# Patient Record
Sex: MALE | Race: BLACK OR AFRICAN AMERICAN | NOT HISPANIC OR LATINO | Employment: UNEMPLOYED | URBAN - METROPOLITAN AREA
[De-identification: names, ages, dates, MRNs, and addresses within clinical notes are randomized per-mention and may not be internally consistent; named-entity substitution may affect disease eponyms.]

---

## 2017-01-07 ENCOUNTER — HOSPITAL ENCOUNTER (EMERGENCY)
Facility: CLINIC | Age: 32
Discharge: HOME | End: 2017-01-07
Attending: EMERGENCY MEDICINE | Admitting: EMERGENCY MEDICINE
Payer: COMMERCIAL

## 2017-01-07 VITALS
RESPIRATION RATE: 16 BRPM | SYSTOLIC BLOOD PRESSURE: 119 MMHG | HEART RATE: 89 BPM | DIASTOLIC BLOOD PRESSURE: 108 MMHG | OXYGEN SATURATION: 100 % | TEMPERATURE: 98.6 F

## 2017-01-07 DIAGNOSIS — L03.317 CELLULITIS OF BUTTOCK, RIGHT: ICD-10-CM

## 2017-01-07 DIAGNOSIS — K61.0 ABSCESS, PERIANAL: ICD-10-CM

## 2017-01-07 PROCEDURE — 10060 I&D ABSCESS SIMPLE/SINGLE: CPT

## 2017-01-07 PROCEDURE — 99285 EMERGENCY DEPT VISIT HI MDM: CPT | Mod: 25

## 2017-01-07 PROCEDURE — 40000275 ZZH STATISTIC RCP TIME EA 10 MIN

## 2017-01-07 PROCEDURE — 99152 MOD SED SAME PHYS/QHP 5/>YRS: CPT

## 2017-01-07 PROCEDURE — 96374 THER/PROPH/DIAG INJ IV PUSH: CPT

## 2017-01-07 PROCEDURE — 25000125 ZZHC RX 250: Performed by: EMERGENCY MEDICINE

## 2017-01-07 PROCEDURE — 40000965 ZZH STATISTIC END TITIAL CO2 MONITORING

## 2017-01-07 PROCEDURE — 76882 US LMTD JT/FCL EVL NVASC XTR: CPT

## 2017-01-07 PROCEDURE — 96372 THER/PROPH/DIAG INJ SC/IM: CPT

## 2017-01-07 RX ORDER — OXYCODONE HYDROCHLORIDE 5 MG/1
5-10 TABLET ORAL EVERY 6 HOURS PRN
Qty: 12 TABLET | Refills: 0 | Status: SHIPPED | OUTPATIENT
Start: 2017-01-07 | End: 2017-06-16

## 2017-01-07 RX ORDER — SULFAMETHOXAZOLE/TRIMETHOPRIM 800-160 MG
2 TABLET ORAL 2 TIMES DAILY
Qty: 28 TABLET | Refills: 0 | Status: SHIPPED | OUTPATIENT
Start: 2017-01-07 | End: 2017-01-14

## 2017-01-07 RX ORDER — CEPHALEXIN 500 MG/1
500 CAPSULE ORAL 4 TIMES DAILY
Qty: 28 CAPSULE | Refills: 0 | Status: SHIPPED | OUTPATIENT
Start: 2017-01-07 | End: 2017-01-14

## 2017-01-07 RX ORDER — PROPOFOL 10 MG/ML
200 INJECTION, EMULSION INTRAVENOUS ONCE
Status: DISCONTINUED | OUTPATIENT
Start: 2017-01-07 | End: 2017-01-07 | Stop reason: HOSPADM

## 2017-01-07 RX ORDER — LIDOCAINE HYDROCHLORIDE AND EPINEPHRINE 10; 10 MG/ML; UG/ML
INJECTION, SOLUTION INFILTRATION; PERINEURAL
Status: DISCONTINUED
Start: 2017-01-07 | End: 2017-01-07 | Stop reason: HOSPADM

## 2017-01-07 RX ORDER — PROPOFOL 10 MG/ML
INJECTION, EMULSION INTRAVENOUS
Status: DISCONTINUED
Start: 2017-01-07 | End: 2017-01-07 | Stop reason: HOSPADM

## 2017-01-07 RX ORDER — FENTANYL CITRATE 50 UG/ML
INJECTION, SOLUTION INTRAMUSCULAR; INTRAVENOUS
Status: DISCONTINUED
Start: 2017-01-07 | End: 2017-01-07 | Stop reason: HOSPADM

## 2017-01-07 RX ORDER — KETOROLAC TROMETHAMINE 30 MG/ML
30 INJECTION, SOLUTION INTRAMUSCULAR; INTRAVENOUS ONCE
Status: COMPLETED | OUTPATIENT
Start: 2017-01-07 | End: 2017-01-07

## 2017-01-07 RX ORDER — FENTANYL CITRATE 50 UG/ML
75 INJECTION, SOLUTION INTRAMUSCULAR; INTRAVENOUS ONCE
Status: DISCONTINUED | OUTPATIENT
Start: 2017-01-07 | End: 2017-01-07 | Stop reason: HOSPADM

## 2017-01-07 RX ADMIN — HYDROMORPHONE HYDROCHLORIDE 1 MG: 1 INJECTION, SOLUTION INTRAMUSCULAR; INTRAVENOUS; SUBCUTANEOUS at 09:52

## 2017-01-07 RX ADMIN — KETOROLAC TROMETHAMINE 30 MG: 30 INJECTION, SOLUTION INTRAMUSCULAR at 09:52

## 2017-01-07 ASSESSMENT — ENCOUNTER SYMPTOMS
FEVER: 0
CHILLS: 0

## 2017-01-07 NOTE — ED AVS SNAPSHOT
[facility] Emergency Department    [address]    [facility] [address]    Phone:  [phone]    Fax:  [phone]                                       Bernard Padilla   MRN: [id number]    Department:  [facility] Emergency Department   Date of Visit:  1/7/2017           Patient Information     Date Of Birth          1985        Your diagnoses for this visit were:     Abscess, perianal     Cellulitis of buttock, right        You were seen by Bashir Gonzales MD.        Discharge Instructions       Please make an appointment to follow up with Colon and Rectal Surgery [phone] in 3-5 days if not improving.    Soak area 2-3 times daily for 5 days    Use tylenol and/or ibuprofen for pain or discomfort    Use Oxycodone for severe pain uncontrolled by above medications    Opioid Medication Information  You have been given a prescription for an opioid (narcotic) pain medicine and/or have received a pain medicine while here in the emergency department. These medicines can make you drowsy or impaired. You must not drive, operate dangerous equipment, or engage in any other dangerous activities while taking these medications. If you drive while taking these medications, you could be arrested for DUI, or driving under the influence. Do not drink any alcohol while you are taking these medications.   Opioid pain medications can cause addiction. If you have a history of chemical dependency of any type, you are at a higher risk of becoming addicted to pain medications. Only take these prescribed medications to treat your pain when all other options have been tried. Take it for as short a time and as few doses as possible. Store your pain pills in a secure place, as they are frequently stolen and provide a dangerous opportunity for children or visitors in your house to start abusing these powerful medications. We will not replace any lost or stolen medicine. As soon as your  pain is better, you should flush all your remaining medication.   Many prescription pain medications contain Tylenol (acetaminophen), including Vicodin, Tylenol #3, Norco, Lortab, and Percocet. You should not take any extra pills of Tylenol if you are using these prescription medications or you can get very sick. Do not ever take more than 4000 mg of acetaminophen in any 24 hour period.  All opioids tend to cause constipation. Drink plenty of water and eat foods that have a lot of fiber, such as fruits, vegetables, prune juice, apple juice and high fiber cereal. Take a laxative if you don t move your bowels at least every other day. Miralax, Milk of Magnesia, Colace, or Senna can be used to keep you regular.                Perianal Abscess, Incision and Drainage  Glands near the anus can become blocked. This can lead to infection. If the infection cannot drain, a collection of pus called an abscess may form. Symptoms of an abscess include pain, itching, swelling, and fever.  Treatment of this infection has required an incision to drain the pus from the abscess. A gauze packing may have been put into the abscess opening. This should be removed within 1-2 days. if it falls out sooner, do not try to put it back in. Treatment with antibiotics may or may not be needed.  Healing of the wound will take about 1 to 2 weeks, depending on the size of the abscess.  Home care    The wound may drain for the first several days. Cover the wound with a clean dry bandage. Change the dressing if it becomes soaked with blood or pus, or soiled with feces.    If gauze packing was placed inside the abscess cavity, you may be told to remove it yourself. Do this only do it if the doctor told you to. You may do this in the shower. Once the packing is removed, wash the area carefully once a day until the skin opening has closed.     Try sitz baths. Sit in a tub filled with about 6 inches of hot water for 15-30 minutes. (Test the water  temperature before sitting down to ensure it will not burn you.) Repeat this twice a day until pain is relieved.    If you were prescribed antibiotics, take all of the medicine as prescribed. Continue it even if you start feeling better. All of the medicine should be finished unless your healthcare provider tells you to stop.    Unless a pain medicine has been prescribed, you may take an over-the-counter medicine, such as ibuprofen, for pain.    Passing stools may be painful. If so, ask your healthcare provider about using a stool-softener for a short time.  Follow-up care  Follow up with your healthcare provider as advised by our staff.  When to seek medical advice  Call your health care provider if any of the following occur:    Increasing pain, swelling, or redness    Pus continuing to drain from the wound 5 days after the incision    Fever of 100.4 F (38 C) or higher, or as directed by your healthcare provider    8958-1784 [employer]. [address] [place] [address]. All rights reserved. This information is not intended as a substitute for professional medical care. Always follow your healthcare professional's instructions.              Cellulitis  Cellulitis is an infection of the deep layers of skin. A break in the skin, such as a cut or scratch, can let bacteria under the skin. If the bacteria get to deep layers of the skin, it can be serious. If not treated, cellulitis can get into the bloodstream and lymph nodes. The infection can then spread throughout the body. This causes serious illness.  Cellulitis causes the affected skin to become red, swollen, warm, and sore. The reddened areas have a visible border. An open sore may leak fluid (pus). You may have a fever, chills, and pain.  Cellulitis is treated with antibiotics taken for 7 to 10 days. An open sore may be cleaned and covered with cool wet gauze. Symptoms should get better 1 to 2 days after treatment is started. Make sure to  take all the antibiotics for the full number of days until they are gone. Keep taking the medicine even if your symptoms go away.  Home care  Follow these tips:    Limit the use of the part of your body with cellulitis. Movement can cause the infection to spread.    If the infection is on your leg, walk as little as possible in the first few days of the treatment. Keep your leg raised while sitting. This will help to reduce swelling.    Take all of the antibiotic medicine exactly as directed until it is gone. Do not miss any doses, especially during the first 7 days. Don t stop taking the medicine when your symptoms get better.    Keep the affected area clean and dry.    Wash your hands with soap and warm water before and after touching your skin. Anyone else who touches your skin should also wash his or her hands. Don't share towels.  Follow-up care  Follow up with your healthcare provider. If your infection does not go away on 1 antibiotic, your healthcare provider will prescribe a different one.  When to seek medical advice  Call your healthcare provider right away if any of these occur:    Red areas that spread    Swelling or pain that gets worse    Fluid leaking from the skin (pus)    Fever higher of 100.4  F (38.0  C) or higher after 2 days on antibiotics    9504-1168 [employer]. [address] [place] [address], [place] [address]. All rights reserved. This information is not intended as a substitute for professional medical care. Always follow your healthcare professional's instructions.          24 Hour Appointment Hotline       To make an appointment at any [facility], call [phone] ([phone]). If you don't have a family doctor or clinic, we will help you find one. [facility] are conveniently located to serve the needs of you and your family.             Review of your medicines      START taking        Dose / Directions Last dose taken    cephALEXin 500 MG capsule   Commonly  known as:  KEFLEX   Dose:  500 mg   Quantity:  28 capsule        Take 1 capsule (500 mg) by mouth 4 times daily for 7 days   Refills:  0        oxyCODONE 5 MG IR tablet   Commonly known as:  ROXICODONE   Dose:  5-10 mg   Quantity:  12 tablet        Take 1-2 tablets (5-10 mg) by mouth every 6 hours as needed for moderate to severe pain   Refills:  0        sulfamethoxazole-trimethoprim 800-160 MG per tablet   Commonly known as:  BACTRIM DS   Dose:  2 tablet   Quantity:  28 tablet        Take 2 tablets by mouth 2 times daily for 7 days   Refills:  0          Our records show that you are taking the medicines listed below. If these are incorrect, please call your family doctor or clinic.        Dose / Directions Last dose taken    metoclopramide 10 MG tablet   Commonly known as:  REGLAN   Dose:  10 mg   Quantity:  15 tablet        Take 1 tablet (10 mg) by mouth 4 times daily as needed   Refills:  0                Prescriptions were sent or printed at these locations (3 Prescriptions)                   Other Prescriptions                Printed at Department/Unit printer (3 of 3)         oxyCODONE (ROXICODONE) 5 MG IR tablet               cephALEXin (KEFLEX) 500 MG capsule               sulfamethoxazole-trimethoprim (BACTRIM DS) 800-160 MG per tablet                Procedures and tests performed during your visit     POC US SOFT TISSUE      Orders Needing Specimen Collection     None      Pending Results     Date and Time Order Name Status Description    1/7/2017 1110 POC US SOFT TISSUE In process             Pending Culture Results     No orders found from 1/6/2017 to 1/8/2017.       Test Results from your hospital stay           1/7/2017 11:10 AM - [facility], [employer]                Clinical Quality Measure: Blood Pressure Screening     Your blood pressure was checked while you were in the emergency department today. The last reading we obtained was  BP: 108/67 mmHg . Please read the guidelines below about what these  "numbers mean and what you should do about them.  If your systolic blood pressure (the top number) is less than 120 and your diastolic blood pressure (the bottom number) is less than 80, then your blood pressure is normal. There is nothing more that you need to do about it.  If your systolic blood pressure (the top number) is 120-139 or your diastolic blood pressure (the bottom number) is 80-89, your blood pressure may be higher than it should be. You should have your blood pressure rechecked within a year by a primary care provider.  If your systolic blood pressure (the top number) is 140 or greater or your diastolic blood pressure (the bottom number) is 90 or greater, you may have high blood pressure. High blood pressure is treatable, but if left untreated over time it can put you at risk for heart attack, stroke, or kidney failure. You should have your blood pressure rechecked by a primary care provider within the next 4 weeks.  If your provider in the emergency department today gave you specific instructions to follow-up with your doctor or provider even sooner than that, you should follow that instruction and not wait for up to 4 weeks for your follow-up visit.        Thank you for choosing [facility]       Thank you for choosing [facility] for your care. Our goal is always to provide you with excellent care. Hearing back from our patients is one way we can continue to improve our services. Please take a few minutes to complete the written survey that you may receive in the mail after you visit with us. Thank you!        [employer] Information     [employer] lets you send messages to your doctor, view your test results, renew your prescriptions, schedule appointments and more. To sign up, go to [email or web address] . Click on \"Log in\" on the left side of the screen, which will take you to the Welcome page. Then click on \"Sign up Now\" on the right side of the page.     You will be asked to enter the access code listed " below, as well as some personal information. Please follow the directions to create your username and password.     Your access code is: [email or web address]  Expires: 2017 12:05 PM     Your access code will  in 90 days. If you need help or a new code, please call your [place] clinic or [phone].        Care EveryWhere ID     This is your Care EveryWhere ID. This could be used by other organizations to access your [facility] [place] medical records  [id number]        After Visit Summary       This is your record. Keep this with you and show to your community pharmacist(s) and doctor(s) at your next visit.

## 2017-01-07 NOTE — ED NOTES
"Patient reports he has \"a boil on my butt\". It is on his right cheek \"and [facility]\"  "

## 2017-01-07 NOTE — ED NOTES
Patient consented for I and D procedure with sedation.  IV established, RCP present, suction on, all monitors on.  See special procedures sheet for documentation.

## 2017-01-07 NOTE — DISCHARGE INSTRUCTIONS
Please make an appointment to follow up with Colon and Rectal Surgery [phone] in 3-5 days if not improving.    Soak area 2-3 times daily for 5 days    Use tylenol and/or ibuprofen for pain or discomfort    Use Oxycodone for severe pain uncontrolled by above medications    Opioid Medication Information  You have been given a prescription for an opioid (narcotic) pain medicine and/or have received a pain medicine while here in the emergency department. These medicines can make you drowsy or impaired. You must not drive, operate dangerous equipment, or engage in any other dangerous activities while taking these medications. If you drive while taking these medications, you could be arrested for DUI, or driving under the influence. Do not drink any alcohol while you are taking these medications.   Opioid pain medications can cause addiction. If you have a history of chemical dependency of any type, you are at a higher risk of becoming addicted to pain medications. Only take these prescribed medications to treat your pain when all other options have been tried. Take it for as short a time and as few doses as possible. Store your pain pills in a secure place, as they are frequently stolen and provide a dangerous opportunity for children or visitors in your house to start abusing these powerful medications. We will not replace any lost or stolen medicine. As soon as your pain is better, you should flush all your remaining medication.   Many prescription pain medications contain Tylenol (acetaminophen), including Vicodin, Tylenol #3, Norco, Lortab, and Percocet. You should not take any extra pills of Tylenol if you are using these prescription medications or you can get very sick. Do not ever take more than 4000 mg of acetaminophen in any 24 hour period.  All opioids tend to cause constipation. Drink plenty of water and eat foods that have a lot of fiber, such as fruits, vegetables, prune juice, apple juice and high  fiber cereal. Take a laxative if you don t move your bowels at least every other day. Miralax, Milk of Magnesia, Colace, or Senna can be used to keep you regular.                Perianal Abscess, Incision and Drainage  Glands near the anus can become blocked. This can lead to infection. If the infection cannot drain, a collection of pus called an abscess may form. Symptoms of an abscess include pain, itching, swelling, and fever.  Treatment of this infection has required an incision to drain the pus from the abscess. A gauze packing may have been put into the abscess opening. This should be removed within 1-2 days. if it falls out sooner, do not try to put it back in. Treatment with antibiotics may or may not be needed.  Healing of the wound will take about 1 to 2 weeks, depending on the size of the abscess.  Home care    The wound may drain for the first several days. Cover the wound with a clean dry bandage. Change the dressing if it becomes soaked with blood or pus, or soiled with feces.    If gauze packing was placed inside the abscess cavity, you may be told to remove it yourself. Do this only do it if the doctor told you to. You may do this in the shower. Once the packing is removed, wash the area carefully once a day until the skin opening has closed.     Try sitz baths. Sit in a tub filled with about 6 inches of hot water for 15-30 minutes. (Test the water temperature before sitting down to ensure it will not burn you.) Repeat this twice a day until pain is relieved.    If you were prescribed antibiotics, take all of the medicine as prescribed. Continue it even if you start feeling better. All of the medicine should be finished unless your healthcare provider tells you to stop.    Unless a pain medicine has been prescribed, you may take an over-the-counter medicine, such as ibuprofen, for pain.    Passing stools may be painful. If so, ask your healthcare provider about using a stool-softener for a short  time.  Follow-up care  Follow up with your healthcare provider as advised by our staff.  When to seek medical advice  Call your health care provider if any of the following occur:    Increasing pain, swelling, or redness    Pus continuing to drain from the wound 5 days after the incision    Fever of 100.4 F (38 C) or higher, or as directed by your healthcare provider    4345-3195 [employer]. [address] [place] [address], [place] [address]. All rights reserved. This information is not intended as a substitute for professional medical care. Always follow your healthcare professional's instructions.              Cellulitis  Cellulitis is an infection of the deep layers of skin. A break in the skin, such as a cut or scratch, can let bacteria under the skin. If the bacteria get to deep layers of the skin, it can be serious. If not treated, cellulitis can get into the bloodstream and lymph nodes. The infection can then spread throughout the body. This causes serious illness.  Cellulitis causes the affected skin to become red, swollen, warm, and sore. The reddened areas have a visible border. An open sore may leak fluid (pus). You may have a fever, chills, and pain.  Cellulitis is treated with antibiotics taken for 7 to 10 days. An open sore may be cleaned and covered with cool wet gauze. Symptoms should get better 1 to 2 days after treatment is started. Make sure to take all the antibiotics for the full number of days until they are gone. Keep taking the medicine even if your symptoms go away.  Home care  Follow these tips:    Limit the use of the part of your body with cellulitis. Movement can cause the infection to spread.    If the infection is on your leg, walk as little as possible in the first few days of the treatment. Keep your leg raised while sitting. This will help to reduce swelling.    Take all of the antibiotic medicine exactly as directed until it is gone. Do not miss any doses, especially during  the first 7 days. Don t stop taking the medicine when your symptoms get better.    Keep the affected area clean and dry.    Wash your hands with soap and warm water before and after touching your skin. Anyone else who touches your skin should also wash his or her hands. Don't share towels.  Follow-up care  Follow up with your healthcare provider. If your infection does not go away on 1 antibiotic, your healthcare provider will prescribe a different one.  When to seek medical advice  Call your healthcare provider right away if any of these occur:    Red areas that spread    Swelling or pain that gets worse    Fluid leaking from the skin (pus)    Fever higher of 100.4  F (38.0  C) or higher after 2 days on antibiotics    7799-3497 [employer]. [address] [place] [address], [place] [address]. All rights reserved. This information is not intended as a substitute for professional medical care. Always follow your healthcare professional's instructions.

## 2017-01-07 NOTE — ED AVS SNAPSHOT
[facility] Emergency Department    [address]    [facility] [address]    Phone:  [phone]    Fax:  [phone]                                       Bernard Padilla   MRN: [id number]    Department:  [facility] Emergency Department   Date of Visit:  1/7/2017           After Visit Summary Signature Page     I have received my discharge instructions, and my questions have been answered. I have discussed any challenges I see with this plan with the nurse or doctor.    ..........................................................................................................................................  Patient/Patient Representative Signature      ..........................................................................................................................................  Patient Representative Print Name and Relationship to Patient    ..................................................               ................................................  Date                                            Time    ..........................................................................................................................................  Reviewed by Signature/Title    ...................................................              ..............................................  Date                                                            Time

## 2017-01-07 NOTE — ED PROVIDER NOTES
History     Chief Complaint:  Boil     HPI   Bernard Padilla is a 31 year old male who presents with a boil. The patient reports the onset of swelling and tenderness of his right buttock, and noticed a boil two days ago. He has had these multiple times in the past, each time needing to be incised and drained. He denies any fevers, or chills.     Allergies:  NKDA     Medications:    The patient is currently on no regular medications.      Past Medical History:    The patient denies any significant past medical history.     Past Surgical History:    Meniscus repair      Family History:    No past pertinent family history.     Social History:  Tobacco: Positive, current every day smoker   Alcohol: Positive, occasional   Marital Status:  Single [1]     Review of Systems   Constitutional: Negative for fever and chills.   Skin:        Boil lower right buttock   All other systems reviewed and are negative.      Physical Exam   First Vitals:  There were no vitals taken for this visit.        Physical Exam    HEENT:  mmm  Neck: supple  CV: ppi, regular   Resp: speaking in full sentences with any resp distress  Abd: L buttock induration and exquisite ttp perirectal area extending outward approx 4cm  Ext: no edema  Skin: warm dry well perfused  Neuro: Alert, no gross motor or sensory deficits,  gait stable        Emergency Department Course     Procedures:            POC US SOFT TISSUE (Final result) Result time: 01/07/17 18:38:36     Final result by Bashir Gonzales MD (01/07/17 18:38:36)     Impression:     PROCEDURE NOTE --> Emergency Bedside Ultrasound    Procedure Name: Bedside R buttock abscess     Preformed by: Bashir Gonzales MD    Indication - abscess     Suspicion of abscess    Probe: high frequency linear array    Windows - transverse and sagittal images, R buttock    Findings - 1cm, lateral to anal on sphincter on R buttock is a  large anechoic area consistent with abscess, and  surrounding  cobbelstoning.    Impression - abscess and surrounding cellulitis    Images saved on ultrasound internal hard drive per protocol         Procedure: Sedation       Expected Level:  Moderate Sedation      Indication:  Sedation is required to allow for Incision and drainage of abcess    Consent:  Risks (including but not limited to: respiratory depression, respiratory failure, or death), benefits and alternatives were discussed with    patient and consent for procedure was obtained.       Timeout: Universal protocol was followed.  TIME OUT conducted prior to     Starting procedure confirmed patient identity, site/side, procedure, patient    Position, and availability of correct equipment and implants?     Yes          PO Intake:  Unknown      ASA Class:  Class 1 - HEALTHY PATIENT      Mallampati:  Grade 1:  Soft palate, uvula, tonsillar pillars, and posterior pharyngeal wall visible      Lungs: clear       Heart: regular      Medication:  Fentanyl and Propofol      Monitoring:  Monitoring consisted of:  heart rate, cardiac monitor, continuous pulse     oximetry, continuous capnometry, frequent blood pressure checks, level of     consciousness, IV access, constant attendance by RN until patient recovered,     constant attendance by MD until patient stable and intubation and emergency     airway equipment available      Patient tolerance: Patient tolerated the procedure well with no immediate     complications, Vital signs stable, Airway patent, O2 Sats > 92%.      Patient Status:  Post procedure patient was alert.   Patient was monitored during recovery and returned to pre-procedure baseline.    TOTAL PHYSICIAN DRUG ADMINISTRATION/MONITORING TIME: 20       Procedure: Incision and Drainage   LOCATIONS:  Left Buttock     ANESTHESIA:  Local field block using Lidocaine 1% with epinephrine, total of 15 mLs     PREPARATION:  Cleansed with Normal Saline and Shur Clens     PROCEDURE:  Area was incised with # 11  Blade (Sharp [place]) with an Eliptical incision.  Wound treatment included Deloculation, Purulent Drainage and Expression of Material.  No Packing.  Appropriate dressing was applied to cover the area.  Open area approx 1.5,cm and 2.5 cm in length, a single interrupted suture (5-0 vicryl rapide) was placed in mid portion wound to reapproximate edges, but leave edges open to continue draining,     Patient Status:        Patient tolerated the procedure well. There were no complications.    Interventions:  0952 Dilaudid 1mg IV   Toradol 30mg IM       Emergency Department Course:  Nursing notes and vitals reviewed.  I performed an exam of the patient as documented above.     1021 I rechecked the patient.     1110 I rechecked the patient.     Findings and plan explained to the Patient. Patient discharged home with instructions regarding supportive care, medications, and reasons to return. The importance of close follow-up was reviewed.      Impression & Plan      Medical Decision Making:  Bernard Padilla is a 31 year old male with a history of cutaneous abscesses as well as a previous perianal abscess, here with concerns for perianal versus perirectal abscess developing over the last two days. Initial examination is significantly tender limiting full examination, but was given analgesics. He will need examination when the pain is more under control, to determine if this is a simple perianal abscess and can be drained here, or if we will need a CT scan to define anatomy and colorectal consult for possible perirectal.     The patient improved after the initial analgesics. Point of care ultrasound showed a perianal or ischioanal abscess with associated buttock cellulitis. We went ahead with an incision and drainage at bedside under US guidance. When ultrasounding towards he anus there was no extension of the abscess towards the anus and the maximal point of fluctuance was away from the anus and anal sphincter. Given the  surrounding cellulitis and induration we will treat him with antibiotics as well as the incision and drainage here. An elliptical incision was made and there was no packing. We discussed follow up care, skin care, wound care, and he was comfortable and in agreement with that plan and was discharged home.       Diagnosis:    ICD-10-CM    1. Abscess, perianal K61.0    2. Cellulitis of buttock, right L03.317        Disposition:  Discharged home.     Discharge Medications:  New Prescriptions    CEPHALEXIN (KEFLEX) 500 MG CAPSULE    Take 1 capsule (500 mg) by mouth 4 times daily for 7 days    OXYCODONE (ROXICODONE) 5 MG IR TABLET    Take 1-2 tablets (5-10 mg) by mouth every 6 hours as needed for moderate to severe pain    SULFAMETHOXAZOLE-TRIMETHOPRIM (BACTRIM DS) 800-160 MG PER TABLET    Take 2 tablets by mouth 2 times daily for 7 days     Stacia BOYD am serving as a scribe on 1/7/2017 at 9:08 AM to personally document services performed by Dr. Gonzales based on my observations and the provider's statements to me.     Stacia Tello  1/7/2017   [facility] EMERGENCY DEPARTMENT        Bashir Gonzales MD  01/07/17 [id number]

## 2017-01-07 NOTE — PROGRESS NOTES
RT NOTE: Present for conscious sedation. Pt's airway was maintained throughout procedure and vitals stable. No adverse events noted.   Odalis Lee,RT  1/7/2017 2:14 PM

## 2017-06-16 ENCOUNTER — HOSPITAL ENCOUNTER (EMERGENCY)
Facility: CLINIC | Age: 32
Discharge: HOME | End: 2017-06-16
Attending: EMERGENCY MEDICINE | Admitting: EMERGENCY MEDICINE

## 2017-06-16 ENCOUNTER — APPOINTMENT (OUTPATIENT)
Dept: ULTRASOUND IMAGING | Facility: CLINIC | Age: 32
End: 2017-06-16
Attending: EMERGENCY MEDICINE

## 2017-06-16 VITALS
HEART RATE: 71 BPM | SYSTOLIC BLOOD PRESSURE: 123 MMHG | BODY MASS INDEX: 30.61 KG/M2 | DIASTOLIC BLOOD PRESSURE: 72 MMHG | TEMPERATURE: 98.3 F | OXYGEN SATURATION: 100 % | RESPIRATION RATE: 16 BRPM | WEIGHT: 195 LBS | HEIGHT: 67 IN

## 2017-06-16 DIAGNOSIS — L02.219 CELLULITIS AND ABSCESS OF TRUNK: ICD-10-CM

## 2017-06-16 DIAGNOSIS — L03.319 CELLULITIS AND ABSCESS OF TRUNK: ICD-10-CM

## 2017-06-16 LAB
ALBUMIN UR-MCNC: NEGATIVE MG/DL
AMORPH CRY #/AREA URNS HPF: ABNORMAL /HPF
ANION GAP SERPL CALCULATED.3IONS-SCNC: 4 MMOL/L (ref 3–14)
APPEARANCE UR: ABNORMAL
BASOPHILS # BLD AUTO: 0 10E9/L (ref 0–0.2)
BASOPHILS NFR BLD AUTO: 0.3 %
BILIRUB UR QL STRIP: NEGATIVE
BUN SERPL-MCNC: 13 MG/DL (ref 7–30)
CALCIUM SERPL-MCNC: 9 MG/DL (ref 8.5–10.1)
CHLORIDE SERPL-SCNC: 106 MMOL/L (ref 94–109)
CO2 SERPL-SCNC: 31 MMOL/L (ref 20–32)
COLOR UR AUTO: YELLOW
CREAT SERPL-MCNC: 0.89 MG/DL (ref 0.66–1.25)
DIFFERENTIAL METHOD BLD: ABNORMAL
EOSINOPHIL # BLD AUTO: 0.3 10E9/L (ref 0–0.7)
EOSINOPHIL NFR BLD AUTO: 2.9 %
ERYTHROCYTE [DISTWIDTH] IN BLOOD BY AUTOMATED COUNT: 12.4 % (ref 10–15)
GFR SERPL CREATININE-BSD FRML MDRD: NORMAL ML/MIN/1.7M2
GLUCOSE SERPL-MCNC: 80 MG/DL (ref 70–99)
GLUCOSE UR STRIP-MCNC: NEGATIVE MG/DL
HCT VFR BLD AUTO: 45.8 % (ref 40–53)
HGB BLD-MCNC: 15.5 G/DL (ref 13.3–17.7)
HGB UR QL STRIP: NEGATIVE
IMM GRANULOCYTES # BLD: 0 10E9/L (ref 0–0.4)
IMM GRANULOCYTES NFR BLD: 0.3 %
KETONES UR STRIP-MCNC: NEGATIVE MG/DL
LEUKOCYTE ESTERASE UR QL STRIP: NEGATIVE
LYMPHOCYTES # BLD AUTO: 2.7 10E9/L (ref 0.8–5.3)
LYMPHOCYTES NFR BLD AUTO: 23.4 %
MCH RBC QN AUTO: 31.6 PG (ref 26.5–33)
MCHC RBC AUTO-ENTMCNC: 33.8 G/DL (ref 31.5–36.5)
MCV RBC AUTO: 94 FL (ref 78–100)
MONOCYTES # BLD AUTO: 0.8 10E9/L (ref 0–1.3)
MONOCYTES NFR BLD AUTO: 6.6 %
MUCOUS THREADS #/AREA URNS LPF: PRESENT /LPF
NEUTROPHILS # BLD AUTO: 7.6 10E9/L (ref 1.6–8.3)
NEUTROPHILS NFR BLD AUTO: 66.5 %
NITRATE UR QL: NEGATIVE
NRBC # BLD AUTO: 0 10*3/UL
NRBC BLD AUTO-RTO: 0 /100
PH UR STRIP: 6 PH (ref 5–7)
PLATELET # BLD AUTO: 215 10E9/L (ref 150–450)
POTASSIUM SERPL-SCNC: 4.2 MMOL/L (ref 3.4–5.3)
RBC # BLD AUTO: 4.9 10E12/L (ref 4.4–5.9)
RBC #/AREA URNS AUTO: 3 /HPF (ref 0–2)
SODIUM SERPL-SCNC: 141 MMOL/L (ref 133–144)
SP GR UR STRIP: 1.02 (ref 1–1.03)
URN SPEC COLLECT METH UR: ABNORMAL
UROBILINOGEN UR STRIP-MCNC: 2 MG/DL (ref 0–2)
WBC # BLD AUTO: 11.3 10E9/L (ref 4–11)
WBC #/AREA URNS AUTO: <1 /HPF (ref 0–2)

## 2017-06-16 PROCEDURE — 99152 MOD SED SAME PHYS/QHP 5/>YRS: CPT

## 2017-06-16 PROCEDURE — 81001 URINALYSIS AUTO W/SCOPE: CPT | Performed by: EMERGENCY MEDICINE

## 2017-06-16 PROCEDURE — 96374 THER/PROPH/DIAG INJ IV PUSH: CPT

## 2017-06-16 PROCEDURE — 25000125 ZZHC RX 250

## 2017-06-16 PROCEDURE — 96376 TX/PRO/DX INJ SAME DRUG ADON: CPT

## 2017-06-16 PROCEDURE — 96375 TX/PRO/DX INJ NEW DRUG ADDON: CPT

## 2017-06-16 PROCEDURE — 85025 COMPLETE CBC W/AUTO DIFF WBC: CPT | Performed by: EMERGENCY MEDICINE

## 2017-06-16 PROCEDURE — 25000128 H RX IP 250 OP 636: Performed by: EMERGENCY MEDICINE

## 2017-06-16 PROCEDURE — 93976 VASCULAR STUDY: CPT

## 2017-06-16 PROCEDURE — 80048 BASIC METABOLIC PNL TOTAL CA: CPT | Performed by: EMERGENCY MEDICINE

## 2017-06-16 PROCEDURE — 99285 EMERGENCY DEPT VISIT HI MDM: CPT | Mod: 25

## 2017-06-16 PROCEDURE — 10060 I&D ABSCESS SIMPLE/SINGLE: CPT

## 2017-06-16 PROCEDURE — 25000125 ZZHC RX 250: Performed by: EMERGENCY MEDICINE

## 2017-06-16 PROCEDURE — 40000275 ZZH STATISTIC RCP TIME EA 10 MIN

## 2017-06-16 PROCEDURE — 25000132 ZZH RX MED GY IP 250 OP 250 PS 637: Performed by: EMERGENCY MEDICINE

## 2017-06-16 RX ORDER — CEPHALEXIN 500 MG/1
500 CAPSULE ORAL ONCE
Status: COMPLETED | OUTPATIENT
Start: 2017-06-16 | End: 2017-06-16

## 2017-06-16 RX ORDER — HYDROMORPHONE HYDROCHLORIDE 1 MG/ML
0.5 INJECTION, SOLUTION INTRAMUSCULAR; INTRAVENOUS; SUBCUTANEOUS
Status: DISCONTINUED | OUTPATIENT
Start: 2017-06-16 | End: 2017-06-16 | Stop reason: HOSPADM

## 2017-06-16 RX ORDER — PROPOFOL 10 MG/ML
.5-1 INJECTION, EMULSION INTRAVENOUS ONCE
Status: COMPLETED | OUTPATIENT
Start: 2017-06-16 | End: 2017-06-16

## 2017-06-16 RX ORDER — BUPIVACAINE HYDROCHLORIDE AND EPINEPHRINE 5; 5 MG/ML; UG/ML
INJECTION, SOLUTION PERINEURAL
Status: DISCONTINUED
Start: 2017-06-16 | End: 2017-06-16 | Stop reason: HOSPADM

## 2017-06-16 RX ORDER — LIDOCAINE 40 MG/G
CREAM TOPICAL
Status: DISCONTINUED | OUTPATIENT
Start: 2017-06-16 | End: 2017-06-16 | Stop reason: HOSPADM

## 2017-06-16 RX ORDER — FENTANYL CITRATE 50 UG/ML
50 INJECTION, SOLUTION INTRAMUSCULAR; INTRAVENOUS
Status: DISCONTINUED | OUTPATIENT
Start: 2017-06-16 | End: 2017-06-16 | Stop reason: HOSPADM

## 2017-06-16 RX ORDER — ONDANSETRON 2 MG/ML
4 INJECTION INTRAMUSCULAR; INTRAVENOUS EVERY 30 MIN PRN
Status: DISCONTINUED | OUTPATIENT
Start: 2017-06-16 | End: 2017-06-16 | Stop reason: HOSPADM

## 2017-06-16 RX ORDER — PROPOFOL 10 MG/ML
INJECTION, EMULSION INTRAVENOUS
Status: COMPLETED
Start: 2017-06-16 | End: 2017-06-16

## 2017-06-16 RX ORDER — SULFAMETHOXAZOLE/TRIMETHOPRIM 800-160 MG
1 TABLET ORAL ONCE
Status: COMPLETED | OUTPATIENT
Start: 2017-06-16 | End: 2017-06-16

## 2017-06-16 RX ORDER — SULFAMETHOXAZOLE/TRIMETHOPRIM 800-160 MG
1 TABLET ORAL 2 TIMES DAILY
Qty: 20 TABLET | Refills: 0 | Status: SHIPPED | OUTPATIENT
Start: 2017-06-16 | End: 2017-06-26

## 2017-06-16 RX ORDER — OXYCODONE AND ACETAMINOPHEN 5; 325 MG/1; MG/1
1-2 TABLET ORAL EVERY 4 HOURS PRN
Qty: 15 TABLET | Refills: 0 | Status: SHIPPED | OUTPATIENT
Start: 2017-06-16 | End: 2017-09-04

## 2017-06-16 RX ORDER — ONDANSETRON 4 MG/1
4 TABLET, ORALLY DISINTEGRATING ORAL EVERY 8 HOURS PRN
Qty: 10 TABLET | Refills: 0 | Status: SHIPPED | OUTPATIENT
Start: 2017-06-16 | End: 2017-06-19

## 2017-06-16 RX ORDER — CEPHALEXIN 500 MG/1
500 CAPSULE ORAL 4 TIMES DAILY
Qty: 40 CAPSULE | Refills: 0 | Status: SHIPPED | OUTPATIENT
Start: 2017-06-16 | End: 2017-06-26

## 2017-06-16 RX ORDER — HYDROCODONE BITARTRATE AND ACETAMINOPHEN 5; 325 MG/1; MG/1
1-2 TABLET ORAL EVERY 4 HOURS PRN
Qty: 15 TABLET | Refills: 0 | Status: SHIPPED | OUTPATIENT
Start: 2017-06-16 | End: 2017-06-16

## 2017-06-16 RX ORDER — NALOXONE HYDROCHLORIDE 0.4 MG/ML
.1-.4 INJECTION, SOLUTION INTRAMUSCULAR; INTRAVENOUS; SUBCUTANEOUS
Status: DISCONTINUED | OUTPATIENT
Start: 2017-06-16 | End: 2017-06-16 | Stop reason: HOSPADM

## 2017-06-16 RX ORDER — FLUMAZENIL 0.1 MG/ML
0.2 INJECTION, SOLUTION INTRAVENOUS
Status: DISCONTINUED | OUTPATIENT
Start: 2017-06-16 | End: 2017-06-16 | Stop reason: HOSPADM

## 2017-06-16 RX ORDER — FENTANYL CITRATE 50 UG/ML
50 INJECTION, SOLUTION INTRAMUSCULAR; INTRAVENOUS ONCE
Status: COMPLETED | OUTPATIENT
Start: 2017-06-16 | End: 2017-06-16

## 2017-06-16 RX ADMIN — ONDANSETRON 4 MG: 2 INJECTION INTRAMUSCULAR; INTRAVENOUS at 17:17

## 2017-06-16 RX ADMIN — SULFAMETHOXAZOLE AND TRIMETHOPRIM 1 TABLET: 800; 160 TABLET ORAL at 20:37

## 2017-06-16 RX ADMIN — FENTANYL CITRATE 50 MCG: 50 INJECTION INTRAMUSCULAR; INTRAVENOUS at 19:52

## 2017-06-16 RX ADMIN — PROPOFOL 40 MG: 10 INJECTION, EMULSION INTRAVENOUS at 19:59

## 2017-06-16 RX ADMIN — HYDROMORPHONE HYDROCHLORIDE 0.5 MG: 1 INJECTION, SOLUTION INTRAMUSCULAR; INTRAVENOUS; SUBCUTANEOUS at 18:22

## 2017-06-16 RX ADMIN — NICOTINE 1 CARTRIDGE.: 4 INHALANT RESPIRATORY (INHALATION) at 19:24

## 2017-06-16 RX ADMIN — PROPOFOL 20 MG: 10 INJECTION, EMULSION INTRAVENOUS at 20:02

## 2017-06-16 RX ADMIN — HYDROMORPHONE HYDROCHLORIDE 1 MG: 1 INJECTION, SOLUTION INTRAMUSCULAR; INTRAVENOUS; SUBCUTANEOUS at 17:19

## 2017-06-16 RX ADMIN — PROPOFOL 40 MG: 10 INJECTION, EMULSION INTRAVENOUS at 19:57

## 2017-06-16 RX ADMIN — PROPOFOL 20 MG: 10 INJECTION, EMULSION INTRAVENOUS at 20:01

## 2017-06-16 RX ADMIN — CEPHALEXIN 500 MG: 500 CAPSULE ORAL at 20:37

## 2017-06-16 RX ADMIN — PROPOFOL 20 MG: 10 INJECTION, EMULSION INTRAVENOUS at 20:00

## 2017-06-16 ASSESSMENT — ENCOUNTER SYMPTOMS: FEVER: 0

## 2017-06-16 NOTE — ED AVS SNAPSHOT
[facility] Emergency Department    [address]    [facility] [address]    Phone:  [phone]    Fax:  [phone]                                       Bernard Padilla   MRN: [id number]    Department:  [facility] Emergency Department   Date of Visit:  6/16/2017           After Visit Summary Signature Page     I have received my discharge instructions, and my questions have been answered. I have discussed any challenges I see with this plan with the nurse or doctor.    ..........................................................................................................................................  Patient/Patient Representative Signature      ..........................................................................................................................................  Patient Representative Print Name and Relationship to Patient    ..................................................               ................................................  Date                                            Time    ..........................................................................................................................................  Reviewed by Signature/Title    ...................................................              ..............................................  Date                                                            Time

## 2017-06-16 NOTE — ED AVS SNAPSHOT
[facility] Emergency Department    [address]    [facility] [address]    Phone:  [phone]    Fax:  [phone]                                       Bernard Padilla   MRN: [id number]    Department:  [facility] Emergency Department   Date of Visit:  6/16/2017           Patient Information     Date Of Birth          1985        Your diagnoses for this visit were:     Cellulitis and abscess of trunk left groin       You were seen by Juan Gonzalez MD.      Follow-up Information     Follow up with Bacilio Marshall MD In 3 days.    Specialty:  Surgery    Contact information:    [facility]  [address]   [facility] [address]  [phone]          Discharge Instructions         Abscess (Incision & Drainage)  An abscess is sometimes called a boil. It happens when bacteria get trapped under the skin and start to grow. Pus forms inside the abscess as the body responds to the bacteria. An abscess can happen with an insect bite, ingrown hair, blocked oil gland, pimple, cyst, or puncture wound.  Your healthcare provider has drained the pus from your abscess. If the abscess pocket was large, your healthcare provider may have put in gauze packing. Your provider will need to remove it on your next visit. He or she may also replace it at that time. You may not need antibiotics to treat a simple abscess, unless the infection is spreading into the skin around the wound (cellulitis).  The wound will take about 1 to 2 weeks to heal, depending on the size of the abscess. Healthy tissue will grow from the bottom and sides of the opening until it seals over.  Home care  These tips can help your wound heal:    The wound may drain for the first 2 days. Cover the wound with a clean dry dressing. Change the dressing if it becomes soaked with blood or pus.    If a gauze packing was placed inside the abscess pocket, you may be told to remove it  yourself. You may do this in the shower. Once the packing is removed, you should wash the area in the shower, or clean the area as directed by your provider. Continue to do this until the skin opening has closed. Make sure you wash your hands after changing the packing or cleaning the wound.    If you were prescribed antibiotics, take them as directed until they are all gone.    You may use acetaminophen or ibuprofen to control pain, unless another pain medicine was prescribed. If you have liver disease or ever had a stomach ulcer, talk with your doctor before using these medicines.  Follow-up care  Follow up with your healthcare provider, or as advised. If a gauze packing was put in your wound, it should be removed in 1 to 2 days. Check your wound every day for any signs that the infection is getting worse. The signs are listed below.  When to seek medical advice  Call your healthcare provider right away if any of these occur:    Increasing redness or swelling    Red streaks in the skin leading away from the wound    Increasing local pain or swelling    Continued pus draining from the wound 2 days after treatment    Fever of 100.4 F (38 C) or higher, or as directed by your healthcare provider    Boil returns when you are at home  Date Last Reviewed: 9/1/2016 2000-2017 [employer]. [address] [place] [address], [place] [address]. All rights reserved. This information is not intended as a substitute for professional medical care. Always follow your healthcare professional's instructions.          Wound Care After Packing Removal or Replacement  Packing is a special type of dressing placed inside a wound to help it heal. Once the packing is removed, you need to care for your wound. Good wound care helps prevent infection. Be sure to keep all follow-up appointments with your healthcare provider. Follow these instructions to take care of the wound once you re at home.  Home care  Your healthcare provider may  prescribe medicines for pain. Or he or she may suggest an over-the-counter (OTC) pain reliever, such as ibuprofen or acetaminophen. Talk with your healthcare provider before taking any OTC medicines if you have chronic liver or kidney disease, a stomach ulcer, or gastrointestinal bleeding. In certain cases, you may also need to take antibiotics to help prevent infection. If so, take them exactly as directed for as long as directed. Don t stop taking your antibiotics until they are all gone, even if you feel better.  Here are some general care guidelines for your wound:    Follow your healthcare provider s instructions on how to care for your wound. Always wash your hands with soap and warm water before and after tending to your wound.    If a bandage was put on, remove and change it once a day or as directed. If the bandage gets wet or dirty, replace it as soon as possible with a new bandage. Use a clean cloth to gently pat the wound dry.    If your packing was replaced, a small piece of gauze may hang from the wound. It allows fluid to continue draining from the wound. You may need to use an ointment or cream to keep the packing from sticking to the bandage.    Don't bathe in a tub or soak your wound until your healthcare provider says it s OK. Take showers or sponge baths instead. Avoid swimming.    Don t scratch, rub, scrub, or pick your wound.    Check your wound daily for the signs of infection listed below.  If your wound was closed, it was likely with one of four types of closures. These include stitches (sutures), strips of surgical tape, skin glue, and staples. Your healthcare provider will decide on the best closure based on the size and location of your wound. Each type of closure needs specific care.    Sutures. You may want to clean the wound daily after the first 2 to 3 days. To do this, remove the bandage and gently wash the area with soap and warm water. After cleaning, apply a thin layer of  antibiotic ointment if recommended. Then put on a new bandage. Sutures on the outside of the skin usually need to be removed by your healthcare provider.    Surgical tape. Keep the area dry. If it gets wet, blot it dry with a towel. Surgical tape closures usually fall off within 7 to 10 days. If they have not fallen off after 10 days, you can remove them yourself. To remove the tape, use mineral oil or petroleum jelly on a cotton ball to gently rub the adhesive.    Skin glue. You may shower or bathe as usual. But do not use soaps, lotions, or ointments on the wound area. Do not scrub the wound. After bathing, pat the wound dry with a soft towel. Do not apply liquids like peroxide, ointments, or creams to the wound while the strips or film is in place. Don't scratch, rub, or pick at the strips or film. Don't put tape directly over the strips or film. Skin adhesive film will fall off naturally in 5 to 10 days. If it does not peel off in 10 days, gently rub petroleum jelly or an ointment onto the film.    Ruth. Take showers or sponge baths. Don't take tub baths. Don't use lotions on the wound area. The area may be cleaned with soap and water 2 to 3 days after the wound was stapled. Don't scrub the wound. Pat it dry with a clean soft cloth or towel. You can use antibiotic ointment if your healthcare provider tells you to. Staples will need to be removed in 10 to 14 days.  Follow-up care  Follow up with your healthcare provider, or as directed. If your packing was replaced, you may need another visit within 1 to 3 days to remove or replace it. If you have sutures or staples, return for their removal as directed.  When to seek medical advice  Call your health care provider right away if you have signs of infection:    Fever of 1 degree or more above your normal temperature, or as directed by your healthcare provider    Increasing pain in the wound or pain that doesn t get better even with pain medicine    Increasing  redness or swelling    Pus or bad-smelling drainage from the wound  Also call your healthcare provider right away if any of these occur:    Your wound bleeds more than a small amount or won t stop bleeding.    The edges of your wound come apart.    You have numbness or weakness in the wound area that doesn t go away.  Date Last Reviewed: 10/2/2015    0827-1359 [employer]. [address] [place] [address], [place] [address]. All rights reserved. This information is not intended as a substitute for professional medical care. Always follow your healthcare professional's instructions.          24 Hour Appointment Hotline       To make an appointment at any [facility], call [phone] ([phone]). If you don't have a family doctor or clinic, we will help you find one. [facility] clinics are conveniently located to serve the needs of you and your family.             Review of your medicines      START taking        Dose / Directions Last dose taken    cephALEXin 500 MG capsule   Commonly known as:  KEFLEX   Dose:  500 mg   Quantity:  40 capsule        Take 1 capsule (500 mg) by mouth 4 times daily for 10 days   Refills:  0        ondansetron 4 MG ODT tab   Commonly known as:  ZOFRAN ODT   Dose:  4 mg   Quantity:  10 tablet        Take 1 tablet (4 mg) by mouth every 8 hours as needed for nausea   Refills:  0        oxyCODONE-acetaminophen 5-325 MG per tablet   Commonly known as:  PERCOCET   Dose:  1-2 tablet   Quantity:  15 tablet        Take 1-2 tablets by mouth every 4 hours as needed for pain   Refills:  0        sulfamethoxazole-trimethoprim 800-160 MG per tablet   Commonly known as:  BACTRIM DS   Dose:  1 tablet   Quantity:  20 tablet        Take 1 tablet by mouth 2 times daily for 10 days   Refills:  0          Our records show that you are taking the medicines listed below. If these are incorrect, please call your family doctor or clinic.        Dose / Directions Last dose taken    metoclopramide 10  MG tablet   Commonly known as:  REGLAN   Dose:  10 mg   Quantity:  15 tablet        Take 1 tablet (10 mg) by mouth 4 times daily as needed   Refills:  0                Prescriptions were sent or printed at these locations (4 Prescriptions)                   Other Prescriptions                Printed at Department/Unit printer (4 of 4)         ondansetron (ZOFRAN ODT) 4 MG ODT tab               cephALEXin (KEFLEX) 500 MG capsule               sulfamethoxazole-trimethoprim (BACTRIM DS) 800-160 MG per tablet               oxyCODONE-acetaminophen (PERCOCET) 5-325 MG per tablet                Procedures and tests performed during your visit     Basic metabolic panel    CBC with platelets differential    Cardiac Continuous Monitoring    Peripheral IV catheter    UA with Microscopic    US Testicular & Scrotum w Doppler Ltd      Orders Needing Specimen Collection     None      Pending Results     No orders found from 6/14/2017 to 6/17/2017.            Pending Culture Results     No orders found from 6/14/2017 to 6/17/2017.            Pending Results Instructions     If you had any lab results that were not finalized at the time of your Discharge, you can call the ED Lab Result RN at [phone]. You will be contacted by this team for any positive Lab results or changes in treatment. The nurses are available 7 days a week from 10A to 6:30P.  You can leave a message 24 hours per day and they will return your call.        Test Results From Your Hospital Stay        6/16/2017  5:42 PM      Component Results     Component Value Ref Range & Units Status    WBC 11.3 (H) 4.0 - 11.0 10e9/L Final    RBC Count 4.90 4.4 - 5.9 10e12/L Final    Hemoglobin 15.5 13.3 - 17.7 g/dL Final    Hematocrit 45.8 40.0 - 53.0 % Final    MCV 94 78 - 100 fl Final    MCH 31.6 26.5 - 33.0 pg Final    MCHC 33.8 31.5 - 36.5 g/dL Final    RDW 12.4 10.0 - 15.0 % Final    Platelet Count 215 150 - 450 10e9/L Final    Diff Method Automated Method  Final    %  Neutrophils 66.5 % Final    % Lymphocytes 23.4 % Final    % Monocytes 6.6 % Final    % Eosinophils 2.9 % Final    % Basophils 0.3 % Final    % Immature Granulocytes 0.3 % Final    Nucleated RBCs 0 0 /100 Final    Absolute Neutrophil 7.6 1.6 - 8.3 10e9/L Final    Absolute Lymphocytes 2.7 0.8 - 5.3 10e9/L Final    Absolute Monocytes 0.8 0.0 - 1.3 10e9/L Final    Absolute Eosinophils 0.3 0.0 - 0.7 10e9/L Final    Absolute Basophils 0.0 0.0 - 0.2 10e9/L Final    Abs Immature Granulocytes 0.0 0 - 0.4 10e9/L Final    Absolute Nucleated RBC 0.0  Final         6/16/2017  5:54 PM      Component Results     Component Value Ref Range & Units Status    Sodium 141 133 - 144 mmol/L Final    Potassium 4.2 3.4 - 5.3 mmol/L Final    Chloride 106 94 - 109 mmol/L Final    Carbon Dioxide 31 20 - 32 mmol/L Final    Anion Gap 4 3 - 14 mmol/L Final    Glucose 80 70 - 99 mg/dL Final    Urea Nitrogen 13 7 - 30 mg/dL Final    Creatinine 0.89 0.66 - 1.25 mg/dL Final    GFR Estimate >90  Non  GFR Calc   >60 mL/min/1.7m2 Final    GFR Estimate If Black >90   GFR Calc   >60 mL/min/1.7m2 Final    Calcium 9.0 8.5 - 10.1 mg/dL Final         6/16/2017  5:46 PM      Component Results     Component Value Ref Range & Units Status    Color Urine Yellow  Final    Appearance Urine Slightly Cloudy  Final    Glucose Urine Negative NEG mg/dL Final    Bilirubin Urine Negative NEG Final    Ketones Urine Negative NEG mg/dL Final    Specific Gravity Urine 1.023 1.003 - 1.035 Final    Blood Urine Negative NEG Final    pH Urine 6.0 5.0 - 7.0 pH Final    Protein Albumin Urine Negative NEG mg/dL Final    Urobilinogen mg/dL 2.0 0.0 - 2.0 mg/dL Final    Nitrite Urine Negative NEG Final    Leukocyte Esterase Urine Negative NEG Final    Source Midstream Urine  Final    WBC Urine <1 0 - 2 /HPF Final    RBC Urine 3 (H) 0 - 2 /HPF Final    Mucous Urine Present (A) NEG /LPF Final    Amorphous Crystals Few (A) NEG /HPF Final         6/16/2017   5:57 PM      Narrative     EXAMINATION: US TESTICULAR AND SCROTAL, 6/16/2017 5:49 PM     COMPARISON: None.    HISTORY: Left groin pain and lump, concern for testicular problem,  lymph node, abscess or hernia.    TECHNIQUE: The scrotum was scanned in standard fashion with  specialized ultrasound transducer(s) using both grey scale and limited  color Doppler techniques.    Findings:  The testes demonstrate normal and symmetric echotexture and  vascularity. No evidence of a focal lesion.  The right testicle  measures 4.2 x 2.9 x 2.1 cm and the left measures 3.9 x 3.0 x 2.1 cm.  There is no evidence of torsion.    There is no evidence of a significant hydrocele or varicocele.    Both the right and left epididymis are within normal limits.    2 normal-appearing lymph nodes are seen in the left groin. Superficial  to this is a 2.7 x 1.7 x 0.6 cm complex fluid collection with  surrounding hyperemia. This does appear to be within or just deep to  the skin.        Impression     Impression:   1.  Complex fluid collection with surrounding hyperemia at patient's  palpable area of concern in the left groin. This appears to be within  or just deep to the skin, suggesting that it is an inflammatory or  possibly infectious process associated with a skin appendage such as  pilonidal cyst or sebaceous cyst.  2.  Normal-appearing lymph nodes are seen in the left groin as well.  3.  Normal appearance of the testicles.  4.  No hernia demonstrated.    YANET MARKS                Clinical Quality Measure: Blood Pressure Screening     Your blood pressure was checked while you were in the emergency department today. The last reading we obtained was  BP: 104/46 . Please read the guidelines below about what these numbers mean and what you should do about them.  If your systolic blood pressure (the top number) is less than 120 and your diastolic blood pressure (the bottom number) is less than 80, then your blood pressure is normal. There is  "nothing more that you need to do about it.  If your systolic blood pressure (the top number) is 120-139 or your diastolic blood pressure (the bottom number) is 80-89, your blood pressure may be higher than it should be. You should have your blood pressure rechecked within a year by a primary care provider.  If your systolic blood pressure (the top number) is 140 or greater or your diastolic blood pressure (the bottom number) is 90 or greater, you may have high blood pressure. High blood pressure is treatable, but if left untreated over time it can put you at risk for heart attack, stroke, or kidney failure. You should have your blood pressure rechecked by a primary care provider within the next 4 weeks.  If your provider in the emergency department today gave you specific instructions to follow-up with your doctor or provider even sooner than that, you should follow that instruction and not wait for up to 4 weeks for your follow-up visit.        Thank you for choosing [facility]       Thank you for choosing [facility] for your care. Our goal is always to provide you with excellent care. Hearing back from our patients is one way we can continue to improve our services. Please take a few minutes to complete the written survey that you may receive in the mail after you visit with us. Thank you!        [employer] Information     [employer] lets you send messages to your doctor, view your test results, renew your prescriptions, schedule appointments and more. To sign up, go to [email or web address] . Click on \"Log in\" on the left side of the screen, which will take you to the Welcome page. Then click on \"Sign up Now\" on the right side of the page.     You will be asked to enter the access code listed below, as well as some personal information. Please follow the directions to create your username and password.     Your access code is: [email or web address]  Expires: 2017  8:55 PM     Your access code will  in 90 days. If you " need help or a new code, please call your [facility] clinic or [phone].        Care EveryWhere ID     This is your Care EveryWhere ID. This could be used by other organizations to access your [facility] medical records  [id number]        After Visit Summary       This is your record. Keep this with you and show to your community pharmacist(s) and doctor(s) at your next visit.

## 2017-06-16 NOTE — ED PROVIDER NOTES
"  History     Chief Complaint:  Groin Pain    HPI   Bernard Padilla is a 31 year old male who presents with groin pain in the left side of his groin. The patient noticed a lump in his left groin in the intercrural crease inferior to the inguinal ligament.  It has been there for a month or perhaps longer.  [facility] two ago and told at that time there was likely an inguinal hernia. He notices that the lump got bigger a few days ago. Today, while at work the patient was bending over to lift a box began to feel abrupt increase, with sharp pain around his left left groin while bending over to  something.  He feels that the lump is now bigger.    No associated fever or chills.  No urinary symptoms such as urgency frequency or dysuria.  No known trauma.  No nausea or vomiting.  Normal bowel movements.    Allergies:  No known drug allergies.    Medications:    The patient is currently on no regular medications.     Past Medical History:    History reviewed.  No significant past medical history.     Past Surgical History:    Meniscus repair    Family History:    History reviewed. No pertinent family history.    Social History:  Marital Status: Single  Presents to the ED with daughter  Tobacco Use: Current daily smoker, 0.5 PPD.  Alcohol Use: Occasional alcohol use.  PC[facility]: [facility]     Review of Systems   Constitutional: Negative for fever.   Musculoskeletal:        Positive for groin pain   All other systems reviewed and are negative.    Physical Exam   First Vitals:  BP: 133/78  Pulse: 71  Resp: 18  Height: 170.2 cm (5' 7\")  Weight: 88.5 kg (195 lb)  SpO2: 98 %    Physical Exam  Constitutional:  Appears well-developed and well-nourished. Alert. Very uncomfortable, grimacing, and is barely conversant.  HENT:   Head: Atraumatic.   Nose: Nose normal.  Mouth/Throat: Oral mucosa is clear and moist. no trismus. Pharynx normal. Tonsils symmetric. No tonsillar " enlargement, erythema, or exudate.  Eyes: Conjunctivae normal. EOM normal. Pupils equal, round, and reactive to light. No scleral icterus.   Neck: Normal range of motion. Neck supple. No tracheal deviation present.   Cardiovascular: Normal rate, regular rhythm. No gallop. No friction rub. No murmur heard. Symmetric radial artery pulses   Pulmonary/Chest: Effort normal. No stridor. No respiratory distress. No wheezes. No rales. No rhonchi . No tenderness.   Abdominal: Soft. Bowel sounds normal. No distension. No mass. Mild suprapubic/LLQ tenderness. No rebound. No guarding.   : normal penis. Right testicle normal. Left testicle seems to be high riding but non-tender. There is a 1x2cm firm, apparently cutaneous nodule in the intercrural fold at the top of the lift thigh (likely cutaneous abscess vs lymph node). Very tender  Above left testicle, but no definite hernia or mass in the inguinal ring.  Musculoskeletal: Normal range of motion. No edema. No tenderness. No deformity   Lymph: No cervical adenopathy.   Neurological: Alert and oriented to person, place, and time. Normal strength. CN II-VII intact. No sensory deficit. GCS eye subscore is 4. GCS verbal subscore is 5. GCS motor subscore is 6. Normal coordination   Skin: Skin is warm and dry. No rash noted. No pallor. Normal capillary refill.  Psychiatric:  Normal mood. Normal affect.     Emergency Department Course     Imaging:  US Testicular & Scrotum w Doppler Ltd  1.  Complex fluid collection with surrounding hyperemia at patient's  palpable area of concern in the left groin. This appears to be within  or just deep to the skin, suggesting that it is an inflammatory or  possibly infectious process associated with a skin appendage such as  pilonidal cyst or sebaceous cyst.  2.  Normal-appearing lymph nodes are seen in the left groin as well.  3.  Normal appearance of the testicles.  4.  No hernia demonstrated.  Report per radiology.  Radiographic findings were  communicated with the patient who voiced understanding of the findings.      Laboratory:   CBC:  WBC 11.3 (H), HGB 15.5, , otherwise WNL  BMP: WNL (Creatinine 0.89)    UA with Microscope: RBC/HPF: 3 (H), mucous present, amorphous crystals: few    Procedures:      Sedation:      PERFORMED BY: Dr. Gonzalez    Pre-Procedure Assessment done at 1930.    EXPECTED LEVEL:  Deep Sedation    INDICATION:  Sedation is required to allow for Incision and drainage of abcess    Consent obtained from patient after discussing the risks, benefits and alternatives.    PO INTAKE: Appropriately NPO for procedure    ASA CLASS: Class 1 - HEALTHY PATIENT    MALLAMPATI: Grade 2:  Soft palate, base of uvula, tonsillar pillars, and portion of posterior pharyngeal wall visible    LUNGS: Lungs Clear with good breath sounds bilaterally.     HEART: Normal heart sounds and rate    History and physical reviewed and no updates needed. I have reviewed the lab findings, diagnostic data, medications, and the plan for sedation. I have determined this patient to be an appropriate candidate for the planned sedation and procedure and have reassessed the patient IMMEDIATELY PRIOR to sedation and procedure.    Sedation Post Procedure Summary:    Prior to the start of the procedure and with procedural staff participation, I verbally confirmed the patient s identity using two indicators, relevant allergies, that the procedure was appropriate and matched the consent or emergent situation, and that the correct equipment/implants were available. Immediately prior to starting the procedure I conducted the Time Out with the procedural staff and re-confirmed the patient s name, procedure, and site/side. (The Joint Commission universal protocol was followed.)  Yes      SEDATIVES: Fentanyl and Propofol    Vital signs, airway, End Tidal CO2 and pulse oximetry were monitored and remained stable throughout the procedure and sedation was maintained until the  procedure was complete.  The patient was monitored by staff until sedation discharge criteria were met.    PATIENT TOLERANCE: Patient tolerated the procedure well with no immediate complications.    TIME OF SEDATION IN MINUTES:  20 minutes from beginning to end of physician one to one monitoring.         Procedure: Incision and Drainage     LOCATIONS:  Left groin    ANESTHESIA:  Local field block using Marcaine 0.5% with epinephrine, total of 10 mLs    PREPARATION:  Cleansed with Betadine    PROCEDURE:  Area was incised with # 11 Blade (Sharp Point) with a Single Straight incision.  Wound treatment included Deloculation.  Packing consisted of Iodoform Gauze.  Appropriate dressing was applied to cover the area.    Patient Status: Patient tolerated the procedure well. There were no complications.    Interventions:  (1717) Zofran, 4 mg, IV injection  (1719) Dilaudid, 1 mg, IV injection  (1822) Dilaudid, 0.5 mg, IV injection  (1924) nicotine inhaler, 1 cartridge  (1952) Fentanyl, 50 mcg, IV injection  (1957) Propofol, 40 mg, IV injection  (1959) Propofol, 40 mg, IV injection  (2000) Propofol, 20 mg, IV injection  (2001) Propofol, 20 mg, IV injection  (2002) Propofol, 20 mg, IV injection  (2037) Bactrim DS/Septra DS, 800-160 mg, PO    Emergency Department Course:  Nursing notes and vitals reviewed.  I performed an exam of the patient as documented above.  A peripheral IV was established. Blood was drawn from the patient. This was sent for laboratory testing, findings above.   The patient was sent for a testicular/scrotal US while in the emergency department, findings above.   I performed a incision and drainage and sedation as documented above.  Findings and plan explained to the patient. Patient discharged home with instructions regarding supportive care, medications, and reasons to return. The importance of close follow-up was reviewed. The patient was prescribed bactrim DS, keflex, Norco, Zofran ODT, Percocet.   I  personally reviewed the laboratory results with the patient and answered all related questions prior to discharge.     Impression & Plan    Medical Decision Making:  Bernard Padilla is a pleasant 31 year old male who presents with pain in his left groin. He has apparently had a lump in the left groin that has been there for a month or so and was previously evaluated at [facility] and he was told at that time he had a hernia. It got acutely more painful today at work so he came here. Initial differential included what appeared to be a cutaneous abscess with surrounding cellulitis. However, given the abrupt worsening in his pain, differential also included inguinal hernia with incarceration and testicular torsion. We sent the patient for scrotal ultrasound which confirms normal testicle, no precedence of any intestine in the groin or inguinal area and does show a complex fluid collection in the skin consistent with abscess.     The patient was acutely uncomfortable. He was not a candidate to undergo I&D while awake. We did procedural sedation as noted above without above without complication, injected local anesthesia, and made a 1.5 cm linear incision in the skin with copious purulent drainage. I was able to deloculate the abscess and packed it with iodoform gauze. The patient is now feeling better. He is otherwise hemodynamically stable and nontoxic. I don't think he needs to be admitted for IV antibiotics. We'll send him home on bactrim and keflex cover for MRSA/MSSA/Strep. He is instructed in wound care and needs to follow up in 2-3 days to have the packing removed.     Since he just had an abscess in a similar location in his left groin a couple of months ago, I am concerned about recurring abscesses and the need ultimately for an excision. I have referred him to our on-call surgeon for an outpatient follow up. At this point I do not see an indication for emergent surgical intervention tonight.       Diagnosis:     ICD-10-CM    1. Cellulitis and abscess of trunk L03.319     L02.219     left groin       Disposition:  discharged to home    Discharge Medications:  Discharge Medication List as of 6/16/2017  8:55 PM      START taking these medications    Details   ondansetron (ZOFRAN ODT) 4 MG ODT tab Take 1 tablet (4 mg) by mouth every 8 hours as needed for nausea, Disp-10 tablet, R-0, Local Print      cephALEXin (KEFLEX) 500 MG capsule Take 1 capsule (500 mg) by mouth 4 times daily for 10 days, Disp-40 capsule, R-0, Local Print      sulfamethoxazole-trimethoprim (BACTRIM DS) 800-160 MG per tablet Take 1 tablet by mouth 2 times daily for 10 days, Disp-20 tablet, R-0, Local Print      oxyCODONE-acetaminophen (PERCOCET) 5-325 MG per tablet Take 1-2 tablets by mouth every 4 hours as needed for pain, Disp-15 tablet, R-0, Local Print               Ramon BOYD, am serving as a scribe on 6/16/2017 at 4:36 PM to personally document services performed by Dr. Gonzalez based on my observations and the provider's statements to me.     6/16/2017   [facility] EMERGENCY DEPARTMENT       Juan Gonzalez MD  06/17/17 0009

## 2017-06-17 NOTE — DISCHARGE INSTRUCTIONS
Abscess (Incision & Drainage)  An abscess is sometimes called a boil. It happens when bacteria get trapped under the skin and start to grow. Pus forms inside the abscess as the body responds to the bacteria. An abscess can happen with an insect bite, ingrown hair, blocked oil gland, pimple, cyst, or puncture wound.  Your healthcare provider has drained the pus from your abscess. If the abscess pocket was large, your healthcare provider may have put in gauze packing. Your provider will need to remove it on your next visit. He or she may also replace it at that time. You may not need antibiotics to treat a simple abscess, unless the infection is spreading into the skin around the wound (cellulitis).  The wound will take about 1 to 2 weeks to heal, depending on the size of the abscess. Healthy tissue will grow from the bottom and sides of the opening until it seals over.  Home care  These tips can help your wound heal:    The wound may drain for the first 2 days. Cover the wound with a clean dry dressing. Change the dressing if it becomes soaked with blood or pus.    If a gauze packing was placed inside the abscess pocket, you may be told to remove it yourself. You may do this in the shower. Once the packing is removed, you should wash the area in the shower, or clean the area as directed by your provider. Continue to do this until the skin opening has closed. Make sure you wash your hands after changing the packing or cleaning the wound.    If you were prescribed antibiotics, take them as directed until they are all gone.    You may use acetaminophen or ibuprofen to control pain, unless another pain medicine was prescribed. If you have liver disease or ever had a stomach ulcer, talk with your doctor before using these medicines.  Follow-up care  Follow up with your healthcare provider, or as advised. If a gauze packing was put in your wound, it should be removed in 1 to 2 days. Check your wound every day for any  signs that the infection is getting worse. The signs are listed below.  When to seek medical advice  Call your healthcare provider right away if any of these occur:    Increasing redness or swelling    Red streaks in the skin leading away from the wound    Increasing local pain or swelling    Continued pus draining from the wound 2 days after treatment    Fever of 100.4 F (38 C) or higher, or as directed by your healthcare provider    Boil returns when you are at home  Date Last Reviewed: 9/1/2016 2000-2017 [employer]. [address] [place] [address]. All rights reserved. This information is not intended as a substitute for professional medical care. Always follow your healthcare professional's instructions.          Wound Care After Packing Removal or Replacement  Packing is a special type of dressing placed inside a wound to help it heal. Once the packing is removed, you need to care for your wound. Good wound care helps prevent infection. Be sure to keep all follow-up appointments with your healthcare provider. Follow these instructions to take care of the wound once you re at home.  Home care  Your healthcare provider may prescribe medicines for pain. Or he or she may suggest an over-the-counter (OTC) pain reliever, such as ibuprofen or acetaminophen. Talk with your healthcare provider before taking any OTC medicines if you have chronic liver or kidney disease, a stomach ulcer, or gastrointestinal bleeding. In certain cases, you may also need to take antibiotics to help prevent infection. If so, take them exactly as directed for as long as directed. Don t stop taking your antibiotics until they are all gone, even if you feel better.  Here are some general care guidelines for your wound:    Follow your healthcare provider s instructions on how to care for your wound. Always wash your hands with soap and warm water before and after tending to your wound.    If a bandage was put on,  remove and change it once a day or as directed. If the bandage gets wet or dirty, replace it as soon as possible with a new bandage. Use a clean cloth to gently pat the wound dry.    If your packing was replaced, a small piece of gauze may hang from the wound. It allows fluid to continue draining from the wound. You may need to use an ointment or cream to keep the packing from sticking to the bandage.    Don't bathe in a tub or soak your wound until your healthcare provider says it s OK. Take showers or sponge baths instead. Avoid swimming.    Don t scratch, rub, scrub, or pick your wound.    Check your wound daily for the signs of infection listed below.  If your wound was closed, it was likely with one of four types of closures. These include stitches (sutures), strips of surgical tape, skin glue, and staples. Your healthcare provider will decide on the best closure based on the size and location of your wound. Each type of closure needs specific care.    Sutures. You may want to clean the wound daily after the first 2 to 3 days. To do this, remove the bandage and gently wash the area with soap and warm water. After cleaning, apply a thin layer of antibiotic ointment if recommended. Then put on a new bandage. Sutures on the outside of the skin usually need to be removed by your healthcare provider.    Surgical tape. Keep the area dry. If it gets wet, blot it dry with a towel. Surgical tape closures usually fall off within 7 to 10 days. If they have not fallen off after 10 days, you can remove them yourself. To remove the tape, use mineral oil or petroleum jelly on a cotton ball to gently rub the adhesive.    Skin glue. You may shower or bathe as usual. But do not use soaps, lotions, or ointments on the wound area. Do not scrub the wound. After bathing, pat the wound dry with a soft towel. Do not apply liquids like peroxide, ointments, or creams to the wound while the strips or film is in place. Don't scratch, rub,  or pick at the strips or film. Don't put tape directly over the strips or film. Skin adhesive film will fall off naturally in 5 to 10 days. If it does not peel off in 10 days, gently rub petroleum jelly or an ointment onto the film.    Ruth. Take showers or sponge baths. Don't take tub baths. Don't use lotions on the wound area. The area may be cleaned with soap and water 2 to 3 days after the wound was stapled. Don't scrub the wound. Pat it dry with a clean soft cloth or towel. You can use antibiotic ointment if your healthcare provider tells you to. Staples will need to be removed in 10 to 14 days.  Follow-up care  Follow up with your healthcare provider, or as directed. If your packing was replaced, you may need another visit within 1 to 3 days to remove or replace it. If you have sutures or staples, return for their removal as directed.  When to seek medical advice  Call your health care provider right away if you have signs of infection:    Fever of 1 degree or more above your normal temperature, or as directed by your healthcare provider    Increasing pain in the wound or pain that doesn t get better even with pain medicine    Increasing redness or swelling    Pus or bad-smelling drainage from the wound  Also call your healthcare provider right away if any of these occur:    Your wound bleeds more than a small amount or won t stop bleeding.    The edges of your wound come apart.    You have numbness or weakness in the wound area that doesn t go away.  Date Last Reviewed: 10/2/2015    7356-9474 [employer]. [address] [place] [address], [place] [address]. All rights reserved. This information is not intended as a substitute for professional medical care. Always follow your healthcare professional's instructions.

## 2017-06-17 NOTE — PROGRESS NOTES
RT at bedside to assist MD with conscious sedation. Vital signs monitored throughout procedure including HR, RR, SPO2 and ETCO2. BVM, suction and nasal/oral airways at bedside. Patient tolerated procedure well and was awake and alert at end of procedure.     Mikala Velazquez, RT 6/16/2017, 8:10 PM

## 2017-09-04 ENCOUNTER — HOSPITAL ENCOUNTER (EMERGENCY)
Facility: CLINIC | Age: 32
Discharge: HOME | End: 2017-09-04
Attending: EMERGENCY MEDICINE | Admitting: EMERGENCY MEDICINE
Payer: MEDICAID

## 2017-09-04 VITALS
OXYGEN SATURATION: 95 % | BODY MASS INDEX: 29.82 KG/M2 | RESPIRATION RATE: 17 BRPM | SYSTOLIC BLOOD PRESSURE: 91 MMHG | WEIGHT: 190 LBS | DIASTOLIC BLOOD PRESSURE: 62 MMHG | TEMPERATURE: 97.7 F | HEIGHT: 67 IN | HEART RATE: 90 BPM

## 2017-09-04 DIAGNOSIS — L02.415 ABSCESS OF RIGHT THIGH: ICD-10-CM

## 2017-09-04 PROCEDURE — 99284 EMERGENCY DEPT VISIT MOD MDM: CPT | Mod: 25

## 2017-09-04 PROCEDURE — 96372 THER/PROPH/DIAG INJ SC/IM: CPT

## 2017-09-04 PROCEDURE — 25000132 ZZH RX MED GY IP 250 OP 250 PS 637: Performed by: EMERGENCY MEDICINE

## 2017-09-04 PROCEDURE — 76882 US LMTD JT/FCL EVL NVASC XTR: CPT

## 2017-09-04 PROCEDURE — 25000128 H RX IP 250 OP 636: Performed by: EMERGENCY MEDICINE

## 2017-09-04 RX ORDER — KETOROLAC TROMETHAMINE 30 MG/ML
30 INJECTION, SOLUTION INTRAMUSCULAR; INTRAVENOUS ONCE
Status: COMPLETED | OUTPATIENT
Start: 2017-09-04 | End: 2017-09-04

## 2017-09-04 RX ORDER — HYDROCODONE BITARTRATE AND ACETAMINOPHEN 5; 325 MG/1; MG/1
1 TABLET ORAL ONCE
Status: COMPLETED | OUTPATIENT
Start: 2017-09-04 | End: 2017-09-04

## 2017-09-04 RX ORDER — SULFAMETHOXAZOLE/TRIMETHOPRIM 800-160 MG
1 TABLET ORAL 2 TIMES DAILY
Qty: 14 TABLET | Refills: 0 | Status: SHIPPED | OUTPATIENT
Start: 2017-09-04 | End: 2017-09-11

## 2017-09-04 RX ORDER — KETOROLAC TROMETHAMINE 30 MG/ML
30 INJECTION, SOLUTION INTRAMUSCULAR; INTRAVENOUS ONCE
Status: DISCONTINUED | OUTPATIENT
Start: 2017-09-04 | End: 2017-09-04

## 2017-09-04 RX ADMIN — HYDROCODONE BITARTRATE AND ACETAMINOPHEN 1 TABLET: 5; 325 TABLET ORAL at 23:22

## 2017-09-04 RX ADMIN — KETOROLAC TROMETHAMINE 30 MG: 30 INJECTION, SOLUTION INTRAMUSCULAR at 22:54

## 2017-09-04 ASSESSMENT — ENCOUNTER SYMPTOMS
NAUSEA: 0
VOMITING: 0
FEVER: 0

## 2017-09-04 NOTE — LETTER
[facility] EMERGENCY DEPARTMENT  [address]  [facility] [address]  Phone: [phone]  Fax: [phone]    September 4, 2017        Bernard Padilla  [id number] [facility] [address]          To whom it may concern:    RE: Bernard Padilla    Patient was seen in the ER on 9/4/2017. I expect his condition to improve over the next 24-48 hours and he may return to work when improved.     Sincerely,    CRYSTAL Sanchez M.D.

## 2017-09-04 NOTE — ED AVS SNAPSHOT
[facility] Emergency Department    [address]    [facility] [address]    Phone:  [phone]    Fax:  [phone]                                       Bernard Padilla   MRN: [id number]    Department:  [facility] Emergency Department   Date of Visit:  9/4/2017           Patient Information     Date Of Birth          1985        Your diagnoses for this visit were:     Abscess of right thigh        You were seen by Lisandra Sanchez MD.      Follow-up Information     Follow up with [facility] In 3 days.    Specialties:  Sports Medicine, Pain Management, Obstetrics & Gynecology, Pediatrics, Internal Medicine, Nephrology    Why:  for recheck of thigh abscess    Contact information:    [address]  [facility] [address] [phone]        Follow up with [facility] Emergency Department.    Specialty:  EMERGENCY MEDICINE    Why:  If symptoms worsen including increasing swelling, redness, fevers    Contact information:    [address] [facility] [address] [phone]        Discharge Instructions         * ABSCESS [Antibiotic treatment only]  An abscess (sometimes called a  boil ) occurs when bacteria get trapped under the skin and begin to grow. Pus forms inside the abscess as the body responds to the bacteria. An abscess can occur with an insect bite, ingrown hair, blocked oil gland, pimple, cyst, or puncture wound.  In the early stages, redness and tenderness are the only symptoms. Sometimes, this stage can be treated with antibiotics alone. If the abscess does not respond to antibiotic treatment, it will need to be drained with a small cut, under local anesthesia.  HOME CARE:    Soak the wound in hot water or apply hot packs (small towel soaked in hot water) to the area for 20 minutes at a time. Do this three to four times a day.    Apply antibiotic cream or ointment such as  Bacitracin or Polysporin onto the skin 3-4 times a day, unless something else was prescribed.  Neosporin Plus  includes an antibiotic plus a local pain reliever.    Take all of the antibiotics until they are gone.    You may use acetaminophen (Tylenol) or ibuprofen (Motrin, Advil) to control pain, unless another pain medicine was prescribed. [ NOTE : If you have chronic liver or kidney disease or ever had a stomach ulcer or GI bleeding, talk with your doctor before using these any of these.]  FOLLOW UP as advised by our staff. Look at your wound each day for the signs of worsening infection listed below.  GET PROMPT MEDICAL ATTENTION if any of the following occur:    An increase in redness or swelling    Red streaks in the skin leading away from the abscess    An increase in local pain or swelling    Fever of 100.4 F (38 C) or higher, or as directed by your healthcare provider    Pus or fluid coming from the abscess    [address]64[address] [place] [address]. All rights reserved. This information is not intended as a substitute for professional medical care. Always follow your healthcare professional's instructions.      24 Hour Appointment Hotline       To make an appointment at any [facility], call [phone] ([phone]). If you don't have a family doctor or clinic, we will help you find one. [facility] clinics are conveniently located to serve the needs of you and your family.             Review of your medicines      START taking        Dose / Directions Last dose taken    sulfamethoxazole-trimethoprim 800-160 MG per tablet   Commonly known as:  BACTRIM DS   Dose:  1 tablet   Quantity:  14 tablet        Take 1 tablet by mouth 2 times daily for 7 days   Refills:  0          Our records show that you are taking the medicines listed below. If these are incorrect, please call your family doctor or clinic.        Dose / Directions Last dose taken    IBUPROFEN PO         Refills:  0                Prescriptions were sent or printed at these locations (1 Prescription)                   Other Prescriptions                Printed at Department/Unit printer (1 of 1)         sulfamethoxazole-trimethoprim (BACTRIM DS) 800-160 MG per tablet                Procedures and tests performed during your visit     POC US SOFT TISSUE      Orders Needing Specimen Collection     None      Pending Results     No orders found from 9/2/2017 to 9/5/2017.            Pending Culture Results     No orders found from 9/2/2017 to 9/5/2017.            Pending Results Instructions     If you had any lab results that were not finalized at the time of your Discharge, you can call the ED Lab Result RN at [phone]. You will be contacted by this team for any positive Lab results or changes in treatment. The nurses are available 7 days a week from 10A to 6:30P.  You can leave a message 24 hours per day and they will return your call.        Test Results From Your Hospital Stay        9/4/2017 11:15 PM      Narrative     Procedure Note     Emergency Medicine Limited Ultrasound: Abscess    PERFORMED BY: Lisandra Sanchez MD  INDICATIONS/SYMPTOM:  Soft tissue swelling, pain. Evaluate for abscess  PROBE: Linear probe   BODY LOCATION: The ultrasound was performed on posterior right thigh  FINDINGS:  No fluid collection was demonstrated in the soft tissues.There was minimal edema noted in the soft tissues.    INTERPRETATION: No Abscess identified.. Correlate clinically for cellulitis.   IMAGE DOCUMENTATION: Images were archived to [facility]                  Clinical Quality Measure: Blood Pressure Screening     Your blood pressure was checked while you were in the emergency department today. The last reading we obtained was  BP: 91/62 . Please read the guidelines below about what these numbers mean and what you should do about them.  If your systolic blood pressure (the top number) is less than 120 and your diastolic blood  "pressure (the bottom number) is less than 80, then your blood pressure is normal. There is nothing more that you need to do about it.  If your systolic blood pressure (the top number) is 120-139 or your diastolic blood pressure (the bottom number) is 80-89, your blood pressure may be higher than it should be. You should have your blood pressure rechecked within a year by a primary care provider.  If your systolic blood pressure (the top number) is 140 or greater or your diastolic blood pressure (the bottom number) is 90 or greater, you may have high blood pressure. High blood pressure is treatable, but if left untreated over time it can put you at risk for heart attack, stroke, or kidney failure. You should have your blood pressure rechecked by a primary care provider within the next 4 weeks.  If your provider in the emergency department today gave you specific instructions to follow-up with your doctor or provider even sooner than that, you should follow that instruction and not wait for up to 4 weeks for your follow-up visit.        Thank you for choosing [facility]       Thank you for choosing [facility] for your care. Our goal is always to provide you with excellent care. Hearing back from our patients is one way we can continue to improve our services. Please take a few minutes to complete the written survey that you may receive in the mail after you visit with us. Thank you!        [employer] Information     [employer] lets you send messages to your doctor, view your test results, renew your prescriptions, schedule appointments and more. To sign up, go to [email or web address] . Click on \"Log in\" on the left side of the screen, which will take you to the Welcome page. Then click on \"Sign up Now\" on the right side of the page.     You will be asked to enter the access code listed below, as well as some personal information. Please follow the directions to create your username and password.     Your access code is: " [email or web address]  Expires: 2017  8:55 PM     Your access code will  in 90 days. If you need help or a new code, please call your [place] clinic or [phone].        Care EveryWhere ID     This is your Care EveryWhere ID. This could be used by other organizations to access your [place] medical records  [id number]        Equal Access to Services     ZACKARY DIEGO : Hadii sam davenporto Sored, waaxda luqadaha, qaybta kaalmada adejonathonyada, maddie perera . So [facility] [phone].    ATENCIÓN: Si habla carlosañol, tiene a jara disposición servicios gratuitos de asistencia lingüística. Llame al [phone].    We comply with applicable federal civil rights laws and [place] laws. We do not discriminate on the basis of race, color, national origin, age, disability sex, sexual orientation or gender identity.            After Visit Summary       This is your record. Keep this with you and show to your community pharmacist(s) and doctor(s) at your next visit.

## 2017-09-04 NOTE — ED AVS SNAPSHOT
[facility] Emergency Department    [address]    [facility] [address]    Phone:  [phone]    Fax:  [phone]                                       Bernard Padilla   MRN: [id number]    Department:  [facility] Emergency Department   Date of Visit:  9/4/2017           After Visit Summary Signature Page     I have received my discharge instructions, and my questions have been answered. I have discussed any challenges I see with this plan with the nurse or doctor.    ..........................................................................................................................................  Patient/Patient Representative Signature      ..........................................................................................................................................  Patient Representative Print Name and Relationship to Patient    ..................................................               ................................................  Date                                            Time    ..........................................................................................................................................  Reviewed by Signature/Title    ...................................................              ..............................................  Date                                                            Time

## 2017-09-05 NOTE — DISCHARGE INSTRUCTIONS
* ABSCESS [Antibiotic treatment only]  An abscess (sometimes called a  boil ) occurs when bacteria get trapped under the skin and begin to grow. Pus forms inside the abscess as the body responds to the bacteria. An abscess can occur with an insect bite, ingrown hair, blocked oil gland, pimple, cyst, or puncture wound.  In the early stages, redness and tenderness are the only symptoms. Sometimes, this stage can be treated with antibiotics alone. If the abscess does not respond to antibiotic treatment, it will need to be drained with a small cut, under local anesthesia.  HOME CARE:    Soak the wound in hot water or apply hot packs (small towel soaked in hot water) to the area for 20 minutes at a time. Do this three to four times a day.    Apply antibiotic cream or ointment such as Bacitracin or Polysporin onto the skin 3-4 times a day, unless something else was prescribed.  Neosporin Plus  includes an antibiotic plus a local pain reliever.    Take all of the antibiotics until they are gone.    You may use acetaminophen (Tylenol) or ibuprofen (Motrin, Advil) to control pain, unless another pain medicine was prescribed. [ NOTE : If you have chronic liver or kidney disease or ever had a stomach ulcer or GI bleeding, talk with your doctor before using these any of these.]  FOLLOW UP as advised by our staff. Look at your wound each day for the signs of worsening infection listed below.  GET PROMPT MEDICAL ATTENTION if any of the following occur:    An increase in redness or swelling    Red streaks in the skin leading away from the abscess    An increase in local pain or swelling    Fever of 100.4 F (38 C) or higher, or as directed by your healthcare provider    Pus or fluid coming from the abscess    [id number] [facility], [address] [place] [address], [place] [address]. All rights reserved. This information is not intended as a substitute for professional medical care. Always follow your healthcare professional's  instructions.

## 2017-09-05 NOTE — ED PROVIDER NOTES
"  History     Chief Complaint:  Wound infection    HPI   Bernard Padilla is a 32 year old male with a history of MRSA infection who presents for evaluation of a wound infection. The patient began noticing a painful boil on his right upper thigh yesterday. He reports that he has had similar episodes in the past where he had to have a boil drained. While here he denies any fever, chills, nausea, or vomiting. He took Ibuprofen before bed last evening and Tylenol this morning when he woke up with little alleviation of his pain. Denies swelling or pain near his anus and is not having difficulty with pooping.      Allergies:  No Known Drug Allergies     Medications:    The patient is not currently taking any prescribed medications.    Past Medical History:    Boil    Past Surgical History:    Meniscus repair    Family History:    The patient denies any relevant family medical history.    Social History:  The patient was accompanied to the ED by his girlfriend.  Smoking Status: Yes  Smokeless Tobacco: No  Alcohol Use: Yes  Marital Status:  Single      Review of Systems   Constitutional: Negative for fever.   Gastrointestinal: Negative for nausea and vomiting.   Skin:        Boil to right upper thigh   All other systems reviewed and are negative.    Physical Exam   Vitals:  Patient Vitals for the past 24 hrs:   BP Temp Temp src Pulse Resp SpO2 Height Weight   09/04/17 2301 - - - - - 95 % - -   09/04/17 2259 91/62 - - - - - - -   09/04/17 2207 119/72 97.7  F (36.5  C) Oral 90 17 98 % 1.702 m (5' 7\") 86.2 kg (190 lb)     Physical Exam  Constitutional: Alert, attentive, GCS 15  HENT:    Nose: Nose normal.    Mouth/Throat: Oropharynx is clear, mucous membranes are moist   Eyes: Normal conjunctiva. Pupils are equal, round, and reactive to light.   CV: regular rate and rhythm; no murmurs, rubs or gallups  Chest: Effort normal and breath sounds normal.   GI:  There is no tenderness. No distension. Normal bowel sounds  MSK: Normal " range of motion, right posterior proximal thigh has 1 cm area of thickening, old scar present, no fluctuance or erythema, mildly tender to palpation   Neurological: Alert, attentive, strength intact   Skin: Skin is warm and dry.    Emergency Department Course     Imaging:  Radiology findings were communicated with the patient who voiced understanding of the findings.  POC US soft tissue:  No Abscess identified.. Correlate clinically for cellulitis.    Interventions:  2254 Toradol 30 mg, Intramuscular  2322 [place], 325 mg, PO     Emergency Department Course:  Nursing notes and vitals reviewed.  2025 I had my initial encounter with the patient.  I performed an exam of the patient as documented above.     I discussed the treatment plan with the patient. They expressed understanding of this plan and consented to discharge. They will be discharged home with instructions for care and follow up. In addition, the patient will return to the emergency department if their symptoms persist, worsen, if new symptoms arise or if there is any concern.  All questions were answered.    Impression & Plan      Medical Decision Making:  Bernard Padilla is a 32 year old male who presents with right posterior thigh pain.  He has a history of abscess and he thinks this feels similar.  He has signs of a cellulitis/early abscess with no cavity noted on ultrasound. No concerning signs/sx's to suggest needs IV abx and admission.  Does not appear consistent with necrotiziing fasciitis and no evidence of perirectal abscess.  Plan outpt treatment w/ abx to cover common skin organisms and MRSA.  Needs close f/u with primary care.  He was instructed to use ibuprofen and tylenol for pain control.  Warning signs for wound given on discharge instructions and verbally; see d/c instructions.  He agrees w/ abx and waiting to see resolution vs abscess formation.      Diagnosis:    ICD-10-CM    1. Abscess of right thigh L02.415      Disposition:    Discharge    Discharge Medications:  Discharge Medication List as of 9/4/2017 11:29 PM      START taking these medications    Details   sulfamethoxazole-trimethoprim (BACTRIM DS) 800-160 MG per tablet Take 1 tablet by mouth 2 times daily for 7 days, Disp-14 tablet, R-0, Local Print           Scribe Disclosure:  I, Dale Cross, am serving as a scribe at 10:24 PM on 9/4/2017 to document services personally performed by Lisandra Sanchez MD, based on my observations and the provider's statements to me.    9/4/2017   [facility] EMERGENCY DEPARTMENT       Lisandra Sanchez MD  09/05/17 0131

## 2017-12-05 ENCOUNTER — HOSPITAL ENCOUNTER (EMERGENCY)
Facility: CLINIC | Age: 32
Discharge: HOME | End: 2017-12-05
Attending: EMERGENCY MEDICINE | Admitting: EMERGENCY MEDICINE
Payer: MEDICAID

## 2017-12-05 ENCOUNTER — APPOINTMENT (OUTPATIENT)
Dept: CT IMAGING | Facility: CLINIC | Age: 32
End: 2017-12-05
Attending: EMERGENCY MEDICINE
Payer: MEDICAID

## 2017-12-05 VITALS
TEMPERATURE: 97.6 F | SYSTOLIC BLOOD PRESSURE: 124 MMHG | BODY MASS INDEX: 27.47 KG/M2 | DIASTOLIC BLOOD PRESSURE: 69 MMHG | HEART RATE: 70 BPM | OXYGEN SATURATION: 97 % | WEIGHT: 175 LBS | RESPIRATION RATE: 18 BRPM | HEIGHT: 67 IN

## 2017-12-05 DIAGNOSIS — R19.4 DECREASED STOOLING: ICD-10-CM

## 2017-12-05 DIAGNOSIS — K62.5 RECTAL BLEEDING: ICD-10-CM

## 2017-12-05 DIAGNOSIS — K64.4 EXTERNAL HEMORRHOID: ICD-10-CM

## 2017-12-05 DIAGNOSIS — R10.9 ABDOMINAL CRAMPING: ICD-10-CM

## 2017-12-05 LAB
ALBUMIN SERPL-MCNC: 3.6 G/DL (ref 3.4–5)
ALBUMIN UR-MCNC: NEGATIVE MG/DL
ALP SERPL-CCNC: 116 U/L (ref 40–150)
ALT SERPL W P-5'-P-CCNC: 38 U/L (ref 0–70)
ANION GAP SERPL CALCULATED.3IONS-SCNC: 7 MMOL/L (ref 3–14)
APPEARANCE UR: ABNORMAL
AST SERPL W P-5'-P-CCNC: 22 U/L (ref 0–45)
BASOPHILS # BLD AUTO: 0 10E9/L (ref 0–0.2)
BASOPHILS NFR BLD AUTO: 0.4 %
BILIRUB SERPL-MCNC: 0.4 MG/DL (ref 0.2–1.3)
BILIRUB UR QL STRIP: NEGATIVE
BUN SERPL-MCNC: 14 MG/DL (ref 7–30)
CALCIUM SERPL-MCNC: 8.6 MG/DL (ref 8.5–10.1)
CHLORIDE SERPL-SCNC: 107 MMOL/L (ref 94–109)
CO2 SERPL-SCNC: 25 MMOL/L (ref 20–32)
COLOR UR AUTO: ABNORMAL
CREAT SERPL-MCNC: 0.86 MG/DL (ref 0.66–1.25)
DIFFERENTIAL METHOD BLD: NORMAL
EOSINOPHIL # BLD AUTO: 0.3 10E9/L (ref 0–0.7)
EOSINOPHIL NFR BLD AUTO: 4.1 %
ERYTHROCYTE [DISTWIDTH] IN BLOOD BY AUTOMATED COUNT: 12.2 % (ref 10–15)
GFR SERPL CREATININE-BSD FRML MDRD: >90 ML/MIN/1.7M2
GLUCOSE SERPL-MCNC: 98 MG/DL (ref 70–99)
GLUCOSE UR STRIP-MCNC: NEGATIVE MG/DL
HCT VFR BLD AUTO: 42.8 % (ref 40–53)
HGB BLD-MCNC: 14.8 G/DL (ref 13.3–17.7)
HGB UR QL STRIP: NEGATIVE
IMM GRANULOCYTES # BLD: 0 10E9/L (ref 0–0.4)
IMM GRANULOCYTES NFR BLD: 0.2 %
KETONES UR STRIP-MCNC: NEGATIVE MG/DL
LACTATE BLD-SCNC: 0.6 MMOL/L (ref 0.7–2)
LEUKOCYTE ESTERASE UR QL STRIP: NEGATIVE
LYMPHOCYTES # BLD AUTO: 3.2 10E9/L (ref 0.8–5.3)
LYMPHOCYTES NFR BLD AUTO: 38.3 %
MCH RBC QN AUTO: 32 PG (ref 26.5–33)
MCHC RBC AUTO-ENTMCNC: 34.6 G/DL (ref 31.5–36.5)
MCV RBC AUTO: 93 FL (ref 78–100)
MONOCYTES # BLD AUTO: 0.5 10E9/L (ref 0–1.3)
MONOCYTES NFR BLD AUTO: 6.5 %
MUCOUS THREADS #/AREA URNS LPF: PRESENT /LPF
NEUTROPHILS # BLD AUTO: 4.2 10E9/L (ref 1.6–8.3)
NEUTROPHILS NFR BLD AUTO: 50.5 %
NITRATE UR QL: NEGATIVE
NRBC # BLD AUTO: 0 10*3/UL
NRBC BLD AUTO-RTO: 0 /100
PH UR STRIP: 6 PH (ref 5–7)
PLATELET # BLD AUTO: 190 10E9/L (ref 150–450)
POTASSIUM SERPL-SCNC: 3.7 MMOL/L (ref 3.4–5.3)
PROT SERPL-MCNC: 7.2 G/DL (ref 6.8–8.8)
RBC # BLD AUTO: 4.62 10E12/L (ref 4.4–5.9)
RBC #/AREA URNS AUTO: 0 /HPF (ref 0–2)
SODIUM SERPL-SCNC: 139 MMOL/L (ref 133–144)
SOURCE: ABNORMAL
SP GR UR STRIP: 1 (ref 1–1.03)
UROBILINOGEN UR STRIP-MCNC: 0 MG/DL (ref 0–2)
WBC # BLD AUTO: 8.3 10E9/L (ref 4–11)
WBC #/AREA URNS AUTO: 1 /HPF (ref 0–2)

## 2017-12-05 PROCEDURE — 96374 THER/PROPH/DIAG INJ IV PUSH: CPT | Mod: 59

## 2017-12-05 PROCEDURE — 25000128 H RX IP 250 OP 636: Performed by: EMERGENCY MEDICINE

## 2017-12-05 PROCEDURE — 25000125 ZZHC RX 250: Performed by: EMERGENCY MEDICINE

## 2017-12-05 PROCEDURE — 74177 CT ABD & PELVIS W/CONTRAST: CPT

## 2017-12-05 PROCEDURE — 99285 EMERGENCY DEPT VISIT HI MDM: CPT | Mod: 25

## 2017-12-05 PROCEDURE — 80053 COMPREHEN METABOLIC PANEL: CPT | Performed by: EMERGENCY MEDICINE

## 2017-12-05 PROCEDURE — 96376 TX/PRO/DX INJ SAME DRUG ADON: CPT

## 2017-12-05 PROCEDURE — 81001 URINALYSIS AUTO W/SCOPE: CPT | Performed by: EMERGENCY MEDICINE

## 2017-12-05 PROCEDURE — 85025 COMPLETE CBC W/AUTO DIFF WBC: CPT | Performed by: EMERGENCY MEDICINE

## 2017-12-05 PROCEDURE — 83605 ASSAY OF LACTIC ACID: CPT | Performed by: EMERGENCY MEDICINE

## 2017-12-05 PROCEDURE — 96361 HYDRATE IV INFUSION ADD-ON: CPT

## 2017-12-05 PROCEDURE — 96375 TX/PRO/DX INJ NEW DRUG ADDON: CPT

## 2017-12-05 RX ORDER — MORPHINE SULFATE 4 MG/ML
4 INJECTION, SOLUTION INTRAMUSCULAR; INTRAVENOUS
Status: DISCONTINUED | OUTPATIENT
Start: 2017-12-05 | End: 2017-12-05 | Stop reason: HOSPADM

## 2017-12-05 RX ORDER — SODIUM CHLORIDE 9 MG/ML
1000 INJECTION, SOLUTION INTRAVENOUS CONTINUOUS
Status: DISCONTINUED | OUTPATIENT
Start: 2017-12-05 | End: 2017-12-05 | Stop reason: HOSPADM

## 2017-12-05 RX ORDER — IOPAMIDOL 755 MG/ML
500 INJECTION, SOLUTION INTRAVASCULAR ONCE
Status: COMPLETED | OUTPATIENT
Start: 2017-12-05 | End: 2017-12-05

## 2017-12-05 RX ORDER — ONDANSETRON 2 MG/ML
4 INJECTION INTRAMUSCULAR; INTRAVENOUS EVERY 30 MIN PRN
Status: DISCONTINUED | OUTPATIENT
Start: 2017-12-05 | End: 2017-12-05 | Stop reason: HOSPADM

## 2017-12-05 RX ADMIN — BENZOCAINE 1 G: 5.6 OINTMENT TOPICAL at 11:36

## 2017-12-05 RX ADMIN — MORPHINE SULFATE 4 MG: 4 INJECTION INTRAVENOUS at 07:51

## 2017-12-05 RX ADMIN — ONDANSETRON 4 MG: 2 INJECTION INTRAMUSCULAR; INTRAVENOUS at 07:51

## 2017-12-05 RX ADMIN — SODIUM CHLORIDE 63 ML: 9 INJECTION, SOLUTION INTRAVENOUS at 08:28

## 2017-12-05 RX ADMIN — IOPAMIDOL 88 ML: 755 INJECTION, SOLUTION INTRAVENOUS at 08:28

## 2017-12-05 RX ADMIN — MORPHINE SULFATE 4 MG: 4 INJECTION INTRAVENOUS at 08:48

## 2017-12-05 RX ADMIN — SODIUM CHLORIDE 1000 ML: 9 INJECTION, SOLUTION INTRAVENOUS at 07:51

## 2017-12-05 ASSESSMENT — ENCOUNTER SYMPTOMS
APPETITE CHANGE: 0
VOMITING: 0
NAUSEA: 1
BLOOD IN STOOL: 1
RECTAL PAIN: 1
FEVER: 0
ABDOMINAL PAIN: 1
CHILLS: 0

## 2017-12-05 NOTE — ED NOTES
Pt presents with c/o abd pain and rectal pain X5 days. Pt pain is worse on the LLQ. Pt is A&O, ABC's intact.

## 2017-12-05 NOTE — DISCHARGE INSTRUCTIONS
Discharge Instructions  Abdominal Pain    Abdominal pain (belly pain) can be caused by many things. Your evaluation today does not show the exact cause for your pain. Your provider today has decided that it is unlikely your pain is due to a life threatening problem, or a problem requiring surgery or hospital admission. Sometimes those problems cannot be found right away, so it is very important that you follow up as directed.  Sometimes only the changes which occur over time allow the cause of your pain to be found.    Generally, every Emergency Department visit should have a follow-up clinic visit with either a primary or a specialty clinic/provider. Please follow-up as instructed by your emergency provider today. With abdominal pain, we often recommend very close follow-up, such as the following day.    ADULTS:  Return to the Emergency Department right away if:      You get an oral temperature above 102oF or as directed by your provider.    You have blood in your stools. This may be bright red or appear as black, tarry stools.      You keep vomiting (throwing up) or cannot drink liquids.    You see blood when you vomit.     You cannot have a bowel movement or you cannot pass gas.    Your stomach gets bloated or bigger.    Your skin or the whites of your eyes look yellow.    You faint.    You have bloody, frequent or painful urination (peeing).    You have new symptoms or anything that worries you.    CHILDREN:  Return to the Emergency Department right away if your child has any of the above-listed symptoms or the following:      Pushes your hand away or screams/cries when his/her belly is touched.    You notice your child is very fussy or weak.    Your child is very tired and is too tired to eat or drink.    Your child is dehydrated.  Signs of dehydration can be:  o Significant change in the amount of wet diapers/urine.  o Your infant or child starts to have dry mouth and lips, or no saliva (spit) or  tears.    PREGNANT WOMEN:  Return to the Emergency Department right away if you have any of the above-listed symptoms or the following:      You have bleeding, leaking fluid or passing tissue from the vagina.    You have worse pain or cramping, or pain in your shoulder or back.    You have vomiting that will not stop.    You have a temperature of 100oF or more.    Your baby is not moving as much as usual.    You faint.    You get a bad headache with or without eye problems and abdominal pain.    You have a seizure.    You have unusual discharge from your vagina and abdominal pain.    Abdominal pain is pretty common during pregnancy.  Your pain may or may not be related to your pregnancy. You should follow-up closely with your OB provider so they can evaluate you and your baby.  Until you follow-up with your regular provider, do the following:       Avoid sex and do not put anything in your vagina.    Drink clear fluids.    Only take medications approved by your provider.    MORE INFORMATION:    Appendicitis:  A possible cause of abdominal pain in any person who still has their appendix is acute appendicitis. Appendicitis is often hard to diagnose.  Testing does not always rule out early appendicitis or other causes of abdominal pain. Close follow-up with your provider and re-evaluations may be needed to figure out the reason for your abdominal pain.    Follow-up:  It is very important that you make an appointment with your clinic and go to the appointment.  If you do not follow-up with your primary provider, it may result in missing an important development which could result in permanent injury or disability and/or lasting pain.  If there is any problem keeping your appointment, call your provider or return to the Emergency Department.    Medications:  Take your medications as directed by your provider today.  Before using over-the-counter medications, ask your provider and make sure to take the medications as  "directed.  If you have any questions about medications, ask your provider.    Diet:  Resume your normal diet as much as possible, but do not eat fried, fatty or spicy foods while you have pain.  Do not drink alcohol or have caffeine.  Do not smoke tobacco.    Probiotics: If you have been given an antibiotic, you may want to also take a probiotic pill or eat yogurt with live cultures. Probiotics have \"good bacteria\" to help your intestines stay healthy. Studies have shown that probiotics help prevent diarrhea (loose stools) and other intestine problems (including C. diff infection) when you take antibiotics. You can buy these without a prescription in the pharmacy section of the store.     If you were given a prescription for medicine here today, be sure to read all of the information (including the package insert) that comes with your prescription.  This will include important information about the medicine, its side effects, and any warnings that you need to know about.  The pharmacist who fills the prescription can provide more information and answer questions you may have about the medicine.  If you have questions or concerns that the pharmacist cannot address, please call or return to the Emergency Department.       Remember that you can always come back to the Emergency Department if you are not able to see your regular provider in the amount of time listed above, if you get any new symptoms, or if there is anything that worries you.    Discharge Instructions  Constipation  Constipation can cause severe cramping pain and your provider thinks this might be the cause of your abdominal pain (belly pain) today.  People usually recognize that they are constipated because they have difficulty having bowel movements, are not having bowel movements frequently enough, or are not having large enough bowel movements. Sometimes, especially in children or older people, you do not recognize that you are constipated until it " becomes severe. The most common causes of constipation are a lack of exercise and not eating enough fruits, vegetables, and whole grains. Constipation can also be a side effect of medications, such as narcotics, or may be caused by a disease of the digestive system.    Generally, every Emergency Department visit should have a follow-up clinic visit with either a primary or a specialty clinic/provider. Please follow-up as instructed by your emergency provider today. Sometimes, chronic constipation requires further testing to determine the cause. If you are over 50 years old, you may need a colonoscopy if you have not had one before.     Return to the Emergency Department if:    Your abdominal pain worsens or does not improve after a bowel movement.    You become very weak.    You get a temperature above 102oF or as directed by your provider.    You have blood in your stools (bright red or black, tarry stools).    You keep vomiting (throwing up) or cannot drink liquids.    Your see blood when you vomit.    Your stomach gets bloated or bigger.    You have new symptoms or anything that worries you.    What can I do to help myself?    If your provider gave you a cathartic medication, like magnesium citrate or GoLytely  (polyethylene glycol), you can expect to have cramps and gas pains after taking it. You can expect to have a number of bowel movements and even diarrhea (loose or watery stools) in the course of clearing your bowels.  You will know your bowels have been cleaned out after you pass clear liquid. The cramps and gas should let up after you have emptied your bowels. You may want to wait until morning to take this type of medication so you aren t up in the night.     Sometimes instead of cathartics, we recommend laxatives like milk of magnesia to move your bowels more slowly, or an enema to help the bowels to move. Read and follow the package directions, or follow your provider s instructions.    Once you have  become very constipated, it takes time for your bowels to return to normal and you need to be very careful to prevent becoming constipated again. Take a laxative if you do not move your bowels at least every two days.       Eat foods that have a lot of fiber. Good choices are fruits, vegetables, prune juice, apple juice, and high fiber cereal. Limit dairy products such as milk and cheese, since these can make constipation worse.     Drink plenty of water.     When you feel the need to go to the bathroom, go to the bathroom. Do not hold it.    Miralax , Metamucil , Colace , Senna or fiber supplements can be used daily.  Miralax  daily is often the best choice for children.  If you were given a prescription for medicine here today, be sure to read all of the information (including the package insert) that comes with your prescription.  This will include important information about the medicine, its side effects, and any warnings that you need to know about.  The pharmacist who fills the prescription can provide more information and answer questions you may have about the medicine.  If you have questions or concerns that the pharmacist cannot address, please call or return to the Emergency Department.   Remember that you can always come back to the Emergency Department if you are not able to see your regular provider in the amount of time listed above, if you get any new symptoms, or if there is anything that worries     Hemorrhoids    Hemorrhoids are swollen and inflamed veins inside the rectum and near the anus. The rectum is the last several inches of the colon. The anus is the passage between the rectum and the outside of the body.  Causes  The veins can become swollen due to increased pressure in them. This is most often caused by:    Chronic constipation or diarrhea    Straining when having a bowel movement    Sitting too long on the toilet    A low-fiber diet    Pregnancy  Symptoms    Bleeding from the rectum  (this may be noticeable after bowel movements)    Lump near the anus    Itching around the anus    Pain around the anus  There are different types of hemorrhoids. Depending on the type you have and the severity, you may be able to treat yourself at home. In some cases, a procedure may be the best treatment option. Your healthcare provider can tell you more about this, if needed.  Home care  General care    To get relief from pain or itching, try:    Topical products. Your healthcare provider may prescribe or recommend creams, ointments, or pads that can be applied to the hemorrhoid. Use these exactly as directed.    Medicines. Your healthcare provider may recommend stool softeners, suppositories, or laxatives to help manage constipation. Use these exactly as directed.    Sitz baths. A sitz bath involves sitting in a few inches of warm bath water. Be careful not to make the water so hot that you burn yourself--test it before sitting in it. Soak for about 10 to 15 minutes a few times a day. This may help relieve pain.  Tips to help prevent hemorrhoids    Eat more fiber. Fiber adds bulk to stool and absorbs water as it moves through your colon. This makes stool softer and easier to pass.    Increase the fiber in your diet with more fiber-rich foods. These include fresh fruit, vegetables, and whole grains.    Take a fiber supplement or bulking agent, if advised to by your provider. These include products such as psyllium or methylcellulose.    Drink plenty of water, if directed to by your provider. This can help keep stool soft.    Be more active. Frequent exercise aids digestion and helps prevent constipation. It may also help make bowel movements more regular.    Don t strain during bowel movements. This can make hemorrhoids more likely. Also, don t sit on the toilet for long periods of time.  Follow-up care  Follow up with your healthcare provider, or as advised. If a culture or imaging tests were done, you will be  notified of the results when they are ready. This may take a few days or longer.  When to seek medical advice  Call your healthcare provider right away if any of these occur:    Increased bleeding from the rectum    Increased pain around the rectum or anus    Weakness or dizziness  Call 911  Call 911 or return to the emergency department right away if any of these occur:    Trouble breathing or swallowing    Fainting or loss of consciousness    Unusually fast heart rate    Vomiting blood    Large amounts of blood in stool  Date Last Reviewed: 6/22/2015 2000-2017 [employer]. [address] [place] [address], [place] [address]. All rights reserved. This information is not intended as a substitute for professional medical care. Always follow your healthcare professional's instructions.

## 2017-12-05 NOTE — ED PROVIDER NOTES
"  History     Chief Complaint:  Abdominal Pain    HPI   Bernard Padilla is a 32 year old male, with a history of colonic polyps and hemorrhoids, who presents to the ED for evaluation of left lower quadrant abdominal pain. The patient reports he has been having painful bowel movements for the past 5 days; the patient is having decreased bowel movements due to the pain. The patient notes he usually has 3-4 normal bowel movements everyday. The patient reports he has been having intermittent runny bowel movements with blood in stool that has been a chronic intermittent issue for him. The patient notes his abdominal pain began this morning with nausea. The patient denies any fever, chills, vomiting, urinary symptoms, or appetite change.     Allergies:  No known drug allergies     Medications:    IBUPROFEN PO     Past Medical History:    Boil  Colon polyps  Internal & external hemorrhoids    Past Surgical History:    Meniscus repair    Family History:    History reviewed. No pertinent family history.     Social History:  Smoking status: Current ever day smoker  Substance use: Marijuana   Alcohol use: Occasional  Marital Status:  Single [1]    Review of Systems   Constitutional: Negative for appetite change, chills and fever.   Gastrointestinal: Positive for abdominal pain, blood in stool, nausea and rectal pain. Negative for vomiting.   All other systems reviewed and are negative.    Physical Exam     Patient Vitals for the past 24 hrs:   BP Temp Temp src Pulse Resp SpO2 Height Weight   12/05/17 0930 105/61 - - - - 100 % - -   12/05/17 0915 119/73 - - - - 99 % - -   12/05/17 0900 121/63 - - - - 99 % - -   12/05/17 0815 125/81 - - - - - - -   12/05/17 0800 132/84 - - - - 95 % - -   12/05/17 0745 (!) 140/105 - - - - - - -   12/05/17 0730 (!) 141/98 - - - - 99 % - -   12/05/17 0718 (!) 142/95 97.6  F (36.4  C) Oral 70 18 100 % 1.702 m (5' 7\") 79.4 kg (175 lb)     Physical Exam  General: Uncomfortable adult male.   Eyes: PERRL, " Conjunctive within normal limits  ENT: Moist mucous membranes, oropharynx clear.   CV: Normal S1S2, no murmur, rub or gallop. Regular rate and rhythm  Resp: Clear to auscultation bilaterally, no wheezes, rales or rhonchi. Normal respiratory effort.  GI: Tender diffusely, most prominent in left lower abdomen with voluntary guarding. Abdomen is soft and nondistended. No palpable masses. No rebound.  Rectal: Unable to fully access due to patient s discomfort. Visually, external hemorrhoid, tender to touch. No visible blood. Nonthrombosed. Small amount of soft round stool in rectal vault with limited rectal exam.   MSK: No edema. Nontender. Normal active range of motion.  Skin: Warm and dry. No rashes or lesions or ecchymoses on visible skin.  Neuro: Alert and oriented. Responds appropriately to all questions and commands. No focal findings appreciated. Normal muscle tone.  Psych: Anxious appearing.     Emergency Department Course     Imaging:  Radiographic findings were communicated with the patient who voiced understanding of the findings.    CT Abdomen Pelvis w Contrast  IMPRESSION: No acute changes in the abdomen or pelvis to account for  patient's abdominal pain. No bowel obstruction, diverticulitis or  appendicitis. Abdominal and pelvic organs are within normal limits. As read by Radiology.    Laboratory:  Lactic acid: 0.6(L)  UA: Mucous present   CBC: o/w WNL (WBC 8.3, HGB 14.8, )   CMP: o/w WNL (Creatinine 0.86)    Interventions:  0751: NS 1L Bolus IV   0751: Morphine 4mg IV   0751: Zofran 4mg IV   0828: NS 63mLs Bolus IV  0848: Morphine 4mg IV  1136: Americaine 20% 1g Rectal     Emergency Department Course:  Past medical records, nursing notes, and vitals reviewed.  0720: I performed an exam of the patient and obtained history, as documented above.  IV inserted and blood drawn.    The patient was sent for an abdomen pelvis CT while in the emergency department, findings above.    0956: I rechecked the  patient. Explained findings to patient. He appears uncomfortable. Ongoing pain control    Explained findings to patient. Enema administered after discussion with patient.    1239: I rechecked the patient. He is feeling improved but still having pain. Had output of brown soft stool after enema.     1306: I rechecked the patient. Findings and plan explained to the Patient. He feels comfortable with the plan for discharge home. Patient discharged home with instructions regarding supportive care, medications, and reasons to return. The importance of close follow-up was reviewed.     Impression & Plan      Medical Decision Making:  Bernard Padilla is a 32 year old male who presents to the emergency with concern for abdominal pain, localized to the left lower quadrant. He also noted having decreased bowel movements over the last 5 days, and intermittent blood. On examination, I did see evidence of external hemorrhoid, not currently bleeding or thrombosed, but this could be the cause of his bleeding as a possible fissure. Although, I did not visualize this. He had no visible blood in his stool here today or on limited rectal exam. There's no evidence of diverticulitis or other acute pathology on CT scan that would explain his symptoms. Here, urinalysis does not show any hematuria. Renal colic seems unlikely. I suspect this is possibly related to constipation or a bowel motility issue. Although not entirely clear, and he is aware that some processes do not show up immediately on CT scanning or other laboratory evaluations. He did have mild improvement after an enema with some stool output again not bloody or black. He's treated appropriately for hemorrhoids and possible fissures with Americaine, and recommendation of Preparation H, warm baths, etc... Recommended follow-up with his PCP within 2 days for reassessment. Return immediately to the emergency department with fever, uncontrollable pain, uncontrollable vomiting,  increasing bleeding, or other worrisome symptoms to him. Of note, his hemoglobin is stable. There's no evidence of coagulopathy on baseline history and exam today. All questions were answered prior to discharge.       Diagnosis:    ICD-10-CM   1. Abdominal cramping R10.9   2. Decreased stooling R19.4   3. External hemorrhoid K64.4   4. Rectal bleeding K62.5     Disposition: Patient discharged to home     Discharge Medications:   Details   benzocaine (AMERICAINE) 20 % rectal ointment Use sparingly on rectal area up to 6 times daily.Disp-28 g, R-0Local Print     Haydee Ramirez  12/5/2017   [facility] EMERGENCY DEPARTMENT    I, Haydee Ramirez, am serving as a scribe at 7:20 AM on 12/5/2017 to document services personally performed by Saba Travis MD based on my observations and the provider's statements to me.        Saba Travis MD  12/06/17 8862

## 2017-12-05 NOTE — ED AVS SNAPSHOT
[facility] Emergency Department    [address]    [facility] [address]    Phone:  [phone]    Fax:  [phone]                                       Bernard Padilla   MRN: [id number]    Department:  [facility] Emergency Department   Date of Visit:  12/5/2017           After Visit Summary Signature Page     I have received my discharge instructions, and my questions have been answered. I have discussed any challenges I see with this plan with the nurse or doctor.    ..........................................................................................................................................  Patient/Patient Representative Signature      ..........................................................................................................................................  Patient Representative Print Name and Relationship to Patient    ..................................................               ................................................  Date                                            Time    ..........................................................................................................................................  Reviewed by Signature/Title    ...................................................              ..............................................  Date                                                            Time

## 2017-12-05 NOTE — ED AVS SNAPSHOT
[facility] Emergency Department    [address]    [facility] [address]    Phone:  [phone]    Fax:  [phone]                                       Bernard Padilla   MRN: [id number]    Department:  [facility] Emergency Department   Date of Visit:  12/5/2017           Patient Information     Date Of Birth          1985        Your diagnoses for this visit were:     Abdominal cramping     Decreased stooling     External hemorrhoid     Rectal bleeding        You were seen by Saba Travis MD.      Follow-up Information     Follow up with [facility].    Why:  within 2-3 days for reassessment    Contact information:    [address] [facility] [address]          Follow up with [facility] Emergency Department.    Specialty:  EMERGENCY MEDICINE    Why:  As needed, If symptoms worsen    Contact information:    [address]  [facility] [address]  [phone]        Discharge Instructions       Discharge Instructions  Abdominal Pain    Abdominal pain (belly pain) can be caused by many things. Your evaluation today does not show the exact cause for your pain. Your provider today has decided that it is unlikely your pain is due to a life threatening problem, or a problem requiring surgery or hospital admission. Sometimes those problems cannot be found right away, so it is very important that you follow up as directed.  Sometimes only the changes which occur over time allow the cause of your pain to be found.    Generally, every Emergency Department visit should have a follow-up clinic visit with either a primary or a specialty clinic/provider. Please follow-up as instructed by your emergency provider today. With abdominal pain, we often recommend very close follow-up, such as the following day.    ADULTS:  Return to the Emergency Department right away if:      You get an oral temperature above 102oF or as  directed by your provider.    You have blood in your stools. This may be bright red or appear as black, tarry stools.      You keep vomiting (throwing up) or cannot drink liquids.    You see blood when you vomit.     You cannot have a bowel movement or you cannot pass gas.    Your stomach gets bloated or bigger.    Your skin or the whites of your eyes look yellow.    You faint.    You have bloody, frequent or painful urination (peeing).    You have new symptoms or anything that worries you.    CHILDREN:  Return to the Emergency Department right away if your child has any of the above-listed symptoms or the following:      Pushes your hand away or screams/cries when his/her belly is touched.    You notice your child is very fussy or weak.    Your child is very tired and is too tired to eat or drink.    Your child is dehydrated.  Signs of dehydration can be:  o Significant change in the amount of wet diapers/urine.  o Your infant or child starts to have dry mouth and lips, or no saliva (spit) or tears.    PREGNANT WOMEN:  Return to the Emergency Department right away if you have any of the above-listed symptoms or the following:      You have bleeding, leaking fluid or passing tissue from the vagina.    You have worse pain or cramping, or pain in your shoulder or back.    You have vomiting that will not stop.    You have a temperature of 100oF or more.    Your baby is not moving as much as usual.    You faint.    You get a bad headache with or without eye problems and abdominal pain.    You have a seizure.    You have unusual discharge from your vagina and abdominal pain.    Abdominal pain is pretty common during pregnancy.  Your pain may or may not be related to your pregnancy. You should follow-up closely with your OB provider so they can evaluate you and your baby.  Until you follow-up with your regular provider, do the following:       Avoid sex and do not put anything in your vagina.    Drink clear  "fluids.    Only take medications approved by your provider.    MORE INFORMATION:    Appendicitis:  A possible cause of abdominal pain in any person who still has their appendix is acute appendicitis. Appendicitis is often hard to diagnose.  Testing does not always rule out early appendicitis or other causes of abdominal pain. Close follow-up with your provider and re-evaluations may be needed to figure out the reason for your abdominal pain.    Follow-up:  It is very important that you make an appointment with your clinic and go to the appointment.  If you do not follow-up with your primary provider, it may result in missing an important development which could result in permanent injury or disability and/or lasting pain.  If there is any problem keeping your appointment, call your provider or return to the Emergency Department.    Medications:  Take your medications as directed by your provider today.  Before using over-the-counter medications, ask your provider and make sure to take the medications as directed.  If you have any questions about medications, ask your provider.    Diet:  Resume your normal diet as much as possible, but do not eat fried, fatty or spicy foods while you have pain.  Do not drink alcohol or have caffeine.  Do not smoke tobacco.    Probiotics: If you have been given an antibiotic, you may want to also take a probiotic pill or eat yogurt with live cultures. Probiotics have \"good bacteria\" to help your intestines stay healthy. Studies have shown that probiotics help prevent diarrhea (loose stools) and other intestine problems (including C. diff infection) when you take antibiotics. You can buy these without a prescription in the pharmacy section of the store.     If you were given a prescription for medicine here today, be sure to read all of the information (including the package insert) that comes with your prescription.  This will include important information about the medicine, its side " effects, and any warnings that you need to know about.  The pharmacist who fills the prescription can provide more information and answer questions you may have about the medicine.  If you have questions or concerns that the pharmacist cannot address, please call or return to the Emergency Department.       Remember that you can always come back to the Emergency Department if you are not able to see your regular provider in the amount of time listed above, if you get any new symptoms, or if there is anything that worries you.    Discharge Instructions  Constipation  Constipation can cause severe cramping pain and your provider thinks this might be the cause of your abdominal pain (belly pain) today.  People usually recognize that they are constipated because they have difficulty having bowel movements, are not having bowel movements frequently enough, or are not having large enough bowel movements. Sometimes, especially in children or older people, you do not recognize that you are constipated until it becomes severe. The most common causes of constipation are a lack of exercise and not eating enough fruits, vegetables, and whole grains. Constipation can also be a side effect of medications, such as narcotics, or may be caused by a disease of the digestive system.    Generally, every Emergency Department visit should have a follow-up clinic visit with either a primary or a specialty clinic/provider. Please follow-up as instructed by your emergency provider today. Sometimes, chronic constipation requires further testing to determine the cause. If you are over 50 years old, you may need a colonoscopy if you have not had one before.     Return to the Emergency Department if:    Your abdominal pain worsens or does not improve after a bowel movement.    You become very weak.    You get a temperature above 102oF or as directed by your provider.    You have blood in your stools (bright red or black, tarry stools).    You  keep vomiting (throwing up) or cannot drink liquids.    Your see blood when you vomit.    Your stomach gets bloated or bigger.    You have new symptoms or anything that worries you.    What can I do to help myself?    If your provider gave you a cathartic medication, like magnesium citrate or GoLytely  (polyethylene glycol), you can expect to have cramps and gas pains after taking it. You can expect to have a number of bowel movements and even diarrhea (loose or watery stools) in the course of clearing your bowels.  You will know your bowels have been cleaned out after you pass clear liquid. The cramps and gas should let up after you have emptied your bowels. You may want to wait until morning to take this type of medication so you aren t up in the night.     Sometimes instead of cathartics, we recommend laxatives like milk of magnesia to move your bowels more slowly, or an enema to help the bowels to move. Read and follow the package directions, or follow your provider s instructions.    Once you have become very constipated, it takes time for your bowels to return to normal and you need to be very careful to prevent becoming constipated again. Take a laxative if you do not move your bowels at least every two days.       Eat foods that have a lot of fiber. Good choices are fruits, vegetables, prune juice, apple juice, and high fiber cereal. Limit dairy products such as milk and cheese, since these can make constipation worse.     Drink plenty of water.     When you feel the need to go to the bathroom, go to the bathroom. Do not hold it.    Miralax , Metamucil , Colace , Senna or fiber supplements can be used daily.  Miralax  daily is often the best choice for children.  If you were given a prescription for medicine here today, be sure to read all of the information (including the package insert) that comes with your prescription.  This will include important information about the medicine, its side effects, and any  warnings that you need to know about.  The pharmacist who fills the prescription can provide more information and answer questions you may have about the medicine.  If you have questions or concerns that the pharmacist cannot address, please call or return to the Emergency Department.   Remember that you can always come back to the Emergency Department if you are not able to see your regular provider in the amount of time listed above, if you get any new symptoms, or if there is anything that worries     Hemorrhoids    Hemorrhoids are swollen and inflamed veins inside the rectum and near the anus. The rectum is the last several inches of the colon. The anus is the passage between the rectum and the outside of the body.  Causes  The veins can become swollen due to increased pressure in them. This is most often caused by:    Chronic constipation or diarrhea    Straining when having a bowel movement    Sitting too long on the toilet    A low-fiber diet    Pregnancy  Symptoms    Bleeding from the rectum (this may be noticeable after bowel movements)    Lump near the anus    Itching around the anus    Pain around the anus  There are different types of hemorrhoids. Depending on the type you have and the severity, you may be able to treat yourself at home. In some cases, a procedure may be the best treatment option. Your healthcare provider can tell you more about this, if needed.  Home care  General care    To get relief from pain or itching, try:    Topical products. Your healthcare provider may prescribe or recommend creams, ointments, or pads that can be applied to the hemorrhoid. Use these exactly as directed.    Medicines. Your healthcare provider may recommend stool softeners, suppositories, or laxatives to help manage constipation. Use these exactly as directed.    Sitz baths. A sitz bath involves sitting in a few inches of warm bath water. Be careful not to make the water so hot that you burn yourself--test it  before sitting in it. Soak for about 10 to 15 minutes a few times a day. This may help relieve pain.  Tips to help prevent hemorrhoids    Eat more fiber. Fiber adds bulk to stool and absorbs water as it moves through your colon. This makes stool softer and easier to pass.    Increase the fiber in your diet with more fiber-rich foods. These include fresh fruit, vegetables, and whole grains.    Take a fiber supplement or bulking agent, if advised to by your provider. These include products such as psyllium or methylcellulose.    Drink plenty of water, if directed to by your provider. This can help keep stool soft.    Be more active. Frequent exercise aids digestion and helps prevent constipation. It may also help make bowel movements more regular.    Don t strain during bowel movements. This can make hemorrhoids more likely. Also, don t sit on the toilet for long periods of time.  Follow-up care  Follow up with your healthcare provider, or as advised. If a culture or imaging tests were done, you will be notified of the results when they are ready. This may take a few days or longer.  When to seek medical advice  Call your healthcare provider right away if any of these occur:    Increased bleeding from the rectum    Increased pain around the rectum or anus    Weakness or dizziness  Call 911  Call 911 or return to the emergency department right away if any of these occur:    Trouble breathing or swallowing    Fainting or loss of consciousness    Unusually fast heart rate    Vomiting blood    Large amounts of blood in stool  Date Last Reviewed: 6/22/2015 2000-2017 [employer]. [address] [place] [address], [place] [address]. All rights reserved. This information is not intended as a substitute for professional medical care. Always follow your healthcare professional's instructions.          24 Hour Appointment Hotline       To make an appointment at any [facility], call [phone] ([phone]). If  you don't have a family doctor or clinic, we will help you find one. [facility] clinics are conveniently located to serve the needs of you and your family.             Review of your medicines      START taking        Dose / Directions Last dose taken    benzocaine 20 % rectal ointment   Commonly known as:  AMERICAINE   Quantity:  28 g        Use sparingly on rectal area up to 6 times daily.   Refills:  0          Our records show that you are taking the medicines listed below. If these are incorrect, please call your family doctor or clinic.        Dose / Directions Last dose taken    IBUPROFEN PO        Refills:  0                Prescriptions were sent or printed at these locations (1 Prescription)                   Other Prescriptions                Printed at Department/Unit printer (1 of 1)         benzocaine (AMERICAINE) 20 % rectal ointment                Procedures and tests performed during your visit     CBC with platelets differential    CT Abdomen Pelvis w Contrast    Comprehensive metabolic panel    Give 20 ounces of water 15 minutes before CT of abdomen    Lactic acid whole blood    Peripheral IV: Standard    UA with Microscopic      Orders Needing Specimen Collection     None      Pending Results     No orders found from 12/3/2017 to 12/6/2017.            Pending Culture Results     No orders found from 12/3/2017 to 12/6/2017.            Pending Results Instructions     If you had any lab results that were not finalized at the time of your Discharge, you can call the ED Lab Result RN at [phone]. You will be contacted by this team for any positive Lab results or changes in treatment. The nurses are available 7 days a week from 10A to 6:30P.  You can leave a message 24 hours per day and they will return your call.        Test Results From Your Hospital Stay        12/5/2017  8:06 AM      Component Results     Component Value Ref Range & Units Status    WBC 8.3 4.0 - 11.0 10e9/L Final    RBC Count  4.62 4.4 - 5.9 10e12/L Final    Hemoglobin 14.8 13.3 - 17.7 g/dL Final    Hematocrit 42.8 40.0 - 53.0 % Final    MCV 93 78 - 100 fl Final    MCH 32.0 26.5 - 33.0 pg Final    MCHC 34.6 31.5 - 36.5 g/dL Final    RDW 12.2 10.0 - 15.0 % Final    Platelet Count 190 150 - 450 10e9/L Final    Diff Method Automated Method  Final    % Neutrophils 50.5 % Final    % Lymphocytes 38.3 % Final    % Monocytes 6.5 % Final    % Eosinophils 4.1 % Final    % Basophils 0.4 % Final    % Immature Granulocytes 0.2 % Final    Nucleated RBCs 0 0 /100 Final    Absolute Neutrophil 4.2 1.6 - 8.3 10e9/L Final    Absolute Lymphocytes 3.2 0.8 - 5.3 10e9/L Final    Absolute Monocytes 0.5 0.0 - 1.3 10e9/L Final    Absolute Eosinophils 0.3 0.0 - 0.7 10e9/L Final    Absolute Basophils 0.0 0.0 - 0.2 10e9/L Final    Abs Immature Granulocytes 0.0 0 - 0.4 10e9/L Final    Absolute Nucleated RBC 0.0  Final         12/5/2017  8:22 AM      Component Results     Component Value Ref Range & Units Status    Sodium 139 133 - 144 mmol/L Final    Potassium 3.7 3.4 - 5.3 mmol/L Final    Chloride 107 94 - 109 mmol/L Final    Carbon Dioxide 25 20 - 32 mmol/L Final    Anion Gap 7 3 - 14 mmol/L Final    Glucose 98 70 - 99 mg/dL Final    Urea Nitrogen 14 7 - 30 mg/dL Final    Creatinine 0.86 0.66 - 1.25 mg/dL Final    GFR Estimate >90 >60 mL/min/1.7m2 Final    Non  GFR Calc    GFR Estimate If Black >90 >60 mL/min/1.7m2 Final    African American GFR Calc    Calcium 8.6 8.5 - 10.1 mg/dL Final    Bilirubin Total 0.4 0.2 - 1.3 mg/dL Final    Albumin 3.6 3.4 - 5.0 g/dL Final    Protein Total 7.2 6.8 - 8.8 g/dL Final    Alkaline Phosphatase 116 40 - 150 U/L Final    ALT 38 0 - 70 U/L Final    AST 22 0 - 45 U/L Final         12/5/2017 10:33 AM      Component Results     Component Value Ref Range & Units Status    Color Urine Straw  Final    Appearance Urine Slightly Cloudy  Final    Glucose Urine Negative NEG^Negative mg/dL Final    Bilirubin Urine Negative  NEG^Negative Final    Ketones Urine Negative NEG^Negative mg/dL Final    Specific Gravity Urine 1.005 1.003 - 1.035 Final    Blood Urine Negative NEG^Negative Final    pH Urine 6.0 5.0 - 7.0 pH Final    Protein Albumin Urine Negative NEG^Negative mg/dL Final    Urobilinogen mg/dL 0.0 0.0 - 2.0 mg/dL Final    Nitrite Urine Negative NEG^Negative Final    Leukocyte Esterase Urine Negative NEG^Negative Final    Source Midstream Urine  Final    WBC Urine 1 0 - 2 /HPF Final    RBC Urine 0 0 - 2 /HPF Final    Mucous Urine Present (A) NEG^Negative /LPF Final         12/5/2017  9:22 AM      Narrative     CT ABDOMEN/PELVIS WITH CONTRAST December 5, 2017 8:27 AM     HISTORY: Abdomen pain.     COMPARISON: CT abdomen/pelvis 12/5/2016.    TECHNIQUE: Axial images from the lung bases to the symphysis are  performed with additional coronal reformatted images. 88 mL of Isovue  370 are given intravenously.  Radiation dose for this scan was reduced  using automated exposure control, adjustment of the mA and/or kV  according to patient size, or iterative reconstruction technique.    FINDINGS: The lung bases are clear.    Abdomen: The liver, spleen, gallbladder, pancreas, adrenal glands and  kidneys are unremarkable. No hydronephrosis or renal calculi. No  enlarged abdominal lymph nodes. The bowel is normal in caliber without  obstruction, diverticulitis or appendicitis.    Pelvis: The bladder, prostate and rectum are unremarkable. No enlarged  pelvic lymph nodes or free fluid. Bone window examination is within  normal limits.        Impression     IMPRESSION: No acute changes in the abdomen or pelvis to account for  patient's abdominal pain. No bowel obstruction, diverticulitis or  appendicitis. Abdominal and pelvic organs are within normal limits.    MAVERICK GALVAN MD         12/5/2017  8:04 AM      Component Results     Component Value Ref Range & Units Status    Lactic Acid 0.6 (L) 0.7 - 2.0 mmol/L Final                Clinical Quality  Measure: Blood Pressure Screening     Your blood pressure was checked while you were in the emergency department today. The last reading we obtained was  BP: 124/69 . Please read the guidelines below about what these numbers mean and what you should do about them.  If your systolic blood pressure (the top number) is less than 120 and your diastolic blood pressure (the bottom number) is less than 80, then your blood pressure is normal. There is nothing more that you need to do about it.  If your systolic blood pressure (the top number) is 120-139 or your diastolic blood pressure (the bottom number) is 80-89, your blood pressure may be higher than it should be. You should have your blood pressure rechecked within a year by a primary care provider.  If your systolic blood pressure (the top number) is 140 or greater or your diastolic blood pressure (the bottom number) is 90 or greater, you may have high blood pressure. High blood pressure is treatable, but if left untreated over time it can put you at risk for heart attack, stroke, or kidney failure. You should have your blood pressure rechecked by a primary care provider within the next 4 weeks.  If your provider in the emergency department today gave you specific instructions to follow-up with your doctor or provider even sooner than that, you should follow that instruction and not wait for up to 4 weeks for your follow-up visit.        Thank you for choosing [facility]       Thank you for choosing [facility] for your care. Our goal is always to provide you with excellent care. Hearing back from our patients is one way we can continue to improve our services. Please take a few minutes to complete the written survey that you may receive in the mail after you visit with us. Thank you!        [facility]ohart Information     [employer] lets you send messages to your doctor, view your test results, renew your prescriptions, schedule appointments and more. To sign up, go to  [email or web address] . Click on \"Log in\" on the left side of the screen, which will take you to the Welcome page. Then click on \"Sign up Now\" on the right side of the page.     You will be asked to enter the access code listed below, as well as some personal information. Please follow the directions to create your username and password.     Your access code is: [email or web address]  Expires: 3/5/2018  1:13 PM     Your access code will  in 90 days. If you need help or a new code, please call your [facility] clinic or [phone].        Care EveryWhere ID     This is your Care EveryWhere ID. This could be used by other organizations to access your [facility] medical records  [id number]        Equal Access to Services     ZACKARY DIEGO : Saw Corona, gayla pacheco, delisa mccrary, maddie perera . [facility] [phone].    ATENCIÓN: Si habla español, tiene a jara disposición servicios gratuitos de asistencia lingüística. Llame al [phone].    We comply with applicable federal civil rights laws and [place] laws. We do not discriminate on the basis of race, color, national origin, age, disability, sex, sexual orientation, or gender identity.            After Visit Summary       This is your record. Keep this with you and show to your community pharmacist(s) and doctor(s) at your next visit.                  "

## 2018-06-26 ENCOUNTER — HOSPITAL ENCOUNTER (EMERGENCY)
Facility: CLINIC | Age: 33
Discharge: HOME | End: 2018-06-26
Attending: EMERGENCY MEDICINE | Admitting: EMERGENCY MEDICINE
Payer: COMMERCIAL

## 2018-06-26 VITALS
OXYGEN SATURATION: 100 % | HEART RATE: 68 BPM | RESPIRATION RATE: 16 BRPM | TEMPERATURE: 97.6 F | DIASTOLIC BLOOD PRESSURE: 95 MMHG | SYSTOLIC BLOOD PRESSURE: 123 MMHG

## 2018-06-26 DIAGNOSIS — L30.9 ECZEMA, UNSPECIFIED TYPE: ICD-10-CM

## 2018-06-26 DIAGNOSIS — L21.0 DANDRUFF IN ADULT: ICD-10-CM

## 2018-06-26 PROCEDURE — 99282 EMERGENCY DEPT VISIT SF MDM: CPT

## 2018-06-26 RX ORDER — PETROLATUM,WHITE 41 %
OINTMENT (GRAM) TOPICAL 2 TIMES DAILY
Qty: 396 G | Refills: 0 | Status: SHIPPED | OUTPATIENT
Start: 2018-06-26 | End: 2018-07-21

## 2018-06-26 RX ORDER — VIT E ACET/GLY/DIMETH/WATER
LOTION (ML) TOPICAL 2 TIMES DAILY
Qty: 473 ML | Refills: 0 | Status: SHIPPED | OUTPATIENT
Start: 2018-06-26 | End: 2018-07-21

## 2018-06-26 ASSESSMENT — ENCOUNTER SYMPTOMS: ROS SKIN COMMENTS: DANDRUFF

## 2018-06-26 NOTE — ED PROVIDER NOTES
"  History     Chief Complaint:  Rash    HPI   Bernard Padilla is a 32 year old male presenting to the ED for evaluation of a rash. The patient reports that for the last 2 days his face has been itching and has noticed dry and red patches. He says that he has been trying to put hydrocortisone and Vaseline on his face but it hasn't been improving. After waking up this morning with no improvement, he was prompted to come to the ED. He says that he thinks this might be caused by an allergy to his mother's dog. He states that he does have eczema and some itching on his upper arm but no other rashes besides his face. Of note, he has chronic dandruff for which selsun blue provides no relief. He says that when he was at [facility] 6 months ago, he was given a \"brown liquid\" which seemed to provide some relief. He denies taking any medication for the rash and is otherwise healthy.    Allergies:  Eggs     Medications:    Cetaphil  Eucerin  Americaine  Vaseline  Hydrocortisone    Past Medical History:    Boil    Past Surgical History:    Meniscus repair    Family History:    Family history reviewed. No pertinent family history.    Social History:  Smoking Status: Current Every Day Smoker   Packs/day: 0.5  Smokeless Tobacco: Never Used  Alcohol Use: Positive  Marital Status:  Single     Review of Systems   Skin: Positive for rash (with itching and dry patches).        Dandruff   All other systems reviewed and are negative.    Physical Exam     Patient Vitals for the past 24 hrs:   BP Temp Temp src Pulse Heart Rate Resp SpO2   06/26/18 0853 (!) 123/95 - - - 70 16 100 %   06/26/18 0850 (!) 133/98 - - - - - -   06/26/18 0826 (!) 143/111 97.6  F (36.4  C) Oral 68 - 18 99 %     Physical Exam   HENT:   Head: Normocephalic.   There is white peeling skin over the scalp consistent with likely dandruff.  There is no karion or concern for infection.   Cardiovascular: Normal rate.    Pulmonary/Chest: Effort normal.   Skin:        Nursing note " and vitals reviewed.      Emergency Department Course     Emergency Department Course:    0830 Nursing notes and vitals reviewed.    0841 I performed an exam of the patient as documented above.     0855 I personally answered all related questions prior to discharge.    Impression & Plan      Medical Decision Making:  Bernard Padilla is a 32 year old male who presents to the emergency department today for evaluation of skin rash.  Patient states this started just today but on examination this is clearly not the case.  His examination is suspicious for eczema or dry skin.  And dandruff.  Patient is offered a set of fill has been given this before as well as a moisturizer cream to the face.  Do not recommend hydrocortisone cream to the face and follow-up with primary care.    Diagnosis:    ICD-10-CM    1. Dandruff in adult L21.8    2. Eczema, unspecified type L30.9      Disposition:   The patient is discharged to home.    Discharge Medications:  Discharge Medication List as of 6/26/2018  8:49 AM      START taking these medications    Details   CETAPHIL (CETAPHIL) lotion Apply topically 2 times dailyDisp-473 mL, R-0Local Print      Emollient (EUCERIN CALMING DAILY MOIST) CREA Externally apply topically 2 times dailyDisp-396 g, R-0Local Print           Scribe Disclosure:  I, Florence Mccauley, am serving as a scribe at 8:38 AM on 6/26/2018 to document services personally performed by Chai Vail MD based on my observations and the provider's statements to me.    [facility] EMERGENCY DEPARTMENT       Chai Vail MD  06/30/18 1651

## 2018-06-26 NOTE — DISCHARGE INSTRUCTIONS
Atopic Dermatitis (Adult)  Atopic dermatitis is a dry, itchy, red rash. It s also called eczema. The rash is chronic, or ongoing. It can come and go over time. The disease is often passed down in families. It causes a problem with the skin barrier that makes the skin more sensitive to the environment and other factors. The increased skin sensitivity causes an itch, which causes scratching. Scratching can worsen the itching or also break the skin. This can put the skin at risk of infection.  The condition is most common in people with asthma, hay fever, hives, or dry or sensitive skin. The rash may be caused by extreme heat or heavy sweating. Skin irritants can cause the rash to flare up. These can include wool or silk clothing, grease, oils, some medicines, and harsh soaps and detergents. Emotional stress can also be a trigger.  Treatment is done to relieve the itching and inflammation of the skin.  Home care  Follow these tips to care for your condition:    Keep the areas of rash clean by bathing at least every other day. Use lukewarm water to bathe. Don t use hot water, which can dry out the skin.    Don t use soaps with strong detergents. Use mild soaps made for sensitive skin.    Apply a cream or ointment to damp skin right after bathing.    Avoid things that irritate your skin. Wear absorbent, soft fabrics next to the skin rather than rough or scratchy materials.    Use mild laundry soap free of scents and perfumes. Make sure to rinse all the soap out of your clothes.    Treat any skin infection as directed.    Use oral diphenhydramine to help reduce itching. This is an antihistamine you can buy at drug and grocery stores. It can make you sleepy, so use lower doses during the daytime. Or you can use loratadine. This is an antihistamine that will not make you sleepy. Do not use diphenhydramine if you have glaucoma or have trouble urinating due to an enlarged prostate.  Follow-up care  See your healthcare  provider, or as advised. If your symptoms don t get better or if they get worse in the next 7 days, make an appointment with your healthcare provider.  When to seek medical advice  Call your healthcare provider right away  if any of these occur:    Increasing area of redness or pain in the skin    Yellow crusts or wet drainage from the rash    Fever of 100.4 F (38 C) or higher, or as directed by your healthcare provider  Date Last Reviewed: 9/1/2016 2000-2017 [employer]. [address] [place] [address]. All rights reserved. This information is not intended as a substitute for professional medical care. Always follow your healthcare professional's instructions.          Managing Atopic Dermatitis (Eczema)     After bathing, gently pat your skin dry (don t rub). Apply moisturizer while your skin is still damp.   To manage your symptoms and help reduce the severity and frequency, try these self-care tips:  Caring for your skin    Use a gentle, fragrance-free cleanser (or nonsoap cleanser) for bathing. Rinse well. Pat skin dry.    Take warm, not hot, baths or showers. Try to limit them to no more that 10 to 15 minutes.     Use moisturizer liberally right after you bathe, while your skin is still damp.    Avoid scratching because it will cause more damage to your skin.     Topical, over-the-counter hydrocortisone cream may help control mild symptoms.   Controlling your environment    Avoid extreme heat or cold.    Avoid very humid or very dry air.    If your home or office air is very dry, use a humidifier.    Avoid allergens, such as dust, that may be present in bedding, carpets, plush toys, or rugs.    Know that pet hair and dander can cause flare-ups.  Seeking medical treatment  Another way to keep symptoms under control is to seek medical treatment. Talk with your healthcare provider about the type of treatment that may work best for you. Your provider may prescribe treatments such as the  following:    Topical treatments to put on the skin daily    Medicines taken by mouth (oral medicines), such as antihistamines, antibiotics, or corticosteroids    In severe cases shots (injections) may be needed to control the symptoms. You may even need antibiotics if skin infections occur.  Treatments don t work the same way for every person. So if your symptoms continue or get worse, ask your healthcare provider about other treatments.  Making lifestyle choices    Manage the stress in your life.    Wear loose-fitting cotton clothing that does not bind or rub your skin.    Avoid contact with wool or other scratchy fabrics.    Use fragrance-free products.  Getting good results  Now that you know more about atopic dermatitis, the next step is up to you. Follow your healthcare provider s treatment plan and your self-care routine. This will help bring atopic dermatitis under control. If your symptoms persist, be sure to let your health care provider know.   Date Last Reviewed: 2/1/2017 2000-2017 [employer]. [address] [place] [address], [place] [address]. All rights reserved. This information is not intended as a substitute for professional medical care. Always follow your healthcare professional's instructions.

## 2018-06-26 NOTE — ED TRIAGE NOTES
Patient presents reporting rash on face thinks he may be allergic to mothers dog. Woke up itchy, red, and dry patches.

## 2018-06-26 NOTE — ED AVS SNAPSHOT
[facility]    [address] [facility] [address]    Phone:  [phone]    Fax:  [phone]                                       Bernard Padilla   MRN: [id number]    Department:  [facility]   Date of Visit:  6/26/2018           Patient Information     Date Of Birth          1985        Your diagnoses for this visit were:     Dandruff in adult     Eczema, unspecified type        You were seen by Chai Vail MD.      Follow-up Information     Follow up with [facility] In 3 days.    Why:  As needed    Contact information:    [address] [facility] [address]          Discharge Instructions         Atopic Dermatitis (Adult)  Atopic dermatitis is a dry, itchy, red rash. It s also called eczema. The rash is chronic, or ongoing. It can come and go over time. The disease is often passed down in families. It causes a problem with the skin barrier that makes the skin more sensitive to the environment and other factors. The increased skin sensitivity causes an itch, which causes scratching. Scratching can worsen the itching or also break the skin. This can put the skin at risk of infection.  The condition is most common in people with asthma, hay fever, hives, or dry or sensitive skin. The rash may be caused by extreme heat or heavy sweating. Skin irritants can cause the rash to flare up. These can include wool or silk clothing, grease, oils, some medicines, and harsh soaps and detergents. Emotional stress can also be a trigger.  Treatment is done to relieve the itching and inflammation of the skin.  Home care  Follow these tips to care for your condition:    Keep the areas of rash clean by bathing at least every other day. Use lukewarm water to bathe. Don t use hot water, which can dry out the skin.    Don t use soaps with strong detergents. Use mild soaps made for sensitive skin.    Apply a cream or  ointment to damp skin right after bathing.    Avoid things that irritate your skin. Wear absorbent, soft fabrics next to the skin rather than rough or scratchy materials.    Use mild laundry soap free of scents and perfumes. Make sure to rinse all the soap out of your clothes.    Treat any skin infection as directed.    Use oral diphenhydramine to help reduce itching. This is an antihistamine you can buy at drug and grocery stores. It can make you sleepy, so use lower doses during the daytime. Or you can use loratadine. This is an antihistamine that will not make you sleepy. Do not use diphenhydramine if you have glaucoma or have trouble urinating due to an enlarged prostate.  Follow-up care  See your healthcare provider, or as advised. If your symptoms don t get better or if they get worse in the next 7 days, make an appointment with your healthcare provider.  When to seek medical advice  Call your healthcare provider right away  if any of these occur:    Increasing area of redness or pain in the skin    Yellow crusts or wet drainage from the rash    Fever of 100.4 F (38 C) or higher, or as directed by your healthcare provider  Date Last Reviewed: 9/1/2016 2000-2017 [employer]. [address] [place] [address], [place] [address]. All rights reserved. This information is not intended as a substitute for professional medical care. Always follow your healthcare professional's instructions.          Managing Atopic Dermatitis (Eczema)     After bathing, gently pat your skin dry (don t rub). Apply moisturizer while your skin is still damp.   To manage your symptoms and help reduce the severity and frequency, try these self-care tips:  Caring for your skin    Use a gentle, fragrance-free cleanser (or nonsoap cleanser) for bathing. Rinse well. Pat skin dry.    Take warm, not hot, baths or showers. Try to limit them to no more that 10 to 15 minutes.     Use moisturizer liberally right after you bathe, while your  skin is still damp.    Avoid scratching because it will cause more damage to your skin.     Topical, over-the-counter hydrocortisone cream may help control mild symptoms.   Controlling your environment    Avoid extreme heat or cold.    Avoid very humid or very dry air.    If your home or office air is very dry, use a humidifier.    Avoid allergens, such as dust, that may be present in bedding, carpets, plush toys, or rugs.    Know that pet hair and dander can cause flare-ups.  Seeking medical treatment  Another way to keep symptoms under control is to seek medical treatment. Talk with your healthcare provider about the type of treatment that may work best for you. Your provider may prescribe treatments such as the following:    Topical treatments to put on the skin daily    Medicines taken by mouth (oral medicines), such as antihistamines, antibiotics, or corticosteroids    In severe cases shots (injections) may be needed to control the symptoms. You may even need antibiotics if skin infections occur.  Treatments don t work the same way for every person. So if your symptoms continue or get worse, ask your healthcare provider about other treatments.  Making lifestyle choices    Manage the stress in your life.    Wear loose-fitting cotton clothing that does not bind or rub your skin.    Avoid contact with wool or other scratchy fabrics.    Use fragrance-free products.  Getting good results  Now that you know more about atopic dermatitis, the next step is up to you. Follow your healthcare provider s treatment plan and your self-care routine. This will help bring atopic dermatitis under control. If your symptoms persist, be sure to let your health care provider know.   Date Last Reviewed: 2/1/2017 2000-2017 The [employer]. [address] [place] [address], [place] [address]. All rights reserved. This information is not intended as a substitute for professional medical care. Always follow your healthcare  professional's instructions.          24 Hour Appointment Hotline       To make an appointment at any [facility], call [phone] ([phone]). If you don't have a family doctor or clinic, we will help you find one. [facility] are conveniently located to serve the needs of you and your family.             Review of your medicines      START taking        Dose / Directions Last dose taken    * CETAPHIL lotion   Quantity:  473 mL        Apply topically 2 times daily   Refills:  0        * EUCERIN CALMING DAILY MOIST Crea   Quantity:  396 g        Externally apply topically 2 times daily   Refills:  0        * Notice:  This list has 2 medication(s) that are the same as other medications prescribed for you. Read the directions carefully, and ask your doctor or other care provider to review them with you.      Our records show that you are taking the medicines listed below. If these are incorrect, please call your family doctor or clinic.        Dose / Directions Last dose taken    benzocaine 20 % rectal ointment   Commonly known as:  AMERICAINE   Quantity:  28 g        Use sparingly on rectal area up to 6 times daily.   Refills:  0        IBUPROFEN PO        Refills:  0                Prescriptions were sent or printed at these locations (2 Prescriptions)                   Other Prescriptions                Printed at Department/Unit printer (2 of 2)         CETAPHIL (CETAPHIL) lotion               Emollient (EUCERIN CALMING DAILY MOIST) CREA                Orders Needing Specimen Collection     None      Pending Results     No orders found from 6/24/2018 to 6/27/2018.            Pending Culture Results     No orders found from 6/24/2018 to 6/27/2018.            Pending Results Instructions     If you had any lab results that were not finalized at the time of your Discharge, you can call the ED Lab Result RN at [phone]. You will be contacted by this team for any positive Lab results or changes in  treatment. The nurses are available 7 days a week from 10A to 6:30P.  You can leave a message 24 hours per day and they will return your call.        Test Results From Your Hospital Stay               Clinical Quality Measure: Blood Pressure Screening     Your blood pressure was checked while you were in the emergency department today. The last reading we obtained was  BP: (!) 143/111 . Please read the guidelines below about what these numbers mean and what you should do about them.  If your systolic blood pressure (the top number) is less than 120 and your diastolic blood pressure (the bottom number) is less than 80, then your blood pressure is normal. There is nothing more that you need to do about it.  If your systolic blood pressure (the top number) is 120-139 or your diastolic blood pressure (the bottom number) is 80-89, your blood pressure may be higher than it should be. You should have your blood pressure rechecked within a year by a primary care provider.  If your systolic blood pressure (the top number) is 140 or greater or your diastolic blood pressure (the bottom number) is 90 or greater, you may have high blood pressure. High blood pressure is treatable, but if left untreated over time it can put you at risk for heart attack, stroke, or kidney failure. You should have your blood pressure rechecked by a primary care provider within the next 4 weeks.  If your provider in the emergency department today gave you specific instructions to follow-up with your doctor or provider even sooner than that, you should follow that instruction and not wait for up to 4 weeks for your follow-up visit.        Thank you for choosing [facility]       Thank you for choosing [facility] for your care. Our goal is always to provide you with excellent care. Hearing back from our patients is one way we can continue to improve our services. Please take a few minutes to complete the written survey that you may receive in the mail after  "you visit with us. Thank you!        [employer] Information     [employer] lets you send messages to your doctor, view your test results, renew your prescriptions, schedule appointments and more. To sign up, go to [email or web address] . Click on \"Log in\" on the left side of the screen, which will take you to the Welcome page. Then click on \"Sign up Now\" on the right side of the page.     You will be asked to enter the access code listed below, as well as some personal information. Please follow the directions to create your username and password.     Your access code is: [email or web address]  Expires: 2018  8:43 AM     Your access code will  in 90 days. If you need help or a new code, please call your [facility] clinic or [phone].        Care EveryWhere ID     This is your Care EveryWhere ID. This could be used by other organizations to access your [facility] medical records  [id number]        Equal Access to Services     NADIA DIEGO : Hadii sam key hadasho Sourielali, waaxda luqadaha, qaybta kaalmada adejonathonyaarturo, maddie perera . So [facility] [phone].    ATENCIÓN: Si habla español, tiene a jara disposición servicios gratuitos de asistencia lingüística. Llame al [phone].    We comply with applicable federal civil rights laws and [place] laws. We do not discriminate on the basis of race, color, national origin, age, disability, sex, sexual orientation, or gender identity.            After Visit Summary       This is your record. Keep this with you and show to your community pharmacist(s) and doctor(s) at your next visit.                  "

## 2018-06-26 NOTE — ED AVS SNAPSHOT
[facility]    [address]    [facility] [address]    Phone:  [phone]    Fax:  [phone]                                       Bernard Padilla   MRN: [id number]    Department:  [facility]   Date of Visit:  6/26/2018           After Visit Summary Signature Page     I have received my discharge instructions, and my questions have been answered. I have discussed any challenges I see with this plan with the nurse or doctor.    ..........................................................................................................................................  Patient/Patient Representative Signature      ..........................................................................................................................................  Patient Representative Print Name and Relationship to Patient    ..................................................               ................................................  Date                                            Time    ..........................................................................................................................................  Reviewed by Signature/Title    ...................................................              ..............................................  Date                                                            Time

## 2018-07-21 ENCOUNTER — HOSPITAL ENCOUNTER (EMERGENCY)
Facility: CLINIC | Age: 33
Discharge: HOME | End: 2018-07-21
Attending: NURSE PRACTITIONER | Admitting: NURSE PRACTITIONER
Payer: COMMERCIAL

## 2018-07-21 ENCOUNTER — APPOINTMENT (OUTPATIENT)
Dept: GENERAL RADIOLOGY | Facility: CLINIC | Age: 33
End: 2018-07-21
Attending: NURSE PRACTITIONER
Payer: COMMERCIAL

## 2018-07-21 VITALS
TEMPERATURE: 98.9 F | OXYGEN SATURATION: 97 % | SYSTOLIC BLOOD PRESSURE: 132 MMHG | DIASTOLIC BLOOD PRESSURE: 76 MMHG | RESPIRATION RATE: 16 BRPM | HEART RATE: 88 BPM

## 2018-07-21 DIAGNOSIS — T14.8XXA PUNCTURE WOUND: ICD-10-CM

## 2018-07-21 PROCEDURE — 25000132 ZZH RX MED GY IP 250 OP 250 PS 637: Performed by: NURSE PRACTITIONER

## 2018-07-21 PROCEDURE — 73630 X-RAY EXAM OF FOOT: CPT | Mod: RT

## 2018-07-21 PROCEDURE — 99283 EMERGENCY DEPT VISIT LOW MDM: CPT

## 2018-07-21 RX ORDER — IBUPROFEN 600 MG/1
600 TABLET, FILM COATED ORAL ONCE
Status: COMPLETED | OUTPATIENT
Start: 2018-07-21 | End: 2018-07-21

## 2018-07-21 RX ORDER — LEVOFLOXACIN 500 MG/1
500 TABLET, FILM COATED ORAL DAILY
Qty: 3 TABLET | Refills: 0 | Status: SHIPPED | OUTPATIENT
Start: 2018-07-21 | End: 2019-02-08

## 2018-07-21 RX ADMIN — IBUPROFEN 600 MG: 600 TABLET ORAL at 16:33

## 2018-07-21 ASSESSMENT — ENCOUNTER SYMPTOMS: NUMBNESS: 0

## 2018-07-21 NOTE — ED TRIAGE NOTES
Stepped on a nail while leon around 0700 this AM.  Took tylenol at 0800, ibuprofen 1200.  ABCDs intact.

## 2018-07-21 NOTE — ED AVS SNAPSHOT
[facility] Emergency Department    [address] [place] [address]    Phone:  [phone]    Fax:  [phone]                                       Bernard Padilla   MRN: [id number]    Department:  [facility] Emergency Department   Date of Visit:  7/21/2018           Patient Information     Date Of Birth          1985        Your diagnoses for this visit were:     Puncture wound        You were seen by Darrion Gallo, ROSEY CNP.      Discharge References/Attachments     PUNCTURE WOUND (FOOT) (ENGLISH)      24 Hour Appointment Hotline       To make an appointment at any [place] clinic, call [phone] ([phone]). If you don't have a family doctor or clinic, we will help you find one. [place] clinics are conveniently located to serve the needs of you and your family.             Review of your medicines      START taking        Dose / Directions Last dose taken    levofloxacin 500 MG tablet   Commonly known as:  LEVAQUIN   Dose:  500 mg   Quantity:  3 tablet        Take 1 tablet (500 mg) by mouth daily for 3 days   Refills:  0          Our records show that you are taking the medicines listed below. If these are incorrect, please call your family doctor or clinic.        Dose / Directions Last dose taken    IBUPROFEN PO        Refills:  0        TYLENOL PO        Refills:  0                Prescriptions were sent or printed at these locations (1 Prescription)                   Other Prescriptions                Printed at Department/Unit printer (1 of 1)         levofloxacin (LEVAQUIN) 500 MG tablet                Procedures and tests performed during your visit     Foot  XR, G/E 3 views, right      Orders Needing Specimen Collection     None      Pending Results     Date and Time Order Name Status Description    7/21/2018 1622 Foot  XR, G/E 3 views, right Preliminary             Pending Culture Results     No orders found from 7/19/2018 to 7/22/2018.             Pending Results Instructions     If you had any lab results that were not finalized at the time of your Discharge, you can call the ED Lab Result RN at [phone]. You will be contacted by this team for any positive Lab results or changes in treatment. The nurses are available 7 days a week from 10A to 6:30P.  You can leave a message 24 hours per day and they will return your call.        Test Results From Your Hospital Stay        7/21/2018  5:40 PM      Narrative     FOOT THREE VIEWS RIGHT  7/21/2018 5:19 PM     HISTORY: Plantar foreign body.      COMPARISON: None.    FINDINGS: No radiopaque foreign body demonstrated. The Lisfranc joint  appears unremarkable in these views. The plantar arch is unremarkable  in these views. There is no acute fracture or dislocation. There are  no worrisome bony lesions.        Impression     IMPRESSION: No acute osseous abnormality demonstrated.                 Clinical Quality Measure: Blood Pressure Screening     Your blood pressure was checked while you were in the emergency department today. The last reading we obtained was  BP: 132/76 . Please read the guidelines below about what these numbers mean and what you should do about them.  If your systolic blood pressure (the top number) is less than 120 and your diastolic blood pressure (the bottom number) is less than 80, then your blood pressure is normal. There is nothing more that you need to do about it.  If your systolic blood pressure (the top number) is 120-139 or your diastolic blood pressure (the bottom number) is 80-89, your blood pressure may be higher than it should be. You should have your blood pressure rechecked within a year by a primary care provider.  If your systolic blood pressure (the top number) is 140 or greater or your diastolic blood pressure (the bottom number) is 90 or greater, you may have high blood pressure. High blood pressure is treatable, but if left untreated over time it can put you at  "risk for heart attack, stroke, or kidney failure. You should have your blood pressure rechecked by a primary care provider within the next 4 weeks.  If your provider in the emergency department today gave you specific instructions to follow-up with your doctor or provider even sooner than that, you should follow that instruction and not wait for up to 4 weeks for your follow-up visit.        Thank you for choosing [facility]       Thank you for choosing [facility] for your care. Our goal is always to provide you with excellent care. Hearing back from our patients is one way we can continue to improve our services. Please take a few minutes to complete the written survey that you may receive in the mail after you visit with us. Thank you!        [employer] Information     [employer] lets you send messages to your doctor, view your test results, renew your prescriptions, schedule appointments and more. To sign up, go to [email or web address] . Click on \"Log in\" on the left side of the screen, which will take you to the Welcome page. Then click on \"Sign up Now\" on the right side of the page.     You will be asked to enter the access code listed below, as well as some personal information. Please follow the directions to create your username and password.     Your access code is: [email or web address]  Expires: 2018  8:43 AM     Your access code will  in 90 days. If you need help or a new code, please call your [facility] clinic or [phone].        Care EveryWhere ID     This is your Care EveryWhere ID. This could be used by other organizations to access your [facility] medical records  [id number]        Equal Access to Services     NADIA DIEGO : Hadii sam Corona, waaxda luqadaha, qaybta kaalmaddie reece . So [facility] [phone].    ATENCIÓN: Si habla español, tiene a jara disposición servicios gratuitos de asistencia lingüística. Llame al [phone].    We comply with " applicable federal civil rights laws and [place] laws. We do not discriminate on the basis of race, color, national origin, age, disability, sex, sexual orientation, or gender identity.            After Visit Summary       This is your record. Keep this with you and show to your community pharmacist(s) and doctor(s) at your next visit.

## 2018-07-21 NOTE — ED PROVIDER NOTES
"  History     Chief Complaint:    Puncture wound       HPI   Bernard Padilla is a 32 year old male who presents to the ED today for evaluation of a puncture wound. The patient states that he was leon earlier today, when he stepped on a eriberto nail with his right foot. He states that his happened at around 0700 this morning, but he stayed at work for the whole day. The patient reports that he did not clean out the wound, but just wrapped it in gauze. He presents to the ED today due to pain (and since he \"wanted his hours \" at work. He states that the Tylenol and Ibuprofen that he took has not helped. Of note, the patient's last tetanus shot was in 2016.     Allergies:  No known drug allergies.      Medications:    The patient is currently on no regular medications.     Past Medical History:    Boil     Past Surgical History:    Meniscus repair     Family History:    History reviewed. No pertinent family history.     Social History:  Marital Status: single   Presents to the ED alone   Tobacco Use: current every day smoker   Alcohol Use: yes   PCP: [facility]     Review of Systems   Skin:        Positive for puncture wound to right foot.    Neurological: Negative for numbness.   All other systems reviewed and are negative.      Physical Exam   First Vitals:  BP: 132/76  Pulse: 88  Temp: 98.9  F (37.2  C)  Resp: 16  SpO2: 97 %      Physical Exam   General: Alert, Mild  discomfort, well kept   HENT:  Normal voice, No lymphadenopathy  Eyes:  The pupils are equal, round, and reactive to light, Conjunctiva normal, No scleral icterus   Neck:  Normal range of motion  CV:  Normal Pulses  Resp:  Non-labored, No cough  MS:  Normal muscular tone, moves all extremities  Skin:  Right plantar surface laterally small area consistent with superficial injury.  Appears to be horizontal versus a straight puncture type wound.  No bleeding.  No erythema.  Neuro: Speech is normal and fluent  Psych:  Awake. Alert.  " Normal affect.  Appropriate interactions. Good eye contact    Emergency Department Course   Imaging:  Xray right foot, 3 views   No acute osseous abnormality demonstrated.   Preliminary radiology read.     Radiographic findings were communicated with the patient who voiced understanding of the findings.    Interventions:  (1633) Ibuprofen 600 mg, PO       Emergency Department Course:  Nursing notes and vitals reviewed.  (1614) I performed an exam of the patient as documented above.    The patient was sent for a right foot xray while in the emergency department, findings above.    Findings and plan explained to the patient. Patient discharged home with instructions regarding supportive care, medications, and reasons to return. The importance of close follow-up was reviewed. The patient was prescribed Levaquin.   Impression & Plan    Medical Decision Making:  Bernard Padilla is a 32 year old male who presents with puncture wound from nail while at job site to right plantar surface of foot.  His examination I had a hard time locating possible puncture wound.  This appeared to be superficial.  I did obtain an x-ray without indication for deeper penetration or bone injury.  No foreign body.  No signs of infection or abscess.  As it did go through his shoe and may have punctured deeper than appearance I have placed him on 3 days of Levaquin.  Will follow up with his primary care provider if he has any further concerns or signs of infection.  He is advised for ice and ibuprofen for discomfort.  He is discharged home.    Diagnosis:    ICD-10-CM    1. Puncture wound T14.8XXA        Disposition:  discharged to home    Discharge Medications:  New Prescriptions    LEVOFLOXACIN (LEVAQUIN) 500 MG TABLET    Take 1 tablet (500 mg) by mouth daily for 3 days         Montse BOYD am serving as a scribe on 7/21/2018 at 4:14 PM to personally document services performed by ROSEY Doyle, CNP based on my observations and the  provider's statements to me.   7/21/2018   [facility] EMERGENCY DEPARTMENT       Darrion Gallo, ROSEY CNP  07/21/18 1806

## 2018-07-21 NOTE — ED AVS SNAPSHOT
[facility] Emergency Department    [address]    [facility] [address]    Phone:  [phone]    Fax:  [phone]                                       Bernard Padilla   MRN: [id number]    Department:  [facility] Emergency Department   Date of Visit:  7/21/2018           After Visit Summary Signature Page     I have received my discharge instructions, and my questions have been answered. I have discussed any challenges I see with this plan with the nurse or doctor.    ..........................................................................................................................................  Patient/Patient Representative Signature      ..........................................................................................................................................  Patient Representative Print Name and Relationship to Patient    ..................................................               ................................................  Date                                            Time    ..........................................................................................................................................  Reviewed by Signature/Title    ...................................................              ..............................................  Date                                                            Time

## 2018-10-11 ENCOUNTER — HOSPITAL ENCOUNTER (EMERGENCY)
Facility: CLINIC | Age: 33
Discharge: HOME | End: 2018-10-11
Attending: EMERGENCY MEDICINE | Admitting: EMERGENCY MEDICINE
Payer: COMMERCIAL

## 2018-10-11 VITALS
OXYGEN SATURATION: 98 % | SYSTOLIC BLOOD PRESSURE: 129 MMHG | DIASTOLIC BLOOD PRESSURE: 64 MMHG | RESPIRATION RATE: 16 BRPM | TEMPERATURE: 98.1 F

## 2018-10-11 DIAGNOSIS — K08.89 PAIN, DENTAL: ICD-10-CM

## 2018-10-11 DIAGNOSIS — K04.7 INFECTED DENTAL CARRIES: ICD-10-CM

## 2018-10-11 DIAGNOSIS — K05.30 PERICORONITIS: ICD-10-CM

## 2018-10-11 DIAGNOSIS — K02.9 INFECTED DENTAL CARRIES: ICD-10-CM

## 2018-10-11 PROCEDURE — 99282 EMERGENCY DEPT VISIT SF MDM: CPT

## 2018-10-11 RX ORDER — PENICILLIN V POTASSIUM 500 MG/1
500 TABLET, FILM COATED ORAL 4 TIMES DAILY
Qty: 28 TABLET | Refills: 0 | Status: SHIPPED | OUTPATIENT
Start: 2018-10-11 | End: 2019-02-08

## 2018-10-11 RX ORDER — BUPIVACAINE HYDROCHLORIDE 5 MG/ML
INJECTION, SOLUTION PERINEURAL
Status: DISCONTINUED
Start: 2018-10-11 | End: 2018-10-11 | Stop reason: HOSPADM

## 2018-10-11 RX ORDER — OXYCODONE AND ACETAMINOPHEN 5; 325 MG/1; MG/1
1 TABLET ORAL EVERY 6 HOURS PRN
COMMUNITY
End: 2019-01-23 | Stop reason: ALTCHOICE

## 2018-10-11 NOTE — ED AVS SNAPSHOT
[facility] Emergency Department    [address] [place] [address]    Phone:  [phone]    Fax:  [phone]                                       Bernard Padilla   MRN: [id number]    Department:  [facility] Emergency Department   Date of Visit:  10/11/2018           Patient Information     Date Of Birth          1985        Your diagnoses for this visit were:     Pain, dental     Infected dental carries     Pericoronitis        You were seen by Kobe Piña MD.      Follow-up Information     Follow up with dentist. Schedule an appointment as soon as possible for a visit in 1 day.        Discharge Instructions         Discharge Instructions  Dental Pain    You have been seen today for a toothache. Your pain may be caused by an exposed nerve, an infection (pulpitis), a root abscess, or other problems. You will need to see a dentist for a solution to your tooth problem. Emergency Department care is only to help control your problem until you can see a dentist.  Today, we did not find any sign that your toothache was caused by a serious condition, but sometimes symptoms develop over time and cannot be found during an emergency visit, so it is very important that you follow up with your dentist.      Return to the Emergency Department if:    You develop a fever over 101 degrees Fahrenheit.    You can t open your mouth normally, can t move your tongue well, or can t swallow.    You have new or increased swelling of your face or neck.    You develop drainage of pus or foul smelling material from around your tooth.  What can I do to help myself?    Take any antibiotic the doctor may have prescribed for you today.    Avoid very hot or very cold foods as both can cause pain.    Make an appointment to see a dentist as soon as possible. If you wish, we can provide you with a list of low-cost dental clinics.   If you were given a prescription for medicine here today,  be sure to read all of the information (including the package insert) that comes with your prescription.  This will include important information about the medicine, its side effects, and any warnings that you need to know about.  The pharmacist who fills the prescription can provide more information and answer questions you may have about the medicine.  If you have questions or concerns that the pharmacist cannot address, please call or return to the Emergency Department.   Opioid Medication Information    Pain medications are among the most commonly prescribed medicines, so we are including this information for all our patients. If you did not receive pain medication or get a prescription for pain medicine, you can ignore it.     You may have been given a prescription for an opioid (narcotic) pain medicine and/or have received a pain medicine while here in the Emergency Department. These medicines can make you drowsy or impaired. You must not drive, operate dangerous equipment, or engage in any other dangerous activities while taking these medications. If you drive while taking these medications, you could be arrested for DUI, or driving under the influence. Do not drink any alcohol while you are taking these medications.     Opioid pain medications can cause addiction. If you have a history of chemical dependency of any type, you are at a higher risk of becoming addicted to pain medications.  Only take these prescribed medications to treat your pain when all other options have been tried. Take it for as short a time and as few doses as possible. Store your pain pills in a secure place, as they are frequently stolen and provide a dangerous opportunity for children or visitors in your house to start abusing these powerful medications. We will not replace any lost or stolen medicine.  As soon as your pain is better, you should flush all your remaining medication.     Many prescription pain medications contain  Tylenol  (acetaminophen), including Vicodin , Tylenol #3 , Norco , Lortab , and Percocet .  You should not take any extra pills of Tylenol  if you are using these prescription medications or you can get very sick.  Do not ever take more than 3000 mg of acetaminophen in any 24 hour period.    All opioids tend to cause constipation. Drink plenty of water and eat foods that have a lot of fiber, such as fruits, vegetables, prune juice, apple juice and high fiber cereal.  Take a laxative if you don t move your bowels at least every other day. Miralax , Milk of Magnesia, Colace , or Senna  can be used to keep you regular.      Remember that you can always come back to the Emergency Department if you are not able to see your regular doctor in the amount of time listed above, if you get any new symptoms, or if there is anything that worries you.        24 Hour Appointment Hotline       To make an appointment at any [facility], call [phone]). If you don't have a family doctor or clinic, we will help you find one. [facility] clinics are conveniently located to serve the needs of you and your family.             Review of your medicines      Our records show that you are taking the medicines listed below. If these are incorrect, please call your family doctor or clinic.        Dose / Directions Last dose taken    IBUPROFEN PO        Refills:  0        oxyCODONE-acetaminophen 5-325 MG per tablet   Commonly known as:  PERCOCET   Dose:  1 tablet        Take 1 tablet by mouth every 6 hours as needed for severe pain   Refills:  0        TYLENOL PO        Refills:  0                Information about OPIOIDS     PRESCRIPTION OPIOIDS: WHAT YOU NEED TO KNOW   We gave you an opioid (narcotic) pain medicine. It is important to manage your pain, but opioids are not always the best choice. You should first try all the other options your care team gave you. Take this medicine for as short a time (and as few doses)  as possible.    Some activities can increase your pain, such as bandage changes or therapy sessions. It may help to take your pain medicine 30 to 60 minutes before these activities. Reduce your stress by getting enough sleep, working on hobbies you enjoy and practicing relaxation or meditation. Talk to your care team about ways to manage your pain beyond prescription opioids.    These medicines have risks:    DO NOT drive when on new or higher doses of pain medicine. These medicines can affect your alertness and reaction times, and you could be arrested for driving under the influence (DUI). If you need to use opioids long-term, talk to your care team about driving.    DO NOT operate heavy machinery    DO NOT do any other dangerous activities while taking these medicines.    DO NOT drink any alcohol while taking these medicines.     If the opioid prescribed includes acetaminophen, DO NOT take with any other medicines that contain acetaminophen. Read all labels carefully. Look for the word  acetaminophen  or  Tylenol.  Ask your pharmacist if you have questions or are unsure.    You can get addicted to pain medicines, especially if you have a history of addiction (chemical, alcohol or substance dependence). Talk to your care team about ways to reduce this risk.    All opioids tend to cause constipation. Drink plenty of water and eat foods that have a lot of fiber, such as fruits, vegetables, prune juice, apple juice and high-fiber cereal. Take a laxative (Miralax, milk of magnesia, Colace, Senna) if you don t move your bowels at least every other day. Other side effects include upset stomach, sleepiness, dizziness, throwing up, tolerance (needing more of the medicine to have the same effect), physical dependence and slowed breathing.    Store your pills in a secure place, locked if possible. We will not replace any lost or stolen medicine. If you don t finish your medicine, please throw away (dispose) as directed by  your pharmacist. The [place] [employer]ution [employer] has more information about safe disposal: https://www.pca.state.[place]/living-green/managing-unwanted-medications        Orders Needing Specimen Collection     None      Pending Results     No orders found from 10/9/2018 to 10/12/2018.            Pending Culture Results     No orders found from 10/9/2018 to 10/12/2018.            Pending Results Instructions     If you had any lab results that were not finalized at the time of your Discharge, you can call the ED Lab Result RN at [phone]. You will be contacted by this team for any positive Lab results or changes in treatment. The nurses are available 7 days a week from 10A to 6:30P.  You can leave a message 24 hours per day and they will return your call.        Test Results From Your Hospital Stay               Clinical Quality Measure: Blood Pressure Screening     Your blood pressure was checked while you were in the emergency department today. The last reading we obtained was  BP: 129/64 . Please read the guidelines below about what these numbers mean and what you should do about them.  If your systolic blood pressure (the top number) is less than 120 and your diastolic blood pressure (the bottom number) is less than 80, then your blood pressure is normal. There is nothing more that you need to do about it.  If your systolic blood pressure (the top number) is 120-139 or your diastolic blood pressure (the bottom number) is 80-89, your blood pressure may be higher than it should be. You should have your blood pressure rechecked within a year by a primary care provider.  If your systolic blood pressure (the top number) is 140 or greater or your diastolic blood pressure (the bottom number) is 90 or greater, you may have high blood pressure. High blood pressure is treatable, but if left untreated over time it can put you at risk for heart attack, stroke, or kidney failure. You should have your blood pressure  "rechecked by a primary care provider within the next 4 weeks.  If your provider in the emergency department today gave you specific instructions to follow-up with your doctor or provider even sooner than that, you should follow that instruction and not wait for up to 4 weeks for your follow-up visit.        Thank you for choosing [facility]       Thank you for choosing [facility] for your care. Our goal is always to provide you with excellent care. Hearing back from our patients is one way we can continue to improve our services. Please take a few minutes to complete the written survey that you may receive in the mail after you visit with us. Thank you!        [employer] Information     [employer] lets you send messages to your doctor, view your test results, renew your prescriptions, schedule appointments and more. To sign up, go to [email or web address] . Click on \"Log in\" on the left side of the screen, which will take you to the Welcome page. Then click on \"Sign up Now\" on the right side of the page.     You will be asked to enter the access code listed below, as well as some personal information. Please follow the directions to create your username and password.     Your access code is: [email or web address]  Expires: 2019  7:41 PM     Your access code will  in 90 days. If you need help or a new code, please call your [facility] clinic or [phone].        Care EveryWhere ID     This is your Care EveryWhere ID. This could be used by other organizations to access your [facility] medical records  [id number]        Equal Access to Services     ZACKARY DIEGO : Hadii sam key hadasho Soomaali, waaxda luqadaha, qaybta kaalmada adeegyada, maddie perera . So [facility] [phone].    ATENCIÓN: Si habla español, tiene a jara disposición servicios gratuitos de asistencia lingüística. Llame al [phone].    We comply with applicable federal civil rights laws and [place] laws. We do not discriminate on the " basis of race, color, national origin, age, disability, sex, sexual orientation, or gender identity.            After Visit Summary       This is your record. Keep this with you and show to your community pharmacist(s) and doctor(s) at your next visit.

## 2018-10-11 NOTE — ED AVS SNAPSHOT
[facility] Emergency Department    [address]    [facility] [address]    Phone:  [phone]    Fax:  [phone]                                       Bernard Padilla   MRN: [id number]    Department:  [facility] Emergency Department   Date of Visit:  10/11/2018           After Visit Summary Signature Page     I have received my discharge instructions, and my questions have been answered. I have discussed any challenges I see with this plan with the nurse or doctor.    ..........................................................................................................................................  Patient/Patient Representative Signature      ..........................................................................................................................................  Patient Representative Print Name and Relationship to Patient    ..................................................               ................................................  Date                                   Time    ..........................................................................................................................................  Reviewed by Signature/Title    ...................................................              ..............................................  Date                                               Time          22EPIC Rev 08/18

## 2018-10-12 NOTE — ED TRIAGE NOTES
A&Ox4. ABC's intact. Pt c/o right upper dental pain that started about 3 days ago.  Pain 10/10 at this time.  Took percocet 1 hour PTA.  Seen by dentist today, told he needs 8 teeth removed, but they don't take his insurance.

## 2018-10-12 NOTE — ED PROVIDER NOTES
History     Chief Complaint:  Dental Pain      HPI   Bernard Padilla is a 33 year old male who presents to the emergency department today for evaluation of dental pain. The patient reports he had seen a dentist who told him that he needed eight teeth pull. For the past four days the patient has had increasing pain. Due to the worsening pain he presented to the emergency department today.       Allergies:  Eggs [Chicken-Derived Products (Egg)]        Medications:    oxyCODONE-acetaminophen (PERCOCET) 5-325 MG per tablet  IBUPROFEN PO       Past Medical History:    Boil      Past Surgical History:    Meniscus repair      Family History:    History reviewed. No pertinent family history.        Social History:  The patient was accompanied to the ED by friend.  Smoking Status: Current Every Day Smoker  Smokeless Tobacco: Never Used  Alcohol Use: Yes   Marital Status:  Single [1]       Review of Systems   HENT: Positive for dental problem.    All other systems reviewed and are negative.        Physical Exam   First Vitals:  BP: 127/75  Heart Rate: 67  Temp: 97.9  F (36.6  C)  Resp: 18  SpO2: 96 %      Physical Exam    General:  No respiratory distress    HENT: Multiple teeth with cavities, and tenderness along the right upper and lower gumline. No palpable abscess.    Cardiovascular: Good cap refill.    Respiratory: Breathing non labored.     Musculoskeletal: No tenderness. No bony deformity.     Skin: No rashes or petechiae     Neurologic: non focal      Psychiatric: Appropriate      Emergency Department Course     Interventions:  1932 Lidocaine 2% solution       Emergency Department Course:  Nursing notes and vitals reviewed.  1920: I performed an exam of the patient as documented above.   1935 I started to numb the area of pain, and the patient denied a digital block or injection  Findings and plan explained to the Patient. Patient discharged home with instructions regarding supportive care, medications, and reasons to  return. The importance of close follow-up was reviewed.   Impression & Plan      Medical Decision Making:  The patient has had dental pain for some time, read the  website and has had multiple prescriptions for narcotics. I discussed it would be inappropriate to give him these here. There are signs of gum infection, but no abscesses. I was going to perform a dental block, and he allowed me to use topical numbing but didn't want an injection. He didn't want the dental block performed. I thought this was reasonable, and will refer to his dentist as an outpatient. He will need multiple teeth extracted and discharged in good condition.    Diagnosis:    ICD-10-CM    1. Pain, dental K08.89    2. Infected dental carries K02.9     K04.7    3. Pericoronitis K05.30        Disposition:  Discharged to home.     Scribe Disclosure:  I, Bianca Gautam, am serving as a scribe at 7:21 PM on 10/11/2018 to document services personally performed by Kobe Piña MD based on my observations and the provider's statements to me.    Bianca Gautam  10/11/2018   [facility] EMERGENCY DEPARTMENT       Kobe Piña MD  10/11/18 3164

## 2018-10-18 ENCOUNTER — HOSPITAL ENCOUNTER (EMERGENCY)
Facility: CLINIC | Age: 33
Discharge: HOME | End: 2018-10-18
Attending: EMERGENCY MEDICINE | Admitting: EMERGENCY MEDICINE
Payer: COMMERCIAL

## 2018-10-18 VITALS
DIASTOLIC BLOOD PRESSURE: 92 MMHG | RESPIRATION RATE: 18 BRPM | WEIGHT: 180 LBS | HEART RATE: 104 BPM | TEMPERATURE: 98.3 F | SYSTOLIC BLOOD PRESSURE: 135 MMHG | BODY MASS INDEX: 28.25 KG/M2 | HEIGHT: 67 IN | OXYGEN SATURATION: 98 %

## 2018-10-18 DIAGNOSIS — L03.317 CELLULITIS OF BUTTOCK: ICD-10-CM

## 2018-10-18 PROCEDURE — 99283 EMERGENCY DEPT VISIT LOW MDM: CPT

## 2018-10-18 RX ORDER — HYDROCODONE BITARTRATE AND ACETAMINOPHEN 5; 325 MG/1; MG/1
2 TABLET ORAL ONCE
Status: DISCONTINUED | OUTPATIENT
Start: 2018-10-18 | End: 2018-10-18 | Stop reason: HOSPADM

## 2018-10-18 RX ORDER — CLINDAMYCIN HCL 150 MG
300 CAPSULE ORAL ONCE
Status: DISCONTINUED | OUTPATIENT
Start: 2018-10-18 | End: 2018-10-18 | Stop reason: HOSPADM

## 2018-10-18 ASSESSMENT — ENCOUNTER SYMPTOMS
VOMITING: 0
BACK PAIN: 1
NAUSEA: 0
DIARRHEA: 0

## 2018-10-18 NOTE — ED AVS SNAPSHOT
[facility] Emergency Department    [address]    [facility] [address]    Phone:  [phone]    Fax:  [phone]                                       Bernard Padilla   MRN: [id number]    Department:  [facility] Emergency Department   Date of Visit:  10/18/2018           Patient Information     Date Of Birth          1985        Your diagnoses for this visit were:     Cellulitis of buttock        You were seen by Samm Paige MD.      Follow-up Information     Go to [facility].    Why:  As needed, If symptoms worsen    Contact information:    [address]  [facility] [address]          Discharge Instructions         Discharge Instructions for Cellulitis  You have been diagnosed with cellulitis. This is an infection in the deepest layer of the skin. In some cases, the infection also affects the muscle. Cellulitis is caused by bacteria. The bacteria can enter the body through broken skin. This can happen with a cut, scratch, animal bite, or an insect bite that has been scratched. You may have been treated in the hospital with antibiotics and fluids. You will likely be given a prescription for antibiotics to take at home. This sheet will help you take care of yourself at home.  Home care  When you are home:    Take the prescribed antibiotic medicine you are given as directed until it is gone. Take it even if you feel better. It treats the infection and stops it from returning. Not taking all the medicine can make future infections hard to treat.    Keep the infected area clean.    When possible, raise the infected area above the level of your heart. This helps keep swelling down.    Talk with your healthcare provider if you are in pain. Ask what kind of over-the-counter medicine you can take for pain.    Apply clean bandages as advised.    Take your temperature once a day for a week.    Wash your hands often to prevent spreading the  infection.  In the future, wash your hands before and after you touch cuts, scratches, or bandages. This will help prevent infection.   When to call your healthcare provider  Call your healthcare provider immediately if you have any of the following:    Difficulty or pain when moving the joints above or below the infected area    Discharge or pus draining from the area    Fever of 100.4 F (38 C) or higher, or as directed by your healthcare provider    Pain that gets worse in or around the infected     Redness that gets worse in or around the infected area, particularly if the area of redness expands to a wider area    Shaking chills    Swelling of the infected area    Vomiting   Date Last Reviewed: 8/1/2016 2000-2017 [employer]. [address] [place] [address], [place] [id number]. All rights reserved. This information is not intended as a substitute for professional medical care. Always follow your healthcare professional's instructions.          24 Hour Appointment Hotline       To make an appointment at any [facility], call [phone] ([phone]). If you don't have a family doctor or clinic, we will help you find one. [facility] clinics are conveniently located to serve the needs of you and your family.             Review of your medicines      Our records show that you are taking the medicines listed below. If these are incorrect, please call your family doctor or clinic.        Dose / Directions Last dose taken    IBUPROFEN PO        Refills:  0        oxyCODONE-acetaminophen 5-325 MG per tablet   Commonly known as:  PERCOCET   Dose:  1 tablet        Take 1 tablet by mouth every 6 hours as needed for severe pain   Refills:  0        penicillin V potassium 500 MG tablet   Commonly known as:  VEETID   Dose:  500 mg   Quantity:  28 tablet        Take 1 tablet (500 mg) by mouth 4 times daily for 7 days   Refills:  0        TYLENOL PO        Refills:  0                Orders Needing Specimen  Collection     None      Pending Results     No orders found from 10/16/2018 to 10/19/2018.            Pending Culture Results     No orders found from 10/16/2018 to 10/19/2018.            Pending Results Instructions     If you had any lab results that were not finalized at the time of your Discharge, you can call the ED Lab Result RN at [phone]. You will be contacted by this team for any positive Lab results or changes in treatment. The nurses are available 7 days a week from 10A to 6:30P.  You can leave a message 24 hours per day and they will return your call.        Test Results From Your Hospital Stay               Clinical Quality Measure: Blood Pressure Screening     Your blood pressure was checked while you were in the emergency department today. The last reading we obtained was  BP: (!) 135/92 . Please read the guidelines below about what these numbers mean and what you should do about them.  If your systolic blood pressure (the top number) is less than 120 and your diastolic blood pressure (the bottom number) is less than 80, then your blood pressure is normal. There is nothing more that you need to do about it.  If your systolic blood pressure (the top number) is 120-139 or your diastolic blood pressure (the bottom number) is 80-89, your blood pressure may be higher than it should be. You should have your blood pressure rechecked within a year by a primary care provider.  If your systolic blood pressure (the top number) is 140 or greater or your diastolic blood pressure (the bottom number) is 90 or greater, you may have high blood pressure. High blood pressure is treatable, but if left untreated over time it can put you at risk for heart attack, stroke, or kidney failure. You should have your blood pressure rechecked by a primary care provider within the next 4 weeks.  If your provider in the emergency department today gave you specific instructions to follow-up with your doctor or provider even  "sooner than that, you should follow that instruction and not wait for up to 4 weeks for your follow-up visit.        Thank you for choosing [facility]       Thank you for choosing [facility] for your care. Our goal is always to provide you with excellent care. Hearing back from our patients is one way we can continue to improve our services. Please take a few minutes to complete the written survey that you may receive in the mail after you visit with us. Thank you!        [email or web address] Information     [facility] lets you send messages to your doctor, view your test results, renew your prescriptions, schedule appointments and more. To sign up, go to [email or web address] . Click on \"Log in\" on the left side of the screen, which will take you to the Welcome page. Then click on \"Sign up Now\" on the right side of the page.     You will be asked to enter the access code listed below, as well as some personal information. Please follow the directions to create your username and password.     Your access code is: [email or web address]  Expires: 2019  7:41 PM     Your access code will  in 90 days. If you need help or a new code, please call your [facility] clinic or [phone].        Care EveryWhere ID     This is your Care EveryWhere ID. This could be used by other organizations to access your [facility] medical records  [id number]        Equal Access to Services     ZACKARY DIEGO : Hadii sam Corona, waaxda franciscoadaha, qaybta kaalmaarturo mccrary, maddie perera . So [facility] [phone].    ATENCIÓN: Si habla español, tiene a jara disposición servicios gratuitos de asistencia lingüística. Rosalinda al [phone].    We comply with applicable federal civil rights laws and [place] laws. We do not discriminate on the basis of race, color, national origin, age, disability, sex, sexual orientation, or gender identity.            After Visit Summary       This is your record. Keep this with you and show to " your community pharmacist(s) and doctor(s) at your next visit.

## 2018-10-18 NOTE — ED AVS SNAPSHOT
[facility] Emergency Department    [address]    [facility] [address]    Phone:  [phone]    Fax:  [phone]                                       Bernard Padilla   MRN: [id number]    Department:  [facility] Emergency Department   Date of Visit:  10/18/2018           After Visit Summary Signature Page     I have received my discharge instructions, and my questions have been answered. I have discussed any challenges I see with this plan with the nurse or doctor.    ..........................................................................................................................................  Patient/Patient Representative Signature      ..........................................................................................................................................  Patient Representative Print Name and Relationship to Patient    ..................................................               ................................................  Date                                   Time    ..........................................................................................................................................  Reviewed by Signature/Title    ...................................................              ..............................................  Date                                               Time          22EPIC Rev 08/18

## 2018-10-19 NOTE — ED TRIAGE NOTES
Wound abscess to left buttock, had it drained on 09/17/18. Feels like infection is back. N/v/d. Percocet at 5pm with no help. ABCs intact.

## 2018-10-19 NOTE — ED PROVIDER NOTES
"  History     Chief Complaint:  Wound check     HPI:   The history is provided by the patient.      Bernard Padilla is a 33 year old male who presents to the emergency department with his significant other for evaluation of a wound. On 9/17/18, the patient had an abscess drained on his left buttock. He was started on Clindamycin after the procedure and just finished a course of Keflex last week. Today, he feels as though another abscess has developed near his previous, with pain going up his spine and paresthesias in his left leg. He tried Percocet at 1700 but had no relief of his pain. Here, he denies any nausea, vomiting, or diarrhea and has no other complaints. He has another procedure scheduled for 11/2/18.     Allergies:  No known drug allergies      Medications:    Percocet 5-325 mg     Past Medical History:    Boil     Past Surgical History:    Meniscus repair     Family History:    History reviewed. No pertinent family history.      Social History:  Presents with his significant other    Tobacco use: 1.00 PPD   Alcohol use: Occasional   PCP: [facility]    Marital Status:  Single      Review of Systems   Gastrointestinal: Negative for diarrhea, nausea and vomiting.   Musculoskeletal: Positive for back pain.   All other systems reviewed and are negative.    Physical Exam     Patient Vitals for the past 24 hrs:   BP Temp Temp src Pulse Heart Rate Resp SpO2 Height Weight   10/18/18 1918 (!) 135/92 98.3  F (36.8  C) Oral 104 104 18 98 % 1.702 m (5' 7\") 81.6 kg (180 lb)        Physical Exam   Constitutional: Patient is well appearing. No distress.  Head: Atraumatic.  Mouth/Throat: Oropharynx is clear and moist. No oropharyngeal exudate.  Eyes: Conjunctivae and EOM are normal. No scleral icterus.  Neck: Normal range of motion. Neck supple.   Cardiovascular: Normal rate, regular rhythm, normal heart sounds and intact distal pulses.   Pulmonary/Chest: Breath sounds normal. No respiratory " distress.  Abdominal: Soft. Bowel sounds are normal. No distension. No tenderness. No rebound or guarding.   Musculoskeletal: Normal range of motion. No edema or tenderness.   Neurological: Alert and orientated to person, place, and time. No observable focal neuro deficit  Skin: Warm and dry. No rash noted. Not diaphoretic.  Right buttock fold with healing surgical incision from September. Small area of induration and pain without iraida abscess or deep tissue signs with full range of motion of left leg. Left buttock mirroring other lesions.     Emergency Department Course     Interventions:  Declined Clindamycin and [place]      Emergency Department Course:  Past medical records, nursing notes, and vitals reviewed.  0832: I performed an exam of the patient as documented above. Clinical findings and plan explained to the Patient and significant other. Patient discharged home with instructions regarding supportive care, medications, and reasons to return as well as the importance of close follow-up were reviewed.      Patient is refusing any intervention here and would like to present to [facility]. He was ambulatory into the ER was eating popcorn and chips in triage for almost an hour in no distress laughing and social with friend however when named called he began to moan and refused to get up.    Impression & Plan      Medical Decision Making:    Bernard Padilla is a 33 year old male who presents for evaluation of skin redness. The history, physical exam and supporting data are consistent with cellulitis or his chronic well established hidradenitis suppurativa. There do not appear at this time to be any complication of cellulitis including necrotizing fascitis, lymphangitis, lymphadenitis, abscess, osteomyelitis, sepsis, or shock. The patient is not immunosuppressed. Supportive outpatient management is indicated with antibiotics. Close follow-up of primary care physician to ensure no progression and rapid  resolution.    Diagnosis:    ICD-10-CM    1. Cellulitis of buttock L03.317        Disposition:  Discharged to home with plan as outlined.     Scribe Disclosure:   I, Varghese Blank, am serving as a scribe at 8:32 PM on 10/18/2018 to document services personally performed by Samm Paige MD based on my observations and the provider's statements to me.       Samm Paige MD  10/18/2018   [facility] EMERGENCY DEPARTMENT       Samm Paige MD  10/18/18 6503

## 2018-10-19 NOTE — DISCHARGE INSTRUCTIONS
Discharge Instructions for Cellulitis  You have been diagnosed with cellulitis. This is an infection in the deepest layer of the skin. In some cases, the infection also affects the muscle. Cellulitis is caused by bacteria. The bacteria can enter the body through broken skin. This can happen with a cut, scratch, animal bite, or an insect bite that has been scratched. You may have been treated in the hospital with antibiotics and fluids. You will likely be given a prescription for antibiotics to take at home. This sheet will help you take care of yourself at home.  Home care  When you are home:    Take the prescribed antibiotic medicine you are given as directed until it is gone. Take it even if you feel better. It treats the infection and stops it from returning. Not taking all the medicine can make future infections hard to treat.    Keep the infected area clean.    When possible, raise the infected area above the level of your heart. This helps keep swelling down.    Talk with your healthcare provider if you are in pain. Ask what kind of over-the-counter medicine you can take for pain.    Apply clean bandages as advised.    Take your temperature once a day for a week.    Wash your hands often to prevent spreading the infection.  In the future, wash your hands before and after you touch cuts, scratches, or bandages. This will help prevent infection.   When to call your healthcare provider  Call your healthcare provider immediately if you have any of the following:    Difficulty or pain when moving the joints above or below the infected area    Discharge or pus draining from the area    Fever of 100.4 F (38 C) or higher, or as directed by your healthcare provider    Pain that gets worse in or around the infected     Redness that gets worse in or around the infected area, particularly if the area of redness expands to a wider area    Shaking chills    Swelling of the infected area    Vomiting   Date Last Reviewed:  8/1/2016 2000-2017 [employer]. [address] [place] [address], [place] [address]. All rights reserved. This information is not intended as a substitute for professional medical care. Always follow your healthcare professional's instructions.

## 2019-01-08 ENCOUNTER — OFFICE VISIT (OUTPATIENT)
Dept: ANESTHESIOLOGY | Facility: CLINIC | Age: 34
End: 2019-01-08
Payer: COMMERCIAL

## 2019-01-08 VITALS
SYSTOLIC BLOOD PRESSURE: 122 MMHG | RESPIRATION RATE: 16 BRPM | HEART RATE: 62 BPM | BODY MASS INDEX: 27.47 KG/M2 | DIASTOLIC BLOOD PRESSURE: 43 MMHG | HEIGHT: 67 IN | WEIGHT: 175 LBS

## 2019-01-08 DIAGNOSIS — L73.2 HIDRADENITIS SUPPURATIVA: Primary | ICD-10-CM

## 2019-01-08 ASSESSMENT — ENCOUNTER SYMPTOMS
TREMORS: 0
TINGLING: 0
NAIL CHANGES: 0
LIGHT-HEADEDNESS: 1
FATIGUE: 0
LEG PAIN: 1
SKIN CHANGES: 0
FLANK PAIN: 0
WEIGHT GAIN: 0
FEVER: 1
CHILLS: 1
ORTHOPNEA: 0
DIFFICULTY URINATING: 0
ALTERED TEMPERATURE REGULATION: 1
HYPOTENSION: 0
SPEECH CHANGE: 0
INCREASED ENERGY: 0
SYNCOPE: 1
HEADACHES: 1
DIZZINESS: 1
MEMORY LOSS: 0
HEMATURIA: 0
SEIZURES: 0
NUMBNESS: 0
NIGHT SWEATS: 1
LOSS OF CONSCIOUSNESS: 1
SLEEP DISTURBANCES DUE TO BREATHING: 0
POLYPHAGIA: 0
PALPITATIONS: 0
DECREASED APPETITE: 1
POLYDIPSIA: 1
DYSURIA: 1
DISTURBANCES IN COORDINATION: 0
EXERCISE INTOLERANCE: 0
HYPERTENSION: 0
POOR WOUND HEALING: 0
HALLUCINATIONS: 0
PARALYSIS: 0
WEAKNESS: 0
WEIGHT LOSS: 0

## 2019-01-08 ASSESSMENT — ANXIETY QUESTIONNAIRES
4. TROUBLE RELAXING: NEARLY EVERY DAY
2. NOT BEING ABLE TO STOP OR CONTROL WORRYING: NOT AT ALL
6. BECOMING EASILY ANNOYED OR IRRITABLE: NEARLY EVERY DAY
GAD7 TOTAL SCORE: 9
5. BEING SO RESTLESS THAT IT IS HARD TO SIT STILL: NOT AT ALL
7. FEELING AFRAID AS IF SOMETHING AWFUL MIGHT HAPPEN: NOT AT ALL
1. FEELING NERVOUS, ANXIOUS, OR ON EDGE: NEARLY EVERY DAY
3. WORRYING TOO MUCH ABOUT DIFFERENT THINGS: NOT AT ALL
7. FEELING AFRAID AS IF SOMETHING AWFUL MIGHT HAPPEN: NOT AT ALL
GAD7 TOTAL SCORE: 9
GAD7 TOTAL SCORE: 9

## 2019-01-08 ASSESSMENT — PATIENT HEALTH QUESTIONNAIRE - PHQ9
SUM OF ALL RESPONSES TO PHQ QUESTIONS 1-9: 8
SUM OF ALL RESPONSES TO PHQ QUESTIONS 1-9: 8
10. IF YOU CHECKED OFF ANY PROBLEMS, HOW DIFFICULT HAVE THESE PROBLEMS MADE IT FOR YOU TO DO YOUR WORK, TAKE CARE OF THINGS AT HOME, OR GET ALONG WITH OTHER PEOPLE: EXTREMELY DIFFICULT

## 2019-01-08 ASSESSMENT — MIFFLIN-ST. JEOR: SCORE: 1702.42

## 2019-01-08 ASSESSMENT — PAIN SCALES - GENERAL: PAINLEVEL: WORST PAIN (10)

## 2019-01-08 NOTE — PROGRESS NOTES
Anxiety Reaction  Anxiety is the feeling we all get when we think something bad might happen. It is a normal response to stress and usually causes only a mild reaction. When anxiety becomes more severe, it can interfere with daily life. In some cases, you may not even be aware of what it is you’re anxious about. There may also be a genetic link or it may be a learned behavior in the home.  Both psychological and physical triggers cause stress reaction. It's often a response to fear or emotional stress, real or imagined. This stress may come from home, family, work, or social relationships.  During an anxiety reaction, you may feel:  · Helpless  · Nervous  · Depressed  · Irritable  Your body may show signs of anxiety in many ways. You may experience:  · Dry mouth  · Shakiness  · Dizziness  · Weakness  · Trouble breathing  · Breathing fast (hyperventilating)  · Chest pressure  · Sweating  · Headache  · Nausea  · Diarrhea  · Tiredness  · Inability to sleep  · Sexual problems  Home care  · Try to locate the sources of stress in your life. They may not be obvious. These may include:  ¨ Daily hassles of life (traffic jams, missed appointments, car troubles, etc.)  ¨ Major life changes, both good (new baby, job promotion) and bad (loss of job, loss of loved one)  ¨ Overload: feeling that you have too many responsibilities and can't take care of all of them at once  ¨ Feeling helpless, feeling that your problems are beyond what you’re able to solve  · Notice how your body reacts to stress. Learn to listen to your body signals. This will help you take action before the stress becomes severe.  · When you can, do something about the source of your stress. (Avoid hassles, limit the amount of change that happens in your life at one time and take a break when you feel overloaded).  · Unfortunately, many stressful situations can't be avoided. It is necessary to learn how to better manage stress. There are many proven methods  I had the pleasure of meeting Mr. Bernard Padilla on 1/8/2019 in the outpatient pain clinic in consult for Dr. Alcantara (?seen in ED) with regards to his hidradenitis.   HPI:  Patient is a 34 yo male seen today for evaluation of pain related to hidradenitis supprativa s/p left groin hidradenitis excisions on 12/7/18 with Dr. Jhonatan Bird of [facility], who who presents with recurrent groin pain. Since discharge his says his pain has never been under control. He describes tender lesions. Increased pain with sitting, walking, and bowel movements. He continues to take Percocet for the pain. He tells me he has also been using naproxen 500 mg between Percocet doses. Within the past month, he has obtained weekly prescriptions of Percocet  mg, #30 tablets, for a monthly total of #150 tablets. Patient states he is out of his current Rx as of today, prescribed on 1/3/19. He continues to rate pain 10/10 despite his medication. He finds marijuana to be effective and is interested in medical cannabis certification. He has not followed with dermatology but tells me he has an upcoming consult. Per his report, he has not attempted any additional management for hidradenitis beyond surgery.   He was seen by pain management through [facility], Dr. Tiburcio Rubio, in November, who recommended gabapentin 300 mg tid. Patient states he took for a month but self-discontinued due to poor efficacy. Opioid medications were not advised for continuous use for his pain.   Patient has another follow up appointment with his surgeon, Dr. Bird, later today. In review of notes, Dr. Bird has recommended decreased use of Percocet.   Past Medical History:   Diagnosis Date     Boil     past medical history reviewed with patient.   Past Surgical History:   Procedure Laterality Date     ORTHOPEDIC SURGERY      meniscus repair    past surgical history reviewed with patient.   Medications:  Current Outpatient Medications   Medication      "Acetaminophen (TYLENOL PO)     IBUPROFEN PO     oxyCODONE-acetaminophen (PERCOCET) 5-325 MG per tablet     No current facility-administered medications for this visit.      A multi-state Prescription Monitoring Program reviewed and no aberrancies noted.     Allergies:     Allergies   Allergen Reactions     Eggs [Chicken-Derived Products (Egg)]      Family History:  family history is not on file.  Social history: he lives in [place] with his family. He is no longer working as a . Smoking: daily smoker. Alcohol: occasional. Street drugs: marijuana.     ROS:  A 14 point review of systems was completed; results are listed at end of note.     Objective:   /43   Pulse 62   Resp 16   Ht 1.702 m (5' 7\")   Wt 79.4 kg (175 lb)   BMI 27.41 kg/m    Body mass index is 27.41 kg/m .  General: In no apparent distress, seated in W/C  Mental status: Normal mood and affect, pleasant  Neuro: Alert, oriented. No sensory deficits.   Head: Atraumatic, normocephalic  Eyes: Extra-ocular movements grossly intact, no scleral icterus  Neck: Supple, Range of motion full  Respiratory: Non-labored breathing; No respiratory distress  Skin: Groin, scrotum, and buttocks examined today with presence of chaperone, Mariposa DE LOS SANTOS. Well healed surgical scarring appreciated. Small open lesion without purulence at base of penis. No additional lesions appreciated on exam.     Assessment:  Mr. Bernard Padilla is a 34 yo male seen today for persistent pain secondary to hidradenitis suppurativa.   Barriers:   Opioid-induced hyperalgesia: Patient has high levels (10/10) of reported pain despite high chronic doses of opiate medication; #150 tablets of Percocet  mg.     Plan:   1. Patient education: I went over the above diagnoses and treatment plan with him and answered all of his questions.  2. Interventions:  -None indicated from interventional pain management standpoint.   3. Medications  1. Recommend patient resume gabapentin " that will reduce your anxiety. These include simple things like exercise, good nutrition and adequate rest. Also, there are certain techniques that are helpful:  ¨ Relaxation  ¨ Breathing exercises  ¨ Visualization  ¨ Biofeedback  ¨ Meditation  For more information about this, consult your doctor or go to a local bookstore and review the many books and tapes available on this subject.  Follow-up care  If you feel that your anxiety is not responding to self-help measures, contact your doctor or make an appointment with a counselor. You may need short-term psychological counseling and temporary medicine to help you manage stress.  Call 911  Call your healthcare provider right away if any of these occur:  · Trouble breathing  · Confusion  · Drowsiness or trouble wakening  · Fainting or loss of consciousness  · Rapid heart rate  · Seizure  · New chest pain that becomes more severe, lasts longer, or spreads into your shoulder, arm, neck, jaw, or back  When to seek medical advice  Call your healthcare provider right away if any of these occur:  · Your symptoms get worse  · Severe headache not relieved by rest and mild pain reliever  © 6156-7333 [employer]. [address] [place] [address]. All rights reserved. This information is not intended as a substitute for professional medical care. Always follow your healthcare professional's instructions.          Panic Attack  A panic attack is an extreme fear reaction that comes on for no clear reason. There is often a fear that something terrible will happen or that you may die. The attack may last a few minutes up to a few hours. Between attacks, things will seem quite normal. This condition has a psychological cause and can be treated with the help of a therapist or psychiatrist. Medicine can be very helpful for this problem.  Panic attacks usually come on suddenly, reaches a peak within minutes, and includes at least 4 of these  300 mg with slow titration (every 3-7 days) to dose maximum of 600 mg t.i.d.   2. Continue to use naproxen 500 mg b.i.d.; may utilize acetaminophen as needed with daily dose to not exceed 3 g/day, taking as-needed Percocet into account.   -I discussed with patient that I do not recommend chronic opioid therapy for chronic pain complaints. Unfortunately, it appears patient has been on opiate medications with relative consistency for >one year. Most recently, he has been prescribed #150 Percocet  mg over the course of one month. Despite high doses, he continues to rate his pain 10/10, suspect for opioid-induced hyperalgesia. I recommend that he would be kept on opioids only for the usual and customary time for this type of procedure (as per surgery), and then be weaned off accordingly. I recommend that surgery taper and discontinue his current dose by 5-10% every 2-3 days until off. As needed use of opiate pain medications in the future for clinically diagnosed flares could be considered based on clinician's discretion.  -Medical cannabis may be a reasonable consideration; patient may locate a certified provider on the [place] Department of Health website.    4. Referrals:   -Patient states he has consult scheduled with dermatology; I discussed that there are additional treatments that they may be able to offer such as topical and systemic pharmaceuticals, light therapy, intralesional corticosteroids. He plans to keep this appointment.   5. Follow up: RTC as needed.     Total time spent was 30 minutes, and more than 50% of face to face time was spent in counseling and/or coordination of care regarding the above plan.    Thank you for the consult.   ROSEY Valle, [facility]  [facility]          Answers for HPI/ROS submitted by the patient on 1/8/2019   General Symptoms: Yes  Skin Symptoms: Yes  HENT Symptoms: No  EYE SYMPTOMS: No  HEART SYMPTOMS: Yes  LUNG SYMPTOMS:  symptoms:  · Palpitations, pounding heart, or accelerated heart rate  · Sweating  · Crying  · Trembling or shaking  · Sensations of shortness of breath or smothering  · Feelings of choking  · Chest pain or discomfort  · Nausea or abdominal distress  · Feeling dizzy, unsteady, light-headed, or faint  · Numbness or tingling sensations  · Fear of dying  · Fear of going crazy or of losing control  · Feelings of unreality, strangeness, or detachment from the environment  Many of these symptoms can be linked to physical problems, so it is sometimes necessary to rule out conditions like thyroid disorders, heart disease, gastrointestinal problems, and others. They can also start as physical symptoms, but psychologically we may react to them in a fearful way, worsening the way we react and feel.  Home care  · Try to find the sources of stress in your life. They may not be obvious. These may include:  ¨ Daily hassles of life which pile up (traffic jams, missed appointments, car troubles, etc.).  ¨ Major life changes, both good (new baby, job promotion) and bad (loss of job, loss of loved one).  ¨ Feeling that you have too many responsibilities and can't take care of everything at once.  ¨ Helplessness: feeling like your problems are too much for you to handle.  · Notice how your body reacts to stress. Learn to listen to your body signals so that you can take action before the stress becomes severe.  · Try to be aware of what you were doing before the reaction started; this may give you clues to things that can trigger a reaction. It may be situations in your life, or what you were doing at the time.  · When possible, avoid or reduce the cause of stress. Avoid hassles, limit the amount of change that is happening in your life at one time or take a break when you feel overloaded.  · Unfortunately, many stressful situations cannot be avoided. Therefore, it is necessary to learn how to manage stress better. There are many proven  No  INTESTINAL SYMPTOMS: No  URINARY SYMPTOMS: Yes  REPRODUCTIVE SYMPTOMS: No  SKELETAL SYMPTOMS: No  BLOOD SYMPTOMS: No  NERVOUS SYSTEM SYMPTOMS: Yes  MENTAL HEALTH SYMPTOMS: No  Fever: Yes  Loss of appetite: Yes  Weight loss: No  Weight gain: No  Fatigue: No  Night sweats: Yes  Chills: Yes  Increased stress: Yes  Excessive hunger: No  Excessive thirst: Yes  Feeling hot or cold when others believe the temperature is normal: Yes  Loss of height: No  Post-operative complications: No  Surgical site pain: Yes  Hallucinations: No  Change in or Loss of Energy: No  Hyperactivity: No  Confusion: No  Changes in hair: No  Changes in moles/birth marks: No  Itching: Yes  Rashes: Yes  Changes in nails: No  Acne: No  Change in facial hair: Yes  Warts: No  Non-healing sores: No  Scarring: Yes  Flaking of skin: Yes  Color changes of hands/feet in cold : No  Sun sensitivity: No  Skin thickening: No  Chest pain or pressure: Yes  Fast or irregular heartbeat: No  Pain in legs with walking: Yes  Trouble breathing while lying down: No  Fingers or toes appear blue: No  High blood pressure: No  Low blood pressure: No  Fainting: Yes  Murmurs: No  Pacemaker: No  Varicose veins: No  Edema or swelling: No  Wake up at night with shortness of breath: No  Light-headedness: Yes  Exercise intolerance: No  Trouble holding urine or incontinence: No  Pain or burning: Yes  Trouble starting or stopping: No  Increased frequency of urination: No  Blood in urine: No  Decreased frequency of urination: No  Frequent nighttime urination: No  Flank pain: No  Difficulty emptying bladder: No  Trouble with coordination: No  Dizziness or trouble with balance: Yes  Fainting or black-out spells: Yes  Memory loss: No  Headache: Yes  Seizures: No  Speech problems: No  Tingling: No  Tremor: No  Weakness: No  Difficulty walking: No  Paralysis: No  Numbness: No  ANGELA 7 TOTAL SCORE: 9  If you checked off any problems, how difficult have these problems made it for you to do  methods that work and will reduce your anxiety. These include simple things like exercise, good nutrition and adequate rest. Also, there are certain techniques that are helpful: relaxation and breathing exercises, visualization, biofeedback, meditation or simply taking some time-out to clear your mind. For more information about this, ask your doctor or go to a local bookstore and review the many books and tapes available on this subject.  Follow-up care  Follow-up with your healthcare provider, or as advised.  Call 911  Call 911 if any of these occur:  · Trouble breathing  · Confusion  · Very drowsy or trouble awakening  · Fainting or loss of consciousness  · Rapid heart rate  · Seizure  · New chest pain that becomes more severe, lasts longer, or spreads into your shoulder, arm, neck, jaw or back  When to seek medical advice  Call your healthcare provider right away if any of these occur:  · Worsening of your symptoms to the point of feeling out-of-control  · Increased pain with breathing  · Increasing feeling of weakness or dizziness  · Cough with dark colored sputum (phlegm) or blood  · Fever 1 degree above normal temperature ) lasting 24 to 48 hours or what your healthcare provider has advised  · Swelling, pain or redness in one leg  · Requests by family or friends for you to seek help for your symptoms  © 2324-1974 [employer]. [address] [place] [address], [place] [address]. All rights reserved. This information is not intended as a substitute for professional medical care. Always follow your healthcare professional's instructions.         your work, take care of things at home, or get along with other people?: Extremely difficult  PHQ9 TOTAL SCORE: 8

## 2019-01-08 NOTE — LETTER
1/8/2019       RE: Bernard Padilla  [id number] [address] [facility] [address]     Dear Colleague,    Thank you for referring your patient, Bernard Padilla, to the [facility] at [facility]. Please see a copy of my visit note below.    I had the pleasure of meeting Mr. Bernard Padilla on 1/8/2019 in the outpatient pain clinic in consult for Dr. Alcantara (?seen in ED) with regards to his hidradenitis.   HPI:  Patient is a 32 yo male seen today for evaluation of pain related to hidradenitis supprativa s/p left groin hidradenitis excisions on 12/7/18 with Dr. Jhonatan Bird of [facility], who who presents with recurrent groin pain. Since discharge his says his pain has never been under control. He describes tender lesions. Increased pain with sitting, walking, and bowel movements. He continues to take Percocet for the pain. He tells me he has also been using naproxen 500 mg between Percocet doses. Within the past month, he has obtained weekly prescriptions of Percocet  mg, #30 tablets, for a monthly total of #150 tablets. Patient states he is out of his current Rx as of today, prescribed on 1/3/19. He continues to rate pain 10/10 despite his medication. He finds marijuana to be effective and is interested in medical cannabis certification. He has not followed with dermatology but tells me he has an upcoming consult. Per his report, he has not attempted any additional management for hidradenitis beyond surgery.   He was seen by pain management through [facility], Dr. Tiburcio Rubio, in November, who recommended gabapentin 300 mg tid. Patient states he took for a month but self-discontinued due to poor efficacy. Opioid medications were not advised for continuous use for his pain.   Patient has another follow up appointment with his surgeon, Dr. Bird, later today. In review of notes, Dr. Bird has recommended decreased use of Percocet.  "  Past Medical History:   Diagnosis Date     Boil     past medical history reviewed with patient.   Past Surgical History:   Procedure Laterality Date     ORTHOPEDIC SURGERY      meniscus repair    past surgical history reviewed with patient.   Medications:  Current Outpatient Medications   Medication     Acetaminophen (TYLENOL PO)     IBUPROFEN PO     oxyCODONE-acetaminophen (PERCOCET) 5-325 MG per tablet     No current facility-administered medications for this visit.      A multi-state Prescription Monitoring Program reviewed and no aberrancies noted.     Allergies:     Allergies   Allergen Reactions     Eggs [Chicken-Derived Products (Egg)]      Family History:  family history is not on file.  Social history: he lives in [place] with his family. He is no longer working as a . Smoking: daily smoker. Alcohol: occasional. Street drugs: marijuana.     ROS:  A 14 point review of systems was completed; results are listed at end of note.     Objective:   /43   Pulse 62   Resp 16   Ht 1.702 m (5' 7\")   Wt 79.4 kg (175 lb)   BMI 27.41 kg/m     Body mass index is 27.41 kg/m .  General: In no apparent distress, seated in W/C  Mental status: Normal mood and affect, pleasant  Neuro: Alert, oriented. No sensory deficits.   Head: Atraumatic, normocephalic  Eyes: Extra-ocular movements grossly intact, no scleral icterus  Neck: Supple, Range of motion full  Respiratory: Non-labored breathing; No respiratory distress  Skin: Groin, scrotum, and buttocks examined today with presence of chaperone, Mariposa DE LOS SANTOS. Well healed surgical scarring appreciated. Small open lesion without purulence at base of penis. No additional lesions appreciated on exam.     Assessment:  Mr. Bernard Padilla is a 32 yo male seen today for persistent pain secondary to hidradenitis suppurativa.   Barriers:   Opioid-induced hyperalgesia: Patient has high levels (10/10) of reported pain despite high chronic doses of opiate medication; " #150 tablets of Percocet  mg.     Plan:   1. Patient education: I went over the above diagnoses and treatment plan with him and answered all of his questions.  2. Interventions:  -None indicated from interventional pain management standpoint.   3. Medications  1. Recommend patient resume gabapentin 300 mg with slow titration (every 3-7 days) to dose maximum of 600 mg t.i.d.   2. Continue to use naproxen 500 mg b.i.d.; may utilize acetaminophen as needed with daily dose to not exceed 3 g/day, taking as-needed Percocet into account.   -I discussed with patient that I do not recommend chronic opioid therapy for chronic pain complaints. Unfortunately, it appears patient has been on opiate medications with relative consistency for >one year. Most recently, he has been prescribed #150 Percocet  mg over the course of one month. Despite high doses, he continues to rate his pain 10/10, suspect for opioid-induced hyperalgesia. I recommend that he would be kept on opioids only for the usual and customary time for this type of procedure (as per surgery), and then be weaned off accordingly. I recommend that surgery taper and discontinue his current dose by 5-10% every 2-3 days until off. As needed use of opiate pain medications in the future for clinically diagnosed flares could be considered based on clinician's discretion.  -Medical cannabis may be a reasonable consideration; patient may locate a certified provider on the [facility] website.    4. Referrals:   -Patient states he has consult scheduled with dermatology; I discussed that there are additional treatments that they may be able to offer such as topical and systemic pharmaceuticals, light therapy, intralesional corticosteroids. He plans to keep this appointment.   5. Follow up: RTC as needed.     Total time spent was 30 minutes, and more than 50% of face to face time was spent in counseling and/or coordination of care regarding the  above plan.    Thank you for the consult.     ROSEY Valle, [facility]  [facility]  Pain and Interventional Clinic

## 2019-01-08 NOTE — PATIENT INSTRUCTIONS
1. Lillian Bailey will make medication recommendations in her office note for medication management.       Follow up: As needed with Lillian Bailey         To speak with a nurse, schedule/reschedule/cancel a clinic appointment, or request a medication refill call: [phone]     You can also reach us by [employer]: [email or web address]    For refills, please call on Monday, 1 week before your medication runs out. The doctors are not always in clinic, so this gives us time to get your prescriptions ready.  Please let us know the name of the medication you are requesting a refill of.

## 2019-01-08 NOTE — NURSING NOTE
I reviewed the AVS with the patient and answered any questions the patient had.  Patient stated verbal understanding and had no further questions.    Karishma Prasad, CMA

## 2019-01-09 ASSESSMENT — PATIENT HEALTH QUESTIONNAIRE - PHQ9: SUM OF ALL RESPONSES TO PHQ QUESTIONS 1-9: 8

## 2019-01-09 ASSESSMENT — ANXIETY QUESTIONNAIRES: GAD7 TOTAL SCORE: 9

## 2019-01-23 ENCOUNTER — DOCUMENTATION ONLY (OUTPATIENT)
Dept: FAMILY MEDICINE | Facility: CLINIC | Age: 34
End: 2019-01-23

## 2019-01-23 ENCOUNTER — OFFICE VISIT (OUTPATIENT)
Dept: FAMILY MEDICINE | Facility: CLINIC | Age: 34
End: 2019-01-23
Payer: COMMERCIAL

## 2019-01-23 VITALS
OXYGEN SATURATION: 98 % | SYSTOLIC BLOOD PRESSURE: 125 MMHG | TEMPERATURE: 98.1 F | WEIGHT: 178 LBS | HEART RATE: 91 BPM | RESPIRATION RATE: 20 BRPM | DIASTOLIC BLOOD PRESSURE: 74 MMHG | BODY MASS INDEX: 27.88 KG/M2

## 2019-01-23 DIAGNOSIS — L73.2 HIDRADENITIS SUPPURATIVA: Primary | ICD-10-CM

## 2019-01-23 RX ORDER — OXYCODONE AND ACETAMINOPHEN 7.5; 325 MG/1; MG/1
1 TABLET ORAL EVERY 6 HOURS PRN
Qty: 20 TABLET | Refills: 0 | Status: SHIPPED | OUTPATIENT
Start: 2019-01-23 | End: 2019-01-25

## 2019-01-23 NOTE — PROGRESS NOTES
Patient in clinic today, recently became restricted to me, he is seeing my partner in clinic today. He brings with him a written prescription from Dr. Jhonatan Bird for percocet 7.5-325mg every 6 hours as needed for pain, quantity of 30.  that he is unable to fill due to new restriction. I will give him 20 tablets and he will return to clinic for follow-up with me.

## 2019-01-23 NOTE — PROGRESS NOTES
Assessment and Plan     1. Hidradenitis suppurativa   reviewed and significant prescriptions for percocet exist but almost all of the recent ones are from his surgeon. There are notes in the computer verifying his story and the care that he is getting. Discussed restricted programs with the patient. Will try to get his surgeon added to the list of people that can prescribe for him. Discussed the case with Dr. Quiroz, his new PCP, who was willing to write a new script with the condition to see him back this week. Appointment made for Friday morning.  - oxyCODONE-acetaminophen (PERCOCET) 7.5-325 MG per tablet; Take 1 tablet by mouth every 6 hours as needed for severe pain  Dispense: 20 tablet; Refill: 0    Overall, I counseled and coordinated care on the above issues for greater than 50% of the 30 minute face-to-face visit.    Options for treatment and follow-up care were reviewed with the patient and/or guardian. Bernard Padilla and/or guardian engaged in the decision making process and verbalized understanding of the options discussed and agreed with the final plan. I answered all of their questions.    Tulio Phillips III, MD, FAAFP  [facility]  01/23/19 4:08 PM           HPI:       Bernard Padilla is a 33 year old  male  Patient presents with:  Surgical Followup: [facility] -[facility] left thigh  Medication Reconciliation: Completed   Establish Care: Insurance for restrictions of pain medicines (over ride)  Refill Request: Percocet     Patient comes here today because he was recently restricted by his insurance to Dr. Jayde Quiroz. This happened within the last week. He just recently had surgery for his hidradenitis suppurative which is very painful. He has been following with his surgeon and was given a prescription on 21 Jan that he has been unable to fill. He is taking it as prescribed. His surgeon is Dr. Jhonatan Bird.    He used to work in [employer] but has  been unable to since he developed the hidradenitis.         PMHX:     Patient Active Problem List   Diagnosis     Hidradenitis suppurativa       Current Outpatient Medications   Medication Sig Dispense Refill     Acetaminophen (TYLENOL PO)        IBUPROFEN PO        oxyCODONE-acetaminophen (PERCOCET) 7.5-325 MG per tablet Take 1 tablet by mouth every 6 hours as needed for severe pain 20 tablet 0       Social History     Socioeconomic History     Marital status: Single     Spouse name: Not on file     Number of children: Not on file     Years of education: Not on file     Highest education level: Not on file   Social Needs     Financial resource strain: Not on file     Food insecurity - worry: Not on file     Food insecurity - inability: Not on file     Transportation needs - medical: Not on file     Transportation needs - non-medical: Not on file   Occupational History     Not on file   Tobacco Use     Smoking status: Current Every Day Smoker     Packs/day: 1.00     Smokeless tobacco: Never Used     Tobacco comment: 1/2 pack of day, not interested in quitting    Substance and Sexual Activity     Alcohol use: Yes     Comment: Beer occasionally      Drug use: Yes     Types: Marijuana     Sexual activity: Not on file   Other Topics Concern     Not on file   Social History Narrative     Not on file       Allergies   Allergen Reactions     Vicodin [Hydrocodone-Acetaminophen] Shortness Of Breath     Eggs [Chicken-Derived Products (Egg)]        No results found for this or any previous visit (from the past 24 hour(s)).         Review of Systems:     Review of Systems   All other systems reviewed and are negative.           Physical Exam:     Vitals:    01/23/19 1050   BP: 125/74   Pulse: 91   Resp: 20   Temp: 98.1  F (36.7  C)   TempSrc: Oral   SpO2: 98%   Weight: 80.7 kg (178 lb)     Body mass index is 27.88 kg/m .    Physical Exam   Constitutional: He is oriented to person, place, and time.  Non-toxic appearance. He does  not have a sickly appearance.   Pulmonary/Chest: No respiratory distress.   Neurological: He is alert and oriented to person, place, and time.   Nursing note and vitals reviewed.

## 2019-01-23 NOTE — PROGRESS NOTES
Obtained referral from patient's insurance. Provided pertinent information to allow Dr. Jhonatan Bird with  [facility] to follow with patient and prescribe as needed for one calendar year. Faxed referral to [employer] at [phone]. Stella JEAN

## 2019-01-24 NOTE — PROGRESS NOTES
Referral forms filled out for each faculty provider to allow for prescribing from all faculty at [facility]. Forms placed in medical records to be scanned into chart. Stella JEAN

## 2019-01-25 ENCOUNTER — OFFICE VISIT (OUTPATIENT)
Dept: FAMILY MEDICINE | Facility: CLINIC | Age: 34
End: 2019-01-25
Payer: COMMERCIAL

## 2019-01-25 VITALS
SYSTOLIC BLOOD PRESSURE: 137 MMHG | TEMPERATURE: 97.8 F | OXYGEN SATURATION: 98 % | BODY MASS INDEX: 28.22 KG/M2 | RESPIRATION RATE: 18 BRPM | WEIGHT: 180.2 LBS | HEART RATE: 83 BPM | DIASTOLIC BLOOD PRESSURE: 85 MMHG

## 2019-01-25 DIAGNOSIS — L73.2 HIDRADENITIS SUPPURATIVA: Primary | ICD-10-CM

## 2019-01-25 RX ORDER — OXYCODONE AND ACETAMINOPHEN 10; 325 MG/1; MG/1
1 TABLET ORAL EVERY 6 HOURS PRN
Qty: 8 TABLET | Refills: 0 | Status: SHIPPED | OUTPATIENT
Start: 2019-01-25 | End: 2019-01-28

## 2019-01-28 ENCOUNTER — OFFICE VISIT (OUTPATIENT)
Dept: FAMILY MEDICINE | Facility: CLINIC | Age: 34
End: 2019-01-28
Payer: COMMERCIAL

## 2019-01-28 VITALS
DIASTOLIC BLOOD PRESSURE: 88 MMHG | OXYGEN SATURATION: 98 % | SYSTOLIC BLOOD PRESSURE: 129 MMHG | HEART RATE: 86 BPM | HEIGHT: 68 IN | BODY MASS INDEX: 26.67 KG/M2 | TEMPERATURE: 97.6 F | WEIGHT: 176 LBS | RESPIRATION RATE: 16 BRPM

## 2019-01-28 DIAGNOSIS — Z11.3 SCREEN FOR STD (SEXUALLY TRANSMITTED DISEASE): ICD-10-CM

## 2019-01-28 DIAGNOSIS — L73.2 HIDRADENITIS SUPPURATIVA: ICD-10-CM

## 2019-01-28 DIAGNOSIS — G89.4 CHRONIC PAIN SYNDROME: Primary | ICD-10-CM

## 2019-01-28 LAB
AMPHETAMINES QUAL: NEGATIVE
BARBITURATES QUAL URINE: NEGATIVE
BENZODIAZEPINE QUAL URINE: NEGATIVE
BUPRENORPHINE QUAL URINE: NEGATIVE
CANNABINOIDS UR QL SCN: POSITIVE
COCAINE QUAL URINE: POSITIVE
HIV 1+2 AB+HIV1 P24 AG SERPL QL IA: NEGATIVE
METHAMPHETAMINE: NEGATIVE
METHODONE QUAL: NEGATIVE
MORPHINE QUAL: NEGATIVE
OXYCODONE QUAL: NEGATIVE
PHENCYCLIDINE: NEGATIVE
PROPOXYPHENE: NEGATIVE
TEMPERATURE OF URINE WAS BETWEEN 90-100 DEGREES F: NO
TRICYCLIC ANTIDEPRESSANTS: NEGATIVE

## 2019-01-28 RX ORDER — OXYCODONE AND ACETAMINOPHEN 10; 325 MG/1; MG/1
1 TABLET ORAL EVERY 6 HOURS PRN
Qty: 16 TABLET | Refills: 0 | Status: SHIPPED | OUTPATIENT
Start: 2019-01-28 | End: 2019-02-01

## 2019-01-28 ASSESSMENT — MIFFLIN-ST. JEOR: SCORE: 1718.34

## 2019-01-28 NOTE — PROGRESS NOTES
Chief Complaint   Patient presents with     Medication Reconciliation     not completed      Pain     STD            HPI:       Bernard Padilla is a 33 year old  male with a significant past medical history of hidradenitis who presents for follow up of concern(s) listed below    I have completed an extensive review of his previous charts, labs,  taking 60 mins of my personal time.    He was to come in today to start our chronic pain process. He was 30 mins late due to snow/traffic as he drives in from [place].    1. 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 He was initially treated for MRSA        Office visit 1-25-19   Office visit 1-23-19   ER visit 1-22-19   Office visit-surgery 1-21-19   ER visit 1-16-19   Office Visit-surgery 1-15-19   Surgery 1-14-19   Pre-op 1-11-19   Office visit-surgery 1-8-19   Surgery 12-7-18   Pre-op 12-6-18  He states he uses oxycodone and marijuana for pain management, Last night had his last oxycodone, used marijuana last night. His friend (mother of his children and maybe his PCA) states she gave him his last pill (he was given 8 tabs by me on 1-25-19) last night    2. Chronic knee pain: He states pain is bilateral, he had more pain in his left knee in 2010 and now states his right knee bothers him more    3. STD testing: He is requesting STD testing, has had STDs in the past, last time was trichomonas, he is agreeable to  HIV, syphilis, urine GC/chlamydia today         PMHX:     Patient Active Problem List   Diagnosis     Hidradenitis suppurativa       Current Outpatient Medications   Medication Sig Dispense Refill     naloxone (NARCAN) 4 MG/0.1ML nasal spray Spray 1 spray (4 mg) into one nostril alternating nostrils as needed for opioid reversal 2 each 0     oxyCODONE-acetaminophen (PERCOCET)  MG per tablet Take 1 tablet by mouth every 6 hours as needed for severe pain 16  tablet 0     Acetaminophen (TYLENOL PO)        IBUPROFEN PO Take 800 mg by mouth 3 times daily as needed         Social History     Socioeconomic History     Marital status: Single     Spouse name: Not on file     Number of children: Not on file     Years of education: Not on file     Highest education level: Not on file   Social Needs     Financial resource strain: Not on file     Food insecurity - worry: Not on file     Food insecurity - inability: Not on file     Transportation needs - medical: Not on file     Transportation needs - non-medical: Not on file   Occupational History     Not on file   Tobacco Use     Smoking status: Current Every Day Smoker     Packs/day: 1.00     Smokeless tobacco: Never Used     Tobacco comment: 1/2 pack of day, not interested in quitting    Substance and Sexual Activity     Alcohol use: Yes     Comment: Beer occasionally      Drug use: Yes     Types: Marijuana     Sexual activity: Not on file   Other Topics Concern     Not on file   Social History Narrative     Not on file          Allergies   Allergen Reactions     Vicodin [Hydrocodone-Acetaminophen] Shortness Of Breath     Eggs [Chicken-Derived Products (Egg)]        Results for orders placed or performed in visit on 01/28/19 (from the past 24 hour(s))   Rapid Urine Drug Screen ([facility])   Result Value Ref Range    Phencyclidine NEGATIVE NEGATIVE    Propoxyphene NEGATIVE NEGATIVE    Tricyclic Antidepressants NEGATIVE NEGATIVE    Amphetamines Qual NEGATIVE NEGATIVE    Barbiturates Qual Urine NEGATIVE NEGATIVE    Buprenorphine Qual Urine NEGATIVE NEGATIVE    Benzodiazepine Qual Urine NEGATIVE NEGATIVE    Cocaine Qual Urine POSITIVE (A) NEGATIVE    Cannabinoids Qual Urine POSITIVE (A) NEGATIVE    Methamphetamine Qual NEGATIVE NEGATIVE    Methadone Qual NEGATIVE NEGATIVE    Morphine Qual NEGATIVE NEGATIVE    Oxycodone Qual NEGATIVE NEGATIVE    Temperature of Urine was Between  Degrees F NO (A) YES    Narrative    No temp  "detected due to not enough urine to temp strip            Review of Systems:   R: NEGATIVE for significant cough or shortness of breath  CV: NEGATIVE for chest pain, palpitations or new or worsening peripheral edema  MUSCULOSKELETAL:Positive for right knee pain  P: NEGATIVE for changes in mood or affect          Physical Exam:     Vitals:    01/28/19 0951   BP: 129/88   Pulse: 86   Resp: 16   Temp: 97.6  F (36.4  C)   TempSrc: Oral   SpO2: 98%   Weight: 79.8 kg (176 lb)   Height: 1.72 m (5' 7.72\")     Body mass index is 26.98 kg/m .    GENERAL APPEARANCE: healthy, alert and no distress,  Lying on his side on exam table, to take pressure off of his left thigh  RESP: lungs clear to auscultation - no rales, rhonchi or wheezes  CV: regular rate and rhythm,  and no murmur, click,  rub or gallop      Assessment and Plan     1. Chronic pain syndrome  - Cocaine Urine ([place]) ([facility])  - Opiates Urine ([facility])  - naloxone (NARCAN) 4 MG/0.1ML nasal spray; Spray 1 spray (4 mg) into one nostril alternating nostrils as needed for opioid reversal  Dispense: 2 each; Refill: 0  - C CHART REVIEW & REPORT PER 1/2 HR    Long discussion today about opioid treatment. He has admitted to use selling cocaine 10 year ago, he states he does not use the substance, however his urine was positive for cocaine x 2 on our rapid screen. We will send out confirmation. I did tell him if it comes back positive for cocaine, I will be weaning him off of his oxycodone, because it is against our policy. Also, he had no oxycodone in his urine, this is also being sent out for confirmation. He states he took his last pill last night. It was also noted in 2012 he was exhibiting drug-seeking behaviors.  He did sign our controlled substance, but it is unclear at this time if he will be eligible for opioid medication through our clinic.    2. Hidradenitis suppurativa  - Rapid Urine Drug Screen ([facility])  - oxyCODONE-acetaminophen (PERCOCET)  MG per " tablet; Take 1 tablet by mouth every 6 hours as needed for severe pain  Dispense: 16 tablet; Refill: 0  - C CHART REVIEW & REPORT PER 1/2 HR    3. Screen for STD (sexually transmitted disease)  - HIV Ag/Ab Screen [place] ([facility])  - Syphilis Screen [place] (RPR/VDRL) ([facility])    RTC on Friday to discuss confirmation results.    Options for treatment and follow-up care were reviewed with the patient and/or guardian. Bernard Padilla and/or guardian engaged in the decision making process and verbalized understanding of the options discussed and agreed with the final plan.    Jayde Quiroz, DO

## 2019-01-28 NOTE — LETTER
[facility]  01/28/19    Patient: Bernard Padilla  YOB: 1985  Medical Record Number: [id number]                                                                  Opioid / Opioid Plus Controlled Substance Agreement    I understand that my care provider has prescribed an opioid (narcotic) controlled substance to help manage my condition(s). I am taking this medicine to help me function or work. I know this is strong medicine, and that it can cause serious side effects. Opioid medicine can be sedating, addicting and may cause a dependency on the drug. They can affect my ability to drive or think, and cause depression. They need to be taken exactly as prescribed. Combining opioids with certain medicines or chemicals (such as cocaine, sedatives and tranquilizers, sleeping pills, meth) can be dangerous or even fatal. Also, if I stop opioids suddenly, I may have severe withdrawal symptoms. Last, I understand that opioids do not work for all types of pain nor for all patients. If not helpful, I may be asked to stop them.        The risks, benefits, and side effects of these medicine(s) were explained to me. I agree that:    1. I will take part in other treatments as advised by my care team. This may be psychiatry or counseling, physical therapy, behavioral therapy, group treatment or a referral to a pain clinic. I will reduce or stop my medicine when my care team tells me to do so.  2. I will take my medicines as prescribed. I will not change the dose or schedule unless my care team tells me to. There will be no refills if I  run out early.   I may be contactedwithout warning and asked to complete a urine drug test or pill count at any time.   3. I will keep all my appointments, and understand this is part of the monitoring of opioids. My care team may require an office visit for EVERY opioid/controlled substance refill. If I miss appointments or don t follow instructions, my care team may stop my  medicine.  4. I will not ask other providers to prescribe controlled substances, and I will not accept controlled substances from other people. If I need another prescribed controlled substance for a new reason, I will tell my care team within 1 business day.  5. I will use one pharmacy to fill all of my controlled substance prescriptions, and it is up to me to make sure that I do not run out of my medicines on weekends or holidays. If my care team is willing to refill my opioid prescription without a visit, I must request refills only during office hours, refills may take up to 3 days to process, and it may take up to 5 to 7 days for my medicine to be mailed and ready at my pharmacy. Prescriptions will not be mailed anywhere except my pharmacy.        [id number]  Rev 12/18         Registration to scan to EHR                             Page 1 of 2               Controlled Substance Agreement Opioid        [facility]  01/28/19  Patient: Bernard Padilla  YOB: 1985  Medical Record Number: [id number]                                                                  6. I am responsible for my prescriptions. If the medicine/prescription is lost or stolen, it will not be replaced. I also agree not to share controlled substance medicines with anyone.  7. I agree to not use ANY illegal or recreational drugs. This includes marijuana, cocaine, bath salts or other drugs. I agree not to use alcohol unless my care team says I may.          I agree to give urine samples whenever asked. If I don t give a urine sample, the care team may stop my medicine.    8. If I enroll in the [place] Medical Marijuana program, I will tell my care team. I will also sign an agreement to share my medical records with my care team.   9. I will bring in my list of medicines (or my medicine bottles) each time I come to the clinic.   10. I will tell my care team right away if I become pregnant or have a new medical problem treated  outside of my regular clinic.  11. I understand that this medicine can affect my thinking and judgment. It may be unsafe for me to drive, use machinery and do dangerous tasks. I will not do any of these things until I know how the medicine affects me. If my dose changes, I will wait to see how it affects me. I will contact my care team if I have concerns about medicine side effects.    I understand that if I do not follow any of the conditions above, my prescriptions or treatment may be stopped.      I agree that my provider, clinic care team, and pharmacy may work with any city, state or federal law enforcement agency that investigates the misuse, sale, or other diversion of my controlled medicine. I will allow my provider to discuss my care with or share a copy of this agreement with any other treating provider, pharmacy or emergency room where I receive care. I agree to give up (waive) any right of privacy or confidentiality with respect to these consents.     I have read this agreement and have asked questions about anything I did not understand.      ________________________________________________________________________  Patient signature - Date/Time -  Bernard Padilla                                      ________________________________________________________________________  Witness signature                                                            ________________________________________________________________________  Provider signature - Jayde Quiroz DO      [id number]  Rev 12/18         Registration to scan to EHR                         Page 2 of 2                   Controlled Substance Agreement Opioid           Page 1 of 2  Opioid Pain Medicines (also known as Narcotics)  What You Need to Know    What are opioids?   Opioids are pain medicines that must be prescribed by a doctor.  They are also known as narcotics.    Examples are:     morphine (MS Contin, [place])    oxycodone (Oxycontin)    oxycodone  and acetaminophen (Percocet)    hydrocodone and acetaminophen (Vicodin, Norco)     fentanyl patch (Duragesic)     hydromorphone (Dilaudid)     methadone     What do opioids do well?   Opioids are best for short-term pain after a surgery or injury. They also work well for cancer pain. Unlike other pain medicines, they do not cause liver or kidney failure or ulcers. They may help some people with long-lasting (chronic) pain.     What do opioids NOT do well?   Opioids never get rid of pain entirely, and they do not work well for most patients with chronic pain. Opioids do not reduce swelling, one of the causes of pain. They also don t work well for nerve pain.                           For informational purposes only.  Not to replace the advice of your care provider.  Copyright [facility]. All right reserved. [employer] 948531-[id number] 02/18.      Page 2 of 2    Risks and side effects   Talk to your doctor before you start or decide to keep taking one of these medicines. Side effects include:    Lowering your breathing rate enough to cause death    Overdose, including death, especially if taking higher than prescribed doses    Long-term opioid use    Worse depression symptoms; less pleasure in things you usually enjoy    Feeling tired or sluggish    Slower thoughts or cloudy thinking    Being more sensitive to pain over time; pain is harder to control    Trouble sleeping or restless sleep    Changes in hormone levels (for example, less testosterone)    Changes in sex drive or ability to have sex    Constipation    Unsafe driving    Itching and sweating    Feeling dizzy    Nausea, vomiting and dry mouth    What else should I know about opioids?  When someone takes opioids for too long or too often, they become dependent. This means that if you stop or reduce the medicine too quickly, you will have withdrawal symptoms.    Dependence is not the same as addiction. Addiction is when people keep using a  substance that harms their body, their mind or their relations with others. If you have a history of drug or alcohol abuse, taking opioids can cause a relapse.    Over time, opioids don t work as well. Most people will need higher and higher doses. The higher the dose, the more serious the side effects. We don t know the long-term effects of opioids.      Prescribed opioids aren't the best way to manage chronic pain    Other ways to manage pain include:      Ibuprofen or acetaminophen.  You should always try this first.      Treat health problems that may be causing pain.      acupuncture or massage, deep breathing, meditation, visual imagery, aromatherapy.      Use heat or ice at the pain site      Physical therapy and exercise      Stop smoking      See a counselor or therapist                                                  People who have used opioids for a long time may have a lower quality of life, worse depression, higher levels of pain and more visits to doctors.    Never share your opioids with others. Be sure to store opioids in a secure place, locked if possible.Young children can easily swallow them and overdose.     You can overdose on opioids.  Signs of overdose include decrease or loss of consciousness, slowed breathing, trouble waking and blue lips.  If someone is worried about overdose, they should call 911.    If you are at risk for overdose, you may get naloxone (Narcan, a medicine that reverses the effects of opioids.  If you overdose, a friend or family member can give you Narcan while waiting for the ambulance.  They need to know the signs of overdose and how to give Narcan.    While you're taking opioids:    Don't use alcohol or street drugs. Taking them together can cause death.    Don't take any of these medicines unless your doctor says its okay.  Taking these with opioids can cause death.    Benzodiazepines (such as lorazepam         or diazepam)    Muscle relaxers (such as  cyclobenzaprine)    sleeping pills    other opioids    Safe disposal of opioids  Find your area drug take-back program, your pharmacy mail-back program, buy a special disposal bag (such as Deterra) from your pharmacy or flush them down the toilet.  Use the guidelines at:  www.fda.gov/drugs/resourcesforyou

## 2019-01-28 NOTE — PROGRESS NOTES
Chief Complaint   [facility]     [facility]            HPI:       Bernard Padilla is a 33 year old  male with a significant past medical history of recent opioid use who presents for follow up of concern(s) listed below    1. Restricted patient: Recently seen on 1-23-19, restricted to me, most likely restricted due to frequent narcotic prescriptions, but has been from same provider (surgeon) up until now. Has been seen by [facility] and most recently seen for a Pre-op on 1-11-19.  He lives in [place], but his kids are in [place], he helps getting them to school.     2. Hidradenitis : has been pain for medication for the skin condition and surgery, has been on it for 1 year. He had surgery, excision of suppurative hidradenitis,  1-14-19. He has 1 open area mid lower abdomen and 1 at left inner thigh.   He has been not able to work and has 8 kids to support. Plans to go back to school.          PMHX:     Patient Active Problem List   Diagnosis     Hidradenitis suppurativa       Current Outpatient Medications   Medication Sig Dispense Refill     Acetaminophen (TYLENOL PO)        IBUPROFEN PO Take 800 mg by mouth 3 times daily as needed       naloxone (NARCAN) 4 MG/0.1ML nasal spray Spray 1 spray (4 mg) into one nostril alternating nostrils as needed for opioid reversal 2 each 0     oxyCODONE-acetaminophen (PERCOCET)  MG per tablet Take 1 tablet by mouth every 6 hours as needed for severe pain 16 tablet 0       Social History     Socioeconomic History     Marital status: Single     Spouse name: Not on file     Number of children: Not on file     Years of education: Not on file     Highest education level: Not on file   Social Needs     Financial resource strain: Not on file     Food insecurity - worry: Not on file     Food insecurity - inability: Not on file     Transportation needs - medical: Not on file     Transportation needs - non-medical: Not on file   Occupational History      Not on file   Tobacco Use     Smoking status: Current Every Day Smoker     Packs/day: 1.00     Smokeless tobacco: Never Used     Tobacco comment: 1/2 pack of day, not interested in quitting    Substance and Sexual Activity     Alcohol use: Yes     Comment: Beer occasionally      Drug use: Yes     Types: Marijuana     Sexual activity: Not on file   Other Topics Concern     Not on file   Social History Narrative     Not on file          Allergies   Allergen Reactions     Vicodin [Hydrocodone-Acetaminophen] Shortness Of Breath     Eggs [Chicken-Derived Products (Egg)]        No results found for this or any previous visit (from the past 24 hour(s)).         Review of Systems:   C: NEGATIVE for fatigue, unexpected change in weight  R: NEGATIVE for significant cough or shortness of breath  CV: NEGATIVE for chest pain, palpitations or new or worsening peripheral edema  MUSCULOSKELETAL:Positive for chronic knee pain  P: NEGATIVE for changes in mood or affect          Physical Exam:     Vitals:    01/25/19 0954   BP: 137/85   Pulse: 83   Resp: 18   Temp: 97.8  F (36.6  C)   TempSrc: Oral   SpO2: 98%   Weight: 81.7 kg (180 lb 3.2 oz)     Body mass index is 28.22 kg/m .   He is here by himself.   GENERAL APPEARANCE: healthy, alert and no distress,  RESP: lungs clear to auscultation - no rales, rhonchi or wheezes  CV: regular rate and rhythm,  and no murmur, click,  rub or gallop  Psych: Makes good eye contact  MSK: Walks without a limp  Skin: Open sore at suprapubic area and left inner thigh. Healing well,       Assessment and Plan     1. Hidradenitis suppurativa  He is wanting his pain medication. He would like to increase from 7.5mg of oxycodone to 10 mg of oxycodone. He was given 8 tabs of percocet for over the weekend, but he needs to return on Monday to start our chronic pain process. He was given the packet and our written policy for pain medications to review. I reviewed the  today and discussed opioid medication is  not a good long term answer.      Options for treatment and follow-up care were reviewed with the patient and/or guardian. Bernard Padilla and/or guardian engaged in the decision making process and verbalized understanding of the options discussed and agreed with the final plan.    Jayde Quiroz, DO

## 2019-01-29 LAB — TREPONEMA ANTIBODY (SYPHILIS): NEGATIVE

## 2019-02-01 ENCOUNTER — OFFICE VISIT (OUTPATIENT)
Dept: FAMILY MEDICINE | Facility: CLINIC | Age: 34
End: 2019-02-01
Payer: COMMERCIAL

## 2019-02-01 VITALS
OXYGEN SATURATION: 97 % | SYSTOLIC BLOOD PRESSURE: 123 MMHG | HEART RATE: 94 BPM | WEIGHT: 175 LBS | BODY MASS INDEX: 26.83 KG/M2 | DIASTOLIC BLOOD PRESSURE: 82 MMHG | RESPIRATION RATE: 16 BRPM | TEMPERATURE: 98 F

## 2019-02-01 DIAGNOSIS — G89.4 CHRONIC PAIN SYNDROME: Primary | ICD-10-CM

## 2019-02-01 DIAGNOSIS — L73.2 HIDRADENITIS SUPPURATIVA: ICD-10-CM

## 2019-02-01 DIAGNOSIS — R82.90 ABNORMAL URINE: ICD-10-CM

## 2019-02-01 LAB
AMPHETAMINES QUAL: NEGATIVE
BARBITURATES QUAL URINE: NEGATIVE
BENZODIAZEPINE QUAL URINE: NEGATIVE
BENZOYLECGONINE, URN, QUANT: >1000 NG/ML
BUPRENORPHINE QUAL URINE: NEGATIVE
CANNABINOIDS UR QL SCN: POSITIVE
COCAINE QUAL URINE: POSITIVE
METHAMPHETAMINE: NEGATIVE
METHODONE QUAL: NEGATIVE
MORPHINE QUAL: NEGATIVE
OXYCODONE QUAL: POSITIVE
PHENCYCLIDINE: NEGATIVE
PROPOXYPHENE: NEGATIVE
TEMPERATURE OF URINE WAS BETWEEN 90-100 DEGREES F: YES
TRICYCLIC ANTIDEPRESSANTS: NEGATIVE

## 2019-02-01 RX ORDER — OXYCODONE AND ACETAMINOPHEN 10; 325 MG/1; MG/1
1 TABLET ORAL EVERY 6 HOURS PRN
Qty: 28 TABLET | Refills: 0 | Status: SHIPPED | OUTPATIENT
Start: 2019-02-01 | End: 2019-02-08

## 2019-02-01 NOTE — PROGRESS NOTES
Chief Complaint   Patient presents with     Pain     chronic pain f/u     Recheck Medication     complete     Refill Request     percocet            HPI:       Bernard Padilla is a 33 year old  male with a significant past medical history of hidradenitis who presents for follow up of concern(s) listed below    This is a complex situation: He is new to our clinic as of 1-23-19 due to restricted patient program despite him having care through [facility] in the past, He is trying to establish care for our chronic pain program to get his oxycodone. However his recent rapid UDS was positive for cocaine ( he denies use and feels his marijuana was laced with it). Due to the recent cold weather the confirmation test ordered on 1-28-19 have not been completed yet, we were supposed to have the tests back today    Last oxycodone was at 4am this morning, trying to use more ibuprofen. Smoked last marijuana on 1-28-19 (that he thinks was laced with cocaine). He did smoke marijuana yesterday. He smokes a 2-3 gram blunt almost daily. His kids want him to stop marijuana and said will bring home straight As if he does.  Moving back to [place]. He has a disability hearing on 2-7-19. He is trying to buy a new truck.    At his last visit, he was given enough oxycodone to last until today's visit as I should have received the confirmatory testing. I also gave him a Rx for naloxone that he was able to . Today, his drug screen was positive for cocaine again, he stated he did use the laced marijuana on 1-28-19 (so could still be positive). He started a new stash of marijuana on 1-30-19.   I feel as I am still at he same place as on 1-28-19, so 1 week supply was given, however if positive again at his next visit, we discussed weaning off his oxycodone and could consider suboxone treatment.         PMHX:     Patient Active Problem List   Diagnosis     Hidradenitis suppurativa       Current Outpatient Medications   Medication Sig  Dispense Refill     Acetaminophen (TYLENOL PO)        IBUPROFEN PO Take 800 mg by mouth 3 times daily as needed       naloxone (NARCAN) 4 MG/0.1ML nasal spray Spray 1 spray (4 mg) into one nostril alternating nostrils as needed for opioid reversal 2 each 0     oxyCODONE-acetaminophen (PERCOCET)  MG per tablet Take 1 tablet by mouth every 6 hours as needed for severe pain 28 tablet 0       Social History     Socioeconomic History     Marital status: Single     Spouse name: Not on file     Number of children: Not on file     Years of education: Not on file     Highest education level: Not on file   Social Needs     Financial resource strain: Not on file     Food insecurity - worry: Not on file     Food insecurity - inability: Not on file     Transportation needs - medical: Not on file     Transportation needs - non-medical: Not on file   Occupational History     Not on file   Tobacco Use     Smoking status: Current Every Day Smoker     Packs/day: 1.00     Smokeless tobacco: Never Used     Tobacco comment: 1/2 pack of day, not interested in quitting    Substance and Sexual Activity     Alcohol use: Yes     Comment: Beer occasionally      Drug use: Yes     Types: Marijuana     Sexual activity: Not on file   Other Topics Concern     Not on file   Social History Narrative     Not on file          Allergies   Allergen Reactions     Vicodin [Hydrocodone-Acetaminophen] Shortness Of Breath     Eggs [Chicken-Derived Products (Egg)]        Results for orders placed or performed in visit on 02/01/19 (from the past 24 hour(s))   Rapid Urine Drug Screen ([facility])   Result Value Ref Range    Phencyclidine NEGATIVE NEGATIVE    Propoxyphene NEGATIVE NEGATIVE    Tricyclic Antidepressants NEGATIVE NEGATIVE    Amphetamines Qual NEGATIVE NEGATIVE    Barbiturates Qual Urine NEGATIVE NEGATIVE    Buprenorphine Qual Urine NEGATIVE NEGATIVE    Benzodiazepine Qual Urine NEGATIVE NEGATIVE    Cocaine Qual Urine POSITIVE (A) NEGATIVE     Cannabinoids Qual Urine POSITIVE (A) NEGATIVE    Methamphetamine Qual NEGATIVE NEGATIVE    Methadone Qual NEGATIVE NEGATIVE    Morphine Qual NEGATIVE NEGATIVE    Oxycodone Qual POSITIVE (A) NEGATIVE    Temperature of Urine was Between  Degrees F YES YES            Review of Systems:   C: NEGATIVE for fatigue, unexpected change in weight  R: NEGATIVE for significant cough or shortness of breath  CV: NEGATIVE for chest pain, palpitations or new or worsening peripheral edema  P: NEGATIVE for changes in mood or affect          Physical Exam:     Vitals:    02/01/19 0950   BP: 123/82   Pulse: 94   Resp: 16   Temp: 98  F (36.7  C)   TempSrc: Oral   SpO2: 97%   Weight: 79.4 kg (175 lb)     Body mass index is 26.83 kg/m .    GENERAL APPEARANCE: healthy, alert and no distress,  RESP: lungs clear to auscultation - no rales, rhonchi or wheezes  CV: regular rate and rhythm,  and no murmur, click,  rub or gallop  SKIN: open area at suprapubic region and inner left thigh, plans to have an area drained on his forehead.      Assessment and Plan     1. Chronic pain syndrome  Discussion of abnormal urine results again, confirmatory testing is not back yet, discussion of weaning off percocet if still positive  - Rapid Urine Drug Screen ([facility])    2. Hidradenitis suppurativa  RTC in 1 week   - oxyCODONE-acetaminophen (PERCOCET)  MG per tablet; Take 1 tablet by mouth every 6 hours as needed for severe pain  Dispense: 28 tablet; Refill: 0    3. Abnormal urine  - Cocaine Urine ([place]) ([facility])    He also needs to bring all his medications to his next visit.      Options for treatment and follow-up care were reviewed with the patient and/or guardian. Bernard Padilla and/or guardian engaged in the decision making process and verbalized understanding of the options discussed and agreed with the final plan.    Jayde Quiroz,

## 2019-02-02 LAB
6-ACETYLMORPHINE, URN, QUANT: <10 NG/ML
CODEINE, URN, QUANT: <20 NG/ML
HYDROCODONE, URN, QUANT: <20 NG/ML
HYDROMORPHONE, URN, QUANT: <20 NG/ML
MORPHINE, URN, QUANT: <20 NG/ML
NORHYDROCODONE, URN, QUANT: <20 NG/ML
NOROXYCODONE, URN, QUANT: 173 NG/ML
NOROXYMORPHONE, URN, QUANT: <20 NG/ML
OXYCODONE, URN, QUANT: 47 NG/ML
OXYMORPHONE, URN, QUANT: <20 NG/ML

## 2019-02-04 LAB — BENZOYLECGONINE, URN, QUANT: >1000 NG/ML

## 2019-02-08 ENCOUNTER — OFFICE VISIT (OUTPATIENT)
Dept: FAMILY MEDICINE | Facility: CLINIC | Age: 34
End: 2019-02-08
Payer: COMMERCIAL

## 2019-02-08 VITALS
RESPIRATION RATE: 20 BRPM | WEIGHT: 175 LBS | SYSTOLIC BLOOD PRESSURE: 121 MMHG | TEMPERATURE: 97.6 F | OXYGEN SATURATION: 97 % | HEART RATE: 84 BPM | DIASTOLIC BLOOD PRESSURE: 79 MMHG | BODY MASS INDEX: 26.83 KG/M2

## 2019-02-08 DIAGNOSIS — G89.4 CHRONIC PAIN SYNDROME: ICD-10-CM

## 2019-02-08 DIAGNOSIS — L73.2 HIDRADENITIS SUPPURATIVA: Primary | ICD-10-CM

## 2019-02-08 LAB
AMPHETAMINES QUAL: NEGATIVE
BARBITURATES QUAL URINE: NEGATIVE
BENZODIAZEPINE QUAL URINE: NEGATIVE
BUPRENORPHINE QUAL URINE: NEGATIVE
CANNABINOIDS UR QL SCN: NEGATIVE
COCAINE QUAL URINE: NEGATIVE
METHAMPHETAMINE: NEGATIVE
METHODONE QUAL: NEGATIVE
MORPHINE QUAL: NEGATIVE
OXYCODONE QUAL: POSITIVE
PHENCYCLIDINE: NEGATIVE
PROPOXYPHENE: NEGATIVE
TEMPERATURE OF URINE WAS BETWEEN 90-100 DEGREES F: YES
TRICYCLIC ANTIDEPRESSANTS: NEGATIVE

## 2019-02-08 RX ORDER — OXYCODONE AND ACETAMINOPHEN 10; 325 MG/1; MG/1
1 TABLET ORAL EVERY 6 HOURS PRN
Qty: 56 TABLET | Refills: 0 | Status: SHIPPED | OUTPATIENT
Start: 2019-02-08 | End: 2019-02-20

## 2019-02-08 NOTE — PATIENT INSTRUCTIONS
OPIOID OVERDOSE SAFETY PLAN  Patients taking prescription opioids are at risk for accidental overdose. Overdose from prescription opioid pain medications is a national epidemic. Opioids include: Vicodin (hydrocodone), OxyContin (oxycodone), Dilaudid (hydromorphone), MS Contin (morphine), Fentanyl, Percocet, Methadone, Suboxone, heroin, and others.    Steps to Avoid Overdose  1. Only take medication prescribed to you  2. Don t take more medication than instructed  3. NEVER mix pain medications with alcohol  4. Avoid sleeping pills when taking pain medications  5. Dispose of unused medications  6. Store your medication in a secure place  7. Teach your family and friends how to respond to an overdose      Narcan is being prescribed as part of your opioid overdose safety plan. Narcan is a medication that reverses opioid overdose and saves lives. Opioid overdoses are life threatening and must be handled right away. Narcan reverses overdose for 30-90 minutes, and you must call 911 immediately if you suspect overdose.     STEP 1: RECOGNIZE OVERDOSE  Not breathing or breathing very slowly (less than 1 breath every 5 seconds)  Snoring, gasping, or gurgling sounds  Lips or fingertips turning blue  Very limp body and pale face  Not responding to hard rub of the chest or yelling their name     STEP 2: CALL FOR HELP (DIAL 911)  Always call 911 and tell them  someone is not breathing   You are legally protected when calling for help in [place]     STEP 3: SUPPORT BREATHING  1. Check airway - make sure there is nothing inside their mouth stopping breathing  2. One hand on chin, tilt head back, pinch nose closed  3. Make a seal over mouth and give 2 slow breaths. You should see the chest rise, not stomach.  4. Keep going with one breath every 5 seconds     STEP 4: GIVE NARCAN  Give Narcan if you can give it quickly enough so that the person won t go for too long without your breathing assistance  Follow directions on the  package  Spray Narcan into one nostril     STEP 5: MONITOR  Continue rescue breathing until they are breathing on their own  Give another Narcan spray if they are not breathing on their own or still unresponsive within 3 minutes of the first spray  Narcan wears off within 30-90 minutes and the person can overdose again once it wears off because the opioids are still in their system! Be sure to get them medical care right away.      Good job on quitting smoking!!
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
## 2019-02-19 ENCOUNTER — TELEPHONE (OUTPATIENT)
Dept: FAMILY MEDICINE | Facility: CLINIC | Age: 34
End: 2019-02-19

## 2019-02-19 NOTE — TELEPHONE ENCOUNTER
Patient got Rx from dermatologist and needs  to call the insurance to okay pt to get Rx from [facility] across the street from us. Told him it could take up to two business days for a response. He states he has been waiting for 10 days. Patient is restricted to Dr. Quiroz. Please call and advise.

## 2019-02-20 DIAGNOSIS — L73.2 HIDRADENITIS SUPPURATIVA: ICD-10-CM

## 2019-02-20 NOTE — TELEPHONE ENCOUNTER
Dr Quiroz, patient also wants you to be aware that Dr Bird will be calling you on Friday after Bernard's surgery so that you can write him a prescription for pain meds since he is restricted to you.

## 2019-02-20 NOTE — TELEPHONE ENCOUNTER
[facility] phone call message- patient requesting a refill:    Full Medication Name: Oxycodone-acetaminophen     Dose:  MG      Pharmacy confirmed as   [id number] - [place], [place] - [address] & [facility]  [address] [place] [address]  Phone: [phone] Fax: [phone]  : Yes    Additional Comments: Patient states he has a surgery scheduled on Friday 02/22/19 with Dr. Bird at [facility]; day that he had an appointment scheduled to see Dr. Quiroz for a medication refill. Patient is requesting a medication refill send over to pharmacy since he won't be able to come to his appt with Dr. Quiroz. Please call and advise.    OK to leave a message on voice mail? Yes    Primary language: English      needed? No    Call taken on February 20, 2019 at 9:21 AM by Mariajose Conklin

## 2019-02-21 RX ORDER — OXYCODONE AND ACETAMINOPHEN 10; 325 MG/1; MG/1
1 TABLET ORAL EVERY 6 HOURS PRN
Qty: 56 TABLET | Refills: 0 | Status: SHIPPED | OUTPATIENT
Start: 2019-02-21 | End: 2019-03-01

## 2019-02-21 NOTE — TELEPHONE ENCOUNTER
Tried to call patient to let him know that he could have been seen today by Dr Quiroz because we do not give out narcotics without an appointment. Dr Quiroz has left a prescription for him at the clinic that he will need to , this one time due to his surgery tomorrow.  Dr Quiroz is also working on getting Dr Bird on the restricted doctor list for prescriptions since he seems to be the main physician managing his opioids.   I was unable to reach him or leave a message.  I will have the /call center continue to reach out to him about this

## 2019-02-22 NOTE — TELEPHONE ENCOUNTER
Rx picked up on February 22, 2019 12:29 PM by Patient {  Rx given by Ernesto Lane  Photo ID verified and signature obtained?: Yes

## 2019-02-26 ENCOUNTER — TELEPHONE (OUTPATIENT)
Dept: FAMILY MEDICINE | Facility: CLINIC | Age: 34
End: 2019-02-26

## 2019-02-27 NOTE — TELEPHONE ENCOUNTER
Patient is calling to check on Rx, told him will take another message but usually takes 48 hours for a response. Please call and advise.

## 2019-02-27 NOTE — TELEPHONE ENCOUNTER
Patient is requesting a medication refill for Percocet. He states he had surgery on Friday and has two open wounds and is in a lot of pain. He states he hasn't received a call back yet, so is calling to see what's going on. Notified I would take a message for Dr. Quiroz. Please call and advise.
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
## 2019-03-01 ENCOUNTER — OFFICE VISIT (OUTPATIENT)
Dept: FAMILY MEDICINE | Facility: CLINIC | Age: 34
End: 2019-03-01
Payer: COMMERCIAL

## 2019-03-01 VITALS
HEART RATE: 101 BPM | OXYGEN SATURATION: 96 % | BODY MASS INDEX: 26.22 KG/M2 | TEMPERATURE: 97.9 F | SYSTOLIC BLOOD PRESSURE: 121 MMHG | DIASTOLIC BLOOD PRESSURE: 80 MMHG | WEIGHT: 171 LBS

## 2019-03-01 DIAGNOSIS — T40.2X5A CONSTIPATION DUE TO OPIOID THERAPY: ICD-10-CM

## 2019-03-01 DIAGNOSIS — K59.03 CONSTIPATION DUE TO OPIOID THERAPY: ICD-10-CM

## 2019-03-01 DIAGNOSIS — Z72.0 TOBACCO USE: ICD-10-CM

## 2019-03-01 DIAGNOSIS — L30.9 ECZEMA, UNSPECIFIED TYPE: ICD-10-CM

## 2019-03-01 DIAGNOSIS — L73.2 HIDRADENITIS SUPPURATIVA: Primary | ICD-10-CM

## 2019-03-01 DIAGNOSIS — G89.4 CHRONIC PAIN SYNDROME: ICD-10-CM

## 2019-03-01 LAB
AMPHETAMINES QUAL: NEGATIVE
BARBITURATES QUAL URINE: NEGATIVE
BENZODIAZEPINE QUAL URINE: NEGATIVE
BUPRENORPHINE QUAL URINE: NEGATIVE
CANNABINOIDS UR QL SCN: NEGATIVE
COCAINE QUAL URINE: NEGATIVE
METHAMPHETAMINE: NEGATIVE
METHODONE QUAL: NEGATIVE
MORPHINE QUAL: POSITIVE
OXYCODONE QUAL: POSITIVE
PHENCYCLIDINE: NEGATIVE
PROPOXYPHENE: NEGATIVE
TEMPERATURE OF URINE WAS BETWEEN 90-100 DEGREES F: NO
TRICYCLIC ANTIDEPRESSANTS: NEGATIVE

## 2019-03-01 RX ORDER — NICOTINE 21 MG/24HR
1 PATCH, TRANSDERMAL 24 HOURS TRANSDERMAL EVERY 24 HOURS
COMMUNITY
Start: 2019-02-20 | End: 2019-03-01

## 2019-03-01 RX ORDER — OXYCODONE AND ACETAMINOPHEN 10; 325 MG/1; MG/1
1 TABLET ORAL EVERY 6 HOURS PRN
Qty: 56 TABLET | Refills: 0 | Status: SHIPPED | OUTPATIENT
Start: 2019-03-01 | End: 2019-03-15

## 2019-03-01 RX ORDER — FLUOCINOLONE ACETONIDE 0.11 MG/ML
1 OIL TOPICAL 2 TIMES DAILY
COMMUNITY
Start: 2019-02-08 | End: 2019-03-01

## 2019-03-01 RX ORDER — POLYETHYLENE GLYCOL 3350 17 G/17G
1 POWDER, FOR SOLUTION ORAL DAILY
Qty: 119 G | Refills: 1 | Status: SHIPPED | OUTPATIENT
Start: 2019-03-01 | End: 2019-04-02

## 2019-03-01 RX ORDER — NICOTINE 21 MG/24HR
1 PATCH, TRANSDERMAL 24 HOURS TRANSDERMAL EVERY 24 HOURS
Qty: 30 PATCH | Refills: 2 | Status: SHIPPED | OUTPATIENT
Start: 2019-03-01 | End: 2019-05-03

## 2019-03-01 RX ORDER — FLUOCINOLONE ACETONIDE 0.11 MG/ML
OIL TOPICAL 2 TIMES DAILY
Qty: 120 ML | Refills: 1 | Status: SHIPPED | OUTPATIENT
Start: 2019-03-01 | End: 2019-09-19

## 2019-03-01 NOTE — PROGRESS NOTES
"Chief Complaint   Patient presents with     Surgical Followup     Surgery follow up            HPI:       Bernard Padilla is a 33 year old  male with a significant past medical history of chronic opioid use who presents for follow up of concern(s) listed below    2/22/19: Excision in left groin by Dr Bird  I wrote him a Rx on 2-22-19 for #56 tabs  On 2/22/2019 he had filled Oxycodone Acetaminophen 10/325 mg, 1 tablet every 6 hours #56 written by Dr Quiroz for 2 weeks. He has been taking an increase amount of 2 tablets every 6 hours on his own, due to severe pain post Hidradenitis surgery 2/22/2019 in groin. Recommend he be seen per clinic policy. Bernard was seen in ED yesterday and left against medical advice. Discussed above information with Dr Benson. Bernard will not be given more pain medication until next fill date of 3/8/2019. Recommend he take Ibuprofen.     Was seen in ER at [facility] on 2-26-19, but eloped from ER before disposition. Dilaudid x 2 in the ER    2/27/2019: This is the note from surgery clinic  \"Patient returns to clinic for dressing change and wound cares after a hidradenitis excision of left groin x 2 with Dr Bird on 2/22/19. Patient states pain has been bad. He went to the ED yesterday. He is out of pain medication and in touch with the pain clinic regarding a refill. His nephew is present with him. \"    He has an open area at the left groin, large bandage over it and it is packed with gauze. It appears to be healing with no significant drainage.     We had a discussion about his using 2 tabs every 6 hours, used up his 2 weeks prescription in 1 week. We did have a success of his stopping his marijuana use. We did discuss he only can use 1 tab 4 times a day.         PMHX:     Patient Active Problem List   Diagnosis     Hidradenitis suppurativa       Current Outpatient Medications   Medication Sig Dispense Refill     fluocinolone acetonide (DERMA-SMOOTHE/FS BODY) 0.01 % external oil Apply topically " 2 times daily 120 mL 1     IBUPROFEN PO Take 800 mg by mouth 3 times daily as needed       nicotine (NICODERM CQ) 14 MG/24HR 24 hr patch Place 1 patch onto the skin every 24 hours 30 patch 2     oxyCODONE-acetaminophen (PERCOCET)  MG per tablet Take 1 tablet by mouth every 6 hours as needed for severe pain 56 tablet 0     polyethylene glycol (MIRALAX/GLYCOLAX) powder Take 17 g (1 capful) by mouth daily 119 g 1     Acetaminophen (TYLENOL PO)        naloxone (NARCAN) 4 MG/0.1ML nasal spray Spray 1 spray (4 mg) into one nostril alternating nostrils as needed for opioid reversal 2 each 0       Social History     Socioeconomic History     Marital status: Single     Spouse name: Not on file     Number of children: Not on file     Years of education: Not on file     Highest education level: Not on file   Occupational History     Not on file   Social Needs     Financial resource strain: Not on file     Food insecurity:     Worry: Not on file     Inability: Not on file     Transportation needs:     Medical: Not on file     Non-medical: Not on file   Tobacco Use     Smoking status: Current Every Day Smoker     Packs/day: 1.00     Smokeless tobacco: Never Used     Tobacco comment: 1/2 pack of day, not interested in quitting    Substance and Sexual Activity     Alcohol use: Yes     Comment: Beer occasionally      Drug use: Yes     Types: Marijuana     Sexual activity: Not on file   Lifestyle     Physical activity:     Days per week: Not on file     Minutes per session: Not on file     Stress: Not on file   Relationships     Social connections:     Talks on phone: Not on file     Gets together: Not on file     Attends [employer] service: Not on file     Active member of club or organization: Not on file     Attends meetings of clubs or organizations: Not on file     Relationship status: Not on file     Intimate partner violence:     Fear of current or ex partner: Not on file     Emotionally abused: Not on file      Physically abused: Not on file     Forced sexual activity: Not on file   Other Topics Concern     Not on file   Social History Narrative     Not on file          Allergies   Allergen Reactions     Vicodin [Hydrocodone-Acetaminophen] Shortness Of Breath     Eggs [Chicken-Derived Products (Egg)]        No results found for this or any previous visit (from the past 24 hour(s)).         Review of Systems:   INTEGUMENTARY/SKIN: dry rash in on back of neck  R: NEGATIVE for significant cough or shortness of breath  CV: NEGATIVE for chest pain, palpitations or new or worsening peripheral edema  GI: Positive for constipation          Physical Exam:     Vitals:    03/01/19 1108   BP: 121/80   Pulse: 101   Temp: 97.9  F (36.6  C)   TempSrc: Oral   SpO2: 96%   Weight: 77.6 kg (171 lb)     Body mass index is 26.22 kg/m .    GENERAL APPEARANCE: healthy, alert and no distress,  RESP: lungs clear to auscultation - no rales, rhonchi or wheezes  CV: regular rate and rhythm,  and no murmur, click,  rub or gallop  ABDOMEN: soft, nontender, without hepatosplenomegaly or masses  MS: extremities normal- no gross deformities noted      Assessment and Plan     1. Hidradenitis suppurativa    - Rapid Urine Drug Screen ([facility])  - oxyCODONE-acetaminophen (PERCOCET)  MG per tablet; Take 1 tablet by mouth every 6 hours as needed for severe pain  Dispense: 56 tablet; Refill: 0    2. Chronic pain syndrome  He is restricted to us, presenting to clinic already on chronic opioids, will continue to monitor closely. He has Narcan at home.    3. Constipation due to opioid therapy  - polyethylene glycol (MIRALAX/GLYCOLAX) powder; Take 17 g (1 capful) by mouth daily  Dispense: 119 g; Refill: 1    4. Tobacco use  He is trying to quit  - nicotine (NICODERM CQ) 14 MG/24HR 24 hr patch; Place 1 patch onto the skin every 24 hours  Dispense: 30 patch; Refill: 2    5. Eczema, unspecified type  - fluocinolone acetonide (DERMA-SMOOTHE/FS BODY) 0.01 % external  oil; Apply topically 2 times daily  Dispense: 120 mL; Refill: 1      Options for treatment and follow-up care were reviewed with the patient and/or guardian. Bernard Padilla and/or guardian engaged in the decision making process and verbalized understanding of the options discussed and agreed with the final plan.    Jayde Quiroz,

## 2019-03-15 ENCOUNTER — OFFICE VISIT (OUTPATIENT)
Dept: FAMILY MEDICINE | Facility: CLINIC | Age: 34
End: 2019-03-15
Payer: COMMERCIAL

## 2019-03-15 VITALS
DIASTOLIC BLOOD PRESSURE: 85 MMHG | BODY MASS INDEX: 26.77 KG/M2 | HEART RATE: 77 BPM | RESPIRATION RATE: 18 BRPM | TEMPERATURE: 97.8 F | WEIGHT: 174.6 LBS | OXYGEN SATURATION: 99 % | SYSTOLIC BLOOD PRESSURE: 122 MMHG

## 2019-03-15 DIAGNOSIS — G89.4 CHRONIC PAIN SYNDROME: ICD-10-CM

## 2019-03-15 DIAGNOSIS — Z72.0 TOBACCO USE: ICD-10-CM

## 2019-03-15 DIAGNOSIS — L73.2 HIDRADENITIS SUPPURATIVA: Primary | ICD-10-CM

## 2019-03-15 DIAGNOSIS — R06.2 WHEEZING: ICD-10-CM

## 2019-03-15 RX ORDER — ALBUTEROL SULFATE 90 UG/1
2 AEROSOL, METERED RESPIRATORY (INHALATION) EVERY 6 HOURS
Qty: 8.5 G | Refills: 3 | Status: SHIPPED | OUTPATIENT
Start: 2019-03-15 | End: 2019-04-02

## 2019-03-15 RX ORDER — OXYCODONE AND ACETAMINOPHEN 10; 325 MG/1; MG/1
1 TABLET ORAL EVERY 6 HOURS PRN
Qty: 56 TABLET | Refills: 0 | Status: SHIPPED | OUTPATIENT
Start: 2019-03-19 | End: 2019-04-02

## 2019-03-15 NOTE — PROGRESS NOTES
Chief Complaint   Patient presents with     Refill Request     CPP      Medication Reconciliation     complete         Chronic Pain Follow-Up Visit      Location of pain: Areas of healing in lower left abdomen, other places on his skin  Analgesia/pain control:         Recent changes:  same        Overall control:  Tolerable with discomfort    Care Plan    Chronic Pain Care Plan completed? No    Are you able to follow the care plan?  YES , probably    Activity level/function:        Daily activities:  Able to do moderate activities      Work:  Not working    Adverse effects: No , maybe some constipation    Adherance    How often do you take extra pain medicine:Daily    Did you take your pain medication today? YES    Home Exercise? None, his clothing will rub and he does not like to get sweaty with his skin lesions     Other treatments         Counseling ?  No         Physical therapy? No    Risk Factors:      Sleep:  Good      Mood/anxiety:  controlled      Recent family or social stressors:  He is taking care of his kids this past 2 weeks      Other risks:     Pain meds prescibed on 3-1-19, had run out of his pain meds because because taking more than prescribed. Was not able to get until 3-6-19. During that time he went to [facility] on 3-2-19 and was not given more pain meds. Was seen at surgery center on 3/4 and 3/5 for packing of wound.      Database checked today? Yes. Details: Last filled on 3-6-19    PHQ-9 SCORE 1/8/2019   PHQ-9 Total Score MyChart 8 (Mild depression)   PHQ-9 Total Score 8     ANGELA-7 SCORE 1/8/2019   Total Score 9 (mild anxiety)   Total Score 9            Opioid Use Evaluation Tools     DIRE Score:    2/8/2019   Total Score 14      Score 14-21: May be a candidate for long-term opioid analgesia    Source: [facility]   2005    Opioid Risk Tool Score:  OPIOID RISK TOOL TOTAL SCORE 2/8/2019   Total Score 12      Total Score Risk Category  > 8 = High  Risk  of future problems with Opioid     Functional Assessment  Questionnaire -5  FUNCTIONAL ASSESSMENT QUESTIONNAIRE SCORE 2/8/2019   Total Score 25        Problem, Medication and Allergy Lists were reviewed and updated if needed..    Patient is an established patient of this clinic. And restricted patient.         Review of Systems:   CONSTITUTIONAL: no fatigue, no unexpected change in weight  SKIN: no worrisome rashes, no worrisome moles, no worrisome lesions  EYES: no acute vision problems or changes  ENT: no ear problems, no mouth problems, no throat problems  RESP:Positive shortness of breath/wheezing  CV: no chest pain, no palpitations, no new or worsening peripheral edema  GI: Positive for constipation         Physical Exam:     Vitals:    03/15/19 1058   BP: 122/85   Pulse: 77   Resp: 18   Temp: 97.8  F (36.6  C)   TempSrc: Oral   SpO2: 99%   Weight: 79.2 kg (174 lb 9.6 oz)     Body mass index is 26.77 kg/m .  Vitals were reviewed and were normal  GENERAL: healthy, alert, well nourished, well hydrated, no distress  RESP: lungs clear to auscultation - no rales, no rhonchi, no wheezes  CV: regular rates and rhythm, normal S1 S2, no S3 or S4 and no murmur, no click or rub -        Results:      Results from the last 24 hours  Results for orders placed or performed in visit on 03/15/19 (from the past 24 hour(s))   Rapid Urine Drug Screen ([facility])   Result Value Ref Range    Phencyclidine NEGATIVE NEGATIVE    Propoxyphene NEGATIVE NEGATIVE    Tricyclic Antidepressants NEGATIVE NEGATIVE    Amphetamines Qual NEGATIVE NEGATIVE    Barbiturates Qual Urine NEGATIVE NEGATIVE    Buprenorphine Qual Urine NEGATIVE NEGATIVE    Benzodiazepine Qual Urine NEGATIVE NEGATIVE    Cocaine Qual Urine NEGATIVE NEGATIVE    Cannabinoids Qual Urine NEGATIVE NEGATIVE    Methamphetamine Qual NEGATIVE NEGATIVE    Methadone Qual NEGATIVE NEGATIVE    Morphine Qual NEGATIVE NEGATIVE    Oxycodone Qual POSITIVE (A) NEGATIVE    Temperature of  Urine was Between  Degrees F YES YES      Rapid urine drug screen obtained today: Yes    Assessment and Plan    Bernard Padilla is here for follow up of chronic pain caused by hidradenitis.    Patient is being prescribed 10mg of oxycodone per day. 60 mg MEDD   Naloxone WILL be prescribed.  Opioid medical management:    Opioid Treatment Agreement  completed? YES,  Dated 1/30/2019   Care Plan Date: Not completed yet    (L73.2) Hidradenitis suppurativa  (primary encounter diagnosis)  Comment: He has been doing his own dressing changes and does not have appt with surgery  Plan: Rapid Urine Drug Screen (P ),         oxyCODONE-acetaminophen (PERCOCET)  MG         per tablet           (G89.29) Chronic pain  Plan: Rapid Urine Drug Screen ([facility])          (Z72.0) Tobacco use  Comment: He is willing to try to quit  Plan: TOBACCO CESSATION QIC REFERRAL (QUIT PLAN)        He was prescribed nicotine patches but states his insurance would not cover    (R06.2) Wheezing  Plan: albuterol (PROAIR HFA/PROVENTIL HFA/VENTOLIN         HFA) 108 (90 Base) MCG/ACT inhaler            If opioids prescribed patient was asked to bring pill bottle to each appointment and was informed that refills would only be provided at office visits.   Asked patient to F/U in 2 weeks to complete his Care Plan and he was asked to bring in all of his medications.     Jayde Quiroz, DO

## 2019-04-02 ENCOUNTER — OFFICE VISIT (OUTPATIENT)
Dept: FAMILY MEDICINE | Facility: CLINIC | Age: 34
End: 2019-04-02
Payer: COMMERCIAL

## 2019-04-02 VITALS
TEMPERATURE: 98.2 F | SYSTOLIC BLOOD PRESSURE: 132 MMHG | WEIGHT: 178 LBS | OXYGEN SATURATION: 98 % | HEART RATE: 115 BPM | HEIGHT: 67 IN | DIASTOLIC BLOOD PRESSURE: 75 MMHG | RESPIRATION RATE: 22 BRPM | BODY MASS INDEX: 27.94 KG/M2

## 2019-04-02 DIAGNOSIS — F41.9 ANXIETY: ICD-10-CM

## 2019-04-02 DIAGNOSIS — L21.9 SEBORRHEIC DERMATITIS: ICD-10-CM

## 2019-04-02 DIAGNOSIS — F43.10 PTSD (POST-TRAUMATIC STRESS DISORDER): ICD-10-CM

## 2019-04-02 DIAGNOSIS — G89.4 CHRONIC PAIN SYNDROME: Primary | ICD-10-CM

## 2019-04-02 DIAGNOSIS — R06.2 WHEEZING: ICD-10-CM

## 2019-04-02 DIAGNOSIS — L20.9 ATOPIC DERMATITIS, UNSPECIFIED TYPE: ICD-10-CM

## 2019-04-02 DIAGNOSIS — L73.2 HIDRADENITIS SUPPURATIVA: ICD-10-CM

## 2019-04-02 LAB
AMPHETAMINES QUAL: NEGATIVE
BARBITURATES QUAL URINE: NEGATIVE
BENZODIAZEPINE QUAL URINE: NEGATIVE
BUPRENORPHINE QUAL URINE: NEGATIVE
CANNABINOIDS UR QL SCN: NEGATIVE
COCAINE QUAL URINE: NEGATIVE
METHAMPHETAMINE: NEGATIVE
METHODONE QUAL: POSITIVE
MORPHINE QUAL: NEGATIVE
OXYCODONE QUAL: POSITIVE
PHENCYCLIDINE: NEGATIVE
PROPOXYPHENE: NEGATIVE
TEMPERATURE OF URINE WAS BETWEEN 90-100 DEGREES F: YES
TRICYCLIC ANTIDEPRESSANTS: NEGATIVE

## 2019-04-02 RX ORDER — FLUOCINONIDE TOPICAL SOLUTION USP, 0.05% 0.5 MG/ML
1 SOLUTION TOPICAL DAILY PRN
COMMUNITY
Start: 2019-04-02 | End: 2019-04-02

## 2019-04-02 RX ORDER — FLUOCINONIDE TOPICAL SOLUTION USP, 0.05% 0.5 MG/ML
1 SOLUTION TOPICAL DAILY PRN
Qty: 60 ML | Refills: 3 | Status: SHIPPED | OUTPATIENT
Start: 2019-04-02 | End: 2019-12-26

## 2019-04-02 RX ORDER — TRIAMCINOLONE ACETONIDE 1 MG/G
1 OINTMENT TOPICAL 2 TIMES DAILY PRN
COMMUNITY
Start: 2019-04-02 | End: 2019-04-02

## 2019-04-02 RX ORDER — OXYCODONE AND ACETAMINOPHEN 10; 325 MG/1; MG/1
1 TABLET ORAL EVERY 6 HOURS PRN
Qty: 56 TABLET | Refills: 0 | Status: SHIPPED | OUTPATIENT
Start: 2019-04-02 | End: 2019-04-19

## 2019-04-02 RX ORDER — ALBUTEROL SULFATE 90 UG/1
2 AEROSOL, METERED RESPIRATORY (INHALATION) EVERY 6 HOURS
Qty: 8.5 G | Refills: 3 | Status: SHIPPED | OUTPATIENT
Start: 2019-04-02 | End: 2021-11-26

## 2019-04-02 RX ORDER — TRIAMCINOLONE ACETONIDE 1 MG/G
OINTMENT TOPICAL 2 TIMES DAILY PRN
Qty: 30 G | Refills: 3 | Status: SHIPPED | OUTPATIENT
Start: 2019-04-02 | End: 2019-12-26

## 2019-04-02 ASSESSMENT — ANXIETY QUESTIONNAIRES
GAD7 TOTAL SCORE: 16
IF YOU CHECKED OFF ANY PROBLEMS ON THIS QUESTIONNAIRE, HOW DIFFICULT HAVE THESE PROBLEMS MADE IT FOR YOU TO DO YOUR WORK, TAKE CARE OF THINGS AT HOME, OR GET ALONG WITH OTHER PEOPLE: SOMEWHAT DIFFICULT
2. NOT BEING ABLE TO STOP OR CONTROL WORRYING: MORE THAN HALF THE DAYS
5. BEING SO RESTLESS THAT IT IS HARD TO SIT STILL: NOT AT ALL
7. FEELING AFRAID AS IF SOMETHING AWFUL MIGHT HAPPEN: NEARLY EVERY DAY
6. BECOMING EASILY ANNOYED OR IRRITABLE: NEARLY EVERY DAY
1. FEELING NERVOUS, ANXIOUS, OR ON EDGE: MORE THAN HALF THE DAYS
3. WORRYING TOO MUCH ABOUT DIFFERENT THINGS: NEARLY EVERY DAY

## 2019-04-02 ASSESSMENT — PATIENT HEALTH QUESTIONNAIRE - PHQ9
5. POOR APPETITE OR OVEREATING: NEARLY EVERY DAY
SUM OF ALL RESPONSES TO PHQ QUESTIONS 1-9: 19

## 2019-04-02 ASSESSMENT — MIFFLIN-ST. JEOR: SCORE: 1721.15

## 2019-04-03 ENCOUNTER — DOCUMENTATION ONLY (OUTPATIENT)
Dept: PSYCHOLOGY | Facility: CLINIC | Age: 34
End: 2019-04-03

## 2019-04-03 ASSESSMENT — ANXIETY QUESTIONNAIRES: GAD7 TOTAL SCORE: 16

## 2019-04-03 NOTE — PROGRESS NOTES
Patient can be scheduled with Dr. Salazar at our clinic.      ===View-only below this line===    ----- Message -----  From: Jayde Quiroz DO  Sent: 2019   4:13 PM  To:  Mental Health  Subject: Order for ZAHIRA MATUTE              [id number]               : 1985  M     [id number] [facility]                              PCP: [id number] [id number]    [facility] [address]                                CTR: [facility]*      Name: ZAHIRA MATUTE Date: 2019    Home: [phone]  Work: none    Payor:              [employer]  P  dulce maria:               [employer] [place]  Sponsor Code:       2337  Subscriber ID:      [id number]  Subscriber Name:    ZAHIRA MATUTE  Subscriber Address: [id number] [place] [id number]    Effective From:     19  Effective To:         Group Number:       [id number]  Group Name  : Not Available      Date       Provider                   Department   Center         2019   [id number] DIAS  [facility] Owned    Order Date:2019  Ordering User:JAYDE QUIROZ [[email or web address]]  Encounter Provider:Jayde Quiroz DO [[id number]]  Authorizing Provider: Jayde Quiroz DO [[id number]]  Department:[facility] FAMILY MED[[id number]]    Ordering Provider NPI: [id number]  Jayde Quiroz  [facility]~[address] [place] [address]  Phone: [phone]        Procedure Requested    9035       MENTAL HEALTH REFERRAL  -             [#[id number]]      Priority: Routine  Class: External referral      Comment:Use this form for behavioral health consults and assessments. The               referral coordinator will help to determine whether patients are               best served by clinic behavioral health staff or by community               providers.                              Type of referral(s) requested (indicate all that apply):               Adult Psychotherapy--for diagnosis and  non-pharmacological                treatment                              Reason for referral: PTSD, not currently interested in medications                              Currently receiving mental health services (if 'Yes', what                services and why today's referral?): No               Currently having suicidal thoughts: No               Previous psych hospitaliz  ation: Unknown                              Please provide data for below screening tools if available.                PHQ-9 Score: 19               GAD7 Score: 16               PC-PTSD Score:               Bipolar Screen:               Sammy (ADHD):                MCHAT (Autism Screen):                Pediatric Symptom Checklist (PSC):                                needed: No               Language: English    Associated Diagnoses      F41.9 Anxiety      F43.10 PTSD (post-traumatic stress disorder)      Adult or Child/Adolescent:  Adult         Location:  [facility]              [id number]               : 1985       [address] [address]                              PCP: [id number] DIAS [place] [id number]                                CTR: [facility]*    Comments

## 2019-04-05 NOTE — PROGRESS NOTES
Chief Complaint   Patient presents with     Refill Request     Medication Reconciliation     Dental Pain            HPI:       Bernard Padilla is a 33 year old  male with a significant past medical history of chronic pain who presents for follow up of concern(s) listed below    1. Skin issues:    Was seen by dermatology today for atopic dermatitis, had a biopsy of an area and awaiting results. Also has hidradenitis suppurativa. He needs me to fill his medications due to his restricted program. Medications are found on outside medications    2. Back pain: just received disability, so he is not working and feels this has helped his back pain    3. Tobacco use: He has quit marijuana, trying to quit tobacco but has been more difficult    4. PTSD: states he wakes up multiple times a night  Anxiety: GAD7 score: 16    PHQ-9 score:    PHQ-9 SCORE 4/2/2019   PHQ-9 Total Score MyChart -   PHQ-9 Total Score 19            PMHX:     Patient Active Problem List   Diagnosis     Hidradenitis suppurativa       Current Outpatient Medications   Medication Sig Dispense Refill     Acetaminophen (TYLENOL PO)        albuterol (PROAIR HFA/PROVENTIL HFA/VENTOLIN HFA) 108 (90 Base) MCG/ACT inhaler Inhale 2 puffs into the lungs every 6 hours 8.5 g 3     fluocinolone acetonide (DERMA-SMOOTHE/FS BODY) 0.01 % external oil Apply topically 2 times daily 120 mL 1     fluocinonide (LIDEX) 0.05 % external solution Apply 1 mL topically daily as needed (itchy scalp) 60 mL 3     IBUPROFEN PO Take 800 mg by mouth 3 times daily as needed       naloxone (NARCAN) 4 MG/0.1ML nasal spray Spray 1 spray (4 mg) into one nostril alternating nostrils as needed for opioid reversal 2 each 0     nicotine (NICODERM CQ) 14 MG/24HR 24 hr patch Place 1 patch onto the skin every 24 hours 30 patch 2     oxyCODONE-acetaminophen (PERCOCET)  MG per tablet Take 1 tablet by mouth every 6 hours as needed for severe pain 56 tablet 0     triamcinolone (KENALOG) 0.1 % external  ointment Apply topically 2 times daily as needed for irritation 30 g 3       Social History     Socioeconomic History     Marital status: Single     Spouse name: Not on file     Number of children: Not on file     Years of education: Not on file     Highest education level: Not on file   Occupational History     Not on file   Social Needs     Financial resource strain: Not on file     Food insecurity:     Worry: Not on file     Inability: Not on file     Transportation needs:     Medical: Not on file     Non-medical: Not on file   Tobacco Use     Smoking status: Current Every Day Smoker     Packs/day: 1.00     Smokeless tobacco: Never Used     Tobacco comment: 1/2 pack of day, not interested in quitting    Substance and Sexual Activity     Alcohol use: Yes     Comment: Beer occasionally      Drug use: Yes     Types: Marijuana     Sexual activity: Not on file   Lifestyle     Physical activity:     Days per week: Not on file     Minutes per session: Not on file     Stress: Not on file   Relationships     Social connections:     Talks on phone: Not on file     Gets together: Not on file     Attends [employer] service: Not on file     Active member of club or organization: Not on file     Attends meetings of clubs or organizations: Not on file     Relationship status: Not on file     Intimate partner violence:     Fear of current or ex partner: Not on file     Emotionally abused: Not on file     Physically abused: Not on file     Forced sexual activity: Not on file   Other Topics Concern     Not on file   Social History Narrative     Not on file          Allergies   Allergen Reactions     Vicodin [Hydrocodone-Acetaminophen] Shortness Of Breath     Eggs [Chicken-Derived Products (Egg)]        No results found for this or any previous visit (from the past 24 hour(s)).         Review of Systems:   C: NEGATIVE for fatigue, unexpected change in weight  ENT/MOUTH:Positive for dental pain  R: NEGATIVE for significant cough or  "shortness of breath  CV: NEGATIVE for chest pain, palpitations or new or worsening peripheral edema  P: NEGATIVE for changes in mood or affect          Physical Exam:     Vitals:    04/02/19 1446   BP: 132/75   Pulse: 115   Resp: 22   Temp: 98.2  F (36.8  C)   TempSrc: Oral   SpO2: 98%   Weight: 80.7 kg (178 lb)   Height: 1.71 m (5' 7.32\")     Body mass index is 27.61 kg/m .    GENERAL APPEARANCE: healthy, alert and no distress, he has a female friend with him today  EYES: Eyes grossly normal to inspection,  PERRL  HENT: Poor dentition  RESP: lungs clear to auscultation - no rales, rhonchi or wheezes  CV: regular rate and rhythm,  and no murmur, click,  rub or gallop  MS: walks stiffly      Assessment and Plan     1. Chronic pain syndrome  - Rapid Urine Drug Screen ([facility])    2. Hidradenitis suppurativa  - oxyCODONE-acetaminophen (PERCOCET)  MG per tablet; Take 1 tablet by mouth every 6 hours as needed for severe pain  Dispense: 56 tablet; Refill: 0    3. Atopic dermatitis, unspecified type  - triamcinolone (KENALOG) 0.1 % external ointment; Apply topically 2 times daily as needed for irritation  Dispense: 30 g; Refill: 3    4. Seborrheic dermatitis  - fluocinonide (LIDEX) 0.05 % external solution; Apply 1 mL topically daily as needed (itchy scalp)  Dispense: 60 mL; Refill: 3    5. Wheezing  - albuterol (PROAIR HFA/PROVENTIL HFA/VENTOLIN HFA) 108 (90 Base) MCG/ACT inhaler; Inhale 2 puffs into the lungs every 6 hours  Dispense: 8.5 g; Refill: 3    6. Anxiety  - MENTAL HEALTH REFERRAL  -    7. PTSD (post-traumatic stress disorder)  Discussed adding prazosin but he was not interested in starting a new medication  - MENTAL HEALTH REFERRAL  -      Options for treatment and follow-up care were reviewed with the patient and/or guardian. Bernard Padilla and/or guardian engaged in the decision making process and verbalized understanding of the options discussed and agreed with the final plan.    Jayde Quiroz, " DO

## 2019-04-09 ENCOUNTER — TELEPHONE (OUTPATIENT)
Dept: FAMILY MEDICINE | Facility: CLINIC | Age: 34
End: 2019-04-09

## 2019-04-09 NOTE — TELEPHONE ENCOUNTER
"Out going call made to Bernard to discuss Mental Health referral. Patient to be scheduled with Dr. Carlos Eduardo Salazar. Patient was unable to schedule with me or discuss \"due to poor phone connection\". Patient given my direct line to call back to schedule clinic visit.   Please schedule patient with Dr Salazar.  "

## 2019-04-19 ENCOUNTER — OFFICE VISIT (OUTPATIENT)
Dept: FAMILY MEDICINE | Facility: CLINIC | Age: 34
End: 2019-04-19
Payer: COMMERCIAL

## 2019-04-19 VITALS
DIASTOLIC BLOOD PRESSURE: 63 MMHG | WEIGHT: 176 LBS | HEIGHT: 67 IN | HEART RATE: 95 BPM | BODY MASS INDEX: 27.62 KG/M2 | TEMPERATURE: 98.2 F | OXYGEN SATURATION: 97 % | RESPIRATION RATE: 20 BRPM | SYSTOLIC BLOOD PRESSURE: 99 MMHG

## 2019-04-19 DIAGNOSIS — G89.4 CHRONIC PAIN SYNDROME: Primary | ICD-10-CM

## 2019-04-19 DIAGNOSIS — L30.9 ECZEMA, UNSPECIFIED TYPE: ICD-10-CM

## 2019-04-19 DIAGNOSIS — L73.2 HIDRADENITIS SUPPURATIVA: ICD-10-CM

## 2019-04-19 RX ORDER — OXYCODONE AND ACETAMINOPHEN 10; 325 MG/1; MG/1
1 TABLET ORAL EVERY 6 HOURS PRN
Qty: 56 TABLET | Refills: 0 | Status: SHIPPED | OUTPATIENT
Start: 2019-04-19 | End: 2019-05-03

## 2019-04-19 ASSESSMENT — MIFFLIN-ST. JEOR: SCORE: 1699.57

## 2019-04-19 NOTE — PROGRESS NOTES
Chief Complaint   Patient presents with     Pain Management     follow up. Having pain in right hip for about 2 weeks now.      Referral     needs new referral for new dermatologist as current one is retiring.      Sleep Problem     has been sleeping too much lately.      Medication Reconciliation     complete.             HPI:       Bernard Padilla is a 33 year old  male with a significant past medical history of hidradenitis suppurativa who presents for follow up of concern(s) listed below    1. Chronic pain: He states he has lots of pain from his skin lesions. He has new areas appearing. He would like to see a new physician for his skin condition. He also complains of right hip pain.              PMHX:     Patient Active Problem List   Diagnosis     Hidradenitis suppurativa     Tobacco use disorder     Patellofemoral arthritis of left knee     Eczema       Current Outpatient Medications   Medication Sig Dispense Refill     Acetaminophen (TYLENOL PO)        albuterol (PROAIR HFA/PROVENTIL HFA/VENTOLIN HFA) 108 (90 Base) MCG/ACT inhaler Inhale 2 puffs into the lungs every 6 hours 8.5 g 3     fluocinolone acetonide (DERMA-SMOOTHE/FS BODY) 0.01 % external oil Apply topically 2 times daily 120 mL 1     fluocinonide (LIDEX) 0.05 % external solution Apply 1 mL topically daily as needed (itchy scalp) 60 mL 3     IBUPROFEN PO Take 800 mg by mouth 3 times daily as needed       oxyCODONE-acetaminophen (PERCOCET)  MG per tablet Take 1 tablet by mouth every 6 hours as needed for severe pain 56 tablet 0     triamcinolone (KENALOG) 0.1 % external ointment Apply topically 2 times daily as needed for irritation 30 g 3     naloxone (NARCAN) 4 MG/0.1ML nasal spray Spray 1 spray (4 mg) into one nostril alternating nostrils as needed for opioid reversal 2 each 0     nicotine (NICODERM CQ) 14 MG/24HR 24 hr patch Place 1 patch onto the skin every 24 hours (Patient not taking: Reported on 4/19/2019) 30 patch 2            Allergies  "  Allergen Reactions     Vicodin [Hydrocodone-Acetaminophen] Shortness Of Breath     Eggs [Chicken-Derived Products (Egg)] Nausea and Vomiting       No results found for this or any previous visit (from the past 24 hour(s)).           Physical Exam:     Vitals:    04/19/19 1158   BP: 99/63   Pulse: 95   Resp: 20   Temp: 98.2  F (36.8  C)   TempSrc: Oral   SpO2: 97%   Weight: 79.8 kg (176 lb)   Height: 1.69 m (5' 6.54\")     Body mass index is 27.95 kg/m .    His kids are with him today  GENERAL APPEARANCE: healthy, alert and no distress,  SKIN: Multiple lesions, palpable, tender to touch. Has one lesion at left upper thigh, the other is at buttock area      Assessment and Plan     1. Chronic pain syndrome  - Rapid Urine Drug Screen ([facility])    2. Hidradenitis suppurativa    - DERMATOLOGY REFERRAL- recommended to go to the [facility] to see a specialist in this area. Has been seeing surgeon, and has multiple lesions removed, but continues to have new spots.  - oxyCODONE-acetaminophen (PERCOCET)  MG per tablet; Take 1 tablet by mouth every 6 hours as needed for severe pain  Dispense: 56 tablet; Refill: 0  Long term opioids does not seem reasonable for this indication    3. Eczema, unspecified type  Dove sensitive soap, good moisturizing cream      Options for treatment and follow-up care were reviewed with the patient and/or guardian. Bernard Padilla and/or guardian engaged in the decision making process and verbalized understanding of the options discussed and agreed with the final plan.    Jayde Quiroz,           "

## 2019-04-19 NOTE — NURSING NOTE
"Chief Complaint   Patient presents with     Pain Management     follow up. Having pain in right hip for about 2 weeks now.      Referral     needs new referral for new dermatologist as current one is retiring.      Sleep Problem     has been sleeping too much lately.      Medication Reconciliation     complete.        BP 99/63   Pulse 95   Temp 98.2  F (36.8  C) (Oral)   Resp 20   Ht 1.69 m (5' 6.54\")   Wt 79.8 kg (176 lb)   SpO2 97%   BMI 27.95 kg/m         Offered CPE for future, patient agrees.     "

## 2019-05-01 ENCOUNTER — TELEPHONE (OUTPATIENT)
Dept: DERMATOLOGY | Facility: CLINIC | Age: 34
End: 2019-05-01

## 2019-05-01 NOTE — TELEPHONE ENCOUNTER
[facility] Call Center    Phone Message    May a detailed message be left on voicemail: yes    Reason for Call: Other: Pt is being referred by DO Jayde Quiroz to see Dr. Apolinar Ruiz, Dr. Ruiz schedule isn't out yet, Pt is needing to be seen for hidradenitis Supportiva. Please call the pt back, thank you     Action Taken: Message routed to:  [facility] ([facility] Derm

## 2019-05-03 ENCOUNTER — OFFICE VISIT (OUTPATIENT)
Dept: FAMILY MEDICINE | Facility: CLINIC | Age: 34
End: 2019-05-03
Payer: COMMERCIAL

## 2019-05-03 VITALS
SYSTOLIC BLOOD PRESSURE: 129 MMHG | BODY MASS INDEX: 27.95 KG/M2 | HEART RATE: 96 BPM | OXYGEN SATURATION: 100 % | WEIGHT: 176 LBS | TEMPERATURE: 97.9 F | DIASTOLIC BLOOD PRESSURE: 76 MMHG | RESPIRATION RATE: 16 BRPM

## 2019-05-03 DIAGNOSIS — G89.4 CHRONIC PAIN SYNDROME: Primary | ICD-10-CM

## 2019-05-03 DIAGNOSIS — L73.2 HIDRADENITIS SUPPURATIVA: ICD-10-CM

## 2019-05-03 LAB
AMPHETAMINES QUAL: NEGATIVE
BARBITURATES QUAL URINE: NEGATIVE
BENZODIAZEPINE QUAL URINE: NEGATIVE
BUPRENORPHINE QUAL URINE: NEGATIVE
CANNABINOIDS UR QL SCN: NEGATIVE
COCAINE QUAL URINE: NEGATIVE
METHAMPHETAMINE: NEGATIVE
METHODONE QUAL: NEGATIVE
MORPHINE QUAL: NEGATIVE
OXYCODONE QUAL: POSITIVE
PHENCYCLIDINE: NEGATIVE
PROPOXYPHENE: NEGATIVE
TEMPERATURE OF URINE WAS BETWEEN 90-100 DEGREES F: NO
TRICYCLIC ANTIDEPRESSANTS: NEGATIVE

## 2019-05-03 RX ORDER — OXYCODONE AND ACETAMINOPHEN 10; 325 MG/1; MG/1
1 TABLET ORAL EVERY 6 HOURS PRN
Qty: 56 TABLET | Refills: 0 | Status: SHIPPED | OUTPATIENT
Start: 2019-05-03 | End: 2019-05-16

## 2019-05-03 ASSESSMENT — ANXIETY QUESTIONNAIRES
5. BEING SO RESTLESS THAT IT IS HARD TO SIT STILL: NEARLY EVERY DAY
1. FEELING NERVOUS, ANXIOUS, OR ON EDGE: MORE THAN HALF THE DAYS
3. WORRYING TOO MUCH ABOUT DIFFERENT THINGS: NEARLY EVERY DAY
7. FEELING AFRAID AS IF SOMETHING AWFUL MIGHT HAPPEN: NEARLY EVERY DAY
GAD7 TOTAL SCORE: 20
6. BECOMING EASILY ANNOYED OR IRRITABLE: NEARLY EVERY DAY
2. NOT BEING ABLE TO STOP OR CONTROL WORRYING: NEARLY EVERY DAY

## 2019-05-03 ASSESSMENT — PATIENT HEALTH QUESTIONNAIRE - PHQ9
5. POOR APPETITE OR OVEREATING: NEARLY EVERY DAY
SUM OF ALL RESPONSES TO PHQ QUESTIONS 1-9: 15
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
## 2019-05-04 ASSESSMENT — ANXIETY QUESTIONNAIRES: GAD7 TOTAL SCORE: 20

## 2019-05-15 ENCOUNTER — OFFICE VISIT (OUTPATIENT)
Dept: PSYCHOLOGY | Facility: CLINIC | Age: 34
End: 2019-05-15
Payer: COMMERCIAL

## 2019-05-15 DIAGNOSIS — F41.1 GAD (GENERALIZED ANXIETY DISORDER): ICD-10-CM

## 2019-05-15 DIAGNOSIS — F33.9 MAJOR DEPRESSION, RECURRENT, CHRONIC (H): Primary | ICD-10-CM
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
## 2019-05-16 ENCOUNTER — OFFICE VISIT (OUTPATIENT)
Dept: FAMILY MEDICINE | Facility: CLINIC | Age: 34
End: 2019-05-16
Payer: COMMERCIAL

## 2019-05-16 ENCOUNTER — TELEPHONE (OUTPATIENT)
Dept: FAMILY MEDICINE | Facility: CLINIC | Age: 34
End: 2019-05-16

## 2019-05-16 VITALS
DIASTOLIC BLOOD PRESSURE: 82 MMHG | RESPIRATION RATE: 20 BRPM | OXYGEN SATURATION: 99 % | TEMPERATURE: 97.3 F | SYSTOLIC BLOOD PRESSURE: 145 MMHG | WEIGHT: 172 LBS | BODY MASS INDEX: 27.32 KG/M2 | HEART RATE: 71 BPM

## 2019-05-16 DIAGNOSIS — L73.2 HIDRADENITIS SUPPURATIVA: ICD-10-CM

## 2019-05-16 DIAGNOSIS — J30.1 SEASONAL ALLERGIC RHINITIS DUE TO POLLEN: ICD-10-CM

## 2019-05-16 DIAGNOSIS — G89.4 CHRONIC PAIN SYNDROME: Primary | ICD-10-CM

## 2019-05-16 LAB
AMPHETAMINES QUAL: NEGATIVE
BARBITURATES QUAL URINE: NEGATIVE
BENZODIAZEPINE QUAL URINE: NEGATIVE
BUPRENORPHINE QUAL URINE: NEGATIVE
CANNABINOIDS UR QL SCN: NEGATIVE
COCAINE QUAL URINE: POSITIVE
METHAMPHETAMINE: NEGATIVE
METHODONE QUAL: NEGATIVE
MORPHINE QUAL: NEGATIVE
OXYCODONE QUAL: POSITIVE
PHENCYCLIDINE: NEGATIVE
PROPOXYPHENE: NEGATIVE
TEMPERATURE OF URINE WAS BETWEEN 90-100 DEGREES F: YES
TRICYCLIC ANTIDEPRESSANTS: NEGATIVE

## 2019-05-16 RX ORDER — FLUTICASONE PROPIONATE 50 MCG
1-2 SPRAY, SUSPENSION (ML) NASAL DAILY
Qty: 16 G | Refills: 2 | Status: SHIPPED | OUTPATIENT
Start: 2019-05-16 | End: 2022-04-05

## 2019-05-16 RX ORDER — OXYCODONE AND ACETAMINOPHEN 10; 325 MG/1; MG/1
1 TABLET ORAL EVERY 6 HOURS PRN
Qty: 56 TABLET | Refills: 0 | Status: SHIPPED | OUTPATIENT
Start: 2019-05-16 | End: 2019-05-17

## 2019-05-16 ASSESSMENT — ANXIETY QUESTIONNAIRES
2. NOT BEING ABLE TO STOP OR CONTROL WORRYING: NEARLY EVERY DAY
1. FEELING NERVOUS, ANXIOUS, OR ON EDGE: MORE THAN HALF THE DAYS
3. WORRYING TOO MUCH ABOUT DIFFERENT THINGS: MORE THAN HALF THE DAYS
5. BEING SO RESTLESS THAT IT IS HARD TO SIT STILL: MORE THAN HALF THE DAYS
6. BECOMING EASILY ANNOYED OR IRRITABLE: NEARLY EVERY DAY
GAD7 TOTAL SCORE: 17
7. FEELING AFRAID AS IF SOMETHING AWFUL MIGHT HAPPEN: MORE THAN HALF THE DAYS

## 2019-05-16 ASSESSMENT — PATIENT HEALTH QUESTIONNAIRE - PHQ9
5. POOR APPETITE OR OVEREATING: NEARLY EVERY DAY
SUM OF ALL RESPONSES TO PHQ QUESTIONS 1-9: 15
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
## 2019-05-16 NOTE — TELEPHONE ENCOUNTER
Patient called a little upset that he had a positive urine drug screen. Asked now what is he supposed to do cause he just took his last pain pill. Notified Dr. Quiroz, and she said that he will have to wait till she hears about his screening, which won't be until tomorrow.

## 2019-05-17 RX ORDER — OXYCODONE AND ACETAMINOPHEN 10; 325 MG/1; MG/1
1 TABLET ORAL EVERY 6 HOURS PRN
Qty: 10 TABLET | Refills: 0 | Status: SHIPPED | OUTPATIENT
Start: 2019-05-17 | End: 2019-05-20

## 2019-05-17 ASSESSMENT — ANXIETY QUESTIONNAIRES: GAD7 TOTAL SCORE: 17

## 2019-05-17 NOTE — TELEPHONE ENCOUNTER
Patient is calling waiting on Rx to see where it is at on it. He states he is in a lot of pain. He states about his results that it came back he was on cocaine and he doesn't do cocaine. He states he is in a lot of pain and needs his medication and that we don't understand. Please call and advise.

## 2019-05-17 NOTE — TELEPHONE ENCOUNTER
RX printed by .  not available to sign RX. RX signed by Ronnie Garcia MD on 's behalf. Patient notified of RX ready for pickup. Stella JEAN

## 2019-05-17 NOTE — TELEPHONE ENCOUNTER
Pt call to check if Dr. Quiroz has responded and asked if he can speak to someone higher up in regards to the Rx. Advise that Dr. Quiroz has not responded yet so we are still waiting. Pt hung up.

## 2019-05-17 NOTE — TELEPHONE ENCOUNTER
Called, notify Patient Rx is ready for pick and that Dr. Quiroz only gave Rx for the weekend to last him til his lab results to come back on Monday. Told Patient he can  anytime today til we close at 5 pm. Told him that Dr. Quiroz is not in to sign Rx so it is signed by Dr. Abbasi. Patient okay and understand, no other questions.

## 2019-05-17 NOTE — TELEPHONE ENCOUNTER
Patient calling to check on his Rx, told him message is being routed to Dr. Quiroz, waiting for a response. Please call and advise.

## 2019-05-17 NOTE — TELEPHONE ENCOUNTER
It appears I will not have the lab result back. Can you please have somebody sign my prescription for 10 tabs. 1 for tonight. 4 for Sat. 4 for Sun and 1 on Monday. I will have the lab result on monday

## 2019-05-17 NOTE — TELEPHONE ENCOUNTER
Patient called back in regards to his medication as he is in pain and needs to take them. Advice patient that Dr. Quiroz is still looking into it so we would have to wait until we hear back. Patient got upset and started yelling saying that everything BS. Patient states clinic is discriminating him and not giving him his medication when he is in pain and lying about his positive UA.

## 2019-05-19 LAB — BENZOYLECGONINE, URN, QUANT: <50 NG/ML

## 2019-05-20 RX ORDER — OXYCODONE AND ACETAMINOPHEN 10; 325 MG/1; MG/1
1 TABLET ORAL EVERY 6 HOURS PRN
Qty: 32 TABLET | Refills: 0 | Status: SHIPPED | OUTPATIENT
Start: 2019-05-20 | End: 2019-05-28

## 2019-05-20 NOTE — TELEPHONE ENCOUNTER
Patient called again to see what the update to his Rx is. Patient states his back is really hurting and he was only given enough medication to last the weekend so he is in pain. Please call and advise.

## 2019-05-20 NOTE — TELEPHONE ENCOUNTER
His lab has been reviewed and is negative.His prescription has been written for 8 days. He needs to make appt for 5-28-19 with me. His excessive phone calls documented by the clinic is very concerning behavior.

## 2019-05-20 NOTE — TELEPHONE ENCOUNTER
Awaiting for Dr. Quiroz to review. Message already routed earlier today.   ~ hPani WHITE CMA (Chrissi)

## 2019-05-20 NOTE — TELEPHONE ENCOUNTER
Patient is calling to check on his Rx if his results is in, he states he only got a Rx for the weekend so he wants to find out if he will be getting his Rx or if he has to come in to do anoter UA? Please call and advise.

## 2019-05-20 NOTE — TELEPHONE ENCOUNTER
Rx has been filed on May 20, 2019 2:51 PM  By: Phani Diez CMA  Patient has been notified Rx ready for . And to bring ID.      Appointment already scheduled for 5/28/2019 for f/u.   ~ Phani WHITE CMA (Chrissi)

## 2019-05-20 NOTE — TELEPHONE ENCOUNTER
Patient is demanding to speak to a provider in regards to his results. Advise patient that our providers are all currently seeing other patient so I can only put in a message and someone can get to him when they're free. Patient states that he is in a lot of back pain and can not move around or do anything. He needs someone to return a phone call to discuss further. Please call and advise.

## 2019-05-28 ENCOUNTER — OFFICE VISIT (OUTPATIENT)
Dept: FAMILY MEDICINE | Facility: CLINIC | Age: 34
End: 2019-05-28
Payer: COMMERCIAL

## 2019-05-28 VITALS
DIASTOLIC BLOOD PRESSURE: 100 MMHG | TEMPERATURE: 97 F | RESPIRATION RATE: 20 BRPM | SYSTOLIC BLOOD PRESSURE: 152 MMHG | BODY MASS INDEX: 28.45 KG/M2 | WEIGHT: 177 LBS | HEIGHT: 66 IN | HEART RATE: 69 BPM | OXYGEN SATURATION: 99 %

## 2019-05-28 DIAGNOSIS — L73.2 HIDRADENITIS SUPPURATIVA: ICD-10-CM

## 2019-05-28 DIAGNOSIS — J30.1 SEASONAL ALLERGIC RHINITIS DUE TO POLLEN: ICD-10-CM

## 2019-05-28 DIAGNOSIS — Z71.6 ENCOUNTER FOR TOBACCO USE CESSATION COUNSELING: ICD-10-CM

## 2019-05-28 DIAGNOSIS — R03.0 ELEVATED BLOOD PRESSURE READING WITHOUT DIAGNOSIS OF HYPERTENSION: ICD-10-CM

## 2019-05-28 DIAGNOSIS — G89.4 CHRONIC PAIN SYNDROME: Primary | ICD-10-CM

## 2019-05-28 RX ORDER — OXYCODONE AND ACETAMINOPHEN 10; 325 MG/1; MG/1
1 TABLET ORAL EVERY 6 HOURS PRN
Qty: 56 TABLET | Refills: 0 | Status: SHIPPED | OUTPATIENT
Start: 2019-05-28 | End: 2019-06-13

## 2019-05-28 RX ORDER — DOXYCYCLINE 100 MG/1
100 TABLET ORAL 2 TIMES DAILY
Qty: 60 TABLET | Refills: 2 | Status: SHIPPED | OUTPATIENT
Start: 2019-05-28 | End: 2019-07-09

## 2019-05-28 RX ORDER — DOXYCYCLINE 100 MG/1
100 TABLET ORAL 2 TIMES DAILY
COMMUNITY
Start: 2019-05-22 | End: 2019-05-28

## 2019-05-28 RX ORDER — CETIRIZINE HYDROCHLORIDE 10 MG/1
10 TABLET ORAL DAILY
Qty: 30 TABLET | Refills: 2 | Status: SHIPPED | OUTPATIENT
Start: 2019-05-28 | End: 2020-09-23

## 2019-05-28 RX ORDER — CLINDAMYCIN PHOSPHATE 11.9 MG/ML
SOLUTION TOPICAL 2 TIMES DAILY
Qty: 60 ML | Refills: 2 | Status: SHIPPED | OUTPATIENT
Start: 2019-05-28 | End: 2021-02-11

## 2019-05-28 ASSESSMENT — MIFFLIN-ST. JEOR: SCORE: 1699.59
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
## 2019-06-12 ENCOUNTER — TELEPHONE (OUTPATIENT)
Dept: FAMILY MEDICINE | Facility: CLINIC | Age: 34
End: 2019-06-12

## 2019-06-12 DIAGNOSIS — L73.2 HIDRADENITIS SUPPURATIVA: Primary | ICD-10-CM

## 2019-06-13 ENCOUNTER — OFFICE VISIT (OUTPATIENT)
Dept: FAMILY MEDICINE | Facility: CLINIC | Age: 34
End: 2019-06-13
Payer: COMMERCIAL

## 2019-06-13 ENCOUNTER — AMBULATORY - HEALTHEAST (OUTPATIENT)
Dept: PALLIATIVE MEDICINE | Facility: OTHER | Age: 34
End: 2019-06-13

## 2019-06-13 ENCOUNTER — TELEPHONE (OUTPATIENT)
Dept: FAMILY MEDICINE | Facility: CLINIC | Age: 34
End: 2019-06-13

## 2019-06-13 VITALS
TEMPERATURE: 98.3 F | DIASTOLIC BLOOD PRESSURE: 87 MMHG | HEIGHT: 67 IN | SYSTOLIC BLOOD PRESSURE: 129 MMHG | HEART RATE: 86 BPM | RESPIRATION RATE: 18 BRPM | WEIGHT: 177.4 LBS | BODY MASS INDEX: 27.84 KG/M2 | OXYGEN SATURATION: 93 %

## 2019-06-13 DIAGNOSIS — G89.4 CHRONIC PAIN SYNDROME: ICD-10-CM

## 2019-06-13 DIAGNOSIS — L73.2 HIDRADENITIS SUPPURATIVA: ICD-10-CM

## 2019-06-13 DIAGNOSIS — G89.4 CHRONIC PAIN SYNDROME: Primary | ICD-10-CM

## 2019-06-13 RX ORDER — OXYCODONE AND ACETAMINOPHEN 10; 325 MG/1; MG/1
1 TABLET ORAL EVERY 6 HOURS PRN
Qty: 56 TABLET | Refills: 0 | Status: SHIPPED | OUTPATIENT
Start: 2019-06-13 | End: 2019-06-14

## 2019-06-13 ASSESSMENT — MIFFLIN-ST. JEOR: SCORE: 1705.37
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
## 2019-06-13 NOTE — PATIENT INSTRUCTIONS
Referral for ( TEST )  :      Pain Management  [facility]  DATE & TIME :       PHONE :     [phone]  FAX :     [phone]  ADDITIONAL INFORMATION :     Patient will be called by  to schedule the appointment. Referral faxed.   Appointment made by clinic staff/:    Saba

## 2019-06-13 NOTE — TELEPHONE ENCOUNTER
[facility] phone call message- medication clarification/question:    Full Medication Name: oxyCODONE-acetaminophen (PERCOCET)  Dose:  MG per tablet      Question: Patient states he was at the pharmacy and they stated they were only able to fill 33 tablets for today and are able to fill the remaining 23 tablets tomorrow when they restock but will need a new prescription for the 23 tablets. Patient states he would like a call back today from Dr. Quiroz. Patient states he will be filling the 33 tablets today from the pharmacy because he's in pain and can't wait until tomorrow. He states he was not able to check at another pharmacy because he is restricted. Please call and advise.     [id number] - [place], [place] - [address] AT [facility]: Yes    OK to leave a message on voice mail? Yes    Primary language: English      needed? No    Call taken on June 13, 2019 at 12:28 PM by Carol Mondragon

## 2019-06-14 RX ORDER — OXYCODONE AND ACETAMINOPHEN 10; 325 MG/1; MG/1
1 TABLET ORAL EVERY 6 HOURS PRN
Qty: 23 TABLET | Refills: 0 | Status: SHIPPED | OUTPATIENT
Start: 2019-06-14 | End: 2019-06-28

## 2019-06-14 NOTE — TELEPHONE ENCOUNTER
I called patient back to inform him that we can not send over a RX for his pain medication to the pharmacy. Also he should have a enough medication for one week as he 33 tablets. Pt states he is going out of town for 2 weeks and needs his other 23 tablets. Pt states he will stop in the clinic to get the RX in 30 to 45 minutes. I explained that Dr. Quiroz is seeing patients this morning and may write the RX when she has time

## 2019-06-14 NOTE — TELEPHONE ENCOUNTER
Patient states that he is going out of town today for a month and that it is really important he is able to get the new Rx sent to his pharmacy so he can pick it up before he leaves.

## 2019-06-14 NOTE — TELEPHONE ENCOUNTER
Rx picked up on June 14, 2019 10:53 AM by Patient   Rx given by Ernesto Lane  Photo ID verified and signature obtained?: Yes

## 2019-06-14 NOTE — TELEPHONE ENCOUNTER
Rx has been filed on June 14, 2019 10:03 AM  By: Destiny Miller  Patient has been notified Rx ready for .

## 2019-06-19 NOTE — NURSING NOTE
Controlled Substance Printed Prescription Disposal   Date: 06/19/19  Time: 1426  [facility] prescription number: [id number]  Medication name: oxycodone-acetaminophen   Strength: 7.5-325mg  Prescription quantity: 30 tablets  Reason for disposal: restricted and was given alternative from non-restricted provider  Prescription shredded: Yes Witnessed by: MIRANDA Hartmann RN     Patient is taking 1 tablet of Celebrex daily. Patient feels the medication is helping her pain. Refill sent to preferred pharmacy. Patient verbalized understanding.

## 2019-06-28 ENCOUNTER — OFFICE VISIT (OUTPATIENT)
Dept: FAMILY MEDICINE | Facility: CLINIC | Age: 34
End: 2019-06-28
Payer: COMMERCIAL

## 2019-06-28 VITALS
RESPIRATION RATE: 18 BRPM | OXYGEN SATURATION: 96 % | BODY MASS INDEX: 29.44 KG/M2 | WEIGHT: 183.2 LBS | TEMPERATURE: 98.4 F | HEIGHT: 66 IN | HEART RATE: 75 BPM | DIASTOLIC BLOOD PRESSURE: 77 MMHG | SYSTOLIC BLOOD PRESSURE: 118 MMHG

## 2019-06-28 DIAGNOSIS — L73.2 HIDRADENITIS SUPPURATIVA: ICD-10-CM

## 2019-06-28 DIAGNOSIS — G89.4 CHRONIC PAIN SYNDROME: Primary | ICD-10-CM

## 2019-06-28 RX ORDER — OXYCODONE AND ACETAMINOPHEN 10; 325 MG/1; MG/1
1 TABLET ORAL EVERY 6 HOURS PRN
Qty: 56 TABLET | Refills: 0 | Status: SHIPPED | OUTPATIENT
Start: 2019-06-28 | End: 2019-07-10

## 2019-06-28 RX ORDER — LIDOCAINE 50 MG/G
OINTMENT TOPICAL PRN
Qty: 50 G | Refills: 1 | Status: SHIPPED | OUTPATIENT
Start: 2019-06-28 | End: 2019-07-09

## 2019-06-28 ASSESSMENT — MIFFLIN-ST. JEOR: SCORE: 1715.8
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
## 2019-07-09 ENCOUNTER — OFFICE VISIT (OUTPATIENT)
Dept: FAMILY MEDICINE | Facility: CLINIC | Age: 34
End: 2019-07-09
Payer: COMMERCIAL

## 2019-07-09 ENCOUNTER — TELEPHONE (OUTPATIENT)
Dept: FAMILY MEDICINE | Facility: CLINIC | Age: 34
End: 2019-07-09

## 2019-07-09 VITALS
HEART RATE: 67 BPM | HEIGHT: 67 IN | DIASTOLIC BLOOD PRESSURE: 77 MMHG | BODY MASS INDEX: 27.94 KG/M2 | WEIGHT: 178 LBS | RESPIRATION RATE: 18 BRPM | SYSTOLIC BLOOD PRESSURE: 119 MMHG | OXYGEN SATURATION: 98 % | TEMPERATURE: 97.8 F

## 2019-07-09 DIAGNOSIS — G89.4 CHRONIC PAIN SYNDROME: Primary | ICD-10-CM

## 2019-07-09 DIAGNOSIS — R82.5 POSITIVE URINE DRUG SCREEN: ICD-10-CM

## 2019-07-09 DIAGNOSIS — G89.4 CHRONIC PAIN SYNDROME: ICD-10-CM

## 2019-07-09 DIAGNOSIS — L73.2 HIDRADENITIS SUPPURATIVA: ICD-10-CM

## 2019-07-09 LAB
AMPHETAMINES QUAL: NEGATIVE
BARBITURATES QUAL URINE: NEGATIVE
BENZODIAZEPINE QUAL URINE: NEGATIVE
BUPRENORPHINE QUAL URINE: NEGATIVE
CANNABINOIDS UR QL SCN: NEGATIVE
CANNABINOIDS UR QL SCN: POSITIVE
CANNABINOIDS UR QL SCN: POSITIVE
COCAINE QUAL URINE: NEGATIVE
COCAINE QUAL URINE: NEGATIVE
COCAINE QUAL URINE: POSITIVE
METHAMPHETAMINE: NEGATIVE
METHODONE QUAL: NEGATIVE
MORPHINE QUAL: NEGATIVE
OXYCODONE QUAL: NEGATIVE
OXYCODONE QUAL: NEGATIVE
OXYCODONE QUAL: POSITIVE
PHENCYCLIDINE: NEGATIVE
PROPOXYPHENE: NEGATIVE
TEMPERATURE OF URINE WAS BETWEEN 90-100 DEGREES F: NO
TEMPERATURE OF URINE WAS BETWEEN 90-100 DEGREES F: NO
TEMPERATURE OF URINE WAS BETWEEN 90-100 DEGREES F: YES
TRICYCLIC ANTIDEPRESSANTS: NEGATIVE

## 2019-07-09 ASSESSMENT — MIFFLIN-ST. JEOR: SCORE: 1708.64

## 2019-07-09 NOTE — LETTER
July 9, 2019      Bernard Padilla  [id number] [address] [facility] [id number]        Dear Bernard,    I am writing to notify you of your dismissal from our clinic. Our clinic has notified the Restricted Patient Program and you are free to choose another provider. I have become aware of your frustrations with our clinic and the chronic pain process that we follow. It seems our clinic may no longer be a good fit for your medical needs. You have broken your controlled substance agreement with us by taking too much of your medication (not as prescribed) and therefore running out of your medication early and the rapid urine drug screens in clinic have been abnormal on more than one occasion. You also have had disruptive behaviors in our clinic by yelling at staff and making repetitive phone calls demanding your written prescription for opioids. For the above reasons, we are not able to prescribe your chronic opioid medication from this point on. Previously, I placed a referral for a pain clinic and the dermatology specialist and I hope you follow through with the appointments. I hope you take this opportunity to find another solution for your management of hidradenitis suppurativa and discontinue all opioid medications from your regimen. Our clinic will be available for the next 30 days to provide any other medical care you may need.        Sincerely,        Jennifer Budd, DO [facility]  Medical Director

## 2019-07-09 NOTE — PROGRESS NOTES
"Patient presents with:  Pain Management: Right knee pain and left hip pain  Medication Reconciliation  Refill Request: Pain Rx    /77   Pulse 67   Temp 97.8  F (36.6  C) (Oral)   Resp 18   Ht 1.69 m (5' 6.54\")   Wt 80.7 kg (178 lb)   SpO2 98%   BMI 28.27 kg/m      SUBJECTIVE:  A 33-year-old male in for a refill of his chronic pain medication.  He left and initial urine sample with the lab prior to my seeing him per our chronic pain medication protocol. His initial urine sample was invalid and I was informed of this by the lab staff prior to entering the room to give him the results. His initial urine sample had sediment in it consistent with tampering, and the temperature was well above 100 degrees, so it was not tested initially, but after the 2 additional samples the testing was run on the sample and it was positive for oxycodone and negative for THC and cocaine.  We asked him to give a second sample, which he did. The second sample was cold and came back positive for marijuana. He became quite angry at this point and was yelling at a volume that caused an  to enter the room.  I offered him a chance to do a third witnessed sample and witnessed him passing urine into the cup from his penis.  It was warm and met the criteria for testing.  This witnessed sample tested positive for cocaine and marijuana.      See copy of picture below of the 3 urines, which were all different colors.  The first had sediment in it, in addition to being above 100 degrees F, that raised suspicion of its origin and tampering. The third urine sample came back with both cocaine and marijuana in it.      I told him the results and that I would not be able to write a prescription for him and he got mad again and stormed out the room.      Prior to this, he had spent quite a bit of time yelling at me for the handling of his urines.  It appears to me that the urines are contaminated or have been altered, particularly " the first sample that was likely spiked with a compound to warm the urine up and oxycodone to give the positive result for oxycodone that was expected continue on the medication.  The second urine likely came out of a container in his pocket with no contamination and was cold, and finally, the third urine (witnessed collection) was in the acceptable  temperature range and had 2 drugs in it.      PHYSICAL EXAMINATION:  He showed me his lesions.  I could not see any active lesions in his groin or on his buttocks.  There are a couple scars but nothing that looked red or inflamed. He had an exaggerated pain response to the lightest of touches that started before the touch occurred.    His reaction to the urine test results was very strong and I was worried about my safety and the safety of the staff.     The patient left the clinic angry.      ASSESSMENT:   1.  Chronic pain syndrome.   2.  Positive urine testing for cocaine and marijuana.   3.  Urine sample tampering  4.  Angry patient.   5.  History of hidradenitis suppurativa.     Plan:  No narcotic or opioid prescriptions, as he has broken his pain contract with us.  If future urine drug testing is done, the sample collection should be witnessed to reduce the chance for sample tampering.    I would recommend  him from clinic, as he has broken the trust needed for a successful professional relationship. He would probably benefit from being at a clinic closer to his home where he would not have the financial outlay of getting here from [place].        URINE SAMPLES Left to right 1 (>100 degrees); 2 (<90 degrees); 3 witnessed sample (WOR)  #1 had sediment in the sample consistent with a warming agent

## 2019-07-09 NOTE — TELEPHONE ENCOUNTER
I spoke with him for a few minutes over the phone. He did hang up on me after I told him he is not allowed to yell in the clinic or at staff. I did listen to his frustrations but he was not happy with his treatment at our clinic. He was a restricted patient to us who has broken his pain contract by taking too many pain meds (not as prescribed) , disruptive behaviors, and rapid drug screen that was first too hot to run, then too cold, then positive for cannabinoids and cocaine.    A letter has been written for his dismissal.

## 2019-07-09 NOTE — TELEPHONE ENCOUNTER
[facility] phone call message- general phone call:    Reason for call: Patient states he would like to speak with who is in charge. Notified Jennifer and Dr. Quiroz. Dr. Quiroz stated she would call the patient back. Notified the patient Dr. Quiroz will call him back. Please call and advise.     Return call needed: Yes    OK to leave a message on voice mail? Yes    Primary language: English      needed? No    Call taken on July 9, 2019 at 1:21 PM by Carol Mondragon

## 2019-07-09 NOTE — RESULT ENCOUNTER NOTE
Bernard,  Sample 1 today was too warm and had sediment in the sample (not tested)  Sample 2 was too cold, but was tested and had TCH in the sample  Sample 3 was in the correct temperature range and a witnessed sample; it tested positive for cocaine and THC, no oxycodone.  I consider this a broken opioid contract and will not write prescriptions for opioids.  You walked out of the clinic before you got the full explanation.  WR

## 2019-07-10 NOTE — RESULT ENCOUNTER NOTE
"Bernard,  We tested the initial sample that you provided.  As you can see, it was \"hot\" and it tested positive for oxycodone.  The oxycodone was not present in the subsequent samples.  This is suspicious for urine sample tampering.  WR"

## 2019-07-11 ENCOUNTER — TELEPHONE (OUTPATIENT)
Dept: FAMILY MEDICINE | Facility: CLINIC | Age: 34
End: 2019-07-11

## 2019-07-11 NOTE — TELEPHONE ENCOUNTER
[facility] phone call message- general phone call:    Reason for call: Patient is calling to request an appt to get pain medication. He states he came in on Tuesday and didn't get any medication and is having a lot of pain he needs medication. He also wants to know what is going on with his urine results? Please call and advise.     Return call needed: Yes    OK to leave a message on voice mail?     Primary language: English      needed? No    Call taken on July 11, 2019 at 8:41 AM by Ernesto Lane

## 2019-07-11 NOTE — TELEPHONE ENCOUNTER
Called and spoke with patient at 13:35. Explained to patient that due to his behaviors on 07/09/2019 his care with the clinic was being terminated but that he is able to be seen here for the next 30 days for care, outside of pain management. Patient verbalized understanding.

## 2019-07-12 LAB — BENZOYLECGONINE, URN, QUANT: 127 NG/ML

## 2019-07-29 ENCOUNTER — TELEPHONE (OUTPATIENT)
Dept: FAMILY MEDICINE | Facility: CLINIC | Age: 34
End: 2019-07-29

## 2019-07-29 NOTE — TELEPHONE ENCOUNTER
Patient called stating he has an appt tomorrow with his surgeon, Jhonatan Bird MD at [facility] and asked that we send a referral to his insurance.      I filled out [employer] form and faxed it.

## 2019-08-02 NOTE — TELEPHONE ENCOUNTER
FUTURE VISIT INFORMATION      FUTURE VISIT INFORMATION:    Date: 8.22.19    Time: 1:00    Location: [facility]  REFERRAL INFORMATION:    Referring provider:  Dr. Jayde Quiroz    Referring providers clinic:  [facility]    Reason for visit/diagnosis:  NEW HS    RECORDS REQUESTED FROM:       Clinic name Comments Records Status Imaging Status   [facility] 7.26.19 In Epic [facility] 7.9.19 Dr. Suh Internal     6.28.19 Dr. Schrader Internal     6.13.19 Dr. Quiroz Internal     5.28.19 Dr. Quiroz Internal    [facility] 5.22.19 In Epic [facility] 5.16.19 Dr. Quiroz Internal

## 2019-08-05 ENCOUNTER — OFFICE VISIT (OUTPATIENT)
Dept: FAMILY MEDICINE | Facility: CLINIC | Age: 34
End: 2019-08-05
Payer: COMMERCIAL

## 2019-08-05 VITALS
TEMPERATURE: 98.1 F | HEIGHT: 67 IN | HEART RATE: 69 BPM | DIASTOLIC BLOOD PRESSURE: 82 MMHG | WEIGHT: 179.4 LBS | OXYGEN SATURATION: 98 % | BODY MASS INDEX: 28.16 KG/M2 | SYSTOLIC BLOOD PRESSURE: 137 MMHG | RESPIRATION RATE: 16 BRPM

## 2019-08-05 DIAGNOSIS — J45.20 MILD INTERMITTENT ASTHMA WITHOUT COMPLICATION: ICD-10-CM

## 2019-08-05 DIAGNOSIS — Z01.818 PREOP GENERAL PHYSICAL EXAM: Primary | ICD-10-CM

## 2019-08-05 DIAGNOSIS — L73.2 HIDRADENITIS SUPPURATIVA: ICD-10-CM

## 2019-08-05 ASSESSMENT — MIFFLIN-ST. JEOR: SCORE: 1722.5

## 2019-08-05 NOTE — NURSING NOTE
What: Hydradenitis surgery, left groin area  Where: [facility]  When: 8/7/2019  Who: Dr. Jhonatan Bird    Anesthesia: Anesthesia    Phone: [phone]   Fax: [phone]

## 2019-08-06 NOTE — PROGRESS NOTES
[facility] [address] [place] [address]  Phone: [phone]  Fax: [phone]    8/6/2019    Adult PRE-OP Evaluation:    Bernard Padilla, 1985 presents for pre-operative evaluation and assessment as requested by Dr. Jhonatan Bird, prior to undergoing surgery/procedure: excision hidradenitis, left groin    Date of Surgery/ Procedure: August 7, 2019  Hospital/Surgical Facility: [facility]     Primary Physician: Jayde Quiroz  Type of Anesthesia Anticipated: to be determined  History of anesthesia complications: NONE  History of  abnormal bleeding: NONE   History of blood transfusions: NO  Patient has a Health Care Directive or Living Will:  NO    HPI:  With a history of hidradenitis suppurativa who I am meeting for the first time today, developed acute pain at his left groin around July 26, 2019 prompting an emergency department visit.  He was treated with a 2-week prescription for doxycycline but did not have resolution of his pain, so followed up with his surgeon Dr. Bird on July 30 who has excised lesions successfully on 8 occasions in the past.  Plan was put in place to have a another excision.  He was scheduled to have an excision August 7, 2019 due to recurrent pain that in the past is not been responsive to antibiotics but treated successfully with excision.    He has a past history of intermittent asthma for which he uses intermittent albuterol. Also has a history of tobacco use. No recent respiratory symptoms.      History of chronic pain previously treated with Percocet as well as adjunct therapies.  Percocet was definitively discontinued on July 9, 2019 at this clinic due to a urine drug screen positive for cocaine and THC, as well as being negative for his prescribed oxycodone.      Preoperative Questions   1. NO - Do you have a history of heart attack, stroke, stent, bypass or surgery on an artery in the head, neck, heart or legs?  2. NO - Do you ever  have any pain or discomfort in your chest?  3. NO - Have you ever had a severe pain across the front of your chest lasting for half an hour or more?  4. NO - Do you have a history of Congestive Heart Failure?  5. NO - Are you troubled by shortness of breath when: walking on the level/ up a slight hill/ at night?  6. YES - Does your chest ever sound wheezy or whistling? Occasional episodes of wheezing, better with albuterol. No episodes in the past week.  7. NO - Do you currently have a cold, bronchitis or other respiratory infection?  8. NO - Have you had a cold, bronchitis or other respiratory infection within the last 2 weeks?  9. NO - Do you usually have a cough?  10. NO - Do you sometimes get pains in the calves of your legs when you walk?  11. YES - Do you or anyone in your family have previous history of blood clots? Mother has history of DVT, does not know further details  12. NO - Do you or does anyone in your family have a serious bleeding problem such as prolonged bleeding following surgeries or cuts?  13. NO - Have you ever had problems with anemia or been told to take iron pills?  14. NO - Have you had any abnormal blood loss such as black, tarry or bloody stools, or abnormal vaginal bleeding?  15. NO - Have you ever had a blood transfusion?  16. NO - Have you or any of your relatives ever had problems with anesthesia?  17. NO - Do you have sleep apnea, excessive snoring or daytime drowsiness?  18. NO - Do you have any prosthetic heart valves?  19. NO - Do you have prosthetic joints?  20. NO - Is there any chance that you may be pregnant?    Patient Active Problem List   Diagnosis     Hidradenitis suppurativa     Tobacco use disorder     Patellofemoral arthritis of left knee     Eczema         Current Outpatient Medications on File Prior to Visit:  Acetaminophen (TYLENOL PO)    albuterol (PROAIR HFA/PROVENTIL HFA/VENTOLIN HFA) 108 (90 Base) MCG/ACT inhaler Inhale 2 puffs into the lungs every 6 hours    cetirizine (ZYRTEC) 10 MG tablet Take 1 tablet (10 mg) by mouth daily   clindamycin (CLEOCIN T) 1 % external solution Apply topically 2 times daily   fluocinolone acetonide (DERMA-SMOOTHE/FS BODY) 0.01 % external oil Apply topically 2 times daily   fluocinonide (LIDEX) 0.05 % external solution Apply 1 mL topically daily as needed (itchy scalp)   fluticasone (FLONASE) 50 MCG/ACT nasal spray Spray 1-2 sprays into both nostrils daily   IBUPROFEN PO Take 800 mg by mouth 3 times daily as needed   naloxone (NARCAN) 4 MG/0.1ML nasal spray Spray 1 spray (4 mg) into one nostril alternating nostrils as needed for opioid reversal   triamcinolone (KENALOG) 0.1 % external ointment Apply topically 2 times daily as needed for irritation     No current facility-administered medications on file prior to visit.     OTC products: occasional ibuprofen    Allergies   Allergen Reactions     Vicodin [Hydrocodone-Acetaminophen] Shortness Of Breath     Chicken-Derived Products (Egg) Nausea and Vomiting and GI Disturbance     Hydrocodone      Other reaction(s): Throat Irritation     Latex Allergy: NO    Social History     Socioeconomic History     Marital status: Single     Spouse name: None     Number of children: None     Years of education: None     Highest education level: None   Occupational History     None   Social Needs     Financial resource strain: None     Food insecurity:     Worry: None     Inability: None     Transportation needs:     Medical: None     Non-medical: None   Tobacco Use     Smoking status: Current Every Day Smoker     Packs/day: 1.00     Smokeless tobacco: Never Used     Tobacco comment: 1/2 pack of day, not interested in quitting    Substance and Sexual Activity     Alcohol use: Yes     Comment: Beer occasionally      Drug use: Yes     Types: Marijuana         Lifestyle     Physical activity:     Days per week: None     Minutes per session: None     Stress: None   Relationships                                    "           Intimate partner violence:     Fear of current or ex partner: None     Emotionally abused: None     Physically abused: None     Forced sexual activity: None   Other Topics Concern     None   Social History Narrative     None     Family History:  Mother: COPD, history of DVT (unknown details)  Father: Diabetes mellitus  2 brothers alive and well  1 sister with a history of back surgery  No family history of bleeding disorders      REVIEW OF SYSTEMS:   Constitutional, HEENT, cardiovascular, pulmonary, gi and gu systems are negative, except as otherwise noted.    EXAM:     Patient Vitals for the past 24 hrs:   BP Temp Temp src Pulse Resp SpO2 Height Weight   08/05/19 1154 137/82 98.1  F (36.7  C) Oral 69 16 98 % 1.71 m (5' 7.32\") 81.4 kg (179 lb 6.4 oz)     Body mass index is 27.83 kg/m .  GENERAL: healthy, alert and no distress  EYES: Eyes grossly normal to inspection, extraocular movements - intact, and PERRL  HENT: mild skin erythema and scale in beardline. ear canals- normal; TMs- normal; Nose- normal; Mouth- no ulcers, no lesions  NECK: no adenopathy  RESP: lungs clear to auscultation   CV: regular rates and rhythm, normal S1 S2, no S3 or S4 and no murmur  ABDOMEN: soft, no tenderness,  MS: extremities- no gross deformities noted, no edema  SKIN: mild erythema and scale skin of in facial beardline. Deferred examination of groin.  NEURO:  mentation- intact, speech- normal  PSYCH: Alert and oriented times; speech- coherent , normal rate and volume; able to articulate logical thoughts      DIAGNOSTICS:      No labs or EKG required for low risk surgery (skin procedure)    RISK ASSESSMENT:     Cardiovascular Risk:  -Patient is able to participate in strenuous activities without chest pain.  -The patient does not have chest pain with exertion.  -Patient does not have a history of congestive heart failure.    -The patient does not have a history of stroke and does not have a history of valvular " disease.    Pulmonary Risk:  -In terms of risk factors for pulmonary complication, the patient has asthma    Perioperative Complications:  -The patient does not have a history of bleeding or clotting problems in the past.    -The patient has not had complications from surgeries.    -The patient does not have a family history of any anesthesia or surgical complications.      IMPRESSION:   Reason for surgery/procedure: symptomatic hidradenitis suppurativa, left groin    The proposed surgical procedure is considered LOW risk.    For above listed surgery and anesthesia:   Patient is at low risk for surgery/procedure and perioperative/procedure complications.    RECOMMENDATIONS:     Labs:  None today  2/26/19: normal BMP, Hgb 12.1    Fasting:  NPO for 12 hours prior to surgery    Preop Plan:  --Approval given to proceed with proposed procedure, without further diagnostic evaluation    -Monitor for respiratory symptoms due to history of asthma/smoking history  -Recommend smoking cessation    -surgical team to be aware of recent urine drug screen on 7/9/19  + cocaine    Medications:  Patient should take their regular medications the morning of surgery unless otherwise instructed.    Hold ibuprofen for 5 days prior.      Bharat Marion MD    Please contact our office if there are any further questions or information required about this patient.

## 2019-08-06 NOTE — PATIENT INSTRUCTIONS
Presurgery Checklist  You are scheduled to have surgery. The healthcare staff will try to make your stay comfortable. Use the guidelines below to remind yourself what to do before surgery. Be sure to follow any specific pre-op instructions from your surgeon or nurse.   Preparing for Surgery  Ask your surgeon if you ll need a blood transfusion during surgery and if so, how to prepare for it. In some cases, you can donate blood before surgery. If needed, this blood can be given back (transfused) to you during or after surgery.  If you are having abdominal surgery, ask what you need to do to clear your bowel.  Tell your surgeon if you have allergies to any medications or foods.  Arrange for an adult family member or friend to drive you home after surgery. If possible, have someone ready to help you at home as you recover.  Call the surgeon if you get a cold, fever, sore throat, diarrhea, or other health problem just before surgery. Your surgeon can decide whether or not to postpone the surgery.  Medications  Tell your surgeon about all medications you take, including prescription and over-the-counter products such as herbal remedies and vitamins. Ask if you should continue taking them.  If you take ibuprofen, naproxen, or  blood thinners  such as aspirin, clopidogrel (Plavix), or warfarin (Coumadin), ask your surgeon whether you should stop taking them and how long before surgery you should stop.  You may be told to take antibiotics just before surgery to prevent infection. If so, follow instructions carefully on how to take them.  If you are told to take medications called anticoagulants to prevent blood clots after surgery, be sure to follow the instructions on how to take them.  Stop Smoking  If you smoke, healing may take longer. So at least 2 week(s) before surgery, stop smoking.  Bathing or Showering Before Surgery  If instructed, wash with antibacterial soap. Afterward, do not use lotions or powders.  If you  are having surgery on the head, you may be asked to shampoo with antibacterial soap. Follow instructions for doing so.  Do Not Remove Hair from the Surgery Site  Do not shave hair from the incision site, unless you are given specific instructions to do so. Usually, if hair needs to be removed, it will be done at the hospital right before surgery.  Don t Eat or Drink  Your doctor will tell you when to stop eating and drinking. If you do not follow your doctor's instructions, your procedure may be postponed or rescheduled for another day.  If your surgeon tells you to continue any medications, take them with small sips of water.  You can brush your teeth and rinse your mouth, but don t swallow any water.  Day of Surgery  Do not wear makeup. Do not use perfume, deodorant, or hairspray. Remove nail polish and artificial nails.  Leave jewelry (including rings), watches, and other valuables at home.  Be sure to bring health insurance cards or forms and a photo ID.  Bring a list of your medications (include the name, dose, how often you take them, and the time last dose was taken).  Arrive on time at the hospital or surgery facilty      Pre-op faxed to fax number :  [phone]  Location :  Critical access hospital  Date of Surgery :  8/7/19  By/Date :  Kathe 8/6/19

## 2019-08-16 ENCOUNTER — HOSPITAL ENCOUNTER (OUTPATIENT)
Dept: PALLIATIVE MEDICINE | Facility: OTHER | Age: 34
Discharge: HOME | End: 2019-08-16
Attending: FAMILY MEDICINE

## 2019-08-16 DIAGNOSIS — G89.4 CHRONIC PAIN SYNDROME: ICD-10-CM

## 2019-08-16 ASSESSMENT — MIFFLIN-ST. JEOR: SCORE: 1742.32

## 2019-08-19 ENCOUNTER — COMMUNICATION - HEALTHEAST (OUTPATIENT)
Dept: PALLIATIVE MEDICINE | Facility: OTHER | Age: 34
End: 2019-08-19

## 2019-08-19 DIAGNOSIS — G89.29 CHRONIC PAIN: ICD-10-CM

## 2019-08-19 LAB — 25(OH)D3 SERPL-MCNC: 13.8 NG/ML (ref 30–80)

## 2019-08-21 LAB
DQA1*: NORMAL
DQA1*LOCUS: NORMAL
DQB1* LOCUS: NORMAL
DQB1*: NORMAL
DRSSO COMMENTS: NORMAL
DRSSO TEST METHOD: NORMAL
GLIADIN IGA SER-ACNC: 2.9 U/ML
GLIADIN IGG SER-ACNC: <0.4 U/ML
IGA SERPL-MCNC: 274 MG/DL (ref 65–400)
TTG IGA SER-ACNC: 0.8 U/ML
TTG IGG SER-ACNC: <0.6 U/ML

## 2019-08-22 ENCOUNTER — PRE VISIT (OUTPATIENT)
Dept: DERMATOLOGY | Facility: CLINIC | Age: 34
End: 2019-08-22

## 2019-08-22 ENCOUNTER — OFFICE VISIT (OUTPATIENT)
Dept: DERMATOLOGY | Facility: CLINIC | Age: 34
End: 2019-08-22
Payer: COMMERCIAL

## 2019-08-22 VITALS — SYSTOLIC BLOOD PRESSURE: 120 MMHG | DIASTOLIC BLOOD PRESSURE: 82 MMHG | HEART RATE: 90 BPM

## 2019-08-22 DIAGNOSIS — L73.2 HIDRADENITIS SUPPURATIVA: ICD-10-CM

## 2019-08-22 DIAGNOSIS — L80 VITILIGO: Primary | ICD-10-CM

## 2019-08-22 DIAGNOSIS — L30.9 DERMATITIS: ICD-10-CM

## 2019-08-22 LAB
6MAM UR QL: NOT DETECTED
7AMINOCLONAZEPAM UR QL: NOT DETECTED
A-OH ALPRAZ UR QL: NOT DETECTED
ALPRAZ UR QL: NOT DETECTED
AMPHET UR QL SCN: NOT DETECTED
BARBITURATES UR QL: NOT DETECTED
BASOPHILS # BLD AUTO: 0 10E9/L (ref 0–0.2)
BASOPHILS NFR BLD AUTO: 0.2 %
BUPRENORPHINE UR QL: NOT DETECTED
BZE UR QL: NOT DETECTED
CARBOXYTHC UR QL: NOT DETECTED
CARISOPRODOL UR QL: NOT DETECTED
CLONAZEPAM UR QL: NOT DETECTED
CODEINE UR QL: NOT DETECTED
CREAT UR-MCNC: 21.9 MG/DL (ref 20–400)
DIAZEPAM UR QL: NOT DETECTED
DIFFERENTIAL METHOD BLD: NORMAL
EOSINOPHIL # BLD AUTO: 0.7 10E9/L (ref 0–0.7)
EOSINOPHIL NFR BLD AUTO: 6.7 %
ERYTHROCYTE [DISTWIDTH] IN BLOOD BY AUTOMATED COUNT: 12.6 % (ref 10–15)
ETHYL GLUCURONIDE UR QL: NOT DETECTED
FENTANYL UR QL: NOT DETECTED
HCT VFR BLD AUTO: 42.3 % (ref 40–53)
HGB BLD-MCNC: 14.2 G/DL (ref 13.3–17.7)
HYDROCODONE UR QL: NOT DETECTED
HYDROMORPHONE UR QL: NOT DETECTED
IMM GRANULOCYTES # BLD: 0 10E9/L (ref 0–0.4)
IMM GRANULOCYTES NFR BLD: 0.4 %
LORAZEPAM UR QL: NOT DETECTED
LYMPHOCYTES # BLD AUTO: 2.4 10E9/L (ref 0.8–5.3)
LYMPHOCYTES NFR BLD AUTO: 24.1 %
MCH RBC QN AUTO: 30.9 PG (ref 26.5–33)
MCHC RBC AUTO-ENTMCNC: 33.6 G/DL (ref 31.5–36.5)
MCV RBC AUTO: 92 FL (ref 78–100)
MDA UR QL: NOT DETECTED
MDEA UR QL: NOT DETECTED
MDMA UR QL: NOT DETECTED
ME-PHENIDATE UR QL: NOT DETECTED
MEPERIDINE UR QL: NOT DETECTED
METHADONE UR QL: NOT DETECTED
METHAMPHET UR QL: NOT DETECTED
MIDAZOLAM UR QL SCN: NOT DETECTED
MONOCYTES # BLD AUTO: 0.6 10E9/L (ref 0–1.3)
MONOCYTES NFR BLD AUTO: 6.6 %
MORPHINE UR QL: NOT DETECTED
NEUTROPHILS # BLD AUTO: 6.1 10E9/L (ref 1.6–8.3)
NEUTROPHILS NFR BLD AUTO: 62 %
NORBUPRENORPHINE UR QL CFM: NOT DETECTED
NORDIAZEPAM UR QL: NOT DETECTED
NORFENTANYL UR QL: NOT DETECTED
NORHYDROCODONE UR QL CFM: NOT DETECTED
NOROXYCODONE UR QL CFM: NOT DETECTED
NOROXYMORPHONE UR QL SCN: NOT DETECTED
NRBC # BLD AUTO: 0 10*3/UL
NRBC BLD AUTO-RTO: 0 /100
OXAZEPAM UR QL: NOT DETECTED
OXYCODONE UR QL: PRESENT
OXYMORPHONE UR QL: NOT DETECTED
PATHOLOGY STUDY: NORMAL
PCP UR QL: NOT DETECTED
PHENTERMINE UR QL: NOT DETECTED
PLATELET # BLD AUTO: 302 10E9/L (ref 150–450)
PROPOXYPH UR QL: NOT DETECTED
RBC # BLD AUTO: 4.6 10E12/L (ref 4.4–5.9)
RETICS # AUTO: 95.7 10E9/L (ref 25–95)
RETICS/RBC NFR AUTO: 2.1 % (ref 0.5–2)
SERVICE CMNT-IMP: NORMAL
TAPENTADOL UR QL SCN: NOT DETECTED
TAPENTADOL UR QL SCN: NOT DETECTED
TEMAZEPAM UR QL: NOT DETECTED
TRAMADOL UR QL: NOT DETECTED
TSH SERPL DL<=0.005 MIU/L-ACNC: 0.95 MU/L (ref 0.4–4)
WBC # BLD AUTO: 9.8 10E9/L (ref 4–11)
ZOLPIDEM UR QL: NOT DETECTED

## 2019-08-22 RX ORDER — FLUOCINOLONE ACETONIDE 0.11 MG/ML
OIL TOPICAL DAILY
Qty: 1 BOTTLE | Refills: 3 | Status: SHIPPED | OUTPATIENT
Start: 2019-08-22 | End: 2019-08-23

## 2019-08-22 RX ORDER — FLUOCINONIDE 0.5 MG/G
OINTMENT TOPICAL 2 TIMES DAILY
Qty: 60 G | Refills: 1 | Status: SHIPPED | OUTPATIENT
Start: 2019-08-22 | End: 2019-08-23

## 2019-08-22 ASSESSMENT — PAIN SCALES - GENERAL: PAINLEVEL: WORST PAIN (10)

## 2019-08-22 NOTE — LETTER
8/22/2019       RE: Bernard Padilla  [id number] [address] [id number]     Dear Colleague,    Thank you for referring your patient, Bernard Padilla, to the [facility] DERMATOLOGY at [facility]. Please see a copy of my visit note below.    [facility] Dermatology Note    Dermatology Surgery Clinic  [facility]  [address] [place] [id number]    Dermatology Problem List:  1. Hydradenitis supperitiva   -Diagnosed in 2016 and did not respond to antibiotics.    -Seen by Dr. Bird and underwent 9 procedures     Encounter Date: Aug 22, 2019    CC:  Chief Complaint   Patient presents with     Derm Problem     Bernard is here for new HS.       History of Present Illness:  Mr. Bernard Padilla is a 34 year old male with no significant past Hx who presents today for a consultation regarding HS. Diagnosed 2 years ago and did not respond to several trials of antibiotics. Lanced and packing however would recur. Derm surgeon (Dr. Bird from [facility]) performed 9 surgeries (removed sweat glands in bilateral axillas, bilateral groins and buttocks and now referred to Dr. Ruiz for further management. Groins done 8/7/19. Currently very painful at both groins at the surgical sight. Taking 30-40mg of percocet daily.     Today the patient reports that he has lesions of the groin and gluteals regions at current. He was on Doxycycline in the past for a long period of time before starting the surgical procedures and the antibiotics for HS. He also reports having tried Bactrim in the past without relief. He reports new lesions around the surgical sites. He endorses having thinning hair. He denies being on a blood thinner.    He has a Hx of eczema. He reports rashes of the upper extremities which he treats with Fluocinonide. He also uses Dermasmooth and eye drops. He reports that he would like to try a new shampoo.   The  patient also shares that from [id number] he had tried to quit smoking while in [employer]. He reports that the HS was unchanged during this time. The patient reports a Hx of skin cancer on his mother's side of the family and Diabetes on his father's side.      Past Medical History:   Patient Active Problem List   Diagnosis     Hidradenitis suppurativa     Tobacco use disorder     Patellofemoral arthritis of left knee     Eczema     Past Medical History:   Diagnosis Date     Boil      Past Surgical History:   Procedure Laterality Date     ORTHOPEDIC SURGERY      meniscus repair       Social History:  Social History     Socioeconomic History     Marital status: Single     Spouse name: None     Number of children: None     Years of education: None     Highest education level: None   Occupational History     None   Social Needs     Financial resource strain: None     Food insecurity:     Worry: None     Inability: None     Transportation needs:     Medical: None     Non-medical: None   Tobacco Use     Smoking status: Current Every Day Smoker     Packs/day: 1.00     Smokeless tobacco: Never Used     Tobacco comment: 1/2 pack of day, not interested in quitting    Substance and Sexual Activity     Alcohol use: Yes     Comment: Beer occasionally      Drug use: Yes     Types: Marijuana     Sexual activity: None   Lifestyle     Physical activity:     Days per week: None     Minutes per session: None     Stress: None   Relationships     Social connections:     Talks on phone: None     Gets together: None     Attends Muslim service: None     Active member of club or organization: None     Attends meetings of clubs or organizations: None     Relationship status: None     Intimate partner violence:     Fear of current or ex partner: None     Emotionally abused: None     Physically abused: None     Forced sexual activity: None   Other Topics Concern     Parent/sibling w/ CABG, MI or angioplasty before 65F 55M? Not Asked    Social History Narrative     None       Family History:  Family History   Problem Relation Age of Onset     Hypertension Mother      Diabetes Father      Cancer No family hx of      Coronary Artery Disease No family hx of      Heart Disease No family hx of         Medications:  Current Outpatient Medications   Medication Sig Dispense Refill     Acetaminophen (TYLENOL PO)        albuterol (PROAIR HFA/PROVENTIL HFA/VENTOLIN HFA) 108 (90 Base) MCG/ACT inhaler Inhale 2 puffs into the lungs every 6 hours 8.5 g 3     cetirizine (ZYRTEC) 10 MG tablet Take 1 tablet (10 mg) by mouth daily 30 tablet 2     clindamycin (CLEOCIN T) 1 % external solution Apply topically 2 times daily 60 mL 2     fluocinolone acetonide (DERMA-SMOOTHE/FS BODY) 0.01 % external oil Apply topically 2 times daily 120 mL 1     fluocinonide (LIDEX) 0.05 % external solution Apply 1 mL topically daily as needed (itchy scalp) 60 mL 3     fluticasone (FLONASE) 50 MCG/ACT nasal spray Spray 1-2 sprays into both nostrils daily 16 g 2     IBUPROFEN PO Take 800 mg by mouth 3 times daily as needed       naloxone (NARCAN) 4 MG/0.1ML nasal spray Spray 1 spray (4 mg) into one nostril alternating nostrils as needed for opioid reversal 2 each 0     triamcinolone (KENALOG) 0.1 % external ointment Apply topically 2 times daily as needed for irritation 30 g 3       Allergies   Allergen Reactions     Vicodin [Hydrocodone-Acetaminophen] Shortness Of Breath     Chicken-Derived Products (Egg) Nausea and Vomiting and GI Disturbance     Hydrocodone      Other reaction(s): Throat Irritation       Review of Systems:  -Skin Establ Pt: The patient denies any new rash, pruritus, or lesions that are symptomatic, changing or bleeding, except as per HPI.  -Constitutional: The patient is feeling generally well.    Physical exam:  Vitals: /82   Pulse 90   GEN: This is a well developed, well-nourished male in no acute distress, in a pleasant mood.    SKIN: Full examination was  performed.  - Scaling throughout the scalp  - Poorly demarcated lichenified plaques in bilateral anticuboidal fossa  - Well healed scar on L upper thigh with a little underlying nodularity  -Lichenification poorly demarcated hyperpigmentation of bilateral inguinal folds  -Several patches of depigmentation w follicular presence of pigment of the bilateral lower legs and bilateral thighs  - R upper thigh, L upper thigh, and L scrotum; excision sites that are healing with secondary intent   - A lesion draining of L upper thigh  - Depigmented macules scattered throughout the lower back, observed via dermoscope  - Scaly fully demarcated plaques on the central chest  - Well healed scars in bilateral axillas.   - Some scarring of the beard area   - Crusted papule/nodule on the L beard area and one on the R  - Well healed scar R posterior upper thigh  - Lichenification of the supergluteal cleft  - No other lesions of concern on areas examined.      Impression/Plan:  1. Hidradenitis suppurativa (s/p 9 surgeries involving bilateral axilla, buttock, and groins being the last surgery 8/7/19)    Patient will do LABS: TSH and CBC with platelets differential, Reticulocyte count, G6PD    Considering antibiotic Dapsone. Discussed risks and benefits. The patient will also discuss with his mother about Humira.    Prescribed Lidex 0.5%  ointment. Discussed risks and benefits. To be applied topically twice daily    Prescribed Dermasmooth for the scalp to decrease scaling. To be applied daily.    Follow up with Dr. Bird for acute post-surgical pain (currently taking percocet - 1-2 tabs TID)    Follow-up in 5 weeks.       Staff Involved:    Scribe Disclosure  I, Norma Lane, am serving as a scribe to document services personally performed by Dr. Apolinar Ruiz, based on data collection and the provider's statements to me.     Provider Disclosure:   The documentation recorded by the scribe accurately reflects the services I personally  performed and the decisions made by me.    Apolinar Ruiz MD, FAAD    Departments of Internal Medicine and Dermatology  [facility]  [phone]

## 2019-08-22 NOTE — PROGRESS NOTES
[facility] Dermatology Note    Dermatology Surgery Clinic  [facility]  [address] [place] [id number]    Dermatology Problem List:  1. Hidradenitis supperitiva   -Diagnosed in 2016 and did not respond to antibiotics.    -Seen by Dr. Bird and underwent 9 procedures     Encounter Date: Aug 22, 2019    CC:  Chief Complaint   Patient presents with     Derm Problem     Bernard is here for new HS.       History of Present Illness:  Mr. Bernard Padilla is a 34 year old male with no significant past Hx who presents today for a consultation regarding HS. Diagnosed 2 years ago and did not respond to several trials of antibiotics. Lanced and packing however would recur. Derm surgeon (Dr. Bird from [facility]) performed 9 surgeries (removed sweat glands in bilateral axillas, bilateral groins and buttocks and now referred to Dr. Ruiz for further management. Groins done 8/7/19. Currently very painful at both groins at the surgical sight. Taking 30-40mg of percocet daily.     Today the patient reports that he has lesions of the groin and gluteals regions at current. He was on Doxycycline in the past for a long period of time before starting the surgical procedures and the antibiotics for HS. He also reports having tried Bactrim in the past without relief. He reports new lesions around the surgical sites. He endorses having thinning hair. He denies being on a blood thinner.    He has a Hx of eczema. He reports rashes of the upper extremities which he treats with Fluocinonide. He also uses Dermasmooth and eye drops. He reports that he would like to try a new shampoo.   The patient also shares that from [id number] he had tried to quit smoking while in [employer]. He reports that the HS was unchanged during this time. The patient reports a Hx of skin cancer on his mother's side of the family and Diabetes on his father's side.      Past Medical  History:   Patient Active Problem List   Diagnosis     Hidradenitis suppurativa     Tobacco use disorder     Patellofemoral arthritis of left knee     Eczema     Past Medical History:   Diagnosis Date     Boil      Past Surgical History:   Procedure Laterality Date     ORTHOPEDIC SURGERY      meniscus repair       Social History:  Social History     Socioeconomic History     Marital status: Single     Spouse name: None     Number of children: None     Years of education: None     Highest education level: None   Occupational History     None   Social Needs     Financial resource strain: None     Food insecurity:     Worry: None     Inability: None     Transportation needs:     Medical: None     Non-medical: None   Tobacco Use     Smoking status: Current Every Day Smoker     Packs/day: 1.00     Smokeless tobacco: Never Used     Tobacco comment: 1/2 pack of day, not interested in quitting    Substance and Sexual Activity     Alcohol use: Yes     Comment: Beer occasionally      Drug use: Yes     Types: Marijuana     Sexual activity: None   Lifestyle     Physical activity:     Days per week: None     Minutes per session: None     Stress: None   Relationships     Social connections:     Talks on phone: None     Gets together: None     Attends Restorationist service: None     Active member of club or organization: None     Attends meetings of clubs or organizations: None     Relationship status: None     Intimate partner violence:     Fear of current or ex partner: None     Emotionally abused: None     Physically abused: None     Forced sexual activity: None   Other Topics Concern     Parent/sibling w/ CABG, MI or angioplasty before 65F 55M? Not Asked   Social History Narrative     None       Family History:  Family History   Problem Relation Age of Onset     Hypertension Mother      Diabetes Father      Cancer No family hx of      Coronary Artery Disease No family hx of      Heart Disease No family hx of          Medications:  Current Outpatient Medications   Medication Sig Dispense Refill     Acetaminophen (TYLENOL PO)        albuterol (PROAIR HFA/PROVENTIL HFA/VENTOLIN HFA) 108 (90 Base) MCG/ACT inhaler Inhale 2 puffs into the lungs every 6 hours 8.5 g 3     cetirizine (ZYRTEC) 10 MG tablet Take 1 tablet (10 mg) by mouth daily 30 tablet 2     clindamycin (CLEOCIN T) 1 % external solution Apply topically 2 times daily 60 mL 2     fluocinolone acetonide (DERMA-SMOOTHE/FS BODY) 0.01 % external oil Apply topically 2 times daily 120 mL 1     fluocinonide (LIDEX) 0.05 % external solution Apply 1 mL topically daily as needed (itchy scalp) 60 mL 3     fluticasone (FLONASE) 50 MCG/ACT nasal spray Spray 1-2 sprays into both nostrils daily 16 g 2     IBUPROFEN PO Take 800 mg by mouth 3 times daily as needed       naloxone (NARCAN) 4 MG/0.1ML nasal spray Spray 1 spray (4 mg) into one nostril alternating nostrils as needed for opioid reversal 2 each 0     triamcinolone (KENALOG) 0.1 % external ointment Apply topically 2 times daily as needed for irritation 30 g 3       Allergies   Allergen Reactions     Vicodin [Hydrocodone-Acetaminophen] Shortness Of Breath     Chicken-Derived Products (Egg) Nausea and Vomiting and GI Disturbance     Hydrocodone      Other reaction(s): Throat Irritation       Review of Systems:  -Skin Establ Pt: The patient denies any new rash, pruritus, or lesions that are symptomatic, changing or bleeding, except as per HPI.  -Constitutional: The patient is feeling generally well.    Physical exam:  Vitals: /82   Pulse 90   GEN: This is a well developed, well-nourished male in no acute distress, in a pleasant mood.    SKIN: Full examination was performed.  - Scaling throughout the scalp  - Poorly demarcated lichenified plaques in bilateral anticuboidal fossa  - Well healed scar on L upper thigh with a little underlying nodularity  -Lichenification poorly demarcated hyperpigmentation of bilateral inguinal  folds  -Several patches of depigmentation w follicular presence of pigment of the bilateral lower legs and bilateral thighs  - R upper thigh, L upper thigh, and L scrotum; excision sites that are healing with secondary intent   - A lesion draining of L upper thigh  - Depigmented macules scattered throughout the lower back, observed via dermoscope  - Scaly fully demarcated plaques on the central chest  - Well healed scars in bilateral axillas.   - Some scarring of the beard area   - Crusted papule/nodule on the L beard area and one on the R  - Well healed scar R posterior upper thigh  - Lichenification of the supergluteal cleft  - No other lesions of concern on areas examined.      Impression/Plan:  1. Hidradenitis suppurativa (s/p 9 surgeries involving bilateral axilla, buttock, and groins being the last surgery 8/7/19)    Patient will do LABS: TSH and CBC with platelets differential, Reticulocyte count, G6PD    Considering antibiotic Dapsone. Discussed risks and benefits. The patient will also discuss with his mother about Humira    Follow up with Dr. Bird for acute post-surgical pain (currently taking percocet - 1-2 tabs TID)    2. Atopic dermatitis    Prescribed Lidex 0.5%  ointment. Discussed risks and benefits. To be applied topically twice daily    Prescribed Dermasmooth for the scalp to decrease scaling. To be applied daily.  Follow-up in 5 weeks.       Staff Involved:    Scribe Disclosure  I, Norma Lane, am serving as a scribe to document services personally performed by Dr. Apolinar Ruiz, based on data collection and the provider's statements to me.     Provider Disclosure:   The documentation recorded by the scribe accurately reflects the services I personally performed and the decisions made by me.    Apolinar Ruiz MD, FAAD    Departments of Internal Medicine and Dermatology  [facility]  [phone]

## 2019-08-22 NOTE — PATIENT INSTRUCTIONS
Considering dapsone  Dapsone is an antibiotic. It blocks neutrophilis (a type ofwhite blood cell). Sometimes can drop your red and white blood cells.     Also talk to mom about humira.    Hidradenitis Suppurativa    What is hidradenitis suppurativa (HS)?    The disease starts with sore red lumps (or boils), typically involving the armpits, breasts, lower abdomen, groin, and buttocks. These spots appear suddenly, rapidly increase in size (with increasing pain) and then rupture, usually under the surface of the skin, but they sometimes drain to the surface. When these lumps heal, they can leave scars and sinuses (tunnels under the skin) that drain.  This is a chronic skin disease that comes and goes.  It is NOT due to an infection or washing habits. The basic problem is that people with HS have  weak pores  that break easily.   It is also NOT due to being overweight, although this does seem to worsen the condition, because of increased friction or rubbing from clothes, especially bra straps and edges of underwear. This is often hereditary, so ask other family members about similar problems.     There is no cure for this disease but your doctor can improve symptoms.       What is the cause?     The cause remains unclear. Many believe this condition develops due to blockage of the hair follicles or pores in the skin. When the pores get blocked, it leads to. a strong immune reaction under the skin, causing inflammation and irritation. Large, red, hot, painful swellings develop. They eventually break down and drain pus, even though this is NOT an infection.        Is this an uncommon condition?    No, it is actually fairly common.  It affects about 1 to 4 out of every 100 people.       Are there any lifestyle changes that can help HS    Yes!  See below for lifestyle changes that have been shown to help HS:  1. Reduce any friction or irritation in the areas where you have recurrent spots.   a. Change to loose cotton  clothing such as loose boxer shorts. Avoid underwear with seams that bind and rub in areas that give you new lesions. Wear clothing that is loose and cool so that you are not overheated and sweating in those areas.   b. Avoid plucking, pinching, picking, squeezing or  needling  the boils because it can make things worse.   c. Shaving is also risky. You may get away with shaving if you soften the hairs first, soaking in water and lathering with sensitive skin soap. Use a sharp razor each time, preferably multi-blade (e.g. . Take short strokes in the direction of hair growth and do not shave over the same areas more than twice. Clippers maybe safer.   d. -STOP SMOKING!!! Stopping all nicotine from any source is essential. Nicotine stimulates plugging of the pores. Also, toxins in cigarette smoke appear to interfere with proper healing  e. Zinc 90mg daily has been shown to be helpful in HS in some studies.  Please use this only when recommended by a physician. Prolonged zinc supplementation at high doses can lead to copper deficiency.  f. Curcumin has been used widely in the HS community but there is no data for it's usage. It may or may bot be helpful. The dosage is typically 500mg twice a day of curcumin. Please only take this if recommended by a physician. If taken with other blood thinners (e.g. Aspirin or warfarin) it can increase your risk for bleeding.

## 2019-08-23 ENCOUNTER — TELEPHONE (OUTPATIENT)
Dept: FAMILY MEDICINE | Facility: CLINIC | Age: 34
End: 2019-08-23

## 2019-08-23 ENCOUNTER — OFFICE VISIT - HEALTHEAST (OUTPATIENT)
Dept: PALLIATIVE MEDICINE | Facility: OTHER | Age: 34
End: 2019-08-23

## 2019-08-23 ENCOUNTER — AMBULATORY - HEALTHEAST (OUTPATIENT)
Dept: PALLIATIVE MEDICINE | Facility: OTHER | Age: 34
End: 2019-08-23

## 2019-08-23 DIAGNOSIS — L30.9 DERMATITIS: ICD-10-CM

## 2019-08-23 DIAGNOSIS — G89.4 CHRONIC PAIN SYNDROME: ICD-10-CM

## 2019-08-23 RX ORDER — FLUOCINONIDE 0.5 MG/G
OINTMENT TOPICAL 2 TIMES DAILY
Qty: 60 G | Refills: 1 | Status: SHIPPED | OUTPATIENT
Start: 2019-08-23 | End: 2019-12-26

## 2019-08-23 RX ORDER — FLUOCINOLONE ACETONIDE 0.11 MG/ML
OIL TOPICAL DAILY
Qty: 1 BOTTLE | Refills: 3 | Status: SHIPPED | OUTPATIENT
Start: 2019-08-23 | End: 2019-10-24

## 2019-08-23 NOTE — TELEPHONE ENCOUNTER
Patient is calling stating he got some prescriptions from the [facility] yesterday from the dermatologist and he is restricted to Dr. Quiroz and needs her or Dr. Schrader to okay his medications for him. He called to ask him both of them were in clinic today. Told him that Dr. Schrader is. Please call and advise.

## 2019-08-24 LAB — G6PD RBC-CCNC: 12 U/G HB (ref 9.9–16.6)

## 2019-08-26 ENCOUNTER — COMMUNICATION - HEALTHEAST (OUTPATIENT)
Dept: PALLIATIVE MEDICINE | Facility: OTHER | Age: 34
End: 2019-08-26

## 2019-08-28 DIAGNOSIS — Z79.899 ENCOUNTER FOR LONG-TERM (CURRENT) USE OF HIGH-RISK MEDICATION: Primary | ICD-10-CM

## 2019-09-09 ENCOUNTER — COMMUNICATION - HEALTHEAST (OUTPATIENT)
Dept: SCHEDULING | Facility: CLINIC | Age: 34
End: 2019-09-09

## 2019-09-09 ENCOUNTER — HOSPITAL ENCOUNTER (OUTPATIENT)
Dept: PALLIATIVE MEDICINE | Facility: OTHER | Age: 34
Discharge: HOME | End: 2019-09-09
Attending: ANESTHESIOLOGY

## 2019-09-09 DIAGNOSIS — G89.4 CHRONIC PAIN SYNDROME: ICD-10-CM

## 2019-09-09 ASSESSMENT — MIFFLIN-ST. JEOR: SCORE: 1742.32

## 2019-09-10 ENCOUNTER — COMMUNICATION - HEALTHEAST (OUTPATIENT)
Dept: INTERNAL MEDICINE | Facility: CLINIC | Age: 34
End: 2019-09-10

## 2019-09-10 ENCOUNTER — OFFICE VISIT - HEALTHEAST (OUTPATIENT)
Dept: INTERNAL MEDICINE | Facility: CLINIC | Age: 34
End: 2019-09-10

## 2019-09-10 ENCOUNTER — RECORDS - HEALTHEAST (OUTPATIENT)
Dept: ADMINISTRATIVE | Facility: OTHER | Age: 34
End: 2019-09-10

## 2019-09-10 ENCOUNTER — COMMUNICATION - HEALTHEAST (OUTPATIENT)
Dept: PALLIATIVE MEDICINE | Facility: OTHER | Age: 34
End: 2019-09-10

## 2019-09-10 DIAGNOSIS — G89.4 CHRONIC PAIN SYNDROME: ICD-10-CM

## 2019-09-10 DIAGNOSIS — G89.29 OTHER CHRONIC PAIN: ICD-10-CM

## 2019-09-10 DIAGNOSIS — R05.9 COUGH: ICD-10-CM

## 2019-09-10 ASSESSMENT — MIFFLIN-ST. JEOR: SCORE: 1715.1

## 2019-09-12 ENCOUNTER — COMMUNICATION - HEALTHEAST (OUTPATIENT)
Dept: NURSING | Facility: CLINIC | Age: 34
End: 2019-09-12

## 2019-09-12 ENCOUNTER — COMMUNICATION - HEALTHEAST (OUTPATIENT)
Dept: INTERNAL MEDICINE | Facility: CLINIC | Age: 34
End: 2019-09-12

## 2019-09-12 DIAGNOSIS — L29.9 PRURITUS: ICD-10-CM

## 2019-09-12 DIAGNOSIS — R21 RASH AND NONSPECIFIC SKIN ERUPTION: ICD-10-CM

## 2019-09-18 ENCOUNTER — AMBULATORY - HEALTHEAST (OUTPATIENT)
Dept: NURSING | Facility: CLINIC | Age: 34
End: 2019-09-18

## 2019-09-18 DIAGNOSIS — L73.2 HIDRADENITIS SUPPURATIVA: Primary | ICD-10-CM

## 2019-09-18 ASSESSMENT — ACTIVITIES OF DAILY LIVING (ADL): DEPENDENT_IADLS:: INDEPENDENT

## 2019-09-18 NOTE — PROGRESS NOTES
I talked to Bernard today and he notes he is falring and has a rahs on his hands. Will have him come in tomorrow morning at 8-9am.

## 2019-09-19 ENCOUNTER — OFFICE VISIT (OUTPATIENT)
Dept: DERMATOLOGY | Facility: CLINIC | Age: 34
End: 2019-09-19
Attending: DERMATOLOGY
Payer: COMMERCIAL

## 2019-09-19 VITALS
OXYGEN SATURATION: 99 % | HEIGHT: 66 IN | WEIGHT: 181.9 LBS | SYSTOLIC BLOOD PRESSURE: 127 MMHG | TEMPERATURE: 97.5 F | BODY MASS INDEX: 29.23 KG/M2 | HEART RATE: 75 BPM | DIASTOLIC BLOOD PRESSURE: 84 MMHG

## 2019-09-19 DIAGNOSIS — L30.9 DERMATITIS: ICD-10-CM

## 2019-09-19 DIAGNOSIS — L73.2 HIDRADENITIS SUPPURATIVA: Primary | ICD-10-CM

## 2019-09-19 DIAGNOSIS — L30.9 ECZEMA, UNSPECIFIED TYPE: ICD-10-CM

## 2019-09-19 DIAGNOSIS — L73.2 HIDRADENITIS SUPPURATIVA: ICD-10-CM

## 2019-09-19 DIAGNOSIS — Z79.899 ENCOUNTER FOR LONG-TERM (CURRENT) USE OF HIGH-RISK MEDICATION: ICD-10-CM

## 2019-09-19 LAB
ANION GAP SERPL CALCULATED.3IONS-SCNC: 3 MMOL/L (ref 3–14)
BUN SERPL-MCNC: 7 MG/DL (ref 7–30)
CALCIUM SERPL-MCNC: 8.9 MG/DL (ref 8.5–10.1)
CHLORIDE SERPL-SCNC: 107 MMOL/L (ref 94–109)
CO2 SERPL-SCNC: 29 MMOL/L (ref 20–32)
CREAT SERPL-MCNC: 0.92 MG/DL (ref 0.66–1.25)
ERYTHROCYTE [DISTWIDTH] IN BLOOD BY AUTOMATED COUNT: 13.1 % (ref 10–15)
GFR SERPL CREATININE-BSD FRML MDRD: >90 ML/MIN/{1.73_M2}
GLUCOSE SERPL-MCNC: 94 MG/DL (ref 70–99)
HCT VFR BLD AUTO: 44.1 % (ref 40–53)
HGB BLD-MCNC: 14.2 G/DL (ref 13.3–17.7)
MCH RBC QN AUTO: 30.9 PG (ref 26.5–33)
MCHC RBC AUTO-ENTMCNC: 32.2 G/DL (ref 31.5–36.5)
MCV RBC AUTO: 96 FL (ref 78–100)
PLATELET # BLD AUTO: 247 10E9/L (ref 150–450)
POTASSIUM SERPL-SCNC: 4.1 MMOL/L (ref 3.4–5.3)
RBC # BLD AUTO: 4.59 10E12/L (ref 4.4–5.9)
RETICS # AUTO: 117 10E9/L (ref 25–95)
RETICS/RBC NFR AUTO: 2.6 % (ref 0.5–2)
SODIUM SERPL-SCNC: 139 MMOL/L (ref 133–144)
URATE SERPL-MCNC: 5.4 MG/DL (ref 3.5–7.2)
WBC # BLD AUTO: 8.4 10E9/L (ref 4–11)

## 2019-09-19 PROCEDURE — 85045 AUTOMATED RETICULOCYTE COUNT: CPT | Performed by: DERMATOLOGY

## 2019-09-19 PROCEDURE — 84550 ASSAY OF BLOOD/URIC ACID: CPT | Performed by: DERMATOLOGY

## 2019-09-19 PROCEDURE — 85027 COMPLETE CBC AUTOMATED: CPT | Performed by: DERMATOLOGY

## 2019-09-19 PROCEDURE — G0463 HOSPITAL OUTPT CLINIC VISIT: HCPCS | Mod: ZF

## 2019-09-19 PROCEDURE — 80048 BASIC METABOLIC PNL TOTAL CA: CPT | Performed by: DERMATOLOGY

## 2019-09-19 PROCEDURE — 36415 COLL VENOUS BLD VENIPUNCTURE: CPT | Performed by: DERMATOLOGY

## 2019-09-19 RX ORDER — CYCLOSPORINE 100 MG/1
200 CAPSULE, GELATIN COATED ORAL 2 TIMES DAILY
Qty: 120 CAPSULE | Refills: 1 | Status: SHIPPED | OUTPATIENT
Start: 2019-09-19 | End: 2019-10-24

## 2019-09-19 RX ORDER — FLUOCINOLONE ACETONIDE 0.11 MG/ML
OIL TOPICAL 2 TIMES DAILY
Qty: 120 ML | Refills: 1 | Status: SHIPPED | OUTPATIENT
Start: 2019-09-19 | End: 2019-12-26

## 2019-09-19 ASSESSMENT — PAIN SCALES - GENERAL: PAINLEVEL: NO PAIN (0)

## 2019-09-19 ASSESSMENT — MIFFLIN-ST. JEOR: SCORE: 1712.84

## 2019-09-19 NOTE — NURSING NOTE
"Chief Complaint   Patient presents with     RECHECK     HS     /84   Pulse 75   Temp 97.5  F (36.4  C) (Oral)   Ht 1.676 m (5' 6\")   Wt 82.5 kg (181 lb 14.4 oz)   SpO2 99%   BMI 29.36 kg/m    Jennifer Hackett CMA    "

## 2019-09-19 NOTE — PROGRESS NOTES
[facility]  Dermatology-Rheumatology Clinic  Apolinar Ruiz MD  2019     Name: Bernard Padilla  MRN: [id number]  Age: 34 year old  : 1985  Referring provider: Jayde Quiroz    Dermatology Problem List:   1. Hidradenitis supperitiva              -Diagnosed in 2016 and did not respond to antibiotics.               -Seen by Dr. Bird and underwent 9 procedures    - mg BID - start   2. Acute onset hand foot psoriasiform appearing dermatitis   -  mg BID - start    - Lidex ointment BID  3. Atopic dermatitis  4. Seborrheic dermatitis    - DermaSmooth    Encounter Date: 2019   Date of Last Visit: 2019  Orders Only on 2019   Component Date Value Ref Range Status     % Retic 2019 2.1* 0.5 - 2.0 % Final     Absolute Retic 2019 95.7* 25 - 95 10e9/L Final     WBC 2019 9.8  4.0 - 11.0 10e9/L Final     RBC Count 2019 4.60  4.4 - 5.9 10e12/L Final     Hemoglobin 2019 14.2  13.3 - 17.7 g/dL Final     Hematocrit 2019 42.3  40.0 - 53.0 % Final     MCV 2019 92  78 - 100 fl Final     MCH 2019 30.9  26.5 - 33.0 pg Final     MCHC 2019 33.6  31.5 - 36.5 g/dL Final     RDW 2019 12.6  10.0 - 15.0 % Final     Platelet Count 2019 302  150 - 450 10e9/L Final     Diff Method 2019 Automated Method   Final     % Neutrophils 2019 62.0  % Final     % Lymphocytes 2019 24.1  % Final     % Monocytes 2019 6.6  % Final     % Eosinophils 2019 6.7  % Final     % Basophils 2019 0.2  % Final     % Immature Granulocytes 2019 0.4  % Final     Nucleated RBCs 2019 0  0 /100 Final     Absolute Neutrophil 2019 6.1  1.6 - 8.3 10e9/L Final     Absolute Lymphocytes 2019 2.4  0.8 - 5.3 10e9/L Final     Absolute Monocytes 2019 0.6  0.0 - 1.3 10e9/L Final     Absolute Eosinophils 2019 0.7  0.0 - 0.7 10e9/L Final     Absolute Basophils 2019 0.0  0.0 - 0.2 10e9/L Final      Abs Immature Granulocytes 08/22/2019 0.0  0 - 0.4 10e9/L Final     Absolute Nucleated RBC 08/22/2019 0.0   Final     Glucose-6-PO4 Dehydrogenase 08/22/2019 12.0  9.9 - 16.6 U/g Hb Final    Comment: (Note)  Performed by [facility],  [address] [facility] [place] [address] [phone]  [email or web address], Jose Griffin MD, Lab. Director         HPI:   Bernard Padilla is a 34 year old male with a history of hidradenitis suppurativa who presents for follow up. He established care on 08/22/2019. We discussed dapsone and Humira. Prescriptions for lidex 0.5% ointment and Dermasmooth were prescribed.     Today he notes that after he went to the [facility] during labor day weekend, his hands and feet began to scale. He has also had peeling on his neck and forehead from eczema but has never previously had hand or foot involvement of his eczema.. He has had difficulty getting his medications due to some insurance complications but was able to get the steroid ointment last week. This has been helpful for the hands but not for the feet. Overall, he has never had similar scaling, itching, and rash of his hands/feet before and this is totally new. He is using anti-itch oil and dandruff shampoo for the scalp and castor oil for the beard. HS is flaring on the buttocks and groin as of last week. He has been using the steroid ointment on these areas as well without improvement. These areas are very tender. He also reports knee, hip, and neck pain but denies elbow pain. He has had intermittent episodes of right arm pain with sudden onset of sharp pain and tingling shooting through the arm into the fingers. Since he went to the [facility], he has had diarrhea, which is now slowing down. He had 1 normal bowel movement yesterday and 1 loose bowel movement this morning. He initially had a fever and chills but these have since resolved.     The patient discussed Humira with his mother and notes that she was not okay with him starting the medication due  to risk of skin cancer.     Background history from hospitals on 08/22/2019:   Diagnosed 2 years ago and did not respond to several trials of antibiotics. Lanced and packing however would recur. Derm surgeon (Dr. Bird from [facility]) performed 9 surgeries (removed sweat glands in bilateral axillas, bilateral groins and buttocks and now referred to Dr. Ruiz for further management. Groins done 8/7/19. Currently very painful at both groins at the surgical sight. Taking 30-40mg of percocet daily.      Today the patient reports that he has lesions of the groin and gluteals regions at current. He was on Doxycycline in the past for a long period of time before starting the surgical procedures and the antibiotics for HS. He also reports having tried Bactrim in the past without relief. He reports new lesions around the surgical sites. He endorses having thinning hair. He denies being on a blood thinner.     He has a Hx of eczema. He reports rashes of the upper extremities which he treats with Fluocinonide. He also uses Dermasmooth and eye drops. He reports that he would like to try a new shampoo.   The patient also shares that from [id number] he had tried to quit smoking while in [place]. He reports that the HS was unchanged during this time. The patient reports a Hx of skin cancer on his mother's side of the family and Diabetes on his father's side.     Review of Systems:   Pertinent items are noted in HPI or as below, remainder of complete ROS is negative.      Active Medications:     Current Outpatient Medications:      Acetaminophen (TYLENOL PO), , Disp: , Rfl:      albuterol (PROAIR HFA/PROVENTIL HFA/VENTOLIN HFA) 108 (90 Base) MCG/ACT inhaler, Inhale 2 puffs into the lungs every 6 hours, Disp: 8.5 g, Rfl: 3     cetirizine (ZYRTEC) 10 MG tablet, Take 1 tablet (10 mg) by mouth daily, Disp: 30 tablet, Rfl: 2     clindamycin (CLEOCIN T) 1 % external solution, Apply topically 2 times daily, Disp: 60 mL, Rfl:  "2     fluocinolone acetonide (DERMA-SMOOTHE/FS BODY) 0.01 % external oil, Apply topically 2 times daily, Disp: 120 mL, Rfl: 1     Fluocinolone Acetonide Scalp (DERMA-SMOOTHE/FS SCALP) 0.01 % OIL oil, Apply topically daily, Disp: 1 Bottle, Rfl: 3     fluocinonide (LIDEX) 0.05 % external ointment, Apply topically 2 times daily, Disp: 60 g, Rfl: 1     fluocinonide (LIDEX) 0.05 % external solution, Apply 1 mL topically daily as needed (itchy scalp), Disp: 60 mL, Rfl: 3     fluticasone (FLONASE) 50 MCG/ACT nasal spray, Spray 1-2 sprays into both nostrils daily, Disp: 16 g, Rfl: 2     IBUPROFEN PO, Take 800 mg by mouth 3 times daily as needed, Disp: , Rfl:      naloxone (NARCAN) 4 MG/0.1ML nasal spray, Spray 1 spray (4 mg) into one nostril alternating nostrils as needed for opioid reversal, Disp: 2 each, Rfl: 0     triamcinolone (KENALOG) 0.1 % external ointment, Apply topically 2 times daily as needed for irritation, Disp: 30 g, Rfl: 3      Allergies:   Vicodin [hydrocodone-acetaminophen]; Chicken-derived products (egg); and Hydrocodone      Past Medical History:  Hidradenitis suppurativa  Tobacco use disorder   Patellofemoral arthritis of left knee   Eczema     Past Surgical History:  Meniscus repair     Family History:   Mother: hypertension   Father: diabetes mellitus       Social History:   Current everyday smoker, 1 ppd.   Occasional alcohol use (beer).   Endorses marijuana use.      Physical Exam:   /84   Pulse 75   Temp 97.5  F (36.4  C) (Oral)   Ht 1.676 m (5' 6\")   Wt 82.5 kg (181 lb 14.4 oz)   SpO2 99%   BMI 29.36 kg/m     Wt Readings from Last 4 Encounters:   09/19/19 82.5 kg (181 lb 14.4 oz)   08/05/19 81.4 kg (179 lb 6.4 oz)   07/09/19 80.7 kg (178 lb)   06/28/19 83.1 kg (183 lb 3.2 oz)     General: Well-appearing male in no distress. Alert and oriented.   SKIN: Full examination was performed.  - Mild greasy, adherent scaling throughout the scalp  - Poorly demarcated lichenified plaques in " bilateral anticuboidal fossa  - Well healed scar on L upper thigh with a little underlying nodularity  -Lichenification poorly demarcated hyperpigmentation of bilateral inguinal folds  -Several patches of depigmentation w follicular presence of pigment of the bilateral lower legs and bilateral thighs  - R upper thigh, L upper thigh, and L scrotum; excision sites that are healing with secondary intent   - Well healed scars in bilateral axillas.   - Mild scarring of the beard area   - Well healed scar R posterior upper thigh  - Lichenification of the supergluteal cleft  - Fairly well demarcated diffuse plaques on the plantar surface of the feet, mostly involving the instep with plate-like scale and hyperpigmented plaques.   - Diffuse erythema of the palm poorly demarcated with diffuse desquamated scale; mild-healing scale   - One inflammatory nodule on the right thigh without any purulence, tunneling  - No nail changes of hands/feet   - No other lesions of concern on areas examined.      Musculoskeletal:   - No pain to palpation of the cervical spine.   - 2+ DTRs in bilateral brachioradialis.   - Tenderness of the muscle in the right upper back to palpation.   - Mild swelling of the right 5th digit (mild dactylitis), possible mild synovitis of the 5th DIP.     Laboratory:   Component      Latest Ref Rng & Units 8/22/2019   WBC      4.0 - 11.0 10e9/L 9.8   RBC Count      4.4 - 5.9 10e12/L 4.60   Hemoglobin      13.3 - 17.7 g/dL 14.2   Hematocrit      40.0 - 53.0 % 42.3   MCV      78 - 100 fl 92   MCH      26.5 - 33.0 pg 30.9   MCHC      31.5 - 36.5 g/dL 33.6   RDW      10.0 - 15.0 % 12.6   Platelet Count      150 - 450 10e9/L 302   Diff Method       Automated Method   % Neutrophils      % 62.0   % Lymphocytes      % 24.1   % Monocytes      % 6.6   % Eosinophils      % 6.7   % Basophils      % 0.2   % Immature Granulocytes      % 0.4   Nucleated RBCs      0 /100 0   Absolute Neutrophil      1.6 - 8.3 10e9/L 6.1    Absolute Lymphocytes      0.8 - 5.3 10e9/L 2.4   Absolute Monocytes      0.0 - 1.3 10e9/L 0.6   Absolute Eosinophils      0.0 - 0.7 10e9/L 0.7   Absolute Basophils      0.0 - 0.2 10e9/L 0.0   Abs Immature Granulocytes      0 - 0.4 10e9/L 0.0   Absolute Nucleated RBC       0.0   % Retic      0.5 - 2.0 % 2.1 (H)   Absolute Retic      25 - 95 10e9/L 95.7 (H)   TSH      0.40 - 4.00 mU/L 0.95   Glucose-6-PO4 Dehydrogenase      9.9 - 16.6 U/g Hb 12.0        Assessment and Plan:      1. Hidradenitis suppurativa (s/p 9 surgeries involving bilateral axilla, buttock, and groins being the last surgery 8/7/19)    Two inflammatory nodules today's and healing surgical sites.    Starting cyclosporine today for psoriasiform eruption, likely reactive arthritis, which I expect will also help the HS. Will plan to start dapsone in one month when starting to taper cyclosporine    G6PD checked at last visit and within normal limits.     eReferral previously placed to get involved with plastic surgery here.      2. Hand and foot psoriasiform dermatitis    Sudden onset, involving b/l palms/soles after GI infection, most suspicious for reactive arthritis with new onsent joint pains and possible dactylitis of rt fifth digit. Less likely new-onsent psoriasis with psoriatic arthritis.Will plan to treat empirically as below and assess response in one month.     Lidex 0.5%  ointment topically twice daily.     Frequent emollient use    Begin cyclosporine 200 mg BID based on weight (5mg/kg)    CBC, BMP, uric acid today    CBC, BMP in 2 weeks and 4 weeks    At follow up, consider tapering CSA and beginning dapsone    3. Hx of atopic dermatitis  - Lidex ointment BID     4. Seborrheic dermatitis  - Derma-smooth for scalp     Follow-up in 1 month with labs      Depending on response consduer tapering off cyclospirin, starting dapsone, and transitioning to topicals       Follow-up: 4 weeks    Scribe Disclosure:  I, Zoë Roque, am serving as a  scribe to document services personally performed by Apolinar Ruiz MD at this visit, based upon the provider's statements to me. All documentation has been reviewed by the aforementioned provider prior to being entered into the official medical record.     Bib Ortiz MD  Internal Medicine - Dermatology, PGY-5    Provider Disclosure:   The documentation recorded by the scribe accurately reflects the services I personally performed and the decisions made by me.    Apolinar Ruiz MD, FAAD    Departments of Internal Medicine and Dermatology  [facility]  [phone]    Staff Physician Comments:   I saw and evaluated the patient with the resident and I agree with the assessment and plan.  I was present for the examination.    Apolinar Ruiz MD, FAAD    Departments of Internal Medicine and Dermatology  [facility]  [phone]

## 2019-09-19 NOTE — LETTER
2019      RE: Bernard Padilla  [address] [facility] [address]       [facility]  Dermatology-Rheumatology Clinic  Apolinar Ruiz MD  2019     Name: Bernard Padilla  MRN: [id number]  Age: 34 year old  : 1985  Referring provider: Jayde Quiroz    Dermatology Problem List:   1. H idradenitis supperitiva              -Diagnosed in 2016 and did not respond to antibiotics.               -Seen by Dr. Bird and underwent 9 procedures    - mg BID - start   2. Acute onset hand foot psoriasiform appearing dermatitis   -  mg BID - start    - Lidex ointment BID  3. Atopic dermatitis  4. Seborrheic dermatitis    - DermaSmooth    Encounter Date: 2019   Date of Last Visit: 2019  Orders Only on 2019   Component Date Value Ref Range Status     % Retic 2019 2.1* 0.5 - 2.0 % Final     Absolute Retic 2019 95.7* 25 - 95 10e9/L Final     WBC 2019 9.8  4.0 - 11.0 10e9/L Final     RBC Count 2019 4.60  4.4 - 5.9 10e12/L Final     Hemoglobin 2019 14.2  13.3 - 17.7 g/dL Final     Hematocrit 2019 42.3  40.0 - 53.0 % Final     MCV 2019 92  78 - 100 fl Final     MCH 2019 30.9  26.5 - 33.0 pg Final     MCHC 2019 33.6  31.5 - 36.5 g/dL Final     RDW 2019 12.6  10.0 - 15.0 % Final     Platelet Count 2019 302  150 - 450 10e9/L Final     Diff Method 2019 Automated Method   Final     % Neutrophils 2019 62.0  % Final     % Lymphocytes 2019 24.1  % Final     % Monocytes 2019 6.6  % Final     % Eosinophils 2019 6.7  % Final     % Basophils 2019 0.2  % Final     % Immature Granulocytes 2019 0.4  % Final     Nucleated RBCs 2019 0  0 /100 Final     Absolute Neutrophil 2019 6.1  1.6 - 8.3 10e9/L Final     Absolute Lymphocytes 2019 2.4  0.8 - 5.3 10e9/L Final     Absolute Monocytes 2019 0.6  0.0 - 1.3 10e9/L Final     Absolute Eosinophils 2019 0.7  0.0 -  0.7 10e9/L Final     Absolute Basophils 08/22/2019 0.0  0.0 - 0.2 10e9/L Final     Abs Immature Granulocytes 08/22/2019 0.0  0 - 0.4 10e9/L Final     Absolute Nucleated RBC 08/22/2019 0.0   Final     Glucose-6-PO4 Dehydrogenase 08/22/2019 12.0  9.9 - 16.6 U/g Hb Final    Comment: (Note)  Performed by [facility],  [address] [place] [id number] [phone]  [email or web address], Jose Griffin MD, Lab. Director         HPI:   Bernard Paidlla is a 34 year old male with a history of hidradenitis suppurativa who presents for follow up. He established care on 08/22/2019. We discussed dapsone and Humira. Prescriptions for lidex 0.5% ointment and Dermasmooth were prescribed.     Today he notes that after he went to the [facility] during labor day weekend, his hands and feet began to scale. He has also had peeling on his neck and forehead from eczema but has never previously had hand or foot involvement of his eczema.. He has had difficulty getting his medications due to some insurance complications but was able to get the steroid ointment last week. This has been helpful for the hands but not for the feet. Overall, he has never had similar scaling, itching, and rash of his hands/feet before and this is totally new. He is using anti-itch oil and dandruff shampoo for the scalp and castor oil for the beard. HS is flaring on the buttocks and groin as of last week. He has been using the steroid ointment on these areas as well without improvement. These areas are very tender. He also reports knee, hip, and neck pain but denies elbow pain. He has had intermittent episodes of right arm pain with sudden onset of sharp pain and tingling shooting through the arm into the fingers. Since he went to the [facility], he has had diarrhea, which is now slowing down. He had 1 normal bowel movement yesterday and 1 loose bowel movement this morning. He initially had a fever and chills but these have since resolved.     The patient discussed Humira  with his mother and notes that she was not okay with him starting the medication due to risk of skin cancer.     Background history from [facility] on 08/22/2019:   Diagnosed 2 years ago and did not respond to several trials of antibiotics. Lanced and packing however would recur. Derm surgeon (Dr. Bird from [facility]) performed 9 surgeries (removed sweat glands in bilateral axillas, bilateral groins and buttocks and now referred to Dr. Ruiz for further management. Groins done 8/7/19. Currently very painful at both groins at the surgical sight. Taking 30-40mg of percocet daily.      Today the patient reports that he has lesions of the groin and gluteals regions at current. He was on Doxycycline in the past for a long period of time before starting the surgical procedures and the antibiotics for HS. He also reports having tried Bactrim in the past without relief. He reports new lesions around the surgical sites. He endorses having thinning hair. He denies being on a blood thinner.     He has a Hx of eczema. He reports rashes of the upper extremities which he treats with Fluocinonide. He also uses Dermasmooth and eye drops. He reports that he would like to try a new shampoo.   The patient also shares that from [id number] he had tried to quit smoking while in [employer]. He reports that the HS was unchanged during this time. The patient reports a Hx of skin cancer on his mother's side of the family and Diabetes on his father's side.     Review of Systems:   Pertinent items are noted in HPI or as below, remainder of complete ROS is negative.      Active Medications:     Current Outpatient Medications:      Acetaminophen (TYLENOL PO), , Disp: , Rfl:      albuterol (PROAIR HFA/PROVENTIL HFA/VENTOLIN HFA) 108 (90 Base) MCG/ACT inhaler, Inhale 2 puffs into the lungs every 6 hours, Disp: 8.5 g, Rfl: 3     cetirizine (ZYRTEC) 10 MG tablet, Take 1 tablet (10 mg) by mouth daily, Disp: 30 tablet, Rfl: 2     clindamycin  "(CLEOCIN T) 1 % external solution, Apply topically 2 times daily, Disp: 60 mL, Rfl: 2     fluocinolone acetonide (DERMA-SMOOTHE/FS BODY) 0.01 % external oil, Apply topically 2 times daily, Disp: 120 mL, Rfl: 1     Fluocinolone Acetonide Scalp (DERMA-SMOOTHE/FS SCALP) 0.01 % OIL oil, Apply topically daily, Disp: 1 Bottle, Rfl: 3     fluocinonide (LIDEX) 0.05 % external ointment, Apply topically 2 times daily, Disp: 60 g, Rfl: 1     fluocinonide (LIDEX) 0.05 % external solution, Apply 1 mL topically daily as needed (itchy scalp), Disp: 60 mL, Rfl: 3     fluticasone (FLONASE) 50 MCG/ACT nasal spray, Spray 1-2 sprays into both nostrils daily, Disp: 16 g, Rfl: 2     IBUPROFEN PO, Take 800 mg by mouth 3 times daily as needed, Disp: , Rfl:      naloxone (NARCAN) 4 MG/0.1ML nasal spray, Spray 1 spray (4 mg) into one nostril alternating nostrils as needed for opioid reversal, Disp: 2 each, Rfl: 0     triamcinolone (KENALOG) 0.1 % external ointment, Apply topically 2 times daily as needed for irritation, Disp: 30 g, Rfl: 3      Allergies:   Vicodin [hydrocodone-acetaminophen]; Chicken-derived products (egg); and Hydrocodone      Past Medical History:  Hidradenitis suppurativa  Tobacco use disorder   Patellofemoral arthritis of left knee   Eczema     Past Surgical History:  Meniscus repair     Family History:   Mother: hypertension   Father: diabetes mellitus       Social History:   Current everyday smoker, 1 ppd.   Occasional alcohol use (beer).   Endorses marijuana use.      Physical Exam:   /84   Pulse 75   Temp 97.5  F (36.4  C) (Oral)   Ht 1.676 m (5' 6\")   Wt 82.5 kg (181 lb 14.4 oz)   SpO2 99%   BMI 29.36 kg/m      Wt Readings from Last 4 Encounters:   09/19/19 82.5 kg (181 lb 14.4 oz)   08/05/19 81.4 kg (179 lb 6.4 oz)   07/09/19 80.7 kg (178 lb)   06/28/19 83.1 kg (183 lb 3.2 oz)     General: Well-appearing male in no distress. Alert and oriented.   SKIN: Full examination was performed.  -  Mild greasy, " adherent scaling throughout the scalp  - Poorly demarcated lichenified plaques in bilateral anticuboidal fossa  - Well healed scar on L upper thigh with a little underlying nodularity  -Lichenification poorly demarcated hyperpigmentation of bilateral inguinal folds  -Several patches of depigmentation w follicular presence of pigment of the bilateral lower legs and bilateral thighs  - R upper thigh, L upper thigh, and L scrotum; excision sites that are healing with secondary intent   - Well healed scars in bilateral axillas.   - Mild scarring of the beard area   - Well healed scar R posterior upper thigh  - Lichenification of the supergluteal cleft  - Fairly well demarcated diffuse plaques on the plantar surface of the feet, mostly involving the instep with plate-like scale and hyperpigmented plaques.   - Diffuse erythema of the palm poorly demarcated with diffuse desquamated scale; mild-healing scale   - One inflammatory nodule on the right thigh without any purulence, tunneling  - No nail changes of hands/feet   - No other lesions of concern on areas examined.      Musculoskeletal:   - No pain to palpation of the cervical spine.   - 2+ DTRs in bilateral brachioradialis.   - Tenderness of the muscle in the right upper back to palpation.   - Mild swelling of the right 5th digit (mild dactylitis), possible mild synovitis of the 5th DIP.     Laboratory:   Component      Latest Ref Rng & Units 8/22/2019   WBC      4.0 - 11.0 10e9/L 9.8   RBC Count      4.4 - 5.9 10e12/L 4.60   Hemoglobin      13.3 - 17.7 g/dL 14.2   Hematocrit      40.0 - 53.0 % 42.3   MCV      78 - 100 fl 92   MCH      26.5 - 33.0 pg 30.9   MCHC      31.5 - 36.5 g/dL 33.6   RDW      10.0 - 15.0 % 12.6   Platelet Count      150 - 450 10e9/L 302   Diff Method       Automated Method   % Neutrophils      % 62.0   % Lymphocytes      % 24.1   % Monocytes      % 6.6   % Eosinophils      % 6.7   % Basophils      % 0.2   % Immature Granulocytes      % 0.4    Nucleated RBCs      0 /100 0   Absolute Neutrophil      1.6 - 8.3 10e9/L 6.1   Absolute Lymphocytes      0.8 - 5.3 10e9/L 2.4   Absolute Monocytes      0.0 - 1.3 10e9/L 0.6   Absolute Eosinophils      0.0 - 0.7 10e9/L 0.7   Absolute Basophils      0.0 - 0.2 10e9/L 0.0   Abs Immature Granulocytes      0 - 0.4 10e9/L 0.0   Absolute Nucleated RBC       0.0   % Retic      0.5 - 2.0 % 2.1 (H)   Absolute Retic      25 - 95 10e9/L 95.7 (H)   TSH      0.40 - 4.00 mU/L 0.95   Glucose-6-PO4 Dehydrogenase      9.9 - 16.6 U/g Hb 12.0        Assessment and Plan:      1. Hidradenitis suppurativa (s/p 9 surgeries involving bilateral axilla, buttock, and groins being the last surgery 8/7/19)    Two inflammatory nodules today's and healing surgical sites.    Starting cyclosporine today for psoriasiform eruption, likely reactive arthritis, which I expect will also help the HS. Will plan to start dapsone in one month when starting to taper cyclosporine    G6PD checked at last visit and within normal limits.     eReferral previously placed to get involved with plastic surgery here.      2. Hand and foot psoriasiform dermatitis    Sudden onset, involving b/l palms/soles after GI infection, most suspicious for reactive arthritis with new onsent joint pains and possible dactylitis of rt fifth digit. Less likely new-onsent psoriasis with psoriatic arthritis.Will plan to treat empirically as below and assess response in one month.     Lidex 0.5%  ointment  topically twice daily.     Frequent emollient use    Begin cyclosporine 200 mg BID based on weight (5mg/kg)    CBC, BMP, uric acid today    CBC, BMP in 2 weeks and 4 weeks    At follow up, consider tapering CSA and beginning dapsone    3. Hx of atopic dermatitis  - Lidex ointment BID     4. Seborrheic dermatitis  - Derma-smooth for scalp     Follow-up in 1 month with labs      Depending on response consduer tapering off cyclospirin, starting dapsone, and transitioning to topicals        Follow-up: 4 weeks    Scribe Disclosure:  I, Zoë Roque, am serving as a scribe to document services personally performed by Apolinar Ruiz MD at this visit, based upon the provider's statements to me. All documentation has been reviewed by the aforementioned provider prior to being entered into the official medical record.     Bib Ortiz MD  Internal Medicine - Dermatology, PGY-5    Provider Disclosure:   The documentation recorded by the scribe accurately reflects the services I personally performed and the decisions made by me.    Apolinar Ruiz MD, FAAD    Departments of Internal Medicine and Dermatology  [facility]  [phone]    Staff Physician Comments:   I saw and evaluated the patient with the resident and I agree with the assessment and plan.  I was present for the examination.    Apolinar Ruiz MD, FAAD    Departments of Internal Medicine and Dermatology  [facility]  [phone]

## 2019-09-20 ENCOUNTER — COMMUNICATION - HEALTHEAST (OUTPATIENT)
Dept: PALLIATIVE MEDICINE | Facility: OTHER | Age: 34
End: 2019-09-20

## 2019-09-20 ENCOUNTER — COMMUNICATION - HEALTHEAST (OUTPATIENT)
Dept: PHARMACY | Facility: CLINIC | Age: 34
End: 2019-09-20

## 2019-09-23 ENCOUNTER — OFFICE VISIT - HEALTHEAST (OUTPATIENT)
Dept: INTERNAL MEDICINE | Facility: CLINIC | Age: 34
End: 2019-09-23

## 2019-09-23 ENCOUNTER — COMMUNICATION - HEALTHEAST (OUTPATIENT)
Dept: TELEHEALTH | Facility: CLINIC | Age: 34
End: 2019-09-23

## 2019-09-23 ENCOUNTER — COMMUNICATION - HEALTHEAST (OUTPATIENT)
Dept: PALLIATIVE MEDICINE | Facility: OTHER | Age: 34
End: 2019-09-23

## 2019-09-23 ENCOUNTER — COMMUNICATION - HEALTHEAST (OUTPATIENT)
Dept: INTERNAL MEDICINE | Facility: CLINIC | Age: 34
End: 2019-09-23

## 2019-09-23 DIAGNOSIS — L29.9 PRURITUS: ICD-10-CM

## 2019-09-23 DIAGNOSIS — R05.9 COUGH: ICD-10-CM

## 2019-09-23 DIAGNOSIS — G89.4 CHRONIC PAIN SYNDROME: ICD-10-CM

## 2019-09-23 DIAGNOSIS — R21 RASH AND NONSPECIFIC SKIN ERUPTION: ICD-10-CM

## 2019-09-23 ASSESSMENT — MIFFLIN-ST. JEOR: SCORE: 1710.57

## 2019-09-24 ENCOUNTER — COMMUNICATION - HEALTHEAST (OUTPATIENT)
Dept: NURSING | Facility: CLINIC | Age: 34
End: 2019-09-24

## 2019-09-26 ENCOUNTER — PRE VISIT (OUTPATIENT)
Dept: SURGERY | Facility: CLINIC | Age: 34
End: 2019-09-26

## 2019-09-26 NOTE — TELEPHONE ENCOUNTER
FUTURE VISIT INFORMATION      FUTURE VISIT INFORMATION:    Date: 11/13/19    Time: 11:00am    Location: [facility]  REFERRAL INFORMATION:    Referring provider:  Dr. Ruiz    Referring providers clinic:  [facility]    Reason for visit/diagnosis  Hidradenitis suppurativa    RECORDS REQUESTED FROM:       Clinic name Comments Records Status Imaging Status   [facility] OV/referral 9/19/19- [facility]

## 2019-09-27 ENCOUNTER — COMMUNICATION - HEALTHEAST (OUTPATIENT)
Dept: NURSING | Facility: CLINIC | Age: 34
End: 2019-09-27

## 2019-10-02 ENCOUNTER — COMMUNICATION - HEALTHEAST (OUTPATIENT)
Dept: CARE COORDINATION | Facility: CLINIC | Age: 34
End: 2019-10-02

## 2019-10-06 ENCOUNTER — OFFICE VISIT - HEALTHEAST (OUTPATIENT)
Dept: PALLIATIVE MEDICINE | Facility: OTHER | Age: 34
End: 2019-10-06

## 2019-10-06 DIAGNOSIS — G89.4 CHRONIC PAIN SYNDROME: ICD-10-CM

## 2019-10-07 ENCOUNTER — OFFICE VISIT - HEALTHEAST (OUTPATIENT)
Dept: PALLIATIVE MEDICINE | Facility: OTHER | Age: 34
End: 2019-10-07

## 2019-10-07 ENCOUNTER — COMMUNICATION - HEALTHEAST (OUTPATIENT)
Dept: PALLIATIVE MEDICINE | Facility: OTHER | Age: 34
End: 2019-10-07

## 2019-10-07 DIAGNOSIS — G89.4 CHRONIC PAIN SYNDROME: ICD-10-CM

## 2019-10-17 ENCOUNTER — COMMUNICATION - HEALTHEAST (OUTPATIENT)
Dept: NURSING | Facility: CLINIC | Age: 34
End: 2019-10-17

## 2019-10-21 ENCOUNTER — COMMUNICATION - HEALTHEAST (OUTPATIENT)
Dept: PALLIATIVE MEDICINE | Facility: OTHER | Age: 34
End: 2019-10-21

## 2019-10-21 DIAGNOSIS — G89.4 CHRONIC PAIN SYNDROME: ICD-10-CM

## 2019-10-24 ENCOUNTER — OFFICE VISIT (OUTPATIENT)
Dept: DERMATOLOGY | Facility: CLINIC | Age: 34
End: 2019-10-24
Attending: DERMATOLOGY
Payer: COMMERCIAL

## 2019-10-24 ENCOUNTER — COMMUNICATION - HEALTHEAST (OUTPATIENT)
Dept: INTERNAL MEDICINE | Facility: CLINIC | Age: 34
End: 2019-10-24

## 2019-10-24 ENCOUNTER — TELEPHONE (OUTPATIENT)
Dept: DERMATOLOGY | Facility: CLINIC | Age: 34
End: 2019-10-24

## 2019-10-24 VITALS
TEMPERATURE: 97.6 F | OXYGEN SATURATION: 98 % | BODY MASS INDEX: 29.47 KG/M2 | WEIGHT: 187.8 LBS | DIASTOLIC BLOOD PRESSURE: 80 MMHG | HEIGHT: 67 IN | HEART RATE: 75 BPM | SYSTOLIC BLOOD PRESSURE: 123 MMHG

## 2019-10-24 DIAGNOSIS — L30.9 DERMATITIS: ICD-10-CM

## 2019-10-24 DIAGNOSIS — L30.9 ECZEMA, UNSPECIFIED TYPE: ICD-10-CM

## 2019-10-24 DIAGNOSIS — M02.30 REACTIVE ARTHRITIS (H): Primary | ICD-10-CM

## 2019-10-24 DIAGNOSIS — L73.2 HIDRADENITIS SUPPURATIVA: ICD-10-CM

## 2019-10-24 LAB
ANION GAP SERPL CALCULATED.3IONS-SCNC: 5 MMOL/L (ref 3–14)
BUN SERPL-MCNC: 12 MG/DL (ref 7–30)
CALCIUM SERPL-MCNC: 8.6 MG/DL (ref 8.5–10.1)
CHLORIDE SERPL-SCNC: 106 MMOL/L (ref 94–109)
CO2 SERPL-SCNC: 27 MMOL/L (ref 20–32)
CREAT SERPL-MCNC: 0.88 MG/DL (ref 0.66–1.25)
ERYTHROCYTE [DISTWIDTH] IN BLOOD BY AUTOMATED COUNT: 12.3 % (ref 10–15)
GFR SERPL CREATININE-BSD FRML MDRD: >90 ML/MIN/{1.73_M2}
GLUCOSE SERPL-MCNC: 90 MG/DL (ref 70–99)
HCT VFR BLD AUTO: 42.2 % (ref 40–53)
HGB BLD-MCNC: 13.9 G/DL (ref 13.3–17.7)
MCH RBC QN AUTO: 30.6 PG (ref 26.5–33)
MCHC RBC AUTO-ENTMCNC: 32.9 G/DL (ref 31.5–36.5)
MCV RBC AUTO: 93 FL (ref 78–100)
PLATELET # BLD AUTO: 215 10E9/L (ref 150–450)
POTASSIUM SERPL-SCNC: 4 MMOL/L (ref 3.4–5.3)
RBC # BLD AUTO: 4.54 10E12/L (ref 4.4–5.9)
SODIUM SERPL-SCNC: 138 MMOL/L (ref 133–144)
WBC # BLD AUTO: 6.8 10E9/L (ref 4–11)

## 2019-10-24 PROCEDURE — G0463 HOSPITAL OUTPT CLINIC VISIT: HCPCS | Mod: ZF

## 2019-10-24 PROCEDURE — 80048 BASIC METABOLIC PNL TOTAL CA: CPT | Performed by: DERMATOLOGY

## 2019-10-24 PROCEDURE — 85027 COMPLETE CBC AUTOMATED: CPT | Performed by: DERMATOLOGY

## 2019-10-24 PROCEDURE — 36415 COLL VENOUS BLD VENIPUNCTURE: CPT | Performed by: DERMATOLOGY

## 2019-10-24 RX ORDER — CYCLOSPORINE 100 MG/1
200 CAPSULE, GELATIN COATED ORAL 2 TIMES DAILY
Qty: 120 CAPSULE | Refills: 1 | Status: SHIPPED | OUTPATIENT
Start: 2019-10-24 | End: 2019-12-26

## 2019-10-24 RX ORDER — FLUOCINOLONE ACETONIDE 0.11 MG/ML
OIL TOPICAL DAILY
Qty: 1 BOTTLE | Refills: 3 | Status: SHIPPED | OUTPATIENT
Start: 2019-10-24 | End: 2019-12-26

## 2019-10-24 RX ORDER — UREA 40 %
CREAM (GRAM) TOPICAL EVERY MORNING
Qty: 1 TUBE | Refills: 3 | Status: SHIPPED | OUTPATIENT
Start: 2019-10-24 | End: 2019-12-26

## 2019-10-24 ASSESSMENT — MIFFLIN-ST. JEOR: SCORE: 1755.49

## 2019-10-24 ASSESSMENT — PAIN SCALES - GENERAL: PAINLEVEL: WORST PAIN (10)

## 2019-10-24 NOTE — TELEPHONE ENCOUNTER
Called and spoke to his restricted care plan, they need the PCP to send a referral to them for Dr. Ruiz.  Once it is sent over, it will 24-48 hours to get the referral approved.  Have updated Dr. Ruiz.      Called and spoke with pt, he is aware of this, he sees PCP tomorrow and she usually prescribes the medication so he can then get it. He is aware that I asked for a referral for him to be seen by Dr. Ruiz.    Lashae Young RN  Rheumatology Clinic
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
## 2019-10-24 NOTE — PATIENT INSTRUCTIONS
Lidex ointment at night   Urea cream 40% to hands and feet in morning  Continue cyclosporine 200 twice a day   Follow-up in 1 month      Fluocinolone oil daily for itching
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
## 2019-10-24 NOTE — TELEPHONE ENCOUNTER
Pt is restricted to certain providers due to insurance, will call insurance company to try to add provider.    Lashae Young RN  Rheumatology Clinic

## 2019-10-24 NOTE — TELEPHONE ENCOUNTER
[facility] Call Center    Phone Message    May a detailed message be left on voicemail: yes    Reason for Call: Medication Question or concern regarding medication   Prescription Clarification  Name of Medication: cycloSPORINE (SANDIMMUNE) 100 MG capsule     Prescribing Provider: Joseph   Pharmacy:[facility] - [place], [place] - [address] [place] [address]   What on the order needs clarification? Rx is not going through as provider is not listed as prescribed provider of record.          Action Taken: Message routed to:  [facility] ([facility] derm

## 2019-10-24 NOTE — NURSING NOTE
"Chief Complaint   Patient presents with     RECHECK     HS     /80   Pulse 75   Temp 97.6  F (36.4  C) (Oral)   Ht 1.702 m (5' 7\")   Wt 85.2 kg (187 lb 12.8 oz)   SpO2 98%   BMI 29.41 kg/m    Jennifer Hackett CMA    "
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
## 2019-10-24 NOTE — PROGRESS NOTES
I have a gentleman here who has fairly mild disease but when he gets an abscess he wants it excised. He has had numerous surgeries with Dr Bird. Unfortunately we were going to start dapsone and he developed a psoriasis-like eruption (I have been calling it reactive arthritis) that I have been treating him with cyclosporine and hoped it would help the HS, but he developed a new abscess in the rt upper thigh. I offered I&D, injection even humira, and he only wants excisions. I planned to start dapsone, but he wants to hold off until after surgery and until he has tapered down on the other medication.  He is trying to get in ASAP. I thought I placed a consult for him last month, yecenia he never got called. It is also possible I never put it in.

## 2019-10-25 ENCOUNTER — RECORDS - HEALTHEAST (OUTPATIENT)
Dept: ADMINISTRATIVE | Facility: OTHER | Age: 34
End: 2019-10-25

## 2019-10-28 ENCOUNTER — OFFICE VISIT - HEALTHEAST (OUTPATIENT)
Dept: INTERNAL MEDICINE | Facility: CLINIC | Age: 34
End: 2019-10-28

## 2019-10-28 DIAGNOSIS — Z13.220 SCREENING FOR HYPERLIPIDEMIA: ICD-10-CM

## 2019-10-28 DIAGNOSIS — M25.551 HIP PAIN, RIGHT: ICD-10-CM

## 2019-10-28 DIAGNOSIS — L73.2 HIDRADENITIS: ICD-10-CM

## 2019-10-28 DIAGNOSIS — Z00.00 ROUTINE GENERAL MEDICAL EXAMINATION AT A HEALTH CARE FACILITY: ICD-10-CM

## 2019-10-28 LAB
CHOLEST SERPL-MCNC: 163 MG/DL
FASTING STATUS PATIENT QL REPORTED: YES
HDLC SERPL-MCNC: 52 MG/DL
LDLC SERPL CALC-MCNC: 92 MG/DL
TRIGL SERPL-MCNC: 95 MG/DL

## 2019-10-28 ASSESSMENT — PATIENT HEALTH QUESTIONNAIRE - PHQ9: SUM OF ALL RESPONSES TO PHQ QUESTIONS 1-9: 9

## 2019-10-28 ASSESSMENT — MIFFLIN-ST. JEOR: SCORE: 1729.84

## 2019-10-29 ENCOUNTER — COMMUNICATION - HEALTHEAST (OUTPATIENT)
Dept: INTERNAL MEDICINE | Facility: CLINIC | Age: 34
End: 2019-10-29

## 2019-10-30 DIAGNOSIS — L73.2 HIDRADENITIS SUPPURATIVA: Primary | ICD-10-CM

## 2019-11-01 ENCOUNTER — TELEPHONE (OUTPATIENT)
Dept: DERMATOLOGY | Facility: CLINIC | Age: 34
End: 2019-11-01

## 2019-11-01 NOTE — TELEPHONE ENCOUNTER
Attempted to call pt to schedule for consult for HS removal with Dr. Monroe per Dr. Ruiz's referral. No answer. LVM for patient to call back, number was provided.    Tammy Dan LPN

## 2019-11-04 ENCOUNTER — COMMUNICATION - HEALTHEAST (OUTPATIENT)
Dept: PALLIATIVE MEDICINE | Facility: OTHER | Age: 34
End: 2019-11-04

## 2019-11-04 DIAGNOSIS — G89.4 CHRONIC PAIN SYNDROME: ICD-10-CM

## 2019-11-04 NOTE — TELEPHONE ENCOUNTER
I called the patient and he states that he cannot wait till December to have the HS excision. He was in the hospital last night and he needs to have this done ASAP. I asked if he was willing to go to [place], the patient states that he cannot drive that far. Is there anyway we can take a MOHS slot that isn't filled and put him in?   Danielle Ojeda, CMA

## 2019-11-04 NOTE — TELEPHONE ENCOUNTER
[facility] Call Center    Phone Message    May a detailed message be left on voicemail: yes    Reason for Call: Other: Pt called back through the scheduling line, please call pt back to get his surgery scheduled.  He is available all day today     Action Taken: Message routed to:  [facility] ([facility] Derm

## 2019-11-06 NOTE — TELEPHONE ENCOUNTER
Fermin Monroe MD  You; [facility] 2 days ago      It looks like Dr. Ruiz put in a plastic surgery consult for excision and he has an appointment with Dr. Weiss on 11/13/19.  If he would like me to see him for excision, I would prefer a consult visit first.    Routing comment

## 2019-11-06 NOTE — TELEPHONE ENCOUNTER
I called the patient and he stated that he wants to get in ASAP. I scheduled him for a consult with Dr. Monroe next week.   Danielle Ojeda, KIKA

## 2019-11-08 NOTE — TELEPHONE ENCOUNTER
FUTURE VISIT INFORMATION      FUTURE VISIT INFORMATION:    Date: 11,12,19    Time: 3:15    Location:  DermSurg  REFERRAL INFORMATION:    Referring provider:  Dr. Ruiz    Referring providers clinic:  [facility]    Reason for visit/diagnosis  HS surgery consult     RECORDS REQUESTED FROM:       Clinic name Comments Records Status Imaging Status   [facility] 10.24.19 Dr. Ruiz [facility] N/A

## 2019-11-12 ENCOUNTER — OFFICE VISIT (OUTPATIENT)
Dept: DERMATOLOGY | Facility: CLINIC | Age: 34
End: 2019-11-12
Payer: COMMERCIAL

## 2019-11-12 ENCOUNTER — PRE VISIT (OUTPATIENT)
Dept: DERMATOLOGY | Facility: CLINIC | Age: 34
End: 2019-11-12

## 2019-11-12 DIAGNOSIS — L73.2 HIDRADENITIS SUPPURATIVA: Primary | ICD-10-CM

## 2019-11-12 ASSESSMENT — PAIN SCALES - GENERAL: PAINLEVEL: EXTREME PAIN (8)

## 2019-11-12 NOTE — NURSING NOTE
Chief Complaint   Patient presents with     Derm Problem     HS surgery consult      Sandi Reeves, EMT

## 2019-11-12 NOTE — PROGRESS NOTES
[facility] Dermatology Note      Dermatology Problem List:  1. Hidradenitis supperitiva              -Diagnosed in 2016 and did not respond to antibiotics.               -Seen by Dr. Bird and underwent 9 procedures               - mg BID - start 9/19  2. Acute onset hand foot psoriasiform appearing dermatitis              -  mg BID - start 9/19              - Lidex ointment at night, urea cream in the morning   3. Atopic dermatitis  4. Seborrheic dermatitis                        - DermaSmooth    Encounter Date: Nov 12, 2019    CC:  Chief Complaint   Patient presents with     Derm Problem     HS surgery consult          History of Present Illness:  Mr. Bernard Padilla is a 34 year old male who presents today for a surgical consultation regarding hidradenitis suppurativa. The patient was last seen by Dr. Ruiz on 10/24/19 when he was referred here to discuss excision. He has been dealing with HS for around 6-7 years. Initially he had areas lanced, which was not helpful. Has undergone surgeries (around 9) in the past to excise nodules/abscesses. These were left to heal on their own rather than stitched back up. He had trouble in the past with lidocaine injections. He has always been under general anesthesia during past procedures. Reports new nodules on his scrotum and another on the buttocks. These are very painful. He was seen in the ER for them a few days ago. The patient is otherwise feeling well. There are no other skin concerns at this time.      Past Medical History:   Patient Active Problem List   Diagnosis     Hidradenitis suppurativa     Tobacco use disorder     Patellofemoral arthritis of left knee     Eczema     Past Medical History:   Diagnosis Date     Boil      Past Surgical History:   Procedure Laterality Date     ORTHOPEDIC SURGERY      meniscus repair       Social History:  Social History     Socioeconomic History     Marital status: Single     Spouse name: None      Number of children: None     Years of education: None     Highest education level: None   Occupational History     None   Social Needs     Financial resource strain: None     Food insecurity:     Worry: None     Inability: None     Transportation needs:     Medical: None     Non-medical: None   Tobacco Use     Smoking status: Current Every Day Smoker     Packs/day: 1.00     Smokeless tobacco: Never Used     Tobacco comment: 1/2 pack of day, not interested in quitting    Substance and Sexual Activity     Alcohol use: Yes     Comment: Beer occasionally      Drug use: Yes     Types: Marijuana     Sexual activity: None   Lifestyle     Physical activity:     Days per week: None     Minutes per session: None     Stress: None   Relationships     Social connections:     Talks on phone: None     Gets together: None     Attends Mosque service: None     Active member of club or organization: None     Attends meetings of clubs or organizations: None     Relationship status: None     Intimate partner violence:     Fear of current or ex partner: None     Emotionally abused: None     Physically abused: None     Forced sexual activity: None   Other Topics Concern     Parent/sibling w/ CABG, MI or angioplasty before 65F 55M? Not Asked   Social History Narrative     None       Family History:  Family History   Problem Relation Age of Onset     Hypertension Mother      Diabetes Father      Cancer No family hx of      Coronary Artery Disease No family hx of      Heart Disease No family hx of         Medications:  Current Outpatient Medications   Medication Sig Dispense Refill     Acetaminophen (TYLENOL PO)        albuterol (PROAIR HFA/PROVENTIL HFA/VENTOLIN HFA) 108 (90 Base) MCG/ACT inhaler Inhale 2 puffs into the lungs every 6 hours 8.5 g 3     cetirizine (ZYRTEC) 10 MG tablet Take 1 tablet (10 mg) by mouth daily 30 tablet 2     clindamycin (CLEOCIN T) 1 % external solution Apply topically 2 times daily 60 mL 2      cycloSPORINE (SANDIMMUNE) 100 MG capsule Take 2 capsules (200 mg) by mouth 2 times daily 120 capsule 1     fluocinolone acetonide (DERMA-SMOOTHE/FS BODY) 0.01 % external oil Apply topically 2 times daily 120 mL 1     Fluocinolone Acetonide Scalp (DERMA-SMOOTHE/FS SCALP) 0.01 % OIL oil Apply topically daily 1 Bottle 3     fluocinonide (LIDEX) 0.05 % external ointment Apply topically 2 times daily 60 g 1     fluocinonide (LIDEX) 0.05 % external solution Apply 1 mL topically daily as needed (itchy scalp) 60 mL 3     fluticasone (FLONASE) 50 MCG/ACT nasal spray Spray 1-2 sprays into both nostrils daily 16 g 2     IBUPROFEN PO Take 800 mg by mouth 3 times daily as needed       naloxone (NARCAN) 4 MG/0.1ML nasal spray Spray 1 spray (4 mg) into one nostril alternating nostrils as needed for opioid reversal 2 each 0     triamcinolone (KENALOG) 0.1 % external ointment Apply topically 2 times daily as needed for irritation 30 g 3     Urea 40 % CREA Externally apply topically every morning 1 Tube 3       Allergies   Allergen Reactions     Vicodin [Hydrocodone-Acetaminophen] Shortness Of Breath     Chicken-Derived Products (Egg) Nausea and Vomiting and GI Disturbance     Hydrocodone      Other reaction(s): Throat Irritation       Review of Systems:  -Skin Establ Pt: The patient denies any new rash, pruritus, or lesions that are symptomatic, changing or bleeding, except as per HPI.  -Constitutional: The patient is feeling generally well.    Physical exam:  Vitals: There were no vitals taken for this visit.  GEN: This is a well developed, well-nourished male in no acute distress, in a pleasant mood.    SKIN: Focused examination of the lesions of concern was performed.  - 2.5 cm tender minimally fluctuant nodule on the left perianal buttocks.   - 1.5 cm firm tender nodule on the right scrotum.   - Well healed scars at previous surgical sites with NERD.  - Dyspigmentation of anterior thighs c/w atopic eczema history.  - No other  lesions of concern on areas examined.       Impression/Plan:  1. Hidradenitis suppurativa    Risks, benefits, and process of excision were reviewed including possibility of damage to surrounding anatomical structures and infection. Discussed that surgery would be done under local anesthesia here in clinic and wound will be allowed to heal by second intention. Patient will be seeing Dr. Leos tomorrow to discuss surgery in OR under general anesthesia. He will contact clinic prn.    Follow-up prn.       Staff Physician Comments:   I saw and evaluated the patient with the fellow (Dr. Neftaly Jimenes) and I agree with the assessment and plan and the above description of the procedure. I was present for the entire exam.    Fermin Monroe DO    Department of Dermatology  [facility]: Phone: [phone], Fax:[phone]  [facility]: Phone: [phone], Fax: [phone]

## 2019-11-13 ENCOUNTER — OFFICE VISIT (OUTPATIENT)
Dept: SURGERY | Facility: CLINIC | Age: 34
End: 2019-11-13
Payer: COMMERCIAL

## 2019-11-13 ENCOUNTER — TELEPHONE (OUTPATIENT)
Dept: SURGERY | Facility: CLINIC | Age: 34
End: 2019-11-13

## 2019-11-13 ENCOUNTER — OFFICE VISIT (OUTPATIENT)
Dept: PLASTIC SURGERY | Facility: CLINIC | Age: 34
End: 2019-11-13
Attending: DERMATOLOGY
Payer: COMMERCIAL

## 2019-11-13 VITALS
WEIGHT: 189.6 LBS | HEART RATE: 38 BPM | BODY MASS INDEX: 29.76 KG/M2 | SYSTOLIC BLOOD PRESSURE: 128 MMHG | OXYGEN SATURATION: 98 % | DIASTOLIC BLOOD PRESSURE: 88 MMHG | HEIGHT: 67 IN

## 2019-11-13 VITALS
BODY MASS INDEX: 29.7 KG/M2 | HEIGHT: 67 IN | HEART RATE: 38 BPM | DIASTOLIC BLOOD PRESSURE: 88 MMHG | OXYGEN SATURATION: 98 % | SYSTOLIC BLOOD PRESSURE: 128 MMHG

## 2019-11-13 DIAGNOSIS — L73.2 HIDRADENITIS SUPPURATIVA: Primary | ICD-10-CM

## 2019-11-13 DIAGNOSIS — K61.0 PERIANAL ABSCESS: Primary | ICD-10-CM

## 2019-11-13 DIAGNOSIS — L73.2 HIDRADENITIS SUPPURATIVA OF ANUS: ICD-10-CM

## 2019-11-13 RX ORDER — CLINDAMYCIN HCL 300 MG
300 CAPSULE ORAL 3 TIMES DAILY
Qty: 30 CAPSULE | Refills: 0 | Status: SHIPPED | OUTPATIENT
Start: 2019-11-13 | End: 2019-11-23

## 2019-11-13 ASSESSMENT — ENCOUNTER SYMPTOMS
PARALYSIS: 0
HEARTBURN: 0
ALTERED TEMPERATURE REGULATION: 0
EYE REDNESS: 0
SEIZURES: 0
CHILLS: 1
INCREASED ENERGY: 0
SKIN CHANGES: 0
VOMITING: 1
HEMATURIA: 0
MUSCLE WEAKNESS: 1
POLYPHAGIA: 1
NIGHT SWEATS: 1
BLOOD IN STOOL: 0
ABDOMINAL PAIN: 0
WEAKNESS: 0
ARTHRALGIAS: 1
EYE WATERING: 0
NAUSEA: 1
BOWEL INCONTINENCE: 0
TASTE DISTURBANCE: 1
NAIL CHANGES: 0
POLYDIPSIA: 1
BLOATING: 0
EYE IRRITATION: 1
HOARSE VOICE: 0
NUMBNESS: 1
CONSTIPATION: 1
JAUNDICE: 0
TREMORS: 0
POOR WOUND HEALING: 0
WEIGHT GAIN: 1
DIFFICULTY URINATING: 0
STIFFNESS: 1
DIARRHEA: 1
SMELL DISTURBANCE: 1
RECTAL PAIN: 0
MYALGIAS: 1
SORE THROAT: 0
DISTURBANCES IN COORDINATION: 0
FEVER: 1
NECK MASS: 0
DECREASED APPETITE: 1
FLANK PAIN: 0
DOUBLE VISION: 0
HALLUCINATIONS: 0
BACK PAIN: 1
DYSURIA: 0
SINUS PAIN: 1
NECK PAIN: 0
JOINT SWELLING: 1
SINUS CONGESTION: 1
TROUBLE SWALLOWING: 0
MEMORY LOSS: 1
WEIGHT LOSS: 0
TINGLING: 1
SPEECH CHANGE: 0
LOSS OF CONSCIOUSNESS: 0
FATIGUE: 1
DIZZINESS: 1
EYE PAIN: 0
HEADACHES: 1

## 2019-11-13 ASSESSMENT — PAIN SCALES - GENERAL
PAINLEVEL: WORST PAIN (10)
PAINLEVEL: EXTREME PAIN (8)

## 2019-11-13 ASSESSMENT — MIFFLIN-ST. JEOR: SCORE: 1763.65

## 2019-11-13 NOTE — PROGRESS NOTES
"Colon and Rectal Surgery Consult Clinic Note    Date: 2019     Referring provider:  Referred Self, MD  No address on file     RE: Bernard Padilla  : 1985  JOHNNY: 2019    Bernard Padilla is a very pleasant 34 year old male with hidradenitis suppurativa who presents today for possible perianal abscess.    HPI:  Bernard has had long-standing hidradenitis with multiple abscesses.  He is undergone over 9 procedures that he reports have been in his armpits, groin, and perianal area.  He has had 2 abscesses drained in the perianal area in the past.  He reports that both of these were on the right side.  He now has a new abscess on the left that has been getting progressively larger and more painful.  He reports that antibiotics have not helped with this in the past that he does not tolerate I&D procedures without sedation.  He denies any fevers or chills.  He denies any difficulty with bowel movements.  He additionally has a smaller abscess near his scrotum that is less painful.  He saw Dr. Leos in consultation today and he recommended that Bernard see urology for the scrotal abscess and our clinic for the perianal abscess.  He started him on clindamycin.  Will he follows with Dr. Ruiz of dermatology and has failed multiple courses of antibiotics.  He is currently on cyclosporine.    Physical Examination: Exam was chaperoned by Jair Blas, EMT  /88   Pulse (!) 38   Ht 5' 7\"   SpO2 98%   BMI 29.70 kg/m    General: Alert, oriented, in no acute distress, sitting comfortably  HEENT: Mucous membranes moist  Perianal external examination:  Induration in the left posterior perianal position with tenderness on palpation.  No drainage and no erythema present.  Scarring present in the right lateral and right anterior  Digital rectal examination: Was deferred    Anoscopy: Was deferred    Assessment/Plan: 34 year old male with perianal abscess.  On exam he does have induration in the left posterior " position. Although he reports that this is a new abscess, he does have a CT from October of last year with subcutaneous stranding and skin thickening along the medial left gluteal fold and some ill-defined fluid compatible with phlegmon and a repeat CT from July of this year with improved inflammatory changes and small phlegmonous change along the left gluteal fold medially.He refuses bedside I&D and request that this be done in the operating room.  He has had multiple I&D procedures in the past and knows that he does not tolerate this without sedation.  Discussed with Dr. Kenny who is willing to get him into the operating room tomorrow for an examination under anesthesia and incision and drainage of perianal abscess. Patient's questions were answered to his stated satisfaction and he is in agreement with this plan.    Medical history:  Past Medical History:   Diagnosis Date     Boil        Surgical history:  Past Surgical History:   Procedure Laterality Date     ORTHOPEDIC SURGERY      meniscus repair       Problem list:  Patient Active Problem List    Diagnosis Date Noted     Hidradenitis suppurativa 01/23/2019     Priority: Medium     Tobacco use disorder 08/08/2016     Priority: Medium     Patellofemoral arthritis of left knee 06/10/2016     Priority: Medium     Eczema 12/05/2012     Priority: Medium       Medications:  Current Outpatient Medications   Medication Sig Dispense Refill     Acetaminophen (TYLENOL PO)        albuterol (PROAIR HFA/PROVENTIL HFA/VENTOLIN HFA) 108 (90 Base) MCG/ACT inhaler Inhale 2 puffs into the lungs every 6 hours 8.5 g 3     cetirizine (ZYRTEC) 10 MG tablet Take 1 tablet (10 mg) by mouth daily 30 tablet 2     clindamycin (CLEOCIN T) 1 % external solution Apply topically 2 times daily 60 mL 2     clindamycin (CLEOCIN) 300 MG capsule Take 1 capsule (300 mg) by mouth 3 times daily for 10 days 30 capsule 0     cycloSPORINE (SANDIMMUNE) 100 MG capsule Take 2 capsules (200 mg) by mouth  2 times daily 120 capsule 1     fluocinolone acetonide (DERMA-SMOOTHE/FS BODY) 0.01 % external oil Apply topically 2 times daily 120 mL 1     Fluocinolone Acetonide Scalp (DERMA-SMOOTHE/FS SCALP) 0.01 % OIL oil Apply topically daily 1 Bottle 3     fluocinonide (LIDEX) 0.05 % external ointment Apply topically 2 times daily 60 g 1     fluocinonide (LIDEX) 0.05 % external solution Apply 1 mL topically daily as needed (itchy scalp) 60 mL 3     fluticasone (FLONASE) 50 MCG/ACT nasal spray Spray 1-2 sprays into both nostrils daily 16 g 2     IBUPROFEN PO Take 800 mg by mouth 3 times daily as needed       naloxone (NARCAN) 4 MG/0.1ML nasal spray Spray 1 spray (4 mg) into one nostril alternating nostrils as needed for opioid reversal 2 each 0     triamcinolone (KENALOG) 0.1 % external ointment Apply topically 2 times daily as needed for irritation 30 g 3     Urea 40 % CREA Externally apply topically every morning 1 Tube 3       Allergies:  Allergies   Allergen Reactions     Vicodin [Hydrocodone-Acetaminophen] Shortness Of Breath     Chicken-Derived Products (Egg) Nausea and Vomiting and GI Disturbance     Hydrocodone      Other reaction(s): Throat Irritation       Family history:  Family History   Problem Relation Age of Onset     Hypertension Mother      Diabetes Father      Cancer No family hx of      Coronary Artery Disease No family hx of      Heart Disease No family hx of        Social history:  Social History     Tobacco Use     Smoking status: Current Every Day Smoker     Packs/day: 1.00     Smokeless tobacco: Never Used     Tobacco comment: 1/2 pack of day, not interested in quitting    Substance Use Topics     Alcohol use: Yes     Comment: Beer occasionally     Marital status: single.    Nursing Notes:   Jair Blas, EMT  11/13/2019  4:10 PM  Signed  Chief Complaint   Patient presents with     Consult     Possible perianal abscess.       Vitals:    11/13/19 1608   BP: 128/88   Pulse: (!) 38   SpO2: 98%  "  Height: 5' 7\"       Body mass index is 29.7 kg/m .      DREW Osman                           Total face to face time was 30 minutes, >50% counseling.    ROSEY Vargas, NP-C  Colon and Rectal Surgery   [facility]    This note was created using speech recognition software and may contain unintended word substitutions.    "

## 2019-11-13 NOTE — LETTER
"     RE: Bernard Padilla  [address] [address] [facility] [address]     Dear Colleague,    Thank you for referring your patient, Bernard Padilla, to the [facility] COLON AND RECTAL SURGERY at [facility]. Please see a copy of my visit note below.    Colon and Rectal Surgery Consult Clinic Note    Date: 2019     Referring provider:  Referred Self, MD  No address on file     RE: Bernard Padilla  : 1985  JOHNNY: 2019    Bernard Padilla is a very pleasant 34 year old male with hidradenitis suppurativa who presents today for possible perianal abscess.    HPI:  Bernard has had long-standing hidradenitis with multiple abscesses.  He is undergone over 9 procedures that he reports have been in his armpits, groin, and perianal area.  He has had 2 abscesses drained in the perianal area in the past.  He reports that both of these were on the right side.  He now has a new abscess on the left that has been getting progressively larger and more painful.  He reports that antibiotics have not helped with this in the past that he does not tolerate I&D procedures without sedation.  He denies any fevers or chills.  He denies any difficulty with bowel movements.  He additionally has a smaller abscess near his scrotum that is less painful.  He saw Dr. Leos in consultation today and he recommended that Bernard see urology for the scrotal abscess and our clinic for the perianal abscess.  He started him on clindamycin.  Will he follows with Dr. Ruiz of dermatology and has failed multiple courses of antibiotics.  He is currently on cyclosporine.    Physical Examination: Exam was chaperoned by Jair Blas, EMT  /88   Pulse (!) 38   Ht 5' 7\"   SpO2 98%   BMI 29.70 kg/m     General: Alert, oriented, in no acute distress, sitting comfortably  HEENT: Mucous membranes moist  Perianal external examination:  Induration in the left posterior perianal position with tenderness on palpation.  " No drainage and no erythema present.  Scarring present in the right lateral and right anterior  Digital rectal examination: Was deferred    Anoscopy: Was deferred    Assessment/Plan: 34 year old male with perianal abscess.  On exam he does have induration in the left posterior position. Although he reports that this is a new abscess, he does have a CT from October of last year with subcutaneous stranding and skin thickening along the medial left gluteal fold and some ill-defined fluid compatible with phlegmon and a repeat CT from July of this year with improved inflammatory changes and small phlegmonous change along the left gluteal fold medially.He refuses bedside I&D and request that this be done in the operating room.  He has had multiple I&D procedures in the past and knows that he does not tolerate this without sedation.  Discussed with Dr. Kenny who is willing to get him into the operating room tomorrow for an examination under anesthesia and incision and drainage of perianal abscess. Patient's questions were answered to his stated satisfaction and he is in agreement with this plan.    Medical history:  Past Medical History:   Diagnosis Date     Boil        Surgical history:  Past Surgical History:   Procedure Laterality Date     ORTHOPEDIC SURGERY      meniscus repair       Problem list:  Patient Active Problem List    Diagnosis Date Noted     Hidradenitis suppurativa 01/23/2019     Priority: Medium     Tobacco use disorder 08/08/2016     Priority: Medium     Patellofemoral arthritis of left knee 06/10/2016     Priority: Medium     Eczema 12/05/2012     Priority: Medium       Medications:  Current Outpatient Medications   Medication Sig Dispense Refill     Acetaminophen (TYLENOL PO)        albuterol (PROAIR HFA/PROVENTIL HFA/VENTOLIN HFA) 108 (90 Base) MCG/ACT inhaler Inhale 2 puffs into the lungs every 6 hours 8.5 g 3     cetirizine (ZYRTEC) 10 MG tablet Take 1 tablet (10 mg) by mouth daily 30 tablet 2      clindamycin (CLEOCIN T) 1 % external solution Apply topically 2 times daily 60 mL 2     clindamycin (CLEOCIN) 300 MG capsule Take 1 capsule (300 mg) by mouth 3 times daily for 10 days 30 capsule 0     cycloSPORINE (SANDIMMUNE) 100 MG capsule Take 2 capsules (200 mg) by mouth 2 times daily 120 capsule 1     fluocinolone acetonide (DERMA-SMOOTHE/FS BODY) 0.01 % external oil Apply topically 2 times daily 120 mL 1     Fluocinolone Acetonide Scalp (DERMA-SMOOTHE/FS SCALP) 0.01 % OIL oil Apply topically daily 1 Bottle 3     fluocinonide (LIDEX) 0.05 % external ointment Apply topically 2 times daily 60 g 1     fluocinonide (LIDEX) 0.05 % external solution Apply 1 mL topically daily as needed (itchy scalp) 60 mL 3     fluticasone (FLONASE) 50 MCG/ACT nasal spray Spray 1-2 sprays into both nostrils daily 16 g 2     IBUPROFEN PO Take 800 mg by mouth 3 times daily as needed       naloxone (NARCAN) 4 MG/0.1ML nasal spray Spray 1 spray (4 mg) into one nostril alternating nostrils as needed for opioid reversal 2 each 0     triamcinolone (KENALOG) 0.1 % external ointment Apply topically 2 times daily as needed for irritation 30 g 3     Urea 40 % CREA Externally apply topically every morning 1 Tube 3       Allergies:  Allergies   Allergen Reactions     Vicodin [Hydrocodone-Acetaminophen] Shortness Of Breath     Chicken-Derived Products (Egg) Nausea and Vomiting and GI Disturbance     Hydrocodone      Other reaction(s): Throat Irritation       Family history:  Family History   Problem Relation Age of Onset     Hypertension Mother      Diabetes Father      Cancer No family hx of      Coronary Artery Disease No family hx of      Heart Disease No family hx of        Social history:  Social History     Tobacco Use     Smoking status: Current Every Day Smoker     Packs/day: 1.00     Smokeless tobacco: Never Used     Tobacco comment: 1/2 pack of day, not interested in quitting    Substance Use Topics     Alcohol use: Yes     Comment:  "Beer occasionally     Marital status: single.    Nursing Notes:   Jair Blas, EMT  11/13/2019  4:10 PM  Signed  Chief Complaint   Patient presents with     Consult     Possible perianal abscess.       Vitals:    11/13/19 1608   BP: 128/88   Pulse: (!) 38   SpO2: 98%   Height: 5' 7\"       Body mass index is 29.7 kg/m .      DREW Osman        Total face to face time was 30 minutes, >50% counseling.    ROSEY Vargas, NP-C  Colon and Rectal Surgery   [facility]    This note was created using speech recognition software and may contain unintended word substitutions.      "

## 2019-11-13 NOTE — NURSING NOTE
"Chief Complaint   Patient presents with     Consult     Possible perianal abscess.       Vitals:    11/13/19 1608   BP: 128/88   Pulse: (!) 38   SpO2: 98%   Height: 5' 7\"       Body mass index is 29.7 kg/m .      Jair Blas, EMT                      "

## 2019-11-13 NOTE — LETTER
11/13/2019       RE: Bernard Padilla  [address] [address] [facility] [id number]     Dear Colleague,    Thank you for referring your patient, Bernard Padilla, to the [facility] PLASTIC AND RECONSTRUCTIVE SURGERY at [facility]. Please see a copy of my visit note below.    REFERRING PHYSICIAN:  Dr. Apolinar Ruiz.      PRESENTING COMPLAINT:  Consultation for hidradenitis suppurativa.      HISTORY OF PRESENTING COMPLAINT:  Mr. Padilla is 34 years old, diagnosed with hidradenitis suppurativa of his groins, axillary area and perianal area.  The concerning areas for him are his right scrotum and his perianal area.  His groins and his armpits have been quiescent.  He has not really had full medical treatments given the fact that he has refused them because of its side effect.  Here to get my recommendations.      ASSESSMENT AND PLAN:  Based on above findings, a diagnosis of hidradenitis suppurativa was made.  Given the fact that perianal as well as scrotal problems, I have referred him to Urology and Colorectal Surgery to appropriately take care of him.  He understands that if his groins and his armpits start to cause a problem, then a wide local excision with local flaps including ALT flaps and TDAP flaps will be undertaken by Plastics.  He understood the plan.  I will see him back p.r.n.      Total time spent with the patient 5 minutes, more than half was counseling.      Again, thank you for allowing me to participate in the care of your patient.      Sincerely,    CURT Leos MD    cc:   Apolinar Ruiz MD   [facility]   [address] [place] [address]  [id number]

## 2019-11-13 NOTE — NURSING NOTE
"Chief Complaint   Patient presents with     Consult     New patient consultation, Hidradenitis suprrosanativa.        Vitals:    11/13/19 1146   BP: 128/88   BP Location: Left arm   Patient Position: Sitting   Cuff Size: Adult Regular   Pulse: (!) 38   SpO2: 98%   Weight: 189 lb 9.6 oz   Height: 5' 7\"       Body mass index is 29.7 kg/m .     Sunedep Lane LPN               "

## 2019-11-14 ENCOUNTER — TELEPHONE (OUTPATIENT)
Dept: SURGERY | Facility: CLINIC | Age: 34
End: 2019-11-14

## 2019-11-14 ENCOUNTER — ANESTHESIA EVENT (OUTPATIENT)
Dept: SURGERY | Facility: CLINIC | Age: 34
End: 2019-11-14
Payer: COMMERCIAL

## 2019-11-14 ENCOUNTER — HOSPITAL ENCOUNTER (OUTPATIENT)
Facility: CLINIC | Age: 34
Discharge: HOME | End: 2019-11-14
Attending: COLON & RECTAL SURGERY | Admitting: COLON & RECTAL SURGERY
Payer: COMMERCIAL

## 2019-11-14 ENCOUNTER — ANESTHESIA (OUTPATIENT)
Dept: SURGERY | Facility: CLINIC | Age: 34
End: 2019-11-14
Payer: COMMERCIAL

## 2019-11-14 VITALS
RESPIRATION RATE: 16 BRPM | OXYGEN SATURATION: 100 % | DIASTOLIC BLOOD PRESSURE: 83 MMHG | BODY MASS INDEX: 29.94 KG/M2 | WEIGHT: 186.29 LBS | HEIGHT: 66 IN | HEART RATE: 82 BPM | SYSTOLIC BLOOD PRESSURE: 121 MMHG | TEMPERATURE: 98.8 F

## 2019-11-14 DIAGNOSIS — L73.2 HIDRADENITIS SUPPURATIVA: ICD-10-CM

## 2019-11-14 DIAGNOSIS — K61.1 PERIRECTAL ABSCESS: Primary | ICD-10-CM

## 2019-11-14 LAB — GLUCOSE BLDC GLUCOMTR-MCNC: 85 MG/DL (ref 70–99)

## 2019-11-14 PROCEDURE — 71000027 ZZH RECOVERY PHASE 2 EACH 15 MINS: Performed by: COLON & RECTAL SURGERY

## 2019-11-14 PROCEDURE — 25000128 H RX IP 250 OP 636: Performed by: NURSE ANESTHETIST, CERTIFIED REGISTERED

## 2019-11-14 PROCEDURE — 36000051 ZZH SURGERY LEVEL 2 1ST 30 MIN - UMMC: Performed by: COLON & RECTAL SURGERY

## 2019-11-14 PROCEDURE — 25000132 ZZH RX MED GY IP 250 OP 250 PS 637: Performed by: ANESTHESIOLOGY

## 2019-11-14 PROCEDURE — 00000146 ZZHCL STATISTIC GLUCOSE BY METER IP

## 2019-11-14 PROCEDURE — 25000125 ZZHC RX 250: Performed by: COLON & RECTAL SURGERY

## 2019-11-14 PROCEDURE — 25800030 ZZH RX IP 258 OP 636: Performed by: NURSE ANESTHETIST, CERTIFIED REGISTERED

## 2019-11-14 PROCEDURE — 37000009 ZZH ANESTHESIA TECHNICAL FEE, EACH ADDTL 15 MIN: Performed by: COLON & RECTAL SURGERY

## 2019-11-14 PROCEDURE — 40000170 ZZH STATISTIC PRE-PROCEDURE ASSESSMENT II: Performed by: COLON & RECTAL SURGERY

## 2019-11-14 PROCEDURE — 37000008 ZZH ANESTHESIA TECHNICAL FEE, 1ST 30 MIN: Performed by: COLON & RECTAL SURGERY

## 2019-11-14 PROCEDURE — 27210794 ZZH OR GENERAL SUPPLY STERILE: Performed by: COLON & RECTAL SURGERY

## 2019-11-14 RX ORDER — ONDANSETRON 2 MG/ML
4 INJECTION INTRAMUSCULAR; INTRAVENOUS EVERY 30 MIN PRN
Status: DISCONTINUED | OUTPATIENT
Start: 2019-11-14 | End: 2019-11-14 | Stop reason: HOSPADM

## 2019-11-14 RX ORDER — OXYCODONE HYDROCHLORIDE 5 MG/1
10 TABLET ORAL EVERY 4 HOURS PRN
Status: DISCONTINUED | OUTPATIENT
Start: 2019-11-14 | End: 2019-11-14 | Stop reason: HOSPADM

## 2019-11-14 RX ORDER — PROPOFOL 10 MG/ML
INJECTION, EMULSION INTRAVENOUS CONTINUOUS PRN
Status: DISCONTINUED | OUTPATIENT
Start: 2019-11-14 | End: 2019-11-14

## 2019-11-14 RX ORDER — SODIUM CHLORIDE, SODIUM LACTATE, POTASSIUM CHLORIDE, CALCIUM CHLORIDE 600; 310; 30; 20 MG/100ML; MG/100ML; MG/100ML; MG/100ML
INJECTION, SOLUTION INTRAVENOUS CONTINUOUS PRN
Status: DISCONTINUED | OUTPATIENT
Start: 2019-11-14 | End: 2019-11-14

## 2019-11-14 RX ORDER — OXYCODONE AND ACETAMINOPHEN 10; 325 MG/1; MG/1
1 TABLET ORAL 2 TIMES DAILY
COMMUNITY
Start: 2019-08-27 | End: 2019-12-26

## 2019-11-14 RX ORDER — NALOXONE HYDROCHLORIDE 0.4 MG/ML
.1-.4 INJECTION, SOLUTION INTRAMUSCULAR; INTRAVENOUS; SUBCUTANEOUS
Status: DISCONTINUED | OUTPATIENT
Start: 2019-11-14 | End: 2019-11-14 | Stop reason: HOSPADM

## 2019-11-14 RX ORDER — HYDROMORPHONE HYDROCHLORIDE 1 MG/ML
.3-.5 INJECTION, SOLUTION INTRAMUSCULAR; INTRAVENOUS; SUBCUTANEOUS EVERY 5 MIN PRN
Status: DISCONTINUED | OUTPATIENT
Start: 2019-11-14 | End: 2019-11-14 | Stop reason: HOSPADM

## 2019-11-14 RX ORDER — SODIUM CHLORIDE, SODIUM LACTATE, POTASSIUM CHLORIDE, CALCIUM CHLORIDE 600; 310; 30; 20 MG/100ML; MG/100ML; MG/100ML; MG/100ML
INJECTION, SOLUTION INTRAVENOUS CONTINUOUS
Status: DISCONTINUED | OUTPATIENT
Start: 2019-11-14 | End: 2019-11-14 | Stop reason: HOSPADM

## 2019-11-14 RX ORDER — ACETAMINOPHEN 325 MG/1
650 TABLET ORAL
Status: DISCONTINUED | OUTPATIENT
Start: 2019-11-14 | End: 2019-11-14 | Stop reason: HOSPADM

## 2019-11-14 RX ORDER — OXYCODONE HYDROCHLORIDE 5 MG/1
5 TABLET ORAL EVERY 4 HOURS PRN
Qty: 6 TABLET | Refills: 0 | Status: SHIPPED | OUTPATIENT
Start: 2019-11-14 | End: 2019-11-15

## 2019-11-14 RX ORDER — FENTANYL CITRATE 50 UG/ML
25-50 INJECTION, SOLUTION INTRAMUSCULAR; INTRAVENOUS
Status: DISCONTINUED | OUTPATIENT
Start: 2019-11-14 | End: 2019-11-14 | Stop reason: HOSPADM

## 2019-11-14 RX ORDER — ONDANSETRON 4 MG/1
4 TABLET, ORALLY DISINTEGRATING ORAL EVERY 30 MIN PRN
Status: DISCONTINUED | OUTPATIENT
Start: 2019-11-14 | End: 2019-11-14 | Stop reason: HOSPADM

## 2019-11-14 RX ORDER — FENTANYL CITRATE 50 UG/ML
INJECTION, SOLUTION INTRAMUSCULAR; INTRAVENOUS PRN
Status: DISCONTINUED | OUTPATIENT
Start: 2019-11-14 | End: 2019-11-14

## 2019-11-14 RX ORDER — PROPOFOL 10 MG/ML
INJECTION, EMULSION INTRAVENOUS PRN
Status: DISCONTINUED | OUTPATIENT
Start: 2019-11-14 | End: 2019-11-14

## 2019-11-14 RX ORDER — MAGNESIUM HYDROXIDE 1200 MG/15ML
LIQUID ORAL PRN
Status: DISCONTINUED | OUTPATIENT
Start: 2019-11-14 | End: 2019-11-14 | Stop reason: HOSPADM

## 2019-11-14 RX ORDER — ONDANSETRON 4 MG/1
4 TABLET, ORALLY DISINTEGRATING ORAL
Status: DISCONTINUED | OUTPATIENT
Start: 2019-11-14 | End: 2019-11-14 | Stop reason: HOSPADM

## 2019-11-14 RX ORDER — BUPIVACAINE HYDROCHLORIDE AND EPINEPHRINE 2.5; 5 MG/ML; UG/ML
INJECTION, SOLUTION INFILTRATION; PERINEURAL PRN
Status: DISCONTINUED | OUTPATIENT
Start: 2019-11-14 | End: 2019-11-14 | Stop reason: HOSPADM

## 2019-11-14 RX ADMIN — MIDAZOLAM 2 MG: 1 INJECTION INTRAMUSCULAR; INTRAVENOUS at 16:25

## 2019-11-14 RX ADMIN — FENTANYL CITRATE 50 MCG: 50 INJECTION, SOLUTION INTRAMUSCULAR; INTRAVENOUS at 16:35

## 2019-11-14 RX ADMIN — OXYCODONE HYDROCHLORIDE 10 MG: 5 TABLET ORAL at 17:21

## 2019-11-14 RX ADMIN — PROPOFOL 125 MCG/KG/MIN: 10 INJECTION, EMULSION INTRAVENOUS at 16:29

## 2019-11-14 RX ADMIN — PROPOFOL 40 MG: 10 INJECTION, EMULSION INTRAVENOUS at 16:29

## 2019-11-14 RX ADMIN — MIDAZOLAM 1 MG: 1 INJECTION INTRAMUSCULAR; INTRAVENOUS at 16:29

## 2019-11-14 RX ADMIN — FENTANYL CITRATE 50 MCG: 50 INJECTION, SOLUTION INTRAMUSCULAR; INTRAVENOUS at 16:29

## 2019-11-14 RX ADMIN — SODIUM CHLORIDE, POTASSIUM CHLORIDE, SODIUM LACTATE AND CALCIUM CHLORIDE: 600; 310; 30; 20 INJECTION, SOLUTION INTRAVENOUS at 16:25

## 2019-11-14 ASSESSMENT — MIFFLIN-ST. JEOR: SCORE: 1736.72

## 2019-11-14 ASSESSMENT — LIFESTYLE VARIABLES: TOBACCO_USE: 1

## 2019-11-14 NOTE — TELEPHONE ENCOUNTER
[facility] Call Center    Phone Message    May a detailed message be left on voicemail: yes    Reason for Call: Other: Pt says he is supposed to have a surgery today at the hospital but pt doesn't know what time. Pt was told that a nurse will call. Pt was also directed to lauren op coordinator to leave a msg. Please call back asap.     Action Taken: Message routed to:  [facility] ([facility] colonrectal

## 2019-11-14 NOTE — OP NOTE
"DATE:  11/14/19    PREOPERATIVE DIAGNOSIS:  1. Perianal hidradenitis  2. Perianal abscess    POSTOPERATIVE DIAGNOSIS:   1. Perianal hidradenitis  2. Perianal abscess    PROCEDURE:  1. Evaluation under anesthesia.  2. Incision and drainage perianal abscess    ANESTHESIA: MAC plus local anesthesia.    SURGEON:  Yon Kenny MD    ASSISTANT(S): None    COMPLICATIONS: none, immediately.    ESTIMATED BLOOD LOSS: 5 mL.    SPECIMEN(S): None.    DRAINS/TUBES:  None.    OPERATIVE FINDINGS: Left posterior midline perianal abscess.    DISPOSITION: PACU.    INDICATIONS FOR PROCEDURE  Bernard Padilla is a 34 year old male who was seen in clinic yesterday with perianal pain. History of hidradenitis and recurrent perirectal abscesses. He did not tolerate office exam and so exam under anesthesia with possible I&D of abscess was recommended. I thoroughly discussed the risks, benefits, and alternatives of operative treatment with the patient and he/she agreed to proceed.    General risks related to anorectal surgery were reviewed with the patient. These include, but are not limited to urinary retention, abscess, infection, bleeding, chronic pain, anal stenosis, fistula, fissure, and fecal incontinence.     OPERATIVE PROCEDURE: After obtaining informed consent, the patient was brought to the operating room and placed in the prone jackknife position. MAC anesthesia was gently induced. Bilateral lower extremity pneumatic compression devices were applied and all pressure points were cushioned. The perianal area was then prepped and draped in the standard sterile fashion. After a \"time-out\" was performed, a total of 30 mL of Bupivacaine 0.25% without epinephrine was injected as a pudendal block. Digital rectal exam and anoscopy were performed. There was a small left posterior midline perianal abscess felt with fluctuance. A stab incision was made over this area with a #11 blade with return of purulent fluid. A small abscess cavity " was found and fully unroofed by excising the overlying skin. The abscess cavity was debrided with a curette. Hemostasis was obtained with electrocautery. There were no internal anal abnormalities noted. The distal rectum and anal canal were irrigated. Instrument, sponge, and needle counts were all correct as reported to me. Dry gauze and mesh underwear were placed. The patient tolerated the procedure well.    Yon Kenny M.D.    Division of Colon & Rectal Surgery  [facility]

## 2019-11-14 NOTE — TELEPHONE ENCOUNTER
11/14/2019 at 2:43 pm:  Received call from the control desk (Veto), stating that if patient wants to get in for surgery today, then they need him in the OR at the hospital ASAP.     Phone call to patient, explained the above, patient states he's leaving now and will arrive at [facility] OR ASAP.    Updated my supervisor April on this via [facility], since we have been in communication about patient's surgery and overall situation today.

## 2019-11-14 NOTE — ANESTHESIA PREPROCEDURE EVALUATION
Anesthesia Pre-Procedure Evaluation    Patient: Bernard Padilla   MRN:     [id number] Gender:   male   Age:    34 year old :      1985        Preoperative Diagnosis: Perianal abscess [K61.0]   Procedure(s):  IRRIGATION AND DEBRIDEMENT, RECTUM     Past Medical History:   Diagnosis Date     Arthritis of knee      Boil      Chronic pain      Eczema      Hidradenitis suppurativa       Past Surgical History:   Procedure Laterality Date     ORTHOPEDIC SURGERY      meniscus repair          Anesthesia Evaluation     . Pt has had prior anesthetic. Type: MAC and General    No history of anesthetic complications          ROS/MED HX    ENT/Pulmonary:  - neg pulmonary ROS   (+)tobacco use, Current use 0.5 packs/day  , . .    Neurologic:  - neg neurologic ROS     Cardiovascular:  - neg cardiovascular ROS       METS/Exercise Tolerance:  4 - Raking leaves, gardening   Hematologic:  - neg hematologic  ROS       Musculoskeletal: Comment: Hidradenitis    (+) arthritis,  -       GI/Hepatic:  - neg GI/hepatic ROS       Renal/Genitourinary:  - ROS Renal section negative       Endo:  - neg endo ROS       Psychiatric:  - neg psychiatric ROS       Infectious Disease:  - neg infectious disease ROS       Malignancy:      - no malignancy   Other: Comment: 20 mg Percocet per day   (+) H/O Chronic Pain,H/O chronic opiod use ,                        PHYSICAL EXAM:   Mental Status/Neuro: A/A/O; Age Appropriate   Airway: Facies: Feasible  Mallampati: II  Mouth/Opening: Full  TM distance: > 6 cm  Neck ROM: Full   Respiratory: Auscultation: CTAB     Resp. Rate: Normal     Resp. Effort: Normal      CV: Rhythm: Regular  Rate: Age appropriate  Heart: Normal Sounds  Edema: None   Comments:      Dental: Normal Dentition                LABS:  CBC:   Lab Results   Component Value Date    WBC 6.8 10/24/2019    WBC 8.4 2019    HGB 13.9 10/24/2019    HGB 14.2 2019    HCT 42.2 10/24/2019    HCT 44.1 2019     10/24/2019      "09/19/2019     BMP:   Lab Results   Component Value Date     10/24/2019     09/19/2019    POTASSIUM 4.0 10/24/2019    POTASSIUM 4.1 09/19/2019    CHLORIDE 106 10/24/2019    CHLORIDE 107 09/19/2019    CO2 27 10/24/2019    CO2 29 09/19/2019    BUN 12 10/24/2019    BUN 7 09/19/2019    CR 0.88 10/24/2019    CR 0.92 09/19/2019    GLC 90 10/24/2019    GLC 94 09/19/2019     COAGS: No results found for: PTT, INR, FIBR  POC:   Lab Results   Component Value Date    BGM 85 11/14/2019     OTHER:   Lab Results   Component Value Date    LACT 0.6 (L) 12/05/2017    CHOLO 8.6 10/24/2019    ALBUMIN 3.6 12/05/2017    PROTTOTAL 7.2 12/05/2017    ALT 38 12/05/2017    AST 22 12/05/2017    ALKPHOS 116 12/05/2017    BILITOTAL 0.4 12/05/2017    TSH 0.95 08/22/2019        Preop Vitals    BP Readings from Last 3 Encounters:   11/14/19 (!) 122/90   11/13/19 128/88   11/13/19 128/88    Pulse Readings from Last 3 Encounters:   11/14/19 83   11/13/19 (!) 38   11/13/19 (!) 38      Resp Readings from Last 3 Encounters:   11/14/19 16   08/05/19 16   07/09/19 18    SpO2 Readings from Last 3 Encounters:   11/14/19 98%   11/13/19 98%   11/13/19 98%      Temp Readings from Last 1 Encounters:   11/14/19 36.8  C (98.3  F) (Oral)    Ht Readings from Last 1 Encounters:   11/14/19 1.683 m (5' 6.25\")      Wt Readings from Last 1 Encounters:   11/14/19 84.5 kg (186 lb 4.6 oz)    Estimated body mass index is 29.84 kg/m  as calculated from the following:    Height as of this encounter: 1.683 m (5' 6.25\").    Weight as of this encounter: 84.5 kg (186 lb 4.6 oz).     LDA:  Peripheral IV 11/14/19 Left Hand (Active)   Site Assessment WDL 11/14/2019  4:00 PM   Line Status Saline locked 11/14/2019  4:00 PM   Phlebitis Scale 0-->no symptoms 11/14/2019  4:00 PM   Infiltration Scale 0 11/14/2019  4:00 PM   Number of days: 0        Assessment:   ASA SCORE: 3    H&P: History and physical reviewed and following examination; no interval change.     Smoking Status:  " Active Smoker       - patient smoked on day of surgery       - Patient was counseled regarding increased Infection Risk with same day smoking.   NPO Status: NPO Appropriate     Plan:   Anes. Type:  General   Pre-Medication: None   Induction:  IV (Standard)   Airway: ETT; Oral   Access/Monitoring: PIV   Maintenance: Balanced     Postop Plan:   Postop Pain: Opioids  Postop Sedation/Airway: Not planned  Disposition: Outpatient     PONV Management:   Adult Risk Factors:, Postop Opioids     CONSENT: Direct conversation   Plan and risks discussed with: Patient   Blood Products: Consent Deferred (Minimal Blood Loss)                   Sarah Wachter, MD

## 2019-11-14 NOTE — OR NURSING
Patient is a member of [place] [employer] though [facility] in [place]. He is a patient of Dr. Chai Foster. Patient is concerned about getting pain medication post-operatively. [facility] pharmacy in [place] closes at 1800.

## 2019-11-14 NOTE — DISCHARGE INSTRUCTIONS
Anorectal Surgery Instructions    What can I expect after anorectal surgery?  Most anorectal procedures are done as outpatient surgery, and you go home the same day as the procedure. A few surgical procedures will require that you stay in the hospital for about one to three days. No matter where the procedure is done or how long or short it takes, these recommendations will help you heal and feel more comfortable.    Medicines:  The anal area is very sensitive; you can expect to have some pain for up to 2-4 weeks after the procedure. Your doctor will give you a prescription for one or more pain medications.    Take naprosyn 500 mg twice a day OR ibuprofen 600 mg four times a day     Take this on a regular basis (not as needed) following your surgery.     The drugs are best taken with food.  Do not take if it causes stomach upset or if you have a history of ulcers or gastritis. You can stop the naprosyn (or ibuprofen) or reduce the dose when you are feeling better.    DO NOT use naprosyn, ibuprofen, or other similar agents (eg. Advil or Aleve) if you have inflammatory bowel disease (Ulcerative Colitis or Crohn's disease) or if your doctor as advised you against using these medications    Take acetominaphen (Tylenol) 650-1000 mg four times a day.     Take this on a regular basis (not as needed) following surgery for pain control.     Take the lower dose if you are >65 years old or have liver disease. The maximum dose of acetominaphen is 4000 mg a day. You can stop the acetaminophen or reduce the dose when you are feeling better.    It is important to realize that many narcotic pain relievers (including vicodin, percocet, tylenol #3) also have acetaminophen, and excessive doses of acetaminophen can be dangerous, so do not take these in addition to acetominaphen.  You may take narcotics that don't contain acetominaphen such as oxycodone.      Take oxycodone AS NEEDED in addition to the acetominaphen and naprosyn.       Because narcotics have side effects (including constipation), you should reduce your use of these medications as tolerated as your pain improves.    PLEASE CALL YOUR PAIN DOCTOR TOMORROW TO REQUEST ANY ADDITIONAL PAIN MEDICATIONS AS NEEDED.    *In general, the best strategy is to take (if you are able to tolerate it) the tylenol and naprosen on a regular basis until your pain has largely gone away. You can take the narcotic pain medicine as needed in addition to the tylenol and ibuprofen. As your pain begins to lessen, you should cut back on your narcotic use while continuing to take your regular tylenol and naprosyn doses.    Perineal care:  External gauze dressing can be removed the morning after surgery. If you have an adhesive dressing stuck to the incision, DO NOT remove this.   Tub baths:    If possible, take a tub bath immediately after each bowel movement.     Baths should be take at least 3 times daily for the first week to 10 days following your procedure. You should soak in the tub for 10 to 15 minutes each time with water as warm as you can tolerate.     Even after you go back to work, it is a good idea to sit in the tub in the morning, after returning from work, and again in the evening before bedtime.    Bleeding/Infection:    You can expect to have some bleeding after bowel movements, but it should stop soon after you wipe.     Use a wet cloth or perianal pad (Tucks or Preparation H pads) to gently wipe the area after each bowel movement.    Do not rub the anal area or use a lot of pressure.    Using a spray bottle filled with warm water helps loosen any remaining stool. Blot gently with a soft dry cloth or tissue paper.    Infection around the anal opening is not very common. The anal area has excellent blood supply, which helps the area to heal. Bloody discharge after bowel movements is normal and may last 2 to 4 weeks after your surgery. However, if you bleed between bowel movements and cannot  get it to stop, call the triage nurse immediately [phone].    Bowel function:  Take a fiber supplement such as Metamucil, which is over the counter. It is important to drink six to eight glasses of water or juice everyday when using fiber products.    If you do not have a bowel movement after 1-2 days:    Take Milk of Magnesia-2 tablespoons.       If there are no results, repeat this or add over the counter Miralax.      If you still do not have results, contact the clinic.     If there are no results, repeat this. Stop taking Milk of Magnesia or other laxatives if you begin to have diarrhea.    * Constipation will cause you to strain when you have a bowel movement. The hard stool will be difficult to pass, will increase pain and bleeding, and will slow down healing.  Try to avoid constipation and/or diarrhea as this can make the pain and bleeding worse.    * It is important to have regular bowel movements at least every other day and to keep your stool soft.  A high fiber diet, including at least four servings of fruits or vegetables daily, will help to keep your bowel movements regular and soft.    Activity:  After your procedure, there are no restrictions on your activity     except restrictions surrounding being on narcotics and in pain, such as no heavy machine operating or driving.     You may walk, climb stairs, ride in a car, and sit as tolerated.     It is helpful to avoid sitting in one position for long periods (2 or more hours).    After some surgeries, you may be told not to perform any lifting (more than 10 pounds) for several weeks after surgery.    When to call:  When do I need to call the doctor or triage nurse?    If you experience any of the problems listed here, call our triage nurse during business hours ([phone]).     The nurse will help you with your problem or have the doctor call you.     After hours and on weekends, please call the main hospital number ([phone]) and ask for  the colon and rectal surgery person on call.     Some is available to help you 24 hours a day, seven days a week.    Call for:   ? Fever greater than 101 degrees   ? Chills   ? Foul-smelling drainage   ? Nausea and vomiting   ? Diarrhea - greater than 3 water stools in 24 hours   ? Constipation - no bowel movement after 3 days   ? Severe bleeding that does not stop soon after a bowel movement   ? Problems with the incision, including increased pain, swelling, or redness      [facility], [place]  Same-Day Surgery   Adult Discharge Orders & Instructions     For 24 hours after surgery    1. Get plenty of rest.  A responsible adult must stay with you for at least 24 hours after you leave the hospital.   2. Do not drive or use heavy equipment.  If you have weakness or tingling, don't drive or use heavy equipment until this feeling goes away.  3. Do not drink alcohol.  4. Avoid strenuous or risky activities.  Ask for help when climbing stairs.   5. You may feel lightheaded.  IF so, sit for a few minutes before standing.  Have someone help you get up.   6. If you have nausea (feel sick to your stomach): Drink only clear liquids such as apple juice, ginger ale, broth or 7-Up.  Rest may also help.  Be sure to drink enough fluids.  Move to a regular diet as you feel able.  7. You may have a slight fever. Call the doctor if your fever is over 100 F (37.7 C) (taken under the tongue) or lasts longer than 24 hours.  8. You may have a dry mouth, a sore throat, muscle aches or trouble sleeping.  These should go away after 24 hours.  9. Do not make important or legal decisions.   Call your doctor for any of the followin.  Signs of infection (fever, growing tenderness at the surgery site, a large amount of drainage or bleeding, severe pain, foul-smelling drainage, redness, swelling).    2. It has been over 8 to 10 hours since surgery and you are still not able to urinate (pass water).    3.   Headache for over 24 hours.    4.  Numbness, tingling or weakness the day after surgery (if you had spinal anesthesia).  To contact a doctor, call Dr. Kenny at [phone]    or:        [phone] and ask for the resident on call for colo rectal (answered 24 hours a day)      Emergency Department:    [facility]: [phone]       (TTY for hearing impaired: [phone])    [facility]: [phone]       (TTY for hearing impaired: [phone])

## 2019-11-14 NOTE — ANESTHESIA CARE TRANSFER NOTE
Patient: Bernard Padilla    Procedure(s):  IRRIGATION AND DEBRIDEMENT, RECTUM    Diagnosis: Perianal abscess [K61.0]  Diagnosis Additional Information: No value filed.    Anesthesia Type:   General     Note:  Airway :Room Air  Patient transferred to:Phase II  Comments: Sedation as noted for procedure. Tolerated anesthesia well. AAO with resp status back to baseline. Handoff Report: Identifed the Patient, Identified the Reponsible Provider, Reviewed the pertinent medical history, Discussed the surgical course, Reviewed Intra-OP anesthesia mangement and issues during anesthesia, Set expectations for post-procedure period and Allowed opportunity for questions and acknowledgement of understanding      Vitals: (Last set prior to Anesthesia Care Transfer)    CRNA VITALS  11/14/2019 1618 - 11/14/2019 1702      11/14/2019             Pulse:  95    SpO2:  98 %                Electronically Signed By: ROSEY Tariq CRNA  November 14, 2019  5:02 PM

## 2019-11-14 NOTE — TELEPHONE ENCOUNTER
I had received calls regarding patient from a couple of other departments due to confusion regarding patient's surgery. After looking into this, and consulting with manager April and supervisor/MIRANDA Wilson, pieced together that patient is on the list to be added on at [facility]. The issue is whether there is OR time available for this surgery to follow Dr Kenny's other surgeries. The [facility] OR control desk plans to call patient 2 hours ahead of his surgery if they are able to add his surgery on for today. Per April no action needed from me in regards to patient, so after updating patient of the above (he was also confused), I will leave this to the control desk to handle. Did also confirm with MIRANDA Wilson that patient needs no pre-op since Dr Kenny is going to handle this with patient prior to his surgery. I communicated this to patient, and patient expressed understanding of the overall plan.

## 2019-11-15 ENCOUNTER — COMMUNICATION - HEALTHEAST (OUTPATIENT)
Dept: INTERNAL MEDICINE | Facility: CLINIC | Age: 34
End: 2019-11-15

## 2019-11-15 ENCOUNTER — COMMUNICATION - HEALTHEAST (OUTPATIENT)
Dept: PALLIATIVE MEDICINE | Facility: OTHER | Age: 34
End: 2019-11-15

## 2019-11-15 DIAGNOSIS — G89.4 CHRONIC PAIN SYNDROME: ICD-10-CM

## 2019-11-15 NOTE — PROGRESS NOTES
REFERRING PHYSICIAN:  Dr. Apolinar Ruiz.      PRESENTING COMPLAINT:  Consultation for hidradenitis suppurativa.      HISTORY OF PRESENTING COMPLAINT:  Mr. Padilla is 34 years old, diagnosed with hidradenitis suppurativa of his groins, axillary area and perianal area.  The concerning areas for him are his right scrotum and his perianal area.  His groins and his armpits have been quiescent.  He has not really had full medical treatments given the fact that he has refused them because of its side effect.  Here to get my recommendations.      ASSESSMENT AND PLAN:  Based on above findings, a diagnosis of hidradenitis suppurativa was made.  Given the fact that perianal as well as scrotal problems, I have referred him to Urology and Colorectal Surgery to appropriately take care of him.  He understands that if his groins and his armpits start to cause a problem, then a wide local excision with local flaps including ALT flaps and TDAP flaps will be undertaken by Plastics.  He understood the plan.  I will see him back p.r.n.      Total time spent with the patient 5 minutes, more than half was counseling.      cc:   Apolinar Ruiz MD   [facility]   [address] [place] [address]  [phone]

## 2019-11-15 NOTE — ANESTHESIA POSTPROCEDURE EVALUATION
Anesthesia POST Procedure Evaluation    Patient: Bernard Padilla   MRN:     [id number] Gender:   male   Age:    34 year old :      1985        Preoperative Diagnosis: Perianal abscess [K61.0]   Procedure(s):  IRRIGATION AND DEBRIDEMENT, RECTUM   Postop Comments: No value filed.       Anesthesia Type:  Not documented  General    Reportable Event: NO     PAIN: Uncomplicated   Sign Out status: Comfortable, Well controlled pain     PONV: No PONV   Sign Out status:  No Nausea or Vomiting     Neuro/Psych: Uneventful perioperative course   Sign Out Status: Preoperative baseline; Age appropriate mentation     Airway/Resp.: Uneventful perioperative course   Sign Out Status: Non labored breathing, age appropriate RR; Resp. Status within EXPECTED Parameters     CV: Uneventful perioperative course   Sign Out status: Appropriate BP and perfusion indices; Appropriate HR/Rhythm     Disposition:   Sign Out in:  PACU  Disposition:  Phase II; Home  Recovery Course: Uneventful  Follow-Up: Not required           Last Anesthesia Record Vitals:  CRNA VITALS  2019 1618 - 2019 1718      2019             Pulse:  95    SpO2:  98 %          Last PACU Vitals:  Vitals Value Taken Time   BP     Temp     Pulse     Resp     SpO2     Temp src     NIBP     Pulse 95 2019  4:49 PM   SpO2 98 % 2019  4:49 PM   Resp     Temp     Ht Rate 90 2019  4:45 PM   Temp 2           Electronically Signed By: Sarah Wachter, MD, 2019, 10:43 PM

## 2019-11-21 ENCOUNTER — RECORDS - HEALTHEAST (OUTPATIENT)
Dept: ADMINISTRATIVE | Facility: OTHER | Age: 34
End: 2019-11-21

## 2019-11-21 ENCOUNTER — OFFICE VISIT (OUTPATIENT)
Dept: DERMATOLOGY | Facility: CLINIC | Age: 34
End: 2019-11-21
Attending: DERMATOLOGY
Payer: COMMERCIAL

## 2019-11-21 VITALS
SYSTOLIC BLOOD PRESSURE: 119 MMHG | WEIGHT: 191.4 LBS | BODY MASS INDEX: 30.76 KG/M2 | TEMPERATURE: 98.2 F | DIASTOLIC BLOOD PRESSURE: 74 MMHG | OXYGEN SATURATION: 97 % | HEIGHT: 66 IN | HEART RATE: 63 BPM

## 2019-11-21 DIAGNOSIS — M02.30 REACTIVE ARTHRITIS (H): ICD-10-CM

## 2019-11-21 DIAGNOSIS — L73.2 HIDRADENITIS SUPPURATIVA: Primary | ICD-10-CM

## 2019-11-21 LAB
ALBUMIN SERPL-MCNC: 3.8 G/DL (ref 3.4–5)
ALP SERPL-CCNC: 112 U/L (ref 40–150)
ALT SERPL W P-5'-P-CCNC: 37 U/L (ref 0–70)
ANION GAP SERPL CALCULATED.3IONS-SCNC: 4 MMOL/L (ref 3–14)
AST SERPL W P-5'-P-CCNC: 21 U/L (ref 0–45)
BASOPHILS # BLD AUTO: 0 10E9/L (ref 0–0.2)
BASOPHILS NFR BLD AUTO: 0.4 %
BILIRUB SERPL-MCNC: 0.4 MG/DL (ref 0.2–1.3)
BUN SERPL-MCNC: 9 MG/DL (ref 7–30)
CALCIUM SERPL-MCNC: 8.8 MG/DL (ref 8.5–10.1)
CHLORIDE SERPL-SCNC: 107 MMOL/L (ref 94–109)
CO2 SERPL-SCNC: 26 MMOL/L (ref 20–32)
CREAT SERPL-MCNC: 0.84 MG/DL (ref 0.66–1.25)
DIFFERENTIAL METHOD BLD: NORMAL
EOSINOPHIL # BLD AUTO: 0.5 10E9/L (ref 0–0.7)
EOSINOPHIL NFR BLD AUTO: 6.2 %
ERYTHROCYTE [DISTWIDTH] IN BLOOD BY AUTOMATED COUNT: 12.4 % (ref 10–15)
GFR SERPL CREATININE-BSD FRML MDRD: >90 ML/MIN/{1.73_M2}
GLUCOSE SERPL-MCNC: 92 MG/DL (ref 70–99)
HCT VFR BLD AUTO: 42.5 % (ref 40–53)
HGB BLD-MCNC: 14.3 G/DL (ref 13.3–17.7)
IMM GRANULOCYTES # BLD: 0 10E9/L (ref 0–0.4)
IMM GRANULOCYTES NFR BLD: 0.4 %
LYMPHOCYTES # BLD AUTO: 2.2 10E9/L (ref 0.8–5.3)
LYMPHOCYTES NFR BLD AUTO: 28.5 %
MCH RBC QN AUTO: 30.8 PG (ref 26.5–33)
MCHC RBC AUTO-ENTMCNC: 33.6 G/DL (ref 31.5–36.5)
MCV RBC AUTO: 92 FL (ref 78–100)
MONOCYTES # BLD AUTO: 0.5 10E9/L (ref 0–1.3)
MONOCYTES NFR BLD AUTO: 6.4 %
NEUTROPHILS # BLD AUTO: 4.5 10E9/L (ref 1.6–8.3)
NEUTROPHILS NFR BLD AUTO: 58.1 %
NRBC # BLD AUTO: 0 10*3/UL
NRBC BLD AUTO-RTO: 0 /100
PLATELET # BLD AUTO: 250 10E9/L (ref 150–450)
POTASSIUM SERPL-SCNC: 3.5 MMOL/L (ref 3.4–5.3)
PROT SERPL-MCNC: 7.4 G/DL (ref 6.8–8.8)
RBC # BLD AUTO: 4.64 10E12/L (ref 4.4–5.9)
SODIUM SERPL-SCNC: 137 MMOL/L (ref 133–144)
WBC # BLD AUTO: 7.8 10E9/L (ref 4–11)

## 2019-11-21 PROCEDURE — 80053 COMPREHEN METABOLIC PANEL: CPT | Performed by: DERMATOLOGY

## 2019-11-21 PROCEDURE — G0463 HOSPITAL OUTPT CLINIC VISIT: HCPCS | Mod: ZF

## 2019-11-21 PROCEDURE — 36415 COLL VENOUS BLD VENIPUNCTURE: CPT | Performed by: DERMATOLOGY

## 2019-11-21 PROCEDURE — 85025 COMPLETE CBC W/AUTO DIFF WBC: CPT | Performed by: DERMATOLOGY

## 2019-11-21 RX ORDER — DAPSONE 100 MG/1
100 TABLET ORAL DAILY
Qty: 30 TABLET | Refills: 0 | Status: SHIPPED | OUTPATIENT
Start: 2019-11-21 | End: 2019-12-26

## 2019-11-21 ASSESSMENT — MIFFLIN-ST. JEOR: SCORE: 1755.93

## 2019-11-21 ASSESSMENT — PAIN SCALES - GENERAL: PAINLEVEL: EXTREME PAIN (8)

## 2019-11-21 NOTE — LETTER
2019      RE: Bernard Padilla  [address] [facility] [address]       [facility]  Dermatology-Rheumatology Clinic  Apolinar Ruiz MD  2019     Name: Bernard Padilla  MRN: [id number]  Age: 34 year old  : 1985  Referring provider: Jayde Quiroz    Dermatology Problem List:   1. Hidradenitis supperitiva              -Diagnosed in 2016 and did not respond to antibiotics.               -Seen by Dr. Bird and underwent 9 procedures               - mg BID - start    Dapsone 100mg daily started  19  2. Acute onset hand foot psoriasiform dermatitis, suspected reactive arthritis              -  mg BID - start               - Lidex ointment at night, urea cream in the morning   3. Atopic dermatitis  4. Seborrheic dermatitis                        - DermaSmooth    Encounter Date: 2019   Date of Last Visit: 2019 with Mabel Li  Admission on 2019, Discharged on 2019   Component Date Value Ref Range Status     Glucose 2019 85  70 - 99 mg/dL Final     HPI:   Bernard Padilla is a 34 year old male with a history including hidradenitis suppurativa who presents for follow-up. The patient was last seen by Dr. Monroe on 2019, at which time he reported new nodules on his scrotum and another on the buttocks which were very painful and he was seen in the ER for this. He was referred to Plastic Surgery and subsequently Urology and Colorectal Surgery for further treatment.     Today, the patient reports that his skin seems much improved today. He states that he has been taking Lidex ointment at night and urea cream in the morning. He explains that he applied urea cream to his face, felt irritation, and threw it away. He adds that his joints are well.     Background history from [facility] on 2019:   Diagnosed 2 years ago and did not respond to several trials of antibiotics. Lanced and packing however would recur. Derm surgeon (Dr. Bird from [facility]) performed 9 surgeries (removed sweat glands in bilateral axillas, bilateral groins and buttocks and now referred to Dr. Ruiz for further management. Groins done 8/7/19. Currently very painful at both groins at the surgical sight. Taking 30-40mg of percocet daily.     Today the patient reports that he has lesions of the groin and gluteals regions at current. He was on Doxycycline in the past for a long period of time before starting the surgical procedures and the antibiotics for HS. He also reports having tried Bactrim in the past without relief. He reports new lesions around the surgical sites. He endorses having thinning hair. He denies being on a blood thinner.     He has a Hx of eczema. He reports rashes of the upper extremities which he treats with Fluocinonide. He also uses Dermasmooth and eye drops. He reports that he would like to try a new shampoo.   The patient also shares that from [id number] he had tried to quit smoking while in [employer]. He reports that the HS was unchanged during this time. The patient reports a Hx of skin cancer on his mother's side of the family and Diabetes on his father's side.     Review of Systems:   Pertinent items are noted in HPI or as below, remainder of complete ROS is negative.      Active Medications:   Current Outpatient Medications:      Acetaminophen (TYLENOL PO), , Disp: , Rfl:      albuterol (PROAIR HFA/PROVENTIL HFA/VENTOLIN HFA) 108 (90 Base) MCG/ACT inhaler, Inhale 2 puffs into the lungs every 6 hours, Disp: 8.5 g, Rfl: 3     cetirizine (ZYRTEC) 10 MG tablet, Take 1 tablet (10 mg) by mouth daily, Disp: 30 tablet, Rfl: 2     clindamycin (CLEOCIN T) 1 % external solution, Apply topically 2 times daily, Disp: 60 mL, Rfl: 2     clindamycin (CLEOCIN) 300 MG capsule, Take 1 capsule (300 mg) by mouth 3 times daily for 10 days, Disp: 30 capsule, Rfl: 0     cycloSPORINE (SANDIMMUNE) 100 MG capsule, Take 2 capsules (200 mg) by mouth 2 times daily, Disp:  "120 capsule, Rfl: 1     dapsone (ACZONE) 100 MG tablet, Take 1 tablet (100 mg) by mouth daily, Disp: 30 tablet, Rfl: 0     fluocinolone acetonide (DERMA-SMOOTHE/FS BODY) 0.01 % external oil, Apply topically 2 times daily, Disp: 120 mL, Rfl: 1     Fluocinolone Acetonide Scalp (DERMA-SMOOTHE/FS SCALP) 0.01 % OIL oil, Apply topically daily, Disp: 1 Bottle, Rfl: 3     fluocinonide (LIDEX) 0.05 % external ointment, Apply topically 2 times daily, Disp: 60 g, Rfl: 1     fluocinonide (LIDEX) 0.05 % external solution, Apply 1 mL topically daily as needed (itchy scalp), Disp: 60 mL, Rfl: 3     fluticasone (FLONASE) 50 MCG/ACT nasal spray, Spray 1-2 sprays into both nostrils daily, Disp: 16 g, Rfl: 2     IBUPROFEN PO, Take 800 mg by mouth 3 times daily as needed, Disp: , Rfl:      naloxone (NARCAN) 4 MG/0.1ML nasal spray, Spray 1 spray (4 mg) into one nostril alternating nostrils as needed for opioid reversal, Disp: 2 each, Rfl: 0     triamcinolone (KENALOG) 0.1 % external ointment, Apply topically 2 times daily as needed for irritation, Disp: 30 g, Rfl: 3     Urea 40 % CREA, Externally apply topically every morning, Disp: 1 Tube, Rfl: 3      Allergies:   Vicodin  Chicken-Derived Products    Hydrocodone     Past Medical History:  Boil  Hidradenitis suppurativa  Tobacco use disorder  Patellofemoral arthritis of left knee  Eczema    Past Surgical History:  Irrigation and debridement rectum, combined 11/14/2019  Meniscus repair    Family History:    The patient's family history includes Diabetes in his father; Hypertension in his mother.    Social History:  The patient reports that he has been smoking. He has been smoking about 0.50 packs per day. He has never used smokeless tobacco. He reports current alcohol use. He reports current drug use. Drug: Marijuana.   PCP: Lillian Webb  Marital Status: Single      Physical Exam:   /74   Pulse 63   Temp 98.2  F (36.8  C) (Oral)   Ht 1.676 m (5' 6\")   Wt 86.8 kg (191 lb 6.4 " oz)   SpO2 97%   BMI 30.89 kg/m      Wt Readings from Last 4 Encounters:   11/21/19 86.8 kg (191 lb 6.4 oz)   11/14/19 84.5 kg (186 lb 4.6 oz)   11/13/19 86 kg (189 lb 9.6 oz)   10/24/19 85.2 kg (187 lb 12.8 oz)     General: Well-appearing male in no distress. Alert and oriented.   Skin findings - A little xerosis on the palms, otherwise clear.  - Little hyperkeratotic plaques on the heel, much improved on the right foot.   - Healing ulcerated plaque on the left perirectal area.  - Inflammatory nodule on the right scrotum.     Laboratory:   RHEUM RESULTS Latest Ref Rng & Units 9/19/2019 10/24/2019 11/21/2019   AST 0 - 45 U/L - - 21   ALT 0 - 70 U/L - - 37   ALBUMIN 3.4 - 5.0 g/dL - - 3.8   WBC 4.0 - 11.0 10e9/L 8.4 6.8 7.8   RBC 4.4 - 5.9 10e12/L 4.59 4.54 4.64   HGB 13.3 - 17.7 g/dL 14.2 13.9 14.3   HCT 40.0 - 53.0 % 44.1 42.2 42.5   MCV 78 - 100 fl 96 93 92   MCHC 31.5 - 36.5 g/dL 32.2 32.9 33.6   RDW 10.0 - 15.0 % 13.1 12.3 12.4    - 450 10e9/L 247 215 250   CREATININE 0.66 - 1.25 mg/dL 0.92 0.88 0.84   GFR ESTIMATE, IF BLACK >60 mL/min/[1.73:m2] >90 >90 >90   GFR ESTIMATE >60 mL/min/[1.73:m2] >90 >90 >90     Assessment and Plan:  1. Hidradenitis suppurativa (s/p 9 surgeries involving bilateral axilla, buttock, and groins being the last surgery 8/7/19)  - Start dapsone 100 mg tablets  - Continue cyclosporine for now  - Continue lidex cream ointment at night   - Start urea cream 40% in the morning to hands and feet   - Recheck labs (CBC with platelets differential and reticulocyte count) in two weeks and follow-up in four weeks.   - I will call Dr. Joe Paige regarding pain management.      2. Reactive arthritis with hand and foot psoriasiform dermatitis and arthritis   - Arthritis resolved and psoriasiform plaques much improved on cyclosporine 200mg BID   - Continue cyclosporine for now. Will discuss tapering after starting dapsone    3. Atopic dermatitis  - Fluocinolone oil twice a day to scalp and  hair     Follow-up: Return in about 4 weeks (around 12/19/2019).     Scribe Disclosure:  I, Wilfredo Theresa, am serving as a scribe to document services personally performed by Apolinar Ruiz MD at this visit, based upon the provider's statements to me. All documentation has been reviewed by the aforementioned provider prior to being entered into the official medical record.   Provider Disclosure:   The documentation recorded by the scribe accurately reflects the services I personally performed and the decisions made by me.    Apolinar Ruiz MD, FAAD    Departments of Internal Medicine and Dermatology  [facility]  [phone]

## 2019-11-21 NOTE — PATIENT INSTRUCTIONS
Continue cyclosporine 200mg twice a day   Start dapsone 100mg in the morning   Recck labs in 2 weeks and then follow-up in 4 weeks

## 2019-11-21 NOTE — NURSING NOTE
"Chief Complaint   Patient presents with     RECHECK     HS     /74   Pulse 63   Temp 98.2  F (36.8  C) (Oral)   Ht 1.676 m (5' 6\")   Wt 86.8 kg (191 lb 6.4 oz)   SpO2 97%   BMI 30.89 kg/m    Jennifer Hackett CMA    "

## 2019-11-21 NOTE — PROGRESS NOTES
[facility]  Dermatology-Rheumatology Clinic  Apolinar Ruiz MD  2019     Name: Bernard Padilla  MRN: [id number]  Age: 34 year old  : 1985  Referring provider: Jayde Quiroz    Dermatology Problem List:   1. Hidradenitis supperitiva              -Diagnosed in 2016 and did not respond to antibiotics.               -Seen by Dr. Bird and underwent 9 procedures               - mg BID - start    Dapsone 100mg daily started  19  2. Acute onset hand foot psoriasiform dermatitis, suspected reactive arthritis              -  mg BID - start               - Lidex ointment at night, urea cream in the morning   3. Atopic dermatitis  4. Seborrheic dermatitis                        - DermaSmooth    Encounter Date: 2019   Date of Last Visit: 2019 with Mabel Li  Admission on 2019, Discharged on 2019   Component Date Value Ref Range Status     Glucose 2019 85  70 - 99 mg/dL Final     HPI:   Bernard Padilla is a 34 year old male with a history including hidradenitis suppurativa who presents for follow-up. The patient was last seen by Dr. Monroe on 2019, at which time he reported new nodules on his scrotum and another on the buttocks which were very painful and he was seen in the ER for this. He was referred to Plastic Surgery and subsequently Urology and Colorectal Surgery for further treatment.     Today, the patient reports that his skin seems much improved today. He states that he has been taking Lidex ointment at night and urea cream in the morning. He explains that he applied urea cream to his face, felt irritation, and threw it away. He adds that his joints are well.     Background history from [facility] on 2019:   Diagnosed 2 years ago and did not respond to several trials of antibiotics. Lanced and packing however would recur. Derm surgeon (Dr. Bird from [facility]) performed 9 surgeries (removed sweat glands in bilateral axillas, bilateral  groins and buttocks and now referred to Dr. Ruiz for further management. Groins done 8/7/19. Currently very painful at both groins at the surgical sight. Taking 30-40mg of percocet daily.     Today the patient reports that he has lesions of the groin and gluteals regions at current. He was on Doxycycline in the past for a long period of time before starting the surgical procedures and the antibiotics for HS. He also reports having tried Bactrim in the past without relief. He reports new lesions around the surgical sites. He endorses having thinning hair. He denies being on a blood thinner.     He has a Hx of eczema. He reports rashes of the upper extremities which he treats with Fluocinonide. He also uses Dermasmooth and eye drops. He reports that he would like to try a new shampoo.   The patient also shares that from [id number] he had tried to quit smoking while in [employer]. He reports that the HS was unchanged during this time. The patient reports a Hx of skin cancer on his mother's side of the family and Diabetes on his father's side.     Review of Systems:   Pertinent items are noted in HPI or as below, remainder of complete ROS is negative.      Active Medications:   Current Outpatient Medications:      Acetaminophen (TYLENOL PO), , Disp: , Rfl:      albuterol (PROAIR HFA/PROVENTIL HFA/VENTOLIN HFA) 108 (90 Base) MCG/ACT inhaler, Inhale 2 puffs into the lungs every 6 hours, Disp: 8.5 g, Rfl: 3     cetirizine (ZYRTEC) 10 MG tablet, Take 1 tablet (10 mg) by mouth daily, Disp: 30 tablet, Rfl: 2     clindamycin (CLEOCIN T) 1 % external solution, Apply topically 2 times daily, Disp: 60 mL, Rfl: 2     clindamycin (CLEOCIN) 300 MG capsule, Take 1 capsule (300 mg) by mouth 3 times daily for 10 days, Disp: 30 capsule, Rfl: 0     cycloSPORINE (SANDIMMUNE) 100 MG capsule, Take 2 capsules (200 mg) by mouth 2 times daily, Disp: 120 capsule, Rfl: 1     dapsone (ACZONE) 100 MG tablet, Take 1 tablet (100 mg) by mouth  "daily, Disp: 30 tablet, Rfl: 0     fluocinolone acetonide (DERMA-SMOOTHE/FS BODY) 0.01 % external oil, Apply topically 2 times daily, Disp: 120 mL, Rfl: 1     Fluocinolone Acetonide Scalp (DERMA-SMOOTHE/FS SCALP) 0.01 % OIL oil, Apply topically daily, Disp: 1 Bottle, Rfl: 3     fluocinonide (LIDEX) 0.05 % external ointment, Apply topically 2 times daily, Disp: 60 g, Rfl: 1     fluocinonide (LIDEX) 0.05 % external solution, Apply 1 mL topically daily as needed (itchy scalp), Disp: 60 mL, Rfl: 3     fluticasone (FLONASE) 50 MCG/ACT nasal spray, Spray 1-2 sprays into both nostrils daily, Disp: 16 g, Rfl: 2     IBUPROFEN PO, Take 800 mg by mouth 3 times daily as needed, Disp: , Rfl:      naloxone (NARCAN) 4 MG/0.1ML nasal spray, Spray 1 spray (4 mg) into one nostril alternating nostrils as needed for opioid reversal, Disp: 2 each, Rfl: 0     triamcinolone (KENALOG) 0.1 % external ointment, Apply topically 2 times daily as needed for irritation, Disp: 30 g, Rfl: 3     Urea 40 % CREA, Externally apply topically every morning, Disp: 1 Tube, Rfl: 3      Allergies:   Vicodin  Chicken-Derived Products    Hydrocodone     Past Medical History:  Boil  Hidradenitis suppurativa  Tobacco use disorder  Patellofemoral arthritis of left knee  Eczema    Past Surgical History:  Irrigation and debridement rectum, combined 11/14/2019  Meniscus repair    Family History:    The patient's family history includes Diabetes in his father; Hypertension in his mother.    Social History:  The patient reports that he has been smoking. He has been smoking about 0.50 packs per day. He has never used smokeless tobacco. He reports current alcohol use. He reports current drug use. Drug: Marijuana.   PCP: Lillian Webb  Marital Status: Single      Physical Exam:   /74   Pulse 63   Temp 98.2  F (36.8  C) (Oral)   Ht 1.676 m (5' 6\")   Wt 86.8 kg (191 lb 6.4 oz)   SpO2 97%   BMI 30.89 kg/m     Wt Readings from Last 4 Encounters:   11/21/19 " 86.8 kg (191 lb 6.4 oz)   11/14/19 84.5 kg (186 lb 4.6 oz)   11/13/19 86 kg (189 lb 9.6 oz)   10/24/19 85.2 kg (187 lb 12.8 oz)     General: Well-appearing male in no distress. Alert and oriented.   Skin findings - A little xerosis on the palms, otherwise clear.  - Little hyperkeratotic plaques on the heel, much improved on the right foot.   - Healing ulcerated plaque on the left perirectal area.  - Inflammatory nodule on the right scrotum.     Laboratory:   RHEUM RESULTS Latest Ref Rng & Units 9/19/2019 10/24/2019 11/21/2019   AST 0 - 45 U/L - - 21   ALT 0 - 70 U/L - - 37   ALBUMIN 3.4 - 5.0 g/dL - - 3.8   WBC 4.0 - 11.0 10e9/L 8.4 6.8 7.8   RBC 4.4 - 5.9 10e12/L 4.59 4.54 4.64   HGB 13.3 - 17.7 g/dL 14.2 13.9 14.3   HCT 40.0 - 53.0 % 44.1 42.2 42.5   MCV 78 - 100 fl 96 93 92   MCHC 31.5 - 36.5 g/dL 32.2 32.9 33.6   RDW 10.0 - 15.0 % 13.1 12.3 12.4    - 450 10e9/L 247 215 250   CREATININE 0.66 - 1.25 mg/dL 0.92 0.88 0.84   GFR ESTIMATE, IF BLACK >60 mL/min/[1.73:m2] >90 >90 >90   GFR ESTIMATE >60 mL/min/[1.73:m2] >90 >90 >90     Assessment and Plan:  1. Hidradenitis suppurativa (s/p 9 surgeries involving bilateral axilla, buttock, and groins being the last surgery 8/7/19)  - Start dapsone 100 mg tablets  - Continue cyclosporine for now  - Continue lidex cream ointment at night   - Start urea cream 40% in the morning to hands and feet   - Recheck labs (CBC with platelets differential and reticulocyte count) in two weeks and follow-up in four weeks.   - I will call Dr. Joe Paige regarding pain management.      2. Reactive arthritis with hand and foot psoriasiform dermatitis and arthritis   - Arthritis resolved and psoriasiform plaques much improved on cyclosporine 200mg BID   - Continue cyclosporine for now. Will discuss tapering after starting dapsone    3. Atopic dermatitis  - Fluocinolone oil twice a day to scalp and hair     Follow-up: Return in about 4 weeks (around 12/19/2019).     Scribe  Disclosure:  I, Wilfredo Theresa, am serving as a scribe to document services personally performed by Apolinar Ruiz MD at this visit, based upon the provider's statements to me. All documentation has been reviewed by the aforementioned provider prior to being entered into the official medical record.   Provider Disclosure:   The documentation recorded by the scribe accurately reflects the services I personally performed and the decisions made by me.    Apolinar Ruiz MD, FAAD    Departments of Internal Medicine and Dermatology  [facility]  [phone]

## 2019-11-22 ENCOUNTER — TELEPHONE (OUTPATIENT)
Dept: DERMATOLOGY | Facility: CLINIC | Age: 34
End: 2019-11-22

## 2019-11-22 ENCOUNTER — TELEPHONE (OUTPATIENT)
Dept: SURGERY | Facility: CLINIC | Age: 34
End: 2019-11-22

## 2019-11-22 ENCOUNTER — COMMUNICATION - HEALTHEAST (OUTPATIENT)
Dept: NURSING | Facility: CLINIC | Age: 34
End: 2019-11-22

## 2019-11-22 ENCOUNTER — HOSPITAL ENCOUNTER (EMERGENCY)
Facility: CLINIC | Age: 34
Discharge: HOME | End: 2019-11-22
Attending: EMERGENCY MEDICINE | Admitting: EMERGENCY MEDICINE
Payer: COMMERCIAL

## 2019-11-22 ENCOUNTER — RECORDS - HEALTHEAST (OUTPATIENT)
Dept: ADMINISTRATIVE | Facility: OTHER | Age: 34
End: 2019-11-22

## 2019-11-22 VITALS
RESPIRATION RATE: 16 BRPM | HEART RATE: 78 BPM | OXYGEN SATURATION: 100 % | WEIGHT: 191 LBS | TEMPERATURE: 98.3 F | BODY MASS INDEX: 30.83 KG/M2 | DIASTOLIC BLOOD PRESSURE: 87 MMHG | SYSTOLIC BLOOD PRESSURE: 145 MMHG

## 2019-11-22 DIAGNOSIS — L73.2 HIDRADENITIS SUPPURATIVA: ICD-10-CM

## 2019-11-22 LAB
ANION GAP SERPL CALCULATED.3IONS-SCNC: 4 MMOL/L (ref 3–14)
BASOPHILS # BLD AUTO: 0 10E9/L (ref 0–0.2)
BASOPHILS NFR BLD AUTO: 0.3 %
BUN SERPL-MCNC: 7 MG/DL (ref 7–30)
CALCIUM SERPL-MCNC: 8.8 MG/DL (ref 8.5–10.1)
CHLORIDE SERPL-SCNC: 108 MMOL/L (ref 94–109)
CO2 SERPL-SCNC: 26 MMOL/L (ref 20–32)
CREAT SERPL-MCNC: 0.79 MG/DL (ref 0.66–1.25)
DIFFERENTIAL METHOD BLD: NORMAL
EOSINOPHIL # BLD AUTO: 0.2 10E9/L (ref 0–0.7)
EOSINOPHIL NFR BLD AUTO: 2 %
ERYTHROCYTE [DISTWIDTH] IN BLOOD BY AUTOMATED COUNT: 12.5 % (ref 10–15)
GFR SERPL CREATININE-BSD FRML MDRD: >90 ML/MIN/{1.73_M2}
GLUCOSE SERPL-MCNC: 85 MG/DL (ref 70–99)
HCT VFR BLD AUTO: 42.6 % (ref 40–53)
HGB BLD-MCNC: 14.2 G/DL (ref 13.3–17.7)
IMM GRANULOCYTES # BLD: 0 10E9/L (ref 0–0.4)
IMM GRANULOCYTES NFR BLD: 0.3 %
LYMPHOCYTES # BLD AUTO: 2.7 10E9/L (ref 0.8–5.3)
LYMPHOCYTES NFR BLD AUTO: 27.9 %
MCH RBC QN AUTO: 30.4 PG (ref 26.5–33)
MCHC RBC AUTO-ENTMCNC: 33.3 G/DL (ref 31.5–36.5)
MCV RBC AUTO: 91 FL (ref 78–100)
MONOCYTES # BLD AUTO: 0.7 10E9/L (ref 0–1.3)
MONOCYTES NFR BLD AUTO: 6.8 %
NEUTROPHILS # BLD AUTO: 6.1 10E9/L (ref 1.6–8.3)
NEUTROPHILS NFR BLD AUTO: 62.7 %
NRBC # BLD AUTO: 0 10*3/UL
NRBC BLD AUTO-RTO: 0 /100
PLATELET # BLD AUTO: 255 10E9/L (ref 150–450)
POTASSIUM SERPL-SCNC: 3.8 MMOL/L (ref 3.4–5.3)
RBC # BLD AUTO: 4.67 10E12/L (ref 4.4–5.9)
SODIUM SERPL-SCNC: 138 MMOL/L (ref 133–144)
WBC # BLD AUTO: 9.8 10E9/L (ref 4–11)

## 2019-11-22 PROCEDURE — 99283 EMERGENCY DEPT VISIT LOW MDM: CPT | Performed by: EMERGENCY MEDICINE

## 2019-11-22 PROCEDURE — 25000132 ZZH RX MED GY IP 250 OP 250 PS 637: Performed by: EMERGENCY MEDICINE

## 2019-11-22 PROCEDURE — 25800030 ZZH RX IP 258 OP 636: Performed by: EMERGENCY MEDICINE

## 2019-11-22 PROCEDURE — 85025 COMPLETE CBC W/AUTO DIFF WBC: CPT | Performed by: EMERGENCY MEDICINE

## 2019-11-22 PROCEDURE — 99283 EMERGENCY DEPT VISIT LOW MDM: CPT | Mod: Z6 | Performed by: EMERGENCY MEDICINE

## 2019-11-22 PROCEDURE — 80048 BASIC METABOLIC PNL TOTAL CA: CPT | Performed by: EMERGENCY MEDICINE

## 2019-11-22 PROCEDURE — 25000128 H RX IP 250 OP 636: Performed by: EMERGENCY MEDICINE

## 2019-11-22 RX ORDER — HYDROMORPHONE HYDROCHLORIDE 1 MG/ML
1 INJECTION, SOLUTION INTRAMUSCULAR; INTRAVENOUS; SUBCUTANEOUS ONCE
Status: DISCONTINUED | OUTPATIENT
Start: 2019-11-22 | End: 2019-11-22

## 2019-11-22 RX ORDER — OXYCODONE HYDROCHLORIDE 5 MG/1
10 TABLET ORAL ONCE
Status: COMPLETED | OUTPATIENT
Start: 2019-11-22 | End: 2019-11-22

## 2019-11-22 RX ORDER — HYDROMORPHONE HYDROCHLORIDE 1 MG/ML
1 INJECTION, SOLUTION INTRAMUSCULAR; INTRAVENOUS; SUBCUTANEOUS ONCE
Status: COMPLETED | OUTPATIENT
Start: 2019-11-22 | End: 2019-11-22

## 2019-11-22 RX ORDER — SODIUM CHLORIDE 9 MG/ML
1000 INJECTION, SOLUTION INTRAVENOUS CONTINUOUS
Status: DISCONTINUED | OUTPATIENT
Start: 2019-11-22 | End: 2019-11-22 | Stop reason: HOSPADM

## 2019-11-22 RX ORDER — ONDANSETRON 4 MG/1
4 TABLET, ORALLY DISINTEGRATING ORAL ONCE
Status: COMPLETED | OUTPATIENT
Start: 2019-11-22 | End: 2019-11-22

## 2019-11-22 RX ADMIN — SODIUM CHLORIDE 1000 ML: 900 INJECTION, SOLUTION INTRAVENOUS at 15:24

## 2019-11-22 RX ADMIN — OXYCODONE HYDROCHLORIDE 10 MG: 5 TABLET ORAL at 15:23

## 2019-11-22 RX ADMIN — ONDANSETRON 4 MG: 4 TABLET, ORALLY DISINTEGRATING ORAL at 17:23

## 2019-11-22 RX ADMIN — Medication 1 MG: at 15:29

## 2019-11-22 RX ADMIN — OXYCODONE HYDROCHLORIDE 10 MG: 5 TABLET ORAL at 19:05

## 2019-11-22 ASSESSMENT — ENCOUNTER SYMPTOMS
FEVER: 0
SHORTNESS OF BREATH: 0
VOMITING: 1
COUGH: 0
NAUSEA: 1
ABDOMINAL PAIN: 0

## 2019-11-22 NOTE — ED TRIAGE NOTES
Patient presents to triage with c/o post-op problem. Patient states he had surgery for hidradenitis suppurativa on Friday and is having worsening scrotal pain. Patient states he is taking percocet for pain.

## 2019-11-22 NOTE — ED NOTES
ED Triage Provider Note  [facility]  Encounter Date: Nov 22, 2019  2:48 PM     History:  Chief Complaint   Patient presents with     Post-op Problem     Bernard Padilla is a 34 year old male who presents with scrotol pain x 1 week was seen at derm yesterday for   Hidradenitis suppurativa.    Derm note from yesterday  Assessment and Plan:  1. Hidradenitis suppurativa (s/p 9 surgeries involving bilateral axilla, buttock, and groins being the last surgery 8/7/19)  - Start dapsone 100 mg tablets  - Continue cyclosporine for now  - Continue lidex cream ointment at night   - Start urea cream 40% in the morning to hands and feet   - Recheck labs (CBC with platelets differential and reticulocyte count) in two weeks and follow-up in four weeks.   - I will call Dr. Joe Paige regarding pain management.      PAST MEDICAL HISTORY:   Past Medical History:   Diagnosis Date     Arthritis of knee      Boil      Chronic pain      Eczema      Hidradenitis suppurativa        PAST SURGICAL HISTORY:   Past Surgical History:   Procedure Laterality Date     IRRIGATION AND DEBRIDEMENT RECTUM, COMBINED N/A 11/14/2019    Procedure: IRRIGATION AND DEBRIDEMENT, RECTUM;  Surgeon: Yon Kenny MD;  Location: [facility] OR     ORTHOPEDIC SURGERY      meniscus repair       FAMILY HISTORY:   Family History   Problem Relation Age of Onset     Hypertension Mother      Diabetes Father      Cancer No family hx of      Coronary Artery Disease No family hx of      Heart Disease No family hx of        SOCIAL HISTORY:   Social History     Tobacco Use     Smoking status: Current Every Day Smoker     Packs/day: 0.50     Smokeless tobacco: Never Used     Tobacco comment: 1/2 pack of day, not interested in quitting    Substance Use Topics     Alcohol use: Yes     Comment: Beer occasionally        Review of Systems:  Review of Systems      Exam:  BP (!) 141/78   Pulse 78   Temp 98.3  F (36.8  C) (Oral)   Resp 16   Wt 86.6  kg (191 lb)   SpO2 98%   BMI 30.83 kg/m    General: No acute distress. Appears stated age.   Cardio: Regular rate, extremities well perfused  Resp: Normal work of breathing, grossly normal respiratory rate  Neuro: Alert. CN II-XII grossly intact. Grossly intact strength.   Unable to assess the scrotum in triage    Medical Decision Making:  Patient arriving to the ED with problem as above. A medical screening exam was performed. Labs and pain med orders initiated from Triage. The patient is appropriate to {wait in triage.         Note created by Artem Maier MD on 11/22/2019 at 2:48 PM       Atrem Maier MD  11/22/19 2916

## 2019-11-22 NOTE — ED AVS SNAPSHOT
[facility], [place], Emergency Department  [address] [place] [id number]  Phone:  [phone]                                    Bernard Padilla   MRN: [id number]    Department:  [facility], Emergency Department   Date of Visit:  11/22/2019           After Visit Summary Signature Page    I have received my discharge instructions, and my questions have been answered. I have discussed any challenges I see with this plan with the nurse or doctor.    ..........................................................................................................................................  Patient/Patient Representative Signature      ..........................................................................................................................................  Patient Representative Print Name and Relationship to Patient    ..................................................               ................................................  Date                                   Time    ..........................................................................................................................................  Reviewed by Signature/Title    ...................................................              ..............................................  Date                                               Time          22EPIC Rev 08/18

## 2019-11-22 NOTE — TELEPHONE ENCOUNTER
Bernard called stating he is have a flare up in the groin area. It hurts real bad and he doesn't want to go to the ED.    Please advise.    LEANDRA Minor

## 2019-11-22 NOTE — TELEPHONE ENCOUNTER
Called and spoke with pt, he has had flare up of HS on his scrotum.  He discussed with Dr. Ruiz yesterday.  He is cannot take the pain anymore.  He feels that it is very bad and is wondering what he should do?  Pt states if it gets this bad he normally goes to the ER, as it does need to be looked at by surgery. Have advised pt to go to ER now to be seen. He will go to [facility] to be seen.    Lashae Young RN  Rheumatology Clinic

## 2019-11-23 NOTE — ED PROVIDER NOTES
"  History     Chief Complaint   Patient presents with     Post-op Problem     HPI  Bernard Padilla is a 34 year old male with a history of eczema and hidradenitis suppurativa who presents with his significant other for evaluation of a painful abscess along his scrotum. Patient reports he's had hidradenitis suppurativa \"for years\" and has had some new nodules grow along his scrotum and buttocks in the past 2 weeks. He notes that the nodule on his buttocks was incised and drained on 11/14/19 (procedure by Dr. Kenny of colorectal surgery), but his scrotal abscess was not operated on as he needed to first follow-up with Urology. Patient states his scrotal abscess has been flaring and \"stinging\" for the past week, but today, he was in more pain than he had ever been. He reports he's had similar hidradenitis flares near his scrotum before that have required I&D's in the past. Patient also states there is \"definitely\" an abscess present. He denies fevers, chest pain, abdominal pain, or cough, but did have an episode of vomiting here in the ED waiting room (he was subsequently given Zofran, oxycodone, and Dilaudid). Patient reports the scrotal abscess has not been open or draining, though the nodule on his buttocks is still draining after his surgery.     Of note, patient was seen by Dermatology (Dr. Ruiz) yesterday and was prescribed 100 mg dapsone in addition to his regular cyclosporine 200 mg BID as well as topical medicines. Patient reports he has not yet picked up the prescriptions, but was notified today that they are ready for pickup. He is restricted to a particular pharmacy.    Past Medical History:   Diagnosis Date     Arthritis of knee      Boil      Chronic pain      Eczema      Hidradenitis suppurativa        Past Surgical History:   Procedure Laterality Date     IRRIGATION AND DEBRIDEMENT RECTUM, COMBINED N/A 11/14/2019    Procedure: IRRIGATION AND DEBRIDEMENT, RECTUM;  Surgeon: Yon Kenny, " MD;  Location: [facility] OR     ORTHOPEDIC SURGERY      meniscus repair       Family History   Problem Relation Age of Onset     Hypertension Mother      Diabetes Father      Cancer No family hx of      Coronary Artery Disease No family hx of      Heart Disease No family hx of        Social History     Tobacco Use     Smoking status: Current Every Day Smoker     Packs/day: 0.50     Smokeless tobacco: Never Used     Tobacco comment: 1/2 pack of day, not interested in quitting    Substance Use Topics     Alcohol use: Yes     Comment: Beer occasionally        Current Facility-Administered Medications   Medication     sodium chloride 0.9% infusion     Current Outpatient Medications   Medication     oxyCODONE-acetaminophen (PERCOCET)  MG per tablet     Acetaminophen (TYLENOL PO)     albuterol (PROAIR HFA/PROVENTIL HFA/VENTOLIN HFA) 108 (90 Base) MCG/ACT inhaler     cetirizine (ZYRTEC) 10 MG tablet     clindamycin (CLEOCIN T) 1 % external solution     clindamycin (CLEOCIN) 300 MG capsule     cycloSPORINE (SANDIMMUNE) 100 MG capsule     dapsone (ACZONE) 100 MG tablet     fluocinolone acetonide (DERMA-SMOOTHE/FS BODY) 0.01 % external oil     Fluocinolone Acetonide Scalp (DERMA-SMOOTHE/FS SCALP) 0.01 % OIL oil     fluocinonide (LIDEX) 0.05 % external ointment     fluocinonide (LIDEX) 0.05 % external solution     fluticasone (FLONASE) 50 MCG/ACT nasal spray     IBUPROFEN PO     naloxone (NARCAN) 4 MG/0.1ML nasal spray     triamcinolone (KENALOG) 0.1 % external ointment     Urea 40 % CREA        Allergies   Allergen Reactions     Vicodin [Hydrocodone-Acetaminophen] Shortness Of Breath     Chicken-Derived Products (Egg) Nausea and Vomiting and GI Disturbance     Hydrocodone Swelling     Other reaction(s): Throat Irritation  Tolerated oxycodone   Upset stomach       I have reviewed the Medications, Allergies, Past Medical and Surgical History, and Social History in the Epic system.    Review of Systems   Constitutional: Negative  for fever.   Respiratory: Negative for cough and shortness of breath.    Cardiovascular: Negative for chest pain.   Gastrointestinal: Positive for nausea and vomiting. Negative for abdominal pain.   Skin:        Positive for scrotal abscess   All other systems reviewed and are negative.      Physical Exam   BP: (!) 141/78  Pulse: 78  Temp: 98.3  F (36.8  C)  Resp: 16  Weight: 86.6 kg (191 lb)  SpO2: 98 %      Physical Exam    GEN:  Well developed, no acute distress  HEENT:  EOMI, Mucous membranes are moist.   Cardio:  RRR, no murmur  PULM:  Lungs clear, good air movement, no wheezes, rales  Abd:  Soft, normal bowel sounds, no focal tenderness  Musculoskeletal:  normal range of motion, no lower extremity swelling or calf tenderness  Neuro:  Alert and oriented X3, Follows commands, moving all extremities spontaneously   Skin:  Warm, dry   exam done with nurse present.  The right side of the superior and lateral aspect of the scrotum has a 2 to 3 cm area of swelling with tenderness and there is a small, 2 mm pustule at the center of it.  No drainage or bleeding.  There are several scars in the groin area and on the scrotum from previous incision and drainage procedures.    ED Course       Labs are normal except as shown.  Procedures  Results for orders placed during the hospital encounter of 11/22/19   POC US SOFT TISSUE    Impression Limited Soft Tissue Ultrasound, performed and interpreted by me.    Indication:  warmth pain. Evaluate for cellulitis vs abscess.     Body location: scrotum    Findings:  There is no cobblestoning suggestive of cellulitis in the evaluated area. There is a fluid collection to suggest abscess.      IMPRESSION: Abscess                   Critical Care time:  none  Labs are normal except as shown    Labs Ordered and Resulted from Time of ED Arrival Up to the Time of Departure from the ED   CBC WITH PLATELETS DIFFERENTIAL   BASIC METABOLIC PANEL        I spoke with urology regarding the  scrotal abscess.  Although this abscess appears to be rather superficial, I am not experienced in doing incision and drainage on the scrotum and would prefer to have this procedure done by urology.  Urology is in the operating room and dissipates he will be another hour before they are able to come and see the patient.  I spoke with the patient, and he prefers not to wait for urology.  The patient will plan to  all of his prescriptions and see if these remedies helped resolve the issue.    Assessments & Plan (with Medical Decision Making)   Patient presents with a history of hidradenitis suppurativa with many previous abscesses and incision and drainage procedures.  He has a painful lesion on the right side of his scrotum and was hoping to have the abscess opened and drained.  At this time, urology is not available to do the procedure, the patient has decided he will fill his prescriptions from his dermatologist and go home tonight.  He was advised to use the medications prescribed by dermatology as prescribed and return to the emergency department if he has fever, increasing size of the abscess, increasing pain or any other concerns.    I have reviewed the nursing notes.    I have reviewed the findings, diagnosis, plan and need for follow up with the patient.    New Prescriptions    No medications on file       Final diagnoses:   Hidradenitis suppurativa - of scrotum   I, Brandon Boyd, am serving as a trained medical scribe to document services personally performed by Destiny Wong MD, based on the provider's statements to me.   IDestiny MD, was physically present and have reviewed and verified the accuracy of this note documented by Brandon Boyd.      11/22/2019   [facility], EMERGENCY DEPARTMENT     Destiny Wong MD  11/22/19 1919

## 2019-11-26 ENCOUNTER — COMMUNICATION - HEALTHEAST (OUTPATIENT)
Dept: TELEHEALTH | Facility: CLINIC | Age: 34
End: 2019-11-26

## 2019-11-26 ENCOUNTER — HOSPITAL ENCOUNTER (OUTPATIENT)
Dept: PALLIATIVE MEDICINE | Facility: OTHER | Age: 34
Discharge: HOME | End: 2019-11-26
Attending: ANESTHESIOLOGY

## 2019-11-26 DIAGNOSIS — G89.4 CHRONIC PAIN SYNDROME: ICD-10-CM

## 2019-11-26 ASSESSMENT — MIFFLIN-ST. JEOR: SCORE: 1721.9

## 2019-12-05 ENCOUNTER — COMMUNICATION - HEALTHEAST (OUTPATIENT)
Dept: NURSING | Facility: CLINIC | Age: 34
End: 2019-12-05

## 2019-12-05 DIAGNOSIS — Z79.899 ENCOUNTER FOR LONG-TERM (CURRENT) USE OF HIGH-RISK MEDICATION: ICD-10-CM

## 2019-12-05 DIAGNOSIS — L30.9 ECZEMA, UNSPECIFIED TYPE: ICD-10-CM

## 2019-12-05 DIAGNOSIS — L73.2 HIDRADENITIS SUPPURATIVA: ICD-10-CM

## 2019-12-05 DIAGNOSIS — L30.9 DERMATITIS: ICD-10-CM

## 2019-12-05 LAB
ANION GAP SERPL CALCULATED.3IONS-SCNC: 4 MMOL/L (ref 3–14)
BASOPHILS # BLD AUTO: 0 10E9/L (ref 0–0.2)
BASOPHILS NFR BLD AUTO: 0.4 %
BUN SERPL-MCNC: 10 MG/DL (ref 7–30)
CALCIUM SERPL-MCNC: 8.8 MG/DL (ref 8.5–10.1)
CHLORIDE SERPL-SCNC: 106 MMOL/L (ref 94–109)
CO2 SERPL-SCNC: 26 MMOL/L (ref 20–32)
CREAT SERPL-MCNC: 0.9 MG/DL (ref 0.66–1.25)
DIFFERENTIAL METHOD BLD: NORMAL
EOSINOPHIL # BLD AUTO: 0.4 10E9/L (ref 0–0.7)
EOSINOPHIL NFR BLD AUTO: 4.6 %
ERYTHROCYTE [DISTWIDTH] IN BLOOD BY AUTOMATED COUNT: 12.4 % (ref 10–15)
GFR SERPL CREATININE-BSD FRML MDRD: >90 ML/MIN/{1.73_M2}
GLUCOSE SERPL-MCNC: 95 MG/DL (ref 70–99)
HCT VFR BLD AUTO: 43.5 % (ref 40–53)
HGB BLD-MCNC: 14.4 G/DL (ref 13.3–17.7)
IMM GRANULOCYTES # BLD: 0 10E9/L (ref 0–0.4)
IMM GRANULOCYTES NFR BLD: 0.3 %
LYMPHOCYTES # BLD AUTO: 2.5 10E9/L (ref 0.8–5.3)
LYMPHOCYTES NFR BLD AUTO: 32.3 %
MCH RBC QN AUTO: 30.6 PG (ref 26.5–33)
MCHC RBC AUTO-ENTMCNC: 33.1 G/DL (ref 31.5–36.5)
MCV RBC AUTO: 92 FL (ref 78–100)
MONOCYTES # BLD AUTO: 0.5 10E9/L (ref 0–1.3)
MONOCYTES NFR BLD AUTO: 5.9 %
NEUTROPHILS # BLD AUTO: 4.4 10E9/L (ref 1.6–8.3)
NEUTROPHILS NFR BLD AUTO: 56.5 %
NRBC # BLD AUTO: 0 10*3/UL
NRBC BLD AUTO-RTO: 0 /100
PLATELET # BLD AUTO: 239 10E9/L (ref 150–450)
POTASSIUM SERPL-SCNC: 3.9 MMOL/L (ref 3.4–5.3)
RBC # BLD AUTO: 4.71 10E12/L (ref 4.4–5.9)
RETICS # AUTO: 74.4 10E9/L (ref 25–95)
RETICS/RBC NFR AUTO: 1.6 % (ref 0.5–2)
SODIUM SERPL-SCNC: 135 MMOL/L (ref 133–144)
WBC # BLD AUTO: 7.8 10E9/L (ref 4–11)

## 2019-12-11 ENCOUNTER — AMBULATORY - HEALTHEAST (OUTPATIENT)
Dept: NURSING | Facility: CLINIC | Age: 34
End: 2019-12-11

## 2019-12-12 ENCOUNTER — COMMUNICATION - HEALTHEAST (OUTPATIENT)
Dept: INTERNAL MEDICINE | Facility: CLINIC | Age: 34
End: 2019-12-12

## 2019-12-18 ENCOUNTER — COMMUNICATION - HEALTHEAST (OUTPATIENT)
Dept: PALLIATIVE MEDICINE | Facility: OTHER | Age: 34
End: 2019-12-18

## 2019-12-18 ENCOUNTER — TELEPHONE (OUTPATIENT)
Dept: RHEUMATOLOGY | Facility: CLINIC | Age: 34
End: 2019-12-18

## 2019-12-18 DIAGNOSIS — G89.4 CHRONIC PAIN SYNDROME: ICD-10-CM

## 2019-12-18 NOTE — TELEPHONE ENCOUNTER
Spoke to patient , called with a reminder about there upcoming appointment , also instructed to bring medications or medication list.    Dee Love CMA

## 2019-12-19 ENCOUNTER — COMMUNICATION - HEALTHEAST (OUTPATIENT)
Dept: CARE COORDINATION | Facility: CLINIC | Age: 34
End: 2019-12-19

## 2019-12-20 ENCOUNTER — COMMUNICATION - HEALTHEAST (OUTPATIENT)
Dept: NURSING | Facility: CLINIC | Age: 34
End: 2019-12-20

## 2019-12-20 ENCOUNTER — TELEPHONE (OUTPATIENT)
Dept: FAMILY MEDICINE | Facility: CLINIC | Age: 34
End: 2019-12-20

## 2019-12-20 NOTE — TELEPHONE ENCOUNTER
Call received from Rosie @ the [facility]'s billing dept ([phone]). Pt received care on 4/2/2019 from a Dr. Norma Flaherty in dermatology, but pt's insurance keeps denying the claim for that date. Rosie requested a referral form be completed for that 4/2/2019 so pt's insurance could be billed retroactively.

## 2019-12-23 NOTE — TELEPHONE ENCOUNTER
Yes, he was restricted to me in April 2019. That is fine for the form. He is no longer my patient though.

## 2019-12-26 ENCOUNTER — AMBULATORY - HEALTHEAST (OUTPATIENT)
Dept: PALLIATIVE MEDICINE | Facility: OTHER | Age: 34
End: 2019-12-26

## 2019-12-26 ENCOUNTER — OFFICE VISIT (OUTPATIENT)
Dept: DERMATOLOGY | Facility: CLINIC | Age: 34
End: 2019-12-26
Attending: DERMATOLOGY
Payer: COMMERCIAL

## 2019-12-26 VITALS
OXYGEN SATURATION: 97 % | SYSTOLIC BLOOD PRESSURE: 112 MMHG | WEIGHT: 188.7 LBS | HEART RATE: 61 BPM | TEMPERATURE: 98 F | BODY MASS INDEX: 30.46 KG/M2 | DIASTOLIC BLOOD PRESSURE: 60 MMHG

## 2019-12-26 DIAGNOSIS — L73.2 HIDRADENITIS SUPPURATIVA: ICD-10-CM

## 2019-12-26 DIAGNOSIS — Z79.899 ENCOUNTER FOR LONG-TERM (CURRENT) USE OF HIGH-RISK MEDICATION: ICD-10-CM

## 2019-12-26 DIAGNOSIS — L30.9 DERMATITIS: ICD-10-CM

## 2019-12-26 DIAGNOSIS — L30.9 ECZEMA, UNSPECIFIED TYPE: ICD-10-CM

## 2019-12-26 DIAGNOSIS — L40.9 PSORIASIS: Primary | ICD-10-CM

## 2019-12-26 LAB
BASOPHILS # BLD AUTO: 0 10E9/L (ref 0–0.2)
BASOPHILS NFR BLD AUTO: 0.3 %
DIFFERENTIAL METHOD BLD: NORMAL
EOSINOPHIL # BLD AUTO: 0.4 10E9/L (ref 0–0.7)
EOSINOPHIL NFR BLD AUTO: 4.7 %
ERYTHROCYTE [DISTWIDTH] IN BLOOD BY AUTOMATED COUNT: 12.4 % (ref 10–15)
HCT VFR BLD AUTO: 42.2 % (ref 40–53)
HGB BLD-MCNC: 14.1 G/DL (ref 13.3–17.7)
IMM GRANULOCYTES # BLD: 0 10E9/L (ref 0–0.4)
IMM GRANULOCYTES NFR BLD: 0.3 %
LYMPHOCYTES # BLD AUTO: 3.2 10E9/L (ref 0.8–5.3)
LYMPHOCYTES NFR BLD AUTO: 42.8 %
MCH RBC QN AUTO: 30.8 PG (ref 26.5–33)
MCHC RBC AUTO-ENTMCNC: 33.4 G/DL (ref 31.5–36.5)
MCV RBC AUTO: 92 FL (ref 78–100)
MONOCYTES # BLD AUTO: 0.5 10E9/L (ref 0–1.3)
MONOCYTES NFR BLD AUTO: 7.1 %
NEUTROPHILS # BLD AUTO: 3.4 10E9/L (ref 1.6–8.3)
NEUTROPHILS NFR BLD AUTO: 44.8 %
NRBC # BLD AUTO: 0 10*3/UL
NRBC BLD AUTO-RTO: 0 /100
PLATELET # BLD AUTO: 225 10E9/L (ref 150–450)
RBC # BLD AUTO: 4.58 10E12/L (ref 4.4–5.9)
RETICS # AUTO: 74.2 10E9/L (ref 25–95)
RETICS/RBC NFR AUTO: 1.6 % (ref 0.5–2)
WBC # BLD AUTO: 7.5 10E9/L (ref 4–11)

## 2019-12-26 PROCEDURE — 85045 AUTOMATED RETICULOCYTE COUNT: CPT

## 2019-12-26 PROCEDURE — G0463 HOSPITAL OUTPT CLINIC VISIT: HCPCS | Mod: ZF

## 2019-12-26 PROCEDURE — 85025 COMPLETE CBC W/AUTO DIFF WBC: CPT

## 2019-12-26 RX ORDER — TRIAMCINOLONE ACETONIDE 1 MG/G
CREAM TOPICAL 2 TIMES DAILY
Qty: 464 G | Refills: 3 | Status: SHIPPED | OUTPATIENT
Start: 2019-12-26 | End: 2020-01-23

## 2019-12-26 RX ORDER — FLUOCINOLONE ACETONIDE 0.11 MG/ML
OIL TOPICAL 2 TIMES DAILY
Qty: 120 ML | Refills: 1 | Status: SHIPPED | OUTPATIENT
Start: 2019-12-26 | End: 2020-01-23

## 2019-12-26 RX ORDER — DAPSONE 100 MG/1
100 TABLET ORAL DAILY
Qty: 30 TABLET | Refills: 1 | Status: SHIPPED | OUTPATIENT
Start: 2019-12-26 | End: 2020-01-23

## 2019-12-26 RX ORDER — FLUOCINOLONE ACETONIDE 0.11 MG/ML
OIL TOPICAL DAILY
Qty: 1 BOTTLE | Refills: 11 | Status: SHIPPED | OUTPATIENT
Start: 2019-12-26 | End: 2020-01-23

## 2019-12-26 RX ORDER — CLOBETASOL PROPIONATE 0.5 MG/G
OINTMENT TOPICAL
Qty: 60 G | Refills: 3 | Status: SHIPPED | OUTPATIENT
Start: 2019-12-26 | End: 2020-01-23

## 2019-12-26 ASSESSMENT — PAIN SCALES - GENERAL: PAINLEVEL: EXTREME PAIN (9)

## 2019-12-26 NOTE — NURSING NOTE
Chief Complaint   Patient presents with     RECHECK     Hidradenitis suppurativa       /60 (BP Location: Right arm, Patient Position: Sitting, Cuff Size: Adult Regular)   Pulse 61   Temp 98  F (36.7  C) (Oral)   Wt 85.6 kg (188 lb 11.2 oz)   SpO2 97%   BMI 30.46 kg/m        Nisa Moise CMA    12/26/2019 11:10 AM

## 2019-12-26 NOTE — PATIENT INSTRUCTIONS
Dapsone 100mg daily  Scalp - fluocinolone oil 1-2 x a day   Body - Triamcinolone cream and fluocinolone oil twice a day  Hands/Feet - clobetasol ointment twice a day

## 2019-12-26 NOTE — PROGRESS NOTES
[facility], [place]  Dermatology-Rheumatology Clinic  Apolinar Ruiz MD  2019     Name: Bernard Padilla  MRN: [id number]  Age: 34 year old  : 1985  Referring provider: Jayde Quiroz    Dermatology Problem List:   1. Hidradenitis supperitiva              -Diagnosed in 2016 and did not respond to antibiotics.               -Seen by Dr. Bird and underwent 9 procedures               - mg BID - start               Dapsone 100mg daily started             19  2. Acute onset hand foot psoriasiform dermatitis, suspected reactive arthritis              -  mg BID - start               - Lidex ointment at night, urea cream in the morning   3. Atopic dermatitis  4. Seborrheic dermatitis                        - DermaSmooth    Encounter Date: 2019   Date of Last Visit: 2019   Orders Only on 2019   Component Date Value Ref Range Status     % Retic 2019 1.6  0.5 - 2.0 % Final     Absolute Retic 2019 74.4  25 - 95 10e9/L Final     WBC 2019 7.8  4.0 - 11.0 10e9/L Final     RBC Count 2019 4.71  4.4 - 5.9 10e12/L Final     Hemoglobin 2019 14.4  13.3 - 17.7 g/dL Final     Hematocrit 2019 43.5  40.0 - 53.0 % Final     MCV 2019 92  78 - 100 fl Final     MCH 2019 30.6  26.5 - 33.0 pg Final     MCHC 2019 33.1  31.5 - 36.5 g/dL Final     RDW 2019 12.4  10.0 - 15.0 % Final     Platelet Count 2019 239  150 - 450 10e9/L Final     Diff Method 2019 Automated Method   Final     % Neutrophils 2019 56.5  % Final     % Lymphocytes 2019 32.3  % Final     % Monocytes 2019 5.9  % Final     % Eosinophils 2019 4.6  % Final     % Basophils 2019 0.4  % Final     % Immature Granulocytes 2019 0.3  % Final     Nucleated RBCs 2019 0  0 /100 Final     Absolute Neutrophil 2019 4.4  1.6 - 8.3 10e9/L Final     Absolute Lymphocytes 2019 2.5  0.8 - 5.3 10e9/L Final     Absolute Monocytes  12/05/2019 0.5  0.0 - 1.3 10e9/L Final     Absolute Eosinophils 12/05/2019 0.4  0.0 - 0.7 10e9/L Final     Absolute Basophils 12/05/2019 0.0  0.0 - 0.2 10e9/L Final     Abs Immature Granulocytes 12/05/2019 0.0  0 - 0.4 10e9/L Final     Absolute Nucleated RBC 12/05/2019 0.0   Final     Sodium 12/05/2019 135  133 - 144 mmol/L Final     Potassium 12/05/2019 3.9  3.4 - 5.3 mmol/L Final     Chloride 12/05/2019 106  94 - 109 mmol/L Final     Carbon Dioxide 12/05/2019 26  20 - 32 mmol/L Final     Anion Gap 12/05/2019 4  3 - 14 mmol/L Final     Glucose 12/05/2019 95  70 - 99 mg/dL Final     Urea Nitrogen 12/05/2019 10  7 - 30 mg/dL Final     Creatinine 12/05/2019 0.90  0.66 - 1.25 mg/dL Final     GFR Estimate 12/05/2019 >90  >60 mL/min/[1.73_m2] Final    Comment: Non  GFR Calc  Starting 12/18/2018, serum creatinine based estimated GFR (eGFR) will be   calculated using the Chronic Kidney Disease Epidemiology Collaboration   (CKD-EPI) equation.       GFR Estimate If Black 12/05/2019 >90  >60 mL/min/[1.73_m2] Final    Comment:  GFR Calc  Starting 12/18/2018, serum creatinine based estimated GFR (eGFR) will be   calculated using the Chronic Kidney Disease Epidemiology Collaboration   (CKD-EPI) equation.       Calcium 12/05/2019 8.8  8.5 - 10.1 mg/dL Final       HPI:   Bernard Padilla is a 34 year old male with a history including hidradenitis suppurativa (HS) who presents for follow-up. I last the patient was seen on November 21st, 2019 at which time, she was to start 100mg Dapsone. Her arthritis had resolved and psoriasiform plaques were much improved cyclosporine 200mg BID.     Today, he reports that he had stopped cyclosporine 200mg BID unintentionally. He is getting two eruptions of HS a month.     Patient last saw Dr. Chai Foster in pain and palliative care on November 26th, 2019 for HS. At that time, Oxycodone 10mg twice a day for 28 days was discussed with a plant to decrease as the new  medication begins to take effect. Plan for follow up in eight weeks.     Background history from [facility] on 08/22/2019:   Diagnosed 2 years ago and did not respond to several trials of antibiotics. Lanced and packing however would recur. Derm surgeon (Dr. Bird from [facility]) performed 9 surgeries (removed sweat glands in bilateral axillas, bilateral groins and buttocks and now referred to Dr. Ruiz for further management. Groins done 8/7/19. Currently very painful at both groins at the surgical sight. Taking 30-40mg of percocet daily.      Today the patient reports that he has lesions of the groin and gluteals regions at current. He was on Doxycycline in the past for a long period of time before starting the surgical procedures and the antibiotics for HS. He also reports having tried Bactrim in the past without relief. He reports new lesions around the surgical sites. He endorses having thinning hair. He denies being on a blood thinner.     He has a Hx of eczema. He reports rashes of the upper extremities which he treats with Fluocinonide. He also uses Dermasmooth and eye drops. He reports that he would like to try a new shampoo.   The patient also shares that from [id number] he had tried to quit smoking while in [employer]. He reports that the HS was unchanged during this time. The patient reports a Hx of skin cancer on his mother's side of the family and Diabetes on his father's side.     Review of Systems:   Pertinent items are noted in HPI or as below, remainder of complete ROS is negative.      Active Medications:     Current Outpatient Medications:      Acetaminophen (TYLENOL PO), , Disp: , Rfl:      albuterol (PROAIR HFA/PROVENTIL HFA/VENTOLIN HFA) 108 (90 Base) MCG/ACT inhaler, Inhale 2 puffs into the lungs every 6 hours, Disp: 8.5 g, Rfl: 3     cetirizine (ZYRTEC) 10 MG tablet, Take 1 tablet (10 mg) by mouth daily, Disp: 30 tablet, Rfl: 2     clindamycin (CLEOCIN T) 1 % external solution, Apply  topically 2 times daily, Disp: 60 mL, Rfl: 2     clobetasol (TEMOVATE) 0.05 % external ointment, Apply to hands and feet twice a day, Disp: 60 g, Rfl: 3     dapsone (ACZONE) 100 MG tablet, Take 1 tablet (100 mg) by mouth daily, Disp: 30 tablet, Rfl: 1     fluocinolone acetonide (DERMA-SMOOTHE/FS BODY) 0.01 % external oil, Apply topically 2 times daily, Disp: 120 mL, Rfl: 1     Fluocinolone Acetonide Scalp (DERMA-SMOOTHE/FS SCALP) 0.01 % OIL oil, Apply topically daily, Disp: 1 Bottle, Rfl: 11     fluticasone (FLONASE) 50 MCG/ACT nasal spray, Spray 1-2 sprays into both nostrils daily, Disp: 16 g, Rfl: 2     IBUPROFEN PO, Take 800 mg by mouth 3 times daily as needed, Disp: , Rfl:      naloxone (NARCAN) 4 MG/0.1ML nasal spray, Spray 1 spray (4 mg) into one nostril alternating nostrils as needed for opioid reversal, Disp: 2 each, Rfl: 0     oxyCODONE-acetaminophen (PERCOCET)  MG per tablet, Take 1 tablet by mouth 2 times daily , Disp: , Rfl:      triamcinolone (KENALOG) 0.1 % external cream, Apply topically 2 times daily, Disp: 464 g, Rfl: 3      Allergies:   Vicodin  Chicken-Derived Products        Hydrocodone        Past Medical History:  Boil  Hidradenitis suppurativa  Tobacco use disorder  Patellofemoral arthritis of left knee  Eczema     Past Surgical History:  Irrigation and debridement rectum, combined 11/14/2019  Meniscus repair     Family History:    The patient's family history includes Diabetes in his father; Hypertension in his mother.     Social History:  The patient reports that he has been smoking. He has been smoking about 0.50 packs per day. He has never used smokeless tobacco. He reports current alcohol use. He reports current drug use. Drug: Marijuana.   PCP: Lillian Webb  Marital Status: Single     Physical Exam:   /60 (BP Location: Right arm, Patient Position: Sitting, Cuff Size: Adult Regular)   Pulse 61   Temp 98  F (36.7  C) (Oral)   Wt 85.6 kg (188 lb 11.2 oz)   SpO2 97%   BMI  30.46 kg/m     Wt Readings from Last 4 Encounters:   12/26/19 85.6 kg (188 lb 11.2 oz)   11/22/19 86.6 kg (191 lb)   11/21/19 86.8 kg (191 lb 6.4 oz)   11/14/19 84.5 kg (186 lb 4.6 oz)     General: Well-appearing male in no distress. Alert and oriented.   Skin: Focused exam of the hands, hands, chest, back, head, neck, lower legs and feet was performed.   Findings -   - Diffuse, plate like scaly papules sacttered on the plantar feet. Bilateral hyperkeratosis of the heel.    - Diffuse scalp papules on the palm fairly well demarcated on the wrists.  - Flat scaly papules on the arms and flanks, and on the lateral aspect of the back. Also few on the elbows as well. Not extremely well demarcated.   - Scaly, plaque on the right upper thigh. Ulcerated surgical scar on the left inguinal fold. Plaque on right thigh.     Laboratory:   Component      Latest Ref Rng & Units 11/21/2019 11/21/2019          10:30 AM 10:30 AM   WBC      4.0 - 11.0 10e9/L 7.8    RBC Count      4.4 - 5.9 10e12/L 4.64    Hemoglobin      13.3 - 17.7 g/dL 14.3    Hematocrit      40.0 - 53.0 % 42.5    MCV      78 - 100 fl 92    MCH      26.5 - 33.0 pg 30.8    MCHC      31.5 - 36.5 g/dL 33.6    RDW      10.0 - 15.0 % 12.4    Platelet Count      150 - 450 10e9/L 250    Diff Method       Automated Method    % Neutrophils      % 58.1    % Lymphocytes      % 28.5    % Monocytes      % 6.4    % Eosinophils      % 6.2    % Basophils      % 0.4    % Immature Granulocytes      % 0.4    Nucleated RBCs      0 /100 0    Absolute Neutrophil      1.6 - 8.3 10e9/L 4.5    Absolute Lymphocytes      0.8 - 5.3 10e9/L 2.2    Absolute Monocytes      0.0 - 1.3 10e9/L 0.5    Absolute Eosinophils      0.0 - 0.7 10e9/L 0.5    Absolute Basophils      0.0 - 0.2 10e9/L 0.0    Abs Immature Granulocytes      0 - 0.4 10e9/L 0.0    Absolute Nucleated RBC       0.0    Sodium      133 - 144 mmol/L  137   Potassium      3.4 - 5.3 mmol/L  3.5   Chloride      94 - 109 mmol/L  107   Carbon  Dioxide      20 - 32 mmol/L  26   Anion Gap      3 - 14 mmol/L  4   Glucose      70 - 99 mg/dL  92   Urea Nitrogen      7 - 30 mg/dL  9   Creatinine      0.66 - 1.25 mg/dL  0.84   GFR Estimate      >60 mL/min/1.73:m2  >90   GFR Estimate If Black      >60 mL/min/1.73:m2  >90   Calcium      8.5 - 10.1 mg/dL  8.8   Bilirubin Total      0.2 - 1.3 mg/dL  0.4   Albumin      3.4 - 5.0 g/dL  3.8   Protein Total      6.8 - 8.8 g/dL  7.4   Alkaline Phosphatase      40 - 150 U/L  112   ALT      0 - 70 U/L  37   AST      0 - 45 U/L  21   % Retic      0.5 - 2.0 %     Absolute Retic      25 - 95 10e9/L       Component      Latest Ref Rng & Units 12/5/2019          11:00 AM   % Retic      0.5 - 2.0 % 1.6   Absolute Retic      25 - 95 10e9/L 74.4     Component      Latest Ref Rng & Units 12/5/2019 12/5/2019          11:00 AM 11:00 AM   WBC      4.0 - 11.0 10e9/L 7.8    RBC Count      4.4 - 5.9 10e12/L 4.71    Hemoglobin      13.3 - 17.7 g/dL 14.4    Hematocrit      40.0 - 53.0 % 43.5    MCV      78 - 100 fl 92    MCH      26.5 - 33.0 pg 30.6    MCHC      31.5 - 36.5 g/dL 33.1    RDW      10.0 - 15.0 % 12.4    Platelet Count      150 - 450 10e9/L 239    Diff Method       Automated Method    % Neutrophils      % 56.5    % Lymphocytes      % 32.3    % Monocytes      % 5.9    % Eosinophils      % 4.6    % Basophils      % 0.4    % Immature Granulocytes      % 0.3    Nucleated RBCs      0 /100 0    Absolute Neutrophil      1.6 - 8.3 10e9/L 4.4    Absolute Lymphocytes      0.8 - 5.3 10e9/L 2.5    Absolute Monocytes      0.0 - 1.3 10e9/L 0.5    Absolute Eosinophils      0.0 - 0.7 10e9/L 0.4    Absolute Basophils      0.0 - 0.2 10e9/L 0.0    Abs Immature Granulocytes      0 - 0.4 10e9/L 0.0    Absolute Nucleated RBC       0.0    Sodium      133 - 144 mmol/L  135   Potassium      3.4 - 5.3 mmol/L  3.9   Chloride      94 - 109 mmol/L  106   Carbon Dioxide      20 - 32 mmol/L  26   Anion Gap      3 - 14 mmol/L  4   Glucose      70 - 99 mg/dL   95   Urea Nitrogen      7 - 30 mg/dL  10   Creatinine      0.66 - 1.25 mg/dL  0.90   GFR Estimate      >60 mL/min/1.73:m2  >90   GFR Estimate If Black      >60 mL/min/1.73:m2  >90   Calcium      8.5 - 10.1 mg/dL  8.8     Assessment and Plan:     1. Hidradenitis suppurativa (s/p 9 surgeries involving bilateral axilla, buttock, and groins being the last surgery 8/7/19):  - Recheck labs today (CBC with platelets differential and reticulocyte count) and follow-up in four weeks.   - Called Dr. Chai Foster in pain and palliative care regarding pain management and we will work together to decrease his opiod usage    2) Differential Psoriasis/Psoriasiform eruption vs dermatitis BIS vs. less likely other papulosqquamous disorders Lichen planus or [place]   -  Patiet stopped Cyclosporine and his psoriasiform eruption seemed to get much worse now spread throughout the trunk, groin and palms/soles. I previously was calling this reactive arthritis, but as it all came on after a viral-like illness. That being said, he has no joint pains and this looks much more like psoriasis at this time. He would like to try to treat topically for now. Could also consider lights in the future.  If this gets worse and we start considering systemic meds, maybe worth doing a biopsy.  Will also check syphilis as well today --> he already has his labs today. Will check at f/u visit.    Topicals:  - Start triamcinolone cream and fluocinolone oil twice day to the whole body.   - Clobetasol ointment twice day to the hands and feet.   - Stop urea cream 40% in the morning to hands and feet as patient reports a burning sensation with use.    3) Hidradenitis suppurativa  - No lesions currently  - Currently healing from recent surgery in left groin  - Continue dapsone 100mg daily  - Will consider increasing dose in future  - Currently gets 1-2 new abscess a month. Has been on dapsone for 4 weeks now. Will see how th next month goes.          4) Atopic  dermatitis  - Fluocinolone oil twice a day to scalp and hair. Patient states this has been beneficial in controlling his dermatitis.     Orders:  - CBC with platelets differential  - Reticulocyte count  - dapsone (ACZONE) 100 MG tablet  Dispense: 30 tablet; Refill: 1  - Fluocinolone Acetonide Scalp (DERMA-SMOOTHE/FS SCALP) 0.01 % OIL oil  Dispense: 1 Bottle; Refill: 11  - triamcinolone (KENALOG) 0.1 % external cream  Dispense: 464 g; Refill: 3  - clobetasol (TEMOVATE) 0.05 % external ointment  Dispense: 60 g; Refill: 3      Follow-up: Return in about 4 weeks (around 1/23/2020).     Scribe Disclosure:  I, Gillian Granda, am serving as a scribe to document services personally performed by Apolinar Ruiz MD at this visit, based upon the provider's statements to me. All documentation has been reviewed by the aforementioned provider prior to being entered into the official medical record.     Provider Disclosure:   The documentation recorded by the scribe accurately reflects the services I personally performed and the decisions made by me.    Apolinar Ruiz MD, FAAD    Departments of Internal Medicine and Dermatology  [facility]  [phone]

## 2019-12-26 NOTE — LETTER
2019      RE: Bernard Padilla  [id number] [facility] [address]       [facility]  Dermatology-Rheumatology Clinic  Apolinar Ruiz MD  2019     Name: Bernard Padilla  MRN: [id number]  Age: 34 year old  : 1985  Referring provider: Jayde Quiroz    Dermatology Problem List:   1. Hidradenitis supperitiva              -Diagnosed in 2016 and did not respond to antibiotics.               -Seen by Dr. Bird and underwent 9 procedures               - mg BID - start               Dapsone 100mg daily started             19  2. Acute onset hand foot psoriasiform dermatitis, suspected reactive arthritis              -  mg BID - start               - Lidex ointment at night, urea cream in the morning   3. Atopic dermatitis  4. Seborrheic dermatitis                        - DermaSmooth    Encounter Date: 2019   Date of Last Visit: 2019   Orders Only on 2019   Component Date Value Ref Range Status     % Retic 2019 1.6  0.5 - 2.0 % Final     Absolute Retic 2019 74.4  25 - 95 10e9/L Final     WBC 2019 7.8  4.0 - 11.0 10e9/L Final     RBC Count 2019 4.71  4.4 - 5.9 10e12/L Final     Hemoglobin 2019 14.4  13.3 - 17.7 g/dL Final     Hematocrit 2019 43.5  40.0 - 53.0 % Final     MCV 2019 92  78 - 100 fl Final     MCH 2019 30.6  26.5 - 33.0 pg Final     MCHC 2019 33.1  31.5 - 36.5 g/dL Final     RDW 2019 12.4  10.0 - 15.0 % Final     Platelet Count 2019 239  150 - 450 10e9/L Final     Diff Method 2019 Automated Method   Final     % Neutrophils 2019 56.5  % Final     % Lymphocytes 2019 32.3  % Final     % Monocytes 2019 5.9  % Final     % Eosinophils 2019 4.6  % Final     % Basophils 2019 0.4  % Final     % Immature Granulocytes 2019 0.3  % Final     Nucleated RBCs 2019 0  0 /100 Final     Absolute Neutrophil 2019 4.4  1.6 - 8.3 10e9/L Final      Absolute Lymphocytes 12/05/2019 2.5  0.8 - 5.3 10e9/L Final     Absolute Monocytes 12/05/2019 0.5  0.0 - 1.3 10e9/L Final     Absolute Eosinophils 12/05/2019 0.4  0.0 - 0.7 10e9/L Final     Absolute Basophils 12/05/2019 0.0  0.0 - 0.2 10e9/L Final     Abs Immature Granulocytes 12/05/2019 0.0  0 - 0.4 10e9/L Final     Absolute Nucleated RBC 12/05/2019 0.0   Final     Sodium 12/05/2019 135  133 - 144 mmol/L Final     Potassium 12/05/2019 3.9  3.4 - 5.3 mmol/L Final     Chloride 12/05/2019 106  94 - 109 mmol/L Final     Carbon Dioxide 12/05/2019 26  20 - 32 mmol/L Final     Anion Gap 12/05/2019 4  3 - 14 mmol/L Final     Glucose 12/05/2019 95  70 - 99 mg/dL Final     Urea Nitrogen 12/05/2019 10  7 - 30 mg/dL Final     Creatinine 12/05/2019 0.90  0.66 - 1.25 mg/dL Final     GFR Estimate 12/05/2019 >90  >60 mL/min/[1.73_m2] Final    Comment: Non  GFR Calc  Starting 12/18/2018, serum creatinine based estimated GFR (eGFR) will be   calculated using the Chronic Kidney Disease Epidemiology Collaboration   (CKD-EPI) equation.       GFR Estimate If Black 12/05/2019 >90  >60 mL/min/[1.73_m2] Final    Comment:  GFR Calc  Starting 12/18/2018, serum creatinine based estimated GFR (eGFR) will be   calculated using the Chronic Kidney Disease Epidemiology Collaboration   (CKD-EPI) equation.       Calcium 12/05/2019 8.8  8.5 - 10.1 mg/dL Final       HPI:   Bernard Padilla is a 34 year old male with a history including hidradenitis suppurativa  (HS) who presents for follow-up. I last the patient was seen on November 21st, 2019 at which time, she was to start 100mg Dapsone. Her arthritis had resolved and psoriasiform plaques were much improved cyclosporine 200mg BID.     Today, he reports that he had stopped cyclosporine 200mg BID unintentionally. He is getting two eruptions of HS a month.     Patient last saw Dr. Chai Foster in pain and palliative care on November 26th, 2019 for HS. At that time,  Oxycodone 10mg twice a day for 28 days was discussed with a plant to decrease as the new medication begins to take effect. Plan for follow up in eight weeks.     Background history from \[facility]\"" on 08/22/2019:   Diagnosed 2 years ago and did not respond to several trials of antibiotics. Lanced and packing however would recur. Derm surgeon (Dr. Bird from [facility]) performed 9 surgeries (removed sweat glands in bilateral axillas, bilateral groins and buttocks and now referred to Dr. Ruiz for further management. Groins done 8/7/19. Currently very painful at both groins at the surgical sight. Taking 30-40mg of percocet daily.      Today the patient reports that he has lesions of the groin and gluteals regions at current. He was on Doxycycline in the past for a long period of time before starting the surgical procedures and the antibiotics for HS. He also reports having tried Bactrim in the past without relief. He reports new lesions around the surgical sites. He endorses having thinning hair. He denies being on a blood thinner.     He has a Hx of eczema. He reports rashes of the upper extremities which he treats with Fluocinonide. He also uses Dermasmooth and eye drops. He reports that he would like to try a new shampoo.   The patient also shares that from [id number] he had tried to quit smoking while in [employer]. He reports that the HS was unchanged during this time. The patient reports a Hx of skin cancer on his mother's side of the family and Diabetes on his father's side.     Review of Systems:   Pertinent items are noted in HPI or as below, remainder of complete ROS is negative.      Active Medications:     Current Outpatient Medications:      Acetaminophen (TYLENOL PO), , Disp: , Rfl:      albuterol (PROAIR HFA/PROVENTIL HFA/VENTOLIN HFA) 108 (90 Base) MCG/ACT inhaler, Inhale 2 puffs into the lungs every 6 hours, Disp: 8.5 g, Rfl: 3     cetirizine (ZYRTEC) 10 MG tablet, Take 1 tablet (10 mg) by mouth  daily, Disp: 30 tablet, Rfl: 2     clindamycin (CLEOCIN T) 1 % external solution, Apply topically 2 times daily, Disp: 60 mL, Rfl: 2     clobetasol (TEMOVATE) 0.05 % external ointment, Apply to hands and feet twice a day, Disp: 60 g, Rfl: 3     dapsone (ACZONE) 100 MG tablet, Take 1 tablet (100 mg) by mouth daily, Disp: 30 tablet, Rfl: 1     fluocinolone acetonide (DERMA-SMOOTHE/FS BODY) 0.01 % external oil, Apply topically 2 times daily, Disp: 120 mL, Rfl: 1     Fluocinolone Acetonide Scalp (DERMA-SMOOTHE/FS SCALP) 0.01 % OIL oil, Apply topically daily, Disp: 1 Bottle, Rfl: 11     fluticasone (FLONASE) 50 MCG/ACT nasal spray, Spray 1-2 sprays into both nostrils daily, Disp: 16 g, Rfl: 2     IBUPROFEN PO, Take 800 mg by mouth 3 times daily as needed, Disp: , Rfl:      naloxone (NARCAN) 4 MG/0.1ML nasal spray, Spray 1 spray (4 mg) into one nostril alternating nostrils as needed for opioid reversal, Disp: 2 each, Rfl: 0     oxyCODONE-acetaminophen (PERCOCET)  MG per tablet, Take 1 tablet by mouth 2 times daily , Disp: , Rfl:      triamcinolone (KENALOG) 0.1 % external cream, Apply topically 2 times daily, Disp: 464 g, Rfl: 3      Allergies:   Vicodin  Chicken-Derived Products        Hydrocodone        Past Medical History:  Boil  Hidradenitis suppurativa  Tobacco use disorder  Patellofemoral arthritis of left knee  Eczema     Past Surgical History:  Irrigation and debridement rectum, combined 11/14/2019  Meniscus repair     Family History:    The patient's family history includes Diabetes in his father; Hypertension in his mother.     Social History:  The patient reports that he has been smoking. He has been smoking about 0.50 packs per day. He has never used smokeless tobacco. He reports current alcohol use. He reports current drug use. Drug: Marijuana.   PCP: Lillian Webb  Marital Status: Single     Physical Exam:   /60 (BP Location: Right arm, Patient Position: Sitting, Cuff Size: Adult Regular)    Pulse 61   Temp 98  F (36.7  C) (Oral)   Wt 85.6 kg (188 lb 11.2 oz)   SpO2 97%   BMI 30.46 kg/m      Wt Readings from Last 4 Encounters:   12/26/19 85.6 kg (188 lb 11.2 oz)   11/22/19 86.6 kg (191 lb)   11/21/19 86.8 kg (191 lb 6.4 oz)   11/14/19 84.5 kg (186 lb 4.6 oz)     General: Well-appearing male in no distress. Alert and oriented.   Skin: Focused exam of the hands, hands, chest, back, head, neck, lower legs and feet was performed.   Findings -   - Diffuse, plate like scaly papules sacttered on the plantar feet. Bilateral hyperkeratosis of the heel.    - Diffuse scalp papules on the palm fairly well demarcated on the wrists.  - Flat scaly papules on the arms and flanks, and on the lateral aspect of the back. Also few on the elbows as well. Not extremely well demarcated.   - Scaly, plaque on the right upper thigh. Ulcerated surgical scar on the left inguinal fold. Plaque on right thigh.     Laboratory:   Component      Latest Ref Rng & Units 11/21/2019 11/21/2019          10:30 AM 10:30 AM   WBC      4.0 - 11.0 10e9/L 7.8    RBC Count      4.4 - 5.9 10e12/L 4.64    Hemoglobin      13.3 - 17.7 g/dL 14.3    Hematocrit      40.0 - 53.0 % 42.5    MCV      78 - 100 fl 92    MCH      26.5 - 33.0 pg 30.8    MCHC      31.5 - 36.5 g/dL 33.6    RDW      10.0 - 15.0 % 12.4    Platelet Count      150 - 450 10e9/L 250    Diff Method       Automated Method    % Neutrophils      % 58.1    % Lymphocytes      % 28.5    % Monocytes      % 6.4    % Eosinophils      % 6.2    % Basophils      % 0.4    % Immature Granulocytes      % 0.4    Nucleated RBCs      0 /100 0    Absolute Neutrophil      1.6 - 8.3 10e9/L 4.5    Absolute Lymphocytes      0.8 - 5.3 10e9/L 2.2    Absolute Monocytes      0.0 - 1.3 10e9/L 0.5    Absolute Eosinophils      0.0 - 0.7 10e9/L 0.5    Absolute Basophils      0.0 - 0.2 10e9/L 0.0    Abs Immature Granulocytes      0 - 0.4 10e9/L 0.0    Absolute Nucleated RBC       0.0    Sodium      133 - 144 mmol/L   137   Potassium      3.4 - 5.3 mmol/L  3.5   Chloride      94 - 109 mmol/L  107   Carbon Dioxide      20 - 32 mmol/L  26   Anion Gap      3 - 14 mmol/L  4   Glucose      70 - 99 mg/dL  92   Urea Nitrogen      7 - 30 mg/dL  9   Creatinine      0.66 - 1.25 mg/dL  0.84   GFR Estimate      >60 mL/min/1.73:m2  >90   GFR Estimate If Black      >60 mL/min/1.73:m2  >90   Calcium      8.5 - 10.1 mg/dL  8.8   Bilirubin Total      0.2 - 1.3 mg/dL  0.4   Albumin      3.4 - 5.0 g/dL  3.8   Protein Total      6.8 - 8.8 g/dL  7.4   Alkaline Phosphatase      40 - 150 U/L  112   ALT      0 - 70 U/L  37   AST      0 - 45 U/L  21   % Retic      0.5 - 2.0 %     Absolute Retic      25 - 95 10e9/L       Component      Latest Ref Rng & Units 12/5/2019          11:00 AM   % Retic      0.5 - 2.0 % 1.6   Absolute Retic      25 - 95 10e9/L 74.4     Component      Latest Ref Rng & Units 12/5/2019 12/5/2019          11:00 AM 11:00 AM   WBC      4.0 - 11.0 10e9/L 7.8    RBC Count      4.4 - 5.9 10e12/L 4.71    Hemoglobin      13.3 - 17.7 g/dL 14.4    Hematocrit      40.0 - 53.0 % 43.5    MCV      78 - 100 fl 92    MCH      26.5 - 33.0 pg 30.6    MCHC      31.5 - 36.5 g/dL 33.1    RDW      10.0 - 15.0 % 12.4    Platelet Count      150 - 450 10e9/L 239    Diff Method       Automated Method    % Neutrophils      % 56.5    % Lymphocytes      % 32.3    % Monocytes      % 5.9    % Eosinophils      % 4.6    % Basophils      % 0.4    % Immature Granulocytes      % 0.3    Nucleated RBCs      0 /100 0    Absolute Neutrophil      1.6 - 8.3 10e9/L 4.4    Absolute Lymphocytes      0.8 - 5.3 10e9/L 2.5    Absolute Monocytes      0.0 - 1.3 10e9/L 0.5    Absolute Eosinophils      0.0 - 0.7 10e9/L 0.4    Absolute Basophils      0.0 - 0.2 10e9/L 0.0    Abs Immature Granulocytes      0 - 0.4 10e9/L 0.0    Absolute Nucleated RBC       0.0    Sodium      133 - 144 mmol/L  135   Potassium      3.4 - 5.3 mmol/L  3.9   Chloride      94 - 109 mmol/L  106   Carbon  Dioxide      20 - 32 mmol/L  26   Anion Gap      3 - 14 mmol/L  4   Glucose      70 - 99 mg/dL  95   Urea Nitrogen      7 - 30 mg/dL  10   Creatinine      0.66 - 1.25 mg/dL  0.90   GFR Estimate      >60 mL/min/1.73:m2  >90   GFR Estimate If Black      >60 mL/min/1.73:m2  >90   Calcium      8.5 - 10.1 mg/dL  8.8     Assessment and Plan:     1. Hidradenitis suppurativa (s/p 9 surgeries involving bilateral axilla, buttock, and groins being the last surgery 8/7/19):  - Recheck labs  today (CBC with platelets differential and reticulocyte count) and follow-up in four weeks.   - Called Dr. Chai Foster in pain and palliative care regarding pain management and we will work together to decrease his opiod usage    2) Differential Psoriasis/Psoriasiform eruption vs dermatitis BIS vs. less likely other papulosqquamous disorders Lichen planus or MI   -  Patiet stopped Cyclosporine and his psoriasiform eruption seemed to get much worse now spread throughout the trunk, groin and palms/soles. I previously was calling this reactive arthritis, but as it all came on after a viral-like illness. That being said, he has no joint pains and this looks much more like psoriasis at this time. He would like to try to treat topically for now. Could also consider lights in the future.  If this gets worse and we start considering systemic meds, maybe worth doing a biopsy.  Will also check syphilis as well today --> he already has his labs today. Will check at f/u visit.    Topicals:  - Start triamcinolone cream and fluocinolone oil twice day to the whole body.   - Clobetasol ointment twice day to the hands and feet.   - Stop urea cream 40% in the morning to hands and feet as patient reports a burning sensation with use.    3) Hidradenitis suppurativa  - No lesions currently  - Currently healing from recent surgery in left groin  - Continue dapsone 100mg daily  - Will consider increasing dose in future  - Currently gets 1-2 new abscess  a month. Has been on dapsone for 4 weeks now. Will see how th next month goes.          4) Atopic dermatitis  - Fluocinolone oil twice a day to scalp and hair. Patient states this has been beneficial in controlling his dermatitis.     Orders:  - CBC with platelets differential  - Reticulocyte count  - dapsone (ACZONE) 100 MG tablet  Dispense: 30 tablet; Refill: 1  - Fluocinolone Acetonide Scalp (DERMA-SMOOTHE/FS SCALP) 0.01 % OIL oil  Dispense: 1 Bottle; Refill: 11  - triamcinolone (KENALOG) 0.1 % external cream  Dispense: 464 g; Refill: 3  - clobetasol (TEMOVATE) 0.05 % external ointment  Dispense: 60 g; Refill: 3      Follow-up: Return in about 4 weeks (around 1/23/2020).     Scribe Disclosure:  I, Gillian Granda, am serving as a scribe to document services personally performed by Apolinar Ruiz MD at this visit, based upon the provider's statements to me. All documentation has been reviewed by the aforementioned provider prior to being entered into the official medical record.     Provider Disclosure:   The documentation recorded by the scribe accurately reflects the services I personally performed and the decisions made by me.    Apolinar Ruiz MD, FAAD    Departments of Internal Medicine and Dermatology  [facility]  [phone]

## 2019-12-26 NOTE — LETTER
2019       RE: Bernard Padilla  [id number] [facility] [address]     Dear Colleague,    Thank you for referring your patient, Bernard Padilla, to the [facility] RHEUMATOLOGY at [facility]. Please see a copy of my visit note below.    [facility]  Dermatology-Rheumatology Clinic  Apolinar Ruiz MD  2019     Name: Bernard Padilla  MRN: [id number]  Age: 34 year old  : 1985  Referring provider: Jayde Quiroz    Dermatology Problem List:   1. Hidradenitis supperitiva              -Diagnosed in  and did not respond to antibiotics.               -Seen by Dr. Bird and underwent 9 procedures               - mg BID - start               Dapsone 100mg daily started             19  2. Acute onset hand foot psoriasiform dermatitis, suspected reactive arthritis              -  mg BID - start               - Lidex ointment at night, urea cream in the morning   3. Atopic dermatitis  4. Seborrheic dermatitis                        - DermaSmooth    Encounter Date: 2019   Date of Last Visit: 2019   Orders Only on 2019   Component Date Value Ref Range Status     % Retic 2019 1.6  0.5 - 2.0 % Final     Absolute Retic 2019 74.4  25 - 95 10e9/L Final     WBC 2019 7.8  4.0 - 11.0 10e9/L Final     RBC Count 2019 4.71  4.4 - 5.9 10e12/L Final     Hemoglobin 2019 14.4  13.3 - 17.7 g/dL Final     Hematocrit 2019 43.5  40.0 - 53.0 % Final     MCV 2019 92  78 - 100 fl Final     MCH 2019 30.6  26.5 - 33.0 pg Final     MCHC 2019 33.1  31.5 - 36.5 g/dL Final     RDW 2019 12.4  10.0 - 15.0 % Final     Platelet Count 2019 239  150 - 450 10e9/L Final     Diff Method 2019 Automated Method   Final     % Neutrophils 2019 56.5  % Final     % Lymphocytes 2019 32.3  % Final     % Monocytes 2019 5.9  % Final     % Eosinophils 2019 4.6  % Final     %  Basophils 12/05/2019 0.4  % Final     % Immature Granulocytes 12/05/2019 0.3  % Final     Nucleated RBCs 12/05/2019 0  0 /100 Final     Absolute Neutrophil 12/05/2019 4.4  1.6 - 8.3 10e9/L Final     Absolute Lymphocytes 12/05/2019 2.5  0.8 - 5.3 10e9/L Final     Absolute Monocytes 12/05/2019 0.5  0.0 - 1.3 10e9/L Final     Absolute Eosinophils 12/05/2019 0.4  0.0 - 0.7 10e9/L Final     Absolute Basophils 12/05/2019 0.0  0.0 - 0.2 10e9/L Final     Abs Immature Granulocytes 12/05/2019 0.0  0 - 0.4 10e9/L Final     Absolute Nucleated RBC 12/05/2019 0.0   Final     Sodium 12/05/2019 135  133 - 144 mmol/L Final     Potassium 12/05/2019 3.9  3.4 - 5.3 mmol/L Final     Chloride 12/05/2019 106  94 - 109 mmol/L Final     Carbon Dioxide 12/05/2019 26  20 - 32 mmol/L Final     Anion Gap 12/05/2019 4  3 - 14 mmol/L Final     Glucose 12/05/2019 95  70 - 99 mg/dL Final     Urea Nitrogen 12/05/2019 10  7 - 30 mg/dL Final     Creatinine 12/05/2019 0.90  0.66 - 1.25 mg/dL Final     GFR Estimate 12/05/2019 >90  >60 mL/min/[1.73_m2] Final    Comment: Non  GFR Calc  Starting 12/18/2018, serum creatinine based estimated GFR (eGFR) will be   calculated using the Chronic Kidney Disease Epidemiology Collaboration   (CKD-EPI) equation.       GFR Estimate If Black 12/05/2019 >90  >60 mL/min/[1.73_m2] Final    Comment:  GFR Calc  Starting 12/18/2018, serum creatinine based estimated GFR (eGFR) will be   calculated using the Chronic Kidney Disease Epidemiology Collaboration   (CKD-EPI) equation.       Calcium 12/05/2019 8.8  8.5 - 10.1 mg/dL Final       HPI:   Bernard Padilla is a 34 year old male with a history including hidradenitis suppurativa  (HS) who presents for follow-up. I last the patient was seen on November 21st, 2019 at which time, she was to start 100mg Dapsone. Her arthritis had resolved and psoriasiform plaques were much improved cyclosporine 200mg BID.     Today, he reports that he had stopped  cyclosporine 200mg BID unintentionally. He is getting two eruptions of HS a month.     Patient last saw Dr. Chai Foster in pain and palliative care on November 26th, 2019 for HS. At that time, Oxycodone 10mg twice a day for 28 days was discussed with a plant to decrease as the new medication begins to take effect. Plan for follow up in eight weeks.     Background history from [facility] on 08/22/2019:   Diagnosed 2 years ago and did not respond to several trials of antibiotics. Lanced and packing however would recur. Derm surgeon (Dr. Bird from [facility]) performed 9 surgeries (removed sweat glands in bilateral axillas, bilateral groins and buttocks and now referred to Dr. Ruiz for further management. Groins done 8/7/19. Currently very painful at both groins at the surgical sight. Taking 30-40mg of percocet daily.      Today the patient reports that he has lesions of the groin and gluteals regions at current. He was on Doxycycline in the past for a long period of time before starting the surgical procedures and the antibiotics for HS. He also reports having tried Bactrim in the past without relief. He reports new lesions around the surgical sites. He endorses having thinning hair. He denies being on a blood thinner.     He has a Hx of eczema. He reports rashes of the upper extremities which he treats with Fluocinonide. He also uses Dermasmooth and eye drops. He reports that he would like to try a new shampoo.   The patient also shares that from [id number] he had tried to quit smoking while in [employer]. He reports that the HS was unchanged during this time. The patient reports a Hx of skin cancer on his mother's side of the family and Diabetes on his father's side.     Review of Systems:   Pertinent items are noted in HPI or as below, remainder of complete ROS is negative.      Active Medications:     Current Outpatient Medications:      Acetaminophen (TYLENOL PO), , Disp: , Rfl:      albuterol (PROAIR  HFA/PROVENTIL HFA/VENTOLIN HFA) 108 (90 Base) MCG/ACT inhaler, Inhale 2 puffs into the lungs every 6 hours, Disp: 8.5 g, Rfl: 3     cetirizine (ZYRTEC) 10 MG tablet, Take 1 tablet (10 mg) by mouth daily, Disp: 30 tablet, Rfl: 2     clindamycin (CLEOCIN T) 1 % external solution, Apply topically 2 times daily, Disp: 60 mL, Rfl: 2     clobetasol (TEMOVATE) 0.05 % external ointment, Apply to hands and feet twice a day, Disp: 60 g, Rfl: 3     dapsone (ACZONE) 100 MG tablet, Take 1 tablet (100 mg) by mouth daily, Disp: 30 tablet, Rfl: 1     fluocinolone acetonide (DERMA-SMOOTHE/FS BODY) 0.01 % external oil, Apply topically 2 times daily, Disp: 120 mL, Rfl: 1     Fluocinolone Acetonide Scalp (DERMA-SMOOTHE/FS SCALP) 0.01 % OIL oil, Apply topically daily, Disp: 1 Bottle, Rfl: 11     fluticasone (FLONASE) 50 MCG/ACT nasal spray, Spray 1-2 sprays into both nostrils daily, Disp: 16 g, Rfl: 2     IBUPROFEN PO, Take 800 mg by mouth 3 times daily as needed, Disp: , Rfl:      naloxone (NARCAN) 4 MG/0.1ML nasal spray, Spray 1 spray (4 mg) into one nostril alternating nostrils as needed for opioid reversal, Disp: 2 each, Rfl: 0     oxyCODONE-acetaminophen (PERCOCET)  MG per tablet, Take 1 tablet by mouth 2 times daily , Disp: , Rfl:      triamcinolone (KENALOG) 0.1 % external cream, Apply topically 2 times daily, Disp: 464 g, Rfl: 3      Allergies:   Vicodin  Chicken-Derived Products        Hydrocodone        Past Medical History:  Boil  Hidradenitis suppurativa  Tobacco use disorder  Patellofemoral arthritis of left knee  Eczema     Past Surgical History:  Irrigation and debridement rectum, combined 11/14/2019  Meniscus repair     Family History:    The patient's family history includes Diabetes in his father; Hypertension in his mother.     Social History:  The patient reports that he has been smoking. He has been smoking about 0.50 packs per day. He has never used smokeless tobacco. He reports current alcohol use. He  reports current drug use. Drug: Marijuana.   PCP: Lillian Webb  Marital Status: Single     Physical Exam:   /60 (BP Location: Right arm, Patient Position: Sitting, Cuff Size: Adult Regular)   Pulse 61   Temp 98  F (36.7  C) (Oral)   Wt 85.6 kg (188 lb 11.2 oz)   SpO2 97%   BMI 30.46 kg/m      Wt Readings from Last 4 Encounters:   12/26/19 85.6 kg (188 lb 11.2 oz)   11/22/19 86.6 kg (191 lb)   11/21/19 86.8 kg (191 lb 6.4 oz)   11/14/19 84.5 kg (186 lb 4.6 oz)     General: Well-appearing male in no distress. Alert and oriented.   Skin: Focused exam of the hands, hands, chest, back, head, neck, lower legs and feet was performed.   Findings -   - Diffuse, plate like scaly papules sacttered on the plantar feet. Bilateral hyperkeratosis of the heel.    - Diffuse scalp papules on the palm fairly well demarcated on the wrists.  - Flat scaly papules on the arms and flanks, and on the lateral aspect of the back. Also few on the elbows as well. Not extremely well demarcated.   - Scaly, plaque on the right upper thigh. Ulcerated surgical scar on the left inguinal fold. Plaque on right thigh.     Laboratory:   Component      Latest Ref Rng & Units 11/21/2019 11/21/2019          10:30 AM 10:30 AM   WBC      4.0 - 11.0 10e9/L 7.8    RBC Count      4.4 - 5.9 10e12/L 4.64    Hemoglobin      13.3 - 17.7 g/dL 14.3    Hematocrit      40.0 - 53.0 % 42.5    MCV      78 - 100 fl 92    MCH      26.5 - 33.0 pg 30.8    MCHC      31.5 - 36.5 g/dL 33.6    RDW      10.0 - 15.0 % 12.4    Platelet Count      150 - 450 10e9/L 250    Diff Method       Automated Method    % Neutrophils      % 58.1    % Lymphocytes      % 28.5    % Monocytes      % 6.4    % Eosinophils      % 6.2    % Basophils      % 0.4    % Immature Granulocytes      % 0.4    Nucleated RBCs      0 /100 0    Absolute Neutrophil      1.6 - 8.3 10e9/L 4.5    Absolute Lymphocytes      0.8 - 5.3 10e9/L 2.2    Absolute Monocytes      0.0 - 1.3 10e9/L 0.5    Absolute  Eosinophils      0.0 - 0.7 10e9/L 0.5    Absolute Basophils      0.0 - 0.2 10e9/L 0.0    Abs Immature Granulocytes      0 - 0.4 10e9/L 0.0    Absolute Nucleated RBC       0.0    Sodium      133 - 144 mmol/L  137   Potassium      3.4 - 5.3 mmol/L  3.5   Chloride      94 - 109 mmol/L  107   Carbon Dioxide      20 - 32 mmol/L  26   Anion Gap      3 - 14 mmol/L  4   Glucose      70 - 99 mg/dL  92   Urea Nitrogen      7 - 30 mg/dL  9   Creatinine      0.66 - 1.25 mg/dL  0.84   GFR Estimate      >60 mL/min/1.73:m2  >90   GFR Estimate If Black      >60 mL/min/1.73:m2  >90   Calcium      8.5 - 10.1 mg/dL  8.8   Bilirubin Total      0.2 - 1.3 mg/dL  0.4   Albumin      3.4 - 5.0 g/dL  3.8   Protein Total      6.8 - 8.8 g/dL  7.4   Alkaline Phosphatase      40 - 150 U/L  112   ALT      0 - 70 U/L  37   AST      0 - 45 U/L  21   % Retic      0.5 - 2.0 %     Absolute Retic      25 - 95 10e9/L       Component      Latest Ref Rng & Units 12/5/2019          11:00 AM   % Retic      0.5 - 2.0 % 1.6   Absolute Retic      25 - 95 10e9/L 74.4     Component      Latest Ref Rng & Units 12/5/2019 12/5/2019          11:00 AM 11:00 AM   WBC      4.0 - 11.0 10e9/L 7.8    RBC Count      4.4 - 5.9 10e12/L 4.71    Hemoglobin      13.3 - 17.7 g/dL 14.4    Hematocrit      40.0 - 53.0 % 43.5    MCV      78 - 100 fl 92    MCH      26.5 - 33.0 pg 30.6    MCHC      31.5 - 36.5 g/dL 33.1    RDW      10.0 - 15.0 % 12.4    Platelet Count      150 - 450 10e9/L 239    Diff Method       Automated Method    % Neutrophils      % 56.5    % Lymphocytes      % 32.3    % Monocytes      % 5.9    % Eosinophils      % 4.6    % Basophils      % 0.4    % Immature Granulocytes      % 0.3    Nucleated RBCs      0 /100 0    Absolute Neutrophil      1.6 - 8.3 10e9/L 4.4    Absolute Lymphocytes      0.8 - 5.3 10e9/L 2.5    Absolute Monocytes      0.0 - 1.3 10e9/L 0.5    Absolute Eosinophils      0.0 - 0.7 10e9/L 0.4    Absolute Basophils      0.0 - 0.2 10e9/L 0.0    Abs  Immature Granulocytes      0 - 0.4 10e9/L 0.0    Absolute Nucleated RBC       0.0    Sodium      133 - 144 mmol/L  135   Potassium      3.4 - 5.3 mmol/L  3.9   Chloride      94 - 109 mmol/L  106   Carbon Dioxide      20 - 32 mmol/L  26   Anion Gap      3 - 14 mmol/L  4   Glucose      70 - 99 mg/dL  95   Urea Nitrogen      7 - 30 mg/dL  10   Creatinine      0.66 - 1.25 mg/dL  0.90   GFR Estimate      >60 mL/min/1.73:m2  >90   GFR Estimate If Black      >60 mL/min/1.73:m2  >90   Calcium      8.5 - 10.1 mg/dL  8.8     Assessment and Plan:     1. Hidradenitis suppurativa (s/p 9 surgeries involving bilateral axilla, buttock, and groins being the last surgery 8/7/19):  - Recheck labs  today (CBC with platelets differential and reticulocyte count) and follow-up in four weeks.   - Called Dr. Chai Foster in pain and palliative care regarding pain management and we will work together to decrease his opiod usage    2) Differential Psoriasis/Psoriasiform eruption vs dermatitis BIS vs. less likely other papulosqquamous disorders Lichen planus or KS   -  Patiet stopped Cyclosporine and his psoriasiform eruption seemed to get much worse now spread throughout the trunk, groin and palms/soles. I previously was calling this reactive arthritis, but as it all came on after a viral-like illness. That being said, he has no joint pains and this looks much more like psoriasis at this time. He would like to try to treat topically for now. Could also consider lights in the future.  If this gets worse and we start considering systemic meds, maybe worth doing a biopsy.  Will also check syphilis as well today --> he already has his labs today. Will check at f/u visit.    Topicals:  - Start triamcinolone cream and fluocinolone oil twice day to the whole body.   - Clobetasol ointment twice day to the hands and feet.   - Stop urea cream 40% in the morning to hands and feet as patient reports a burning sensation with use.    3)  Hidradenitis suppurativa  - No lesions currently  - Currently healing from recent surgery in left groin  - Continue dapsone 100mg daily  - Will consider increasing dose in future  - Currently gets 1-2 new abscess a month. Has been on dapsone for 4 weeks now. Will see how th next month goes.          4) Atopic dermatitis  - Fluocinolone oil twice a day to scalp and hair. Patient states this has been beneficial in controlling his dermatitis.     Orders:  - CBC with platelets differential  - Reticulocyte count  - dapsone (ACZONE) 100 MG tablet  Dispense: 30 tablet; Refill: 1  - Fluocinolone Acetonide Scalp (DERMA-SMOOTHE/FS SCALP) 0.01 % OIL oil  Dispense: 1 Bottle; Refill: 11  - triamcinolone (KENALOG) 0.1 % external cream  Dispense: 464 g; Refill: 3  - clobetasol (TEMOVATE) 0.05 % external ointment  Dispense: 60 g; Refill: 3      Follow-up: Return in about 4 weeks (around 1/23/2020).     Scribe Disclosure:  I, Gillian Granda, am serving as a scribe to document services personally performed by Apolinar Ruiz MD at this visit, based upon the provider's statements to me. All documentation has been reviewed by the aforementioned provider prior to being entered into the official medical record.     Provider Disclosure:   The documentation recorded by the scribe accurately reflects the services I personally performed and the decisions made by me.    Apolinar Ruiz MD, FAAD    Departments of Internal Medicine and Dermatology  [facility]  [phone]

## 2020-01-01 ENCOUNTER — RECORDS - HEALTHEAST (OUTPATIENT)
Dept: ADMINISTRATIVE | Facility: OTHER | Age: 35
End: 2020-01-01

## 2020-01-06 ENCOUNTER — COMMUNICATION - HEALTHEAST (OUTPATIENT)
Dept: NURSING | Facility: CLINIC | Age: 35
End: 2020-01-06

## 2020-01-08 ENCOUNTER — COMMUNICATION - HEALTHEAST (OUTPATIENT)
Dept: NURSING | Facility: CLINIC | Age: 35
End: 2020-01-08

## 2020-01-16 ENCOUNTER — COMMUNICATION - HEALTHEAST (OUTPATIENT)
Dept: PALLIATIVE MEDICINE | Facility: OTHER | Age: 35
End: 2020-01-16

## 2020-01-16 DIAGNOSIS — G89.4 CHRONIC PAIN SYNDROME: ICD-10-CM

## 2020-01-17 ENCOUNTER — TELEPHONE (OUTPATIENT)
Dept: DERMATOLOGY | Facility: CLINIC | Age: 35
End: 2020-01-17

## 2020-01-17 NOTE — TELEPHONE ENCOUNTER
I called the [facility] back to clarify what kind of approval is needed and why the patient is on a restricted plan. I was told that Bernard is restricted to only provider, Dr. Ruiz, due to a substance abuse problem and cannot be given prescriptions by any other providers. Bernard needs to be re-approved with  so that the insurance will cover the necessary medications.     I routed the message to  and his team so that the necessary next steps can be taken.    Lennie Bailey, EMT

## 2020-01-17 NOTE — TELEPHONE ENCOUNTER
[facility] Call Center    Phone Message    May a detailed message be left on voicemail: no    Reason for Call: Other: [facility] pharmacy calling stating they need approval for medications due to patient being on a restricted plan with his insurance company, the medications are  fluocinolone acetonide both scripts and clobetasol. Please call to discuss thank you.     Action Taken: Message routed to:  [facility] ([facility] Derm

## 2020-01-20 ENCOUNTER — COMMUNICATION - HEALTHEAST (OUTPATIENT)
Dept: PALLIATIVE MEDICINE | Facility: OTHER | Age: 35
End: 2020-01-20

## 2020-01-21 ENCOUNTER — HOSPITAL ENCOUNTER (OUTPATIENT)
Dept: PALLIATIVE MEDICINE | Facility: OTHER | Age: 35
Discharge: HOME | End: 2020-01-21
Attending: ANESTHESIOLOGY

## 2020-01-21 DIAGNOSIS — G89.4 CHRONIC PAIN SYNDROME: ICD-10-CM

## 2020-01-21 ASSESSMENT — MIFFLIN-ST. JEOR: SCORE: 1711.93

## 2020-01-22 ENCOUNTER — TELEPHONE (OUTPATIENT)
Dept: RHEUMATOLOGY | Facility: CLINIC | Age: 35
End: 2020-01-22

## 2020-01-23 ENCOUNTER — OFFICE VISIT (OUTPATIENT)
Dept: DERMATOLOGY | Facility: CLINIC | Age: 35
End: 2020-01-23
Attending: DERMATOLOGY
Payer: COMMERCIAL

## 2020-01-23 ENCOUNTER — RECORDS - HEALTHEAST (OUTPATIENT)
Dept: ADMINISTRATIVE | Facility: OTHER | Age: 35
End: 2020-01-23

## 2020-01-23 VITALS
BODY MASS INDEX: 29.65 KG/M2 | SYSTOLIC BLOOD PRESSURE: 133 MMHG | HEART RATE: 64 BPM | TEMPERATURE: 98 F | DIASTOLIC BLOOD PRESSURE: 82 MMHG | WEIGHT: 183.7 LBS | OXYGEN SATURATION: 100 %

## 2020-01-23 DIAGNOSIS — Z51.81 MEDICATION MONITORING ENCOUNTER: ICD-10-CM

## 2020-01-23 DIAGNOSIS — M25.572 ACUTE LEFT ANKLE PAIN: ICD-10-CM

## 2020-01-23 DIAGNOSIS — L30.9 ECZEMA, UNSPECIFIED TYPE: ICD-10-CM

## 2020-01-23 DIAGNOSIS — L40.9 PSORIASIS: ICD-10-CM

## 2020-01-23 DIAGNOSIS — L30.9 DERMATITIS: ICD-10-CM

## 2020-01-23 DIAGNOSIS — L73.2 HIDRADENITIS SUPPURATIVA: Primary | ICD-10-CM

## 2020-01-23 RX ORDER — TRIAMCINOLONE ACETONIDE 1 MG/G
CREAM TOPICAL 2 TIMES DAILY
Qty: 464 G | Refills: 3 | Status: SHIPPED | OUTPATIENT
Start: 2020-01-23 | End: 2020-02-10

## 2020-01-23 RX ORDER — DAPSONE 100 MG/1
100 TABLET ORAL DAILY
Qty: 30 TABLET | Refills: 3 | Status: SHIPPED | OUTPATIENT
Start: 2020-01-23 | End: 2020-02-10

## 2020-01-23 RX ORDER — CLOBETASOL PROPIONATE 0.5 MG/G
OINTMENT TOPICAL
Qty: 60 G | Refills: 3 | Status: SHIPPED | OUTPATIENT
Start: 2020-01-23 | End: 2020-02-10

## 2020-01-23 RX ORDER — FLUOCINOLONE ACETONIDE 0.11 MG/ML
OIL TOPICAL DAILY
Qty: 1 BOTTLE | Refills: 11 | Status: SHIPPED | OUTPATIENT
Start: 2020-01-23 | End: 2020-02-10

## 2020-01-23 RX ORDER — FLUOCINOLONE ACETONIDE 0.11 MG/ML
OIL TOPICAL 2 TIMES DAILY
Qty: 120 ML | Refills: 1 | Status: SHIPPED | OUTPATIENT
Start: 2020-01-23 | End: 2020-02-10

## 2020-01-23 ASSESSMENT — PAIN SCALES - GENERAL: PAINLEVEL: WORST PAIN (10)

## 2020-01-23 NOTE — LETTER
2020       RE: Bernard Padilla  [id number] [facility] [address]     Dear Colleague,    Thank you for referring your patient, Bernard Padilla, to the [facility] RHEUMATOLOGY at [facility]. Please see a copy of my visit note below.    [facility]  Dermatology-Rheumatology Clinic  Apolinar Ruiz MD  2020     Name: Bernard Padilla  MRN: [id number]  Age: 34 year old  : 1985  Referring provider: Jayde Quiroz    Dermatology Problem List:   1. Hidradenitis suppuritiva              - Diagnosed in  and did not respond to antibiotics.               - Seen by Dr. Bird and underwent 9 procedures               -  mg BID - start , now discontinued              -  Dapsone 100mg daily started             19  2. Acute onset hand foot psoriasiform dermatitis, suspected reactive arthritis              -  mg BID - started , now discontinued              - Lidex ointment at night, urea cream in the morning   3. Atopic dermatitis  4. Seborrheic dermatitis                        - DermaSmooth    Encounter Date: 2020   Date of Last Visit: 2019   Orders Only on 2019   Component Date Value Ref Range Status     % Retic 2019 1.6  0.5 - 2.0 % Final     Absolute Retic 2019 74.4  25 - 95 10e9/L Final     WBC 2019 7.8  4.0 - 11.0 10e9/L Final     RBC Count 2019 4.71  4.4 - 5.9 10e12/L Final     Hemoglobin 2019 14.4  13.3 - 17.7 g/dL Final     Hematocrit 2019 43.5  40.0 - 53.0 % Final     MCV 2019 92  78 - 100 fl Final     MCH 2019 30.6  26.5 - 33.0 pg Final     MCHC 2019 33.1  31.5 - 36.5 g/dL Final     RDW 2019 12.4  10.0 - 15.0 % Final     Platelet Count 2019 239  150 - 450 10e9/L Final     Diff Method 2019 Automated Method   Final     % Neutrophils 2019 56.5  % Final     % Lymphocytes 2019 32.3  % Final     % Monocytes 2019 5.9  % Final     %  Eosinophils 12/05/2019 4.6  % Final     % Basophils 12/05/2019 0.4  % Final     % Immature Granulocytes 12/05/2019 0.3  % Final     Nucleated RBCs 12/05/2019 0  0 /100 Final     Absolute Neutrophil 12/05/2019 4.4  1.6 - 8.3 10e9/L Final     Absolute Lymphocytes 12/05/2019 2.5  0.8 - 5.3 10e9/L Final     Absolute Monocytes 12/05/2019 0.5  0.0 - 1.3 10e9/L Final     Absolute Eosinophils 12/05/2019 0.4  0.0 - 0.7 10e9/L Final     Absolute Basophils 12/05/2019 0.0  0.0 - 0.2 10e9/L Final     Abs Immature Granulocytes 12/05/2019 0.0  0 - 0.4 10e9/L Final     Absolute Nucleated RBC 12/05/2019 0.0   Final     Sodium 12/05/2019 135  133 - 144 mmol/L Final     Potassium 12/05/2019 3.9  3.4 - 5.3 mmol/L Final     Chloride 12/05/2019 106  94 - 109 mmol/L Final     Carbon Dioxide 12/05/2019 26  20 - 32 mmol/L Final     Anion Gap 12/05/2019 4  3 - 14 mmol/L Final     Glucose 12/05/2019 95  70 - 99 mg/dL Final     Urea Nitrogen 12/05/2019 10  7 - 30 mg/dL Final     Creatinine 12/05/2019 0.90  0.66 - 1.25 mg/dL Final     GFR Estimate 12/05/2019 >90  >60 mL/min/[1.73_m2] Final    Comment: Non  GFR Calc  Starting 12/18/2018, serum creatinine based estimated GFR (eGFR) will be   calculated using the Chronic Kidney Disease Epidemiology Collaboration   (CKD-EPI) equation.       GFR Estimate If Black 12/05/2019 >90  >60 mL/min/[1.73_m2] Final    Comment:  GFR Calc  Starting 12/18/2018, serum creatinine based estimated GFR (eGFR) will be   calculated using the Chronic Kidney Disease Epidemiology Collaboration   (CKD-EPI) equation.       Calcium 12/05/2019 8.8  8.5 - 10.1 mg/dL Final       HPI:   Bernard Padilla is a 34 year old male with a history including hidradenitis suppurativa (HS) who presents for follow-up. We last saw the patient on 12/26, 2019 at which time, he was to continued on 100mg Dapsone. He continues to take dapsone and is tolerating very well. He is happy with overall control. Not having SOB  or other issues with the medication.     His HS has overall been doing pretty well. He says he did have to get an emergent surgery for a flare at the end of the year. He otherwise has been doing very well from an HS perspective. No recent new lesions. No current active lesions. He feels the dapsone is helping and does not feel he needs to increase the dosing today.     Regarding his hands/feet things are much better. He has been using his topicals as directed. Says hands haven't been this clear in over a year. Says body has largely resolved as well. He is overall very happy with how things are going.     Patient states starting this AM he developed left ankle pain. Does not recall preceding trauma. He notes he has had problems with joints in the past though has never had left ankle involvement.     Pt otherwise feels well without any changes in general state of health. Denies any new, growing, changing, bleeding, or otherwise concerning/symptomatic skin findings. No other questions or concerns today.     Background history from [facility] on 08/22/2019:   Diagnosed 2 years ago and did not respond to several trials of antibiotics. Lanced and packing however would recur. Derm surgeon (Dr. Bird from [facility]) performed 9 surgeries (removed sweat glands in bilateral axillas, bilateral groins and buttocks and now referred to Dr. Ruiz for further management. Groins done 8/7/19. Currently very painful at both groins at the surgical sight. Taking 30-40mg of percocet daily.      Today the patient reports that he has lesions of the groin and gluteals regions at current. He was on Doxycycline in the past for a long period of time before starting the surgical procedures and the antibiotics for HS. He also reports having tried Bactrim in the past without relief. He reports new lesions around the surgical sites. He endorses having thinning hair. He denies being on a blood thinner.     He has a Hx of eczema. He reports  rashes of the upper extremities which he treats with Fluocinonide. He also uses Dermasmooth and eye drops. He reports that he would like to try a new shampoo.   The patient also shares that from [id number] he had tried to quit smoking while in [employer]. He reports that the HS was unchanged during this time. The patient reports a Hx of skin cancer on his mother's side of the family and Diabetes on his father's side.     Review of Systems:   Pertinent items are noted in HPI or as below, remainder of complete ROS is negative.      Active Medications:     Current Outpatient Medications:      Acetaminophen (TYLENOL PO), , Disp: , Rfl:      albuterol (PROAIR HFA/PROVENTIL HFA/VENTOLIN HFA) 108 (90 Base) MCG/ACT inhaler, Inhale 2 puffs into the lungs every 6 hours, Disp: 8.5 g, Rfl: 3     cetirizine (ZYRTEC) 10 MG tablet, Take 1 tablet (10 mg) by mouth daily, Disp: 30 tablet, Rfl: 2     clindamycin (CLEOCIN T) 1 % external solution, Apply topically 2 times daily, Disp: 60 mL, Rfl: 2     clobetasol (TEMOVATE) 0.05 % external ointment, Apply to hands and feet twice a day, Disp: 60 g, Rfl: 3     dapsone (ACZONE) 100 MG tablet, Take 1 tablet (100 mg) by mouth daily, Disp: 30 tablet, Rfl: 3     fluocinolone acetonide (DERMA-SMOOTHE/FS BODY) 0.01 % external oil, Apply topically 2 times daily, Disp: 120 mL, Rfl: 1     Fluocinolone Acetonide Scalp (DERMA-SMOOTHE/FS SCALP) 0.01 % OIL oil, Apply topically daily, Disp: 1 Bottle, Rfl: 11     fluticasone (FLONASE) 50 MCG/ACT nasal spray, Spray 1-2 sprays into both nostrils daily, Disp: 16 g, Rfl: 2     IBUPROFEN PO, Take 800 mg by mouth 3 times daily as needed, Disp: , Rfl:      naloxone (NARCAN) 4 MG/0.1ML nasal spray, Spray 1 spray (4 mg) into one nostril alternating nostrils as needed for opioid reversal, Disp: 2 each, Rfl: 0     triamcinolone (KENALOG) 0.1 % external cream, Apply topically 2 times daily, Disp: 464 g, Rfl: 3      Allergies:   Vicodin  Chicken-Derived Products         Hydrocodone        Past Medical History:  Boil  Hidradenitis suppurativa  Tobacco use disorder  Patellofemoral arthritis of left knee  Eczema     Past Surgical History:  Irrigation and debridement rectum, combined 11/14/2019  Meniscus repair     Family History:    The patient's family history includes Diabetes in his father; Hypertension in his mother.     Social History:  The patient reports that he has been smoking. He has been smoking about 0.50 packs per day. He has never used smokeless tobacco. He reports current alcohol use. He reports current drug use. Drug: Marijuana.   PCP: Lillian Webb  Marital Status: Single     Physical Exam:   /82 (BP Location: Right arm, Patient Position: Sitting, Cuff Size: Adult Regular)   Pulse 64   Temp 98  F (36.7  C)   Wt 83.3 kg (183 lb 11.2 oz)   SpO2 100%   BMI 29.65 kg/m      Wt Readings from Last 4 Encounters:   01/23/20 83.3 kg (183 lb 11.2 oz)   12/26/19 85.6 kg (188 lb 11.2 oz)   11/22/19 86.6 kg (191 lb)   11/21/19 86.8 kg (191 lb 6.4 oz)     General: Well-appearing male in no distress. Alert and oriented.   Skin: Focused exam of the hands, hands, chest, back, head, neck, lower legs and feet was performed.   Findings -   - Diffuse, plate like scaly hyperkeratotic papules/plaques scattered on the plantar feet. Bilateral hyperkeratosis of the heel. Overall improved from last exam.   - Palms with mild xerosis and hyperkeratosis, improved from previous  - Resolution of previously noted eruption of body, though some persistent mild thin eczematous plaques on left lateral back  - Diffuse xerosis      Laboratory:   Component      Latest Ref Rng & Units 11/21/2019 11/21/2019          10:30 AM 10:30 AM   WBC      4.0 - 11.0 10e9/L 7.8    RBC Count      4.4 - 5.9 10e12/L 4.64    Hemoglobin      13.3 - 17.7 g/dL 14.3    Hematocrit      40.0 - 53.0 % 42.5    MCV      78 - 100 fl 92    MCH      26.5 - 33.0 pg 30.8    MCHC      31.5 - 36.5 g/dL 33.6    RDW      10.0  - 15.0 % 12.4    Platelet Count      150 - 450 10e9/L 250    Diff Method       Automated Method    % Neutrophils      % 58.1    % Lymphocytes      % 28.5    % Monocytes      % 6.4    % Eosinophils      % 6.2    % Basophils      % 0.4    % Immature Granulocytes      % 0.4    Nucleated RBCs      0 /100 0    Absolute Neutrophil      1.6 - 8.3 10e9/L 4.5    Absolute Lymphocytes      0.8 - 5.3 10e9/L 2.2    Absolute Monocytes      0.0 - 1.3 10e9/L 0.5    Absolute Eosinophils      0.0 - 0.7 10e9/L 0.5    Absolute Basophils      0.0 - 0.2 10e9/L 0.0    Abs Immature Granulocytes      0 - 0.4 10e9/L 0.0    Absolute Nucleated RBC       0.0    Sodium      133 - 144 mmol/L  137   Potassium      3.4 - 5.3 mmol/L  3.5   Chloride      94 - 109 mmol/L  107   Carbon Dioxide      20 - 32 mmol/L  26   Anion Gap      3 - 14 mmol/L  4   Glucose      70 - 99 mg/dL  92   Urea Nitrogen      7 - 30 mg/dL  9   Creatinine      0.66 - 1.25 mg/dL  0.84   GFR Estimate      >60 mL/min/1.73:m2  >90   GFR Estimate If Black      >60 mL/min/1.73:m2  >90   Calcium      8.5 - 10.1 mg/dL  8.8   Bilirubin Total      0.2 - 1.3 mg/dL  0.4   Albumin      3.4 - 5.0 g/dL  3.8   Protein Total      6.8 - 8.8 g/dL  7.4   Alkaline Phosphatase      40 - 150 U/L  112   ALT      0 - 70 U/L  37   AST      0 - 45 U/L  21   % Retic      0.5 - 2.0 %     Absolute Retic      25 - 95 10e9/L       Component      Latest Ref Rng & Units 12/5/2019          11:00 AM   % Retic      0.5 - 2.0 % 1.6   Absolute Retic      25 - 95 10e9/L 74.4     Component      Latest Ref Rng & Units 12/5/2019 12/5/2019          11:00 AM 11:00 AM   WBC      4.0 - 11.0 10e9/L 7.8    RBC Count      4.4 - 5.9 10e12/L 4.71    Hemoglobin      13.3 - 17.7 g/dL 14.4    Hematocrit      40.0 - 53.0 % 43.5    MCV      78 - 100 fl 92    MCH      26.5 - 33.0 pg 30.6    MCHC      31.5 - 36.5 g/dL 33.1    RDW      10.0 - 15.0 % 12.4    Platelet Count      150 - 450 10e9/L 239    Diff Method       Automated Method     % Neutrophils      % 56.5    % Lymphocytes      % 32.3    % Monocytes      % 5.9    % Eosinophils      % 4.6    % Basophils      % 0.4    % Immature Granulocytes      % 0.3    Nucleated RBCs      0 /100 0    Absolute Neutrophil      1.6 - 8.3 10e9/L 4.4    Absolute Lymphocytes      0.8 - 5.3 10e9/L 2.5    Absolute Monocytes      0.0 - 1.3 10e9/L 0.5    Absolute Eosinophils      0.0 - 0.7 10e9/L 0.4    Absolute Basophils      0.0 - 0.2 10e9/L 0.0    Abs Immature Granulocytes      0 - 0.4 10e9/L 0.0    Absolute Nucleated RBC       0.0    Sodium      133 - 144 mmol/L  135   Potassium      3.4 - 5.3 mmol/L  3.9   Chloride      94 - 109 mmol/L  106   Carbon Dioxide      20 - 32 mmol/L  26   Anion Gap      3 - 14 mmol/L  4   Glucose      70 - 99 mg/dL  95   Urea Nitrogen      7 - 30 mg/dL  10   Creatinine      0.66 - 1.25 mg/dL  0.90   GFR Estimate      >60 mL/min/1.73:m2  >90   GFR Estimate If Black      >60 mL/min/1.73:m2  >90   Calcium      8.5 - 10.1 mg/dL  8.8     Assessment and Plan:     1. Hidradenitis suppurativa (s/p 9 surgeries involving bilateral axilla, buttock, and groins being the last surgery 8/7/19):  Prefers to have these excised when they arise. Currently without any active lesions. Still getting 1-2 new lesions a month.  - Continue dapsone 100 mg every day   - Consider increasing in future if needed, holding at current dose as doing well  - Labs today and in 1 month  - Referral to Dr. Monroe to establish care  - F/u 3 months    2) Psoriasis   Previously flaring. Now much improved on topicals. Previously flared after stopping CSA. We were previously was calling this reactive arthritis, but as it all came on after a viral-like illness. That being said, he has no joint pains and this looks much more like psoriasis at this time. Much improved on topicals.   Topicals:  - Triamcinolone cream and fluocinolone oil twice PRN  - Clobetasol ointment twice day to the hands and feet.      3) Atopic  dermatitis  Well controlled. Continue current topicals PRN (TAC, clobetasol, derma smooth)    4) Acute onset L ankle pain  No preceding trauma. New as of this AM. Diffuse joint line tenderness. No erythema or warmth of joint. Unclear etiology of pain. Ddx includes inflammatory vs trauma vs other.   - uric acid pending (previously normal)  - 2 view ankle XR today pending  - if not improved in 1-2 weeks, consider       Follow-up: Return in about 3 months (around 4/23/2020).     Scribe Disclosure:  I, Gillian Granda, am serving as a scribe to document services personally performed by Apolinar Ruiz MD at this visit, based upon the provider's statements to me. All documentation has been reviewed by the aforementioned provider prior to being entered into the official medical record.     Bib Ortiz MD  Internal Medicine - Dermatology, PGY-5    Staff Physician Comments:   I saw and evaluated the patient with the resident and I agree with the assessment and plan.  I was present for the examination.    Apolinar Ruiz MD, FAAD    Departments of Internal Medicine and Dermatology  [facility]  [phone]      Provider Disclosure:   The documentation recorded by the scribe accurately reflects the services I personally performed and the decisions made by me.    Apolinar Ruiz MD, FAAD    Departments of Internal Medicine and Dermatology  [facility]  [phone]

## 2020-01-23 NOTE — NURSING NOTE
Chief Complaint   Patient presents with     RECHECK     HS follow up     /82 (BP Location: Right arm, Patient Position: Sitting, Cuff Size: Adult Regular)   Pulse 64   Temp 98  F (36.7  C)   Wt 83.3 kg (183 lb 11.2 oz)   SpO2 100%   BMI 29.65 kg/m      Jad Cortes, EMT

## 2020-01-23 NOTE — PROGRESS NOTES
[facility]  Dermatology-Rheumatology Clinic  Apolinar Ruiz MD  2020     Name: Bernard Padilla  MRN: [id number]  Age: 34 year old  : 1985  Referring provider: Jayde Quiroz    Dermatology Problem List:   1. Hidradenitis suppuritiva              - Diagnosed in 2016 and did not respond to antibiotics.               - Seen by Dr. Bird and underwent 9 procedures               -  mg BID - start , now discontinued              - Dapsone 100mg daily started             19  2. Acute onset hand foot psoriasiform dermatitis, suspected reactive arthritis              -  mg BID - started , now discontinued              - Lidex ointment at night, urea cream in the morning   3. Atopic dermatitis  4. Seborrheic dermatitis                        - DermaSmooth    Encounter Date: 2020   Date of Last Visit: 2019   Orders Only on 2019   Component Date Value Ref Range Status     % Retic 2019 1.6  0.5 - 2.0 % Final     Absolute Retic 2019 74.4  25 - 95 10e9/L Final     WBC 2019 7.8  4.0 - 11.0 10e9/L Final     RBC Count 2019 4.71  4.4 - 5.9 10e12/L Final     Hemoglobin 2019 14.4  13.3 - 17.7 g/dL Final     Hematocrit 2019 43.5  40.0 - 53.0 % Final     MCV 2019 92  78 - 100 fl Final     MCH 2019 30.6  26.5 - 33.0 pg Final     MCHC 2019 33.1  31.5 - 36.5 g/dL Final     RDW 2019 12.4  10.0 - 15.0 % Final     Platelet Count 2019 239  150 - 450 10e9/L Final     Diff Method 2019 Automated Method   Final     % Neutrophils 2019 56.5  % Final     % Lymphocytes 2019 32.3  % Final     % Monocytes 2019 5.9  % Final     % Eosinophils 2019 4.6  % Final     % Basophils 2019 0.4  % Final     % Immature Granulocytes 2019 0.3  % Final     Nucleated RBCs 2019 0  0 /100 Final     Absolute Neutrophil 2019 4.4  1.6 - 8.3 10e9/L Final     Absolute Lymphocytes 2019 2.5  0.8  - 5.3 10e9/L Final     Absolute Monocytes 12/05/2019 0.5  0.0 - 1.3 10e9/L Final     Absolute Eosinophils 12/05/2019 0.4  0.0 - 0.7 10e9/L Final     Absolute Basophils 12/05/2019 0.0  0.0 - 0.2 10e9/L Final     Abs Immature Granulocytes 12/05/2019 0.0  0 - 0.4 10e9/L Final     Absolute Nucleated RBC 12/05/2019 0.0   Final     Sodium 12/05/2019 135  133 - 144 mmol/L Final     Potassium 12/05/2019 3.9  3.4 - 5.3 mmol/L Final     Chloride 12/05/2019 106  94 - 109 mmol/L Final     Carbon Dioxide 12/05/2019 26  20 - 32 mmol/L Final     Anion Gap 12/05/2019 4  3 - 14 mmol/L Final     Glucose 12/05/2019 95  70 - 99 mg/dL Final     Urea Nitrogen 12/05/2019 10  7 - 30 mg/dL Final     Creatinine 12/05/2019 0.90  0.66 - 1.25 mg/dL Final     GFR Estimate 12/05/2019 >90  >60 mL/min/[1.73_m2] Final    Comment: Non  GFR Calc  Starting 12/18/2018, serum creatinine based estimated GFR (eGFR) will be   calculated using the Chronic Kidney Disease Epidemiology Collaboration   (CKD-EPI) equation.       GFR Estimate If Black 12/05/2019 >90  >60 mL/min/[1.73_m2] Final    Comment:  GFR Calc  Starting 12/18/2018, serum creatinine based estimated GFR (eGFR) will be   calculated using the Chronic Kidney Disease Epidemiology Collaboration   (CKD-EPI) equation.       Calcium 12/05/2019 8.8  8.5 - 10.1 mg/dL Final       HPI:   Bernard Padilla is a 34 year old male with a history including hidradenitis suppurativa (HS) who presents for follow-up. We last saw the patient on 12/26, 2019 at which time, he was to continued on 100mg Dapsone. He continues to take dapsone and is tolerating very well. He is happy with overall control. Not having SOB or other issues with the medication.     His HS has overall been doing pretty well. He says he did have to get an emergent surgery for a flare at the end of the year. He otherwise has been doing very well from an HS perspective. No recent new lesions. No current active lesions. He  feels the dapsone is helping and does not feel he needs to increase the dosing today.     Regarding his hands/feet things are much better. He has been using his topicals as directed. Says hands haven't been this clear in over a year. Says body has largely resolved as well. He is overall very happy with how things are going.     Patient states starting this AM he developed left ankle pain. Does not recall preceding trauma. He notes he has had problems with joints in the past though has never had left ankle involvement.     Pt otherwise feels well without any changes in general state of health. Denies any new, growing, changing, bleeding, or otherwise concerning/symptomatic skin findings. No other questions or concerns today.     Background history from [facility] on 08/22/2019:   Diagnosed 2 years ago and did not respond to several trials of antibiotics. Lanced and packing however would recur. Derm surgeon (Dr. Bird from [facility]) performed 9 surgeries (removed sweat glands in bilateral axillas, bilateral groins and buttocks and now referred to Dr. Ruiz for further management. Groins done 8/7/19. Currently very painful at both groins at the surgical sight. Taking 30-40mg of percocet daily.      Today the patient reports that he has lesions of the groin and gluteals regions at current. He was on Doxycycline in the past for a long period of time before starting the surgical procedures and the antibiotics for HS. He also reports having tried Bactrim in the past without relief. He reports new lesions around the surgical sites. He endorses having thinning hair. He denies being on a blood thinner.     He has a Hx of eczema. He reports rashes of the upper extremities which he treats with Fluocinonide. He also uses Dermasmooth and eye drops. He reports that he would like to try a new shampoo.   The patient also shares that from [id number] he had tried to quit smoking while in [employer]. He reports that the HS was  unchanged during this time. The patient reports a Hx of skin cancer on his mother's side of the family and Diabetes on his father's side.     Review of Systems:   Pertinent items are noted in HPI or as below, remainder of complete ROS is negative.      Active Medications:     Current Outpatient Medications:      Acetaminophen (TYLENOL PO), , Disp: , Rfl:      albuterol (PROAIR HFA/PROVENTIL HFA/VENTOLIN HFA) 108 (90 Base) MCG/ACT inhaler, Inhale 2 puffs into the lungs every 6 hours, Disp: 8.5 g, Rfl: 3     cetirizine (ZYRTEC) 10 MG tablet, Take 1 tablet (10 mg) by mouth daily, Disp: 30 tablet, Rfl: 2     clindamycin (CLEOCIN T) 1 % external solution, Apply topically 2 times daily, Disp: 60 mL, Rfl: 2     clobetasol (TEMOVATE) 0.05 % external ointment, Apply to hands and feet twice a day, Disp: 60 g, Rfl: 3     dapsone (ACZONE) 100 MG tablet, Take 1 tablet (100 mg) by mouth daily, Disp: 30 tablet, Rfl: 3     fluocinolone acetonide (DERMA-SMOOTHE/FS BODY) 0.01 % external oil, Apply topically 2 times daily, Disp: 120 mL, Rfl: 1     Fluocinolone Acetonide Scalp (DERMA-SMOOTHE/FS SCALP) 0.01 % OIL oil, Apply topically daily, Disp: 1 Bottle, Rfl: 11     fluticasone (FLONASE) 50 MCG/ACT nasal spray, Spray 1-2 sprays into both nostrils daily, Disp: 16 g, Rfl: 2     IBUPROFEN PO, Take 800 mg by mouth 3 times daily as needed, Disp: , Rfl:      naloxone (NARCAN) 4 MG/0.1ML nasal spray, Spray 1 spray (4 mg) into one nostril alternating nostrils as needed for opioid reversal, Disp: 2 each, Rfl: 0     triamcinolone (KENALOG) 0.1 % external cream, Apply topically 2 times daily, Disp: 464 g, Rfl: 3      Allergies:   Vicodin  Chicken-Derived Products        Hydrocodone        Past Medical History:  Boil  Hidradenitis suppurativa  Tobacco use disorder  Patellofemoral arthritis of left knee  Eczema     Past Surgical History:  Irrigation and debridement rectum, combined 11/14/2019  Meniscus repair     Family History:    The patient's  family history includes Diabetes in his father; Hypertension in his mother.     Social History:  The patient reports that he has been smoking. He has been smoking about 0.50 packs per day. He has never used smokeless tobacco. He reports current alcohol use. He reports current drug use. Drug: Marijuana.   PCP: Lillian Webb  Marital Status: Single     Physical Exam:   /82 (BP Location: Right arm, Patient Position: Sitting, Cuff Size: Adult Regular)   Pulse 64   Temp 98  F (36.7  C)   Wt 83.3 kg (183 lb 11.2 oz)   SpO2 100%   BMI 29.65 kg/m     Wt Readings from Last 4 Encounters:   01/23/20 83.3 kg (183 lb 11.2 oz)   12/26/19 85.6 kg (188 lb 11.2 oz)   11/22/19 86.6 kg (191 lb)   11/21/19 86.8 kg (191 lb 6.4 oz)     General: Well-appearing male in no distress. Alert and oriented.   Skin: Focused exam of the hands, hands, chest, back, head, neck, lower legs and feet was performed.   Findings -   - Diffuse, plate like scaly hyperkeratotic papules/plaques scattered on the plantar feet. Bilateral hyperkeratosis of the heel. Overall improved from last exam.   - Palms with mild xerosis and hyperkeratosis, improved from previous  - Resolution of previously noted eruption of body, though some persistent mild thin eczematous plaques on left lateral back  - Diffuse xerosis      Laboratory:   Component      Latest Ref Rng & Units 11/21/2019 11/21/2019          10:30 AM 10:30 AM   WBC      4.0 - 11.0 10e9/L 7.8    RBC Count      4.4 - 5.9 10e12/L 4.64    Hemoglobin      13.3 - 17.7 g/dL 14.3    Hematocrit      40.0 - 53.0 % 42.5    MCV      78 - 100 fl 92    MCH      26.5 - 33.0 pg 30.8    MCHC      31.5 - 36.5 g/dL 33.6    RDW      10.0 - 15.0 % 12.4    Platelet Count      150 - 450 10e9/L 250    Diff Method       Automated Method    % Neutrophils      % 58.1    % Lymphocytes      % 28.5    % Monocytes      % 6.4    % Eosinophils      % 6.2    % Basophils      % 0.4    % Immature Granulocytes      % 0.4    Nucleated  RBCs      0 /100 0    Absolute Neutrophil      1.6 - 8.3 10e9/L 4.5    Absolute Lymphocytes      0.8 - 5.3 10e9/L 2.2    Absolute Monocytes      0.0 - 1.3 10e9/L 0.5    Absolute Eosinophils      0.0 - 0.7 10e9/L 0.5    Absolute Basophils      0.0 - 0.2 10e9/L 0.0    Abs Immature Granulocytes      0 - 0.4 10e9/L 0.0    Absolute Nucleated RBC       0.0    Sodium      133 - 144 mmol/L  137   Potassium      3.4 - 5.3 mmol/L  3.5   Chloride      94 - 109 mmol/L  107   Carbon Dioxide      20 - 32 mmol/L  26   Anion Gap      3 - 14 mmol/L  4   Glucose      70 - 99 mg/dL  92   Urea Nitrogen      7 - 30 mg/dL  9   Creatinine      0.66 - 1.25 mg/dL  0.84   GFR Estimate      >60 mL/min/1.73:m2  >90   GFR Estimate If Black      >60 mL/min/1.73:m2  >90   Calcium      8.5 - 10.1 mg/dL  8.8   Bilirubin Total      0.2 - 1.3 mg/dL  0.4   Albumin      3.4 - 5.0 g/dL  3.8   Protein Total      6.8 - 8.8 g/dL  7.4   Alkaline Phosphatase      40 - 150 U/L  112   ALT      0 - 70 U/L  37   AST      0 - 45 U/L  21   % Retic      0.5 - 2.0 %     Absolute Retic      25 - 95 10e9/L       Component      Latest Ref Rng & Units 12/5/2019          11:00 AM   % Retic      0.5 - 2.0 % 1.6   Absolute Retic      25 - 95 10e9/L 74.4     Component      Latest Ref Rng & Units 12/5/2019 12/5/2019          11:00 AM 11:00 AM   WBC      4.0 - 11.0 10e9/L 7.8    RBC Count      4.4 - 5.9 10e12/L 4.71    Hemoglobin      13.3 - 17.7 g/dL 14.4    Hematocrit      40.0 - 53.0 % 43.5    MCV      78 - 100 fl 92    MCH      26.5 - 33.0 pg 30.6    MCHC      31.5 - 36.5 g/dL 33.1    RDW      10.0 - 15.0 % 12.4    Platelet Count      150 - 450 10e9/L 239    Diff Method       Automated Method    % Neutrophils      % 56.5    % Lymphocytes      % 32.3    % Monocytes      % 5.9    % Eosinophils      % 4.6    % Basophils      % 0.4    % Immature Granulocytes      % 0.3    Nucleated RBCs      0 /100 0    Absolute Neutrophil      1.6 - 8.3 10e9/L 4.4    Absolute Lymphocytes       0.8 - 5.3 10e9/L 2.5    Absolute Monocytes      0.0 - 1.3 10e9/L 0.5    Absolute Eosinophils      0.0 - 0.7 10e9/L 0.4    Absolute Basophils      0.0 - 0.2 10e9/L 0.0    Abs Immature Granulocytes      0 - 0.4 10e9/L 0.0    Absolute Nucleated RBC       0.0    Sodium      133 - 144 mmol/L  135   Potassium      3.4 - 5.3 mmol/L  3.9   Chloride      94 - 109 mmol/L  106   Carbon Dioxide      20 - 32 mmol/L  26   Anion Gap      3 - 14 mmol/L  4   Glucose      70 - 99 mg/dL  95   Urea Nitrogen      7 - 30 mg/dL  10   Creatinine      0.66 - 1.25 mg/dL  0.90   GFR Estimate      >60 mL/min/1.73:m2  >90   GFR Estimate If Black      >60 mL/min/1.73:m2  >90   Calcium      8.5 - 10.1 mg/dL  8.8     Assessment and Plan:     1. Hidradenitis suppurativa (s/p 9 surgeries involving bilateral axilla, buttock, and groins being the last surgery 8/7/19):  Prefers to have these excised when they arise. Currently without any active lesions. Still getting 1-2 new lesions a month.  - Continue dapsone 100 mg every day   - Consider increasing in future if needed, holding at current dose as doing well  - Labs today and in 1 month  - Referral to Dr. Monroe to establish care  - F/u 3 months    2) Psoriasis   Previously flaring. Now much improved on topicals. Previously flared after stopping CSA. We were previously was calling this reactive arthritis, but as it all came on after a viral-like illness. That being said, he has no joint pains and this looks much more like psoriasis at this time. Much improved on topicals.   Topicals:  - Triamcinolone cream and fluocinolone oil twice PRN  - Clobetasol ointment twice day to the hands and feet.      3) Atopic dermatitis  Well controlled. Continue current topicals PRN (TAC, clobetasol, derma smooth)    4) Acute onset L ankle pain  No preceding trauma. New as of this AM. Diffuse joint line tenderness. No erythema or warmth of joint. Unclear etiology of pain. Ddx includes inflammatory vs trauma vs other.    - uric acid pending (previously normal)  - 2 view ankle XR today pending  - if not improved in 1-2 weeks, consider       Follow-up: Return in about 3 months (around 4/23/2020).     Scribe Disclosure:  I, Gillian Granda, am serving as a scribe to document services personally performed by Apolinar Ruiz MD at this visit, based upon the provider's statements to me. All documentation has been reviewed by the aforementioned provider prior to being entered into the official medical record.     Bib Ortiz MD  Internal Medicine - Dermatology, PGY-5    Staff Physician Comments:   I saw and evaluated the patient with the resident and I agree with the assessment and plan.  I was present for the examination.    Apolinar Ruiz MD, FAAD    Departments of Internal Medicine and Dermatology  [facility]  [phone]      Provider Disclosure:   The documentation recorded by the scribe accurately reflects the services I personally performed and the decisions made by me.    Apolinar Ruiz MD, FAAD    Departments of Internal Medicine and Dermatology  [facility]  [phone]

## 2020-01-23 NOTE — LETTER
2020       RE: Bernard Padilla  [id number] [facility] [address]     Dear Colleague,    Thank you for referring your patient, Bernard Padilla, to the [facility] RHEUMATOLOGY at [facility]. Please see a copy of my visit note below.    [facility]  Dermatology-Rheumatology Clinic  Apolinar Ruiz MD  2020     Name: Bernard Padilla  MRN: [id number]  Age: 34 year old  : 1985  Referring provider: Jayde Quiroz    Dermatology Problem List:   1. Hidradenitis suppuritiva              - Diagnosed in  and did not respond to antibiotics.               - Seen by Dr. Bird and underwent 9 procedures               -  mg BID - start , now discontinued              -  Dapsone 100mg daily started             19  2. Acute onset hand foot psoriasiform dermatitis, suspected reactive arthritis              -  mg BID - started , now discontinued              - Lidex ointment at night, urea cream in the morning   3. Atopic dermatitis  4. Seborrheic dermatitis                        - DermaSmooth    Encounter Date: 2020   Date of Last Visit: 2019   Orders Only on 2019   Component Date Value Ref Range Status     % Retic 2019 1.6  0.5 - 2.0 % Final     Absolute Retic 2019 74.4  25 - 95 10e9/L Final     WBC 2019 7.8  4.0 - 11.0 10e9/L Final     RBC Count 2019 4.71  4.4 - 5.9 10e12/L Final     Hemoglobin 2019 14.4  13.3 - 17.7 g/dL Final     Hematocrit 2019 43.5  40.0 - 53.0 % Final     MCV 2019 92  78 - 100 fl Final     MCH 2019 30.6  26.5 - 33.0 pg Final     MCHC 2019 33.1  31.5 - 36.5 g/dL Final     RDW 2019 12.4  10.0 - 15.0 % Final     Platelet Count 2019 239  150 - 450 10e9/L Final     Diff Method 2019 Automated Method   Final     % Neutrophils 2019 56.5  % Final     % Lymphocytes 2019 32.3  % Final     % Monocytes 2019 5.9  % Final     %  Eosinophils 12/05/2019 4.6  % Final     % Basophils 12/05/2019 0.4  % Final     % Immature Granulocytes 12/05/2019 0.3  % Final     Nucleated RBCs 12/05/2019 0  0 /100 Final     Absolute Neutrophil 12/05/2019 4.4  1.6 - 8.3 10e9/L Final     Absolute Lymphocytes 12/05/2019 2.5  0.8 - 5.3 10e9/L Final     Absolute Monocytes 12/05/2019 0.5  0.0 - 1.3 10e9/L Final     Absolute Eosinophils 12/05/2019 0.4  0.0 - 0.7 10e9/L Final     Absolute Basophils 12/05/2019 0.0  0.0 - 0.2 10e9/L Final     Abs Immature Granulocytes 12/05/2019 0.0  0 - 0.4 10e9/L Final     Absolute Nucleated RBC 12/05/2019 0.0   Final     Sodium 12/05/2019 135  133 - 144 mmol/L Final     Potassium 12/05/2019 3.9  3.4 - 5.3 mmol/L Final     Chloride 12/05/2019 106  94 - 109 mmol/L Final     Carbon Dioxide 12/05/2019 26  20 - 32 mmol/L Final     Anion Gap 12/05/2019 4  3 - 14 mmol/L Final     Glucose 12/05/2019 95  70 - 99 mg/dL Final     Urea Nitrogen 12/05/2019 10  7 - 30 mg/dL Final     Creatinine 12/05/2019 0.90  0.66 - 1.25 mg/dL Final     GFR Estimate 12/05/2019 >90  >60 mL/min/[1.73_m2] Final    Comment: Non  GFR Calc  Starting 12/18/2018, serum creatinine based estimated GFR (eGFR) will be   calculated using the Chronic Kidney Disease Epidemiology Collaboration   (CKD-EPI) equation.       GFR Estimate If Black 12/05/2019 >90  >60 mL/min/[1.73_m2] Final    Comment:  GFR Calc  Starting 12/18/2018, serum creatinine based estimated GFR (eGFR) will be   calculated using the Chronic Kidney Disease Epidemiology Collaboration   (CKD-EPI) equation.       Calcium 12/05/2019 8.8  8.5 - 10.1 mg/dL Final       HPI:   Bernard Padilla is a 34 year old male with a history including hidradenitis suppurativa (HS) who presents for follow-up. We last saw the patient on 12/26, 2019 at which time, he was to continued on 100mg Dapsone. He continues to take dapsone and is tolerating very well. He is happy with overall control. Not having SOB  or other issues with the medication.     His HS has overall been doing pretty well. He says he did have to get an emergent surgery for a flare at the end of the year. He otherwise has been doing very well from an HS perspective. No recent new lesions. No current active lesions. He feels the dapsone is helping and does not feel he needs to increase the dosing today.     Regarding his hands/feet things are much better. He has been using his topicals as directed. Says hands haven't been this clear in over a year. Says body has largely resolved as well. He is overall very happy with how things are going.     Patient states starting this AM he developed left ankle pain. Does not recall preceding trauma. He notes he has had problems with joints in the past though has never had left ankle involvement.     Pt otherwise feels well without any changes in general state of health. Denies any new, growing, changing, bleeding, or otherwise concerning/symptomatic skin findings. No other questions or concerns today.     Background history from [facility] on 08/22/2019:   Diagnosed 2 years ago and did not respond to several trials of antibiotics. Lanced and packing however would recur. Derm surgeon (Dr. Bird from [facility]) performed 9 surgeries (removed sweat glands in bilateral axillas, bilateral groins and buttocks and now referred to Dr. Ruiz for further management. Groins done 8/7/19. Currently very painful at both groins at the surgical sight. Taking 30-40mg of percocet daily.      Today the patient reports that he has lesions of the groin and gluteals regions at current. He was on Doxycycline in the past for a long period of time before starting the surgical procedures and the antibiotics for HS. He also reports having tried Bactrim in the past without relief. He reports new lesions around the surgical sites. He endorses having thinning hair. He denies being on a blood thinner.     He has a Hx of eczema. He reports  rashes of the upper extremities which he treats with Fluocinonide. He also uses Dermasmooth and eye drops. He reports that he would like to try a new shampoo.   The patient also shares that from [id number] he had tried to quit smoking while in [employer]. He reports that the HS was unchanged during this time. The patient reports a Hx of skin cancer on his mother's side of the family and Diabetes on his father's side.     Review of Systems:   Pertinent items are noted in HPI or as below, remainder of complete ROS is negative.      Active Medications:     Current Outpatient Medications:      Acetaminophen (TYLENOL PO), , Disp: , Rfl:      albuterol (PROAIR HFA/PROVENTIL HFA/VENTOLIN HFA) 108 (90 Base) MCG/ACT inhaler, Inhale 2 puffs into the lungs every 6 hours, Disp: 8.5 g, Rfl: 3     cetirizine (ZYRTEC) 10 MG tablet, Take 1 tablet (10 mg) by mouth daily, Disp: 30 tablet, Rfl: 2     clindamycin (CLEOCIN T) 1 % external solution, Apply topically 2 times daily, Disp: 60 mL, Rfl: 2     clobetasol (TEMOVATE) 0.05 % external ointment, Apply to hands and feet twice a day, Disp: 60 g, Rfl: 3     dapsone (ACZONE) 100 MG tablet, Take 1 tablet (100 mg) by mouth daily, Disp: 30 tablet, Rfl: 3     fluocinolone acetonide (DERMA-SMOOTHE/FS BODY) 0.01 % external oil, Apply topically 2 times daily, Disp: 120 mL, Rfl: 1     Fluocinolone Acetonide Scalp (DERMA-SMOOTHE/FS SCALP) 0.01 % OIL oil, Apply topically daily, Disp: 1 Bottle, Rfl: 11     fluticasone (FLONASE) 50 MCG/ACT nasal spray, Spray 1-2 sprays into both nostrils daily, Disp: 16 g, Rfl: 2     IBUPROFEN PO, Take 800 mg by mouth 3 times daily as needed, Disp: , Rfl:      naloxone (NARCAN) 4 MG/0.1ML nasal spray, Spray 1 spray (4 mg) into one nostril alternating nostrils as needed for opioid reversal, Disp: 2 each, Rfl: 0     triamcinolone (KENALOG) 0.1 % external cream, Apply topically 2 times daily, Disp: 464 g, Rfl: 3      Allergies:   Vicodin  Chicken-Derived Products         Hydrocodone        Past Medical History:  Boil  Hidradenitis suppurativa  Tobacco use disorder  Patellofemoral arthritis of left knee  Eczema     Past Surgical History:  Irrigation and debridement rectum, combined 11/14/2019  Meniscus repair     Family History:    The patient's family history includes Diabetes in his father; Hypertension in his mother.     Social History:  The patient reports that he has been smoking. He has been smoking about 0.50 packs per day. He has never used smokeless tobacco. He reports current alcohol use. He reports current drug use. Drug: Marijuana.   PCP: Lillian Webb  Marital Status: Single     Physical Exam:   /82 (BP Location: Right arm, Patient Position: Sitting, Cuff Size: Adult Regular)   Pulse 64   Temp 98  F (36.7  C)   Wt 83.3 kg (183 lb 11.2 oz)   SpO2 100%   BMI 29.65 kg/m      Wt Readings from Last 4 Encounters:   01/23/20 83.3 kg (183 lb 11.2 oz)   12/26/19 85.6 kg (188 lb 11.2 oz)   11/22/19 86.6 kg (191 lb)   11/21/19 86.8 kg (191 lb 6.4 oz)     General: Well-appearing male in no distress. Alert and oriented.   Skin: Focused exam of the hands, hands, chest, back, head, neck, lower legs and feet was performed.   Findings -   - Diffuse, plate like scaly hyperkeratotic papules/plaques scattered on the plantar feet. Bilateral hyperkeratosis of the heel. Overall improved from last exam.   - Palms with mild xerosis and hyperkeratosis, improved from previous  - Resolution of previously noted eruption of body, though some persistent mild thin eczematous plaques on left lateral back  - Diffuse xerosis      Laboratory:   Component      Latest Ref Rng & Units 11/21/2019 11/21/2019          10:30 AM 10:30 AM   WBC      4.0 - 11.0 10e9/L 7.8    RBC Count      4.4 - 5.9 10e12/L 4.64    Hemoglobin      13.3 - 17.7 g/dL 14.3    Hematocrit      40.0 - 53.0 % 42.5    MCV      78 - 100 fl 92    MCH      26.5 - 33.0 pg 30.8    MCHC      31.5 - 36.5 g/dL 33.6    RDW      10.0  - 15.0 % 12.4    Platelet Count      150 - 450 10e9/L 250    Diff Method       Automated Method    % Neutrophils      % 58.1    % Lymphocytes      % 28.5    % Monocytes      % 6.4    % Eosinophils      % 6.2    % Basophils      % 0.4    % Immature Granulocytes      % 0.4    Nucleated RBCs      0 /100 0    Absolute Neutrophil      1.6 - 8.3 10e9/L 4.5    Absolute Lymphocytes      0.8 - 5.3 10e9/L 2.2    Absolute Monocytes      0.0 - 1.3 10e9/L 0.5    Absolute Eosinophils      0.0 - 0.7 10e9/L 0.5    Absolute Basophils      0.0 - 0.2 10e9/L 0.0    Abs Immature Granulocytes      0 - 0.4 10e9/L 0.0    Absolute Nucleated RBC       0.0    Sodium      133 - 144 mmol/L  137   Potassium      3.4 - 5.3 mmol/L  3.5   Chloride      94 - 109 mmol/L  107   Carbon Dioxide      20 - 32 mmol/L  26   Anion Gap      3 - 14 mmol/L  4   Glucose      70 - 99 mg/dL  92   Urea Nitrogen      7 - 30 mg/dL  9   Creatinine      0.66 - 1.25 mg/dL  0.84   GFR Estimate      >60 mL/min/1.73:m2  >90   GFR Estimate If Black      >60 mL/min/1.73:m2  >90   Calcium      8.5 - 10.1 mg/dL  8.8   Bilirubin Total      0.2 - 1.3 mg/dL  0.4   Albumin      3.4 - 5.0 g/dL  3.8   Protein Total      6.8 - 8.8 g/dL  7.4   Alkaline Phosphatase      40 - 150 U/L  112   ALT      0 - 70 U/L  37   AST      0 - 45 U/L  21   % Retic      0.5 - 2.0 %     Absolute Retic      25 - 95 10e9/L       Component      Latest Ref Rng & Units 12/5/2019          11:00 AM   % Retic      0.5 - 2.0 % 1.6   Absolute Retic      25 - 95 10e9/L 74.4     Component      Latest Ref Rng & Units 12/5/2019 12/5/2019          11:00 AM 11:00 AM   WBC      4.0 - 11.0 10e9/L 7.8    RBC Count      4.4 - 5.9 10e12/L 4.71    Hemoglobin      13.3 - 17.7 g/dL 14.4    Hematocrit      40.0 - 53.0 % 43.5    MCV      78 - 100 fl 92    MCH      26.5 - 33.0 pg 30.6    MCHC      31.5 - 36.5 g/dL 33.1    RDW      10.0 - 15.0 % 12.4    Platelet Count      150 - 450 10e9/L 239    Diff Method       Automated Method     % Neutrophils      % 56.5    % Lymphocytes      % 32.3    % Monocytes      % 5.9    % Eosinophils      % 4.6    % Basophils      % 0.4    % Immature Granulocytes      % 0.3    Nucleated RBCs      0 /100 0    Absolute Neutrophil      1.6 - 8.3 10e9/L 4.4    Absolute Lymphocytes      0.8 - 5.3 10e9/L 2.5    Absolute Monocytes      0.0 - 1.3 10e9/L 0.5    Absolute Eosinophils      0.0 - 0.7 10e9/L 0.4    Absolute Basophils      0.0 - 0.2 10e9/L 0.0    Abs Immature Granulocytes      0 - 0.4 10e9/L 0.0    Absolute Nucleated RBC       0.0    Sodium      133 - 144 mmol/L  135   Potassium      3.4 - 5.3 mmol/L  3.9   Chloride      94 - 109 mmol/L  106   Carbon Dioxide      20 - 32 mmol/L  26   Anion Gap      3 - 14 mmol/L  4   Glucose      70 - 99 mg/dL  95   Urea Nitrogen      7 - 30 mg/dL  10   Creatinine      0.66 - 1.25 mg/dL  0.90   GFR Estimate      >60 mL/min/1.73:m2  >90   GFR Estimate If Black      >60 mL/min/1.73:m2  >90   Calcium      8.5 - 10.1 mg/dL  8.8     Assessment and Plan:     1. Hidradenitis suppurativa (s/p 9 surgeries involving bilateral axilla, buttock, and groins being the last surgery 8/7/19):  Prefers to have these excised when they arise. Currently without any active lesions. Still getting 1-2 new lesions a month.  - Continue dapsone 100 mg every day   - Consider increasing in future if needed, holding at current dose as doing well  - Labs today and in 1 month  - Referral to Dr. Monroe to establish care  - F/u 3 months    2) Psoriasis   Previously flaring. Now much improved on topicals. Previously flared after stopping CSA. We were previously was calling this reactive arthritis, but as it all came on after a viral-like illness. That being said, he has no joint pains and this looks much more like psoriasis at this time. Much improved on topicals.   Topicals:  - Triamcinolone cream and fluocinolone oil twice PRN  - Clobetasol ointment twice day to the hands and feet.      3) Atopic  dermatitis  Well controlled. Continue current topicals PRN (TAC, clobetasol, derma smooth)    4) Acute onset L ankle pain  No preceding trauma. New as of this AM. Diffuse joint line tenderness. No erythema or warmth of joint. Unclear etiology of pain. Ddx includes inflammatory vs trauma vs other.   - uric acid pending (previously normal)  - 2 view ankle XR today pending  - if not improved in 1-2 weeks, consider       Follow-up: Return in about 3 months (around 4/23/2020).     Scribe Disclosure:  I, Gillian Granda, am serving as a scribe to document services personally performed by Apolinar Ruiz MD at this visit, based upon the provider's statements to me. All documentation has been reviewed by the aforementioned provider prior to being entered into the official medical record.     Bib Ortiz MD  Internal Medicine - Dermatology, PGY-5    Staff Physician Comments:   I saw and evaluated the patient with the resident and I agree with the assessment and plan.  I was present for the examination.    Apolinar Ruiz MD, FAAD    Departments of Internal Medicine and Dermatology  [facility]  [phone]      Provider Disclosure:   The documentation recorded by the scribe accurately reflects the services I personally performed and the decisions made by me.    Apolinar Ruiz MD, FAAD    Departments of Internal Medicine and Dermatology  [facility]  [phone]

## 2020-01-23 NOTE — LETTER
2020      RE: Bernard Padilla  [id number] [facility] [address]       [facility]  Dermatology-Rheumatology Clinic  Apolinar Ruiz MD  2020     Name: Bernard Padilla  MRN: [id number]  Age: 34 year old  : 1985  Referring provider: Jayde Quiroz    Dermatology Problem List:   1. Hidradenitis suppuritiva              - Diagnosed in 2016 and did not respond to antibiotics.               - Seen by Dr. Bird and underwent 9 procedures               -  mg BID - start , now discontinued              -  Dapsone 100mg daily started             19  2. Acute onset hand foot psoriasiform dermatitis, suspected reactive arthritis              -  mg BID - started , now discontinued              - Lidex ointment at night, urea cream in the morning   3. Atopic dermatitis  4. Seborrheic dermatitis                        - DermaSmooth    Encounter Date: 2020   Date of Last Visit: 2019   Orders Only on 2019   Component Date Value Ref Range Status     % Retic 2019 1.6  0.5 - 2.0 % Final     Absolute Retic 2019 74.4  25 - 95 10e9/L Final     WBC 2019 7.8  4.0 - 11.0 10e9/L Final     RBC Count 2019 4.71  4.4 - 5.9 10e12/L Final     Hemoglobin 2019 14.4  13.3 - 17.7 g/dL Final     Hematocrit 2019 43.5  40.0 - 53.0 % Final     MCV 2019 92  78 - 100 fl Final     MCH 2019 30.6  26.5 - 33.0 pg Final     MCHC 2019 33.1  31.5 - 36.5 g/dL Final     RDW 2019 12.4  10.0 - 15.0 % Final     Platelet Count 2019 239  150 - 450 10e9/L Final     Diff Method 2019 Automated Method   Final     % Neutrophils 2019 56.5  % Final     % Lymphocytes 2019 32.3  % Final     % Monocytes 2019 5.9  % Final     % Eosinophils 2019 4.6  % Final     % Basophils 2019 0.4  % Final     % Immature Granulocytes 2019 0.3  % Final     Nucleated RBCs 2019 0  0 /100 Final     Absolute Neutrophil  12/05/2019 4.4  1.6 - 8.3 10e9/L Final     Absolute Lymphocytes 12/05/2019 2.5  0.8 - 5.3 10e9/L Final     Absolute Monocytes 12/05/2019 0.5  0.0 - 1.3 10e9/L Final     Absolute Eosinophils 12/05/2019 0.4  0.0 - 0.7 10e9/L Final     Absolute Basophils 12/05/2019 0.0  0.0 - 0.2 10e9/L Final     Abs Immature Granulocytes 12/05/2019 0.0  0 - 0.4 10e9/L Final     Absolute Nucleated RBC 12/05/2019 0.0   Final     Sodium 12/05/2019 135  133 - 144 mmol/L Final     Potassium 12/05/2019 3.9  3.4 - 5.3 mmol/L Final     Chloride 12/05/2019 106  94 - 109 mmol/L Final     Carbon Dioxide 12/05/2019 26  20 - 32 mmol/L Final     Anion Gap 12/05/2019 4  3 - 14 mmol/L Final     Glucose 12/05/2019 95  70 - 99 mg/dL Final     Urea Nitrogen 12/05/2019 10  7 - 30 mg/dL Final     Creatinine 12/05/2019 0.90  0.66 - 1.25 mg/dL Final     GFR Estimate 12/05/2019 >90  >60 mL/min/[1.73_m2] Final    Comment: Non  GFR Calc  Starting 12/18/2018, serum creatinine based estimated GFR (eGFR) will be   calculated using the Chronic Kidney Disease Epidemiology Collaboration   (CKD-EPI) equation.       GFR Estimate If Black 12/05/2019 >90  >60 mL/min/[1.73_m2] Final    Comment:  GFR Calc  Starting 12/18/2018, serum creatinine based estimated GFR (eGFR) will be   calculated using the Chronic Kidney Disease Epidemiology Collaboration   (CKD-EPI) equation.       Calcium 12/05/2019 8.8  8.5 - 10.1 mg/dL Final       HPI:   Bernard Padilla is a 34 year old male with a history including hidradenitis suppurativa (HS) who presents for follow-up. We last saw the patient on 12/26, 2019 at which time, he was to continued on 100mg Dapsone. He continues to take dapsone and is tolerating very well. He is happy with overall control. Not having SOB or other issues with the medication.     His HS has overall been doing pretty well. He says he did have to get an emergent surgery for a flare at the end of the year. He otherwise has been doing  very well from an HS perspective. No recent new lesions. No current active lesions. He feels the dapsone is helping and does not feel he needs to increase the dosing today.     Regarding his hands/feet things are much better. He has been using his topicals as directed. Says hands haven't been this clear in over a year. Says body has largely resolved as well. He is overall very happy with how things are going.     Patient states starting this AM he developed left ankle pain. Does not recall preceding trauma. He notes he has had problems with joints in the past though has never had left ankle involvement.     Pt otherwise feels well without any changes in general state of health. Denies any new, growing, changing, bleeding, or otherwise concerning/symptomatic skin findings. No other questions or concerns today.     Background history from [facility] on 08/22/2019:   Diagnosed 2 years ago and did not respond to several trials of antibiotics. Lanced and packing however would recur. Derm surgeon (Dr. Bird from [facility]) performed 9 surgeries (removed sweat glands in bilateral axillas, bilateral groins and buttocks and now referred to Dr. Ruiz for further management. Groins done 8/7/19. Currently very painful at both groins at the surgical sight. Taking 30-40mg of percocet daily.      Today the patient reports that he has lesions of the groin and gluteals regions at current. He was on Doxycycline in the past for a long period of time before starting the surgical procedures and the antibiotics for HS. He also reports having tried Bactrim in the past without relief. He reports new lesions around the surgical sites. He endorses having thinning hair. He denies being on a blood thinner.     He has a Hx of eczema. He reports rashes of the upper extremities which he treats with Fluocinonide. He also uses Dermasmooth and eye drops. He reports that he would like to try a new shampoo.   The patient also shares that from  [id number] he had tried to quit smoking while in [employer]. He reports that the HS was unchanged during this time. The patient reports a Hx of skin cancer on his mother's side of the family and Diabetes on his father's side.     Review of Systems:   Pertinent items are noted in HPI or as below, remainder of complete ROS is negative.      Active Medications:     Current Outpatient Medications:      Acetaminophen (TYLENOL PO), , Disp: , Rfl:      albuterol (PROAIR HFA/PROVENTIL HFA/VENTOLIN HFA) 108 (90 Base) MCG/ACT inhaler, Inhale 2 puffs into the lungs every 6 hours, Disp: 8.5 g, Rfl: 3     cetirizine (ZYRTEC) 10 MG tablet, Take 1 tablet (10 mg) by mouth daily, Disp: 30 tablet, Rfl: 2     clindamycin (CLEOCIN T) 1 % external solution, Apply topically 2 times daily, Disp: 60 mL, Rfl: 2     clobetasol (TEMOVATE) 0.05 % external ointment, Apply to hands and feet twice a day, Disp: 60 g, Rfl: 3     dapsone (ACZONE) 100 MG tablet, Take 1 tablet (100 mg) by mouth daily, Disp: 30 tablet, Rfl: 3     fluocinolone acetonide (DERMA-SMOOTHE/FS BODY) 0.01 % external oil, Apply topically 2 times daily, Disp: 120 mL, Rfl: 1     Fluocinolone Acetonide Scalp (DERMA-SMOOTHE/FS SCALP) 0.01 % OIL oil, Apply topically daily, Disp: 1 Bottle, Rfl: 11     fluticasone (FLONASE) 50 MCG/ACT nasal spray, Spray 1-2 sprays into both nostrils daily, Disp: 16 g, Rfl: 2     IBUPROFEN PO, Take 800 mg by mouth 3 times daily as needed, Disp: , Rfl:      naloxone (NARCAN) 4 MG/0.1ML nasal spray, Spray 1 spray (4 mg) into one nostril alternating nostrils as needed for opioid reversal, Disp: 2 each, Rfl: 0     triamcinolone (KENALOG) 0.1 % external cream, Apply topically 2 times daily, Disp: 464 g, Rfl: 3      Allergies:   Vicodin  Chicken-Derived Products        Hydrocodone        Past Medical History:  Boil  Hidradenitis suppurativa  Tobacco use disorder  Patellofemoral arthritis of left knee  Eczema     Past Surgical History:  Irrigation and  debridement rectum, combined 11/14/2019  Meniscus repair     Family History:    The patient's family history includes Diabetes in his father; Hypertension in his mother.     Social History:  The patient reports that he has been smoking. He has been smoking about 0.50 packs per day. He has never used smokeless tobacco. He reports current alcohol use. He reports current drug use. Drug: Marijuana.   PCP: Lillian Webb  Marital Status: Single     Physical Exam:   /82 (BP Location: Right arm, Patient Position: Sitting, Cuff Size: Adult Regular)   Pulse 64   Temp 98  F (36.7  C)   Wt 83.3 kg (183 lb 11.2 oz)   SpO2 100%   BMI 29.65 kg/m      Wt Readings from Last 4 Encounters:   01/23/20 83.3 kg (183 lb 11.2 oz)   12/26/19 85.6 kg (188 lb 11.2 oz)   11/22/19 86.6 kg (191 lb)   11/21/19 86.8 kg (191 lb 6.4 oz)     General: Well-appearing male in no distress. Alert and oriented.   Skin: Focused exam of the hands, hands, chest, back, head, neck, lower legs and feet was performed.   Findings -   - Diffuse, plate like scaly hyperkeratotic papules/plaques scattered on the plantar feet. Bilateral hyperkeratosis of the heel. Overall improved from last exam.   - Palms with mild xerosis and hyperkeratosis, improved from previous  - Resolution of previously noted eruption of body, though some persistent mild thin eczematous plaques on left lateral back  - Diffuse xerosis      Laboratory:   Component      Latest Ref Rng & Units 11/21/2019 11/21/2019          10:30 AM 10:30 AM   WBC      4.0 - 11.0 10e9/L 7.8    RBC Count      4.4 - 5.9 10e12/L 4.64    Hemoglobin      13.3 - 17.7 g/dL 14.3    Hematocrit      40.0 - 53.0 % 42.5    MCV      78 - 100 fl 92    MCH      26.5 - 33.0 pg 30.8    MCHC      31.5 - 36.5 g/dL 33.6    RDW      10.0 - 15.0 % 12.4    Platelet Count      150 - 450 10e9/L 250    Diff Method       Automated Method    % Neutrophils      % 58.1    % Lymphocytes      % 28.5    % Monocytes      % 6.4    %  Eosinophils      % 6.2    % Basophils      % 0.4    % Immature Granulocytes      % 0.4    Nucleated RBCs      0 /100 0    Absolute Neutrophil      1.6 - 8.3 10e9/L 4.5    Absolute Lymphocytes      0.8 - 5.3 10e9/L 2.2    Absolute Monocytes      0.0 - 1.3 10e9/L 0.5    Absolute Eosinophils      0.0 - 0.7 10e9/L 0.5    Absolute Basophils      0.0 - 0.2 10e9/L 0.0    Abs Immature Granulocytes      0 - 0.4 10e9/L 0.0    Absolute Nucleated RBC       0.0    Sodium      133 - 144 mmol/L  137   Potassium      3.4 - 5.3 mmol/L  3.5   Chloride      94 - 109 mmol/L  107   Carbon Dioxide      20 - 32 mmol/L  26   Anion Gap      3 - 14 mmol/L  4   Glucose      70 - 99 mg/dL  92   Urea Nitrogen      7 - 30 mg/dL  9   Creatinine      0.66 - 1.25 mg/dL  0.84   GFR Estimate      >60 mL/min/1.73:m2  >90   GFR Estimate If Black      >60 mL/min/1.73:m2  >90   Calcium      8.5 - 10.1 mg/dL  8.8   Bilirubin Total      0.2 - 1.3 mg/dL  0.4   Albumin      3.4 - 5.0 g/dL  3.8   Protein Total      6.8 - 8.8 g/dL  7.4   Alkaline Phosphatase      40 - 150 U/L  112   ALT      0 - 70 U/L  37   AST      0 - 45 U/L  21   % Retic      0.5 - 2.0 %     Absolute Retic      25 - 95 10e9/L       Component      Latest Ref Rng & Units 12/5/2019          11:00 AM   % Retic      0.5 - 2.0 % 1.6   Absolute Retic      25 - 95 10e9/L 74.4     Component      Latest Ref Rng & Units 12/5/2019 12/5/2019          11:00 AM 11:00 AM   WBC      4.0 - 11.0 10e9/L 7.8    RBC Count      4.4 - 5.9 10e12/L 4.71    Hemoglobin      13.3 - 17.7 g/dL 14.4    Hematocrit      40.0 - 53.0 % 43.5    MCV      78 - 100 fl 92    MCH      26.5 - 33.0 pg 30.6    MCHC      31.5 - 36.5 g/dL 33.1    RDW      10.0 - 15.0 % 12.4    Platelet Count      150 - 450 10e9/L 239    Diff Method       Automated Method    % Neutrophils      % 56.5    % Lymphocytes      % 32.3    % Monocytes      % 5.9    % Eosinophils      % 4.6    % Basophils      % 0.4    % Immature Granulocytes      % 0.3     Nucleated RBCs      0 /100 0    Absolute Neutrophil      1.6 - 8.3 10e9/L 4.4    Absolute Lymphocytes      0.8 - 5.3 10e9/L 2.5    Absolute Monocytes      0.0 - 1.3 10e9/L 0.5    Absolute Eosinophils      0.0 - 0.7 10e9/L 0.4    Absolute Basophils      0.0 - 0.2 10e9/L 0.0    Abs Immature Granulocytes      0 - 0.4 10e9/L 0.0    Absolute Nucleated RBC       0.0    Sodium      133 - 144 mmol/L  135   Potassium      3.4 - 5.3 mmol/L  3.9   Chloride      94 - 109 mmol/L  106   Carbon Dioxide      20 - 32 mmol/L  26   Anion Gap      3 - 14 mmol/L  4   Glucose      70 - 99 mg/dL  95   Urea Nitrogen      7 - 30 mg/dL  10   Creatinine      0.66 - 1.25 mg/dL  0.90   GFR Estimate      >60 mL/min/1.73:m2  >90   GFR Estimate If Black      >60 mL/min/1.73:m2  >90   Calcium      8.5 - 10.1 mg/dL  8.8     Assessment and Plan:     1. Hidradenitis suppurativa (s/p 9 surgeries involving bilateral axilla, buttock, and groins being the last surgery 8/7/19):  Prefers to have these excised when they arise. Currently without any active lesions. Still getting 1-2 new lesions a month.  - Continue dapsone 100 mg every day   - Consider increasing in future if needed, holding at current dose as doing well  - Labs today and in 1 month  - Referral to Dr. Monroe to establish care  - F/u 3 months    2) Psoriasis   Previously flaring. Now much improved on topicals. Previously flared after stopping CSA. We were previously was calling this reactive arthritis, but as it all came on after a viral-like illness. That being said, he has no joint pains and this looks much more like psoriasis at this time. Much improved on topicals.   Topicals:  - Triamcinolone cream and fluocinolone oil twice PRN  - Clobetasol ointment twice day to the hands and feet.      3) Atopic dermatitis  Well controlled. Continue current topicals PRN (TAC, clobetasol, derma smooth)    4) Acute onset L ankle pain  No preceding trauma. New as of this AM. Diffuse joint line  tenderness. No erythema or warmth of joint. Unclear etiology of pain. Ddx includes inflammatory vs trauma vs other.   - uric acid pending (previously normal)  - 2 view ankle XR today pending  - if not improved in 1-2 weeks, consider       Follow-up: Return in about 3 months (around 4/23/2020).     Scribe Disclosure:  I, Gillian Granda, am serving as a scribe to document services personally performed by Apolinar Ruiz MD at this visit, based upon the provider's statements to me. All documentation has been reviewed by the aforementioned provider prior to being entered into the official medical record.     Bib Ortiz MD  Internal Medicine - Dermatology, PGY-5    Staff Physician Comments:   I saw and evaluated the patient with the resident and I agree with the assessment and plan.  I was present for the examination.    Apolinar Ruiz MD, FAAD    Departments of Internal Medicine and Dermatology  [facility]  [phone]      Provider Disclosure:   The documentation recorded by the scribe accurately reflects the services I personally performed and the decisions made by me.    Apolinar Ruiz MD, FAAD    Departments of Internal Medicine and Dermatology  [facility]  [phone]            Apolinar Ruiz MD

## 2020-01-24 NOTE — TELEPHONE ENCOUNTER
Rosie states she would like to speak with Juan M regarding a retro referral back from December 2019 for service date 4/2/19. Please call and advise.

## 2020-01-27 ENCOUNTER — COMMUNICATION - HEALTHEAST (OUTPATIENT)
Dept: PHARMACY | Facility: CLINIC | Age: 35
End: 2020-01-27

## 2020-01-27 DIAGNOSIS — L29.9 PRURITUS: ICD-10-CM

## 2020-01-27 DIAGNOSIS — R21 RASH AND NONSPECIFIC SKIN ERUPTION: ICD-10-CM

## 2020-01-28 ENCOUNTER — COMMUNICATION - HEALTHEAST (OUTPATIENT)
Dept: NURSING | Facility: CLINIC | Age: 35
End: 2020-01-28

## 2020-01-29 ENCOUNTER — TELEPHONE (OUTPATIENT)
Dept: FAMILY MEDICINE | Facility: CLINIC | Age: 35
End: 2020-01-29

## 2020-01-29 NOTE — TELEPHONE ENCOUNTER
Called [employer] restricted program ([phone]) and was told pt's referral form was received. Was given referral ID: [id number]. [facility] can resubmit the claim w/ this referrce #.

## 2020-02-10 ENCOUNTER — TELEPHONE (OUTPATIENT)
Dept: DERMATOLOGY | Facility: CLINIC | Age: 35
End: 2020-02-10

## 2020-02-10 DIAGNOSIS — L30.9 DERMATITIS: ICD-10-CM

## 2020-02-10 DIAGNOSIS — L40.9 PSORIASIS: ICD-10-CM

## 2020-02-10 DIAGNOSIS — L30.9 ECZEMA, UNSPECIFIED TYPE: ICD-10-CM

## 2020-02-10 DIAGNOSIS — L73.2 HIDRADENITIS SUPPURATIVA: ICD-10-CM

## 2020-02-10 RX ORDER — TRIAMCINOLONE ACETONIDE 1 MG/G
CREAM TOPICAL 2 TIMES DAILY
Qty: 464 G | Refills: 3 | Status: SHIPPED | OUTPATIENT
Start: 2020-02-10 | End: 2020-10-29

## 2020-02-10 RX ORDER — DAPSONE 100 MG/1
100 TABLET ORAL DAILY
Qty: 30 TABLET | Refills: 3 | Status: SHIPPED | OUTPATIENT
Start: 2020-02-10 | End: 2021-02-11

## 2020-02-10 RX ORDER — CLOBETASOL PROPIONATE 0.5 MG/G
OINTMENT TOPICAL
Qty: 60 G | Refills: 3 | Status: SHIPPED | OUTPATIENT
Start: 2020-02-10 | End: 2020-10-29

## 2020-02-10 RX ORDER — FLUOCINOLONE ACETONIDE 0.11 MG/ML
OIL TOPICAL DAILY
Qty: 1 BOTTLE | Refills: 11 | Status: SHIPPED | OUTPATIENT
Start: 2020-02-10 | End: 2020-07-13

## 2020-02-10 RX ORDER — FLUOCINOLONE ACETONIDE 0.11 MG/ML
OIL TOPICAL 2 TIMES DAILY
Qty: 120 ML | Refills: 1 | Status: SHIPPED | OUTPATIENT
Start: 2020-02-10 | End: 2020-10-07

## 2020-02-10 NOTE — TELEPHONE ENCOUNTER
Per pt call need meds signed by DR Ruiz under special program. WO changed to DR Ruiz  1/23/2020  [facility]   Apolinar Ruiz MD   Dermatology

## 2020-02-10 NOTE — TELEPHONE ENCOUNTER
[facility] Call Center    Phone Message    May a detailed message be left on voicemail: no     Reason for Call: Medication Refill Request    Has the patient contacted the pharmacy for the refill? Yes   Name of medication being requested:   Clobetasol  Dapsone  Fluocinolone body  Triamcinolone  Fluocinolone scalp  Provider who prescribed the medication: Dr. Ruiz  Pharmacy: [facility] on [address] [facility] [phone] and fax [phone]    The patient is on a special program through the state to help with his meds and he called stating the prescriptions need to come directly from Dr. Ruiz or they won't be covered.    Date medication is needed: Soon       Action Taken: Message routed to:  [facility] Med Refill Team    Travel Screening: Not Applicable

## 2020-02-11 ENCOUNTER — COMMUNICATION - HEALTHEAST (OUTPATIENT)
Dept: NURSING | Facility: CLINIC | Age: 35
End: 2020-02-11

## 2020-02-13 ENCOUNTER — AMBULATORY - HEALTHEAST (OUTPATIENT)
Dept: NURSING | Facility: CLINIC | Age: 35
End: 2020-02-13

## 2020-02-17 ENCOUNTER — COMMUNICATION - HEALTHEAST (OUTPATIENT)
Dept: PHARMACY | Facility: CLINIC | Age: 35
End: 2020-02-17

## 2020-02-17 DIAGNOSIS — G89.4 CHRONIC PAIN SYNDROME: ICD-10-CM

## 2020-02-27 ENCOUNTER — COMMUNICATION - HEALTHEAST (OUTPATIENT)
Dept: INTERNAL MEDICINE | Facility: CLINIC | Age: 35
End: 2020-02-27

## 2020-02-27 DIAGNOSIS — R05.9 COUGH: ICD-10-CM

## 2020-03-02 ENCOUNTER — COMMUNICATION - HEALTHEAST (OUTPATIENT)
Dept: INTERNAL MEDICINE | Facility: CLINIC | Age: 35
End: 2020-03-02

## 2020-03-02 ENCOUNTER — COMMUNICATION - HEALTHEAST (OUTPATIENT)
Dept: NURSING | Facility: CLINIC | Age: 35
End: 2020-03-02

## 2020-03-02 DIAGNOSIS — J11.1 INFLUENZA: ICD-10-CM

## 2020-03-02 DIAGNOSIS — J42 CHRONIC BRONCHITIS, UNSPECIFIED CHRONIC BRONCHITIS TYPE (H): ICD-10-CM

## 2020-03-04 ENCOUNTER — COMMUNICATION - HEALTHEAST (OUTPATIENT)
Dept: NURSING | Facility: CLINIC | Age: 35
End: 2020-03-04

## 2020-03-09 ENCOUNTER — COMMUNICATION - HEALTHEAST (OUTPATIENT)
Dept: NURSING | Facility: CLINIC | Age: 35
End: 2020-03-09

## 2020-03-11 ENCOUNTER — COMMUNICATION - HEALTHEAST (OUTPATIENT)
Dept: PALLIATIVE MEDICINE | Facility: OTHER | Age: 35
End: 2020-03-11

## 2020-03-11 ENCOUNTER — AMBULATORY - HEALTHEAST (OUTPATIENT)
Dept: PALLIATIVE MEDICINE | Facility: OTHER | Age: 35
End: 2020-03-11

## 2020-03-11 DIAGNOSIS — G89.4 CHRONIC PAIN SYNDROME: ICD-10-CM

## 2020-03-27 ENCOUNTER — COMMUNICATION - HEALTHEAST (OUTPATIENT)
Dept: NURSING | Facility: CLINIC | Age: 35
End: 2020-03-27

## 2020-03-30 ENCOUNTER — HOSPITAL ENCOUNTER (OUTPATIENT)
Dept: PALLIATIVE MEDICINE | Facility: OTHER | Age: 35
Discharge: HOME | End: 2020-03-30
Attending: ANESTHESIOLOGY

## 2020-03-30 DIAGNOSIS — G89.4 CHRONIC PAIN SYNDROME: ICD-10-CM

## 2020-03-31 ENCOUNTER — AMBULATORY - HEALTHEAST (OUTPATIENT)
Dept: INTERNAL MEDICINE | Facility: CLINIC | Age: 35
End: 2020-03-31

## 2020-03-31 ENCOUNTER — COMMUNICATION - HEALTHEAST (OUTPATIENT)
Dept: INTERNAL MEDICINE | Facility: CLINIC | Age: 35
End: 2020-03-31

## 2020-03-31 DIAGNOSIS — L40.9 PSORIASIS: ICD-10-CM

## 2020-04-08 ENCOUNTER — COMMUNICATION - HEALTHEAST (OUTPATIENT)
Dept: NURSING | Facility: CLINIC | Age: 35
End: 2020-04-08

## 2020-04-08 ENCOUNTER — COMMUNICATION - HEALTHEAST (OUTPATIENT)
Dept: INTERNAL MEDICINE | Facility: CLINIC | Age: 35
End: 2020-04-08

## 2020-04-08 DIAGNOSIS — T78.40XD ALLERGIC STATE, SUBSEQUENT ENCOUNTER: ICD-10-CM

## 2020-04-09 ENCOUNTER — COMMUNICATION - HEALTHEAST (OUTPATIENT)
Dept: NURSING | Facility: CLINIC | Age: 35
End: 2020-04-09

## 2020-04-10 ENCOUNTER — COMMUNICATION - HEALTHEAST (OUTPATIENT)
Dept: NURSING | Facility: CLINIC | Age: 35
End: 2020-04-10

## 2020-04-27 ENCOUNTER — COMMUNICATION - HEALTHEAST (OUTPATIENT)
Dept: PHARMACY | Facility: CLINIC | Age: 35
End: 2020-04-27

## 2020-04-27 DIAGNOSIS — G89.4 CHRONIC PAIN SYNDROME: ICD-10-CM

## 2020-04-28 ENCOUNTER — COMMUNICATION - HEALTHEAST (OUTPATIENT)
Dept: PALLIATIVE MEDICINE | Facility: OTHER | Age: 35
End: 2020-04-28

## 2020-04-30 ENCOUNTER — COMMUNICATION - HEALTHEAST (OUTPATIENT)
Dept: NURSING | Facility: CLINIC | Age: 35
End: 2020-04-30

## 2020-04-30 ENCOUNTER — COMMUNICATION - HEALTHEAST (OUTPATIENT)
Dept: INTERNAL MEDICINE | Facility: CLINIC | Age: 35
End: 2020-04-30

## 2020-04-30 DIAGNOSIS — L73.2 HIDRADENITIS: ICD-10-CM

## 2020-04-30 DIAGNOSIS — L29.9 PRURITUS: ICD-10-CM

## 2020-05-06 ENCOUNTER — COMMUNICATION - HEALTHEAST (OUTPATIENT)
Dept: INTERNAL MEDICINE | Facility: CLINIC | Age: 35
End: 2020-05-06

## 2020-05-06 DIAGNOSIS — J30.9 ALLERGIC RHINITIS: ICD-10-CM

## 2020-05-18 ENCOUNTER — COMMUNICATION - HEALTHEAST (OUTPATIENT)
Dept: PALLIATIVE MEDICINE | Facility: OTHER | Age: 35
End: 2020-05-18

## 2020-05-20 ENCOUNTER — COMMUNICATION - HEALTHEAST (OUTPATIENT)
Dept: CARE COORDINATION | Facility: CLINIC | Age: 35
End: 2020-05-20

## 2020-05-22 ENCOUNTER — COMMUNICATION - HEALTHEAST (OUTPATIENT)
Dept: INTERNAL MEDICINE | Facility: CLINIC | Age: 35
End: 2020-05-22

## 2020-05-22 ENCOUNTER — OFFICE VISIT - HEALTHEAST (OUTPATIENT)
Dept: INTERNAL MEDICINE | Facility: CLINIC | Age: 35
End: 2020-05-22

## 2020-05-22 ENCOUNTER — HOSPITAL ENCOUNTER (OUTPATIENT)
Dept: PALLIATIVE MEDICINE | Facility: OTHER | Age: 35
Discharge: HOME | End: 2020-05-22
Attending: ANESTHESIOLOGY

## 2020-05-22 DIAGNOSIS — L73.2 HIDRADENITIS: ICD-10-CM

## 2020-05-22 DIAGNOSIS — J30.9 ALLERGIC RHINITIS: ICD-10-CM

## 2020-05-22 DIAGNOSIS — Z01.818 PREOP GENERAL PHYSICAL EXAM: ICD-10-CM

## 2020-05-22 DIAGNOSIS — G89.4 CHRONIC PAIN SYNDROME: ICD-10-CM

## 2020-05-22 LAB
ALBUMIN SERPL-MCNC: 3.9 G/DL (ref 3.5–5)
ALP SERPL-CCNC: 101 U/L (ref 45–120)
ALT SERPL W P-5'-P-CCNC: 27 U/L (ref 0–45)
ANION GAP SERPL CALCULATED.3IONS-SCNC: 8 MMOL/L (ref 5–18)
AST SERPL W P-5'-P-CCNC: 23 U/L (ref 0–40)
BILIRUB SERPL-MCNC: 0.4 MG/DL (ref 0–1)
BUN SERPL-MCNC: 9 MG/DL (ref 8–22)
CALCIUM SERPL-MCNC: 9.1 MG/DL (ref 8.5–10.5)
CHLORIDE BLD-SCNC: 106 MMOL/L (ref 98–107)
CO2 SERPL-SCNC: 25 MMOL/L (ref 22–31)
CREAT SERPL-MCNC: 0.87 MG/DL (ref 0.7–1.3)
ERYTHROCYTE [DISTWIDTH] IN BLOOD BY AUTOMATED COUNT: 11.5 % (ref 11–14.5)
GFR SERPL CREATININE-BSD FRML MDRD: >60 ML/MIN/1.73M2
GLUCOSE BLD-MCNC: 86 MG/DL (ref 70–125)
HCT VFR BLD AUTO: 43.5 % (ref 40–54)
HGB BLD-MCNC: 14.8 G/DL (ref 14–18)
MCH RBC QN AUTO: 32.7 PG (ref 27–34)
MCHC RBC AUTO-ENTMCNC: 34 G/DL (ref 32–36)
MCV RBC AUTO: 96 FL (ref 80–100)
PLATELET # BLD AUTO: 211 THOU/UL (ref 140–440)
PMV BLD AUTO: 7.2 FL (ref 7–10)
POTASSIUM BLD-SCNC: 4.4 MMOL/L (ref 3.5–5)
PROT SERPL-MCNC: 6.8 G/DL (ref 6–8)
RBC # BLD AUTO: 4.52 MILL/UL (ref 4.4–6.2)
SODIUM SERPL-SCNC: 139 MMOL/L (ref 136–145)
WBC: 6.6 THOU/UL (ref 4–11)

## 2020-05-22 ASSESSMENT — MIFFLIN-ST. JEOR: SCORE: 1712.83

## 2020-05-22 ASSESSMENT — PATIENT HEALTH QUESTIONNAIRE - PHQ9: SUM OF ALL RESPONSES TO PHQ QUESTIONS 1-9: 9

## 2020-05-26 ENCOUNTER — COMMUNICATION - HEALTHEAST (OUTPATIENT)
Dept: PALLIATIVE MEDICINE | Facility: OTHER | Age: 35
End: 2020-05-26

## 2020-05-26 ENCOUNTER — COMMUNICATION - HEALTHEAST (OUTPATIENT)
Dept: INTERNAL MEDICINE | Facility: CLINIC | Age: 35
End: 2020-05-26

## 2020-05-26 DIAGNOSIS — G89.4 CHRONIC PAIN SYNDROME: ICD-10-CM

## 2020-05-28 ENCOUNTER — COMMUNICATION - HEALTHEAST (OUTPATIENT)
Dept: NURSING | Facility: CLINIC | Age: 35
End: 2020-05-28

## 2020-06-10 ENCOUNTER — COMMUNICATION - HEALTHEAST (OUTPATIENT)
Dept: NURSING | Facility: CLINIC | Age: 35
End: 2020-06-10

## 2020-06-12 ENCOUNTER — COMMUNICATION - HEALTHEAST (OUTPATIENT)
Dept: CARE COORDINATION | Facility: CLINIC | Age: 35
End: 2020-06-12

## 2020-06-22 ENCOUNTER — COMMUNICATION - HEALTHEAST (OUTPATIENT)
Dept: PALLIATIVE MEDICINE | Facility: OTHER | Age: 35
End: 2020-06-22

## 2020-06-22 DIAGNOSIS — G89.4 CHRONIC PAIN SYNDROME: ICD-10-CM

## 2020-06-24 ENCOUNTER — COMMUNICATION - HEALTHEAST (OUTPATIENT)
Dept: CARE COORDINATION | Facility: CLINIC | Age: 35
End: 2020-06-24

## 2020-06-30 ENCOUNTER — COMMUNICATION - HEALTHEAST (OUTPATIENT)
Dept: CARE COORDINATION | Facility: CLINIC | Age: 35
End: 2020-06-30

## 2020-07-01 ENCOUNTER — COMMUNICATION - HEALTHEAST (OUTPATIENT)
Dept: NURSING | Facility: CLINIC | Age: 35
End: 2020-07-01

## 2020-07-02 ENCOUNTER — COMMUNICATION - HEALTHEAST (OUTPATIENT)
Dept: INTERNAL MEDICINE | Facility: CLINIC | Age: 35
End: 2020-07-02

## 2020-07-07 ENCOUNTER — TELEPHONE (OUTPATIENT)
Dept: DERMATOLOGY | Facility: CLINIC | Age: 35
End: 2020-07-07

## 2020-07-07 NOTE — TELEPHONE ENCOUNTER
Called spoke with pt, he flared and is really struggling at this time.  He would like to see Dr. Ruiz in clinic.  He will come in at 10:30.      Pt just had knee surgery a month ago.  He has no s/s of COVID.    Lashae Young RN  Rheumatology Clinic

## 2020-07-07 NOTE — TELEPHONE ENCOUNTER
[facility] Call Center    Phone Message    May a detailed message be left on voicemail: yes     Reason for Call: Symptoms or Concerns     If patient has red-flag symptoms, warm transfer to triage line    Current symptom or concern: Pt called and said that he is flaring up really bad to the point where it's hard for him to walk or use the restroom.  Pt said that it started last week and pt has been taking the prescribed medication to help with the flare.  Pt said that the medication is not working anymore. Pt does not want to go to the ER.  Please call the pt to discuss. Thanks    Symptoms have been present for:  1 week(s)    Has patient previously been seen for this? Yes    By : Dr. Ruiz    Date: 1/23/2020    Are there any new or worsening symptoms? Yes: see above      Action Taken: Message routed to:  [facility] DERM     Travel Screening: Not Applicable

## 2020-07-10 DIAGNOSIS — L30.9 DERMATITIS: ICD-10-CM

## 2020-07-13 ENCOUNTER — COMMUNICATION - HEALTHEAST (OUTPATIENT)
Dept: PALLIATIVE MEDICINE | Facility: OTHER | Age: 35
End: 2020-07-13

## 2020-07-13 DIAGNOSIS — G89.4 CHRONIC PAIN SYNDROME: ICD-10-CM

## 2020-07-13 RX ORDER — FLUOCINOLONE ACETONIDE 0.11 MG/ML
OIL TOPICAL
Qty: 118.28 ML | Refills: 0 | Status: SHIPPED | OUTPATIENT
Start: 2020-07-13 | End: 2020-10-07

## 2020-07-13 NOTE — TELEPHONE ENCOUNTER
Last Clinic Visit: 1/23/2020  [facility] Rhe[facility]ology    Refilled qty to 6 months from last visit (process #2/Derm protocol).

## 2020-07-15 ENCOUNTER — COMMUNICATION - HEALTHEAST (OUTPATIENT)
Dept: PALLIATIVE MEDICINE | Facility: OTHER | Age: 35
End: 2020-07-15

## 2020-07-15 ENCOUNTER — COMMUNICATION - HEALTHEAST (OUTPATIENT)
Dept: PHARMACY | Facility: CLINIC | Age: 35
End: 2020-07-15

## 2020-07-16 ENCOUNTER — RECORDS - HEALTHEAST (OUTPATIENT)
Dept: ADMINISTRATIVE | Facility: OTHER | Age: 35
End: 2020-07-16

## 2020-07-16 ENCOUNTER — OFFICE VISIT (OUTPATIENT)
Dept: DERMATOLOGY | Facility: CLINIC | Age: 35
End: 2020-07-16
Payer: COMMERCIAL

## 2020-07-16 DIAGNOSIS — Z79.899 ENCOUNTER FOR LONG-TERM (CURRENT) USE OF HIGH-RISK MEDICATION: Primary | ICD-10-CM

## 2020-07-16 DIAGNOSIS — L73.2 HIDRADENITIS SUPPURATIVA: ICD-10-CM

## 2020-07-16 RX ORDER — DAPSONE 100 MG/1
200 TABLET ORAL DAILY
Qty: 60 TABLET | Refills: 3 | Status: SHIPPED | OUTPATIENT
Start: 2020-07-16 | End: 2020-07-16

## 2020-07-16 ASSESSMENT — PAIN SCALES - GENERAL: PAINLEVEL: SEVERE PAIN (7)

## 2020-07-16 NOTE — LETTER
7/16/2020       RE: Bernard Padilla  [address]  [place] [address]     Dear Colleague,    Thank you for referring your patient, Bernard Padilla, to the [facility] DERMATOLOGY at [facility]. Please see a copy of my visit note below.    [facility]    Dermatology Problem List:   1. Hidradenitis suppuritiva              - Diagnosed in 2016 and did not respond to antibiotics.               - Seen by Dr. Bird and underwent 9 procedures               -  mg BID - start 9/19, now discontinued              - Increased dapsone to 200 mg daily (started 11/21/19)  2. Acute onset hand foot psoriasiform dermatitis with joint pain, suspected reactive arthritis, now improving and almost resolved with minimal foot involvement              -  mg BID - started 9/19, now discontinued              - Lidex ointment at night, urea cream in the morning   3. Atopic dermatitis  4. Seborrheic dermatitis                        - DermaSmooth    Encounter Date: 07/16/2020   Date of Last Visit: 1/23/2019    S:  Bernard Padilla is a 34 year old male with a history including hidradenitis suppurativa (HS) who presents for follow-up. We last saw the patient on 1/23/2020 at which time, he was to continued on 100mg Dapsone.      He called to make this appointment because he has having a severe HS flare at this time.  He started having this flare up last week and had one about a month before that.  The flare is in the groin area.  It drains yellow fluid/pus.  He is still using the Dapsone 100 mg daily.      Background history from [facility] on 08/22/2019:   Diagnosed 2 years ago and did not respond to several trials of antibiotics. Lanced and packing however would recur. [facility] (removed sweat glands in bilateral axillas, bilateral groins and buttocks and now referred to Dr. Ruiz for further management. Groins done 8/7/19. Currently very  painful at both groins at the surgical sight. Taking 30-40mg of percocet daily.      Today the patient reports that he has lesions of the groin and gluteals regions at current. He was on Doxycycline in the past for a long period of time before starting the surgical procedures and the antibiotics for HS. He also reports having tried Bactrim in the past without relief. He reports new lesions around the surgical sites. He endorses having thinning hair. He denies being on a blood thinner.     He has a Hx of eczema. He reports rashes of the upper extremities which he treats with Fluocinonide. He also uses Dermasmooth and eye drops. He reports that he would like to try a new shampoo.   The patient also shares that from [id number] he had tried to quit smoking while in [employer]. He reports that the HS was unchanged during this time. The patient reports a Hx of skin cancer on his mother's side of the family and Diabetes on his father's side.     Review of Systems:   Pertinent items are noted in HPI or as below, remainder of complete ROS is negative.      Active Medications:     Current Outpatient Medications:      Acetaminophen (TYLENOL PO), , Disp: , Rfl:      albuterol (PROAIR HFA/PROVENTIL HFA/VENTOLIN HFA) 108 (90 Base) MCG/ACT inhaler, Inhale 2 puffs into the lungs every 6 hours, Disp: 8.5 g, Rfl: 3     cetirizine (ZYRTEC) 10 MG tablet, Take 1 tablet (10 mg) by mouth daily, Disp: 30 tablet, Rfl: 2     clindamycin (CLEOCIN T) 1 % external solution, Apply topically 2 times daily, Disp: 60 mL, Rfl: 2     clobetasol (TEMOVATE) 0.05 % external ointment, Apply to hands and feet twice a day, Disp: 60 g, Rfl: 3     dapsone (ACZONE) 100 MG tablet, Take 1 tablet (100 mg) by mouth daily, Disp: 30 tablet, Rfl: 3     fluocinolone acetonide (DERMA-SMOOTHE/FS BODY) 0.01 % external oil, Apply topically 2 times daily, Disp: 120 mL, Rfl: 1     Fluocinolone Acetonide Scalp (DERMA-SMOOTHE/FS SCALP) 0.01 % OIL oil, Apply topically  daily as instructed., Disp: 118.28 mL, Rfl: 0     fluticasone (FLONASE) 50 MCG/ACT nasal spray, Spray 1-2 sprays into both nostrils daily, Disp: 16 g, Rfl: 2     IBUPROFEN PO, Take 800 mg by mouth 3 times daily as needed, Disp: , Rfl:      naloxone (NARCAN) 4 MG/0.1ML nasal spray, Spray 1 spray (4 mg) into one nostril alternating nostrils as needed for opioid reversal, Disp: 2 each, Rfl: 0     triamcinolone (KENALOG) 0.1 % external cream, Apply topically 2 times daily, Disp: 464 g, Rfl: 3      Allergies:   Vicodin  Chicken-Derived Products        Hydrocodone        Past Medical History:  Boil   Hidradenitis suppurativa  Tobacco use disorder  Patellofemoral arthritis of left knee  Eczema     Past Surgical History:  Irrigation and debridement rectum, combined 11/14/2019  Meniscus repair     Family History:    The patient's family history includes Diabetes in his father; Hypertension in his mother.     Social History:  The patient reports that he has been smoking. He has been smoking about 0.50 packs per day. He has never used smokeless tobacco. He reports current alcohol use. He reports current drug use. Drug: Marijuana.   PCP: Lillian Webb  Marital Status: Single     O:   Physical exam:     General: Well-appearing male in no distress. Alert and oriented.   Skin: Focused exam of the hands, hands, chest, back, head, neck, lower legs and feet was performed.   Findings -   - inflammatory nodule left inguinal fold   - Inflammatory resolving papule in suprapubic region  - Well-demarcated hyperpigmented plaque on rt lateal hiup  - hyperkeratotic papule on the left lateral  fifth digit     Labs:   Reviewed.     A/P:  # Hidradenitis suppurativa (s/p 9 surgeries involving bilateral axilla, buttock, and groins being the last surgery 8/7/19):  Prefers to have these excised when they arise.  Reports increased flares with inflammatory nodules on the groin. Does not want to do a punch excision today due to birthday party this  week, but open to at next appointment in August  - Increase dapsone to 200 mg daily   - Labs today, in 2 weeks and 1 mon  - Will return in a few weeks for possible punch excision of groin lesion     # Corn  - s/p corn removal    # Psoriasiform dermatitis  - Likely psoriasis, came on after viral infection. Also considered reactive arthritis. Now with minimal involvement on feet.   - Did have a plaque on left lateral thigh that may havebeen a psoriasis plaque. Seemed to be resolving. Pt can use the clobetasol ointment on thsi as well.  - Clobetasol ointment twice day to the hands and feet PRN    # Atopic dermatitis/Seborrheic dermatitis  Continue current topical regimen as needed  - Triamcinolone cream and fluocinolone oil twice PRN    Follow-up: August 20, 2020      Basia Evangelista MD  Dermatology Resident, PGY2    Staff Physician Comments:   I saw and evaluated the patient with the resident and I agree with the assessment and plan.  I was present for the examination.    Apolinar Ruiz MD, FAAD    Departments of Internal Medicine and Dermatology  [facility]  [phone]

## 2020-07-16 NOTE — NURSING NOTE
Dermatology Rooming Note    Bernard Padilla's goals for this visit include:   Chief Complaint   Patient presents with     Derm Problem     Bernard is following up for HS, states he's flaring in his groin area, states it started last week.       Dejah Wilkinson LPN

## 2020-07-16 NOTE — PATIENT INSTRUCTIONS
Follow up appointment in epic.      Hidradenitis suppurativa:   -- increase your dapsone dose to 200 mg daily   -- Next appointment consider punch excision of lesions of the groin     Corn excision:   -- Apply vaseline to area with bandage covering

## 2020-07-16 NOTE — PROGRESS NOTES
[facility]    Dermatology Problem List:   1. Hidradenitis suppuritiva              - Diagnosed in 2016 and did not respond to antibiotics.               - Seen by Dr. Bird and underwent 9 procedures               -  mg BID - start 9/19, now discontinued              - Increased dapsone to 200 mg daily (started 11/21/19)  2. Acute onset hand foot psoriasiform dermatitis with joint pain, suspected reactive arthritis, now improving and almost resolved with minimal foot involvement              -  mg BID - started 9/19, now discontinued              - Lidex ointment at night, urea cream in the morning   3. Atopic dermatitis  4. Seborrheic dermatitis                        - DermaSmooth    Encounter Date: 07/16/2020   Date of Last Visit: 1/23/2019    S:  Bernard Padilla is a 34 year old male with a history including hidradenitis suppurativa (HS) who presents for follow-up. We last saw the patient on 1/23/2020 at which time, he was to continued on 100mg Dapsone.      He called to make this appointment because he has having a severe HS flare at this time.  He started having this flare up last week and had one about a month before that.  The flare is in the groin area.  It drains yellow fluid/pus.  He is still using the Dapsone 100 mg daily.      Background history from [facility] on 08/22/2019:   Diagnosed 2 years ago and did not respond to several trials of antibiotics. Lanced and packing however would recur. Derm surgeon (Dr. Bird from [facility]) performed 9 surgeries (removed sweat glands in bilateral axillas, bilateral groins and buttocks and now referred to Dr. Ruiz for further management. Groins done 8/7/19. Currently very painful at both groins at the surgical sight. Taking 30-40mg of percocet daily.      Today the patient reports that he has lesions of the groin and gluteals regions at current. He was on Doxycycline in the past for a long period of time before starting the surgical procedures and  the antibiotics for HS. He also reports having tried Bactrim in the past without relief. He reports new lesions around the surgical sites. He endorses having thinning hair. He denies being on a blood thinner.     He has a Hx of eczema. He reports rashes of the upper extremities which he treats with Fluocinonide. He also uses Dermasmooth and eye drops. He reports that he would like to try a new shampoo.   The patient also shares that from [id number] he had tried to quit smoking while in [employer]. He reports that the HS was unchanged during this time. The patient reports a Hx of skin cancer on his mother's side of the family and Diabetes on his father's side.     Review of Systems:   Pertinent items are noted in HPI or as below, remainder of complete ROS is negative.      Active Medications:     Current Outpatient Medications:      Acetaminophen (TYLENOL PO), , Disp: , Rfl:      albuterol (PROAIR HFA/PROVENTIL HFA/VENTOLIN HFA) 108 (90 Base) MCG/ACT inhaler, Inhale 2 puffs into the lungs every 6 hours, Disp: 8.5 g, Rfl: 3     cetirizine (ZYRTEC) 10 MG tablet, Take 1 tablet (10 mg) by mouth daily, Disp: 30 tablet, Rfl: 2     clindamycin (CLEOCIN T) 1 % external solution, Apply topically 2 times daily, Disp: 60 mL, Rfl: 2     clobetasol (TEMOVATE) 0.05 % external ointment, Apply to hands and feet twice a day, Disp: 60 g, Rfl: 3     dapsone (ACZONE) 100 MG tablet, Take 1 tablet (100 mg) by mouth daily, Disp: 30 tablet, Rfl: 3     fluocinolone acetonide (DERMA-SMOOTHE/FS BODY) 0.01 % external oil, Apply topically 2 times daily, Disp: 120 mL, Rfl: 1     Fluocinolone Acetonide Scalp (DERMA-SMOOTHE/FS SCALP) 0.01 % OIL oil, Apply topically daily as instructed., Disp: 118.28 mL, Rfl: 0     fluticasone (FLONASE) 50 MCG/ACT nasal spray, Spray 1-2 sprays into both nostrils daily, Disp: 16 g, Rfl: 2     IBUPROFEN PO, Take 800 mg by mouth 3 times daily as needed, Disp: , Rfl:      naloxone (NARCAN) 4 MG/0.1ML nasal spray,  Spray 1 spray (4 mg) into one nostril alternating nostrils as needed for opioid reversal, Disp: 2 each, Rfl: 0     triamcinolone (KENALOG) 0.1 % external cream, Apply topically 2 times daily, Disp: 464 g, Rfl: 3      Allergies:   Vicodin  Chicken-Derived Products        Hydrocodone        Past Medical History:  Boil   Hidradenitis suppurativa  Tobacco use disorder  Patellofemoral arthritis of left knee  Eczema     Past Surgical History:  Irrigation and debridement rectum, combined 11/14/2019  Meniscus repair     Family History:    The patient's family history includes Diabetes in his father; Hypertension in his mother.     Social History:  The patient reports that he has been smoking. He has been smoking about 0.50 packs per day. He has never used smokeless tobacco. He reports current alcohol use. He reports current drug use. Drug: Marijuana.   PCP: Lillian Webb  Marital Status: Single     O:   Physical exam:     General: Well-appearing male in no distress. Alert and oriented.   Skin: Focused exam of the hands, hands, chest, back, head, neck, lower legs and feet was performed.   Findings -   - inflammatory nodule left inguinal fold   - Inflammatory resolving papule in suprapubic region  - Well-demarcated hyperpigmented plaque on rt lateal hiup  - hyperkeratotic papule on the left lateral  fifth digit     Labs:   Reviewed.     A/P:  # Hidradenitis suppurativa (s/p 9 surgeries involving bilateral axilla, buttock, and groins being the last surgery 8/7/19):  Prefers to have these excised when they arise.  Reports increased flares with inflammatory nodules on the groin. Does not want to do a punch excision today due to birthday party this week, but open to at next appointment in August  - Increase dapsone to 200 mg daily   - Labs today, in 2 weeks and 1 mon  - Will return in a few weeks for possible punch excision of groin lesion     # Corn  - s/p corn removal    # Psoriasiform dermatitis  - Likely psoriasis, came on  after viral infection. Also considered reactive arthritis. Now with minimal involvement on feet.   - Did have a plaque on left lateral thigh that may havebeen a psoriasis plaque. Seemed to be resolving. Pt can use the clobetasol ointment on thsi as well.  - Clobetasol ointment twice day to the hands and feet PRN    # Atopic dermatitis/Seborrheic dermatitis  Continue current topical regimen as needed  - Triamcinolone cream and fluocinolone oil twice PRN    Follow-up: August 20, 2020      Basia Evangelista MD  Dermatology Resident, PGY2    Staff Physician Comments:   I saw and evaluated the patient with the resident and I agree with the assessment and plan.  I was present for the examination.    Apolinar Ruiz MD, FAAD    Departments of Internal Medicine and Dermatology  [facility]  [phone]

## 2020-07-20 ENCOUNTER — COMMUNICATION - HEALTHEAST (OUTPATIENT)
Dept: CARE COORDINATION | Facility: CLINIC | Age: 35
End: 2020-07-20

## 2020-07-23 ENCOUNTER — TELEPHONE (OUTPATIENT)
Dept: DERMATOLOGY | Facility: CLINIC | Age: 35
End: 2020-07-23

## 2020-07-23 NOTE — TELEPHONE ENCOUNTER
Called pt per Dr. Ruiz. Appt today not needed, sent message to clinic coordinators to have pt labs next week and a follow up with Dr. Ruiz scheduled on August 20th in Derm. Pt aware of appt cancelled for today and future appts.

## 2020-07-24 ENCOUNTER — COMMUNICATION - HEALTHEAST (OUTPATIENT)
Dept: NURSING | Facility: CLINIC | Age: 35
End: 2020-07-24

## 2020-07-31 ENCOUNTER — COMMUNICATION - HEALTHEAST (OUTPATIENT)
Dept: CARE COORDINATION | Facility: CLINIC | Age: 35
End: 2020-07-31

## 2020-08-10 ENCOUNTER — COMMUNICATION - HEALTHEAST (OUTPATIENT)
Dept: PHARMACY | Facility: CLINIC | Age: 35
End: 2020-08-10

## 2020-08-10 DIAGNOSIS — G89.4 CHRONIC PAIN SYNDROME: ICD-10-CM

## 2020-08-14 ENCOUNTER — HOSPITAL ENCOUNTER (OUTPATIENT)
Dept: PALLIATIVE MEDICINE | Facility: OTHER | Age: 35
Discharge: HOME | End: 2020-08-14
Attending: ANESTHESIOLOGY

## 2020-08-14 DIAGNOSIS — G89.4 CHRONIC PAIN SYNDROME: ICD-10-CM

## 2020-08-17 ENCOUNTER — COMMUNICATION - HEALTHEAST (OUTPATIENT)
Dept: PHARMACY | Facility: CLINIC | Age: 35
End: 2020-08-17

## 2020-08-18 ENCOUNTER — TELEPHONE (OUTPATIENT)
Dept: DERMATOLOGY | Facility: CLINIC | Age: 35
End: 2020-08-18

## 2020-08-18 DIAGNOSIS — L40.9 PSORIASIS: Primary | ICD-10-CM

## 2020-08-18 NOTE — TELEPHONE ENCOUNTER
Central Prior Authorization Team   Phone: [phone]      PA Initiation    Medication: clobetasol (TEMOVATE) 0.05 % external ointment - INITIATED  Insurance Company: [employer] Part D - Phone [phone] Fax [phone]  Pharmacy Filling the Rx: [facility] - [place], [place] - [address] [place] [address] STREET  Filling Pharmacy Phone: [phone]  Filling Pharmacy Fax: [phone]  Start Date: 8/18/2020

## 2020-08-18 NOTE — TELEPHONE ENCOUNTER
Prior Authorization Retail Medication Request    Medication/Dose: Fluocinolone 0.01% / Clobetasol 0.05%  ICD code (if different than what is on RX):  Same as rx   Previously Tried and Failed:  N/A   Rationale:  N/A    Insurance Name:  [facility] Part D   Insurance ID:  [id number]      Pharmacy Information (if different than what is on RX)  Name:  [facility]  Phone:  [phone]

## 2020-08-18 NOTE — TELEPHONE ENCOUNTER
Central Prior Authorization Team   Phone: [phone]      New encounter created for clobetasol (TEMOVATE) 0.05 % external ointment.      PA Initiation    Medication: fluocinolone acetonide (DERMA-SMOOTHE/FS BODY) 0.01 % external oil - INITIATED  Insurance Company: [employer] Part D - Phone [phone] Fax [phone]  Pharmacy Filling the Rx: [facility] - [place], [place] - [address] [place] [address]  Filling Pharmacy Phone: [phone]  Filling Pharmacy Fax: [phone]  Start Date: 8/18/2020

## 2020-08-19 NOTE — TELEPHONE ENCOUNTER
Central Prior Authorization Team   Phone: [phone]      PRIOR AUTHORIZATION DENIED    Medication: clobetasol (TEMOVATE) 0.05 % external ointment - DENIED    Denial Date: 8/19/2020    Denial Rational:     Appeal Information:

## 2020-08-19 NOTE — TELEPHONE ENCOUNTER
Central Prior Authorization Team   Phone: [phone]      Additional information requested, completed and faxed back to insurance @ [phone].

## 2020-08-20 ENCOUNTER — COMMUNICATION - HEALTHEAST (OUTPATIENT)
Dept: INTERNAL MEDICINE | Facility: CLINIC | Age: 35
End: 2020-08-20

## 2020-08-20 DIAGNOSIS — G89.29 OTHER CHRONIC PAIN: ICD-10-CM

## 2020-08-20 RX ORDER — BETAMETHASONE DIPROPIONATE 0.5 MG/G
OINTMENT, AUGMENTED TOPICAL 2 TIMES DAILY
Qty: 50 G | Refills: 3 | Status: SHIPPED | OUTPATIENT
Start: 2020-08-20 | End: 2020-10-29

## 2020-08-20 NOTE — TELEPHONE ENCOUNTER
Central Prior Authorization Team   Phone: [phone]      PRIOR AUTHORIZATION DENIED    Medication: fluocinolone acetonide (DERMA-SMOOTHE/FS BODY) 0.01 % external oil - DENIED    Denial Date: 8/19/2020    Denial Rational:     Appeal Information:

## 2020-08-21 ENCOUNTER — AMBULATORY - HEALTHEAST (OUTPATIENT)
Dept: PALLIATIVE MEDICINE | Facility: OTHER | Age: 35
End: 2020-08-21

## 2020-08-24 ENCOUNTER — AMBULATORY - HEALTHEAST (OUTPATIENT)
Dept: PALLIATIVE MEDICINE | Facility: OTHER | Age: 35
End: 2020-08-24

## 2020-08-26 ENCOUNTER — TELEPHONE (OUTPATIENT)
Dept: DERMATOLOGY | Facility: CLINIC | Age: 35
End: 2020-08-26

## 2020-08-26 NOTE — TELEPHONE ENCOUNTER
Called and spoke with pt, he is flaring, one on buttocks and one on groin area. He feels like he needs to have them removed.  He does not want to go to the ER because all they will do is elin them, and he wants them removed. He cannot make it to clinic tomorrow, but he can do a telephone call tomorrow am with Dr. Ruiz. He would like to discuss getting the abscesses removed and seeing what can be done.      Lashae Young RN  Rheumatology Clinic

## 2020-08-26 NOTE — TELEPHONE ENCOUNTER
[facility] Call Center    Phone Message    May a detailed message be left on voicemail: yes     Reason for Call: Symptoms or Concerns     If patient has red-flag symptoms, warm transfer to triage line    Current symptom or concern: Pt called and said that he is flaring up and is in a lot of pain. Pt is trying not to go to the ER but might have to if it gets worst. Pt request a call back to discuss what he should do. Thanks    Symptoms have been present for:  1 week(s)    Has patient previously been seen for this? Yes    By : Dr. Ruiz    Date: 7/16/20    Are there any new or worsening symptoms? Yes: see above       Action Taken: Message routed to:  [facility] DERM    Travel Screening: Not Applicable

## 2020-08-27 ENCOUNTER — RECORDS - HEALTHEAST (OUTPATIENT)
Dept: ADMINISTRATIVE | Facility: OTHER | Age: 35
End: 2020-08-27

## 2020-08-27 ENCOUNTER — VIRTUAL VISIT (OUTPATIENT)
Dept: DERMATOLOGY | Facility: CLINIC | Age: 35
End: 2020-08-27
Attending: DERMATOLOGY
Payer: MEDICARE

## 2020-08-27 DIAGNOSIS — L73.2 HIDRADENITIS SUPPURATIVA: Primary | ICD-10-CM

## 2020-08-27 DIAGNOSIS — L20.89 OTHER ATOPIC DERMATITIS: ICD-10-CM

## 2020-08-27 RX ORDER — FLUOCINONIDE TOPICAL SOLUTION USP, 0.05% 0.5 MG/ML
SOLUTION TOPICAL 2 TIMES DAILY
Qty: 60 ML | Refills: 3 | Status: SHIPPED | OUTPATIENT
Start: 2020-08-27 | End: 2021-02-11

## 2020-08-27 ASSESSMENT — PAIN SCALES - GENERAL: PAINLEVEL: EXTREME PAIN (9)

## 2020-08-27 NOTE — PROGRESS NOTES
"Bernard Padilla is a 35 year old male who is being evaluated via a billable telephone visit.      The patient has been notified of following:     \"This telephone visit will be conducted via a call between you and your physician/provider. We have found that certain health care needs can be provided without the need for a physical exam.  This service lets us provide the care you need with a short phone conversation.  If a prescription is necessary we can send it directly to your pharmacy.  If lab work is needed we can place an order for that and you can then stop by our lab to have the test done at a later time.    Telephone visits are billed at different rates depending on your insurance coverage. During this emergency period, for some insurers they may be billed the same as an in-person visit.  Please reach out to your insurance provider with any questions.    If during the course of the call the physician/provider feels a telephone visit is not appropriate, you will not be charged for this service.\"    Patient has given verbal consent for Telephone visit?  Yes    What phone number would you like to be contacted at? [phone]    How would you like to obtain your AVS?Mail a copy    Phone call duration: 20min  Start 9:42 minutes  End 10:02am    Apolinar Ruiz MD      "
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
## 2020-08-27 NOTE — PATIENT INSTRUCTIONS
Increase dapsone to 200mg a day  Labs in 2 weeks with phone call  Referred to plastic surgery (Dr Maldonado)  Clobetasol ointment for hands and feet twice a day as needed  Triamcinolone cream twice a day to rash on body  Lidex soltion twice a day as needed for scalp  I m going to write a letter to try to get your insurance to cover the oil
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
## 2020-08-28 ENCOUNTER — COMMUNICATION - HEALTHEAST (OUTPATIENT)
Dept: CARE COORDINATION | Facility: CLINIC | Age: 35
End: 2020-08-28

## 2020-08-31 ENCOUNTER — COMMUNICATION - HEALTHEAST (OUTPATIENT)
Dept: NURSING | Facility: CLINIC | Age: 35
End: 2020-08-31

## 2020-08-31 NOTE — TELEPHONE ENCOUNTER
Central Prior Authorization Team   Phone: [phone]      Medication Appeal Initiation    We have initiated an appeal for the requested medication:  Medication: fluocinolone acetonide (DERMA-SMOOTHE/FS BODY) 0.01 % external oil - APPEAL INITIATED  Appeal Start Date:  8/31/2020  Insurance Company: [employer] Part D - Phone [phone] Fax [phone]  Comments:  Appeal initiated with letter of medical necessity.    Manually faxed to insurance @ [phone].

## 2020-08-31 NOTE — TELEPHONE ENCOUNTER
Central Prior Authorization Team   Phone: [phone]      Fax is down - will re-try to submit appeal later.

## 2020-09-01 ENCOUNTER — COMMUNICATION - HEALTHEAST (OUTPATIENT)
Dept: NURSING | Facility: CLINIC | Age: 35
End: 2020-09-01

## 2020-09-01 ASSESSMENT — ACTIVITIES OF DAILY LIVING (ADL): DEPENDENT_IADLS:: CLEANING;COOKING;LAUNDRY

## 2020-09-01 NOTE — TELEPHONE ENCOUNTER
FUTURE VISIT INFORMATION      FUTURE VISIT INFORMATION:    Date: 9/8/20    Time: 8:30am    Location: [facility]  REFERRAL INFORMATION:    Referring provider:  Apolinar Ruiz MD     Referring providers clinic:  [facility]    Reason for visit/diagnosis  Hidradenitis suppurativa     RECORDS REQUESTED FROM:       Clinic name Comments Records Status Imaging Status   [facility] Virtual visit/referral 8/27/20 EPIC

## 2020-09-08 ENCOUNTER — RECORDS - HEALTHEAST (OUTPATIENT)
Dept: ADMINISTRATIVE | Facility: OTHER | Age: 35
End: 2020-09-08

## 2020-09-08 ENCOUNTER — PRE VISIT (OUTPATIENT)
Dept: SURGERY | Facility: CLINIC | Age: 35
End: 2020-09-08

## 2020-09-08 ENCOUNTER — OFFICE VISIT (OUTPATIENT)
Dept: PLASTIC SURGERY | Facility: CLINIC | Age: 35
End: 2020-09-08
Payer: COMMERCIAL

## 2020-09-08 VITALS — BODY MASS INDEX: 28.72 KG/M2 | HEIGHT: 67 IN | WEIGHT: 183 LBS

## 2020-09-08 DIAGNOSIS — L73.2 HIDRADENITIS SUPPURATIVA: Primary | ICD-10-CM

## 2020-09-08 RX ORDER — CEFAZOLIN SODIUM 2 G/50ML
2 SOLUTION INTRAVENOUS
Status: CANCELLED | OUTPATIENT
Start: 2020-09-08

## 2020-09-08 RX ORDER — CEFAZOLIN SODIUM 1 G/50ML
1 INJECTION, SOLUTION INTRAVENOUS SEE ADMIN INSTRUCTIONS
Status: CANCELLED | OUTPATIENT
Start: 2020-09-08

## 2020-09-08 ASSESSMENT — PAIN SCALES - GENERAL: PAINLEVEL: EXTREME PAIN (9)

## 2020-09-08 ASSESSMENT — MIFFLIN-ST. JEOR: SCORE: 1728.71
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
## 2020-09-08 NOTE — TELEPHONE ENCOUNTER
Central Prior Authorization Team   Phone: [phone]      MEDICATION APPEAL APPROVED    Medication: fluocinolone acetonide (DERMA-SMOOTHE/FS BODY) 0.01 % external oil - APPEAL APPROVED  Authorization Effective Date: 08/18/2020   Authorization Expiration Date: 12/31/2020   Approved Dose/Quantity: 120 FOR 30  Reference #: [id number]   Insurance Company: [employer] Part D - Phone [phone] Fax [phone]  Expected CoPay:       CoPay Card Available:      Foundation Assistance Needed:    Which Pharmacy is filling the prescription (Not needed for infusion/clinic administered): [facility] - [place], [place] - [address] [place] [address]    Verbal approval given by [facility] representative - confirmation letter sent via mail - call with any questions @ [phone].    Pharmacy notified and pt picked up.
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
## 2020-09-09 ENCOUNTER — COMMUNICATION - HEALTHEAST (OUTPATIENT)
Dept: INTERNAL MEDICINE | Facility: CLINIC | Age: 35
End: 2020-09-09

## 2020-09-11 ENCOUNTER — TELEPHONE (OUTPATIENT)
Dept: DERMATOLOGY | Facility: CLINIC | Age: 35
End: 2020-09-11

## 2020-09-11 NOTE — TELEPHONE ENCOUNTER
Unfortunately, residents cannot prescribe opioid pain medication. Routing to Dr. Ruiz to review on his return.

## 2020-09-11 NOTE — TELEPHONE ENCOUNTER
[facility] Call Center    Phone Message    May a detailed message be left on voicemail: yes     Reason for Call: Symptoms or Concerns     If patient has red-flag symptoms, warm transfer to triage line    Current symptom or concern: Pt called again to talk about his HS flaring.  Pain is becoming unbearable and Pt requests medication to help him cope with it.  Pt asks us to help and expresses it is urgent he get some relief.    Symptoms have been present for:  5+ month(s)    Has patient previously been seen for this? Yes    By Apolinar Ruiz MD    Date: 8/27/2020    Are there any new or worsening symptoms? No      Action Taken: Message routed to:  [facility] dermatology    Travel Screening: Not Applicable

## 2020-09-11 NOTE — TELEPHONE ENCOUNTER
DEVIN stating that I was calling to get some more information and advised clinic closes at 5 pm. Dr. Ruiz is not in office at this time. Advise to call back to discuss.     Bruna PRABHAKAR CMA

## 2020-09-14 NOTE — TELEPHONE ENCOUNTER
Spoke with Bernard and informed him that because he is a part of a pain management program, we are unable to prescribe pain medications. He needs to reach out to his pain management provider.

## 2020-09-14 NOTE — TELEPHONE ENCOUNTER
Pt called again to check if we were making any progress.  The pain is very difficult and he is considering going to ED for relief.

## 2020-09-16 ENCOUNTER — TELEPHONE (OUTPATIENT)
Dept: SURGERY | Facility: CLINIC | Age: 35
End: 2020-09-16

## 2020-09-16 ENCOUNTER — PATIENT OUTREACH (OUTPATIENT)
Dept: PLASTIC SURGERY | Facility: CLINIC | Age: 35
End: 2020-09-16

## 2020-09-16 DIAGNOSIS — L73.2 HIDRADENITIS SUPPURATIVA: Primary | ICD-10-CM

## 2020-09-16 DIAGNOSIS — Z11.59 ENCOUNTER FOR SCREENING FOR OTHER VIRAL DISEASES: Primary | ICD-10-CM

## 2020-09-16 NOTE — PATIENT INSTRUCTIONS
"Spoke with pt regarding upcoming surgery. Pt states he is in severe pain and right thigh lesion is swollen and tender. Pt states he does not want to have this \"lanced\" again as he feels the area will swell back up again. Recommended pt continue with Tylenol and Ibuprofen. Explained that his condition would be relayed to Dr. Maldonado and pt would be contacted if there is any earlier procedure availability. Darlene MTZ RNCC    "

## 2020-09-16 NOTE — TELEPHONE ENCOUNTER
Per Document only encounter from pain management on 8/24:  Chai Foster MD - 08/24/2020 5:53 PM CDT  Patient calls today, today he got in the mail to the after visit summary talk about the urine drug screen on Tuesday. He notes he was at the dentist on Tuesday. He goes by things in his calendar. He did not recall we talked about the urine drug during the visit last week.    I pointed out he did remember to go  his oxycodone on Monday. States he got a call from the pharmacy.    We discussed the urine drug screens are reported to obtain was requested. Given that he did not do so, discussed we make the transition reviewed and are feeding products. Reminded him that he has a new dermatologist for when things are going well. Discussed the hidradenitis supra T which should be less of a ongoing issue with a concern. Noted if he does have any surgery for his lesions the surgeon can give him postoperative pain management as seems appropriate. Chronic pain perspective we would look at optimizing management of the been of treatment.

## 2020-09-16 NOTE — TELEPHONE ENCOUNTER
Surgery is scheduled with Dr. Maldonado on 9/25 at [facility].  Scheduled per MD.    H&P: to be completed by PAC.  PAC: 9/23  POST-OP: 10/1, 10/13    The surgery packet was provided via letter in the mail.    COVID test has been scheduled.    They are aware that they will  get their finalized times at their appointment with PAC.    I contacted the patient via phone and spoke with the patient to confirm the scheduled dates. Letter also sent.      Patient also noted he is in immense pain and Tylenol and Ibuprofen has not been helping. He has not been able to walk/lay down/ use the restroom without being in pain. He has attempted to reach out to his pain management doctor but has not been able to reach the physician. He also tried to reach out to his PCP and was unable to reach them. Sent high priority staff message to team to follow up with the patient regarding his pain.

## 2020-09-16 NOTE — TELEPHONE ENCOUNTER
Pt called regarding below. Pt was wondering if Dr. Ruiz can call him to discuss this.  Pt said he is in a lot of pain. Please call him back.

## 2020-09-16 NOTE — TELEPHONE ENCOUNTER
"      Per pain management encounter from 8/14:  End of 8/18. Patient did not show for scheduled urine drug test. Message left to the pharmacy to see if he picked up the oxycodone as this would be considered a failed urine test    Addendum 8/19 I called patient, he indicated the urine drug screen slipped his mind. He was busy with his daughter's birthday. He did  the prescription for the oxycodone 8/17. I reviewed with him that given that he did not show for the scheduled urine drug screen, which I confirmed with my nursing staff they have her as discussed on the phone, that this would be the time we will transition from oxycodone to the option of Butorphic product such as Suboxone. He stated he recalls being on that before was not helpful. States he needs his oxycodone to \"get around\" for chronic pain. He indicated he would ask his primary care provider from this point to continue. I indicated that is certainly appropriate for him to look into. Reviewed given he has a new dermatologist with new treatments, and he had noted he had only one new lesion instead of 6 or 7, that this was positive news for future management of his hidradenitis supra T. Jeannette.    This note has been dictated using voice recognition software. Any grammatical or context distortions are unintentional and inherent to the software    Electronically signed by Chai Foster MD at 08/19/2020 4:23 PM CDT      "

## 2020-09-16 NOTE — TELEPHONE ENCOUNTER
FUTURE VISIT INFORMATION      SURGERY INFORMATION:    Date: 20    Location: [facility] OR    Surgeon:  Maura Maldonado MD     Anesthesia Type:  General    Procedure: EXCISION, HIDRADENITIS - right medial thigh, left groin and posterior scrotum.    Consult: ov     RECORDS REQUESTED FROM:       Primary Care Provider: Budd, Jennifer, DO - [facility]    Most recent EKG+ Tracin19- [facility]    Most recent ECHO: 18- [facility]

## 2020-09-17 ENCOUNTER — VIRTUAL VISIT (OUTPATIENT)
Dept: DERMATOLOGY | Facility: CLINIC | Age: 35
End: 2020-09-17
Payer: COMMERCIAL

## 2020-09-17 ENCOUNTER — RECORDS - HEALTHEAST (OUTPATIENT)
Dept: ADMINISTRATIVE | Facility: OTHER | Age: 35
End: 2020-09-17

## 2020-09-17 DIAGNOSIS — L73.2 HIDRADENITIS SUPPURATIVA: Primary | ICD-10-CM

## 2020-09-17 RX ORDER — CLINDAMYCIN HCL 300 MG
300 CAPSULE ORAL 2 TIMES DAILY
Qty: 180 CAPSULE | Refills: 0 | Status: SHIPPED | OUTPATIENT
Start: 2020-09-17 | End: 2022-04-27

## 2020-09-17 RX ORDER — RIFAMPIN 300 MG/1
300 CAPSULE ORAL DAILY
Qty: 180 CAPSULE | Refills: 0 | Status: SHIPPED | OUTPATIENT
Start: 2020-09-17 | End: 2020-09-23

## 2020-09-17 ASSESSMENT — PATIENT HEALTH QUESTIONNAIRE - PHQ9: SUM OF ALL RESPONSES TO PHQ QUESTIONS 1-9: 3

## 2020-09-17 ASSESSMENT — PAIN SCALES - GENERAL: PAINLEVEL: EXTREME PAIN (9)

## 2020-09-17 NOTE — PROGRESS NOTES
Dermatology Phone Visit: Phone Number:[phone]    Dermatology Problem List:  1. Hidradenitis suppuritiva              - Diagnosed in 2016 and did not respond to antibiotics.               - Seen by Dr. Bird and underwent 9 procedures               -  mg BID - start , now discontinued              - Increased dapsone to 200 mg daily (started 19)  2. Acute onset hand foot psoriasiform dermatitis with joint pain, suspected reactive arthritis, now improving and almost resolved with minimal foot involvement              -  mg BID - started , now discontinued              - Lidex ointment at night, urea cream in the morning   3. Atopic dermatitis  4. Seborrheic dermatitis                        - [facility]    Patient opted to conduct today's return visit via telephone vs an in person visit to the clinic.    Encounter Date: Sep 17, 2020    Chief complaint:   Chief Complaint   Patient presents with     Derm Problem     HS follow up, flaring inner thighs     I spoke with: Bernard Padilla    The reason for the telephone visit was:   HS flare    Pertinent history and review of systems:  Pt has been flaring off and on in left thigh and also having a new flare on the rt.  He increased dapsone two weeks ago. Will be having surgery with Dr Maldonado on 20.     Assessment/Advice/instructions given to patient/guardian including prescriptions, follow up appointment or orders for diagnostic testin) HS flare  - Pt is flaring and in significant pain  - Has gabapentin but doesn't find it helpful  - Sees Dr Foster for pain management and has contract with him--> spoke to him this evening  - Surgery is planned for . Doing the dapsone currently.   - Clindamycin/Rifampin BID for 12 weeks    RTC in 4 weeks via phone    Phone call contact time: 14min  Call Started at: 2:37  Call Ended at: 2:51      Staff only:    Apolinar Ruiz MD, FAAD    Departments of Internal Medicine and  Dermatology  [facility]  [phone]

## 2020-09-17 NOTE — PATIENT INSTRUCTIONS
[facility] Dermatology Visit    I spoke to Cristian. I recommend following up with him. He has some good ideas for chronic pain management.   Clindamycin 300mg and Rifampin 300mg twice a day for 12 weeks  Continue dapsone 200mg daily    When should I call my doctor?    If you are worsening or not improving, please, contact us or seek urgent care as noted below.     Who should I call with questions (adults)?    [facility] (adult and pediatric): [phone]     [facility] (adult): [phone]    For urgent needs outside of business hours call the [facility] at [phone] and ask for the dermatology resident on call    If this is a medical emergency and you are unable to reach an ER, Call 911      Who should I call with questions (pediatric)?  [facility]- Pediatric Dermatology  Dr. Aleida Potter, Dr. Edmundo Clemons, Dr. Salma Casillas, Denice Juarez, PA  Dr. Lillian Gordon, Dr. Lauar Whitaker & Dr. Nicholas Biswas  Non Urgent  Nurse Triage Line; [phone]- Madai and Guillermina RN Care Coordinators   Abbie (/Complex ) [phone]    If you need a prescription refill, please contact your pharmacy. Refills are approved or denied by our Physicians during normal business hours, Monday through Fridays  Per office policy, refills will not be granted if you have not been seen within the past year (or sooner depending on your child's condition)    Scheduling Information:  Pediatric Appointment Scheduling and Call Center [phone]  Radiology Scheduling- [phone]  Sedation Unit Scheduling- [phone]  [facility] Scheduling- General [phone]; Pediatric Dermatology [phone]  Main  Services: [phone]  Guinean: [phone]  Burundian: [phone]  Hmong/Kinyarwanda/American: [phone]  Preadmission Nursing Department Fax Number: [phone] (Fax all  pre-operative paperwork to this number)    For urgent matters arising during evenings, weekends, or holidays that cannot wait for normal business hours please call [phone] and ask for the Dermatology Resident On-Call to be paged.

## 2020-09-17 NOTE — NURSING NOTE
Chief Complaint   Patient presents with     Derm Problem     HS follow up, flaring inner thighs     Sandi Reeves, EMT

## 2020-09-21 PROBLEM — J30.9 ALLERGIC RHINITIS: Status: ACTIVE | Noted: 2018-08-16

## 2020-09-21 PROBLEM — J11.1 INFLUENZA: Status: ACTIVE | Noted: 2020-03-01

## 2020-09-21 PROBLEM — G89.29 OTHER CHRONIC PAIN: Status: ACTIVE | Noted: 2019-09-10

## 2020-09-22 DIAGNOSIS — Z11.59 ENCOUNTER FOR SCREENING FOR OTHER VIRAL DISEASES: ICD-10-CM

## 2020-09-22 LAB
SARS-COV-2 RNA SPEC QL NAA+PROBE: NOT DETECTED
SPECIMEN SOURCE: NORMAL

## 2020-09-22 PROCEDURE — U0003 INFECTIOUS AGENT DETECTION BY NUCLEIC ACID (DNA OR RNA); SEVERE ACUTE RESPIRATORY SYNDROME CORONAVIRUS 2 (SARS-COV-2) (CORONAVIRUS DISEASE [COVID-19]), AMPLIFIED PROBE TECHNIQUE, MAKING USE OF HIGH THROUGHPUT TECHNOLOGIES AS DESCRIBED BY CMS-2020-01-R: HCPCS | Performed by: SURGERY

## 2020-09-23 ENCOUNTER — COMMUNICATION - HEALTHEAST (OUTPATIENT)
Dept: NURSING | Facility: CLINIC | Age: 35
End: 2020-09-23

## 2020-09-23 ENCOUNTER — PRE VISIT (OUTPATIENT)
Dept: SURGERY | Facility: CLINIC | Age: 35
End: 2020-09-23

## 2020-09-23 ENCOUNTER — VIRTUAL VISIT (OUTPATIENT)
Dept: SURGERY | Facility: CLINIC | Age: 35
End: 2020-09-23
Payer: COMMERCIAL

## 2020-09-23 ENCOUNTER — ANESTHESIA EVENT (OUTPATIENT)
Dept: SURGERY | Facility: CLINIC | Age: 35
End: 2020-09-23
Payer: MEDICARE

## 2020-09-23 VITALS — WEIGHT: 180 LBS | HEIGHT: 66 IN | BODY MASS INDEX: 28.93 KG/M2

## 2020-09-23 DIAGNOSIS — Z01.818 PREOP EXAMINATION: Primary | ICD-10-CM

## 2020-09-23 RX ORDER — IPRATROPIUM BROMIDE AND ALBUTEROL SULFATE 2.5; .5 MG/3ML; MG/3ML
3 SOLUTION RESPIRATORY (INHALATION) ONCE
Status: CANCELLED | OUTPATIENT
Start: 2020-09-23 | End: 2020-09-23

## 2020-09-23 RX ORDER — LORATADINE 10 MG/1
10 TABLET ORAL DAILY
COMMUNITY
End: 2022-04-04

## 2020-09-23 ASSESSMENT — ENCOUNTER SYMPTOMS: SEIZURES: 0

## 2020-09-23 ASSESSMENT — PAIN SCALES - GENERAL: PAINLEVEL: WORST PAIN (10)

## 2020-09-23 ASSESSMENT — MIFFLIN-ST. JEOR: SCORE: 1699.22

## 2020-09-23 ASSESSMENT — LIFESTYLE VARIABLES: TOBACCO_USE: 1

## 2020-09-23 NOTE — ANESTHESIA PREPROCEDURE EVALUATION
"Anesthesia Pre-Procedure Evaluation    Patient: Bernard Padilla   MRN:     [id number] Gender:   male   Age:    35 year old :      1985        Preoperative Diagnosis: Hidradenitis suppurativa [L73.2]   Procedure(s):  EXCISION, HIDRADENITIS - right medial thigh, left groin and posterior scrotum.     LABS:  CBC:   Lab Results   Component Value Date    WBC 7.5 2019    WBC 7.8 2019    HGB 14.1 2019    HGB 14.4 2019    HCT 42.2 2019    HCT 43.5 2019     2019     2019     BMP:   Lab Results   Component Value Date     2019     2019    POTASSIUM 3.9 2019    POTASSIUM 3.8 2019    CHLORIDE 106 2019    CHLORIDE 108 2019    CO2 26 2019    CO2 26 2019    BUN 10 2019    BUN 7 2019    CR 0.90 2019    CR 0.79 2019    GLC 95 2019    GLC 85 2019     COAGS: No results found for: PTT, INR, FIBR  POC:   Lab Results   Component Value Date    BGM 85 2019     OTHER:   Lab Results   Component Value Date    LACT 0.6 (L) 2017    CHOLO 8.8 2019    ALBUMIN 3.8 2019    PROTTOTAL 7.4 2019    ALT 37 2019    AST 21 2019    ALKPHOS 112 2019    BILITOTAL 0.4 2019    TSH 0.95 2019        Preop Vitals    BP Readings from Last 3 Encounters:   20 133/82   19 112/60   19 (!) 145/87    Pulse Readings from Last 3 Encounters:   20 64   19 61   19 78      Resp Readings from Last 3 Encounters:   19 16   19 16   19 16    SpO2 Readings from Last 3 Encounters:   20 100%   19 97%   19 100%      Temp Readings from Last 1 Encounters:   20 98  F (36.7  C)    Ht Readings from Last 1 Encounters:   20 1.676 m (5' 6\")      Wt Readings from Last 1 Encounters:   20 81.6 kg (180 lb)    Estimated body mass index is 29.05 kg/m  as calculated from the following:    Height " "as of this encounter: 1.676 m (5' 6\").    Weight as of this encounter: 81.6 kg (180 lb).     LDA:        Past Medical History:   Diagnosis Date     Arthritis of knee      Boil      Chronic pain      Eczema      Hidradenitis suppurativa       Past Surgical History:   Procedure Laterality Date     IRRIGATION AND DEBRIDEMENT RECTUM, COMBINED N/A 11/14/2019    Procedure: IRRIGATION AND DEBRIDEMENT, RECTUM;  Surgeon: Yon Kenny MD;  Location: [facility] OR     ORTHOPEDIC SURGERY      meniscus repair      Allergies   Allergen Reactions     Vicodin [Hydrocodone-Acetaminophen] Shortness Of Breath     Chicken-Derived Products (Egg) Nausea and Vomiting and GI Disturbance     Hydrocodone Swelling     Other reaction(s): Throat Irritation  Tolerated oxycodone   Upset stomach          Anesthesia Evaluation     . Pt has had prior anesthetic.     History of anesthetic complications   - PONV  Mild nausea. 11/2019: \"dl X1 with Mac 3. anterior/stiff neck/ view of base of cords only\", 2nd attempt successful with glidescope      ROS/MED HX    ENT/Pulmonary: Comment: 20 pk yr hx    Uses albuterol when ascending stairs    (+)tobacco use, Current use 1/2 packs/day  , . .   (-) asthma and allergic rhinitis   Neurologic:  - neg neurologic ROS    (-) seizures and CVA   Cardiovascular:  - neg cardiovascular ROS   (+) ----. : . . . :. . Previous cardiac testing Echodate:12/2018results:date: results:ECG reviewed date:2/13/19 results: date: results:          METS/Exercise Tolerance: Comment: Has SOB when ascending stairs. Deconditioned, knee pain. Denies CP. 3 - Able to walk 1-2 blocks without stopping   Hematologic:  - neg hematologic  ROS      (-) history of blood clots and History of Transfusion   Musculoskeletal: Comment: Hx left knee surgery x3.    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    Two bulging discs in low back. Denies radiculopathy.        GI/Hepatic:  - neg GI/hepatic ROS      (-) GERD and liver " disease   Renal/Genitourinary:  - ROS Renal section negative       Endo:  - neg endo ROS    (-) Type II DM   Psychiatric:  - neg psychiatric ROS       Infectious Disease:  - neg infectious disease ROS       Malignancy:      - no malignancy   Other: Comment: Hx cocaine use.   (+) H/O Chronic Pain,                       PHYSICAL EXAM:   Mental Status/Neuro: A/A/O   Airway: Facies: Feasible  Mallampati: II  Mouth/Opening: Full  TM distance: > 6 cm  Neck ROM: Full   Respiratory: Auscultation: CTAB     Resp. Rate: Normal     Resp. Effort: Normal      CV: Rhythm: Regular  Rate: Age appropriate  Heart: Normal Sounds  Edema: None   Comments:      Dental: Details                  Assessment:   ASA SCORE: 2    H&P: History and physical reviewed and following examination; no interval change.   Smoking Status:  Non-Smoker/Unknown   NPO Status: NPO Appropriate     Plan:   Anes. Type:  General   Pre-Medication: None   Induction:  IV (Standard)   Airway: ETT; Oral   Access/Monitoring: PIV   Maintenance: Balanced     Advanced Monitoring: BIS     Postop Plan:   Postop Pain: Opioids  Postop Sedation/Airway: Not planned  Disposition: Outpatient     PONV Management:   Adult Risk Factors:, Non-Smoker, Postop Opioids   Prevention: Ondansetron, Dexamethasone, Propofol, No Volatiles     CONSENT: Direct conversation   Plan and risks discussed with: Patient          Comments for Plan/Consent:  Discussed risks of general anesthesia, including aspiration pneumonia, sore throat/hoarse voice, abrasions/damage to lips/tongue/teeth, nausea, rare complications (including medication reactions, cardiac, pulmonary).                PAC Discussion and Assessment    ASA Classification: 2 and 3  Case is suitable for: [facility]  Anesthetic techniques and relevant risks discussed: GA  Invasive monitoring and risk discussed: No  Types:   Possibility and Risk of blood transfusion discussed: No  NPO instructions given:   Additional anesthetic preparation and  risks discussed:   Needs early admission to pre-op area:   Other:     PAC Resident/NP Anesthesia Assessment:  Bernard Padilla is a 35 year old male scheduled to undergo EXCISION, HIDRADENITIS - right medial thigh, left groin and posterior scrotum with Yaneth Maldonado MD on 9/25/20 at [facility] for treatment of Hidradenitis suppurativa.    Pt has had prior anesthetic.     Mild nausea    He has the following specific operative considerations:   # ALLISON 1/8 = low risk  # VTE risk: 0.5%  # Risk of PONV score = 2.  If > 2, anti-emetic intervention recommended.  # Anesthesia considerations:  Refer to PAC assessment in anesthesia records    # Increased risk of postoperative nausea/vomiting: Recommend use of antiemetic agents in the perioperative period.        CARDIAC: METS 3,  Has SOB when ascending stairs. Deconditioned, knee pain. Denies CP     # RCRI : No serious cardiac risks.  0.4% risk of major adverse cardiac event.     #  Echo 12/2018:  EF 55%, Very mild     hypokinesis of distal anteroseptum and apex.      #  EKG 2/13/19: normal    PULMONARY:     # Current smoker, 20 pk yr hx, smokes 1/2 pk/day (cut down from 1.5 pk/day)    # Uses albuterol when ascending stairs. Will order Duoneb for DOS.    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      NEURO/PSYCH:     # Prior hx cocaine use    Patient is optimized and is acceptable candidate for the proposed procedure. No further diagnostic evaluation is needed.     ** Patient's visit was done virtually today due to the Covid 19 pandemic.  A full  physical exam was not completed.  Please refer to the physical examination documented by the anesthesiologist in the anesthesia record on the day of surgery. **      Reviewed and Signed by PAC Mid-Level Provider/Resident  Mid-Level Provider/Resident: Griselda Mosquera PA-C  Date: 9/23/20  Time: [facility]    Attending Anesthesiologist Anesthesia Assessment:        Anesthesiologist:   Date:   Time:   Pass/Fail:   Disposition:     PAC Pharmacist Assessment:        Pharmacist:   Date:   Time:    Griselda Mosquera PA-C

## 2020-09-23 NOTE — PATIENT INSTRUCTIONS
Preparing for Your Surgery      Name:  Bernard Padilla   MRN:  [id number]   :  1985   Today's Date:  2020       Arriving for surgery:  Surgery date:  20  Arrival time:  11:15 am    Restrictions due to COVID 19:  Patients are allowed one visitor in the pre-op period  All visitors must wear a mask  No visitors under 18  No ill visitors   parking is not available     Please come to:     [facility] [address]  [address] [place] [address]    -Enter through the [facility] entrance. If you prefer, park your car in the Green Lot.    -Proceed to the 3rd floor, check in at the Adult Surgery Waiting Lounge. [phone]    If an escort is needed stop at the Information Desk in the lobby. Inform the information person that you are here for surgery. An escort to the Adult Surgery Waiting Lounge will be provided.        What can I eat or drink?  -  You may eat and drink normally for up to 8 hours before your surgery. (Until 5:15 am)  -  You may have clear liquids until 2 hours before surgery. (Until 11:15 am)  Examples of clear liquids:  Water  Clear broth  Juices (apple, white grape, white cranberry  and cider) without pulp  Noncarbonated, powder based beverages  (lemonade and Ronal-Aid)  Sodas (Sprite, 7-Up, ginger ale and seltzer)  Coffee or tea (without milk or cream)  Gatorade    -  No Alcohol for at least 24 hours before surgery     Which medicines can I take?    Hold Aspirin for 7 days before surgery.   Hold Multivitamins for 7 days before surgery.  Hold Supplements for 7 days before surgery.  **Hold Ibuprofen (Advil, Motrin) for 1 day before surgery.**  Hold Naproxen (Aleve) for 4 days before surgery.    -  PLEASE TAKE these medications the day of surgery:  Albuterol (Proair) Inhaler - bring with you to surgery  Clindamycin (Cleocin)  Dapsone (Aczone)  Fluticasone (Flonase)      How do I prepare myself?  - Please take 2 showers before surgery  using Scrubcare or Hibiclens soap.    Use this soap only from the neck to your toes.     Leave the soap on your skin for one minute--then rinse thoroughly.      You may use your own shampoo and conditioner; no other hair products.   - Please remove all jewelry and body piercings.  - No lotions, deodorants or fragrance.   - Bring your ID and insurance card.    - All patients are required to have a Covid-19 test within 4 days of surgery/procedure.      -Patients will be contacted by the [facility] scheduling team within 1 week of surgery to make an appointment.      - Patients may call the Scheduling team at [phone] if they have not been scheduled within 4 days of  surgery.      ALL PATIENTS GOING HOME THE SAME DAY OF SURGERY ARE REQUIRED TO HAVE A RESPONSIBLE ADULT TO DRIVE AND BE IN ATTENDANCE WITH THEM FOR 24 HOURS FOLLOWING SURGERY     Questions or Concerns:    - For any questions regarding the day of surgery or your hospital stay, please contact the Pre Admission Nursing Office at [phone].       - If you have health changes between today and your surgery please call your surgeon.       For questions after surgery please call your surgeons office.

## 2020-09-23 NOTE — PROGRESS NOTES
"Bernard Padilla is a 35 year old male who is being evaluated via a billable video visit.      The patient has been notified of following:     \"This video visit will be conducted via a call between you and your physician/provider. We have found that certain health care needs can be provided without the need for an in-person physical exam.  This service lets us provide the care you need with a video conversation.  If a prescription is necessary we can send it directly to your pharmacy.  If lab work is needed we can place an order for that and you can then stop by our lab to have the test done at a later time.    Video visits are billed at different rates depending on your insurance coverage.  Please reach out to your insurance provider with any questions.    If during the course of the call the physician/provider feels a video visit is not appropriate, you will not be charged for this service.\"    Patient has given verbal consent for Video visit? Yes  How would you like to obtain your AVS? Mirthahart  If you are dropped from the video visit, the video invite should be resent to: Other e-mail: [employer]  Will anyone else be joining your video visit? No          Geo Rojo        "

## 2020-09-23 NOTE — H&P
Pre-Operative H & P     CC:  Preoperative exam to assess for increased cardiopulmonary risk while undergoing surgery and anesthesia.  Reason for Visit: Hidradenitis suppurativa    VIDEO-VISIT DETAILS    Type of service:  Video Visit    Patient verbally consented to video service today:  YES    Video Start Time: 0930  Video End Time (time video stopped): 0942    Originating Location (pt. Location): Other in parked car    Distant Location (provider location):  [facility] PREOPERATIVE ASSESSMENT [facility]    Mode of Communication:  Video Conference via [facility]  Bernard Padilla is a 34 y/o male who presents for pre-operative H&P in preparation for EXCISION, HIDRADENITIS - right medial thigh, left groin and posterior scrotum with Yaneth Maldonado MD on 9/25/20 at [facility] for treatment of Hidradenitis suppurativa.    Mr. Padilla has a history of Hidradenitis suppuritiva, diagnosed in 2016. It did not respond to antibiotics and his is s/p >10 procedures. Recently, he has been flaring off and on in left thigh and also has a new flare on the right. He now presents for the above procedure.    PMH is also significant for hx hand foot psoriasiform dermatitis with joint pain & suspected reactive arthritis, 20 pk yr smoking hx (smokes   pk/day) and hx cocaine use s/p right knee surgery x3.    History was obtained from patient & chart review.     Past Medical History  Past Medical History:   Diagnosis Date     Arthritis of knee      Boil      Chronic pain      Eczema      Hidradenitis suppurativa        Past Surgical History  Past Surgical History:   Procedure Laterality Date     IRRIGATION AND DEBRIDEMENT RECTUM, COMBINED N/A 11/14/2019    Procedure: IRRIGATION AND DEBRIDEMENT, RECTUM;  Surgeon: Yon Kenny MD;  Location: [facility] OR     ORTHOPEDIC SURGERY      meniscus repair       Hx of Blood transfusions/reactions: no     Hx of abnormal bleeding or anti-platelet use:  no    Menstrual history: No LMP for male patient.:      Steroid use in the last year: no    Personal or FH with difficulty with Anesthesia:  Mild nausea    Prior to Admission Medications  Current Outpatient Medications   Medication Sig Dispense Refill     albuterol (PROAIR HFA/PROVENTIL HFA/VENTOLIN HFA) 108 (90 Base) MCG/ACT inhaler Inhale 2 puffs into the lungs every 6 hours 8.5 g 3     clindamycin (CLEOCIN) 300 MG capsule Take 1 capsule (300 mg) by mouth 2 times daily 180 capsule 0     dapsone (ACZONE) 100 MG tablet Take 1 tablet (100 mg) by mouth daily 30 tablet 3     fluticasone (FLONASE) 50 MCG/ACT nasal spray Spray 1-2 sprays into both nostrils daily 16 g 2     IBUPROFEN PO Take 800 mg by mouth 3 times daily as needed (Last dose 9.19.2020)        loratadine (CLARITIN) 10 MG tablet Take 10 mg by mouth daily       augmented betamethasone dipropionate (DIPROLENE-AF) 0.05 % external ointment Apply topically 2 times daily 50 g 3     clindamycin (CLEOCIN T) 1 % external solution Apply topically 2 times daily 60 mL 2     clobetasol (TEMOVATE) 0.05 % external ointment Apply to hands and feet twice a day 60 g 3     fluocinolone acetonide (DERMA-SMOOTHE/FS BODY) 0.01 % external oil Apply topically 2 times daily 120 mL 1     Fluocinolone Acetonide Scalp (DERMA-SMOOTHE/FS SCALP) 0.01 % OIL oil Apply topically daily as instructed. 118.28 mL 0     fluocinonide (LIDEX) 0.05 % external solution Apply topically 2 times daily 60 mL 3     triamcinolone (KENALOG) 0.1 % external cream Apply topically 2 times daily 464 g 3       Allergies  Allergies   Allergen Reactions     Vicodin [Hydrocodone-Acetaminophen] Shortness Of Breath     Chicken-Derived Products (Egg) Nausea and Vomiting and GI Disturbance     Hydrocodone Swelling     Other reaction(s): Throat Irritation  Tolerated oxycodone   Upset stomach         Social History  Social History     Socioeconomic History     Marital status: Single     Spouse name: Not on file      Number of children: Not on file     Years of education: Not on file     Highest education level: Not on file   Occupational History     Not on file   Social Needs     Financial resource strain: Not on file     Food insecurity     Worry: Not on file     Inability: Not on file     Transportation needs     Medical: Not on file     Non-medical: Not on file   Tobacco Use     Smoking status: Current Every Day Smoker     Packs/day: 1.00     Years: 19.00     Pack years: 19.00     Smokeless tobacco: Never Used     Tobacco comment: 1/2 pack of day, down from 1.5 pk/day   Substance and Sexual Activity     Alcohol use: Yes     Comment: Beer occasionally      Drug use: Yes     Types: Marijuana     Comment: Occasional     Sexual activity: Not on file   Lifestyle     Physical activity     Days per week: Not on file     Minutes per session: Not on file     Stress: Not on file   Relationships     Social connections     Talks on phone: Not on file     Gets together: Not on file     Attends [employer] service: Not on file     Active member of club or organization: Not on file     Attends meetings of clubs or organizations: Not on file     Relationship status: Not on file     Intimate partner violence     Fear of current or ex partner: Not on file     Emotionally abused: Not on file     Physically abused: Not on file     Forced sexual activity: Not on file   Other Topics Concern     Parent/sibling w/ CABG, MI or angioplasty before 65F 55M? Not Asked   Social History Narrative     Not on file       Family History  Family History   Problem Relation Age of Onset     Hypertension Mother      Diabetes Father      Cancer No family hx of      Coronary Artery Disease No family hx of      Heart Disease No family hx of      Anesthesia Reaction No family hx of      Deep Vein Thrombosis No family hx of        LABS:  CBC:   Lab Results   Component Value Date    WBC 7.5 12/26/2019    WBC 7.8 12/05/2019    HGB 14.1 12/26/2019    HGB 14.4 12/05/2019     "HCT 42.2 12/26/2019    HCT 43.5 12/05/2019     12/26/2019     12/05/2019     BMP:   Lab Results   Component Value Date     12/05/2019     11/22/2019    POTASSIUM 3.9 12/05/2019    POTASSIUM 3.8 11/22/2019    CHLORIDE 106 12/05/2019    CHLORIDE 108 11/22/2019    CO2 26 12/05/2019    CO2 26 11/22/2019    BUN 10 12/05/2019    BUN 7 11/22/2019    CR 0.90 12/05/2019    CR 0.79 11/22/2019    GLC 95 12/05/2019    GLC 85 11/22/2019     COAGS: No results found for: PTT, INR, FIBR  POC:   Lab Results   Component Value Date    BGM 85 11/14/2019     OTHER:   Lab Results   Component Value Date    LACT 0.6 (L) 12/05/2017    CHOLO 8.8 12/05/2019    ALBUMIN 3.8 11/21/2019    PROTTOTAL 7.4 11/21/2019    ALT 37 11/21/2019    AST 21 11/21/2019    ALKPHOS 112 11/21/2019    BILITOTAL 0.4 11/21/2019    TSH 0.95 08/22/2019        Preop Vitals    BP Readings from Last 3 Encounters:   01/23/20 133/82   12/26/19 112/60   11/22/19 (!) 145/87    Pulse Readings from Last 3 Encounters:   01/23/20 64   12/26/19 61   11/22/19 78      Resp Readings from Last 3 Encounters:   11/22/19 16   11/14/19 16   08/05/19 16    SpO2 Readings from Last 3 Encounters:   01/23/20 100%   12/26/19 97%   11/22/19 100%      Temp Readings from Last 1 Encounters:   01/23/20 98  F (36.7  C)    Ht Readings from Last 1 Encounters:   09/23/20 1.676 m (5' 6\")      Wt Readings from Last 1 Encounters:   09/23/20 81.6 kg (180 lb)    Estimated body mass index is 29.05 kg/m  as calculated from the following:    Height as of this encounter: 1.676 m (5' 6\").    Weight as of this encounter: 81.6 kg (180 lb).         ROS/MED HX  The complete review of systems is negative other than noted in the HPI or here.  Patient denies recent illness, fever and respiratory infection during past month.  Pt denies steroid use during past year.    ENT/Pulmonary: Comment: 20 pk yr hx    Uses albuterol when ascending stairs    (+)tobacco use, Current use 1/2 packs/day  , . . " "  (-) asthma and allergi rhinitis   Neurologic:  - neg neurologic ROS    (-) seizures and CVA   Cardiovascular:  - neg cardiovascular ROS   (+) ----. : . . . :. . Previous cardiac testing Echodate:12/2018results:date: results:ECG reviewed date:2/13/19 results: date: results:          METS/Exercise Tolerance: Comment: Has SOB when ascending stairs. Deconditioned, knee pain. Denies CP. 3 - Able to walk 1-2 blocks without stopping   Hematologic:  - neg hematologic  ROS      (-) history of blood clots and History of Transfusion   Musculoskeletal: Comment: Hx left knee surgery x3.    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    Two bulging discs in low back. Denies radiculopathy.        GI/Hepatic:  - neg GI/hepatic ROS      (-) GERD and liver disease   Renal/Genitourinary:  - ROS Renal section negative       Endo:  - neg endo ROS    (-) Type II DM   Psychiatric:  - neg psychiatric ROS       Infectious Disease:  - neg infectious disease ROS       Malignancy:      - no malignancy   Other: Comment: Hx cocaine use.   (+) H/O Chronic Pain,               PHYSICAL EXAM:   Mental Status/Neuro: A/A/O; Age Appropriate   Airway:    Respiratory:    CV:    Comments:                                             180 lbs 0 oz  5' 6\"   Body mass index is 29.05 kg/m .    Physical Exam  Constitutional: Awake, alert, cooperative, no apparent distress, and appears stated age.  Neurologic: Awake, alert, oriented to name, place and time.   Neuropsychiatric: Calm, cooperative. Normal affect. Answers questions appropriately.    ** Patient's visit was done virtually today due to the Covid 19 pandemic.  A full physical exam was not completed.  Please refer to the physical examination documented by the anesthesiologist in the anesthesia record on the day of surgery. **    PRIOR LABS/DIAGNOSTIC STUDIES:  All labs and imaging personally reviewed    EKG 2/13/19  Sinus rhythm  Normal ECG  When compared with ECG of 11-DEC-2018 " 12:34,  No significant change was found  Ventricular rate 76 bpm    ECHOCARDIOGRAM 12/12/18    CONCLUSION:    Echo 2018-12-12 07:09.     Normal LV size and systolic function.  Estimated EF 55%.  Very mild     hypokinesis of distal anteroseptum and apex.     Normal RV size and function.     Mild LA dilatation.    No prior studies to directly compare.      Left Ventricular Ejection Fraction: 55 %      LABS 6/18/20  WBC  4.0 - 11.0 thou/uL  7.4     RBC  4.40 - 6.20 mill/uL  4.63     Hemoglobin  14.0 - 18.0 g/dL  14.4     Hematocrit  40.0 - 54.0 %  43.1     MCV  80 - 100 fL  93     MCH  27.0 - 34.0 pg  31.1     MCHC  32.0 - 36.0 g/dL  33.4     RDW  11.0 - 14.5 %  12.7     Platelets  140 - 440 thou/uL  233     MPV  8.5 - 12.5 fL  9.7     Resulting Agency   [facility]      LABS 5/22/20  Sodium  136 - 145 mmol/L  139     Potassium  3.5 - 5.0 mmol/L  4.4     Chloride  98 - 107 mmol/L  106     CO2  22 - 31 mmol/L  25     Anion Gap, Calculation  5 - 18 mmol/L  8     Glucose  70 - 125 mg/dL  86     BUN  8 - 22 mg/dL  9     Creatinine  0.70 - 1.30 mg/dL  0.87     GFR MDRD Af Amer  >60 mL/min/1.73m2  >60     GFR MDRD Non Af Amer  >60 mL/min/1.73m2  >60     Bilirubin, Total  0.0 - 1.0 mg/dL  0.4     Calcium  8.5 - 10.5 mg/dL  9.1     Protein, Total  6.0 - 8.0 g/dL  6.8     Albumin  3.5 - 5.0 g/dL  3.9     Alkaline Phosphatase  45 - 120 U/L  101     AST  0 - 40 U/L  23     ALT  0 - 45 U/L  27     Resulting Agency   [facility]        Labs today: None  COVID19 testing completed yesterday, virus not detected    Outside records reviewed from: Care Everywhere    ASSESSMENT and PLAN  Bernard Padilla is a 35 year old male scheduled to undergo EXCISION, HIDRADENITIS - right medial thigh, left groin and posterior scrotum with Yaneth Maldonado MD on 9/25/20 at [facility] for treatment of Hidradenitis suppurativa.    Pt has had prior anesthetic.     Mild nausea    He has the following  specific operative considerations:   # ALLISON 1/8 = low risk  # VTE risk: 0.5%  # Risk of PONV score = 2.  If > 2, anti-emetic intervention recommended.  # Anesthesia considerations:  Refer to PAC assessment in anesthesia records    # Increased risk of postoperative nausea/vomiting: Recommend use of antiemetic agents in the perioperative period.        CARDIAC: METS 3,  Has SOB when ascending stairs. Deconditioned, knee pain. Denies CP     # RCRI : No serious cardiac risks.  0.4% risk of major adverse cardiac event.     #  Echo 12/2018:  EF 55%, Very mild     hypokinesis of distal anteroseptum and apex.      #  EKG 2/13/19: normal    PULMONARY:     # Current smoker, 20 pk yr hx, smokes 1/2 pk/day (cut down from 1.5 pk/day)    # Uses albuterol when ascending stairs. Will order Duoneb for DOS.    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      NEURO/PSYCH:     # Prior hx cocaine use    Patient is optimized and is acceptable candidate for the proposed procedure. No further diagnostic evaluation is needed.     ** Patient's visit was done virtually today due to the Covid 19 pandemic.  A full physical exam was not completed.  Please refer to the physical examination documented by the anesthesiologist in the anesthesia record on the day of surgery. **    Arrival time, NPO, shower and medication instructions provided by nursing staff today.  Preparing For Your Surgery handout given.    Griselda Mosquera PA-C  Preoperative Assessment Center  [facility]  Clinic and Surgery Center  Phone: [phone]  Fax: [phone]

## 2020-09-25 ENCOUNTER — HOSPITAL ENCOUNTER (OUTPATIENT)
Facility: CLINIC | Age: 35
Discharge: HOME | End: 2020-09-25
Attending: SURGERY | Admitting: SURGERY
Payer: MEDICARE

## 2020-09-25 ENCOUNTER — ANESTHESIA (OUTPATIENT)
Dept: SURGERY | Facility: CLINIC | Age: 35
End: 2020-09-25
Payer: MEDICARE

## 2020-09-25 VITALS
DIASTOLIC BLOOD PRESSURE: 80 MMHG | BODY MASS INDEX: 27.92 KG/M2 | WEIGHT: 177.91 LBS | OXYGEN SATURATION: 98 % | HEIGHT: 67 IN | TEMPERATURE: 98.1 F | SYSTOLIC BLOOD PRESSURE: 119 MMHG | RESPIRATION RATE: 16 BRPM | HEART RATE: 71 BPM

## 2020-09-25 DIAGNOSIS — L73.2 HIDRADENITIS SUPPURATIVA: ICD-10-CM

## 2020-09-25 LAB — GLUCOSE BLDC GLUCOMTR-MCNC: 87 MG/DL (ref 70–99)

## 2020-09-25 PROCEDURE — 36000051 ZZH SURGERY LEVEL 2 1ST 30 MIN - UMMC: Performed by: SURGERY

## 2020-09-25 PROCEDURE — 25000125 ZZHC RX 250: Performed by: NURSE ANESTHETIST, CERTIFIED REGISTERED

## 2020-09-25 PROCEDURE — 25000128 H RX IP 250 OP 636: Performed by: SURGERY

## 2020-09-25 PROCEDURE — 36000053 ZZH SURGERY LEVEL 2 EA 15 ADDTL MIN - UMMC: Performed by: SURGERY

## 2020-09-25 PROCEDURE — 25000128 H RX IP 250 OP 636: Performed by: NURSE ANESTHETIST, CERTIFIED REGISTERED

## 2020-09-25 PROCEDURE — 25800030 ZZH RX IP 258 OP 636: Performed by: NURSE ANESTHETIST, CERTIFIED REGISTERED

## 2020-09-25 PROCEDURE — 40000170 ZZH STATISTIC PRE-PROCEDURE ASSESSMENT II: Performed by: SURGERY

## 2020-09-25 PROCEDURE — 37000008 ZZH ANESTHESIA TECHNICAL FEE, 1ST 30 MIN: Performed by: SURGERY

## 2020-09-25 PROCEDURE — 71000014 ZZH RECOVERY PHASE 1 LEVEL 2 FIRST HR: Performed by: SURGERY

## 2020-09-25 PROCEDURE — 82962 GLUCOSE BLOOD TEST: CPT

## 2020-09-25 PROCEDURE — 25000125 ZZHC RX 250: Performed by: PHYSICIAN ASSISTANT

## 2020-09-25 PROCEDURE — 25000125 ZZHC RX 250: Performed by: SURGERY

## 2020-09-25 PROCEDURE — 71000015 ZZH RECOVERY PHASE 1 LEVEL 2 EA ADDTL HR: Performed by: SURGERY

## 2020-09-25 PROCEDURE — 71000027 ZZH RECOVERY PHASE 2 EACH 15 MINS: Performed by: SURGERY

## 2020-09-25 PROCEDURE — 88305 TISSUE EXAM BY PATHOLOGIST: CPT | Performed by: SURGERY

## 2020-09-25 PROCEDURE — 88305 TISSUE EXAM BY PATHOLOGIST: CPT | Mod: 26 | Performed by: SURGERY

## 2020-09-25 PROCEDURE — 37000009 ZZH ANESTHESIA TECHNICAL FEE, EACH ADDTL 15 MIN: Performed by: SURGERY

## 2020-09-25 PROCEDURE — 27210794 ZZH OR GENERAL SUPPLY STERILE: Performed by: SURGERY

## 2020-09-25 RX ORDER — HYDROMORPHONE HYDROCHLORIDE 1 MG/ML
.3-.5 INJECTION, SOLUTION INTRAMUSCULAR; INTRAVENOUS; SUBCUTANEOUS EVERY 10 MIN PRN
Status: DISCONTINUED | OUTPATIENT
Start: 2020-09-25 | End: 2020-09-25 | Stop reason: HOSPADM

## 2020-09-25 RX ORDER — FENTANYL CITRATE 50 UG/ML
INJECTION, SOLUTION INTRAMUSCULAR; INTRAVENOUS PRN
Status: DISCONTINUED | OUTPATIENT
Start: 2020-09-25 | End: 2020-09-25

## 2020-09-25 RX ORDER — CEFAZOLIN SODIUM 1 G/3ML
INJECTION, POWDER, FOR SOLUTION INTRAMUSCULAR; INTRAVENOUS CONTINUOUS PRN
Status: DISCONTINUED | OUTPATIENT
Start: 2020-09-25 | End: 2020-09-25 | Stop reason: HOSPADM

## 2020-09-25 RX ORDER — IPRATROPIUM BROMIDE AND ALBUTEROL SULFATE 2.5; .5 MG/3ML; MG/3ML
3 SOLUTION RESPIRATORY (INHALATION) ONCE
Status: COMPLETED | OUTPATIENT
Start: 2020-09-25 | End: 2020-09-25

## 2020-09-25 RX ORDER — ONDANSETRON 2 MG/ML
4 INJECTION INTRAMUSCULAR; INTRAVENOUS EVERY 30 MIN PRN
Status: DISCONTINUED | OUTPATIENT
Start: 2020-09-25 | End: 2020-09-25 | Stop reason: HOSPADM

## 2020-09-25 RX ORDER — CEFAZOLIN SODIUM 2 G/100ML
2 INJECTION, SOLUTION INTRAVENOUS
Status: COMPLETED | OUTPATIENT
Start: 2020-09-25 | End: 2020-09-25

## 2020-09-25 RX ORDER — PROPOFOL 10 MG/ML
INJECTION, EMULSION INTRAVENOUS CONTINUOUS PRN
Status: DISCONTINUED | OUTPATIENT
Start: 2020-09-25 | End: 2020-09-25

## 2020-09-25 RX ORDER — ONDANSETRON 2 MG/ML
INJECTION INTRAMUSCULAR; INTRAVENOUS PRN
Status: DISCONTINUED | OUTPATIENT
Start: 2020-09-25 | End: 2020-09-25

## 2020-09-25 RX ORDER — BUPIVACAINE HYDROCHLORIDE AND EPINEPHRINE 2.5; 5 MG/ML; UG/ML
INJECTION, SOLUTION INFILTRATION; PERINEURAL PRN
Status: DISCONTINUED | OUTPATIENT
Start: 2020-09-25 | End: 2020-09-25 | Stop reason: HOSPADM

## 2020-09-25 RX ORDER — SODIUM CHLORIDE, SODIUM LACTATE, POTASSIUM CHLORIDE, CALCIUM CHLORIDE 600; 310; 30; 20 MG/100ML; MG/100ML; MG/100ML; MG/100ML
INJECTION, SOLUTION INTRAVENOUS CONTINUOUS PRN
Status: DISCONTINUED | OUTPATIENT
Start: 2020-09-25 | End: 2020-09-25

## 2020-09-25 RX ORDER — MEPERIDINE HYDROCHLORIDE 25 MG/ML
12.5 INJECTION INTRAMUSCULAR; INTRAVENOUS; SUBCUTANEOUS
Status: DISCONTINUED | OUTPATIENT
Start: 2020-09-25 | End: 2020-09-25 | Stop reason: HOSPADM

## 2020-09-25 RX ORDER — LIDOCAINE HYDROCHLORIDE 20 MG/ML
INJECTION, SOLUTION INFILTRATION; PERINEURAL PRN
Status: DISCONTINUED | OUTPATIENT
Start: 2020-09-25 | End: 2020-09-25

## 2020-09-25 RX ORDER — OXYCODONE HYDROCHLORIDE 5 MG/1
5 TABLET ORAL EVERY 4 HOURS PRN
Status: DISCONTINUED | OUTPATIENT
Start: 2020-09-25 | End: 2020-09-25 | Stop reason: HOSPADM

## 2020-09-25 RX ORDER — SODIUM CHLORIDE, SODIUM LACTATE, POTASSIUM CHLORIDE, CALCIUM CHLORIDE 600; 310; 30; 20 MG/100ML; MG/100ML; MG/100ML; MG/100ML
INJECTION, SOLUTION INTRAVENOUS CONTINUOUS
Status: DISCONTINUED | OUTPATIENT
Start: 2020-09-25 | End: 2020-09-25 | Stop reason: HOSPADM

## 2020-09-25 RX ORDER — NALOXONE HYDROCHLORIDE 0.4 MG/ML
.1-.4 INJECTION, SOLUTION INTRAMUSCULAR; INTRAVENOUS; SUBCUTANEOUS
Status: DISCONTINUED | OUTPATIENT
Start: 2020-09-25 | End: 2020-09-25 | Stop reason: HOSPADM

## 2020-09-25 RX ORDER — FENTANYL CITRATE 50 UG/ML
25-50 INJECTION, SOLUTION INTRAMUSCULAR; INTRAVENOUS
Status: DISCONTINUED | OUTPATIENT
Start: 2020-09-25 | End: 2020-09-25 | Stop reason: HOSPADM

## 2020-09-25 RX ORDER — CEFAZOLIN SODIUM 1 G/3ML
1 INJECTION, POWDER, FOR SOLUTION INTRAMUSCULAR; INTRAVENOUS SEE ADMIN INSTRUCTIONS
Status: DISCONTINUED | OUTPATIENT
Start: 2020-09-25 | End: 2020-09-25 | Stop reason: HOSPADM

## 2020-09-25 RX ORDER — ONDANSETRON 4 MG/1
4 TABLET, ORALLY DISINTEGRATING ORAL EVERY 30 MIN PRN
Status: DISCONTINUED | OUTPATIENT
Start: 2020-09-25 | End: 2020-09-25 | Stop reason: HOSPADM

## 2020-09-25 RX ORDER — DEXAMETHASONE SODIUM PHOSPHATE 4 MG/ML
INJECTION, SOLUTION INTRA-ARTICULAR; INTRALESIONAL; INTRAMUSCULAR; INTRAVENOUS; SOFT TISSUE PRN
Status: DISCONTINUED | OUTPATIENT
Start: 2020-09-25 | End: 2020-09-25

## 2020-09-25 RX ORDER — PROPOFOL 10 MG/ML
INJECTION, EMULSION INTRAVENOUS PRN
Status: DISCONTINUED | OUTPATIENT
Start: 2020-09-25 | End: 2020-09-25

## 2020-09-25 RX ORDER — OXYCODONE HYDROCHLORIDE 10 MG/1
5-10 TABLET ORAL EVERY 8 HOURS PRN
Qty: 21 TABLET | Refills: 0 | Status: SHIPPED | OUTPATIENT
Start: 2020-09-25 | End: 2021-12-06

## 2020-09-25 RX ORDER — KETAMINE HYDROCHLORIDE 10 MG/ML
INJECTION INTRAMUSCULAR; INTRAVENOUS PRN
Status: DISCONTINUED | OUTPATIENT
Start: 2020-09-25 | End: 2020-09-25

## 2020-09-25 RX ADMIN — MIDAZOLAM 2 MG: 1 INJECTION INTRAMUSCULAR; INTRAVENOUS at 13:27

## 2020-09-25 RX ADMIN — PROPOFOL 60 MG: 10 INJECTION, EMULSION INTRAVENOUS at 13:33

## 2020-09-25 RX ADMIN — PROPOFOL: 10 INJECTION, EMULSION INTRAVENOUS at 14:08

## 2020-09-25 RX ADMIN — SODIUM CHLORIDE, POTASSIUM CHLORIDE, SODIUM LACTATE AND CALCIUM CHLORIDE: 600; 310; 30; 20 INJECTION, SOLUTION INTRAVENOUS at 13:27

## 2020-09-25 RX ADMIN — ROCURONIUM BROMIDE 50 MG: 10 INJECTION INTRAVENOUS at 13:33

## 2020-09-25 RX ADMIN — HYDROMORPHONE HYDROCHLORIDE 1 MG: 1 INJECTION, SOLUTION INTRAMUSCULAR; INTRAVENOUS; SUBCUTANEOUS at 13:44

## 2020-09-25 RX ADMIN — IPRATROPIUM BROMIDE AND ALBUTEROL SULFATE 3 ML: .5; 3 SOLUTION RESPIRATORY (INHALATION) at 13:25

## 2020-09-25 RX ADMIN — FENTANYL CITRATE 100 MCG: 50 INJECTION, SOLUTION INTRAMUSCULAR; INTRAVENOUS at 13:31

## 2020-09-25 RX ADMIN — Medication 20 MG: at 13:34

## 2020-09-25 RX ADMIN — Medication 10 MG: at 14:55

## 2020-09-25 RX ADMIN — PROPOFOL 40 MG: 10 INJECTION, EMULSION INTRAVENOUS at 13:39

## 2020-09-25 RX ADMIN — FENTANYL CITRATE 50 MCG: 50 INJECTION, SOLUTION INTRAMUSCULAR; INTRAVENOUS at 14:10

## 2020-09-25 RX ADMIN — ONDANSETRON 4 MG: 2 INJECTION INTRAMUSCULAR; INTRAVENOUS at 14:59

## 2020-09-25 RX ADMIN — FENTANYL CITRATE 50 MCG: 50 INJECTION, SOLUTION INTRAMUSCULAR; INTRAVENOUS at 14:26

## 2020-09-25 RX ADMIN — ROCURONIUM BROMIDE 30 MG: 10 INJECTION INTRAVENOUS at 14:26

## 2020-09-25 RX ADMIN — SUGAMMADEX 200 MG: 100 INJECTION, SOLUTION INTRAVENOUS at 15:07

## 2020-09-25 RX ADMIN — PROPOFOL 160 MG: 10 INJECTION, EMULSION INTRAVENOUS at 13:32

## 2020-09-25 RX ADMIN — CEFAZOLIN 2 G: 10 INJECTION, POWDER, FOR SOLUTION INTRAVENOUS at 13:40

## 2020-09-25 RX ADMIN — DEXAMETHASONE SODIUM PHOSPHATE 6 MG: 4 INJECTION, SOLUTION INTRAMUSCULAR; INTRAVENOUS at 13:48

## 2020-09-25 RX ADMIN — LIDOCAINE HYDROCHLORIDE 60 MG: 20 INJECTION, SOLUTION INFILTRATION; PERINEURAL at 13:32

## 2020-09-25 RX ADMIN — PROPOFOL 40 MG: 10 INJECTION, EMULSION INTRAVENOUS at 13:36

## 2020-09-25 RX ADMIN — Medication 20 MG: at 14:26

## 2020-09-25 RX ADMIN — HYDROMORPHONE HYDROCHLORIDE 0.5 MG: 1 INJECTION, SOLUTION INTRAMUSCULAR; INTRAVENOUS; SUBCUTANEOUS at 14:56

## 2020-09-25 RX ADMIN — PROPOFOL 50 MG: 10 INJECTION, EMULSION INTRAVENOUS at 14:10

## 2020-09-25 RX ADMIN — PROPOFOL 150 MCG/KG/MIN: 10 INJECTION, EMULSION INTRAVENOUS at 13:32

## 2020-09-25 ASSESSMENT — MIFFLIN-ST. JEOR: SCORE: 1705.63

## 2020-09-25 NOTE — ANESTHESIA CARE TRANSFER NOTE
Patient: Bernard Padilla    Procedure(s):  EXCISION, HIDRADENITIS - right medial thigh, left groin and posterior scrotum.    Diagnosis: Hidradenitis suppurativa [L73.2]  Diagnosis Additional Information: No value filed.    Anesthesia Type:   General     Note:  Airway :Face Mask  Patient transferred to:PACU  Comments: Neuromuscular blockade reversed after TOF 4/4, spontaneous respirations, adequate tidal volumes, followed commands to voice, oropharynx suctioned with soft flexible catheter, extubated atraumatically, airway patent after extubation.  Oxygen via facemask at 6 liters per minute to PACU. Oxygen tubing connected to wall O2 in PACU, SpO2, NiBP, and EKG monitors and alarms on and functioning, report on patient's clinical status given to PACU RN, RN questions answered.   Handoff Report: Identifed the Patient, Identified the Reponsible Provider, Reviewed the pertinent medical history, Discussed the surgical course, Reviewed Intra-OP anesthesia mangement and issues during anesthesia, Set expectations for post-procedure period and Allowed opportunity for questions and acknowledgement of understanding      Vitals: (Last set prior to Anesthesia Care Transfer)    CRNA VITALS  9/25/2020 1449 - 9/25/2020 1523      9/25/2020             Resp Rate (observed): 10                Electronically Signed By: ROSEY Alcazar CRNA  September 25, 2020  3:23 PM

## 2020-09-25 NOTE — ANESTHESIA POSTPROCEDURE EVALUATION
Anesthesia POST Procedure Evaluation    Patient: Bernard Padilla   MRN:     [id number] Gender:   male   Age:    35 year old :      1985        Preoperative Diagnosis: Hidradenitis suppurativa [L73.2]   Procedure(s):  EXCISION, HIDRADENITIS - right medial thigh, left groin and posterior scrotum.   Postop Comments: No value filed.     Anesthesia Type: General       Disposition: Outpatient   Postop Pain Control: Uneventful            Sign Out: Well controlled pain   PONV: No   Neuro/Psych: Uneventful            Sign Out: Acceptable/Baseline neuro status   Airway/Respiratory: Uneventful            Sign Out: Acceptable/Baseline resp. status   CV/Hemodynamics: Uneventful            Sign Out: Acceptable CV status   Other NRE: NONE   DID A NON-ROUTINE EVENT OCCUR? No         Last Anesthesia Record Vitals:  CRNA VITALS  2020 1449 - 2020 1549      2020             Resp Rate (observed):  (!) 2          Last PACU Vitals:  Vitals Value Taken Time   /72 2020  4:30 PM   Temp 36.6  C (97.9  F) 2020  4:30 PM   Pulse 83 2020  4:30 PM   Resp 14 2020  4:30 PM   SpO2 97 % 2020  4:30 PM   Temp src     NIBP     Pulse     SpO2     Resp     Temp     Ht Rate     Temp 2     Vitals shown include unvalidated device data.      Electronically Signed By: Tulio Che MD, 2020, 5:44 PM

## 2020-09-25 NOTE — DISCHARGE INSTRUCTIONS
Caring for Your Incision    You ll need to care for your incision after surgery and certain medical procedures. To close an incision, your doctor used sutures (stitches), steri-strips, staples, or dermabond. Follow the tips on this sheet to help heal and prevent infection of your incision.   Types of Incision Closure:    Surgical Sutures (stitches) are placed by sewing the edges of an incision together with surgical thread. Sutures are either absorbable or non-absorbable. Absorbable sutures break down in the body over time. Non-absorbable sutures need to be removed.     Steri-strips are made of adhesive (sticky) material to help hold the edges of an incision together. Steri-strips usually fall off by themselves in 7 to 10 days.     Surgical Staples are made or steel or titanium. They are often used to close shallow incisions. They are not used on certain body areas, such as the face and hands. This is because these areas have nerves that are close to the surface. Staples are usually removed within a week.     Dermabond (skin glue) is used to close a cut or small incision. The skin glue is less painful than stitches (sutures). In some cases, a lower layer of skin may be sutured before Dermabond is applied. The skin glue closes the cut/incision within a few minutes. It also provides a water-resistant covering. No bandage is required. Dermabond peels off on its own within 5 to 10 days.  Home Care for Your  Incision:    Keep the incision clean and dry. You should bathe only as directed by the doctor. It is okay to wash around the incision, but don t spray water directly on it.     Check the incision site daily for pain, redness, drainage, swelling, or separation of the incision edges.     If you have a dressing over the incision, change the dressing as directed by the doctor.    Make sure any clothing that touches the incision is loose fitting. This will prevent rubbing. If the incision is on the head, avoid wearing  caps or other head coverings. These may rub against the incision.    Avoid rough play, contact sports, or physical activities. This can put you at risk of opening an incision.     As your incision heals, the skin may appear pink or red. It may also feel slightly bumpy or raised. This is called a healing ridge. Over time, the color should fade and the raised skin will become less noticeable.   Call the doctor right away if you have any of the following:    Increased pain, redness, drainage, swelling or bleeding at the incision site    Numbness, coldness or tingling around the incision site    Fever of 101 F degrees or higher  Rev. 4/2014  Same-Day Surgery   Adult Discharge Orders & Instructions     For 24 hours after surgery:  1. Get plenty of rest.  A responsible adult must stay with you for at least 24 hours after you leave the hospital.   2. Pain medication can slow your reflexes. Do not drive or use heavy equipment.  If you have weakness or tingling, don't drive or use heavy equipment until this feeling goes away.  3. Mixing alcohol and pain medication can cause dizziness and slow your breathing. It can even be fatal. Do not drink alcohol while taking pain medication.  4. Avoid strenuous or risky activities.  Ask for help when climbing stairs.   5. You may feel lightheaded.  If so, sit for a few minutes before standing.  Have someone help you get up.   6. If you have nausea (feel sick to your stomach), drink only clear liquids such as apple juice, ginger ale, broth or 7-Up.  Rest may also help.  Be sure to drink enough fluids.  Move to a regular diet as you feel able. Take pain medications with a small amount of solid food, such as toast or crackers, to avoid nausea.   7. A slight fever is normal. Call the doctor if your fever is over 100 F (37.7 C) (taken under the tongue) or lasts longer than 24 hours.  8. You may have a dry mouth, muscle aches, trouble sleeping or a sore throat.  These symptoms should go away  after 24 hours.  9. Do not make important or legal decisions.   Pain Management:      1. Take pain medication (if prescribed) for pain as directed by your physician.        2. WARNING: If the pain medication you have been prescribed contains Tylenol  (acetaminophen), DO NOT take additional doses of Tylenol (acetaminophen).     Call your doctor for any of the followin.  Signs of infection (fever, growing tenderness at the surgery site, severe pain, a large amount of drainage or bleeding, foul-smelling drainage, redness, swelling).    2.  It has been over 8 to 10 hours since surgery and you are still not able to urinate (pee).    3.  Headache for over 24 hours.    4.  Numbness, tingling or weakness the day after surgery (if you had spinal anesthesia).  To contact a doctor, call _____________________________________ or:      [phone] and ask for the Resident On Call for:          __________________________________________ (answered 24 hours a day)      Emergency Department:  [facility]: [phone]  [facility]: [phone]               Rev. 10/2014

## 2020-09-25 NOTE — BRIEF OP NOTE
[facility] Brief Operative Note    Pre-operative diagnosis: Hidradenitis suppurativa [L73.2]   Post-operative diagnosis Same as above   Procedure: Procedure(s):  EXCISION, HIDRADENITIS - right medial thigh, left groin and posterior scrotum.   Surgeon(s): Surgeon(s) and Role:     * Maura Maldonado MD - Primary     * Karen Duran PA-C - Resident - Assisting   Estimated blood loss: 10 mL    Specimens: ID Type Source Tests Collected by Time Destination   A : right medial thigh Tissue Groin SURGICAL PATHOLOGY EXAM Maura Maldonado MD 9/25/2020  2:21 PM    B : inferior left groin Tissue Groin SURGICAL PATHOLOGY EXAM Maura Maldonado MD 9/25/2020  2:22 PM    C : superior left groin Tissue Groin SURGICAL PATHOLOGY EXAM Maura Maldonado MD 9/25/2020  2:23 PM    D : posterior scrotum Tissue Scrotum SURGICAL PATHOLOGY EXAM Maura Maldonado MD 9/25/2020  2:41 PM       Findings: Excision of HS lesions to R thigh, L groin and posterior scrotum. Closed primarily. Tissue culture sent x4.

## 2020-09-25 NOTE — OR NURSING
Patient increasingly agitated throughout PACU stay. Remained friendly with staff. On his phone much of recovery, yelling and swearing to people he is talking to on the phone. Started showing non verbal signs of agitation. Security was called to the bedside to assist with de-escalating patient.     After speaking with him they escorted him down to his car to get his phone  and cigarettes, and then planned to escort him outside to meet his sister who will be taking him home.

## 2020-09-28 ENCOUNTER — TELEPHONE (OUTPATIENT)
Dept: NURSING | Facility: CLINIC | Age: 35
End: 2020-09-28

## 2020-09-28 NOTE — TELEPHONE ENCOUNTER
.  [facility]: Nurse Triage Note  SITUATION/BACKGROUND                                                      Bernard Paidlla is a 35 year old male s/p EXCISION, HIDRADENITIS - right medial thigh, left groin and posterior scrotum procedure.   Patient calls to report having large amount of tan drainage noted in under pants from incision. Area feels swollen. Unable to tell if site is open. This nurse reviewed post procedural AVS instructions.   Patient advised to keep area clean/ dry/ application of bandage to keep from rubbing against incision and loose pants. Routed to Dermatology as High Priority to review and follow up  Call.   In addition, he reports having increased pain, rates at 10/10. Patient requests a refill on oxycodone. Per AVS, patient will need to call Pain Clinic for refill on oxycodone. This nurse reiterated the need to contact pain clinic for refill. May take OTC for pain, if no improvement may need to seek ED care to aid in pain relief.

## 2020-09-29 ENCOUNTER — COMMUNICATION - HEALTHEAST (OUTPATIENT)
Dept: PALLIATIVE MEDICINE | Facility: OTHER | Age: 35
End: 2020-09-29

## 2020-09-29 LAB — COPATH REPORT: NORMAL

## 2020-09-29 NOTE — TELEPHONE ENCOUNTER
I spoke to Bernard Padilla and he says he already has a virtual appointment with Dr. Ruiz this week that he wants to keep as a telephone appointment. He states he is reaching out to Cristian Li, he says he is back in clinic tomorrow.    Dejah Wilkinson LPN

## 2020-09-29 NOTE — OP NOTE
Procedure Date: 09/25/2020      ATTENDING:  Maura Maldonado MD      ASSISTANT:  Karen Duran PA-C (no resident available.  ANMOL did 50% of the case).      PREOPERATIVE DIAGNOSIS:  Hidradenitis suppurativa of the right medial thigh, posterior scrotum and left inguinal groin crease.      POSTOPERATIVE DIAGNOSIS:  Hidradenitis suppurativa of the right medial thigh, posterior scrotum and left inguinal groin crease.      PROCEDURE:  Wide local excision of above-mentioned hidradenitis lesions.      ANESTHESIA:  GET.      ESTIMATED BLOOD LOSS:  10 mL      IV FLUIDS:  900 mL      URINE OUTPUT:  Not recorded.      COUNTS:  Correct.      COMPLICATIONS:  None.      DRAINS:  None.      SPECIMENS:  x 4, right medial thigh, inferior left groin, superior left groin and posterior scrotum.      INDICATIONS:  This is a 35-year-old -American gentleman who is a smoker, who was referred from Dermatology for possible excision of active hidradenitis lesions.  Unfortunately, this patient has issues with chronic pain and was let go by his previous pain clinic.  He has been taking Tylenol and nonsteroidal anti-inflammatories in the interim while he looks for a new clinic.  We felt that all the areas that were active for him could be addressed with a wide local excision and primary closure.      DESCRIPTION OF PROCEDURE:  The patient was seen in the preoperative waiting area.  The operative sites were marked with the assistance of the patient himself.  Informed consent was obtained after reviewing the possible risks and complications including but not limited to the following:  Infection, bleeding, hematoma, seroma formation, poor healing, possible dehiscence, possible recurrent disease, possible altered sensation in the surgical sites, possible hypertrophic scarring, possible injury surrounding neurovascular and musculoskeletal structures as well as genitourinary structures, and anesthetic risks such as DVT, PE and  cardiopulmonary arrest.  The patient was then brought to the operating room and placed supine on the OR table.  After general anesthesia was administered and the patient was intubated, the patient's lower extremities were placed in Yellofin stirrups.  This allowed us to gain access to all the areas at the same time.  Photos were taken.  The area was then prepped and draped in the usual sterile fashion using ChloraPrep.      After a timeout was taken and the proper patient and procedure were identified, 0.25% Marcaine with epinephrine was injected into each site and allowed to take effect for about 5-10 minutes.  We then sharply excised all lesions, taking great care not to injure any underlying vital structures.  The right medial thigh had a final length of 1.5 cm and this was a previously-lanced lesion that was no longer draining, but had some slight induration surrounding it.  The left inferior and superior groin lesions were excised separately for path purposes, but after closure were combined into 1 wound measuring 6 x 3 x 2 cm.  The spermatic cord was visible along the medial border of this wound where he had already had previous excisions and lots of scar tissue.  Also of note, there was a very large node in the superior left groin specimen.  The posterior scrotum was excised last and this was very close to the underlying scrotal contents.  We also inadvertently entered a portion of the lesion and there was iraida pus.  When this was completely excised, the defect measured 6 x 3 x 1 cm.  Closure was achieved for all wounds with 3-0 Monocryl, deep dermal and vertical mattress sutures.  Closure was done after irrigating with Ancef solution and making sure we had adequate hemostasis.  Essentially Mepilex was used for a Band-Aid on the right medial thigh, and then for the scrotum and the left groin areas, Kerlix fluff was used with a Depends diaper since the patient refused mesh panties.  The patient was then  extubated, transferred to a stretcher and taken to the recovery room in satisfactory condition, having tolerated the procedure without difficulty or complication.  The specimens were sent for permanent in formalin for histopathologic examination.         DASHA MCMILLAN MD             D: 2020   T: 2020   MT: BARRY      Name:     ZAHIRA MATUTE   MRN:      [id number]        Account:        [id number]   :      1985           Procedure Date: 2020      Document: [id number]

## 2020-09-30 ENCOUNTER — AMBULATORY - HEALTHEAST (OUTPATIENT)
Dept: PALLIATIVE MEDICINE | Facility: OTHER | Age: 35
End: 2020-09-30

## 2020-10-01 ENCOUNTER — NURSE TRIAGE (OUTPATIENT)
Dept: NURSING | Facility: CLINIC | Age: 35
End: 2020-10-01

## 2020-10-01 ENCOUNTER — RECORDS - HEALTHEAST (OUTPATIENT)
Dept: ADMINISTRATIVE | Facility: OTHER | Age: 35
End: 2020-10-01

## 2020-10-01 ENCOUNTER — VIRTUAL VISIT (OUTPATIENT)
Dept: DERMATOLOGY | Facility: CLINIC | Age: 35
End: 2020-10-01
Attending: DERMATOLOGY
Payer: MEDICARE

## 2020-10-01 DIAGNOSIS — L73.2 HIDRADENITIS SUPPURATIVA: Primary | ICD-10-CM

## 2020-10-01 PROCEDURE — 99442 PR PHYSICIAN TELEPHONE EVALUATION 11-20 MIN: CPT | Mod: 95 | Performed by: DERMATOLOGY

## 2020-10-01 RX ORDER — OXYCODONE AND ACETAMINOPHEN 5; 325 MG/1; MG/1
2 TABLET ORAL EVERY 8 HOURS PRN
Qty: 42 TABLET | Refills: 0 | Status: SHIPPED | OUTPATIENT
Start: 2020-10-01 | End: 2020-10-08

## 2020-10-01 RX ORDER — RIFAMPIN 300 MG/1
300 CAPSULE ORAL DAILY
COMMUNITY
Start: 2020-09-17 | End: 2021-07-11

## 2020-10-01 RX ORDER — ACETAMINOPHEN 325 MG/1
650 TABLET ORAL EVERY 4 HOURS PRN
COMMUNITY

## 2020-10-01 ASSESSMENT — PAIN SCALES - GENERAL: PAINLEVEL: WORST PAIN (10)

## 2020-10-01 NOTE — PROGRESS NOTES
Dermatology Phone Visit: Phone Number:[phone]     Dermatology Problem List:  1. Hidradenitis suppuritiva              - Diagnosed in 2016 and did not respond to antibiotics.               - Seen by Dr. Bird and underwent 9 procedures               -  mg BID - start , now discontinued              - Increased dapsone to 200 mg daily (started 19)  2. Acute onset hand foot psoriasiform dermatitis with joint pain, suspected reactive arthritis, now improving and almost resolved with minimal foot involvement              -  mg BID - started , now discontinued              - Lidex ointment at night, urea cream in the morning   3. Atopic dermatitis  4. Seborrheic dermatitis                        - DermaSmooth     Patient opted to conduct today's return visit via telephone vs an in person visit to the clinic.     Encounter Date: Sep 17, 2020     Chief complaint:   Chief Complaint   Patient presents with     Telephone     Follow-up Psoriasis      I spoke with: Bernard Padilla     The reason for the telephone visit was:   HS flare     Pertinent history and review of systems:  Pt had surgery on 20. He had excision of 3 sites, including scrotum requiring sutures. He is currently in severe pain.      Assessment/Advice/instructions given to patient/guardian including prescriptions, follow up appointment or orders for diagnostic testin) HS flare s/p exicisions now in severe post-op pain   - Patient has been working with Dr Foster, but did  not appropriately follow-up and his pain meds were  stopped. Dr Bermudez was trying to start him on  suboxone but the Bernard did not want to do that, so  he has not followed up. I told Bernard that I was very  uncomfortable with this entire situation and would  not order him chronic pain meds. I told him the  Importance of following up with Dr Foster and  explained suboxone and why I think it would be  helpful. Bernard verablized understanding. I  did agree  to one more week of pain meds post-op, but told  him I will not prescribe more. We also came up with  a plan to try smaller procedures for flares ratherthan   - Clindamycin/Rifampin BID for 12 weeks  - Dapsone 200mg daily     RTC in 4 weeks via phone     Phone call contact time: 20min  Call Started at: 1:00pm  Call Ended at: 1:20pm        Staff only:     Apolinar Ruiz MD, FAAD    Departments of Internal Medicine and Dermatology  [facility]  [phone]

## 2020-10-01 NOTE — TELEPHONE ENCOUNTER
On 9/25/2020 Pt had  EXCISION, HIDRADENITIS - right medial thigh, left groin and posterior scrotum.      Pt reporting he is having severe pain in the groin area.  Pt can hardly move.   Reddish/whitish colored drainage from the incision site, warm to touch, very tender and painful.    Pt ran out of pain medication a couple of days ago.  Pt has called his pain clinic. They told him to call his surgeon.    Pt has also been to the ER and told to call his surgeon for further assistance.    Please call Pt @ [phone] Or [phone] for further assistance.          Irina Izquierdo RN  Central Triage Red Flags/Med Refills    Additional Information    Negative: Major abdominal surgical incision and wound gaping open with visible internal organs    Negative: Sounds like a life-threatening emergency to the triager    Negative: Bleeding from incision and won't stop after 10 minutes of direct pressure    Negative: Widespread rash and bright red, sunburn-like    Severe pain in the incision    Protocols used: POST-OP INCISION SYMPTOMS AND [id number]

## 2020-10-01 NOTE — PROGRESS NOTES
"Bernard Padilla is a 35 year old male who is being evaluated via a billable telephone visit.      The patient has been notified of following:     \"This telephone visit will be conducted via a call between you and your physician/provider. We have found that certain health care needs can be provided without the need for a physical exam.  This service lets us provide the care you need with a short phone conversation.  If a prescription is necessary we can send it directly to your pharmacy.  If lab work is needed we can place an order for that and you can then stop by our lab to have the test done at a later time.    Telephone visits are billed at different rates depending on your insurance coverage. During this emergency period, for some insurers they may be billed the same as an in-person visit.  Please reach out to your insurance provider with any questions.    If during the course of the call the physician/provider feels a telephone visit is not appropriate, you will not be charged for this service.\"    Patient has given verbal consent for Telephone visit?  Yes    What phone number would you like to be contacted at? [phone]    How would you like to obtain your AVS? Mail a copy    Phone call duration: 20 minutes    Apolinar Ruiz MD      "

## 2020-10-01 NOTE — TELEPHONE ENCOUNTER
Spoke with pt regarding postoperative concerns.Pt states that groin incision is very painful. States this are has been draining scant sanguineous drainage. States this has not changed since surgery Denies any fevers or chills. Pt states he has been alternating between Tylenol and Ibuprofen. States his sister provided him with some of her pain medication yesterday. Explained that we can send a prescription for Tramadol for pt to utilize for severe breakthrough pain. Pt states this medication will not work. Explained that it is important to try this medication first. Pt became very agitated, stated he will be contacting pt relations and his  and ended the call abruptly. Darlene MTZ RNCC

## 2020-10-01 NOTE — LETTER
"10/1/2020       RE: Bernard Padilla  [address]  [place] [address]     Dear Colleague,    Thank you for referring your patient, Bernard Padilla, to the [facility] [place] at [facility]. Please see a copy of my visit note below.    Bernard Padilla is a 35 year old male who is being evaluated via a billable telephone visit.      The patient has been notified of following:     \"This telephone visit will be conducted via a call between you and your physician/provider. We have found that certain health care needs can be provided without the need for a physical exam.  This service lets us provide the care you need with a short phone conversation.  If a prescription is necessary we can send it directly to your pharmacy.  If lab work is needed we can place an order for that and you can then stop by our lab to have the test done at a later time.    Telephone visits are billed at different rates depending on your insurance coverage. During this emergency period, for some insurers they may be billed the same as an in-person visit.  Please reach out to your insurance provider with any questions.    If during the course of the call the physician/provider feels a telephone visit is not appropriate, you will not be charged for this service.\"    Patient has given verbal consent for Telephone visit?  Yes    What phone number would you like to be contacted at? [phone]    How would you like to obtain your AVS? Mail a copy    Phone call duration: 20 minutes    Apolinar Ruiz MD        Dermatology Phone Visit: Phone Number:[phone]     Dermatology Problem List:  1. Hidradenitis suppuritiva              - Diagnosed in 2016 and did not respond to antibiotics.               - Seen by Dr. Bird and underwent 9 procedures               -  mg BID - start 9/19, now discontinued              - Increased dapsone to 200 mg " daily (started 19)  2. Acute onset hand foot psoriasiform dermatitis with joint pain, suspected reactive arthritis, now improving and almost resolved with minimal foot involvement              -  mg BID - started , now discontinued              - Lidex ointment at night, urea cream in the morning   3. Atopic dermatitis  4. Seborrheic dermatitis                        - DermaSmooth     Patient opted to conduct today's return visit via telephone vs an in person visit to the clinic.     Encounter Date: Sep 17, 2020     Chief complaint:   Chief Complaint   Patient presents with     Telephone     Follow-up Psoriasis      I spoke with: Bernard Padilla     The reason for the telephone visit was:   HS flare     Pertinent history and review of systems:  Pt had surgery on 20. He had excision of 3 sites, including scrotum requiring sutures. He is currently in severe pain.      Assessment/Advice/instructions given to patient/guardian including prescriptions, follow up appointment or orders for diagnostic testin) HS flare s/p exicisions now in severe post-op pain   - Patient has been working with Dr Foster, but did  not appropriately follow-up and his pain meds were  stopped. Dr Bermudez was trying to start him on  suboxone but the Bernard did not want to do that, so  he has not followed up. I told Bernard that I was very  uncomfortable with this entire situation and would  not order him chronic pain meds. I told him the  Importance of following up with Dr Foster and  explained suboxone and why I think it would be  helpful. Bernard verablized understanding. I did agree  to one more week of pain meds post-op, but told  him I will not prescribe more. We also came up with  a plan to try smaller procedures for flares ratherthan   - Clindamycin/Rifampin BID for 12 weeks  - Dapsone 200mg daily     RTC in 4 weeks via phone     Phone call contact time: 20min  Call Started at: 1:00pm  Call Ended at:  1:20pm        Staff only:     Apolinar Ruiz MD, FAAD    Departments of Internal Medicine and Dermatology  [facility]  [phone]

## 2020-10-02 ENCOUNTER — COMMUNICATION - HEALTHEAST (OUTPATIENT)
Dept: CARE COORDINATION | Facility: CLINIC | Age: 35
End: 2020-10-02

## 2020-10-02 DIAGNOSIS — L30.9 DERMATITIS: ICD-10-CM

## 2020-10-02 DIAGNOSIS — L30.9 ECZEMA, UNSPECIFIED TYPE: ICD-10-CM

## 2020-10-02 NOTE — TELEPHONE ENCOUNTER
Pt is out please send new RX        Thank You,  Isidro Lane, [facility]  On behalf of [facility]

## 2020-10-06 ENCOUNTER — COMMUNICATION - HEALTHEAST (OUTPATIENT)
Dept: PHARMACY | Facility: CLINIC | Age: 35
End: 2020-10-06

## 2020-10-06 DIAGNOSIS — J45.20 MILD INTERMITTENT ASTHMA WITHOUT COMPLICATION: ICD-10-CM

## 2020-10-06 DIAGNOSIS — L29.9 PRURITUS: ICD-10-CM

## 2020-10-07 RX ORDER — FLUOCINOLONE ACETONIDE 0.11 MG/ML
OIL TOPICAL 2 TIMES DAILY
Qty: 120 ML | Refills: 1 | Status: SHIPPED | OUTPATIENT
Start: 2020-10-07 | End: 2020-12-17

## 2020-10-07 RX ORDER — FLUOCINOLONE ACETONIDE 0.11 MG/ML
OIL TOPICAL
Qty: 118.28 ML | Refills: 0 | Status: SHIPPED | OUTPATIENT
Start: 2020-10-07 | End: 2020-10-29

## 2020-10-13 ENCOUNTER — RECORDS - HEALTHEAST (OUTPATIENT)
Dept: ADMINISTRATIVE | Facility: OTHER | Age: 35
End: 2020-10-13

## 2020-10-13 ENCOUNTER — OFFICE VISIT (OUTPATIENT)
Dept: PLASTIC SURGERY | Facility: CLINIC | Age: 35
End: 2020-10-13
Payer: MEDICARE

## 2020-10-13 DIAGNOSIS — L73.2 HIDRADENITIS SUPPURATIVA: Primary | ICD-10-CM

## 2020-10-13 DIAGNOSIS — T81.31XA POSTOPERATIVE WOUND DEHISCENCE, INITIAL ENCOUNTER: ICD-10-CM

## 2020-10-13 PROCEDURE — 99024 POSTOP FOLLOW-UP VISIT: CPT | Performed by: SURGERY

## 2020-10-13 ASSESSMENT — PAIN SCALES - GENERAL: PAINLEVEL: WORST PAIN (10)

## 2020-10-13 NOTE — PROGRESS NOTES
"PLASTICS WOUND RETURN CONSUT    HPI: : Bernard is a 35 year old  male with a history of hidradenitis suppurativa (diagnosed in 2016) and chronic pain. He is here today by himself.. He is 3 weeks post-op following excision of HS lesions of his left groin, right thigh, and posterior scrotum on 9/25/20.    Bernard reports experiencing pain. I informed him that Dr. Ruiz will be the sole provider prescribing him pain medication and that he should call him for a refill. If that is no longer available to him, he has known that he needs a new pain provider since he was let go from his previous clinic before we even met him in consultation. Immediately postop, he contacted our clinic staff regarding his pain and when offered Tramadol, stated \"he was going to contact his \".  Our rooming staff also noted the scent of marijuana when entering the exam room the second time to provide dressing supplies.     Reports drainage from wounds.   Using 6x9 pads. I provided him with Vashe to soak the wounds.    Bernard continues to smoke, which I urged him to stop. He states his teenaged daughters are stressing him out.    PE: General:   Minimal swelling. Left inguinal closure intact but sore. Left medial thigh crease closed but has several granulomatous areas. Posterior scrotal closure has opened partially about 2 x 2.5 x 0.2 cms. Wound bed is clean. No evidence of gross infection. Right medial thigh wound has even dehisced and has grungy fat in the base. This is <1cm diameter and depth.   Photo taken with verbal consent    A&P: 35 year old male with hx of hidradenitis suppurativa and chronic pain. He is 3 weeks post-op following excision of HS lesions on 9/25/20.     I urge him to stop smoking to expedite the process. I provided him Vashe and AMD in hopes that Bernard's wounds continue to heal.    Bernard will follow up with me in one month. Causes for returning were discussed    Total time = 10 minutes, more than " half of that time was spent in medical education and developing a new wound care plan.     This note was prepared on behalf of Maura Maldonado MD by Danni Tate, a trained medical scribe, based on my observations and the provider's statements to me.

## 2020-10-13 NOTE — NURSING NOTE
Chief Complaint   Patient presents with     Surgical Followup     Pt here for post op for HS       There were no vitals filed for this visit.    There is no height or weight on file to calculate BMI.      KAYLA Mcgill NREMT                    No vitals taken per provider

## 2020-10-13 NOTE — LETTER
"10/13/2020       RE: Bernard Padilla  [address]  [place] [address]     Dear Colleague,    Thank you for referring your patient, Bernard Padilla, to the [facility] [place] at [facility]. Please see a copy of my visit note below.    PLASTICS WOUND RETURN CONSUT    HPI: : Bernard is a 35 year old  male with a history of hidradenitis suppurativa (diagnosed in 2016) and chronic pain. He is here today by himself.. He is 3 weeks post-op following excision of HS lesions of his left groin, right thigh, and posterior scrotum on 9/25/20.    Bernard reports experiencing pain. I informed him that Dr. Ruiz will be the sole provider prescribing him pain medication and that he should call him for a refill. If that is no longer available to him, he has known that he needs a new pain provider since he was let go from his previous clinic before we even met him in consultation. Immediately postop, he contacted our clinic staff regarding his pain and when offered Tramadol, stated \"he was going to contact his \".  Our rooming staff also noted the scent of marijuana when entering the exam room the second time to provide dressing supplies.     Reports drainage from wounds.   Using 6x9 pads. I provided him with Vashe to soak the wounds.    Bernard continues to smoke, which I urged him to stop. He states his teenaged daughters are stressing him out.    PE: General:   Minimal swelling. Left inguinal closure intact but sore. Left medial thigh crease closed but has several granulomatous areas. Posterior scrotal closure has opened partially about 2 x 2.5 x 0.2 cms. Wound bed is clean. No evidence of gross infection. Right medial thigh wound has even dehisced and has grungy fat in the base. This is <1cm diameter and depth.   Photo taken with verbal consent    A&P: 35 year old male with hx of hidradenitis " suppurativa and chronic pain. He is 3 weeks post-op following excision of HS lesions on 9/25/20.     I urge him to stop smoking to expedite the process. I provided him Vashe and AMD in hopes that Bernard's wounds continue to heal.    Bernard will follow up with me in one month. Causes for returning were discussed    Total time = 10 minutes, more than half of that time was spent in medical education and developing a new wound care plan.     This note was prepared on behalf of Maura Maldonado MD by Danni Tate, a trained medical scribe, based on my observations and the provider's statements to me.       Again, thank you for allowing me to participate in the care of your patient.      Sincerely,    Maura Maldonado MD

## 2020-10-19 ENCOUNTER — COMMUNICATION - HEALTHEAST (OUTPATIENT)
Dept: PHARMACY | Facility: CLINIC | Age: 35
End: 2020-10-19

## 2020-10-19 DIAGNOSIS — J45.20 MILD INTERMITTENT ASTHMA WITHOUT COMPLICATION: ICD-10-CM

## 2020-10-20 ENCOUNTER — COMMUNICATION - HEALTHEAST (OUTPATIENT)
Dept: CARE COORDINATION | Facility: CLINIC | Age: 35
End: 2020-10-20

## 2020-10-27 ENCOUNTER — COMMUNICATION - HEALTHEAST (OUTPATIENT)
Dept: NURSING | Facility: CLINIC | Age: 35
End: 2020-10-27

## 2020-10-28 ENCOUNTER — COMMUNICATION - HEALTHEAST (OUTPATIENT)
Dept: INTERNAL MEDICINE | Facility: CLINIC | Age: 35
End: 2020-10-28

## 2020-10-28 DIAGNOSIS — J45.20 MILD INTERMITTENT ASTHMA WITHOUT COMPLICATION: ICD-10-CM

## 2020-10-29 ENCOUNTER — OFFICE VISIT (OUTPATIENT)
Dept: DERMATOLOGY | Facility: CLINIC | Age: 35
End: 2020-10-29
Attending: DERMATOLOGY
Payer: MEDICARE

## 2020-10-29 ENCOUNTER — RECORDS - HEALTHEAST (OUTPATIENT)
Dept: ADMINISTRATIVE | Facility: OTHER | Age: 35
End: 2020-10-29

## 2020-10-29 VITALS
OXYGEN SATURATION: 94 % | HEIGHT: 67 IN | WEIGHT: 184.7 LBS | TEMPERATURE: 98.1 F | BODY MASS INDEX: 28.99 KG/M2 | HEART RATE: 73 BPM | SYSTOLIC BLOOD PRESSURE: 132 MMHG | DIASTOLIC BLOOD PRESSURE: 78 MMHG | RESPIRATION RATE: 20 BRPM

## 2020-10-29 DIAGNOSIS — L20.89 OTHER ATOPIC DERMATITIS: ICD-10-CM

## 2020-10-29 DIAGNOSIS — L30.9 DERMATITIS: ICD-10-CM

## 2020-10-29 DIAGNOSIS — L73.2 HIDRADENITIS SUPPURATIVA: Primary | ICD-10-CM

## 2020-10-29 DIAGNOSIS — L40.9 PSORIASIS: ICD-10-CM

## 2020-10-29 PROCEDURE — 99214 OFFICE O/P EST MOD 30 MIN: CPT | Performed by: DERMATOLOGY

## 2020-10-29 PROCEDURE — G0463 HOSPITAL OUTPT CLINIC VISIT: HCPCS

## 2020-10-29 RX ORDER — FLUOCINOLONE ACETONIDE 0.11 MG/ML
OIL TOPICAL
Qty: 118.28 ML | Refills: 0 | Status: SHIPPED | OUTPATIENT
Start: 2020-10-29 | End: 2020-12-17

## 2020-10-29 RX ORDER — BETAMETHASONE DIPROPIONATE 0.5 MG/G
OINTMENT, AUGMENTED TOPICAL 2 TIMES DAILY
Qty: 50 G | Refills: 3 | Status: SHIPPED | OUTPATIENT
Start: 2020-10-29 | End: 2021-02-11

## 2020-10-29 RX ORDER — TRIAMCINOLONE ACETONIDE 1 MG/G
CREAM TOPICAL 2 TIMES DAILY
Qty: 464 G | Refills: 3 | Status: SHIPPED | OUTPATIENT
Start: 2020-10-29 | End: 2021-02-11

## 2020-10-29 ASSESSMENT — MIFFLIN-ST. JEOR: SCORE: 1736.42

## 2020-10-29 ASSESSMENT — PAIN SCALES - GENERAL: PAINLEVEL: SEVERE PAIN (7)

## 2020-10-29 NOTE — LETTER
"10/29/2020       RE: Bernard Padilla  [address]  [place] [address]     Dear Colleague,    Thank you for referring your patient, Bernard Padilla, to the [facility] [place] at [facility]. Please see a copy of my visit note below.    Dermatology Phone Visit: Phone Number:[phone]     Dermatology Problem List:  1. Hidradenitis suppuritiva              - Diagnosed in 2016 and did not respond to antibiotics.               - Seen by Dr. Bird and underwent 9 procedures               -  mg BID - start 9/19, now discontinued              - Increased dapsone to 200 mg daily (started 11/21/19)  2. Acute onset hand foot psoriasiform dermatitis with joint pain, suspected reactive arthritis, now improving and almost resolved with minimal foot involvement              -  mg BID - started 9/19, now discontinued              - Lidex ointment at night, urea cream in the morning   3. Atopic dermatitis  4. Seborrheic dermatitis                        - DermaSmooth        Encounter Date: October 29, 2020  Last visit: Sep 17, 2020     Chief complaint: f/u HS     Interval Hx: October 29, 2020  Pt notes that he is looking for a new pain doctor. Has not gotten it yet. Seeing PCP on Tuesday. Currently on dapsone 200mg twice a day.   He stopped the clindamycin/rifampin for after 1 month and it helped things calm down. No flares. Diong well now other than chronic pain and not having a pain doctor. Wounds are healing OK after surgery.     Physical Exam   /78 (BP Location: Right arm, Patient Position: Sitting, Cuff Size: Adult Regular)   Pulse 73   Temp 98.1  F (36.7  C) (Oral)   Resp 20   Ht 1.702 m (5' 7\")   Wt 83.8 kg (184 lb 11.2 oz)   SpO2 94%   BMI 28.93 kg/m     GEN: NAD  SKIN: Healing ulcerated crusted papule on rt upper thigh. Scarring in inguinal fold with no active HS lesions.  Vitiligo macules and patche son chest " and lips. Verrucous papule on rt elbow. Scaling , hyperpigmented mildly erythematous well demarcated papules and plaques ob left elbow and chest. Some xerosis and hyperpigmentation on upper arms and back. Corn left lateral 5th digit.      A/P  1) HS  - Well controlled on dasone 200mg BID  - If flaring take ckindamycin/rifampin 300mg/300mg BID for 1 month and come intio clinic for de-leon  - CBC today and repeat in 3 months    2) Psoriasis/Atopic dermatitis   - Refilled   Triamcinolone cream for body   Betamethasone diporpironate ointment for              recalcitrant areas   Derma-smoothe scalp    3) Vitiligo  - No treatment    4) Corn   - Paired down     RTC in 3 months     Apolinar Ruiz MD, FAAD    Departments of Internal Medicine and Dermatology  [facility]  [phone]

## 2020-10-29 NOTE — NURSING NOTE
"Chief Complaint   Patient presents with     RECHECK     Psoriasis       Vital signs:  Temp: 98.1  F (36.7  C) Temp src: Oral BP: 132/78 Pulse: 73   Resp: 20 SpO2: 94 %     Height: 170.2 cm (5' 7\") Weight: 83.8 kg (184 lb 11.2 oz)  Estimated body mass index is 28.93 kg/m  as calculated from the following:    Height as of this encounter: 1.702 m (5' 7\").    Weight as of this encounter: 83.8 kg (184 lb 11.2 oz).          Amy Clay, [facility]  10/29/2020 10:13 AM      "

## 2020-10-29 NOTE — PROGRESS NOTES
"Dermatology Phone Visit: Phone Number:[phone]     Dermatology Problem List:  1. Hidradenitis suppuritiva              - Diagnosed in 2016 and did not respond to antibiotics.               - Seen by Dr. Bird and underwent 9 procedures               -  mg BID - start 9/19, now discontinued              - Increased dapsone to 200 mg daily (started 11/21/19)  2. Acute onset hand foot psoriasiform dermatitis with joint pain, suspected reactive arthritis, now improving and almost resolved with minimal foot involvement              -  mg BID - started 9/19, now discontinued              - Lidex ointment at night, urea cream in the morning   3. Atopic dermatitis  4. Seborrheic dermatitis                        - DermaSmooth        Encounter Date: October 29, 2020  Last visit: Sep 17, 2020     Chief complaint: f/u HS     Interval Hx: October 29, 2020  Pt notes that he is looking for a new pain doctor. Has not gotten it yet. Seeing PCP on Tuesday. Currently on dapsone 200mg twice a day.   He stopped the clindamycin/rifampin for after 1 month and it helped things calm down. No flares. Diong well now other than chronic pain and not having a pain doctor. Wounds are healing OK after surgery.     Physical Exam   /78 (BP Location: Right arm, Patient Position: Sitting, Cuff Size: Adult Regular)   Pulse 73   Temp 98.1  F (36.7  C) (Oral)   Resp 20   Ht 1.702 m (5' 7\")   Wt 83.8 kg (184 lb 11.2 oz)   SpO2 94%   BMI 28.93 kg/m     GEN: NAD  SKIN: Healing ulcerated crusted papule on rt upper thigh. Scarring in inguinal fold with no active HS lesions.  Vitiligo macules and patche son chest and lips. Verrucous papule on rt elbow. Scaling , hyperpigmented mildly erythematous well demarcated papules and plaques ob left elbow and chest. Some xerosis and hyperpigmentation on upper arms and back. Corn left lateral 5th digit.      A/P  1) HS  - Well controlled on dasone 200mg BID  - If flaring take " ckindamycin/rifampin 300mg/300mg BID for 1 month and come intio clinic for de-leon  - CBC today and repeat in 3 months    2) Psoriasis/Atopic dermatitis   - Refilled   Triamcinolone cream for body   Betamethasone diporpironate ointment for              recalcitrant areas   Derma-smoothe scalp    3) Vitiligo  - No treatment    4) Corn   - Paired down     RTC in 3 months     Apolinar Ruiz MD, FAAD    Departments of Internal Medicine and Dermatology  [facility]  [phone]

## 2020-10-29 NOTE — PATIENT INSTRUCTIONS
Dapsone 200mg twice a day         Triamcinolone cream (lb jar) for whole body   Betamethasone diporpironate ointment for difficult to treat areas      Derma-smoothe scalp

## 2020-10-29 NOTE — LETTER
"10/29/2020      RE: Bernard Padilla  [address]  [place] [address]       Dermatology Phone Visit: Phone Number:[phone]     Dermatology Problem List:  1. Hidradenitis suppuritiva              - Diagnosed in 2016 and did not respond to antibiotics.               - Seen by Dr. Bird and underwent 9 procedures               -  mg BID - start 9/19, now discontinued              - Increased dapsone to 200 mg daily (started 11/21/19)  2. Acute onset hand foot psoriasiform dermatitis with joint pain, suspected reactive arthritis, now improving and almost resolved with minimal foot involvement              -  mg BID - started 9/19, now discontinued              - Lidex ointment at night, urea cream in the morning   3. Atopic dermatitis  4. Seborrheic dermatitis                        - DermaSmooth        Encounter Date: October 29, 2020  Last visit: Sep 17, 2020     Chief complaint: f/u HS     Interval Hx: October 29, 2020  Pt notes that he is looking for a new pain doctor. Has not gotten it yet. Seeing PCP on Tuesday. Currently on dapsone 200mg twice a day.   He stopped the clindamycin/rifampin for after 1 month and it helped things calm down. No flares. Diong well now other than chronic pain and not having a pain doctor. Wounds are healing OK after surgery.     Physical Exam   /78 (BP Location: Right arm, Patient Position: Sitting, Cuff Size: Adult Regular)   Pulse 73   Temp 98.1  F (36.7  C) (Oral)   Resp 20   Ht 1.702 m (5' 7\")   Wt 83.8 kg (184 lb 11.2 oz)   SpO2 94%   BMI 28.93 kg/m     GEN: NAD  SKIN: Healing ulcerated crusted papule on rt upper thigh. Scarring in inguinal fold with no active HS lesions.  Vitiligo macules and patche son chest and lips. Verrucous papule on rt elbow. Scaling , hyperpigmented mildly erythematous well demarcated papules and plaques ob left elbow and chest. Some xerosis and hyperpigmentation on upper arms and back. Corn left lateral " 5th digit.      A/P  1) HS  - Well controlled on dasone 200mg BID  - If flaring take ckindamycin/rifampin 300mg/300mg BID for 1 month and come intio clinic for de-leon  - CBC today and repeat in 3 months    2) Psoriasis/Atopic dermatitis   - Refilled   Triamcinolone cream for body   Betamethasone diporpironate ointment for              recalcitrant areas   Derma-smoothe scalp    3) Vitiligo  - No treatment    4) Corn   - Paired down     RTC in 3 months     Apolinar Ruiz MD, FAAD    Departments of Internal Medicine and Dermatology  [facility]  [phone]

## 2020-11-02 ENCOUNTER — OFFICE VISIT - HEALTHEAST (OUTPATIENT)
Dept: INTERNAL MEDICINE | Facility: CLINIC | Age: 35
End: 2020-11-02

## 2020-11-02 DIAGNOSIS — Z00.00 ROUTINE GENERAL MEDICAL EXAMINATION AT A HEALTH CARE FACILITY: ICD-10-CM

## 2020-11-02 DIAGNOSIS — T78.40XD ALLERGY, SUBSEQUENT ENCOUNTER: ICD-10-CM

## 2020-11-02 DIAGNOSIS — E55.9 VITAMIN D DEFICIENCY: ICD-10-CM

## 2020-11-02 DIAGNOSIS — J45.20 MILD INTERMITTENT ASTHMA WITHOUT COMPLICATION: ICD-10-CM

## 2020-11-02 DIAGNOSIS — L73.2 HIDRADENITIS: ICD-10-CM

## 2020-11-02 ASSESSMENT — PATIENT HEALTH QUESTIONNAIRE - PHQ9: SUM OF ALL RESPONSES TO PHQ QUESTIONS 1-9: 3

## 2020-11-02 ASSESSMENT — MIFFLIN-ST. JEOR: SCORE: 1707.16

## 2020-11-03 ENCOUNTER — AMBULATORY - HEALTHEAST (OUTPATIENT)
Dept: PULMONOLOGY | Facility: OTHER | Age: 35
End: 2020-11-03

## 2020-11-03 DIAGNOSIS — J45.909 ASTHMA: ICD-10-CM

## 2020-11-04 ENCOUNTER — COMMUNICATION - HEALTHEAST (OUTPATIENT)
Dept: INTERNAL MEDICINE | Facility: CLINIC | Age: 35
End: 2020-11-04

## 2020-11-04 DIAGNOSIS — J45.20 MILD INTERMITTENT ASTHMA WITHOUT COMPLICATION: ICD-10-CM

## 2020-11-05 PROBLEM — L02.214 ABSCESS OF GROIN, LEFT: Status: ACTIVE | Noted: 2019-12-14

## 2020-11-05 PROBLEM — N49.2 SCROTAL ABSCESS: Status: ACTIVE | Noted: 2019-11-24

## 2020-11-06 ENCOUNTER — COMMUNICATION - HEALTHEAST (OUTPATIENT)
Dept: INTERNAL MEDICINE | Facility: CLINIC | Age: 35
End: 2020-11-06

## 2020-11-09 ENCOUNTER — RECORDS - HEALTHEAST (OUTPATIENT)
Dept: ADMINISTRATIVE | Facility: OTHER | Age: 35
End: 2020-11-09

## 2020-12-07 ENCOUNTER — COMMUNICATION - HEALTHEAST (OUTPATIENT)
Dept: CARE COORDINATION | Facility: CLINIC | Age: 35
End: 2020-12-07

## 2020-12-07 DIAGNOSIS — N52.9 ERECTILE DYSFUNCTION, UNSPECIFIED ERECTILE DYSFUNCTION TYPE: ICD-10-CM

## 2020-12-09 ENCOUNTER — COMMUNICATION - HEALTHEAST (OUTPATIENT)
Dept: INTERNAL MEDICINE | Facility: CLINIC | Age: 35
End: 2020-12-09

## 2020-12-16 DIAGNOSIS — L30.9 DERMATITIS: ICD-10-CM

## 2020-12-16 DIAGNOSIS — L30.9 ECZEMA, UNSPECIFIED TYPE: ICD-10-CM

## 2020-12-17 ENCOUNTER — COMMUNICATION - HEALTHEAST (OUTPATIENT)
Dept: NURSING | Facility: CLINIC | Age: 35
End: 2020-12-17

## 2020-12-17 RX ORDER — FLUOCINOLONE ACETONIDE 0.11 MG/ML
OIL TOPICAL 2 TIMES DAILY
Qty: 120 ML | Refills: 1 | Status: SHIPPED | OUTPATIENT
Start: 2020-12-17 | End: 2021-06-06

## 2020-12-17 RX ORDER — FLUOCINOLONE ACETONIDE 0.11 MG/ML
OIL TOPICAL
Qty: 118.28 ML | Refills: 0 | Status: SHIPPED | OUTPATIENT
Start: 2020-12-17 | End: 2021-01-20

## 2020-12-17 NOTE — TELEPHONE ENCOUNTER
Fluocinolone Acetonide Scalp (DERMA-SMOOTHE/FS SCALP) 0.01 % OIL oil  Last Written Prescription Date:  10/29/20  Last Fill Quantity: 118.28ml,   # refills: 0  Last Office Visit : 10/29/20  Future Office visit:  2/11/21      fluocinolone acetonide (DERMA-SMOOTHE/FS BODY) 0.01 % external oil   Last Written Prescription Date:  10/7/20  Last Fill Quantity:120ml   # refills: 1

## 2020-12-21 ENCOUNTER — RECORDS - HEALTHEAST (OUTPATIENT)
Dept: PULMONOLOGY | Facility: OTHER | Age: 35
End: 2020-12-21

## 2020-12-21 ENCOUNTER — OFFICE VISIT - HEALTHEAST (OUTPATIENT)
Dept: PULMONOLOGY | Facility: OTHER | Age: 35
End: 2020-12-21

## 2020-12-21 ENCOUNTER — RECORDS - HEALTHEAST (OUTPATIENT)
Dept: ADMINISTRATIVE | Facility: OTHER | Age: 35
End: 2020-12-21

## 2020-12-21 DIAGNOSIS — J41.0 SIMPLE CHRONIC BRONCHITIS (H): ICD-10-CM

## 2020-12-21 DIAGNOSIS — J45.909 UNSPECIFIED ASTHMA, UNCOMPLICATED: ICD-10-CM

## 2020-12-21 LAB — HGB BLD-MCNC: 15.8 G/DL

## 2020-12-21 ASSESSMENT — MIFFLIN-ST. JEOR: SCORE: 1743.45

## 2020-12-27 ENCOUNTER — HEALTH MAINTENANCE LETTER (OUTPATIENT)
Age: 35
End: 2020-12-27

## 2021-01-07 ENCOUNTER — RECORDS - HEALTHEAST (OUTPATIENT)
Dept: ADMINISTRATIVE | Facility: OTHER | Age: 36
End: 2021-01-07

## 2021-01-08 ENCOUNTER — COMMUNICATION - HEALTHEAST (OUTPATIENT)
Dept: INTERNAL MEDICINE | Facility: CLINIC | Age: 36
End: 2021-01-08

## 2021-01-08 ENCOUNTER — COMMUNICATION - HEALTHEAST (OUTPATIENT)
Dept: SCHEDULING | Facility: CLINIC | Age: 36
End: 2021-01-08

## 2021-01-08 DIAGNOSIS — R05.9 COUGH: ICD-10-CM

## 2021-01-09 ENCOUNTER — RECORDS - HEALTHEAST (OUTPATIENT)
Dept: ADMINISTRATIVE | Facility: OTHER | Age: 36
End: 2021-01-09

## 2021-01-13 ENCOUNTER — COMMUNICATION - HEALTHEAST (OUTPATIENT)
Dept: SCHEDULING | Facility: CLINIC | Age: 36
End: 2021-01-13

## 2021-01-13 ENCOUNTER — COMMUNICATION - HEALTHEAST (OUTPATIENT)
Dept: INTERNAL MEDICINE | Facility: CLINIC | Age: 36
End: 2021-01-13

## 2021-01-14 ENCOUNTER — OFFICE VISIT - HEALTHEAST (OUTPATIENT)
Dept: INTERNAL MEDICINE | Facility: CLINIC | Age: 36
End: 2021-01-14

## 2021-01-14 DIAGNOSIS — U07.1 CLINICAL DIAGNOSIS OF COVID-19: ICD-10-CM

## 2021-01-14 DIAGNOSIS — J45.20 MILD INTERMITTENT ASTHMA WITHOUT COMPLICATION: ICD-10-CM

## 2021-01-14 DIAGNOSIS — R05.9 COUGH: ICD-10-CM

## 2021-01-15 ENCOUNTER — COMMUNICATION - HEALTHEAST (OUTPATIENT)
Dept: NURSING | Facility: CLINIC | Age: 36
End: 2021-01-15

## 2021-01-18 ENCOUNTER — COMMUNICATION - HEALTHEAST (OUTPATIENT)
Dept: CARE COORDINATION | Facility: CLINIC | Age: 36
End: 2021-01-18

## 2021-01-18 ENCOUNTER — TELEPHONE (OUTPATIENT)
Dept: DERMATOLOGY | Facility: CLINIC | Age: 36
End: 2021-01-18

## 2021-01-18 ENCOUNTER — TELEPHONE (OUTPATIENT)
Dept: DERMATOLOGY | Facility: CLINIC | Age: 36
End: 2021-01-18
Payer: MEDICARE

## 2021-01-18 NOTE — TELEPHONE ENCOUNTER
PA Initiation    Medication: fluocinolone acetonide (DERMA-SMOOTHE/FS BODY) 0.01 % external oil   Insurance Company: [employer] Part D - Phone [phone] Fax [phone]  Pharmacy Filling the Rx: [facility] [place] [place] - [address] [place] [address]  Filling Pharmacy Phone: [phone]  Filling Pharmacy Fax: [phone]  Start Date: 1/18/2021

## 2021-01-18 NOTE — TELEPHONE ENCOUNTER
PA Initiation    Medication: Fluocinolone Acetonide Scalp (DERMA-SMOOTHE/FS SCALP) 0.01 % OIL oil   Insurance Company: [employer] Part D - Phone [phone] Fax [phone]  Pharmacy Filling the Rx: [facility] [place] - [address] [place] [address]  Filling Pharmacy Phone: [phone]  Filling Pharmacy Fax: [phone]  Start Date: 1/18/2021

## 2021-01-19 NOTE — TELEPHONE ENCOUNTER
PRIOR AUTHORIZATION DENIED    Medication: fluocinolone acetonide (DERMA-SMOOTHE/FS BODY) 0.01 % external oil--DENIED    Denial Date: 1/19/2021    Denial Rational: Patient needs to try and fail 4 preferred medications which include       Appeal Information:

## 2021-01-19 NOTE — TELEPHONE ENCOUNTER
Prior Authorization Not Needed per Insurance    Medication: augmented betamethasone dipropionate (DIPROLENE-AF) 0.05 % external ointment--NO PA NEEDED  Insurance Company: [employer] Part D - Phone [phone] Fax [phone]  Expected CoPay:      Pharmacy Filling the Rx: [facility] [place], [place] - [address] [place] [address]  Pharmacy Notified: Yes  Patient Notified: Yes **Instructed pharmacy to notify patient when script is ready to /ship.**

## 2021-01-19 NOTE — TELEPHONE ENCOUNTER
PRIOR AUTHORIZATION DENIED    Medication: Fluocinolone Acetonide Scalp (DERMA-SMOOTHE/FS SCALP) 0.01 % OIL oil--DENIED    Denial Date: 1/19/2021    Denial Rational: Patient needs to try and fail 2 preferred medications which include       Appeal Information:

## 2021-01-20 DIAGNOSIS — L30.9 DERMATITIS: ICD-10-CM

## 2021-01-20 RX ORDER — FLUOCINOLONE ACETONIDE 0.11 MG/ML
OIL TOPICAL
Qty: 118.28 ML | Refills: 1 | Status: SHIPPED | OUTPATIENT
Start: 2021-01-20 | End: 2021-02-17

## 2021-01-21 NOTE — TELEPHONE ENCOUNTER
Pt called regarding below. Pt wants to know where we are at with this PA. Pt said that his skin is starting to crack and bleed.  Please call him back. Thanks

## 2021-02-05 NOTE — TELEPHONE ENCOUNTER
Encounter did not get routed to me because the provider closed the encounter.  I left a voicemail for Bernard informing him of Dr. Ruiz's message. Encounter routed urgently to PA team to start appeal.

## 2021-02-05 NOTE — TELEPHONE ENCOUNTER
Medication Appeal Initiation    We have initiated an appeal for the requested medication:  Medication: Fluocinolone Acetonide Scalp (DERMA-SMOOTHE/FS SCALP) 0.01 % OIL oil--DENIED  Appeal Start Date:  2/5/2021  Insurance Company: [employer] ([facility]) - Phone [phone] Fax [phone]  Comments:

## 2021-02-05 NOTE — TELEPHONE ENCOUNTER
Medication Appeal Initiation    We have initiated an appeal for the requested medication:  Medication: fluocinolone acetonide (DERMA-SMOOTHE/FS BODY) 0.01 % external oil--DENIED  Appeal Start Date:  2/5/2021  Insurance Company: [employer] ([facility]) - Phone [phone] Fax [phone]  Comments:

## 2021-02-05 NOTE — TELEPHONE ENCOUNTER
Pt called regarding below. Pt has not heard back from anyone and would like to know where we are at with this. Pt would really like to get this done so he can start using it. Please call him back. Thanks

## 2021-02-05 NOTE — TELEPHONE ENCOUNTER
Apolinar Ruiz MD  You 9 days ago     Letter written. Also make sure he has topical trimacinolone and fluocinolone scalp solution while we are waiting to hear back.   Winnie

## 2021-02-08 ENCOUNTER — COMMUNICATION - HEALTHEAST (OUTPATIENT)
Dept: NURSING | Facility: CLINIC | Age: 36
End: 2021-02-08

## 2021-02-09 NOTE — TELEPHONE ENCOUNTER
MEDICATION APPEAL APPROVED    Medication: Fluocinolone Acetonide Scalp (DERMA-SMOOTHE/FS SCALP) 0.01 % OIL oil--APPEAL APPROVED  Authorization Effective Date: 2/8/2021  Authorization Expiration Date: 12/31/2021  Approved Dose/Quantity:   Reference #:     Insurance Company: [employer] ([facility]) - Phone [phone] Fax [phone]  Expected CoPay:       CoPay Card Available:      Foundation Assistance Needed:    Which Pharmacy is filling the prescription (Not needed for infusion/clinic administered): [facility] [place] [facility] [place], [place] - [address] [place] [address]

## 2021-02-09 NOTE — TELEPHONE ENCOUNTER
MEDICATION APPEAL APPROVED    Medication: fluocinolone acetonide (DERMA-SMOOTHE/FS BODY) 0.01 % external oil--APPEAL APPROVED  Authorization Effective Date: 2/8/2021  Authorization Expiration Date: 12/31/2021  Approved Dose/Quantity:   Reference #:     Insurance Company: [employer] ([facility]) - Phone [phone] Fax [phone]  Expected CoPay:       CoPay Card Available:      Foundation Assistance Needed:    Which Pharmacy is filling the prescription (Not needed for infusion/clinic administered): [facility] [place] [facility] [place], [place] - [address] [place] [address]

## 2021-02-11 ENCOUNTER — OFFICE VISIT (OUTPATIENT)
Dept: DERMATOLOGY | Facility: CLINIC | Age: 36
End: 2021-02-11
Payer: MEDICARE

## 2021-02-11 VITALS — SYSTOLIC BLOOD PRESSURE: 129 MMHG | HEART RATE: 66 BPM | DIASTOLIC BLOOD PRESSURE: 74 MMHG

## 2021-02-11 DIAGNOSIS — L20.89 OTHER ATOPIC DERMATITIS: ICD-10-CM

## 2021-02-11 DIAGNOSIS — L73.2 HIDRADENITIS SUPPURATIVA: ICD-10-CM

## 2021-02-11 DIAGNOSIS — L40.9 PSORIASIS: ICD-10-CM

## 2021-02-11 DIAGNOSIS — L30.9 DERMATITIS: ICD-10-CM

## 2021-02-11 PROCEDURE — 99214 OFFICE O/P EST MOD 30 MIN: CPT | Performed by: DERMATOLOGY

## 2021-02-11 RX ORDER — DAPSONE 100 MG/1
100 TABLET ORAL DAILY
Qty: 90 TABLET | Refills: 0 | Status: SHIPPED | OUTPATIENT
Start: 2021-02-11 | End: 2021-07-01

## 2021-02-11 RX ORDER — FLUOCINONIDE TOPICAL SOLUTION USP, 0.05% 0.5 MG/ML
SOLUTION TOPICAL 2 TIMES DAILY
Qty: 60 ML | Refills: 3 | Status: SHIPPED | OUTPATIENT
Start: 2021-02-11 | End: 2022-04-27

## 2021-02-11 RX ORDER — TRIAMCINOLONE ACETONIDE 1 MG/G
CREAM TOPICAL 2 TIMES DAILY
Qty: 464 G | Refills: 3 | Status: SHIPPED | OUTPATIENT
Start: 2021-02-11 | End: 2022-08-25

## 2021-02-11 ASSESSMENT — PAIN SCALES - GENERAL: PAINLEVEL: NO PAIN (0)

## 2021-02-11 NOTE — LETTER
2/11/2021       RE: Bernard Padilla  [address]  [place] [address]     Dear Colleague,    Thank you for referring your patient, Bernard Padilla, to the [facility] [place] at [facility]. Please see a copy of my visit note below.    [facility] Dermatology Note  Encounter Date: Feb 11, 2021  Office Visit     Dermatology Problem List:  1. Hidradenitis suppuritiva   - Diagnosed in 2016 and did not respond to antibiotics.       - Seen by Dr. Bird and underwent 9 procedures       -  mg BID - started 9/19, now discontinued      - Dapsone 100 mg daily (started 11/21/19)      - Clindamycin/Rifampin 300 mg/ 300 mg with flares      2. Acute onset hand foot psoriasiform dermatitis with joint pain, suspected reactive arthritis, now improving and almost resolved with minimal foot involvement      -  mg BID - started 9/19, now discontinued      - Lidex ointment at night       - DermaSmooth  3. Atopic dermatitis   - Triamcinolone 0.1% cream BID   - Betamethasone 0.05% ointment BID         4. Vitiligo   - no treatment  5. Corn      - pair down    ____________________________________________    Assessment & Plan:  1) HS  - Well controlled on dasone 100mg daily  - If flaring take clindamycin/rifampin 300mg/300mg BID for 2 weeks  - CBC in 2 months     2) Psoriasis/Atopic dermatitis   - Refilled      Triamcinolone cream for body      Discontinue Betamethasone diporpironate ointment for              recalcitrant areas      Derma-smoothe and lidex on scalp     3) Vitiligo  - No treatment     4) Corn   - Paired down    Follow-up: 2 month(s) virtually (telephone with photos), or earlier for new or changing lesions. Will meet with PA.     Staff and Medical Student:   I saw and examined this patient in the presence of Dr. Ruiz.    Brianne Purvis, MS4  [facility]    Staff Physician:  I was present  with the medical student who participated in the service and in the documentation of the note. I have verified the history and personally performed the physical exam and medical decision making. I agree with the assessment and plan of care as documented in the note.     Apolinar Ruiz MD    Department of Dermatology  [facility]: Phone: [phone], Fax: [phone]  2/20/2021        ____________________________________________    CC: Derm Problem (Patient is here today for a 3 month follow up. )    HPI:  Mr. Bernard Padilla is a(n) 35 year old male who presents today as a return patient for a follow up visit. Today, Bernard is doing really well. He had a flare two weeks ago on his buttocks but he took the Clindamycin/Rifampin for 4-5 days and that resolved his symptoms. He is tolerating the dapsone 100 mg daily. For his psoriasis, he is applying triamcinolone on his hands and feet which has cleared up the psoriasis/atopic dermatitis. He is using the dermasmooth and lidex solution on his scalp which has drastically improved his itching and flakiness. He would like his corn to be paired down today. Patient is otherwise feeling well, without additional concerns. No issues with shortness of breath.    Labs:  CBC , BMP, and hepatic function labs reviewed.    Physical Exam:  Vitals: /74 (BP Location: Right arm, Patient Position: Chair, Cuff Size: Adult Regular)   Pulse 66   GEN: NAD  SKIN: Focused examination of hands, feet, and scalp was performed  - Scalp with  minimal flaking  - Corn left lateral 5th digit.  - No other lesions of concern on areas examined.   NEURO: Able to walk on heels and toes    Medications:  Current Outpatient Medications   Medication     acetaminophen (TYLENOL) 325 MG tablet     albuterol (PROAIR HFA/PROVENTIL HFA/VENTOLIN HFA) 108 (90 Base) MCG/ACT inhaler     augmented betamethasone dipropionate  (DIPROLENE-AF) 0.05 % external ointment     clindamycin (CLEOCIN T) 1 % external solution     clindamycin (CLEOCIN) 300 MG capsule     dapsone (ACZONE) 100 MG tablet     fluocinolone acetonide (DERMA-SMOOTHE/FS BODY) 0.01 % external oil     Fluocinolone Acetonide Scalp (DERMA-SMOOTHE/FS SCALP) 0.01 % OIL oil     fluocinonide (LIDEX) 0.05 % external solution     fluticasone (FLONASE) 50 MCG/ACT nasal spray     IBUPROFEN PO     loratadine (CLARITIN) 10 MG tablet     oxyCODONE IR (ROXICODONE) 10 MG tablet     rifampin (RIFADIN) 300 MG capsule     triamcinolone (KENALOG) 0.1 % external cream     No current facility-administered medications for this visit.       Past Medical History:   Patient Active Problem List   Diagnosis     Hidradenitis suppurativa     Tobacco use disorder     Patellofemoral arthritis of left knee     Eczema     Allergic rhinitis     Depression     Influenza     Knee pain, left     Other chronic pain     Scrotal abscess     Abscess of groin, left     Past Medical History:   Diagnosis Date     Arthritis of knee      Boil      Chronic pain      Eczema      Hidradenitis suppurativa         CC Referred Self, MD  No address on file on close of this encounter.

## 2021-02-11 NOTE — PROGRESS NOTES
[facility] Dermatology Note  Encounter Date: Feb 11, 2021  Office Visit     Dermatology Problem List:  1. Hidradenitis suppuritiva   - Diagnosed in 2016 and did not respond to antibiotics.       - Seen by Dr. Bird and underwent 9 procedures       -  mg BID - started 9/19, now discontinued      - Dapsone 100 mg daily (started 11/21/19)      - Clindamycin/Rifampin 300 mg/ 300 mg with flares      2. Acute onset hand foot psoriasiform dermatitis with joint pain, suspected reactive arthritis, now improving and almost resolved with minimal foot involvement      -  mg BID - started 9/19, now discontinued      - Lidex ointment at night       - DermaSmooth  3. Atopic dermatitis   - Triamcinolone 0.1% cream BID   - Betamethasone 0.05% ointment BID         4. Vitiligo   - no treatment  5. Corn      - pair down    ____________________________________________    Assessment & Plan:  1) HS  - Well controlled on dasone 100mg daily  - If flaring take clindamycin/rifampin 300mg/300mg BID for 2 weeks  - CBC in 2 months     2) Psoriasis/Atopic dermatitis   - Refilled      Triamcinolone cream for body      Discontinue Betamethasone diporpironate ointment for              recalcitrant areas      Derma-smoothe and lidex on scalp     3) Vitiligo  - No treatment     4) Corn   - Paired down    Follow-up: 2 month(s) virtually (telephone with photos), or earlier for new or changing lesions. Will meet with PA.     Staff and Medical Student:   I saw and examined this patient in the presence of Dr. Ruiz.    Brianne Purvis, MS4  [facility]    Staff Physician:  I was present with the medical student who participated in the service and in the documentation of the note. I have verified the history and personally performed the physical exam and medical decision making. I agree with the assessment and plan of care as documented in the note.     Apolinar Ruiz MD    Department of  Dermatology  [facility]: Phone: [phone], Fax: [phone]  2/20/2021        ____________________________________________    CC: Derm Problem (Patient is here today for a 3 month follow up. )    HPI:  Mr. Bernard Padilla is a(n) 35 year old male who presents today as a return patient for a follow up visit. Today, Bernard is doing really well. He had a flare two weeks ago on his buttocks but he took the Clindamycin/Rifampin for 4-5 days and that resolved his symptoms. He is tolerating the dapsone 100 mg daily. For his psoriasis, he is applying triamcinolone on his hands and feet which has cleared up the psoriasis/atopic dermatitis. He is using the dermasmooth and lidex solution on his scalp which has drastically improved his itching and flakiness. He would like his corn to be paired down today. Patient is otherwise feeling well, without additional concerns. No issues with shortness of breath.    Labs:  CBC , BMP, and hepatic function labs reviewed.    Physical Exam:  Vitals: /74 (BP Location: Right arm, Patient Position: Chair, Cuff Size: Adult Regular)   Pulse 66   GEN: NAD  SKIN: Focused examination of hands, feet, and scalp was performed  - Scalp with  minimal flaking  - Corn left lateral 5th digit.  - No other lesions of concern on areas examined.   NEURO: Able to walk on heels and toes    Medications:  Current Outpatient Medications   Medication     acetaminophen (TYLENOL) 325 MG tablet     albuterol (PROAIR HFA/PROVENTIL HFA/VENTOLIN HFA) 108 (90 Base) MCG/ACT inhaler     augmented betamethasone dipropionate (DIPROLENE-AF) 0.05 % external ointment     clindamycin (CLEOCIN T) 1 % external solution     clindamycin (CLEOCIN) 300 MG capsule     dapsone (ACZONE) 100 MG tablet     fluocinolone acetonide (DERMA-SMOOTHE/FS BODY) 0.01 % external oil     Fluocinolone Acetonide Scalp (DERMA-SMOOTHE/FS SCALP) 0.01 % OIL oil     fluocinonide  (LIDEX) 0.05 % external solution     fluticasone (FLONASE) 50 MCG/ACT nasal spray     IBUPROFEN PO     loratadine (CLARITIN) 10 MG tablet     oxyCODONE IR (ROXICODONE) 10 MG tablet     rifampin (RIFADIN) 300 MG capsule     triamcinolone (KENALOG) 0.1 % external cream     No current facility-administered medications for this visit.       Past Medical History:   Patient Active Problem List   Diagnosis     Hidradenitis suppurativa     Tobacco use disorder     Patellofemoral arthritis of left knee     Eczema     Allergic rhinitis     Depression     Influenza     Knee pain, left     Other chronic pain     Scrotal abscess     Abscess of groin, left     Past Medical History:   Diagnosis Date     Arthritis of knee      Boil      Chronic pain      Eczema      Hidradenitis suppurativa         CC Referred Self, MD  No address on file on close of this encounter.

## 2021-02-11 NOTE — NURSING NOTE
Chief Complaint   Patient presents with     Derm Problem     Patient is here today for a 3 month follow up.      Bruna PRABHAKAR CMA

## 2021-02-16 DIAGNOSIS — L30.9 DERMATITIS: ICD-10-CM

## 2021-02-17 RX ORDER — FLUOCINOLONE ACETONIDE 0.11 MG/ML
OIL TOPICAL
Qty: 118.28 ML | Refills: 5 | Status: SHIPPED | OUTPATIENT
Start: 2021-02-17 | End: 2021-05-31

## 2021-02-17 NOTE — TELEPHONE ENCOUNTER
Last Clinic Visit: 2/11/2021  [facility] Dermatology [facility]  Process #2..

## 2021-02-24 ENCOUNTER — COMMUNICATION - HEALTHEAST (OUTPATIENT)
Dept: NURSING | Facility: CLINIC | Age: 36
End: 2021-02-24

## 2021-02-25 ENCOUNTER — COMMUNICATION - HEALTHEAST (OUTPATIENT)
Dept: NURSING | Facility: CLINIC | Age: 36
End: 2021-02-25

## 2021-03-03 ENCOUNTER — COMMUNICATION - HEALTHEAST (OUTPATIENT)
Dept: CARE COORDINATION | Facility: CLINIC | Age: 36
End: 2021-03-03

## 2021-03-03 DIAGNOSIS — L30.9 ECZEMA, UNSPECIFIED TYPE: ICD-10-CM

## 2021-03-04 RX ORDER — FLUOCINOLONE ACETONIDE 0.11 MG/ML
OIL TOPICAL 2 TIMES DAILY
Qty: 120 ML | Refills: 1 | OUTPATIENT
Start: 2021-03-04

## 2021-03-19 ENCOUNTER — COMMUNICATION - HEALTHEAST (OUTPATIENT)
Dept: NURSING | Facility: CLINIC | Age: 36
End: 2021-03-19

## 2021-03-30 ENCOUNTER — COMMUNICATION - HEALTHEAST (OUTPATIENT)
Dept: NURSING | Facility: CLINIC | Age: 36
End: 2021-03-30

## 2021-04-09 ENCOUNTER — COMMUNICATION - HEALTHEAST (OUTPATIENT)
Dept: PHARMACY | Facility: CLINIC | Age: 36
End: 2021-04-09

## 2021-04-09 DIAGNOSIS — J30.9 ALLERGIC RHINITIS: ICD-10-CM

## 2021-04-13 ENCOUNTER — COMMUNICATION - HEALTHEAST (OUTPATIENT)
Dept: INTERNAL MEDICINE | Facility: CLINIC | Age: 36
End: 2021-04-13

## 2021-04-13 DIAGNOSIS — G89.29 OTHER CHRONIC PAIN: ICD-10-CM

## 2021-04-13 DIAGNOSIS — L73.2 HIDRADENITIS: ICD-10-CM

## 2021-04-19 ENCOUNTER — TRANSCRIBE ORDERS (OUTPATIENT)
Dept: OTHER | Age: 36
End: 2021-04-19

## 2021-04-19 DIAGNOSIS — L73.2 HIDRADENITIS: Primary | ICD-10-CM

## 2021-04-19 DIAGNOSIS — G89.29 OTHER CHRONIC PAIN: ICD-10-CM

## 2021-04-21 ENCOUNTER — COMMUNICATION - HEALTHEAST (OUTPATIENT)
Dept: CARE COORDINATION | Facility: CLINIC | Age: 36
End: 2021-04-21

## 2021-04-22 ENCOUNTER — COMMUNICATION - HEALTHEAST (OUTPATIENT)
Dept: NURSING | Facility: CLINIC | Age: 36
End: 2021-04-22

## 2021-05-04 ENCOUNTER — TELEPHONE (OUTPATIENT)
Dept: DERMATOLOGY | Facility: CLINIC | Age: 36
End: 2021-05-04

## 2021-05-04 DIAGNOSIS — L30.9 ECZEMA, UNSPECIFIED TYPE: ICD-10-CM

## 2021-05-04 NOTE — TELEPHONE ENCOUNTER
[facility] Call Center    Phone Message    May a detailed message be left on voicemail: yes     Reason for Call: Symptoms or Concerns     If patient has red-flag symptoms, warm transfer to triage line    Current symptom or concern: Pt called and said that he is having a flare up from his HS. Pt stated that it started on the weekend but after taking his medication it went down for a bit.  Pt said that it started again yesterday. Pt said that the medication is not working. Pt is having a flare up on his buttocks, groin area and inner thigh. Please call him back to discuss. Thanks    Symptoms have been present for: 24 hour(s)    Has patient previously been seen for this? Yes    By : Dr. Ruiz    Date: 2/11/21    Are there any new or worsening symptoms? Yes: see above      Action Taken: Message routed to:  [facility] DERM    Travel Screening: Not Applicable

## 2021-05-05 ENCOUNTER — OFFICE VISIT - HEALTHEAST (OUTPATIENT)
Dept: INTERNAL MEDICINE | Facility: CLINIC | Age: 36
End: 2021-05-05

## 2021-05-05 ENCOUNTER — COMMUNICATION - HEALTHEAST (OUTPATIENT)
Dept: SCHEDULING | Facility: CLINIC | Age: 36
End: 2021-05-05

## 2021-05-05 ENCOUNTER — RECORDS - HEALTHEAST (OUTPATIENT)
Dept: SCHEDULING | Facility: CLINIC | Age: 36
End: 2021-05-05

## 2021-05-05 DIAGNOSIS — K65.1 PERITONEAL ABSCESS (H): ICD-10-CM

## 2021-05-05 DIAGNOSIS — G89.29 OTHER CHRONIC PAIN: ICD-10-CM

## 2021-05-05 DIAGNOSIS — L73.2 HIDRADENITIS: ICD-10-CM

## 2021-05-05 ASSESSMENT — MIFFLIN-ST. JEOR: SCORE: 1749.12

## 2021-05-05 NOTE — TELEPHONE ENCOUNTER
lvm to call back to get more information.     Will need to route to LISA as Dr. Ruiz is out and does not  Have any openings.

## 2021-05-20 ENCOUNTER — TELEPHONE (OUTPATIENT)
Dept: ANESTHESIOLOGY | Facility: CLINIC | Age: 36
End: 2021-05-20

## 2021-05-20 ENCOUNTER — OFFICE VISIT (OUTPATIENT)
Dept: ANESTHESIOLOGY | Facility: CLINIC | Age: 36
End: 2021-05-20
Payer: MEDICARE

## 2021-05-20 VITALS
HEIGHT: 67 IN | SYSTOLIC BLOOD PRESSURE: 124 MMHG | RESPIRATION RATE: 16 BRPM | DIASTOLIC BLOOD PRESSURE: 87 MMHG | HEART RATE: 69 BPM | WEIGHT: 189 LBS | BODY MASS INDEX: 29.66 KG/M2

## 2021-05-20 DIAGNOSIS — G89.29 CHRONIC PAIN OF LEFT KNEE: Primary | ICD-10-CM

## 2021-05-20 DIAGNOSIS — Z79.891 OPIOID CONTRACT EXISTS: ICD-10-CM

## 2021-05-20 DIAGNOSIS — M25.562 CHRONIC PAIN OF LEFT KNEE: Primary | ICD-10-CM

## 2021-05-20 DIAGNOSIS — L73.2 HIDRADENITIS SUPPURATIVA: ICD-10-CM

## 2021-05-20 DIAGNOSIS — M79.18 MYOFASCIAL PAIN: ICD-10-CM

## 2021-05-20 PROCEDURE — 99000 SPECIMEN HANDLING OFFICE-LAB: CPT | Performed by: PATHOLOGY

## 2021-05-20 PROCEDURE — 80307 DRUG TEST PRSMV CHEM ANLYZR: CPT | Mod: 90 | Performed by: PATHOLOGY

## 2021-05-20 PROCEDURE — 99204 OFFICE O/P NEW MOD 45 MIN: CPT | Mod: GC | Performed by: ANESTHESIOLOGY

## 2021-05-20 RX ORDER — MELOXICAM 15 MG/1
15 TABLET ORAL DAILY
Qty: 30 TABLET | Refills: 0 | Status: SHIPPED | OUTPATIENT
Start: 2021-05-20 | End: 2021-08-09

## 2021-05-20 ASSESSMENT — PAIN SCALES - GENERAL: PAINLEVEL: WORST PAIN (10)

## 2021-05-20 ASSESSMENT — MIFFLIN-ST. JEOR: SCORE: 1755.93

## 2021-05-20 NOTE — TELEPHONE ENCOUNTER
[facility] Call Center    Phone Message    May a detailed message be left on voicemail: yes     Reason for Call: Other: Patient calling to check the status of a pain medication that was discussed during his appointment today with Dr. Sanchez, Patient states his pharmacy is [facility] - [place], [place] - [address] [place] [address]. Please call to discuss thank you.      Action Taken: Message routed to:  [facility] ([facility] pain    Travel Screening: Not Applicable

## 2021-05-20 NOTE — NURSING NOTE
LPN reviewed CSA with the pt.   A copy of the CSA was provided to them.     Patient is scheduled for follow up on 6/17/21 with Dr. Sanchez.     Jayde Daniels LPN

## 2021-05-20 NOTE — PATIENT INSTRUCTIONS
Medications:    Belbuca 150 mg films- Place 1 film inside the cheek every 12 hours for 14 days.     For refills of opioid medications - You MUST call (Or MyChart) the clinic DIRECTLY and at least 7 days before you run out of your medication.    Start Amitriptyline 25 mg at bedtime.     Start Mobic 15 mg by mouth daily.     Please provide the clinic with a minium of 1 week notice, on all prescription refills.     Referrals:    Pain Psychology Referral Placed- Call [phone] to schedule if you have not heard from their office within 2 buisness days.      Pain Physical Therapy Referral placed-   Call [phone] to schedule your appointment.     Treatment planning:    A Controlled Substance Agreement was signed today with Dr. Sanchez.   A copy was provided to you for your records.     A urine drug screen was collected today. These will be repeated randomly.       Recommended Follow up:      6/17/21 at 1120 am       To speak with a nurse, schedule/reschedule/cancel a clinic appointment, or request a medication refill call: [phone], option #1.    You can also reach us by [employer]: [email or web address]

## 2021-05-20 NOTE — PROGRESS NOTES
ATTENDING ATTESTATION: I have seen and evaluated the patient with the medical student and agree with the history, examination, assessment, and plan as detailed below.  In addition, I have selectively edited the note below such that it is reflective of my own assessment of the patient's condition and plan for the patient's care.    Sami Cotter MD    Department of Anesthesiology  Pain Management Division    VISIT RECOMMENDATIONS:  1. Trial amitriptyline 25 mg at bedtime for chronic widespread pain.  Will reassess for benefit, side effects, and additional titration at next evaluation.  2. Trial meloxicam 15 mg daily for chronic myofascial pain.  Discontinue if side effects.  Patient advised to avoid any other NSAID medication.  3. Trial Belbuca 150 mcg BID x 14 days for chronic pain 2/2 hidradenitis suppurativa.  Additional titration can be considered if non beneficial at this dosage  4. Referral placed for pain PT for evaluation of central and peripheral sensitization and myofascial pain and treatments involving fear avoidance, pacing, mobilization, and other indicated modalities.  5. Referral placed for pain psychology to address the role of pain processing via CBT, biofeedback, mindfulness based stress reduction, and other indicated modalities  6. UDS and CSA for prescribing of opioid medications    COMPREHENSIVE PAIN CLINIC INITIAL EVALUATION    I had the pleasure of meeting Mr. Bernard Padilla on 5/20/2021 in [facility] with regards to his chronic pain 2/2 HS, chronic knee pain and chronic back pain    Subjective:  The patient is a 35 year old male with past medical history of Hydradenitis Suppurativa, chronic left Knee pain and chronic back pain  who presents for evaluation of chronic pain. The patient's groin pain began 10 years ago, and has been worsening since that time.  He reports that his pain is located primarily in groin and buttocks and does not radiate. The patient  describes the pain as sharp. He reports that the pain is made worse by activity and HS flares.  His pain is improved with oxycodone and percocet. He has had 17 surgeries to drain abscesses, the most recent of which was 2 weeks ago. The patient's pain is most severe during flares. He has he can have 2-5 flares a week and they last for 1-3 weeks. It is uncomfortable to sit or stand. He rates his average pain score at 8/10, but it can be as low as 6/10 or as severe as 10/10. He was previously in the care of Dr. Chai Foster, he says he failed to show up for a UDS and was fired by this physician.  He follows Dr. Ruiz in dermatology who has him on Dapsone, which he says has been helpful.     Patient has had three surgeries on his left knee and says the pain is increased with activity, constant and relieved by oxycodone.  Patient's back pain is constant and aggravated by sitting in uncomfortable positions in order to take pressure of his HS flares on his buttocks and groin. Relates the pain is the worst in the low back, but admits to stiffness throughout. Was offered surgery but declined due to risks.     He denies any new problems with falls or balance, any new numbness or weakness of the arms or legs, any new bowel or bladder incontinence, any night sweats or unexplained fevers, or any sudden or unexpected weight loss.     Bernard Padilla has been seen at a pain clinic in the past. Last seen by Dr. Marcos Foster on 8/14/2020.     Patient reported symptoms:  Patient Supplied Answers To the [facility] Pain Questionnaire  [facility] -  Patient Entered Questionnaire/Answers 1/8/2019   What number best describes your pain right now:  0 = No pain  to  10 = Worst pain imaginable 10   How would you describe the pain? pressure, other   Which of the following worsen your pain? standing, sitting, walking, thinking about something else   Which of the following improve or reduce your pain?  medication, relaxation, nothing relieves the  pain   What number best describes your average pain for the past week:  0 = No pain  to  10 = Worst pain imaginable 10   What number best describes your LOWEST pain in past 24 hours:  0 = No pain  to  10 = Worst pain imaginable 8   What number best describes your WORST pain in past 24 hours:  0 = No pain  to  10 = Worst pain imaginable 10   When is your pain worst? Constant   What non-medicine treatments have you already had for your pain? pain clinic, none   Have you tried treating your pain with medication?  Yes   Are you currently taking medications for your pain? Yes     Current treatments:  Ibuprofen PRN  Oxycodone 10 mg TID PRN  Acetaminophen PRN    Previous medication treatments included:  Anti-convulsants: gabapentin, didn't work  Muscle relaxors: for knee pain, worked well  Anti-depressants: no  Acetaminophen/NSAIDs: Yes, do not help enough  Topicals: no  Opioids: Percocet and oxycodone both work    Other treatments have included:  Physical therapy: for knee pain and back pain, worked well  Pain Psychology: no  Chiropractic: yes, helpful for stiffness  Acupuncture: no  TENs Unit: yes, at chiropractor for back  Injections: for the knee yes, didn't work  Surgeries: 17 surgeries to drain HS abscesses, 3 L knee surgeries      Past Medical History:  Medical history reviewed.   Past Medical History:   Diagnosis Date     Arthritis of knee      Boil      Chronic pain      Eczema      Hidradenitis suppurativa       Patient Active Problem List   Diagnosis     Hidradenitis suppurativa     Tobacco use disorder     Patellofemoral arthritis of left knee     Eczema     Allergic rhinitis     Depression     Influenza     Knee pain, left     Other chronic pain     Scrotal abscess     Abscess of groin, left     Past Surgical History:  Pertinent surgical history reviewed.   Past Surgical History:   Procedure Laterality Date     EXCISE HIDRADENITIS (LOCATION) Bilateral 9/25/2020    Procedure: EXCISION, HIDRADENITIS - right  medial thigh, left groin and posterior scrotum.;  Surgeon: Maura Maldonado MD;  Location: [facility]     IRRIGATION AND DEBRIDEMENT RECTUM, COMBINED N/A 11/14/2019    Procedure: IRRIGATION AND DEBRIDEMENT, RECTUM;  Surgeon: Yon Kenny MD;  Location: [facility] OR     ORTHOPEDIC SURGERY      meniscus repair     Medications: Pertinent medications reviewed and updated  Current Outpatient Medications   Medication Sig Dispense Refill     acetaminophen (TYLENOL) 325 MG tablet Take 650 mg by mouth every 4 hours as needed       albuterol (PROAIR HFA/PROVENTIL HFA/VENTOLIN HFA) 108 (90 Base) MCG/ACT inhaler Inhale 2 puffs into the lungs every 6 hours 8.5 g 3     clindamycin (CLEOCIN) 300 MG capsule Take 1 capsule (300 mg) by mouth 2 times daily 180 capsule 0     dapsone (ACZONE) 100 MG tablet Take 1 tablet (100 mg) by mouth daily 90 tablet 0     fluocinolone acetonide (DERMA-SMOOTHE/FS BODY) 0.01 % external oil Apply topically 2 times daily 120 mL 1     Fluocinolone Acetonide Scalp (DERMA-SMOOTHE/FS SCALP) 0.01 % OIL oil Apply topically daily as instructed. 118.28 mL 5     fluocinonide (LIDEX) 0.05 % external solution Apply topically 2 times daily 60 mL 3     fluticasone (FLONASE) 50 MCG/ACT nasal spray Spray 1-2 sprays into both nostrils daily 16 g 2     IBUPROFEN PO Take 800 mg by mouth 3 times daily as needed (Last dose 9.19.2020)        loratadine (CLARITIN) 10 MG tablet Take 10 mg by mouth daily       oxyCODONE IR (ROXICODONE) 10 MG tablet Take 0.5-1 tablets (5-10 mg) by mouth every 8 hours as needed for severe pain 21 tablet 0     rifampin (RIFADIN) 300 MG capsule Take 300 mg by mouth daily       triamcinolone (KENALOG) 0.1 % external cream Apply topically 2 times daily 464 g 3     [place] and [place] Prescription Monitoring Program reviewed    Allergies: Pertinent allergies reviewed     Allergies   Allergen Reactions     Vicodin [Hydrocodone-Acetaminophen] Shortness Of Breath     Chicken-Derived Products (Egg) Nausea  and Vomiting and GI Disturbance     Hydrocodone Swelling     Other reaction(s): Throat Irritation  Tolerated oxycodone   Upset stomach       Family History:   family history includes Diabetes in his father; Hypertension in his mother.    Social history:   Social History     Socioeconomic History     Marital status: Single     Spouse name: Not on file     Number of children: Not on file     Years of education: Not on file     Highest education level: Not on file   Occupational History     Not on file   Social Needs     Financial resource strain: Not on file     Food insecurity     Worry: Not on file     Inability: Not on file     Transportation needs     Medical: Not on file     Non-medical: Not on file   Tobacco Use     Smoking status: Current Every Day Smoker     Packs/day: 1.00     Years: 19.00     Pack years: 19.00     Smokeless tobacco: Never Used     Tobacco comment: 1/2 pack of day, down from 1.5 pk/day   Substance and Sexual Activity     Alcohol use: Yes     Comment: Beer occasionally      Drug use: Yes     Types: Marijuana     Comment: Occasional     Sexual activity: Not on file   Lifestyle     Physical activity     Days per week: Not on file     Minutes per session: Not on file     Stress: Not on file   Relationships     Social connections     Talks on phone: Not on file     Gets together: Not on file     Attends Sabianist service: Not on file     Active member of club or organization: Not on file     Attends meetings of clubs or organizations: Not on file     Relationship status: Not on file     Intimate partner violence     Fear of current or ex partner: Not on file     Emotionally abused: Not on file     Physically abused: Not on file     Forced sexual activity: Not on file   Other Topics Concern     Parent/sibling w/ CABG, MI or angioplasty before 65F 55M? Not Asked   Social History Narrative     Not on file     Social History     Social History Narrative     Not on file     He lives in [place], [place]. He  "is not currently working. He recently quit his job as a  and is currently on social security.  Smokin/2ppd. Alcohol: socially. Street drugs: admits to occasional marijuana.     Review of Systems:  ROS   The 14 system ROS was reviewed from the intake questionnaire; results listed at end of note.    Physical Exam:  /87   Pulse 69   Resp 16   Ht 1.702 m (5' 7\")   Wt 85.7 kg (189 lb)   BMI 29.60 kg/m      Physical Exam   Constitutional: He is oriented to person, place, and time.  He appears well-developed and well-nourished. He is not in acute distress.   HENT:     Head: Normocephalic and atraumatic.     Eyes: Pupils are equal, round, and reactive to light. EOM are normal. No scleral icterus.     Neck: Normal range of motion. Neck supple.   Cardiovascular: Normal rate and regular rhythm.   Pulmonary/Chest:  NWOB. No respiratory distress.   Abdominal: deferred  Genitourinary: deferred  Neurological: He is alert and oriented to person, place, and time. CN II-XII grossly intact, coordination grossly normal.    Skin: Skin is warm and dry. He is not diaphoretic.   Psychiatric: He has a normal mood and affect. His behavior is normal. Judgment and thought content normal.  MSK: bilateral point tenderness throughout back at lumbar, thoracic and cervical levels. No tenderness over the vertebral bodies. Mild TTP around left knee, antalgic gait    Imaging:     Left Knee MRI , report notes post op changes of partial lateral meniscectomy without evidence of recurrent tearing, mild osteoarthritic changes in lateral compartment, small area of partial thickness articular cartilage loss in trochlear groove.     No spine MRI found. Patient stated having several \"slipped disks\" but no imaging found. Several lumbar Xrays have been normal, the latest found in .     EMG:  None    Laboratory Results:  CBC, CMP WNL from [facility] 2021    Assessment:  The patient is a 35 year old male with PMHx of " Hydradenitis suppurativa, chronic knee and back pain who was referred by Dr. Webb at [facility] [place] for evaluation of chronic pain.  The patient has multiple somatic pain generators including his left knee, myofascial pain, and pain related to hidradenitis suppurativa.  In addition to these pain generators he also has findings consistent with central and peripheral nervous systems sensitization in the setting of both chronic pain and chronic opioid therapy.  We will start with a multidisciplinary treatment plan as detailed below to address both the somatic and central causes of his pain.  In the setting of central sensitization and adherence issues with his prior clinic, conventional opioid medications are not recommended at this time for the treatment of his chronic pain problems.    Knee: We discussed options for pain treatments including nerve ablations for the knee pain. He was concerned that if this failed he would have to have a knee replacement and he did not want to do that.     Back: We discussed physical therapy for his back.    HS: We discussed starting him on buprenorphine for his HS and other chronic pain conditions. He did not like the idea of an oral film vs pill but seemed willing to try.     General chronic pain: We discussed starting low dose amitriptyline to help with central sensitization as well as improve sleep. We also discussed pain psychiatry to help with things like mindfulness.     Visit Diagnoses:  1. Hidradenitis suppurativa  2. Myofascial pain  3. Left knee pain  4. Chronic pain syndrome    Plan:  Patient education:    We discussed with the patient the likely mechanisms underlying his pain.  In his case, this includes hidradenitis, left knee joint derangement, and myofascial pain which are likely contributing to his overall pain picture.  In addition we discussed the role of central and peripheral pain processing in the development and propagation of chronic pain as a  disorder as well as the importance of multidisciplinary treatment and multimodal medication therapy.    Work up:    UDS for opioid prescribing    Referrals:    Therapeutic referrals as listed below    Medications:    Trial amitriptyline 25 mg at bedtime for widespread pain.  Will reassess for benefit, side effects, and additional titration at next evaluation.    Trial meloxicam 15 mg daily for myofascial pain.  Discontinue if side effects.  Patient advised to avoid any other NSAID medication.    Trial belbuca 150 mcg film BID for alternative opioid therapy to mitigate risks of long term opioid therapy    Therapies:    Referral placed for pain PT for evaluation of central and peripheral sensitization and myofascial pain and treatments involving fear avoidance, pacing, mobilization, and other indicated modalities.    Referral placed for pain psychology to address the role of pain processing via CBT, biofeedback, mindfulness based stress reduction, and other indicated modalities    Interventions:    None at this time, but will explore insurance coverage for genicular RFA treatments    Follow up: 6-8 weeks or sooner if needed    Thank you for the consult.    Jeanne Momin, MS4

## 2021-05-20 NOTE — LETTER
5/20/2021       RE: Bernard Padilla  [address]  [place] [address]     Dear Colleague,    Thank you for referring your patient, Bernard Padilla, to the [facility] FOR COMPREHENSIVE PAIN MANAGEMENT [place] at [facility]. Please see a copy of my visit note below.    ATTENDING ATTESTATION: I have seen and evaluated the patient with the medical student and agree with the history, examination, assessment, and plan as detailed below.  In addition, I have selectively edited the note below such that it is reflective of my own assessment of the patient's condition and plan for the patient's care.    Sami Cotter MD    Department of Anesthesiology  Pain Management Division    VISIT RECOMMENDATIONS:  1. Trial amitriptyline 25 mg at bedtime for chronic widespread pain.  Will reassess for benefit, side effects, and additional titration at next evaluation.  2. Trial meloxicam 15 mg daily for chronic myofascial pain.  Discontinue if side effects.  Patient advised to avoid any other NSAID medication.  3. Trial Belbuca 150 mcg BID x 14 days for chronic pain 2/2 hidradenitis suppurativa.  Additional titration can be considered if non beneficial at this dosage  4. Referral placed for pain PT for evaluation of central and peripheral sensitization and myofascial pain and treatments involving fear avoidance, pacing, mobilization, and other indicated modalities.  5. Referral placed for pain psychology to address the role of pain processing via CBT, biofeedback, mindfulness based stress reduction, and other indicated modalities  6. UDS and CSA for prescribing of opioid medications    COMPREHENSIVE PAIN CLINIC INITIAL EVALUATION    I had the pleasure of meeting Mr. Bernard Padilla on 5/20/2021 in the Chronic Pain Clinic with regards to his chronic pain 2/2 HS, chronic knee pain and chronic back pain    Subjective:  The patient is a 35  year old male with past medical history of Hydradenitis Suppurativa, chronic left Knee pain and chronic back pain  who presents for evaluation of chronic pain. The patient's groin pain began 10 years ago, and has been worsening since that time.  He reports that his pain is located primarily in groin and buttocks and does not radiate. The patient describes the pain as sharp. He reports that the pain is made worse by activity and HS flares.  His pain is improved with oxycodone and percocet. He has had 17 surgeries to drain abscesses, the most recent of which was 2 weeks ago. The patient's pain is most severe during flares. He has he can have 2-5 flares a week and they last for 1-3 weeks. It is uncomfortable to sit or stand. He rates his average pain score at 8/10, but it can be as low as 6/10 or as severe as 10/10. He was previously in the care of Dr. Chai Foster, he says he failed to show up for a UDS and was fired by this physician.  He follows Dr. Ruiz in dermatology who has him on Dapsone, which he says has been helpful.     Patient has had three surgeries on his left knee and says the pain is increased with activity, constant and relieved by oxycodone.  Patient's back pain is constant and aggravated by sitting in uncomfortable positions in order to take pressure of his HS flares on his buttocks and groin. Relates the pain is the worst in the low back, but admits to stiffness throughout. Was offered surgery but declined due to risks.     He denies any new problems with falls or balance, any new numbness or weakness of the arms or legs, any new bowel or bladder incontinence, any night sweats or unexplained fevers, or any sudden or unexpected weight loss.     Bernard Padilla has been seen at a pain clinic in the past. Last seen by Dr. Marcos Foster on 8/14/2020.     Patient reported symptoms:  Patient Supplied Answers To the [facility] Pain Questionnaire  [facility] Pain -  Patient Entered Questionnaire/Answers 1/8/2019    What number best describes your pain right now:  0 = No pain  to  10 = Worst pain imaginable 10   How would you describe the pain? pressure, other   Which of the following worsen your pain? standing, sitting, walking, thinking about something else   Which of the following improve or reduce your pain?  medication, relaxation, nothing relieves the pain   What number best describes your average pain for the past week:  0 = No pain  to  10 = Worst pain imaginable 10   What number best describes your LOWEST pain in past 24 hours:  0 = No pain  to  10 = Worst pain imaginable 8   What number best describes your WORST pain in past 24 hours:  0 = No pain  to  10 = Worst pain imaginable 10   When is your pain worst? Constant   What non-medicine treatments have you already had for your pain? pain clinic, none   Have you tried treating your pain with medication?  Yes   Are you currently taking medications for your pain? Yes     Current treatments:  Ibuprofen PRN  Oxycodone 10 mg TID PRN  Acetaminophen PRN    Previous medication treatments included:  Anti-convulsants: gabapentin, didn't work  Muscle relaxors: for knee pain, worked well  Anti-depressants: no  Acetaminophen/NSAIDs: Yes, do not help enough  Topicals: no  Opioids: Percocet and oxycodone both work    Other treatments have included:  Physical therapy: for knee pain and back pain, worked well  Pain Psychology: no  Chiropractic: yes, helpful for stiffness  Acupuncture: no  TENs Unit: yes, at chiropractor for back  Injections: for the knee yes, didn't work  Surgeries: 17 surgeries to drain HS abscesses, 3 L knee surgeries      Past Medical History:  Medical history reviewed.   Past Medical History:   Diagnosis Date     Arthritis of knee      Boil      Chronic pain      Eczema      Hidradenitis suppurativa       Patient Active Problem List   Diagnosis     Hidradenitis suppurativa     Tobacco use disorder     Patellofemoral arthritis of left knee     Eczema     Allergic  rhinitis     Depression     Influenza     Knee pain, left     Other chronic pain     Scrotal abscess     Abscess of groin, left     Past Surgical History:  Pertinent surgical history reviewed.   Past Surgical History:   Procedure Laterality Date     EXCISE HIDRADENITIS (LOCATION) Bilateral 9/25/2020    Procedure: EXCISION, HIDRADENITIS - right medial thigh, left groin and posterior scrotum.;  Surgeon: Maura Maldonado MD;  Location: [facility] OR     IRRIGATION AND DEBRIDEMENT RECTUM, COMBINED N/A 11/14/2019    Procedure: IRRIGATION AND DEBRIDEMENT, RECTUM;  Surgeon: Yon Kenny MD;  Location: [facility] OR     ORTHOPEDIC SURGERY      meniscus repair     Medications: Pertinent medications reviewed and updated  Current Outpatient Medications   Medication Sig Dispense Refill     acetaminophen (TYLENOL) 325 MG tablet Take 650 mg by mouth every 4 hours as needed       albuterol (PROAIR HFA/PROVENTIL HFA/VENTOLIN HFA) 108 (90 Base) MCG/ACT inhaler Inhale 2 puffs into the lungs every 6 hours 8.5 g 3     clindamycin (CLEOCIN) 300 MG capsule Take 1 capsule (300 mg) by mouth 2 times daily 180 capsule 0     dapsone (ACZONE) 100 MG tablet Take 1 tablet (100 mg) by mouth daily 90 tablet 0     fluocinolone acetonide (DERMA-SMOOTHE/FS BODY) 0.01 % external oil Apply topically 2 times daily 120 mL 1     Fluocinolone Acetonide Scalp (DERMA-SMOOTHE/FS SCALP) 0.01 % OIL oil Apply topically daily as instructed. 118.28 mL 5     fluocinonide (LIDEX) 0.05 % external solution Apply topically 2 times daily 60 mL 3     fluticasone (FLONASE) 50 MCG/ACT nasal spray Spray 1-2 sprays into both nostrils daily 16 g 2     IBUPROFEN PO Take 800 mg by mouth 3 times daily as needed (Last dose 9.19.2020)        loratadine (CLARITIN) 10 MG tablet Take 10 mg by mouth daily       oxyCODONE IR (ROXICODONE) 10 MG tablet Take 0.5-1 tablets (5-10 mg) by mouth every 8 hours as needed for severe pain 21 tablet 0     rifampin (RIFADIN) 300 MG capsule Take  300 mg by mouth daily       triamcinolone (KENALOG) 0.1 % external cream Apply topically 2 times daily 464 g 3     [place] and [place] Prescription Monitoring Program reviewed    Allergies: Pertinent allergies reviewed     Allergies   Allergen Reactions     Vicodin [Hydrocodone-Acetaminophen] Shortness Of Breath     Chicken-Derived Products (Egg) Nausea and Vomiting and GI Disturbance     Hydrocodone Swelling     Other reaction(s): Throat Irritation  Tolerated oxycodone   Upset stomach       Family History:   family history includes Diabetes in his father; Hypertension in his mother.    Social history:   Social History     Socioeconomic History     Marital status: Single     Spouse name: Not on file     Number of children: Not on file     Years of education: Not on file     Highest education level: Not on file   Occupational History     Not on file   Social Needs     Financial resource strain: Not on file     Food insecurity     Worry: Not on file     Inability: Not on file     Transportation needs     Medical: Not on file     Non-medical: Not on file   Tobacco Use     Smoking status: Current Every Day Smoker     Packs/day: 1.00     Years: 19.00     Pack years: 19.00     Smokeless tobacco: Never Used     Tobacco comment: 1/2 pack of day, down from 1.5 pk/day   Substance and Sexual Activity     Alcohol use: Yes     Comment: Beer occasionally      Drug use: Yes     Types: Marijuana     Comment: Occasional     Sexual activity: Not on file   Lifestyle     Physical activity     Days per week: Not on file     Minutes per session: Not on file     Stress: Not on file   Relationships     Social connections     Talks on phone: Not on file     Gets together: Not on file     Attends Congregational service: Not on file     Active member of club or organization: Not on file     Attends meetings of clubs or organizations: Not on file     Relationship status: Not on file     Intimate partner violence     Fear of current or ex partner: Not on file  "    Emotionally abused: Not on file     Physically abused: Not on file     Forced sexual activity: Not on file   Other Topics Concern     Parent/sibling w/ CABG, MI or angioplasty before 65F 55M? Not Asked   Social History Narrative     Not on file     Social History     Social History Narrative     Not on file     He lives in [place], [place]. He is not currently working. He recently quit his job as a  and is currently on social security.  Smokin/2ppd. Alcohol: socially. Street drugs: admits to occasional marijuana.     Review of Systems:  ROS   The 14 system ROS was reviewed from the intake questionnaire; results listed at end of note.    Physical Exam:  /87   Pulse 69   Resp 16   Ht 1.702 m (5' 7\")   Wt 85.7 kg (189 lb)   BMI 29.60 kg/m      Physical Exam   Constitutional: He is oriented to person, place, and time.  He appears well-developed and well-nourished. He is not in acute distress.   HENT:     Head: Normocephalic and atraumatic.     Eyes: Pupils are equal, round, and reactive to light. EOM are normal. No scleral icterus.     Neck: Normal range of motion. Neck supple.   Cardiovascular: Normal rate and regular rhythm.   Pulmonary/Chest:  NWOB. No respiratory distress.   Abdominal: deferred  Genitourinary: deferred  Neurological: He is alert and oriented to person, place, and time. CN II-XII grossly intact, coordination grossly normal.    Skin: Skin is warm and dry. He is not diaphoretic.   Psychiatric: He has a normal mood and affect. His behavior is normal. Judgment and thought content normal.  MSK: bilateral point tenderness throughout back at lumbar, thoracic and cervical levels. No tenderness over the vertebral bodies. Mild TTP around left knee, antalgic gait    Imaging:     Left Knee MRI 2016, report notes post op changes of partial lateral meniscectomy without evidence of recurrent tearing, mild osteoarthritic changes in lateral compartment, small area of partial thickness " "articular cartilage loss in trochlear groove.     No spine MRI found. Patient stated having several \"slipped disks\" but no imaging found. Several lumbar Xrays have been normal, the latest found in 2012.     EMG:  None    Laboratory Results:  CBC, [facility] from [facility] 1/2021    Assessment:  The patient is a 35 year old male with PMHx of Hydradenitis suppurativa, chronic knee and back pain who was referred by Dr. Webb at [facility] in [place] mid[place] for evaluation of chronic pain.  The patient has multiple somatic pain generators including his left knee, myofascial pain, and pain related to hidradenitis suppurativa.  In addition to these pain generators he also has findings consistent with central and peripheral nervous systems sensitization in the setting of both chronic pain and chronic opioid therapy.  We will start with a multidisciplinary treatment plan as detailed below to address both the somatic and central causes of his pain.  In the setting of central sensitization and adherence issues with his prior clinic, conventional opioid medications are not recommended at this time for the treatment of his chronic pain problems.    Knee: We discussed options for pain treatments including nerve ablations for the knee pain. He was concerned that if this failed he would have to have a knee replacement and he did not want to do that.     Back: We discussed physical therapy for his back.    HS: We discussed starting him on buprenorphine for his HS and other chronic pain conditions. He did not like the idea of an oral film vs pill but seemed willing to try.     General chronic pain: We discussed starting low dose amitriptyline to help with central sensitization as well as improve sleep. We also discussed pain psychiatry to help with things like mindfulness.     Visit Diagnoses:  1. Hidradenitis suppurativa  2. Myofascial pain  3. Left knee pain  4. Chronic pain syndrome    Plan:  Patient education:    We discussed with the " patient the likely mechanisms underlying his pain.  In his case, this includes hidradenitis, left knee joint derangement, and myofascial pain which are likely contributing to his overall pain picture.  In addition we discussed the role of central and peripheral pain processing in the development and propagation of chronic pain as a disorder as well as the importance of multidisciplinary treatment and multimodal medication therapy.    Work up:    UDS for opioid prescribing    Referrals:    Therapeutic referrals as listed below    Medications:    Trial amitriptyline 25 mg at bedtime for widespread pain.  Will reassess for benefit, side effects, and additional titration at next evaluation.    Trial meloxicam 15 mg daily for myofascial pain.  Discontinue if side effects.  Patient advised to avoid any other NSAID medication.    Trial belbuca 150 mcg film BID for alternative opioid therapy to mitigate risks of long term opioid therapy    Therapies:    Referral placed for pain PT for evaluation of central and peripheral sensitization and myofascial pain and treatments involving fear avoidance, pacing, mobilization, and other indicated modalities.    Referral placed for pain psychology to address the role of pain processing via CBT, biofeedback, mindfulness based stress reduction, and other indicated modalities    Interventions:    None at this time, but will explore insurance coverage for genicular RFA treatments    Follow up: 6-8 weeks or sooner if needed    Thank you for the consult.    Jeanne Momin, MS4    Again, thank you for allowing me to participate in the care of your patient.      Sincerely,    Sami Sanchez MD

## 2021-05-21 NOTE — TELEPHONE ENCOUNTER
I called and LVM for the pt informing him that Dr. Sanchez sent his medication over to his pharmacy.    If you have any questions you can call [phone].    Karishma Prasad CMA

## 2021-05-22 LAB
BZE UR CFM-MCNC: 75 NG/ML
BZE/CREAT UR: 42 NG/MG{CREAT}
CREAT UR-MCNC: 178 MG/DL
OXYCODONE UR CFM-MCNC: 104 NG/ML
OXYCODONE/CREAT UR: 58 NG/MG{CREAT}
OXYMORPHONE UR CFM-MCNC: 96 NG/ML
OXYMORPHONE/CREAT UR: 54 NG/MG{CREAT}
RPT COMMENT: ABNORMAL

## 2021-05-25 ENCOUNTER — COMMUNICATION - HEALTHEAST (OUTPATIENT)
Dept: CARE COORDINATION | Facility: CLINIC | Age: 36
End: 2021-05-25

## 2021-05-25 ENCOUNTER — TELEPHONE (OUTPATIENT)
Dept: PHARMACY | Facility: CLINIC | Age: 36
End: 2021-05-25

## 2021-05-25 NOTE — TELEPHONE ENCOUNTER
PA Initiation    Medication: Belbucca 150mcg Film  Insurance Company: [employer] Part D - Phone [phone] Fax [phone]  Pharmacy Filling the Rx: [facility] [place] - [place], [place] - [address] [place] [address]  Filling Pharmacy Phone: [phone]  Filling Pharmacy Fax: [phone]  Start Date: 5/25/2021

## 2021-05-25 NOTE — TELEPHONE ENCOUNTER
Prior Authorization Retail Medication Request    Medication/Dose: Belbucca 150mcg Film  ICD code (if different than what is on RX):    Previously Tried and Failed:    Rationale:      Insurance Name:  [facility] Part D   Insurance ID:  [id number]      Pharmacy Information (if different than what is on RX)  Name:    Phone:

## 2021-05-26 ASSESSMENT — PATIENT HEALTH QUESTIONNAIRE - PHQ9
SUM OF ALL RESPONSES TO PHQ QUESTIONS 1-9: 9
SUM OF ALL RESPONSES TO PHQ QUESTIONS 1-9: 3

## 2021-05-26 NOTE — TELEPHONE ENCOUNTER
PRIOR AUTHORIZATION DENIED    Medication: Jonas 150mcg Film    Denial Date: 5/25/2021    Denial Rationale: Patient has to first try/fail Tramadol ER tablets        Appeal Information: If provider would like to appeal this decision we will need a detailed letter of medical necessity to start the process. Then re-route this request back to the PA pool.

## 2021-05-27 ENCOUNTER — COMMUNICATION - HEALTHEAST (OUTPATIENT)
Dept: NURSING | Facility: CLINIC | Age: 36
End: 2021-05-27

## 2021-05-27 VITALS
DIASTOLIC BLOOD PRESSURE: 70 MMHG | OXYGEN SATURATION: 97 % | BODY MASS INDEX: 29.7 KG/M2 | HEIGHT: 67 IN | WEIGHT: 189.25 LBS | HEART RATE: 66 BPM | SYSTOLIC BLOOD PRESSURE: 116 MMHG

## 2021-05-27 ASSESSMENT — PATIENT HEALTH QUESTIONNAIRE - PHQ9: SUM OF ALL RESPONSES TO PHQ QUESTIONS 1-9: 9

## 2021-05-28 DIAGNOSIS — L30.9 ECZEMA, UNSPECIFIED TYPE: ICD-10-CM

## 2021-05-28 ASSESSMENT — ASTHMA QUESTIONNAIRES
ACT_TOTALSCORE: 18
ACT_TOTALSCORE: 10

## 2021-05-31 DIAGNOSIS — L30.9 DERMATITIS: ICD-10-CM

## 2021-05-31 RX ORDER — FLUOCINOLONE ACETONIDE 0.11 MG/ML
OIL TOPICAL 2 TIMES DAILY
Qty: 120 ML | Refills: 1 | Status: CANCELLED | OUTPATIENT
Start: 2021-05-31

## 2021-05-31 RX ORDER — FLUOCINOLONE ACETONIDE 0.11 MG/ML
OIL TOPICAL
Qty: 118.28 ML | Refills: 5 | Status: SHIPPED | OUTPATIENT
Start: 2021-05-31 | End: 2021-11-17

## 2021-05-31 NOTE — TELEPHONE ENCOUNTER
"Pt calls saying he's restricted to 1 prescriber for opioids and requests that Dr Erickson call [phone] to lift the restriction.. States he's currently \"appealing the decision to restrict,\" and that he's been working on it for some time.   Nurse spoke with pharmacy and was told pt needs to speak with his prescribing MD to change to Dr Foster. Pt disagrees and states Dr Foster has to make the call.   "

## 2021-05-31 NOTE — PROGRESS NOTES
New pt is being seen today by Chai Foster MD, for consultation of 10-constant, achy, burning, stabbing groin pain.   F8

## 2021-05-31 NOTE — PROGRESS NOTES
Patient urine drug screen has come back as expected.  Reviewing the record when he called Dr. Bird's office it did not appear he was giving him more oxycodone.  He did go to emergency room that day.  They identified he was restricted patient was given Percocet No. 2.    I tried to leave a message today, unclear of his message courses activated.    I did refill oxycodone 10 mg twice a day #28.

## 2021-05-31 NOTE — PROGRESS NOTES
Assessment/Plan:     Problem List Items Addressed This Visit     None      Visit Diagnoses     Chronic pain syndrome    -  Primary    Relevant Orders    Vitamin D, Total (25-Hydroxy) (Completed)    Celiac(Gluten)Antibody Panel    HLA DQB1 for Celiac Disease    Pain Management Drug Panel, Urine            No follow-ups on file.    Patient Instructions   PLAN:    Labs today    Baseline Urine Drug Screen and opioids use agreement, Dr. Foster to review the result and may take over prescribing oxycodone if returns as expected    Return in 6 weeks        Subjective:       34 y.o. male presents for evaluation of pain related to hidradenitis suprativa, referred from Jayde Davenport MD    34-year-old male living in [place] with his mother or in [place] with his sister.  He will have his own place next week.  He is single, has 2 children living in [place].  He has been on disability since March due to hidradenitis.    Records from Dr. English at [facility] indicate the patient's daughter to work with him in January, restricted patient has had multiple providers.  He was receiving oxycodone for the hidradenitis supra T above.  Records reflect there was a urine drug screen with concern for cocaine.    Care everywhere indicates he has been followed by Dr. Jhonatan Bird and surgery having a number of excisions for lesions in his groin and scrotum last on 8/7, being followed by wound care and use of oxycodone.    Reviewing the  he last received on 8/11 oxycodone 10/325 #25.      On interview he reviews beginning to have skin lesion 78 years ago which should be lanced and drained.  Initially it was thought he had MRSA.  He then saw a specialist infectious disease diagnosed with the hidradenitis.  He is to see another specialist at the [facility] this fall.    He is primarily worked with Dr. Bird over the last couple of years, this is his ninth surgery.  Reviews that the wounds are open and then packed, [facility] the wound clinic  [facility]    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  He did stop work as with the flareups he would miss work.    Reviews also caused stress in relationships.    He is on antibiotics at times.  Is been told there is no cure.    Describes uses pain meds to help him get up to go to the bathroom, has significant problems with changing positions.    Acknowledged when follow with Dr. English there was one time where the temperature of the sample was irregular being hot or cold.  He had told him at the beginning he uses marijuana daily to help with anxiety.  He denied that he ever used cocaine.  He notes that the screens might of come back positive but the confirmation to be negative.  I did review the record that showed the last indicated it was positive for cocaine.  He states he uses the marijuana to help him relax and manage cocaine in them.    He uses the oxycodone 10 mg 1 in the morning one half in the one half at bedtime.  Notes no side effects, use a stool sample here.    Had tried hydrocodone with GI problems.  Is used morphine tolerating that IV and hydro-more phone IV.    Reviews his sleep is good with his medications.    Describes he became restricted as he would go to different urgent care and emergency room's.    Describes his appetite is good weight is around 180, when he was very sick 2 years ago was down to 125.  He feels better when he weighs 200.  He notes his mood is usually \"upbeat and he always has a smile on his face though notes that he has significant stress from the medical condition and managing.    Describes used to enjoy working as a  and now his goal is to help younger people find a path forward with issues.    Subs use history he may have a beer when he watches football.  He is aware he needs to space out beer with alcohol.    He may smoke service 1/2 pack/day, he tried Chantix did not tolerate, is been using the patch.  He is aware of the need to decrease " "smoking.    Has found the marijuana helps him with anxiety, denies it causes problems.  Does not use cocaine heroin or others.    Medical history no other active medical problems      Current Outpatient Medications:      ibuprofen (ADVIL,MOTRIN) 200 MG tablet, Take 800 mg by mouth 2 (two) times a day., Disp: , Rfl:      oxyCODONE-acetaminophen (PERCOCET/ENDOCET)  mg per tablet, Take 1 tablet by mouth 2 (two) times a day., Disp: , Rfl:      cholecalciferol, vitamin D3, 3,000 unit Tab, Take 3,000 Units by mouth daily., Disp: 30 tablet, Rfl: 3  No current facility-administered medications for this encounter.   Allergies   Allergen Reactions     Hydrocodone Swelling     Upset stomach     History raised in [place] third of 4 children.  Father is a  mother is a nurse.  Described a good developmental history active in sports.  B.  Hobbies include fishing.  He has not taken involved in Ca[employer].    On exam he is alert with a clear sensorium good eye contact.  Thought process tight logical.  Affect is full range.           Objective:     Vitals:    08/16/19 0953   BP: 124/88   Pulse: 97   Weight: 186 lb (84.4 kg)   Height: 5' 7\" (1.702 m)   PainSc: 10-Worst pain ever   PainLoc: Groin       He is wearing an adult diaper to help with drainage.  There is gauze packing with lesions in groin bilaterally and under his scrotum.  Appear to be healing well.  Describes usually wear sweatpants, there is the first day he has been able to wear jeans with this recovery.  Reviews it can be difficult to walk.    Impression: Hidradenitis, with multiple surgeries.  Use oxycodone is been needed for the short-term with flareups and they have been fairly frequent over the last 2 years.  Reports he has [facility] though was not sure the discipline possibly dermatology.    Patient had been on placed on a restricted status as it appears he was obtaining opioids from different urgency " Centers and emergency room's, reviewing the record and the  shows that prescriptions I primarily from Dr. English and Dr. Bird over the last year.    There is indicated there was a concern for urine sample being inappropriate temperature, and findings for cocaine.    Plan: We will obtain some laboratories today, baseline urine drug screen and opioid use agreement.  We discussed I would review the results and may take over prescribing oxycodone if returns as expected.  If not may look into other approaches such as buprenorphine products.    He is instructed to call Dr. Bird office as he has run out of medications today, and reviewed opioids are not prescribed on the first appointment.    Time spent more than 45 minutes face-to-face, 50% reviewing above history according to the treatment plan      This note has been dictated using voice recognition software. Any grammatical or context distortions are unintentional and inherent to the software

## 2021-05-31 NOTE — TELEPHONE ENCOUNTER
----- Message from Chai Foster MD sent at 8/19/2019 12:48 PM CDT -----  Vit d3, 3,000 units daily    Vitamin D3 supplement ordered per provider recommendation.

## 2021-05-31 NOTE — TELEPHONE ENCOUNTER
Not my responsibility to call, referring provider does-Dr Foster  Will route this message to Dr Jayde Quiroz

## 2021-05-31 NOTE — PATIENT INSTRUCTIONS - HE
PLAN:    Labs today    Baseline Urine Drug Screen and opioids use agreement, Dr. Foster to review the result and may take over prescribing oxycodone if returns as expected    Return in 6 weeks

## 2021-06-01 NOTE — TELEPHONE ENCOUNTER
Tried calling patient- phone cut out    If he calls back let him know ointment has been sent to the [facility] pharmacy downstairs- and he needs to give the Zpak more time to work. It stays in the system for 10 days.

## 2021-06-01 NOTE — TELEPHONE ENCOUNTER
Wants his rx resent to [facility] [place].need to check with [facility] about current rx. Too many callers ahead to wait on line today.

## 2021-06-01 NOTE — PROGRESS NOTES
CHW was able to contact patient today and get his rescheduled to see [facility] SW next week.    Next outreach due: 10/7/19

## 2021-06-01 NOTE — TELEPHONE ENCOUNTER
Refill Request  Did you contact pharmacy: No  Medication name:  oxyCODONE-acetaminophen (PERCOCET/ENDOCET)  mg per tablet 28 tablet       Sig - Route: Take 1 tablet by mouth 2 (two) times a day. - Oral      Who prescribed the medication: Dr. Chai Foster - [phone].     States he is a restricted recipient with [facility] [place] and only Dr. Webb can fill it? Please advise.   Pharmacy Name and Location: [facility] on file  Is patient out of medication: No  Patient notified refills processed in 72 hours:  yes  Okay to leave a detailed message: yes

## 2021-06-01 NOTE — TELEPHONE ENCOUNTER
RN Triage:  No PCP    Spoke with 34 yr old Bernard who is calling for an OV for cough/chest tightness and shortness of breath with exertion.    Pt states symptoms began 5 days ago.    Pt reports chest pain even when not coughing.    Pt is taking cough medicine and states his is spitting up green mucus.    Pt reports no energy.    Pt states he feels feverish but doesn't have a thermometer.    Pt states he is on [place] restriction program and cannot receive needed medications without a primary care physician.    PLAN:  Strongly advised ED per protocol.  Pt refuses ED and is requesting OV to establish care as pt states this is required per his [place] restriction program.  Transferred pt to PSR to schedule OV per patient request.  Pt scheduled with Dr. Webb tomorrow 9/10/19 at 9:20 am.    Charlotte James RN   [facility] RN Triage      Reason for Disposition    Chest pain present when not coughing    Protocols used: COUGH-A-OH

## 2021-06-01 NOTE — TELEPHONE ENCOUNTER
Patient has switched over his [place] restriction and is now assigned to Dr Webb. When patient established with Dr Foster at the  [facility] he will have to add him to his restrictions to prescribe his pain meds.     Patient went to [facility] and was given a rx for flucinonide cream and needs PCP to re-prescribe ([place] restriced pt) and send to [facility].  He has an appointment with dermatology 9/19/19.     Patient also states he is still coughing up green phlegm, he just started Z-thania yesterday.    Pls advise

## 2021-06-01 NOTE — TELEPHONE ENCOUNTER
Patient came into the clinic. Currently clinic is not able to do anything additionally for support for the patient. Pt was terminated from previous clinic. Patient was told during office visit by  that he has to call [facility] and change his PCP. Once this is [place] and [place] in [place], the [place] form will be faxed to [facility] to authorize Dr. Chai Foster to treat and authorize medication for his care. The [place] came through [place] and form was faxed at 2:30pm on 9/10/19. Fax was successful.   [facility] now has to complete the necessary updates to the patient's chart with them to authorize Dr. Foster's prescriptions.   Patient can contact [facility] to find out updates but clinic has completed current steps for the restricted patient program.

## 2021-06-01 NOTE — TELEPHONE ENCOUNTER
Patient called inquiring on the status of this request. Patient states she no longer has a primary care physician, she states she is in the process of finding a new primary care physician within the [facility] network. Patient is unsure of what the next step should be to change her restrictions. Patient is asking for a return call

## 2021-06-01 NOTE — PROGRESS NOTES
Scheduled Follow Up Call: Attempt 1 Community Health Worker called and left a message for the patient. If the patient is returning my call, please transfer the patient to [place] at ext. [phone].    Needs to reschedule [facility] SW visit (double book, scheduling error)    Next outreach due: 9/26/19

## 2021-06-01 NOTE — PROGRESS NOTES
"Assessment/Plan:     Problem List Items Addressed This Visit     None      Visit Diagnoses     Chronic pain syndrome    -  Primary              No follow-ups on file.    Patient Instructions   PLAN:    Discussed you have a new appt with primary care at [facility]    You have appt with [facility] next week    We will look into [place] Rest[place] program    [id number] mg twice a day has been prescribed    Return in 8 weeks        Subjective:       34 y.o. male presents for evaluation of lesions related to hidradenitis superativa.    Since last seen I did speak with his surgeon Dr. Bird.  He notes to be retiring this fall.  He has referred him to a specialist at the [facility] to continue managing his condition.    Reviewing the record he did see dermatology at the [facility] and they are developing treatment plan.    On interview he describes to be meeting with a new primary care provider at the [facility].  His mother and sister go there.  He notes he is no longer with the previous [facility] program.  He acknowledges there were discussions about his urinalysis, cocaine, and he \"snapped\".    We discussed that I been trying to prescribe oxycodone for him.  As he is on a restricted status the primary care doctor must make the changes.    Reviewed the record that labs showed negative for gluten sensitivity.  The baseline urine drug screen here was negative for cocaine.    He reviews a goal to get back to some sort of work.  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  He would like to look into something that can involve sitting rather than me and active.    He continues with the wound care with the changes.      Current Outpatient Medications:      ibuprofen (ADVIL,MOTRIN) 200 MG tablet, Take 800 mg by mouth 2 (two) times a day., Disp: , Rfl:      cholecalciferol, vitamin D3, 3,000 unit Tab, Take 3,000 Units by mouth daily., Disp: 30 tablet, Rfl: " "3     oxyCODONE-acetaminophen (PERCOCET/ENDOCET)  mg per tablet, Take 1 tablet by mouth 2 (two) times a day., Disp: 28 tablet, Rfl: 0  Is alert with a clear sensorium good eye contact.  Thought process tight logical.     is reviewed, on 8/27 he did receive oxycodone 10 mg #30.  This was prescribed by Dr. Bird.         Objective:     Vitals:    09/09/19 0812   BP: 127/86   Pulse: 81   Weight: 186 lb (84.4 kg)   Height: 5' 7\" (1.702 m)   PainSc: 10-Worst pain ever   PainLoc: Groin       We discussed the plan for me to look into the [place] restricted program as he is under the impression that I can call to make changes.    He will continue his appointment at the [facility] dermatology next week and then with his primary care provider.    I have performed provided for him a prescription oxycodone 10 mg twice a day.    At the time of dictation I did call the prescription program which alerted can it is a primary care provider only who can make changes.    Time spent more than 15 minutes face-to-face, 50% counseling about above condition coordination treatment plan             This note has been dictated using voice recognition software. Any grammatical or context distortions are unintentional and inherent to the software  "

## 2021-06-01 NOTE — TELEPHONE ENCOUNTER
Patient called requesting that a prescription for Percocet be electronically sent to [facility] pharmacy on [address]

## 2021-06-01 NOTE — TELEPHONE ENCOUNTER
printed as well as yesterdays OV with Dr. Foster @ the pain clinic and put into your inbox for review

## 2021-06-01 NOTE — TELEPHONE ENCOUNTER
Patient called inquiring on the status of this request. Please see encounter entered by patient's primary care office regarding an authorization for Dr. Foster to fill his prescriptions.

## 2021-06-01 NOTE — PROGRESS NOTES
Office Visit   Bernard Padilla   34 y.o. male    Date of Visit: 9/10/2019    Chief Complaint   Patient presents with     Establish care visit     Cough     Green phlegm, tight chest, wheezing for 4- 5 days        Assessment and Plan   1. Cough  He has significant wheezing on exam and I am going to give him a prescription for albuterol.  I am also prescribing azithromycin.  If he is not improving then may need reevaluation.  - azithromycin (ZITHROMAX) 250 MG tablet; Take 2 tablets by mouth day one and 1 tablet by mouth days 2-5  Dispense: 6 tablet; Refill: 0  - albuterol (PROAIR HFA;PROVENTIL HFA;VENTOLIN HFA) 90 mcg/actuation inhaler; Inhale 2 puffs every 6 (six) hours as needed for wheezing.  Dispense: 1 each; Refill: 11    2. Other chronic pain  Has chronic pain.  He saw a pain specialist recently who prescribed Percocet.  He is on a restricted prescriber program and has been flagged in the past for seeking behavior.  I discussed with him today that I would not be willing to prescribe Percocet but if a pain specialist was managing it I would okay that.  I am going to have him discuss next steps with our care management since he is on a restricted prescribing plan with another provider.  - Ambulatory referral to Care Management (Primary Care)         No follow-ups on file.     History of Present Illness   This 34 y.o. old male comes in to establish care and discuss a recent cough.  He has developed a cough and fatigue for the last week.  He has a history of needing an albuterol inhaler but does not have one at this time.  He has not had fevers but the cough is pretty disruptive and frequent.  He is feeling more short of breath.  He also has a history of chronic pain.  He is limited to a restricted provider.  He is in the process of trying to change that.  He was given a prescription by a pain specialist for Percocet recently that he states he is not able to fill.    Review of Systems: As above, systems otherwise  "reviewed and negative.     Medications, Allergies and Problem List   Patient Active Problem List   Diagnosis     Allergic rhinitis     Depression     Other chronic pain     Current Outpatient Medications   Medication Sig Dispense Refill     albuterol (PROAIR HFA;PROVENTIL HFA;VENTOLIN HFA) 90 mcg/actuation inhaler Inhale 2 puffs every 6 (six) hours as needed for wheezing. 1 each 11     azithromycin (ZITHROMAX) 250 MG tablet Take 2 tablets by mouth day one and 1 tablet by mouth days 2-5 6 tablet 0     cholecalciferol, vitamin D3, 3,000 unit Tab Take 3,000 Units by mouth daily. 30 tablet 3     fluocinonide (LIDEX) 0.05 % external solution Apply to scalp as needed for scale until resolved, repeat PRN.       ibuprofen (ADVIL,MOTRIN) 200 MG tablet Take 800 mg by mouth 2 (two) times a day.       oxyCODONE-acetaminophen (PERCOCET/ENDOCET)  mg per tablet Take 1 tablet by mouth 2 (two) times a day. 28 tablet 0     No current facility-administered medications for this visit.      Allergies   Allergen Reactions     Hydrocodone Swelling     Upset stomach          Physical Exam     /70 (Patient Site: Right Arm, Patient Position: Sitting)   Pulse 80   Temp 98.6  F (37  C)   Ht 5' 7\" (1.702 m)   Wt 180 lb (81.6 kg)   BMI 28.19 kg/m      General:  Patient is alert and in no respiratory distress.  Cough throughout the exam.  Cardiovascular:  Regular rate and rhythm, normal S1/S2, no murmurs, rubs, or gallop.  Pulmonary: Has wheezes throughout but good air movement.         Additional Information   Social History     Tobacco Use     Smoking status: Current Every Day Smoker     Packs/day: 1.00     Smokeless tobacco: Never Used   Substance Use Topics     Alcohol use: Yes     Drug use: Yes            Lillian Webb MD    "

## 2021-06-01 NOTE — TELEPHONE ENCOUNTER
Ordered Prescriptions  - documented in this encounter  Prescription Sig Dispensed Refills Start Date End Date   fluocinonide (LIDEX) 0.05 % cream   Apply BID to palms. 30 g   11 09/12/2019      From ER visit- needs from PCP to send in due to [place]

## 2021-06-01 NOTE — TELEPHONE ENCOUNTER
Patient came in to the clinic today. 9/5  He is upset about not getting his pain medication.  He is adamant that we need to lift the restriction so that he can get the RX from Dr. Foster.    Writer explains to patient that Dr. Foster is unable to lift the restriction.  Encouraged him to contact his primary care clinic or if necessary go to the ED within [facility].  Patient is not accepting of this response and continues to press the issue.  Writer again makes same recommendations.    Reviewed with provider, provider suggests the same follow up.  Patient remains in the clinic lobby long after discussion,  staff review with patient that there is no further action at this time.  Patient leaves the clinic upset.

## 2021-06-01 NOTE — PROGRESS NOTES
Office Visit   Bernard Padilla   34 y.o. male    Date of Visit: 9/23/2019    Chief Complaint   Patient presents with     Cough     Green/yellow phlegm, sore chest, fatigue        Assessment and Plan   1. Cough  He has significant wheezing on exam.  He has been using an albuterol inhaler.  I am therefore going to give him prednisone as well as doxycycline.  If he is not improving he will let me know.  - predniSONE (DELTASONE) 10 mg tablet; Take 40mg by mouth daily for 3 days, then 30mg x 3 days, then 20mg x 3 days ,then 10mg x 3 days then stop.  Dispense: 30 tablet; Refill: 0  - doxycycline (ADOXA) 100 MG tablet; Take 1 tablet (100 mg total) by mouth 2 (two) times a day.  Dispense: 20 tablet; Refill: 0         No follow-ups on file.     History of Present Illness   This 34 y.o. old male comes in with ongoing cough and wheezing.  Its been going on now for a few weeks.  He has been using an albuterol inhaler frequently without much benefit.  He has not had fevers or chills but just ongoing cough and shortness of breath.  He has no other acute concerns today.    Review of Systems: As above, systems otherwise reviewed and negative.     Medications, Allergies and Problem List   Patient Active Problem List   Diagnosis     Allergic rhinitis     Depression     Other chronic pain     Current Outpatient Medications   Medication Sig Dispense Refill     albuterol (PROAIR HFA;PROVENTIL HFA;VENTOLIN HFA) 90 mcg/actuation inhaler Inhale 2 puffs every 6 (six) hours as needed for wheezing. 1 each 11     cycloSPORINE (SANDIMMUNE) 100 MG capsule Take 200 mg by mouth.       fluocinonide (LIDEX) 0.05 % external solution Apply to scalp as needed for scale until resolved, repeat PRN.       fluocinonide-emollient (FLUOCINONIDE-EMOLLIENT) 0.05 % Crea Apply twice daily to palms 30 g 11     ibuprofen (ADVIL,MOTRIN) 200 MG tablet Take 800 mg by mouth 2 (two) times a day.       doxycycline (ADOXA) 100 MG tablet Take 1 tablet (100 mg total) by  "mouth 2 (two) times a day. 20 tablet 0     oxyCODONE-acetaminophen (PERCOCET/ENDOCET)  mg per tablet Take 1 tablet by mouth 2 (two) times a day. 28 tablet 0     predniSONE (DELTASONE) 10 mg tablet Take 40mg by mouth daily for 3 days, then 30mg x 3 days, then 20mg x 3 days ,then 10mg x 3 days then stop. 30 tablet 0     No current facility-administered medications for this visit.      Allergies   Allergen Reactions     Hydrocodone Swelling     Upset stomach          Physical Exam     /76 (Patient Site: Right Arm, Patient Position: Sitting)   Pulse 80   Temp 98.8  F (37.1  C)   Ht 5' 7\" (1.702 m)   Wt 179 lb (81.2 kg)   SpO2 99%   BMI 28.04 kg/m      General:  Patient is alert and in no apparent distress.  Cardiovascular:  Regular rate and rhythm, normal S1/S2, no murmurs, rubs, or gallop.  Pulmonary: He has wheezes throughout with no rales.  O2 sats are satisfactory and he is not in respiratory distress.         Additional Information   Social History     Tobacco Use     Smoking status: Current Every Day Smoker     Packs/day: 1.00     Smokeless tobacco: Never Used   Substance Use Topics     Alcohol use: Yes     Drug use: Yes            Lillian Webb MD    "

## 2021-06-01 NOTE — TELEPHONE ENCOUNTER
[place]. Primary Care Provider: Dr. Lillian Webb, [facility]; Pharmacy: [facility] ([place]); Hospital: [facility], Effective 9/10/19    Managed Care Referral Form was completed and faxed to [facility] Restricted Recipient Program for authorization to prescribe to Dr. Chai Foster. Once the form is entered into [facility], any medication prescribed can be picked up.

## 2021-06-01 NOTE — PROGRESS NOTES
The Clinic Community Health Worker called the patient today at the request of the PCP to discuss possible Clinic Care Coordination enrollment.  The service was described to the patient and immediate needs were discussed.  The patient agreed to enrollment and an assessment was scheduled.  The patient was provided with contact information for the clinic CHW.             Assessment date: 9/18/19

## 2021-06-01 NOTE — TELEPHONE ENCOUNTER
Patient Returning Call  Reason for call:  Medication status check  Information relayed to patient:  Relayed previous message regarding status of prescription and because of restrictive status patient is unable to fill any prescription Please contact patient in Am as is very upset.  Patient has additional questions:  Yes  If YES, what are your questions/concerns:  Would like resolved  Okay to leave a detailed message?: Yes

## 2021-06-01 NOTE — PATIENT INSTRUCTIONS - HE
PLAN:    Discussed you have a new appt with primary care at [facility]    You have appt with [facility] next week    [place] will look [place]    [id number] mg twice a day has been prescribed    Return in 8 weeks

## 2021-06-01 NOTE — TELEPHONE ENCOUNTER
Patient checking on his request for provider to approve controlled med.    Pain is groin area     Patient very upset this was not done yesterday.  Last pain pills end of August.    Patient using harsh language and very unhappy pcp is out today.    He hung up.    Paulette Luis, RN, Care Connection Nurse Triage/Med Refills RN

## 2021-06-01 NOTE — TELEPHONE ENCOUNTER
FYI - Status Update  Who is Calling: Patient  Update: patient is calling to check on the status of the request below.  Patient is requesting this to be addressed as soon as possible.      Patient is also requesting a return call when it has been addressed.  Okay to leave a detailed message?:  Yes   [phone]

## 2021-06-02 ENCOUNTER — TELEPHONE (OUTPATIENT)
Dept: ANESTHESIOLOGY | Facility: CLINIC | Age: 36
End: 2021-06-02

## 2021-06-02 NOTE — TELEPHONE ENCOUNTER
Last rx was dispensed on 9/24, and was for 14 days, so actually due today. Will cue new rx reflecting this.   On 10/6 printed rx had been  Done for [facility], but patient is restricted to [facility], so could not fill at [facility].    I spoke with the Pharmacist , Dottie at [facility]. She will destroy rx dated for 10/20, that is in the profile.

## 2021-06-02 NOTE — PROGRESS NOTES
Scheduled Follow Up Call: Attempt 2 Community Health Worker called and left a message for the patient. If the patient is returning my call, please transfer the patient to [place] at ext. [phone].   I have called the patient 2 times I the past month and have been unsuccessful in reaching him/her. The patient has not returned any of my messages. I have mailed a letter to the patient requesting a return call and will continue attempting to reach out to the patient in one month. I will also check the patient's chart for upcoming appointments, ER reports that may contain a new phone number, or any other recent activity.    Next outreach due: 11/18/19

## 2021-06-02 NOTE — TELEPHONE ENCOUNTER
"I called the patient to discuss his UDS which was positive for cocaine.  He denied any cocaine use in the past 5 years and states that he has had similar issues with drug screens in the past.  Belbuca was denied by insurance and I offered a trial of suboxone which he declined stating that he did not want to \"be a guinea pig\".  He requested to take a new drug screen.  "

## 2021-06-02 NOTE — TELEPHONE ENCOUNTER
[facility] Call Center    Phone Message    May a detailed message be left on voicemail: yes     Reason for Call: Medication Question or concern regarding medication   Prescription Clarification  Name of Medication: Buprenorphine HCl (BELBUCA) 150 MCG FILM buccal film     Prescribing Provider: Sami Sanchez MD   Pharmacy: [facility] - [place], [place] - [address]   What on the order needs clarification?   The patient said the medication Prior Authorization was denied twice, the Patient Is in a lot of pain and needs alternatives sent to the pharmacy today or ASAP. The patient suggested the Percocet 10 mg he was on previously worked very well please review and follow up with the patient ASAP to address any questions or concerns thank you.              Action Taken: Message routed to:  [facility] pain    Travel Screening: Not Applicable

## 2021-06-02 NOTE — TELEPHONE ENCOUNTER
Referral Request  Type of referral: Managed Care  Who s requesting: Lashae with Dr Apolinar Ruiz  Why the request:   Restricted access patient   Have you been seen for this request: No  Does patient have a preference on a group/provider?   Dr Apolinar Ruiz,   [facility]   [address] [place] [address]   [phone]  Okay to leave a detailed message?  Yes     Dr Lillian Webb is Provider that needs to refer patient.  Patient needs to get cyclosporin thru Dr. Ruiz  Patient is on Restricted Access  Please fax referral to [facility] at:  [phone]

## 2021-06-02 NOTE — TELEPHONE ENCOUNTER
She had been scheduled for appointment today 10/7.  This provider had a administrative conflict.  I did speak with the patient on 10/6 inform him of the change in a prescription was sent in.  He will be rescheduled

## 2021-06-02 NOTE — TELEPHONE ENCOUNTER
Patient calling very upset, has left multiple messages, RX was sent with the wrong fill date.  Shows not to be filled until 10-20-19, was due yesterday.  Please fix date asap and call patient when done.

## 2021-06-02 NOTE — PROGRESS NOTES
Office Visit - Physical   Bernard Padilla   34 y.o.  male    Date of visit: 10/28/2019  Physician: Lillian Webb MD     Assessment and Plan   1. Routine general medical examination at a health care facility  His exam is satisfactory today.  He has normal blood pressure.  He has had normal glucose recently.  I will get a fasting lipid panel today.  He is otherwise up-to-date on age-appropriate health maintenance.    2. Hidradenitis  He sees dermatology and gets medicine from them.  He will continue with that.    3. Hip pain, right  He is having worsening right hip pain.  I am going to refer him to Ortho for further evaluation.  - Ambulatory referral to Orthopedics    4. Screening for hyperlipidemia  - Lipid Cascade          Return in about 1 year (around 10/28/2020) for Annual physical.     Chief Complaint   Chief Complaint   Patient presents with     Annual Exam     Pt is fasting        Patient Profile   Social History     Patient does not qualify to have social determinant information on file (likely too young).   Social History Narrative     Not on file        Past Medical History   Patient Active Problem List   Diagnosis     Allergic rhinitis     Depression     Other chronic pain     Hidradenitis       Past Surgical History  He has a past surgical history that includes Knee surgery and Skin surgery.     History of Present Illness   This 34 y.o. old male comes in for a physical.  He is fasting today.  He has a history of severe hidradenitis and sees dermatology.  He is on immunosuppressants for this.  He had blood work recently that showed a normal glucose, electrolytes, creatinine, and CBC.  His blood pressure is normal today.  He has not had a lipid panel done and I will go ahead and set that up today.  He also notes some worsening right hip pain but otherwise has no acute concerns today.  He is otherwise up-to-date on age-appropriate health maintenance.    Review of Systems: A comprehensive review of  "systems was negative except as noted.     Medications and Allergies   Current Outpatient Medications   Medication Sig Dispense Refill     cycloSPORINE (SANDIMMUNE) 100 MG capsule Take 200 mg by mouth.       oxyCODONE-acetaminophen (PERCOCET/ENDOCET)  mg per tablet Take 1 tablet by mouth 2 (two) times a day for 14 days. 28 tablet 0     albuterol (PROAIR HFA;PROVENTIL HFA;VENTOLIN HFA) 90 mcg/actuation inhaler Inhale 2 puffs every 6 (six) hours as needed for wheezing. 1 each 11     doxycycline (ADOXA) 100 MG tablet Take 1 tablet (100 mg total) by mouth 2 (two) times a day. 20 tablet 0     fluocinonide (LIDEX) 0.05 % external solution Apply to scalp as needed for scale until resolved, repeat PRN.       fluocinonide-emollient (FLUOCINONIDE-EMOLLIENT) 0.05 % Crea Apply twice daily to palms 30 g 11     ibuprofen (ADVIL,MOTRIN) 200 MG tablet Take 800 mg by mouth 2 (two) times a day.       No current facility-administered medications for this visit.      Allergies   Allergen Reactions     Hydrocodone Swelling     Upset stomach        Family and Social History   Family History   Problem Relation Age of Onset     COPD Mother      Hypertension Mother      Diabetes Father      Coronary artery disease Father      Colon cancer Neg Hx      Prostate cancer Neg Hx         Social History     Tobacco Use     Smoking status: Current Every Day Smoker     Packs/day: 1.00     Smokeless tobacco: Never Used   Substance Use Topics     Alcohol use: Yes     Drug use: Yes        Physical Exam   General Appearance:   This is an alert male in no apparent distress.    /80 (Patient Site: Left Arm, Patient Position: Sitting)   Pulse 77   Ht 5' 6.5\" (1.689 m)   Wt 185 lb (83.9 kg)   SpO2 98%   BMI 29.41 kg/m        NECK: Neck appearance was normal and supple. There were no neck masses and the thyroid was not enlarged.  RESPIRATORY: Normal respiratory effort.  Lungs are clear to ausculation both anteriorly and posteriorly.  No wheezes " or rales.   CARDIOVASCULAR: Heart rate and rhythm were normal.  S1 and S2 were normal and there were no extra sounds or murmurs. Dorsalis pedis pulses were normal.  Jugular venous pressure was normal.  There was no peripheral edema.  GASTROINTESTINAL:  Bowel sounds were present.  Abdomen was soft, nontender, nondistended, with no organomegaly detected.  No rebound or guarding.  MUSCULOSKELETAL: Skeletal configuration was normal and muscle mass was normal for age. Joint appearance was overall normal.  LYMPHATIC: There were no enlarged nodes.  SKIN/HAIR/NAILS: He does have changes of hidradenitis.  NEUROLOGIC: The patient was alert and oriented to person, place, time, and circumstance. Speech was normal. Cranial nerves were normal. No focal defecits were noted.  PSYCHIATRIC:  Mood and affect were normal and the patient had normal recent and remote memory. The patient's judgment and insight were normal.       Additional Information        Lillian Webb MD  Internal Medicine  Contact me at [phone]

## 2021-06-03 VITALS
OXYGEN SATURATION: 98 % | WEIGHT: 185 LBS | BODY MASS INDEX: 29.03 KG/M2 | HEART RATE: 77 BPM | HEIGHT: 67 IN | SYSTOLIC BLOOD PRESSURE: 102 MMHG | DIASTOLIC BLOOD PRESSURE: 80 MMHG

## 2021-06-03 VITALS
HEIGHT: 67 IN | TEMPERATURE: 98.6 F | BODY MASS INDEX: 28.25 KG/M2 | SYSTOLIC BLOOD PRESSURE: 120 MMHG | HEART RATE: 80 BPM | WEIGHT: 180 LBS | DIASTOLIC BLOOD PRESSURE: 70 MMHG

## 2021-06-03 VITALS — WEIGHT: 186 LBS | BODY MASS INDEX: 29.19 KG/M2 | HEIGHT: 67 IN

## 2021-06-03 VITALS
DIASTOLIC BLOOD PRESSURE: 76 MMHG | OXYGEN SATURATION: 99 % | HEIGHT: 67 IN | TEMPERATURE: 98.8 F | SYSTOLIC BLOOD PRESSURE: 110 MMHG | BODY MASS INDEX: 28.09 KG/M2 | HEART RATE: 80 BPM | WEIGHT: 179 LBS

## 2021-06-03 VITALS — HEIGHT: 67 IN | WEIGHT: 186 LBS | BODY MASS INDEX: 29.19 KG/M2

## 2021-06-03 NOTE — TELEPHONE ENCOUNTER
Controlled Substance Refill Request  Medication Name:   oxyCODONE-acetaminophen (PERCOCET/ENDOCET)  mg   Sig - Route: Take 1 tablet by mouth 2 (two) times a day for 14 days. - Oral  Date Last Fill: NA  [facility]        Submit electronically to pharmacy  Controlled Substance Agreement Date Scanned: NA  Encounter-Level CSA Scan Date - 08/16/2019:    Scan on 8/16/2019  2:13 PM by Danica Cam: [facility] NARCOTIC AGREEMENT       Last office visit with prescriber/PCP: 9/23/2019 Lillian Webb MD OR same dept: 9/23/2019 Lillian Webb MD OR same specialty: 9/23/2019 Lillian Webb MD  Last physical: 10/28/2019 Last MTM visit: Visit date not found      Per patient, he saw his dermatologist last night? And was informed he had a inflammation on the rectal area and had an emergency surgery done last night at the [facility] discharged him with 6 percocets only and was instructed to take 2-4 tabs a day but he's been taking 4-5 tabs due to the pain.  Please advise.

## 2021-06-03 NOTE — PROGRESS NOTES
"Patient here to see Dr Foster for scrotal/groin pain. States \"just had surgery on 11/24 at [facility] on his right scrotum\". Patient rates hgis pain a 10 with a functional score of 8.  "

## 2021-06-03 NOTE — PROGRESS NOTES
Video Start Time: 10:39 AM    [facility]     Date of visit: 6/4/2021      Assessment:   The patient is a 35 year old male with PMHx of hidradenitis suppurativa, chronic knee and back pain who was referred by Dr. Webb at [facility] in [place] for evaluation of chronic pain.     1. Left knee pain  2. Back- unclear origin, imaging available is normal.  3. Widespread pain- etiology hidradenitis suppurativa.    Visit Diagnoses:  1. Hidradenitis suppurativa    2. Myofascial pain    3. Chronic pain of left knee    4. Chronic pain syndrome        Plan:    The visit was cut short when Bernard disconnected the video call after it was communicated that I would not order a repeat urine drug screen or prescribed controlled substances.     I do think that Bernard could benefit from a comprehensive pain program utilizing non opioid medication management, pain physical therapy, and pain psychology. Should he return for follow up these options could be discussed further. Given the presence of cocaine in urine, it appears he may have a substance abuse problem. An Addiction Medicine referral and consideration of Suboxone would be very appropriate.     Brenda CURRIE [facility]     -------------------------------------------------    Subjective:    Chief complaint:   Chief Complaint   Patient presents with     Pain Management     Follow up       Interval history:  Bernard Padilla is a 35 year old male last seen on 5/20/2021.  He is a patient of Dr. Sanchez seen in follow up.     Recommendations/plan at the last visit included:  1. Trial amitriptyline 25 mg at bedtime for chronic widespread pain.  Will reassess for benefit, side effects, and additional titration at next evaluation.  2. Trial meloxicam 15 mg daily for chronic myofascial pain.  Discontinue if side effects.  Patient advised to avoid any other NSAID medication.  3. Trial Belbuca 150 mcg BID x 14 days for chronic  "pain 2/2 hidradenitis suppurativa.  Additional titration can be considered if non beneficial at this dosage  4. Referral placed for pain PT for evaluation of central and peripheral sensitization and myofascial pain and treatments involving fear avoidance, pacing, mobilization, and other indicated modalities.  5. Referral placed for pain psychology to address the role of pain processing via CBT, biofeedback, mindfulness based stress reduction, and other indicated modalities  6. UDS and CSA for prescribing of opioid medications       Since his last visit, Bernard Padilla reports:  -His pain is worse than it was at last visit.   -Unfortunately Belbuca was not covered by his insurance so he wasn't able to start. He reports frustration about this.  -He is upset that cocaine was present in his urine sample, adamantly denies cocaine use. He states, \"I don't get high, I take my sobriety serious and also, \"you aren't going to put that label on me.\" He also comments that he has had urine drug screens result abnormally before and states these samples were also inaccurate.  -Of note, he states he smokes marijuana daily but marijuana was not present in his urine.   -He is adamant that he needs \"pain meds\" to manage his pain, specifically Percocet. When discussed that the presence of cocaine in his urine is irrefutable, he states \"that can't be my urine then.\" He became increasingly upset at this point and the visit, raising his voice and stating that his pain is not being managed.   -At one point he comments that he is open to pain psychology and pain physical therapy but later states he would need medication in order to do this.   -He is not interested in the non opioid medications that were prescribed, states, \"you give me all these meds and treat me like a damned guinea pig.\"   -After I explained to Bernard multiple times that due to the presence of cocaine in his urine, I would not be able to order a repeat urine sample or " "prescribe opioids/other controlled substances, he stated, \"then this appointment is useless then, you have a blessed day\" and then disconnected the visit.     HPI per Dr. Sanchez:  The patient is a 35 year old male with past medical history of Hydradenitis Suppurativa, chronic left Knee pain and chronic back pain  who presents for evaluation of chronic pain. The patient's groin pain began 10 years ago, and has been worsening since that time.  He reports that his pain is located primarily in groin and buttocks and does not radiate. The patient describes the pain as sharp. He reports that the pain is made worse by activity and HS flares.  His pain is improved with oxycodone and percocet. He has had 17 surgeries to drain abscesses, the most recent of which was 2 weeks ago. The patient's pain is most severe during flares. He has he can have 2-5 flares a week and they last for 1-3 weeks. It is uncomfortable to sit or stand. He rates his average pain score at 8/10, but it can be as low as 6/10 or as severe as 10/10. He was previously in the care of Dr. Chai Foster, he says he failed to show up for a UDS and was fired by this physician.  He follows Dr. Ruiz in dermatology who has him on Dapsone, which he says has been helpful.      Patient has had three surgeries on his left knee and says the pain is increased with activity, constant and relieved by oxycodone.  Patient's back pain is constant and aggravated by sitting in uncomfortable positions in order to take pressure of his HS flares on his buttocks and groin. Relates the pain is the worst in the low back, but admits to stiffness throughout. Was offered surgery but declined due to risks.      He denies any new problems with falls or balance, any new numbness or weakness of the arms or legs, any new bowel or bladder incontinence, any night sweats or unexplained fevers, or any sudden or unexpected weight loss.     Pain Information:   Pain rating: averages 10/10 on " a 0-10 scale.      Current pain medications:    Ibuprofen PRN   Oxycodone 10 mg TID PRN   Acetaminophen PRN    Current MME: ?    Review of [place] Prescription Monitoring Program (): No concern for abuse or misuse of controlled medications based on this report. No recent prescriptions since 5/6/2021.    Annual Controlled Substance Agreement/UDS due date: NA    Past pain treatments:  Previous medication treatments included:  Anti-convulsants: gabapentin, didn't work  Muscle relaxors: for knee pain, worked well  Anti-depressants: no  Acetaminophen/NSAIDs: Yes, do not help enough  Topicals: no  Opioids: Percocet and oxycodone both work     Other treatments have included:  Physical therapy: for knee pain and back pain, worked well  Pain Psychology: no  Chiropractic: yes, helpful for stiffness  Acupuncture: no  TENs Unit: yes, at chiropractor for back  Injections: for the knee yes, didn't work  Surgeries: 17 surgeries to drain HS abscesses, 3 L knee surgeries      Medications:  Current Outpatient Medications   Medication Sig Dispense Refill     acetaminophen (TYLENOL) 325 MG tablet Take 650 mg by mouth every 4 hours as needed       albuterol (PROAIR HFA/PROVENTIL HFA/VENTOLIN HFA) 108 (90 Base) MCG/ACT inhaler Inhale 2 puffs into the lungs every 6 hours 8.5 g 3     amitriptyline (ELAVIL) 25 MG tablet Take 1 tablet (25 mg) by mouth At Bedtime 30 tablet 0     clindamycin (CLEOCIN) 300 MG capsule Take 1 capsule (300 mg) by mouth 2 times daily 180 capsule 0     dapsone (ACZONE) 100 MG tablet Take 1 tablet (100 mg) by mouth daily 90 tablet 0     fluocinolone acetonide (DERMA-SMOOTHE/FS BODY) 0.01 % external oil Apply topically 2 times daily 120 mL 1     Fluocinolone Acetonide Scalp (DERMA-SMOOTHE/FS SCALP) 0.01 % OIL oil Apply topically daily as instructed. 118.28 mL 5     fluocinonide (LIDEX) 0.05 % external solution Apply topically 2 times daily 60 mL 3     fluticasone (FLONASE) 50 MCG/ACT nasal spray Spray 1-2 sprays  "into both nostrils daily 16 g 2     IBUPROFEN PO Take 800 mg by mouth 3 times daily as needed (Last dose 9.19.2020)        loratadine (CLARITIN) 10 MG tablet Take 10 mg by mouth daily       meloxicam (MOBIC) 15 MG tablet Take 1 tablet (15 mg) by mouth daily 30 tablet 0     oxyCODONE IR (ROXICODONE) 10 MG tablet Take 0.5-1 tablets (5-10 mg) by mouth every 8 hours as needed for severe pain 21 tablet 0     rifampin (RIFADIN) 300 MG capsule Take 300 mg by mouth daily       triamcinolone (KENALOG) 0.1 % external cream Apply topically 2 times daily 464 g 3       Medical History: any changes in medical history since they were last seen? No    Review of Systems: A 10-point review of systems was negative, with the exception of chronic pain issues.      Objective:    Physical Exam:  There were no vitals taken for this visit.  Constitutional: Well developed, well nourished, appears stated age.  HEENT: Head atraumatic, normocephalic. Eyes without conjunctival injection or jaundice. Oropharynx clear. Neck supple. No obvious neck masses.  Skin: No rash, lesions, or petechiae of exposed skin.   Psychiatric/mental status: Alert, without lethargy or stupor. Speech fluent. Appropriate affect. Mood normal. Able to follow commands without difficulty.     Imaging:  Left Knee MRI 2016, report notes post op changes of partial lateral meniscectomy without evidence of recurrent tearing, mild osteoarthritic changes in lateral compartment, small area of partial thickness articular cartilage loss in trochlear groove.      No spine MRI found. Patient stated having several \"slipped disks\" but no imaging found. Several lumbar Xrays have been normal, the latest found in 2012.     Video-Visit Details    Type of service:  Video Visit    Video End Time:10:50 AM    Originating Location (pt. Location): Home    Distant Location (provider location):  [facility] [place]     Platform used for Video Visit: " [facility]     BILLING TIME DOCUMENTATION:   The total TIME spent on this patient on the date of the encounter/appointment was 24 minutes.      TOTAL TIME includes:   Time spent preparing to see the patient (reviewing records and tests)   Time spent face to face (or over the phone) with the patient   Time spent ordering tests, medications, procedures and referrals   Time spent Referring and communicating with other healthcare professionals   Time spent documenting clinical information in Epic

## 2021-06-03 NOTE — TELEPHONE ENCOUNTER
Patient continues to call multiple times to the pain center and other staff within the system.    Patient continues to report post surgical pain, surgery on 11/14  At this time the pain clinic has not seen or assessed this.  Apparently the small quantity of pain medication prescribed post surgically was not dispensed due to patient being restricted.    Patient is being treated for chronic pain at the pain center,   At this time,  chronic pain medication is not due.  Patient is reporting that he is out of his chronic pain medication at this time.  This would not be appropriate as this is due on 11/19. Even if taking for post surgical related pain would not have needed that much additional medication from a surgery yesterday, 11/15    Will route to all providers to coordinate care.

## 2021-06-03 NOTE — PATIENT INSTRUCTIONS - HE
PLAN:    Discussed you are starting new medication to treat your HS    Oxycodone 10 mg twice a day for next 28 days, discussed plan to decrease as new medication to take effect    Dr. Foster to contact your  to see about changing the restriction process    Return in 8 weeks

## 2021-06-03 NOTE — PROGRESS NOTES
Patient was on his way to the ED at the [facility] for his perirectal abscess.  Would like a call back on Monday to discuss his housing situation and schedule to see the CCC SW.    Next outreach due: 11/25/19
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
## 2021-06-04 ENCOUNTER — VIRTUAL VISIT (OUTPATIENT)
Dept: ANESTHESIOLOGY | Facility: CLINIC | Age: 36
End: 2021-06-04
Payer: MEDICARE

## 2021-06-04 VITALS — HEIGHT: 66 IN | BODY MASS INDEX: 27.44 KG/M2

## 2021-06-04 VITALS
DIASTOLIC BLOOD PRESSURE: 56 MMHG | BODY MASS INDEX: 29.41 KG/M2 | HEIGHT: 66 IN | OXYGEN SATURATION: 97 % | SYSTOLIC BLOOD PRESSURE: 98 MMHG | HEART RATE: 66 BPM | WEIGHT: 183 LBS

## 2021-06-04 VITALS — BODY MASS INDEX: 29.38 KG/M2 | WEIGHT: 182.8 LBS | HEIGHT: 66 IN

## 2021-06-04 VITALS — HEIGHT: 66 IN | WEIGHT: 185 LBS | BODY MASS INDEX: 29.73 KG/M2

## 2021-06-04 DIAGNOSIS — L73.2 HIDRADENITIS SUPPURATIVA: Primary | ICD-10-CM

## 2021-06-04 DIAGNOSIS — G89.29 CHRONIC PAIN OF LEFT KNEE: ICD-10-CM

## 2021-06-04 DIAGNOSIS — G89.4 CHRONIC PAIN SYNDROME: ICD-10-CM

## 2021-06-04 DIAGNOSIS — M25.562 CHRONIC PAIN OF LEFT KNEE: ICD-10-CM

## 2021-06-04 DIAGNOSIS — M79.18 MYOFASCIAL PAIN: ICD-10-CM

## 2021-06-04 PROCEDURE — 99213 OFFICE O/P EST LOW 20 MIN: CPT | Mod: 95 | Performed by: NURSE PRACTITIONER

## 2021-06-04 ASSESSMENT — PAIN SCALES - GENERAL: PAINLEVEL: WORST PAIN (10)

## 2021-06-04 NOTE — PROGRESS NOTES
Bernard is a 35 year old who is being evaluated via a billable video visit.      How would you like to obtain your AVS? MyChart  If the video visit is dropped, the invitation should be resent by: Text to cell phone: [phone]  Will anyone else be joining your video visit? Chayo Prasad CMA

## 2021-06-04 NOTE — PROGRESS NOTES
Phone call from Dr. Ruiz in dermatology saw the patient recently.  Reports he does not have any active lesions with his at bedtime presently.  He is started on dapsone.  They are hoping to decrease the frequency from once a month to twice a month.  We reviewed that he had previously been treated by Dr. Bird and surgery and that frequent opioids were needed around frequent surgeries.  He does as noted have any active lesions discuss decreasing the opioids.  Be in frequent communication around flareups which are expected to be directed to dermatology first.  Dr. Ruiz's pager is [phone] and cell is [phone]

## 2021-06-04 NOTE — PROGRESS NOTES
Rescheduled patient for phone visit with [facility] LORAINE.  Patient is still looking for housing, he is disabled and has a felony record therefore it has been a challenge. It is difficult for him to be able to receive care from his nurse when he doesn't have stable housing.    Next outreach due: 1/20/20

## 2021-06-04 NOTE — TELEPHONE ENCOUNTER
Who is calling:  Patient Bernard  Reason for Call:  Bernard called to let the   know that he found a landlord that will rent to him, and he needs  to help him now with the application process and fee. Please call Bernard to discuss/assist further. Thanks.  Date of last appointment with primary care: 12-11 with  and 10-28 in primary care  Okay to leave a detailed message: Yes

## 2021-06-04 NOTE — PROGRESS NOTES
Scheduled Follow Up Call: Attempt 1 Community Health Worker called and left a message for the patient. If the patient is returning my call, please transfer the patient to [place] at ext. [phone].    Next outreach due: 12/27/19

## 2021-06-04 NOTE — PROGRESS NOTES
"Patient has not seen the CCC SW yet due to being in and out of the hospital. CHW rescheduled appointment for next week.  He is needing affordable housing, he has been \"couch hopping\"    Next outreach due: 12/19/19  "
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
## 2021-06-04 NOTE — LETTER
6/4/2021       RE: Bernard Padilla  [address]  [place] [address]     Dear Colleague,    Thank you for referring your patient, Bernard Padilla, to the [facility]NAGEMENT [place] at [facility]. Please see a copy of my visit note below.    Video Start Time: 10:39 AM    [facility] Pain Management     Date of visit: 6/4/2021      Assessment:   The patient is a 35 year old male with PMHx of hidradenitis suppurativa, chronic knee and back pain who was referred by Dr. Webb at [facility] in [place] for evaluation of chronic pain.     1. Left knee pain  2. Back- unclear origin, imaging available is normal.  3. Widespread pain- etiology hidradenitis suppurativa.    Visit Diagnoses:  1. Hidradenitis suppurativa    2. Myofascial pain    3. Chronic pain of left knee    4. Chronic pain syndrome        Plan:    The visit was cut short when Bernard disconnected the video call after it was communicated that I would not order a repeat urine drug screen or prescribed controlled substances.     I do think that Bernard could benefit from a comprehensive pain program utilizing non opioid medication management, pain physical therapy, and pain psychology. Should he return for follow up these options could be discussed further. Given the presence of cocaine in urine, it appears he may have a substance abuse problem. An Addiction Medicine referral and consideration of Suboxone would be very appropriate.     Brenda CURRIE CNP  [facility]     -------------------------------------------------    Subjective:    Chief complaint:   Chief Complaint   Patient presents with     Pain Management     Follow up       Interval history:  Bernard Padilla is a 35 year old male last seen on 5/20/2021.  He is a patient of Dr. Sanchez seen in follow up.     Recommendations/plan at the last visit included:  1. Trial  "amitriptyline 25 mg at bedtime for chronic widespread pain.  Will reassess for benefit, side effects, and additional titration at next evaluation.  2. Trial meloxicam 15 mg daily for chronic myofascial pain.  Discontinue if side effects.  Patient advised to avoid any other NSAID medication.  3. Trial Belbuca 150 mcg BID x 14 days for chronic pain 2/2 hidradenitis suppurativa.  Additional titration can be considered if non beneficial at this dosage  4. Referral placed for pain PT for evaluation of central and peripheral sensitization and myofascial pain and treatments involving fear avoidance, pacing, mobilization, and other indicated modalities.  5. Referral placed for pain psychology to address the role of pain processing via CBT, biofeedback, mindfulness based stress reduction, and other indicated modalities  6. UDS and CSA for prescribing of opioid medications       Since his last visit, Bernard Padilla reports:  -His pain is worse than it was at last visit.   -Unfortunately Belbuca was not covered by his insurance so he wasn't able to start. He reports frustration about this.  -He is upset that cocaine was present in his urine sample, adamantly denies cocaine use. He states, \"I don't get high, I take my sobriety serious and also, \"you aren't going to put that label on me.\" He also comments that he has had urine drug screens result abnormally before and states these samples were also inaccurate.  -Of note, he states he smokes marijuana daily but marijuana was not present in his urine.   -He is adamant that he needs \"pain meds\" to manage his pain, specifically Percocet. When discussed that the presence of cocaine in his urine is irrefutable, he states \"that can't be my urine then.\" He became increasingly upset at this point and the visit, raising his voice and stating that his pain is not being managed.   -At one point he comments that he is open to pain psychology and pain physical therapy but later states he " "would need medication in order to do this.   -He is not interested in the non opioid medications that were prescribed, states, \"you give me all these meds and treat me like a damned guinea pig.\"   -After I explained to Bernard multiple times that due to the presence of cocaine in his urine, I would not be able to order a repeat urine sample or prescribe opioids/other controlled substances, he stated, \"then this appointment is useless then, you have a blessed day\" and then disconnected the visit.     HPI per Dr. Sanchez:  The patient is a 35 year old male with past medical history of Hydradenitis Suppurativa, chronic left Knee pain and chronic back pain  who presents for evaluation of chronic pain. The patient's groin pain began 10 years ago, and has been worsening since that time.  He reports that his pain is located primarily in groin and buttocks and does not radiate. The patient describes the pain as sharp. He reports that the pain is made worse by activity and HS flares.  His pain is improved with oxycodone and percocet. He has had 17 surgeries to drain abscesses, the most recent of which was 2 weeks ago. The patient's pain is most severe during flares. He has he can have 2-5 flares a week and they last for 1-3 weeks. It is uncomfortable to sit or stand. He rates his average pain score at 8/10, but it can be as low as 6/10 or as severe as 10/10. He was previously in the care of Dr. Chai Foster, he says he failed to show up for a UDS and was fired by this physician.  He follows Dr. Ruiz in dermatology who has him on Dapsone, which he says has been helpful.      Patient has had three surgeries on his left knee and says the pain is increased with activity, constant and relieved by oxycodone.  Patient's back pain is constant and aggravated by sitting in uncomfortable positions in order to take pressure of his HS flares on his buttocks and groin. Relates the pain is the worst in the low back, but admits to " stiffness throughout. Was offered surgery but declined due to risks.      He denies any new problems with falls or balance, any new numbness or weakness of the arms or legs, any new bowel or bladder incontinence, any night sweats or unexplained fevers, or any sudden or unexpected weight loss.     Pain Information:   Pain rating: averages 10/10 on a 0-10 scale.      Current pain medications:    Ibuprofen PRN   Oxycodone 10 mg TID PRN   Acetaminophen PRN    Current MME: ?    Review of [place] Prescription Monitoring Program (): No concern for abuse or misuse of controlled medications based on this report. No recent prescriptions since 5/6/2021.    Annual Controlled Substance Agreement/UDS due date: NA    Past pain treatments:  Previous medication treatments included:  Anti-convulsants: gabapentin, didn't work  Muscle relaxors: for knee pain, worked well  Anti-depressants: no  Acetaminophen/NSAIDs: Yes, do not help enough  Topicals: no  Opioids: Percocet and oxycodone both work     Other treatments have included:  Physical therapy: for knee pain and back pain, worked well  Pain Psychology: no  Chiropractic: yes, helpful for stiffness  Acupuncture: no  TENs Unit: yes, at chiropractor for back  Injections: for the knee yes, didn't work  Surgeries: 17 surgeries to drain HS abscesses, 3 L knee surgeries      Medications:  Current Outpatient Medications   Medication Sig Dispense Refill     acetaminophen (TYLENOL) 325 MG tablet Take 650 mg by mouth every 4 hours as needed       albuterol (PROAIR HFA/PROVENTIL HFA/VENTOLIN HFA) 108 (90 Base) MCG/ACT inhaler Inhale 2 puffs into the lungs every 6 hours 8.5 g 3     amitriptyline (ELAVIL) 25 MG tablet Take 1 tablet (25 mg) by mouth At Bedtime 30 tablet 0     clindamycin (CLEOCIN) 300 MG capsule Take 1 capsule (300 mg) by mouth 2 times daily 180 capsule 0     dapsone (ACZONE) 100 MG tablet Take 1 tablet (100 mg) by mouth daily 90 tablet 0     fluocinolone acetonide  "(DERMA-SMOOTHE/FS BODY) 0.01 % external oil Apply topically 2 times daily 120 mL 1     Fluocinolone Acetonide Scalp (DERMA-SMOOTHE/FS SCALP) 0.01 % OIL oil Apply topically daily as instructed. 118.28 mL 5     fluocinonide (LIDEX) 0.05 % external solution Apply topically 2 times daily 60 mL 3     fluticasone (FLONASE) 50 MCG/ACT nasal spray Spray 1-2 sprays into both nostrils daily 16 g 2     IBUPROFEN PO Take 800 mg by mouth 3 times daily as needed (Last dose 9.19.2020)        loratadine (CLARITIN) 10 MG tablet Take 10 mg by mouth daily       meloxicam (MOBIC) 15 MG tablet Take 1 tablet (15 mg) by mouth daily 30 tablet 0     oxyCODONE IR (ROXICODONE) 10 MG tablet Take 0.5-1 tablets (5-10 mg) by mouth every 8 hours as needed for severe pain 21 tablet 0     rifampin (RIFADIN) 300 MG capsule Take 300 mg by mouth daily       triamcinolone (KENALOG) 0.1 % external cream Apply topically 2 times daily 464 g 3       Medical History: any changes in medical history since they were last seen? No    Review of Systems: A 10-point review of systems was negative, with the exception of chronic pain issues.      Objective:    Physical Exam:  There were no vitals taken for this visit.  Constitutional: Well developed, well nourished, appears stated age.  HEENT: Head atraumatic, normocephalic. Eyes without conjunctival injection or jaundice. Oropharynx clear. Neck supple. No obvious neck masses.  Skin: No rash, lesions, or petechiae of exposed skin.   Psychiatric/mental status: Alert, without lethargy or stupor. Speech fluent. Appropriate affect. Mood normal. Able to follow commands without difficulty.     Imaging:  Left Knee MRI 2016, report notes post op changes of partial lateral meniscectomy without evidence of recurrent tearing, mild osteoarthritic changes in lateral compartment, small area of partial thickness articular cartilage loss in trochlear groove.      No spine MRI found. Patient stated having several \"slipped disks\" but " no imaging found. Several lumbar Xrays have been normal, the latest found in 2012.     Video-Visit Details    Type of service:  Video Visit    Video End Time:10:50 AM    Originating Location (pt. Location): Home    Distant Location (provider location):  [facility] FOR COMPREHENSIVE PAIN MANAGEMENT [employer]     Platform used for Video Visit: [employer]     BILLING TIME DOCUMENTATION:   The total TIME spent on this patient on the date of the encounter/appointment was 24 minutes.      TOTAL TIME includes:   Time spent preparing to see the patient (reviewing records and tests)   Time spent face to face (or over the phone) with the patient   Time spent ordering tests, medications, procedures and referrals   Time spent Referring and communicating with other healthcare professionals   Time spent documenting clinical information in Epic       Bernard is a 35 year old who is being evaluated via a billable video visit.      How would you like to obtain your AVS? MyChart  If the video visit is dropped, the invitation should be resent by: Text to cell phone: [phone]  Will anyone else be joining your video visit? Chayo Prasad, KIKA      Again, thank you for allowing me to participate in the care of your patient.      Sincerely,    Shara Solano, ROSEY CNP

## 2021-06-05 VITALS
HEART RATE: 72 BPM | WEIGHT: 188 LBS | OXYGEN SATURATION: 97 % | BODY MASS INDEX: 29.51 KG/M2 | SYSTOLIC BLOOD PRESSURE: 108 MMHG | DIASTOLIC BLOOD PRESSURE: 60 MMHG | HEIGHT: 67 IN

## 2021-06-05 VITALS
OXYGEN SATURATION: 98 % | HEIGHT: 67 IN | DIASTOLIC BLOOD PRESSURE: 73 MMHG | BODY MASS INDEX: 28.25 KG/M2 | SYSTOLIC BLOOD PRESSURE: 104 MMHG | WEIGHT: 180 LBS | HEART RATE: 86 BPM

## 2021-06-05 NOTE — PROGRESS NOTES
"Clinic Care Coordination Contact    Follow Up Progress Note      Assessment: SW contacted Bernard by phone to discuss housing. He reported he contacted the resources provided at last appointment and there is nothing available.    SW reported that housing is very difficult right now. SW confirmed his email address to send him more resources.    SW sent Bernard the Community Resources PDF from the [employer]. They have a packet of resources that are generally \"felon friendly\" and this includes housing.    Goals addressed this encounter:   Goals Addressed                 This Visit's Progress      Functional (pt-stated)        Goal Statement- I would like to have affordable housing in the next year.     Action steps to achieve this goal  1.  I will review the new resources sent to me on 1/8/2020  2.  If I need assistance with completing any paperwork or have questions related to any paperwork associated with this goal, I will notify the [facility] Community Health Worker, [place].      Date goal set: 9/19/19   Updated: 12/11/2019 BC  Updated: 1/8/2020 BC               Intervention/Education provided during outreach: Emailed resource          Plan:   Standard Outreach    "

## 2021-06-05 NOTE — PROGRESS NOTES
Scheduled Follow Up Call: Attempt 1 Community Health Worker called and left a message for the patient. If the patient is returning my call, please transfer the patient to [place] at ext. [phone].    Next outreach due: 2/4/2020
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
## 2021-06-05 NOTE — TELEPHONE ENCOUNTER
Central PA team  [phone]  Pool: HE PA MED ([id number])          PA has been initiated.       PA form completed and faxed insurance via Cover My Meds     Key:  [id number]     Medication:  oxyCODONE-Acetaminophen 10-325MG tablets      Insurance:  [employer]        Response will be received via fax and may take up to 5-10 business days depending on plan
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
## 2021-06-05 NOTE — TELEPHONE ENCOUNTER
Prior Authorization Request  Who s requesting:  Pharmacy/Foster  Pharmacy Name and Location: [facility]  Medication Name: oxycodone/acet 10/325mg, 2/day  Insurance Plan: Blue Plus Adv  Insurance Member ID Number:  [id number]  CoverMyMeds Key: N/A  Informed patient that prior authorizations can take up to 10 business days for response:   Yes  Okay to leave a detailed message: No

## 2021-06-05 NOTE — PATIENT INSTRUCTIONS - HE
PLAN:    Oxycodone 10 mg twice a day, may fill tomorrow    Vitamin D3 2,000 unit capsules, one daily for one week, then two daily    Return in 8 weeks

## 2021-06-06 RX ORDER — FLUOCINOLONE ACETONIDE 0.11 MG/ML
OIL TOPICAL 2 TIMES DAILY
Qty: 120 ML | Refills: 1 | Status: SHIPPED | OUTPATIENT
Start: 2021-06-06 | End: 2021-11-17

## 2021-06-06 NOTE — PROGRESS NOTES
Review indicates patient is being discharged after influenza hospitalization.  He told a staff on discharge she had been to the teen [facility] 10 years previously.  Reviewing my initial evaluation I did not specifically ask about a history of substance abuse treatment though this was not volunteered.  This will need to be continue to monitor given report about cocaine in urine in the past.

## 2021-06-06 NOTE — TELEPHONE ENCOUNTER
Dr. Webb,  Prescriptions have been set up for you to review due to patient's restricted status.  He was in the hospital yesterday and discharged today.  Deanne PRABHAKAR CMA/ANGEL....................10:58 AM

## 2021-06-06 NOTE — PROGRESS NOTES
Clinic Care Coordination Contact    Follow Up Progress Note      Assessment: CCC RN attempted to follow up with patient regarding recent hospitalization at [facility] for treatment of Influenza A,  but was only able to leave a message. Encouraged patient to contact the CCC CHW for any immediate needs or concerns. CCC RN will continue to try to reach out to patient to ensure needs and concerns are being addressed.

## 2021-06-06 NOTE — PROGRESS NOTES
Clinic Care Coordination Contact    Follow Up Progress Note      Assessment: LORAINE met with Bernard to discuss housing.    He reported he was able to secure his own 1 bedroom apartment. He appeared to be extremely happy about this, congratulated him.     Provided him with information for [facility] to try and get a bed. Provided him with Saba's information at [facility] to ask for a Bridging Referral.    Bernard was wondering about PCA, reported he had an assessment but was denied. He didn't know who did it. LORAINE contacted [facility] and they reported that he actually was approved for 4.25 hours per day and his service agreement is 1/1/2020-12/31/2020, he just needs to select an agency. Once an agency is selected they need to complete DHS form 4074-A and send it to the [facility].    LORAINE contacted Bernard to inform him of this, he had me email the form to [email or web address]         Goals addressed this encounter:   Goals Addressed                 This Visit's Progress      COMPLETED: Functional (pt-stated)        I have housing secured           Functional (pt-stated)        Goal Statement: I want to have my PCA services active within one month  Date Goal set: 02/13/20   Barriers: Need to find agency  Strengths: Have resources and been approved  Date to Achieve By: 3/31/2020  Patient expressed understanding of goal: yes  Action steps to achieve this goal:  1. I will contact my cousin who has a PCA agency  2. I will fill out DHS Form 4074-A with my cousin's agency and submit it to the [facility] to start my services  3. I will update CCC team on the status of this            Problem Solving (pt-stated)        Goal Statement: I want to have furniture for my new apartment as soon as possible  Date Goal set: 02/13/20  Barriers: Financial  Strengths: Friends/Family support, resourceful  Date to Achieve By: 3/31/2020  Patient expressed understanding of goal: Yes  Action steps to achieve this goal:  1. I will contact Saba at  [facility] for a Bridging Referral  2. I will go to [facility] for a bed   3. I will update CCC team on the status of this                  Intervention/Education provided during outreach: See above          Plan:   Standard Outreach

## 2021-06-06 NOTE — PROGRESS NOTES
Clinic Care Coordination Contact    Follow Up Progress Note      Assessment: CCC Follow up    Goals addressed this encounter:   Goals Addressed                 This Visit's Progress       General      Functional (pt-stated)   On track     Goal Statement: I want to have my PCA services active within one month  Date Goal set: 02/13/20   Barriers: Need to find agency  Strengths: Have resources and been approved  Date to Achieve By: 3/31/2020  Patient expressed understanding of goal: yes  Action steps to achieve this goal:  1. I will contact my cousin who has a PCA agency  2. I will fill out DHS Form 4074-A with my cousin's agency and submit it to the [facility] to start my services  3. I will update CCC team on the status of this            Problem Solving (pt-stated)   On track     Goal Statement: I want to have furniture for my new apartment as soon as possible  Date Goal set: 02/13/20  Barriers: Financial  Strengths: Friends/Family support, resourceful  Date to Achieve By: 3/31/2020  Patient expressed understanding of goal: Yes  Action steps to achieve this goal:  1. I will contact [facility] for a Bridging Referral  2. I will go to [facility] for a bed   3. I will update CCC team on the status of this                  Intervention/Education provided during outreach: Patient is currently at [facility] to get a bed for his apartment. He does not have the money to get an appointment or pay for delivery for Bridging, CHW will send message to [facility] to see if there are resources available to help him with that.  He has a family member that is going through the process to qualify to be his PCA, he has been approved for 4.5 hours a day/     Plan:   Patient will get a bed from [facility].  Patient will continue to have his cousin work through the process of getting qualified to be his PCA.  CHW will send a message to [facility] to see if there are resources to help him with the cost of getting furniture through  Bridging.    Care Coordinator will follow up in 2 weeks

## 2021-06-06 NOTE — TELEPHONE ENCOUNTER
Medication Request  Medication name:   1.   oseltamivir (TAMIFLU) 75 MG capsule 10 capsule 0 3/2/2020 3/7/2020    Sig - Route: Take 1 capsule (75 mg total) by mouth 2 (two) times a day for 5 days. - Oral    Sent to pharmacy as: oseltamivir 75 mg capsule (TAMIFLU)        2.   predniSONE (DELTASONE) 10 mg tablet 12 tablet 0 3/2/2020     Sig: Take 3 tabs daily for 2 days, then 2 tabs daily for 2 days, then 1 tab daily for 2 days, then stop..    Sent to pharmacy as: predniSONE 10 mg tablet (DELTASONE)        Requested Pharmacy:      [facility] ( Listed on out patient  Medications )     Reason for request:   Restricted Provider Program, Please call in STAT     When did you use medication last?:    NA    Patient offered appointment:  patient declined     Okay to leave a detailed message: yes

## 2021-06-07 NOTE — TELEPHONE ENCOUNTER
Patient is restricted to Dr. Webb so I will forward request to her to address.    Requested medication is not on patients active medication list.    Dr. Webb,    Please advise on requested medication.    Thank you.    Samia SPARKS LPN .......... 11:45 AM  04/08/20

## 2021-06-07 NOTE — PROGRESS NOTES
Clinic Care Coordination Contact    Follow Up Progress Note      Assessment: Call to patient, he was driving and wanted to talk later. Called back later. He asked if SW CC had access to gift cards.  Writer does not have gift cards to give nor knows of anywhere to get them.  He has been to the food shelves and was not happy with selection. He is aware of community meals.  Is happy with his apartment.  Didn't want to discuss anything else at this time.         Plan:   Will delegate next outreach to CHW.    Care Coordinator will follow up in 45 days and as needed.  [facility]  Clinic Care Coordinator  [phone]

## 2021-06-07 NOTE — PROGRESS NOTES
Clinic Care Coordination Contact    Community Health Worker Follow Up    Goals:   Goals Addressed                 This Visit's Progress       General      Functional (pt-stated)   Not on track     Goal Statement: I want to have my PCA services active within one month  Date Goal set: 02/13/20   Barriers: Need to find agency  Strengths: Have resources and been approved  Date to Achieve By: 3/31/2020  Patient expressed understanding of goal: yes  Action steps to achieve this goal:  1. I will contact my cousin who has a PCA agency  2. I will fill out DHS Form 4074-A with my cousin's agency and [facility] to [facility] my services  3. I will update CCC team on the status of this            Problem Solving (pt-stated)   0%     Goal Statement: I want to have furniture for my new apartment as soon as possible  Date Goal set: 02/13/20  Barriers: Financial  Strengths: Friends/Family support, resourceful  Date to Achieve By: 3/31/2020  Patient expressed understanding of goal: Yes  Action steps to achieve this goal:  1. I will contact [facility] for a Bridging Referral  2. I will go to [facility] for a bed   3. I will update CCC team on the status of this                 CHW Next Follow-up: 3-4 weeks    CHW Plan: Patient has not been able to move forward with his goals due to financial reasons and the COVID pandemic.   He is unable to use Bridging for new furniture due to cost of delivery and doesn't want to meet/discuss with CCC SW at this time.  Hold on PCA at this time due to COVID pandemic    Patient needed refills on a few of his medications, CHW sent message to PCP for refills    Next outreach due: 5/27/2020

## 2021-06-07 NOTE — TELEPHONE ENCOUNTER
Pt had virtual visit with Brenda Solano on 6/4/21, during which pt and provider discussed medication management. Please see provider note.     EMERSON AshN, RN

## 2021-06-07 NOTE — PROGRESS NOTES
Clinic Care Coordination Contact    Situation: Patient chart reviewed by care coordinator.    Background: Patient currently working on goal related to getting a PCA and a goal to get furniture for his apartment.     Assessment: [facility] CHW followed up with patient yesterday. Patient reported he is happy with his new apartment. Currently he is unable to use Bridging for furniture as he does not have the funds the for delivery fee. Patient reported he is looking a [employer] stores currently. Spoke with [facility] LSW yesterday regarding gift cards for food, but no resources were available. Patient said she was not happy with the food he was getting at food shelves. Patient aware of community meal resources.       Plan/Recommendations: CCC RN will continue to monitor and will be available as nursing needs arise.

## 2021-06-07 NOTE — PROGRESS NOTES
Clinic Care Coordination Contact    Follow Up Progress Note      Assessment: CCC Follow Up    Goals addressed this encounter:   Goals Addressed                 This Visit's Progress       General      Functional (pt-stated)   Not on track     Goal Statement: I want to have my PCA services active within one month  Date Goal set: 02/13/20   Barriers: Need to find agency  Strengths: Have resources and been approved  Date to Achieve By: 3/31/2020  Patient expressed understanding of goal: yes  Action steps to achieve this goal:  1. I will contact my cousin who has a PCA agency  2. I will fill out DHS Form 4074-A with my cousin's agency and submit it to the [facility] beds at the [facility] to start my services  3. I will update CCC team on the status of this            Problem Solving (pt-stated)   20%     Goal Statement: I want to have furniture for my new apartment as soon as possible  Date Goal set: 02/13/20  Barriers: Financial  Strengths: Friends/Family support, resourceful  Date to Achieve By: 3/31/2020  Patient expressed understanding of goal: Yes  Action steps to achieve this goal:  1. I will contact [facility] for a Bridging Referral  2. I will go to [facility] for a bed   3. I will update CCC team on the status of this                  Intervention/Education provided during outreach: Patient is in his new apartment and has not used Bridging yet to get furniture because he does not have the funds for the delivery fees. He is looking at discount stores and checking for other avenues to get furniture.    He would like to meet with the [facility] SW to discuss getting gift cards for groceries because the food shelves are so limited right now. CHW made appt for patient to have a phone appt with [facility] SW       Plan:   Patient will keep phone appt with [facility] SW to discuss food resources    Care Coordinator will follow up in 2 weeks

## 2021-06-07 NOTE — PROGRESS NOTES
Clinic Care Coordination Contact  [facility]/Voicemail       Clinical Data: Care Coordinator Outreach  Outreach attempted x 1.  Left message on patient's voicemail with call back information and requested return call.    Plan:    Care Coordinator will try to reach patient again in 3-5 business days.    Next outreach due: 4/3/2020

## 2021-06-07 NOTE — TELEPHONE ENCOUNTER
Patient states that he needs a refill on his allergy medication Loratadine 10 mg.      Uses the [facility] in the [facility]

## 2021-06-07 NOTE — PROGRESS NOTES
"Pt is being treated today with a telephone visit, with Chai Foster MD, for refill and f/u of back pain    Bernard Padilla is a 34 y.o. male who is being evaluated via a billable telephone visit.      The patient has been notified of following:     \"This telephone visit will be conducted via a call between you and your physician/provider. We have found that certain health care needs can be provided without the need for a physical exam.  This service lets us provide the care you need with a short phone conversation.  If a prescription is necessary we can send it directly to your pharmacy.  If lab work is needed we can place an order for that and you can then stop by our lab to have the test done at a later time.    If during the course of the call the physician/provider feels a telephone visit is not appropriate, you will not be charged for this service.\"     Physician has received verbal consent for a Telephone Visit from the patient? Yes    Bernard Padilla complains of    Chief Complaint   Patient presents with     Follow-up       I have reviewed and updated the patient's Past Medical History, Social History, Family History and Medication List.    ALLERGIES  Hydrocodone and Egg    Pain score 10  Constant   What does your pain like feel during a flare? Sharp, achy, burning  Does the pain interfere with:  Work ----- NA  Walking ------ yes  Sleep ------- yes  Daily activities ------yes  Relationships -------yes  F= 7      Nguyen Mckeon, [facility]    "

## 2021-06-07 NOTE — TELEPHONE ENCOUNTER
Central PA team  [phone]  Pool: HE PA MED ([id number])          PA has been initiated.       PA form completed and faxed insurance via Cover My Meds     Key:  [id number]     Medication:  oxyCODONE-Acetaminophen 10-325MG tablets      Insurance:  [employer]        Response will be received via fax and may take up to 5-10 business days depending on plan

## 2021-06-07 NOTE — TELEPHONE ENCOUNTER
Referral Request  Type of referral: Dermatology for Dr. Apolinar Ruiz at [facility]   Who s requesting: Bella from [facility]   Why the request: Caller states the patient had seen Kike Burdick. surgeon is recommending the patient to see . DX: Hidradenitis , Psoriasis ,Eczema and Dermatitis  Have you been seen for this request: Yes  Does patient have a preference on a group/provider? See message above in type of referral    Okay to leave a detailed message?  Yes

## 2021-06-07 NOTE — TELEPHONE ENCOUNTER
Patient would like refills on his skin creams  Temovate and Lidex.  He would also like a prescription for Flonase as well.    [facility]ses [facility] in [facility] [place] [facility]

## 2021-06-07 NOTE — TELEPHONE ENCOUNTER
2020 Prior Authorization Request  Who's requesting PA: Pharmacy  Provider: Chai Foster MD  Pharmacy Name, Location & Phone # : [facility], [address], [place] [address], P:[phone], F: [phone]  Medication Name: Endocet  MG tabs  Quantity: 84  Refills: 0  Days Supply: 3  Medication Instructions: Take 1 tablet by mouth every 8 (eight) hours as needed for pain  Insurance Plan: [employer] [place]  Insurance Member ID & GRP # : MemberID# [id number] , GroupID# [id number]  CoverMyMeds Key: n/a  Informed patient that prior authorizations can take up to 10 business days for response: YES  Okay to leave a detailed message: YES    Route to: [facility] PA MED ([id number])

## 2021-06-07 NOTE — TELEPHONE ENCOUNTER
Rx refills teed up for auth    Do we have any info on what kind of symptoms he's having that he is requesting Flonase- did not see this on his past or current med list    Vaccine Information Sheet, \"Influenza - Inactivated\"  given to Angelique Correa, or parent/legal guardian of  Angelique Correa and verbalized understanding. Patient responses:    Have you ever had a reaction to a flu vaccine? NO    Are you able to eat eggs without adverse effects? YES    Do you have any current illness? NO    Have you ever had [place] Syndrome? NO    Flu vaccine given per order. Please see immunization tab. 57yo Female who presented to the ER in respiratory distress / metabolic acidosis / hepatic encephalopathy / hepatic failure.  Intubated after a ?PEA arrest s/p DCCV for Afib with RVR.  Found to be in septic shock:  CT C/A/P: emphysematous pyelonephritis with stag horn calculous Left kidney  Echo: LVEF 30%, RV enlargement, mod MR      PAST MEDICAL & SURGICAL HISTORY  no known medical history    INTERVAL HISTORY: vented, sedated  Vital Signs Last 24 Hrs  T(C): 36.7 (07 Aug 2018 15:57), Max: 37.4 (07 Aug 2018 07:42)  T(F): 98 (07 Aug 2018 15:57), Max: 99.4 (07 Aug 2018 07:42)  HR: 57 (07 Aug 2018 12:10) (47 - 84)  BP: 110/57 (07 Aug 2018 11:00) (95/68 - 133/78)  BP(mean): 68 (07 Aug 2018 11:00) (68 - 104)  RR: 21 (07 Aug 2018 11:00) (16 - 27)  SpO2: 98% (07 Aug 2018 12:10) (87% - 100%)    MEDICATIONS  (STANDING):  aspirin  chewable 81 milliGRAM(s) Oral daily  chlorhexidine 0.12% Liquid 15 milliLiter(s) Swish and Spit two times a day  chlorhexidine 4% Liquid 1 Application(s) Topical <User Schedule>  fluconAZOLE   Tablet 200 milliGRAM(s) Oral daily  furosemide   Injectable 40 milliGRAM(s) IV Push every 12 hours  heparin  Injectable 5000 Unit(s) SubCutaneous every 8 hours  meropenem  IVPB 1000 milliGRAM(s) IV Intermittent every 8 hours  meropenem  IVPB      midodrine 20 milliGRAM(s) Oral every 8 hours  simethicone drops 80 milliGRAM(s) Oral daily  vancomycin  IVPB      vancomycin  IVPB 750 milliGRAM(s) IV Intermittent every 12 hours    MEDICATIONS  (PRN):  fentaNYL    Injectable 100 MICROGram(s) IV Push every 1 hour PRN sedation  midazolam Injectable 2 milliGRAM(s) IV Push every 4 hours PRN sedation      PHYSICAL EXAM:  Neuro: Opens eyes and is somewhat responsive.  CV: S1 S2 RRR, + systolic murmur   Lungs: diminished to bases   GI: mildly distended,  BS +  Extremities: + edema, + anasarca , chronic stasis changes    	    RADIOLOGY:   < from: Xray Chest 1 View- PORTABLE-Urgent (08.02.18 @ 12:42) >    EXAM:  XR CHEST PORTABLE URGENT 1V                            PROCEDURE DATE:  08/02/2018          INTERPRETATION:    DATE OF STUDY: 8/2/18 at 12:14PM.    PRIOR: Earlier 8/2/18 chest.    CLINICAL INDICATION: Readjust ET and NG tubes.    TECHNIQUE: portable chest - done semierect.    FINDINGS:   ET tube tipi is 3.0cm above the nivia.  NG tube is in the stomach.  There is stable cardiomegaly.  Mild improvement in pulmonary edema and decrease in left pleural effusion.  No significant right pleural effusion.  No pneumothorax.  The bony structures are intact.    IMPRESSION:   Mild improvement in pulmonary edema.  Mild decrease in left pleural effusion.  Life supporting devices in appropriate position.      < end of copied text >      < from: TTE Limited Echo w/o Cont (08.01.18 @ 20:03) >   1. Left ventricular ejection fraction, by visual estimation, is 40 to   45%.   2. Technically limited study.   3. Mildly to moderately decreased global left ventricular systolic   function.   4. Global cardiomyopathy.   5. Normal left ventricular internal cavity size.   6. The left ventricular diastolic function could not be assessed in this   study.   7. Moderate pleural effusion in the left lateral region.   8. Small pericardial effusion.   9. Moderate mitral valve regurgitation.  10. Structurally normal mitral valve, with normal leaflet excursion.  11. Degenerative tricuspid valve.  12. Moderate-severe tricuspid regurgitation.  13. Sclerotic aortic valve with normal opening.  14. Trace pulmonic valve regurgitation.  15. Compared with the prior study from 7/19/18, global LVEF is somewhat   improved. RV appears mildly enlarged and moderate to severetricuspid   insufficiency and moderate mitral insufficiency noted.    < end of copied text >    LABS:	 	                                     9.5    8.82  )-----------( 300      ( 07 Aug 2018 04:01 )             30.5   08-07    142  |  98  |  17  ----------------------------<  89  3.4<L>   |  33<H>  |  0.65    Ca    9.0      07 Aug 2018 04:01  Phos  2.9     08-07  Mg     2.1     08-07    TPro  6.8  /  Alb  2.2<L>  /  TBili  2.4<H>  /  DBili  2.01<H>  /  AST  34  /  ALT  29  /  AlkPhos  107  08-06
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
## 2021-06-08 NOTE — PROGRESS NOTES
Preoperative Exam    Scheduled Procedure: LEFT KNEE ARTHROSCOPY WITH POSSIBLE MENISCAL REPAIR - ARTHREX  Surgery Date:  6/9/20  Surgery Location: [facility]    Surgeon:  Dr. Hayden    Assessment/Plan:     1. Preop general physical exam  He is scheduled for knee surgery on June 9.  He has had multiple surgeries in the past with no difficulty with anesthesia, bleeding, blood clots.  He has no history of heart disease.  He does smoke and uses an albuterol inhaler at times.  He had a normal chest x-ray just a couple months ago.  He is not having any symptoms or signs of unstable cardiac or lung disease.  I will recheck his CBC and metabolic panel prior to surgery.  Otherwise he can proceed as planned.  No recent symptoms of infection.  - HM2(CBC w/o Differential)  - Comprehensive Metabolic Panel    2. Hidradenitis  This has been stabilized with the dapsone and he will continue on medication.  - HM2(CBC w/o Differential)  - Comprehensive Metabolic Panel    3. Allergic rhinitis  - fluticasone propionate (FLONASE) 50 mcg/actuation nasal spray; Instill two sprays into each nostril once daily  Dispense: 16 g; Refill: 3    Surgical Procedure Risk: Intermediate (reported cardiac risk generally 1-5%)  Have you had prior anesthesia?: Yes  Have you or any family members had a previous anesthesia reaction:  No  Do you or any family members have a history of a clotting or bleeding disorder?: No  Cardiac Risk Assessment: no increased risk for major cardiac complications    Pending review of diagnostic evaluation, APPROVAL GIVEN to proceed with proposed procedure, without further diagnostic evaluation.      Functional Status: Independent  Patient plans to recover at home with family.     Subjective:      Bernard Padilla is a 34 y.o. male who presents for a preoperative consultation.  He scheduled for knee surgery on June 9, 2020.  He has had multiple surgeries in the past.  He has no history of bleeding or blood clots.  He has not had  any recent symptoms of chest pain, palpitations, dyspnea on exertion or other worrisome cardiac or lung symptoms.  He does smoke and uses albuterol as needed.  He has not needed to use it more often recently.  He has not had any recent symptoms of infection.  No fevers or chills or cough.  He does continue to exercise at times and has no problems with his breathing or chest pain.  He had a normal chest x-ray just 2 months ago.  He also has hidradenitis which has been well controlled recently with dapsone.  He has no other acute concerns today.    All other systems reviewed and are negative, other than those listed in the HPI.    Pertinent History  Do you have difficulty breathing or chest pain after walking up a flight of stairs: Yes: SOB when walking up stairs  History of obstructive sleep apnea: No  Steroid use in the last 6 months: Yes: inhaler  Frequent Aspirin/NSAID use: No  Prior Blood Transfusion: No  Prior Blood Transfusion Reaction: No  If for some reason prior to, during or after the procedure, if it is medically indicated, would you be willing to have a blood transfusion?:  There is no transfusion refusal.    Current Outpatient Medications   Medication Sig Dispense Refill     albuterol (PROAIR HFA;PROVENTIL HFA;VENTOLIN HFA) 90 mcg/actuation inhaler Inhale 2 puffs every 6 (six) hours as needed for wheezing. 1 each 11     clobetasoL (TEMOVATE) 0.05 % ointment Apply 1 application topically 2 (two) times a day. To hands and feet 60 g 1     dapsone 100 MG tablet Take 100 mg by mouth daily.        fluocinonide (LIDEX) 0.05 % external solution Apply topically 2 (two) times a day. 60 mL 1     fluocinonide-emollient (FLUOCINONIDE-EMOLLIENT) 0.05 % Crea Apply twice daily to palms 30 g 11     fluticasone propionate (FLONASE) 50 mcg/actuation nasal spray Instill two sprays into each nostril once daily 16 g 3     loratadine (CLARITIN) 10 mg tablet Take 1 tablet (10 mg total) by mouth daily. 90 tablet 3     [START ON  5/27/2020] oxyCODONE-acetaminophen (PERCOCET/ENDOCET)  mg per tablet Take 1 tablet by mouth every 8 (eight) hours as needed for pain. 84 tablet 0     No current facility-administered medications for this visit.         Allergies   Allergen Reactions     Hydrocodone Swelling     Upset stomach     Egg        Patient Active Problem List   Diagnosis     Allergic rhinitis     Depression     Other chronic pain     Hidradenitis     Influenza     Knee pain, left       Past Medical History:   Diagnosis Date     Anxiety      Arthritis      Hydradenitis        Past Surgical History:   Procedure Laterality Date     KNEE SURGERY       SKIN SURGERY         Social History     Socioeconomic History     Marital status: Single     Spouse name: Not on file     Number of children: 2     Years of education: Not on file     Highest education level: Not on file   Occupational History     Occupation: disability   Social Needs     Financial resource strain: Very hard     Food insecurity     Worry: Often true     Inability: Often true     Transportation needs     Medical: Yes     Non-medical: Yes   Tobacco Use     Smoking status: Current Every Day Smoker     Packs/day: 1.00     Years: 20.00     Pack years: 20.00     Types: Cigarettes     Smokeless tobacco: Never Used   Substance and Sexual Activity     Alcohol use: Yes     Frequency: Monthly or less     Drug use: Yes     Sexual activity: Yes     Partners: Female   Lifestyle     Physical activity     Days per week: 0 days     Minutes per session: 0 min     Stress: Very much   Relationships     Social connections     Talks on phone: More than three times a week     Gets together: More than three times a week     Attends Denominational service: Never     Active member of club or organization: No     Attends meetings of clubs or organizations: Never     Relationship status: Not on file     Intimate partner violence     Fear of current or ex partner: Not on file     Emotionally abused: Not on  "file     Physically abused: Not on file     Forced sexual activity: Not on file   Other Topics Concern     Not on file   Social History Narrative     Not on file       Patient Care Team:  Lillian Webb MD as PCP - General (Internal Medicine)  Lillian Webb MD as Assigned PCP  Myhre, David J, RN as Clinic Care Coordinator (Primary Care - CC)  Mercedez Hurley CHW as Community Health Worker (Primary Care - CC)  Eden Livingston SW as Lead Care Coordinator (Primary Care - CC)          Objective:     Vitals:    05/22/20 1036   BP: 98/56   Pulse: 66   SpO2: 97%   Weight: 183 lb (83 kg)   Height: 5' 6\" (1.676 m)         Physical Exam:  General:  Patient is alert and in no apparent distress.  Neck:  Supple with no adenopathy or thyroid abnormality noted.  Cardiovascular:  Regular rate and rhythm, normal S1/S2, no murmurs, rubs, or gallop.  Pulmonary:  Lungs are clear to auscultation bilaterally with normal respiratory effort.  Gastrointestinal:  Abdomen is soft, non-tender, non-distended, with no organomegaly, rebound or guarding.  Extremities:  No LE edema.   Neurologic Cranial nerves are intact.  No focal deficits.  Psychiatric:  Pleasant, no confusion or agitation       There are no Patient Instructions on file for this visit.    EKG: Not indicated    Labs:  Labs pending at this time.  Results will be reviewed when available.    Immunization History   Administered Date(s) Administered     Tdap 08/08/2016           Electronically signed by Lillian Webb MD 05/22/20 10:38 AM  "

## 2021-06-08 NOTE — TELEPHONE ENCOUNTER
Pharmacy calls:  Refill sent to pharmacy but due to an insurance change only a 7 day refill was allowed, will need the remainder sent.    Requested Prescriptions     Pending Prescriptions Disp Refills     oxyCODONE-acetaminophen (PERCOCET/ENDOCET)  mg per tablet 63 tablet 0     Sig: Take 1 tablet by mouth every 8 (eight) hours as needed for pain.     HE [place]

## 2021-06-08 NOTE — PROGRESS NOTES
Please call and let him know that his preop labs were all normal.  He can proceed as planned.  Thanks.

## 2021-06-08 NOTE — PROGRESS NOTES
"Clinic Care Coordination Contact    Follow Up Progress Note      Assessment: Spoke with patient today to follow up on recent surgery left knee arthroscopy related to meniscal tear. Patient stated he continues to experience surgical pain. He stated he has contacted surgeon's office, but said they would not prescribe him any additional pain medication at this time. Patient plans to speak with his Pain Provider, Dr. Foster today regarding his concerns.   With regards to furniture goal, patient stated he was able to furnish his apartment. He agreed to complete this goal.   Patient has not moved forward on PCA goal. He stated he is concerned about having other people in his apartment related to the COVID-19 outbreak. He stated he will continue to purse getting a PCA when \"things die down\".     Goals addressed this encounter:   Goals Addressed                 This Visit's Progress       Patient Stated      Functional (pt-stated)        Goal Statement: I want to have my PCA services active within one month  Date Goal set: 02/13/20   Barriers: Need to find agency  Strengths: Have resources and been approved  Date to Achieve By: 3/31/2020  Patient expressed understanding of goal: yes  Action steps to achieve this goal:  1. I will contact my cousin who has a PCA agency  2. I will fill out DHS Form 4074-A with my cousin's agency and submit it to the [facility] to start my services  3. I will update CCC team on the status of this    Discussed on 6/12/20.             COMPLETED: Problem Solving (pt-stated)        Goal Statement: I want to have furniture for my new apartment as soon as possible  Date Goal set: 02/13/20  Barriers: Financial  Strengths: Friends/Family support, resourceful  Date to Achieve By: 3/31/2020  Patient expressed understanding of goal: Yes  Action steps to achieve this goal:  1. I will contact [facility] for a Bridging Referral  2. I will go to [facility] for a bed   3. I will update [facility] team on the " status of this     Discussed on 6/12/20 - Patient stated has furniture for his apartment. Ok to complete this goal. DJM                 Intervention/Education provided during outreach: Discussed the importance of taking his medications daily as directed. Discuss post-procedure recommendations and discussed the importance of attending all follow up appointments associated with his recent surgery. Discussed the importance of handwashing, keeping his hands away from his face, masking in public and engaging in social distancing at this time.           Plan: CCC RN will continue to monitor and will be available as nursing needs arise.     Care Coordinator will follow up in 45 days.

## 2021-06-08 NOTE — TELEPHONE ENCOUNTER
Medication being requested: percocet 10/325 mg  Last visit date: 3/30  Provider: KRYSTIAN  Next visit date: 5/22  Provider: BE  Expected follow up: 8 weeks  Patient does not need a refill until after follow up apt and can address at that time

## 2021-06-08 NOTE — PROGRESS NOTES
Clinic Care Coordination Contact  [facility]/Voicemail       Clinical Data: Care Coordinator Outreach  Outreach attempted x 1.  Left message on patient's voicemail with call back information and requested return call.  Plan:  Care Coordinator will try to reach patient again in 3-5 business days.  Next outreach due: 6/2/2020

## 2021-06-08 NOTE — PROGRESS NOTES
Clinic Care Coordination Contact     Situation: Patient chart reviewed by care coordinator.     Background: Pts initial assessment and enrollment to Care Coordination was 9-.   Patient centered goals were developed with participation from patient.  RN CC handed patient off to CHW for continued outreach every 30 days.      Assessment: CHW has been in contact with patient monthly. CHW Plan: Patient has not been able to move forward with his goals due to financial reasons and the COVID pandemic.   He is unable to use [employer] for new furniture due to cost of delivery and doesn't want to meet/discuss with CCC SW at this time. Hold on PCA at this time due to COVID pandemic.     Plan/Recommendations: CHW will involve SW CC as needed or if patient is ready to move to maintenance.  SW CC will continue to monitor progress to goals and CHW outreaches every 6 weeks.     Care Plan updated and mailed to patient: no

## 2021-06-08 NOTE — PROGRESS NOTES
Patient had knee surgery yesterday, we did not discuss his goals today due to his pain level and inability to sleep.  CHW encouraged patient to call and talk to his surgeon to discuss his pain level.    Next outreach due: 6/24/2020

## 2021-06-08 NOTE — PROGRESS NOTES
"Bernard Padilla is a 34 y.o. male who is being evaluated via a billable telephone visit regarding back pain. Patient describes his pain as aching, stabbing, burning. Patient's everyday functions effected by pain include: walking, sleep, activities of daily living and mood.    The patient has been notified of following:     \"This telephone visit will be conducted via a call between you and your physician/provider. We have found that certain health care needs can be provided without the need for a physical exam.  This service lets us provide the care you need with a short phone conversation.  If a prescription is necessary we can send it directly to your pharmacy.  If lab work is needed we can place an order for that and you can then stop by our lab to have the test done at a later time. Telephone visits are billed at different rates depending on your insurance coverage. During this emergency period, for some insurers they may be billed the same as an in-person visit.  Please reach out to your insurance provider with any questions.\"    Patient has given verbal consent to a Telephone visit? Yes    What phone number would you like to be contacted at? mobile    Patient would like to receive their AVS by AVS Preference: Mail a copy.    Tali Palma CMA      "

## 2021-06-08 NOTE — PATIENT INSTRUCTIONS - HE
PLAN:  Discussed you are scheduled for knee surgery on June 9.    Continue working with the dermatology clinic.    Discussed the chiropractors may be starting to see people in June.    You are due for refill of your oxycodone 10/325 3 times a day as needed on 5/27.  Reviewed your goal after surgery is to begin tapering the oxycodone and this will be supported.    Follow-up with Dr. Foster in 8 weeks.

## 2021-06-09 ENCOUNTER — COMMUNICATION - HEALTHEAST (OUTPATIENT)
Dept: INTERNAL MEDICINE | Facility: CLINIC | Age: 36
End: 2021-06-09

## 2021-06-09 NOTE — TELEPHONE ENCOUNTER
Patient left a message stating they were returning a call from provider regarding medication for vacation.  Patient I plans to go on vacation for 30-45 days.

## 2021-06-09 NOTE — TELEPHONE ENCOUNTER
Evelyne from [facility] is calling to get a restricted referral form completed for patient. Pt was seen on 5/20/20 for left knee pain by Dr. Hayden. He was referred to this provider by his chiropractor. Patient had surgery and is doing PT with this group also.     We denied the restricted referral form due to patient did not contact PCP office about the referral. We would have sent him to an in network facility.     [facility] did not look at this MA restriction due to the primary insurance being [employer].

## 2021-06-09 NOTE — PROGRESS NOTES
CCC RN spoke with patient and CCC SW reviewed patient chart.     CHW will follow up in 4 weeks.     Next outreach due: 7/27/2020

## 2021-06-09 NOTE — TELEPHONE ENCOUNTER
Also refilled oxycodone.  Describes he has had his knee surgery, now is in physical therapy.  Still significant pain.  He expects he will need a knee replacement a year or 2.    He is work with Dr. Ruiz with appointment yesterday.  He has had flareups of this scan condition.  Dr. Ruiz wants him to see another surgeon.    He has been using the oxycodone 10 mg every 8 hours.    He is going out of town next week to take his children to visit family in [place] for several weeks before the kids return to school.  We will refill the oxycodone at that time and review his interactions with the dermatologist on return.

## 2021-06-09 NOTE — PROGRESS NOTES
.Clinic Care Coordination Contact     Situation: Patient chart reviewed by care coordinator.     Background: Pts initial assessment and enrollment to Care Coordination was 9-.   Patient centered goals were developed with participation from patient.  RN CC handed patient off to CHW for continued outreach every 30 days.      Assessment: RN CC talked to patient on 6-24. Patient has made progress to goals. One goal to get a PCA is not being pursued during COVID.       Plan/Recommendations: CHW will involve SW CC as needed or if patient is ready to move to maintenance.  SW CC will continue to monitor progress to goals and CHW outreaches every 6 weeks.     Care Plan updated and mailed to patient: no

## 2021-06-09 NOTE — PROGRESS NOTES
"Clinic Care Coordination Contact    Follow Up Progress Note      Assessment: CCC RN spoke with patient briefly today to follow up on ED visit  for evaluation of mild intermittent asthma with complication. Writer went over ED AVS with patient including newly prescribed medications. Patient denied any concerns at this time and stated he was \"doing alright now\".     Goals addressed this encounter:   Goals Addressed    None          Intervention/Education provided during outreach: Discussed the importance of taking his medications daily as scheduled. Discussed the importance of washing his hands frequently, keeping his hands away from his face, masking in public and engaging in social distancing at this time.        Plan: CCC RN will continue to monitor and will be available to assist as nursing needs arise.     Care Coordinator will follow up in 45 days    "

## 2021-06-09 NOTE — PROGRESS NOTES
Patient spoke with CCC RN on 7/20.  No CHW delegations at this time.    Next outreach due: 8/20/2020

## 2021-06-09 NOTE — PROGRESS NOTES
Clinic Care Coordination Contact    Follow Up Progress Note      Assessment: CCC RN spoke with patient today to follow up on recent ED visit for acute left knee pain. Patient stated he has a follow up with his orthopedic surgeon today. Reported his pain was under good control. No other concerns expressed.     Goals addressed this encounter:   Goals Addressed    None          Intervention/Education provided during outreach: Discussed the importance of taking his medications daily as directed. Encouraged patient to attend all scheduled follow appointments. Discuss the importance of frequent hand washing, keeping hands away from his face, masking in public and engaging in social distancing at this time.           Plan: CCC RN will continue to monitor and will be available to assist as nursing needs arise.     Care Coordinator will follow up in 45 days

## 2021-06-10 NOTE — TELEPHONE ENCOUNTER
Referral Request  Type of referral: specialist - Pain clinic  Who s requesting: Patient  Why the request: patient called to request new referral to a different pain clinic.   Patient states his current provider declined his pain medication, as the patient missed a scheduled UA screening with the pain clinic.  Patient states he did not realize he missed the appointment as he had some recent dental work.  Patient unhappy with this medication denial and the providers attitude toward him.   Have you been seen for this request: Yes  Does patient have a preference on a group/provider? Patient had no preference.  Okay to leave a detailed message?  Yes

## 2021-06-10 NOTE — PATIENT INSTRUCTIONS - HE
PLAN:     You are scheduled for a urine drug test at the pain center on Tuesday the 18th.    Continue the oxycodone 10 mg every 8 hours as needed, anticipating can taper the dose as your hidradenitis is improving.    You continue with your chiropractor for back pain.    Continue with orthopedics for your knee and back pain.    Follow-up Dr. Foster in 8 weeks.

## 2021-06-10 NOTE — PROGRESS NOTES
"Bernard Padilla is a 35 y.o. male who is being evaluated via a billable video visit.      The patient has been notified of following:     \"This video visit will be conducted via a call between you and your physician/provider. We have found that certain health care needs can be provided without the need for an in-person physical exam.  This service lets us provide the care you need with a video conversation.  If a prescription is necessary we can send it directly to your pharmacy.  If lab work is needed we can place an order for that and you can then stop by our lab to have the test done at a later time.    Video visits are billed at different rates depending on your insurance coverage. Please reach out to your insurance provider with any questions.    If during the course of the call the physician/provider feels a video visit is not appropriate, you will not be charged for this service.\"    Patient has given verbal consent to a Video visit? Yes  How would you like to obtain your AVS? AVS Preference: Mail a copy.  If dropped by the video visit, the video invitation should be sent to: Text to cell phone: [phone]  Will anyone else be joining your video visit? No    Patient is here for a follow up appointment with Dr. Foster. Patient has back and bilateral knees pain, describes the pain as constant, aching, throbbing and burning,rates the pain as 9/10. CSA, ARINA, NDI and UDT are deferred due to COVID 19. Functionality is 8. Patient states their pain interferes with sleep, walking, work, ADL's and relationships.    Anna Faulkner LPN  "
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
## 2021-06-10 NOTE — PROGRESS NOTES
Patient calls today, today he got in the mail to the after visit summary talk about the urine drug screen on Tuesday.  He notes he was at the dentist on Tuesday.  He goes by things in his calendar.  He did not recall we talked about the urine drug during the visit last week.    I pointed out he did remember to go  his oxycodone on Monday.  States he got a call from the pharmacy.    We discussed the urine drug screens are reported to obtain was requested.  Given that he did not do so, discussed we make the transition reviewed and are feeding products.  Reminded him that he has a new dermatologist for when things are going well.  Discussed the hidradenitis supra T which should be less of a ongoing issue with a concern.  Noted if he does have any surgery for his lesions the surgeon can give him postoperative pain management as seems appropriate.  Chronic pain perspective we would look at optimizing management of the been of treatment.

## 2021-06-10 NOTE — PROGRESS NOTES
"Left a message, reported 2 or 3 days ago had teeth problems went to the dentist.  I attempted to call the patient back and left a message.    Patient may have been referring to the discussion about missed urine drug test.  Note when I did speak with him the day after the most test he indicated he had had \"slipped\" in his mind, and he did not mention dental problems at that time.  "

## 2021-06-10 NOTE — TELEPHONE ENCOUNTER
LM with Pain Clinic to see if patient called to make an appointment for urine test, if he could still be seen and continue medication management with Dr. Foster.

## 2021-06-10 NOTE — TELEPHONE ENCOUNTER
Per the chart it appears that patient has failed the requested pill count. In this case it is not necessarily a discharge from the clinic but the way medications are currently managed may change as the pill count is part of the contract with the pain center.  Will send to Dr. Foster

## 2021-06-10 NOTE — PROGRESS NOTES
Clinic Care Coordination Contact    Follow Up Progress Note      Assessment: Call to patient who is frustrated with his pain clinic as he was told he missed a UA and did not get notification he needed to have one done until it was too late. He was notified via mail.  He is working on getting a new pain clinic. His current medications will last until mid September he thinks, but is taking more than usual due to back pain with his groin wounds.  He is raising his two daughters and has chronic pain. The medications help him make it through his day.  He had appointment with [facility] doctor regarding his wounds and they put together a plan.  He is happy with his PCP and will continue to see her.     He is unable to locate a PCA he can trust who won't bring him the COVID.  He wants to continue with Care Coordination and set up re-assessment for 9-1 at 9:30 AM.      Goals addressed this encounter:   Goals Addressed                 This Visit's Progress       Patient Stated      Functional (pt-stated)   10%     Goal Statement: I want to have my PCA services active within one month  Date Goal set: 02/13/20   Barriers: Need to find agency  Strengths: Have resources and been approved  Date to Achieve By: 3/31/2020  Patient expressed understanding of goal: yes  Action steps to achieve this goal:  1. I will contact my cousin who has a PCA agency  2. I will fill out DHS Form 4074-A with my cousin's agency and submit it to the [facility] to start my services  3. I will update CCC team on the status of this    Discussed on 6/12/20.                  Intervention/Education provided during outreach: Empathized with his frustrations. Until he connects to a new pain clinic, he may have to ask PCP for assistance with medications.           Plan: Will call patient on 9-1 to do assessment.     Care Coordinator will follow up in one week.  Eden Livingston, [facility]  Clinic Care Coordinator  [phone]

## 2021-06-10 NOTE — PROGRESS NOTES
Clinic Care Coordination Contact  Care Team Conversations   Discussed at team meeting. Patient needs new assessment and goals.  Will delegate to CHW to contact to see if patient wants to complete new assessment.  His one goal has been on hold for several months due to COVID.     SW CC available to complete assessment. If patient does not want to complete assessment, move his status to maintenance and follow protocol.   JOHNNY Akins  Clinic Care Coordinator  [phone]

## 2021-06-11 NOTE — TELEPHONE ENCOUNTER
Who is calling:  Cary From [facility] Referral   Reason for Call:  Caller states patient went to [facility] on 12/14/19 need Retro referral for patient, date of service is 12/14/19 Kyle Bear for Diagnosis code L02.214 Tetanus Abscess Gowing pain. For questions please reach out Cary [phone].  Date of last appointment with primary care: 05/22/20  Okay to leave a detailed message: No

## 2021-06-11 NOTE — TELEPHONE ENCOUNTER
I told her that we need the ER visit note to do the retro referral. She asked us to call her once we have report. I told her that the patient would need to call [facility] to have them release the ER visit note to us since it is over 6 months old.      Once report is received then retro referral can be completed and sent to [facility]. Patient has to do the work to get the report to PCP office

## 2021-06-11 NOTE — PROGRESS NOTES
Clinic Care Coordination Contact    Community Health Worker Follow Up    Goals:   Goals Addressed                 This Visit's Progress       General      Financial Wellbeing (pt-stated)   50%     Goal Statement: I will have affordable housing for myself and my two daughters within one year.   Date Goal set: 9-1-2020  Barriers: have criminal records that make it difficult to find housing.  Living in a one bedroom with three people.    Strengths: Is motivated and willing to work with housing access services.  Date to Achieve By: 9-1-2021  Patient expressed understanding of goal: yes  Action steps to achieve this goal:  1. I will work with [facility] housing access services to create a plan to find affordable housing.   2. I will complete applications.   3. I will continue to pay rent on current apartment.  4. I will update CHW with any questions or progress.           Functional (pt-stated)        Goal Statement: I want to have my PCA services active within three months  Date Goal set: 9-1-2020  Barriers: unsure what to do next  Strengths: Have resources and been approved  Date to Achieve By: 12-  Patient expressed understanding of goal: yes  Action steps to achieve this goal:  1. I will contact my cousin who has a PCA agency  2. I will fill out DHS Form 4074-A with my cousin's agency and submit it to the [facility] to start my services  3. I will update CCC team on the status of this    9-1-2020  - has found someone to be his PCA.  LM at [facility] to see if he can still have PCA since the assessment was done in winter and he never hired a PCA.              Pain Management (pt-stated)   50%     Goal Statement: I will have my pain under control and have a new pain clinic by 3 months.    Date Goal set: 9-1-2020  Barriers: Terminated relationship with [facility] pain clinic without having another clinic identified.    Strengths: Good relationship with PCP.   Date to Achieve By: 12-1-2020  Patient expressed understanding of goal:  yes  Action steps to achieve this goal:  1. I will set up appointment with PCP to discuss referral to new pain clinic.  2. I will follow up with referral with new pain clinic.   3. I will update care coordination team with any concerns or progress.     Discussed 9/24/2020                CHW Next Follow-up: 1 month    CHW Plan: CHW will support patient with his goals. Encouraged patient to follow up with his new pain clinic that he submitted paperwork to and find out when he can schedule his intake appointment.    Discussion: Patient is frustrated that he can't get pain medication except from his surgeon after surgery and he is still waiting to get in with his new pain clinic. He is aware that PCP is not comfortable prescribing pain medications.     Next outreach due: 10/19/2020

## 2021-06-11 NOTE — PROGRESS NOTES
Patient calls reporting had surgery on Friday through plastic surgery, as having significant pain.  Reports he was told by the surgeons to contact his pain doctor.    I had spoken with Dr. Ruiz last week about the patient's situation, the fact that he did not keep a urine drug test as recommended so I transition to Suboxone which he did not care for.    I reminded the patient today of that discussion and I again offered Suboxone which he declined.  Indicated he can contact his surgeon's office and they could review my note in that regard.  I also suggested he may wish to contact Dr. Ruiz

## 2021-06-11 NOTE — PROGRESS NOTES
Please let him know that I would not be able to prescribe his pain medication since he did not comply with the current pain clinic recommendations.  I could sure place another consult but they probably will not prescribe narcotic pain medications either.  If he wants an appointment, please help him schedule one.  Thanks.  KAITLYNN

## 2021-06-11 NOTE — TELEPHONE ENCOUNTER
Patient calls, reports that his surgeon told him to follow up with Dr. Foster for pain management post operatively.  Chart review:  Excision, hidranitis on 9/25.    Per Dr. Foster on 8/24:  Patient was told to follow up with the surgeon for any post operative pain medications, as the surgeon feels appropriate.  Patient also non-compliant with providing a UDT as requested on multiple occasions.    Per discussion with patient on 8/14 with Dr. Foster patient was advised that he would transition to buprenorphine products as he has been non-compliant with pain clinic policies.  At that time patient reported that he would follow up with his primary care provider for his oxycodone.    At this time, this clinic will not be providing oxycodone for chronic pain and will not be manageing post operative pain for acute pain needs which is the standard process for any patient.    Will route this encounter to primary care so they are aware that patient may be contacting them for chronic pain medications.  However the patient is already aware that he needs to be managed by the surgeon for any post operative pain medications.  Will route to Dr. Foster as an FYI

## 2021-06-11 NOTE — PROGRESS NOTES
Left message informing patient of below message. Asked patient to return call if he would like a referral placed.    Laly Meléndez, CMA

## 2021-06-11 NOTE — TELEPHONE ENCOUNTER
Cary from [facility] Restricted Recipient Program calling to get a retro referral for DOS: 12/14/19 at [facility] ER.     I told her that we need the ER visit note to do the retro referral. She asked us to call her once we have report. I told her that the patient would need to call [facility] to have them release the ER visit note to us since it is over 6 months old.     Once report is received then retro referral can be completed and sent to [facility]. Patient has to do the work to get the report to PCP office.

## 2021-06-11 NOTE — PROGRESS NOTES
Patient spoke with [facility] SW on 8/28. New assessment scheduled for 9/1.  CHW will follow up in 1-2 weeks    Next outreach due: 9/10/2020

## 2021-06-12 NOTE — PROGRESS NOTES
Patient spoke with [facility] SW on 10/20 regarding goals.   No CHW delegations at this time.    Next outreach due: 11/20/2020

## 2021-06-12 NOTE — PROGRESS NOTES
Office Visit - Physical   Bernard Padilla   35 y.o.  male    Date of visit: 11/2/2020  Physician: Lillian Webb MD     Assessment and Plan   1. Routine general medical examination at a health care facility  His examination is satisfactory.  He is due for a pneumonia vaccine.  Declines fasting blood work today.  Lipids a year ago were satisfactory and recently he has had a CBC and metabolic panel that were normal.  He is otherwise up-to-date on age-appropriate health maintenance.    2. Mild intermittent asthma without complication  He has breakthrough symptoms with his asthma.  He has not been using an inhaled steroid recently.  I am going to give him a Flovent inhaler that he does need to use twice daily to prevent wheezing.  I am also going to refer him to pulmonary and he is in agreement with this plan.  - fluticasone propionate (FLOVENT HFA) 110 mcg/actuation inhaler; Inhale 2 puffs 2 (two) times a day.  Dispense: 3 Inhaler; Refill: 3  - albuterol (PROAIR HFA;PROVENTIL HFA;VENTOLIN HFA) 90 mcg/actuation inhaler; Inhale 2 puffs every 4 (four) hours as needed for wheezing.  Dispense: 3 Inhaler; Refill: 3  - Ambulatory referral to Pulmonology  - loratadine (CLARITIN) 10 mg tablet; Take 1 tablet (10 mg total) by mouth daily.  Dispense: 90 tablet; Refill: 3    3. Allergy, subsequent encounter  - loratadine (CLARITIN) 10 mg tablet; Take 1 tablet (10 mg total) by mouth daily.  Dispense: 90 tablet; Refill: 3    4. Vitamin D deficiency  He has not been taking vitamin D lately and his last vitamin D level was quite low.  - cholecalciferol, vitamin D3, (VITAMIN D3) 50 mcg (2,000 unit) Tab; Take 1 tablet (2,000 Units total) by mouth daily.  Dispense: 90 tablet; Refill: 3    5. Hidradenitis  He sees dermatology for this.          Return in about 1 year (around 11/2/2021) for Routine preventive.     Chief Complaint   Chief Complaint   Patient presents with     Annual Exam     not fasting        Patient Profile   Social  History     Social History Narrative     Not on file        Past Medical History   Patient Active Problem List   Diagnosis     Depression     Other chronic pain     Hidradenitis     Allergy, subsequent encounter     Mild intermittent asthma without complication     Vitamin D deficiency       Past Surgical History  He has a past surgical history that includes Knee surgery and Skin surgery.     History of Present Illness   This 35 y.o. old male comes in for physical exam.  He is not fasting today and in review of his chart he is up-to-date on fasting labs.  He has a history of asthma which recently has been a little bit worse.  He feels he is wheezing more.  He has not been using an inhaled steroid recently.  I am going to get him back on 1.  I also think he could benefit from seeing pulmonary.  He has not had any cough or symptoms of infection.  He has chronic pain with hidradenitis.  Currently is not on any pain medicine and declines gabapentin.  He is working with the pain clinic to try to get back in with them.  He sees dermatology for his hidradenitis.  He has no other acute concerns today.    Review of Systems: A comprehensive review of systems was negative except as noted.     Medications and Allergies   Current Outpatient Medications   Medication Sig Dispense Refill     acetaminophen (TYLENOL) 325 MG tablet Take 650 mg by mouth every 4 (four) hours as needed.       albuterol (PROAIR HFA;PROVENTIL HFA;VENTOLIN HFA) 90 mcg/actuation inhaler Inhale 2 puffs every 4 (four) hours as needed for wheezing. 3 Inhaler 3     betamethasone, augmented, (DIPROLENE) 0.05 % ointment        clindamycin (CLEOCIN) 300 MG capsule        clobetasoL (TEMOVATE) 0.05 % ointment Apply 1 application topically 2 (two) times a day. To hands and feet 60 g 1     crutch Misc Crutches x 4 weeks       dapsone 100 MG tablet Take 100 mg by mouth daily.        fluocinolone (DERMA-SMOOTHE) 0.01 % external oil        fluocinolone 0.01 % Oil Apply 1  "application topically daily.       fluocinonide (LIDEX) 0.05 % external solution Apply topically 2 (two) times a day. (Patient taking differently: Apply topically 2 (two) times a day. scalp) 60 mL 1     fluocinonide-emollient (FLUOCINONIDE-EMOLLIENT) 0.05 % Crea Apply twice daily to palms 30 g 11     fluticasone propionate (FLONASE) 50 mcg/actuation nasal spray Instill two sprays into each nostril once daily 16 g 3     loratadine (CLARITIN) 10 mg tablet Take 1 tablet (10 mg total) by mouth daily. 90 tablet 3     rifAMPin (RIFADIN) 300 MG capsule Take 300 mg by mouth.       triamcinolone (KENALOG) 0.1 % cream        cholecalciferol, vitamin D3, (VITAMIN D3) 50 mcg (2,000 unit) Tab Take 1 tablet (2,000 Units total) by mouth daily. 90 tablet 3     fluticasone propionate (FLOVENT HFA) 110 mcg/actuation inhaler Inhale 2 puffs 2 (two) times a day. 3 Inhaler 3     No current facility-administered medications for this visit.      Allergies   Allergen Reactions     Hydrocodone Swelling     Upset stomach     Egg         Family and Social History   Family History   Problem Relation Age of Onset     COPD Mother      Hypertension Mother      Diabetes Father      Coronary artery disease Father      Colon cancer Neg Hx      Prostate cancer Neg Hx         Social History     Tobacco Use     Smoking status: Current Every Day Smoker     Packs/day: 1.00     Years: 20.00     Pack years: 20.00     Types: Cigarettes     Smokeless tobacco: Never Used   Substance Use Topics     Alcohol use: Yes     Frequency: Monthly or less     Drug use: Yes        Physical Exam   General Appearance:   This is an alert male, sitting comfortably, no apparent distress.    /73 (Patient Site: Left Arm, Patient Position: Sitting, Cuff Size: Adult Regular)   Pulse 86   Ht 5' 6.5\" (1.689 m)   Wt 180 lb (81.6 kg)   SpO2 98%   BMI 28.62 kg/m      HEENT:  Normocephalic, atraumatic.  .    NECK: Supple with no lymphadenopathy.  Thyroid was " normal.  RESPIRATORY: Normal respiratory effort.  Lungs were clear to ausculation both anteriorly and posteriorly.  No wheezes or rales were detected.   CARDIOVASCULAR: Heart rate and rhythm were normal.  S1 and S2 were normal and there were no extra sounds or murmurs. Dorsalis pedis pulses were normal.  Jugular venous pressure was normal.  There was no peripheral edema.  NEUROLOGIC: The patient was alert and oriented to person, place, time, and circumstance. Speech was normal. Cranial nerves were normal. No focal defecits were noted.  PSYCHIATRIC:  Mood and affect were normal and the patient had normal recent and remote memory. The patient's judgment and insight were normal.       Additional Information        Lillian Webb MD  Internal Medicine  Contact me at [phone]

## 2021-06-12 NOTE — TELEPHONE ENCOUNTER
Who is calling:  Danica   Reason for Call:  Caller stated that someone had called them about patient wanting to switch doctor in clinic. The current doctor that patient saw was  Dr. Foster, caller stated that if patient is still wanting to come to this location it will still have to be with that [facility] doctor and continue plan of care. Or else patient will have to be seen somewhere else.    Date of last appointment with primary care:   Okay to leave a detailed message: Yes  [phone]

## 2021-06-12 NOTE — TELEPHONE ENCOUNTER
Dr. Webb,  Spoke with Danica at [facility] pain [facility], who states that they had received a referral from our clinic asking for the patient to see another provider.  She states that the request was reviewed and the only provider that he will be able to see is Dr. Foster.  Per Dr. Foster, he would be using the same care plan that was previously in place.  Please advise on the next steps.  Thank you.  Deanne PRABHAKAR, KIKA/ANGEL....................2:58 PM

## 2021-06-12 NOTE — TELEPHONE ENCOUNTER
Left message asking patient to call back for below message. We need to see what patient is looking for? Change in provider? Location?     Laly Meléndez, CMA

## 2021-06-12 NOTE — TELEPHONE ENCOUNTER
He asked me for a referral for a new pain specialist.  Please let him know their response.  I have no other recommendations.  Thanks.

## 2021-06-12 NOTE — TELEPHONE ENCOUNTER
----- Message from David Fajardo PharmD sent at 11/4/2020  9:24 AM CST -----  Regarding: Flovent HFA  Flovent HFA is not preferred. Pulmicort is covered by insurance.  Please advise.   Thanks,   David [phone]

## 2021-06-12 NOTE — PROGRESS NOTES
Clinic Care Coordination Contact     Situation: Patient chart reviewed by care coordinator.     Background: Pts most recent assessment and enrollment to Care Coordination was 9-1-2020.   Patient centered goals were developed with participation from patient.  LORAINE CC handed patient off to CHW for continued outreach every 30 days.      Assessment: CHW has been in contact with patient monthly, last contact on 9-24. Patient has made some progress to goals. Do not see any appointments with pain clinic. Is getting pain medications from surgeon, but indicates that no further pain medication will be prescribed post surgery.      Plan/Recommendations: CHW will involve SW CC as needed or if patient is ready to move to maintenance.  SW CC will continue to monitor progress to goals and will contact patient in two weeks.     JOHNNY Akins  Clinic Care Coordinator  [phone]

## 2021-06-12 NOTE — PROGRESS NOTES
Clinic Care Coordination Contact    Follow Up Progress Note      Assessment: Talked to patient who is out of pain medication prescribed by Dr. Ruiz after his surgery.  He has significant pain and is taking Tylenol which does not adequately address his pain.  He would like to find out his options at  Pain Clinic. He has talked to nurse line and is unsure what steps can be taken.  He knows Dr. Foster wanted to prescribe suboxone and patient does not agree with that plan as he tried it before and it didn't work and then he was prescribed pain medications.  He finds pain medications are the best to control his pain.     He is working on the PCA who is helping him with personal cares.      He got off the restricted medical program in July.  He tried to use the [facility] and they told him to return to .      His mom and sister encouraged him to work with a different provider at  pain clinic.  He asked for [facility] assistance with contact  Pain clinic to learn what options he has.  Called pain clinic and left message on clinical VM line.    Goals addressed this encounter:   Goals Addressed                 This Visit's Progress       Patient Stated      Functional (pt-stated)   20%     Goal Statement: I want to have my PCA services active within three months  Date Goal set: 9-1-2020  Barriers: unsure what to do next  Strengths: Have resources and been approved  Date to Achieve By: 12-  Patient expressed understanding of goal: yes  Action steps to achieve this goal:  1. I will contact my cousin who has a PCA agency  2. I will fill out DHS Form 4074-A with my cousin's agency and submit it to the [facility] to start my services  3. I will update CCC team on the status of this    9-1-2020  - has found someone to be his PCA.  LM at [facility] to see if he can still have PCA since the assessment was done in winter and he never hired a PCA.    10- discussed.             Pain Management (pt-stated)         Goal Statement: I will have my pain under control and have a new pain clinic by 3 months.    Date Goal set: 9-1-2020  Barriers: Terminated relationship with  pain clinic without having another clinic identified.    Strengths: Good relationship with PCP.   Date to Achieve By: 12-1-2020  Patient expressed understanding of goal: yes  Action steps to achieve this goal:  1. I will set up appointment with PCP to discuss referral to new pain clinic.  2. I will follow up with referral with new pain clinic.   3. I will update care coordination team with any concerns or progress.     Discussed 9/24/2020  Discussed 10-                 Intervention/Education provided during outreach: Discussed options.       Outreach Frequency: monthly    Plan:   Will talk with  pain clinic to learn what options he may have.    Care Coordinator will follow up with patient after communicating with pain clinic staff.  Eden Livingston [facility]  Clinic Care Coordinator  [phone]    Clinic Care Coordination Contact    Follow Up Progress Note      Assessment: Did not get call back from Pain Clinic.  Called and talked to Marce. She will ask Dr. Foster to review his chart to see if he can be patient at pain clinic and to have a different provider than Dr. Foster.  It may take several weeks before a decision is made.    Called patient and informed him.  He has a chest cold that is making it difficult for him to sleep.  He has a physical on 11-2 and thinks his symptoms do not need appointment before then, though he does need his inhaler, Albuterol refilled.  His pharmacy has told him that they contacted clinic/PCP. He has not gotten a text that the medication has been refilled.  Will route to PCP for consideration.  Patient thinks he needs it as it will help with his congestion.      Goals addressed this encounter:   Goals Addressed                 This Visit's Progress       Patient Stated      Functional (pt-stated)   20%     Goal Statement:  I want to have my PCA services active within three months  Date Goal set: 9-1-2020  Barriers: unsure what to do next  Strengths: Have resources and been approved  Date to Achieve By: 12-  Patient expressed understanding of goal: yes  Action steps to achieve this goal:  1. I will contact my cousin who has a PCA agency  2. I will fill out DHS Form 4074-A with my cousin's agency and submit it to the [facility] to start my services  3. I will update CCC team on the status of this    9-1-2020  - has found someone to be his PCA.  LM at [facility] to see if he can still have PCA since the assessment was done in winter and he never hired a PCA.    10- discussed.             Pain Management (pt-stated)        Goal Statement: I will have my pain under control and have a new pain clinic by 3 months.    Date Goal set: 9-1-2020  Barriers: Terminated relationship with [facility] pain clinic without having another clinic identified.    Strengths: Good relationship with PCP.   Date to Achieve By: 12-1-2020  Patient expressed understanding of goal: yes  Action steps to achieve this goal:  1. I will set up appointment with PCP to discuss referral to new pain clinic.  2. I will follow up with referral with new pain clinic.   3. I will update care coordination team with any concerns or progress.     Discussed 9/24/2020  Discussed 10-  Discussed 10-                 Intervention/Education provided during outreach: Reviewed pain clinic process to see if he can return to [facility] pain clinic.      Outreach Frequency: monthly    Plan:   Delegating to CHW to contact in less than 30 days.    Care Coordinator will follow up in as needed and in 45 days.  Eden Livingston, [facility]  Clinic Care Coordinator  [phone]

## 2021-06-12 NOTE — PROGRESS NOTES
Dov Marshall for refill requested?  Refill has been set up for you to review.  Deanne PRABHAKAR, KIKA/CMT....................11:38 AM

## 2021-06-13 NOTE — PROGRESS NOTES
Clinic Care Coordination Contact    Community Health Worker Follow Up    Goals:   Goals Addressed                 This Visit's Progress       General      Financial Wellbeing (pt-stated)        Goal Statement: I will have affordable housing for myself and my two daughters within one year.   Date Goal set: 9-1-2020  Barriers: have criminal records that make it difficult to find housing.  Living in a one bedroom with three people.    Strengths: Is motivated and willing to work with housing access services.  Date to Achieve By: 9-1-2021  Patient expressed understanding of goal: yes  Action steps to achieve this goal:  1. I will work with [facility] housing access services to create a plan to find affordable housing.   2. I will complete applications.   3. I will continue to pay rent on current apartment.  4. I will update CHW with any questions or progress.     Discussed 12/18/2020                      Functional (pt-stated)   20%     Goal Statement: I want to have my PCA services active within three months  Date Goal set: 9-1-2020  Barriers: unsure what to do next  Strengths: Have resources and been approved  Date to Achieve By: 12-  Patient expressed understanding of goal: yes  Action steps to achieve this goal:  1. I will contact my cousin who has a PCA agency  2. I will fill out DHS Form 4074-A with my cousin's agency and submit it to the [facility] to start my services  3. I will update CCC team on the status of this    9-1-2020  - has found someone to be his PCA.  LM at [facility] to see if he can still have PCA since the assessment was done in winter and he never hired a PCA.    10- discussed.   12-8-2020 found someone, needs new assessment            Pain Management (pt-stated)   50%     Goal Statement: I will have my pain under control and have a new pain clinic by 3 months.    Date Goal set: 9-1-2020  Barriers: Terminated relationship with [facility] pain clinic without having another clinic identified.    Strengths:  Good relationship with PCP.   Date to Achieve By: 12-1-2020  Patient expressed understanding of goal: yes  Action steps to achieve this goal:  1. I will set up appointment with PCP to discuss referral to new pain clinic.  2. I will follow up with referral with new pain clinic.   3. I will update care coordination team with any concerns or progress.     Discussed 12/17/2020                CHW Next Follow-up: 2 weeks    CHW Plan: CHW will continue to support patient with goals through routine scheduled outreach.     Discussion: Patient was grocery shopping. He hasn't followed up with his county  yet on scheduling a new PCA assessment.  He has not follow up on getting an appointment with a new pain clinic.  CHW encouraged patient to make follow up calls over the next 2 weeks and will follow up with him after the new year.    Next outreach due: 1/5/2021

## 2021-06-13 NOTE — PROGRESS NOTES
Clinic Care Coordination Contact    Follow Up Progress Note      Assessment: Called [facility] pain clinic to discuss what Dr. Foster's decision was on having patient enroll again in pain clinic.  Talked with Haydee and she will review with Dr. Foster today and likely he is going to want to talk to PCP to discuss.      Called patient who had physical recently and has follow up with pulmonology on 12-21.  He has not heard anything from pain clinic. His sister has set up an appointment with her pain clinic but he is unsure which clinic that is. He would like to hear if [facility] pain clinic would work with him as he is having back pain that makes it difficult for him to get out of bed.  He was unable to continue on the call as he needed to talk to his cousin.      Goals addressed this encounter:   Goals Addressed                 This Visit's Progress       Patient Stated      Pain Management (pt-stated)        Goal Statement: I will have my pain under control and have a new pain clinic by 3 months.    Date Goal set: 9-1-2020  Barriers: Terminated relationship with [facility] pain clinic without having another clinic identified.    Strengths: Good relationship with PCP.   Date to Achieve By: 12-1-2020  Patient expressed understanding of goal: yes  Action steps to achieve this goal:  1. I will set up appointment with PCP to discuss referral to new pain clinic.  2. I will follow up with referral with new pain clinic.   3. I will update care coordination team with any concerns or progress.     Discussed 9/24/2020  Discussed 10-  Discussed 10-  Discussed 12-7-2020                 Intervention/Education provided during outreach: None provided.      Outreach Frequency: monthly    Plan:   Will do chart review in one week to determine what patient can do for pain relief.   Care Coordinator will follow up in one week.  Eden Livingston [facility]  Clinic Care Coordinator  [phone]    Clinic Care Coordination Contact    Follow Up  Progress Note      Assessment: Call from Haydee at pain clinic who talked with Dr. Foster. He is willing to work with patient and the plan would be to transition to Suboxone. If patient does not want to do that, he would be best served by finding a new pain clinic.      Called patient and reviewed above. He would like to find a different pain clinic and he has not had pain medications for 3 months and has postponed surgeries due to that.  He is unable to get out of bed some days.  He would like to have Viagra or other medication prescribed as he is unable to have erection due to surgeries.  He has a partner at this time.  Discussed that it may not be on his formulary [place] and he may want to talk to pharmacist about that.  He may have to pay out of pocket. Will route to PCP to see if she wants to have appointment to discuss. His mother and sister have a pain clinic and he is going to check to see which one.  He has transportation at this time.  He tried to go to [facility] pain clinic and they said no as he was still working with [facility]. Now he is not and he will see if he can set up appointment with them and complete paperwork.      He found someone to be in PCA but now needs to have Psychiatric assessment again.  Directed him to have his PCA agency contact Psychiatric to get it scheduled. Some days he is not able to get out of bed.  His teenage daughters go to his sister's house to do school some days.      Goals addressed this encounter:   Goals Addressed                 This Visit's Progress       Patient Stated      Financial Wellbeing (pt-stated)        Goal Statement: I will have affordable housing for myself and my two daughters within one year.   Date Goal set: 9-1-2020  Barriers: have criminal records that make it difficult to find housing.  Living in a one bedroom with three people.    Strengths: Is motivated and willing to work with housing access services.  Date to Achieve By:  9-1-2021  Patient expressed understanding of goal: yes  Action steps to achieve this goal:  1. I will work with [facility] housing access services to create a plan to find affordable housing.   2. I will complete applications.   3. I will continue to pay rent on current apartment.  4. I will update CHW with any questions or progress.   12-8 discussed          Functional (pt-stated)        Goal Statement: I want to have my PCA services active within three months  Date Goal set: 9-1-2020  Barriers: unsure what to do next  Strengths: Have resources and been approved  Date to Achieve By: 12-  Patient expressed understanding of goal: yes  Action steps to achieve this goal:  1. I will contact my cousin who has a PCA agency  2. I will fill out DHS Form 4074-A with my cousin's agency and submit it to the [facility] to start my services  3. I will update CCC team on the status of this    9-1-2020  - has found someone to be his PCA.   at [facility] to see if he can still have PCA since the assessment was done in winter and he never hired a PCA.    10- discussed.   12-8-2020 found someone, needs new assessment            Pain Management (pt-stated)        Goal Statement: I will have my pain under control and have a new pain clinic by 3 months.    Date Goal set: 9-1-2020  Barriers: Terminated relationship with [facility] pain clinic without having another clinic identified.    Strengths: Good relationship with PCP.   Date to Achieve By: 12-1-2020  Patient expressed understanding of goal: yes  Action steps to achieve this goal:  1. I will set up appointment with PCP to discuss referral to new pain clinic.  2. I will follow up with referral with new pain clinic.   3. I will update care coordination team with any concerns or progress.     Discussed 9/24/2020  Discussed 10-  Discussed 10-  Discussed 12-7-2020                 Intervention/Education provided during outreach: Discussed costs of viagra may be out of pocket.        Outreach Frequency: monthly    Plan:   Will route to PCP his request to get prescription for Erectile Disfunction.  He will call [facility] to set up appointment and get paperwork completed.  If he can't work with [facility], he will call [facility] to get other recommendations.      Delegating to CHW to call in less than 30 days.    Care Coordinator will follow up in 45 days and as needed.  Eden Livingston, [facility]  Clinic Care Coordinator  [phone]

## 2021-06-13 NOTE — PATIENT INSTRUCTIONS - HE
1) You are already on the therapy I was going to recommend.  You haven't been on it long enough to know if it will work, so I would stick with it  2) I'm not sure I want to call this asthma yet.  It may be that the pneumonia and smoking, or even a case of COVID you didn't know about brought this on.  I'm hopeful this will get better in time.  3) Stopping smoking will be the best way to get it better.  4) Come back in 3 months to discuss your results.  If you are better you can cancel that visit.

## 2021-06-13 NOTE — TELEPHONE ENCOUNTER
Medication Question or Clarification  Who is calling: Pharmacy  What medication are you calling about (include dose and sig)?: generic Viagra  Who prescribed the medication?: Lillian Webb MD  What is your question/concern?: Patient lost written script.  Pharmacist asking for verbal order.  Writer shared that order. No further action needed.  FYI  Requested Pharmacy: [facility] Pharmacist  Okay to leave a detailed message?: No

## 2021-06-13 NOTE — TELEPHONE ENCOUNTER
"Spoke with the patient and relayed message below from Dr. Webb.  Patient became very upset.  Started yelling in the phone stating that he refuses to see Dr. Foster and wants to see a new physician.  Patient yelled that he has rights and this is \"bullshit.\"  He states that he filled out the paperwork to go to [facility], but they would not accept him because of Dr. Foster not releasing him as a patient.  Patient then stated that he was going to contact his  since this is not right and he, as a patient, has rights.    Management has been notified.  Deanne PRABHAKAR CMA/ANGEL....................9:35 AM    "

## 2021-06-13 NOTE — PROGRESS NOTES
"Assessment and Plan:Bernard Padilla is a 35 y.o. male with a past medical history significant for anxiety who presents to clinic today for evaluation of wheezing and cough thought to be asthma.  His symptoms started with a pneumonia in February and he is a chronic smoker.  I'm not sure his situation is truly asthma, his PFTs don't really corroborate this.  It is possible he has chronic bronchitis from smoking tobacco and marijuana and this could be causing some bronchospasm.  He was just put on an inhaled steroid that I suspect will help in time.  If he can quit smoking this would help more.  I doubt he is at risk of significant symptoms or an exacerbation from his current symptoms.    1. Bronchospasm/chronic bronchitis - continue the pulmicort started last week for at least a month and see if this helps.  Albuterol can be used up to every four hours.  I don't think he needs a nebulizer.  If he is feeling good, he can try to stop the pulmicort to see if this is necessary for the long term.  2. Tobacco abuse - this is likely driving his lung issues.  We discussed smoking cessation, he is under a lot of stress as a single dad and unemployed.    3. RTC in 3 months to check in, if he is feeling better he can cancel.      CCx: cough/wheezing    HPI: Mr. Padilla is a 35 year old male who was referred for increasing cough and wheezing.  He has felt bad since he had pneumonia in February.  His cough has gotten better and worse over that time.  He was given albuterol initially and he thinks this helps some.  A week ago he was started on pulmicort.  He isn't sure if this is helping yet.  He has a daily \"smokers cough\" that seems to take his breath away.  He still smokes about 1ppd and uses a fair bit of marijuana in place of chronic opiates.  He did quit smoking a while back for 16 months, he states this made him gain weight, but at that time he didn't have any pulmonary issues.    He has not tried prednisone for his problems.  " He isn't sure anything makes him wheeze except maybe cats.  He has seasonal allergies, but this didn't make him wheeze.  He had normal lungs growing up and was quite athletic.  He didn't have any issues prior to this pneumonia with his lungs.    PMH:  Past Medical History:   Diagnosis Date     Anxiety      Arthritis      Hydradenitis        PSH:  Past Surgical History:   Procedure Laterality Date     KNEE SURGERY       SKIN SURGERY         SH:  Social History     Socioeconomic History     Marital status: Single     Spouse name: Not on file     Number of children: 2     Years of education: Not on file     Highest education level: Not on file   Occupational History     Occupation: disability   Social Needs     Financial resource strain: Very hard     Food insecurity     Worry: Often true     Inability: Often true     Transportation needs     Medical: Yes     Non-medical: Yes   Tobacco Use     Smoking status: Current Every Day Smoker     Packs/day: 1.00     Years: 20.00     Pack years: 20.00     Types: Cigarettes     Smokeless tobacco: Never Used   Substance and Sexual Activity     Alcohol use: Yes     Frequency: Monthly or less     Drug use: Yes     Sexual activity: Yes     Partners: Female   Lifestyle     Physical activity     Days per week: 0 days     Minutes per session: 0 min     Stress: Very much   Relationships     Social connections     Talks on phone: More than three times a week     Gets together: More than three times a week     Attends Worship service: Never     Active member of club or organization: No     Attends meetings of clubs or organizations: Never     Relationship status: Not on file     Intimate partner violence     Fear of current or ex partner: Not on file     Emotionally abused: Not on file     Physically abused: Not on file     Forced sexual activity: Not on file   Other Topics Concern     Not on file   Social History Narrative     Not on file       Family history:  Family History   Problem  Relation Age of Onset     COPD Mother      Hypertension Mother      Diabetes Father      Coronary artery disease Father      Colon cancer Neg Hx      Prostate cancer Neg Hx      The family history was reviewed and is not pertinent to the chief complaint or HPI.    ROS:  Review of Systems - History obtained from the patient  General ROS: negative  Psychological ROS: negative  ENT ROS: negative  Allergy and Immunology ROS: negative  Endocrine ROS: negative  Respiratory ROS: positive for - cough and wheezing  negative for - hemoptysis, orthopnea, pleuritic pain, stridor or tachypnea  Cardiovascular ROS: no chest pain or palpitations  Gastrointestinal ROS: no abdominal pain, change in bowel habits, or black or bloody stools  Genito-Urinary ROS: no dysuria, trouble voiding, or hematuria  Musculoskeletal ROS: negative  Neurological ROS: no TIA or stroke symptoms  Dermatological ROS: negative      Current Meds:  Current Outpatient Medications   Medication Sig     acetaminophen (TYLENOL) 325 MG tablet Take 650 mg by mouth every 4 (four) hours as needed.     betamethasone, augmented, (DIPROLENE) 0.05 % ointment      budesonide (PULMICORT) 180 mcg/actuation inhaler Inhale 1 puff 2 (two) times a day.     cholecalciferol, vitamin D3, (VITAMIN D3) 50 mcg (2,000 unit) Tab Take 1 tablet (2,000 Units total) by mouth daily.     clindamycin (CLEOCIN) 300 MG capsule      clobetasoL (TEMOVATE) 0.05 % ointment Apply 1 application topically 2 (two) times a day. To hands and feet     crutch Misc Crutches x 4 weeks     dapsone 100 MG tablet Take 100 mg by mouth daily.      fluocinolone (DERMA-SMOOTHE) 0.01 % external oil      fluocinolone 0.01 % Oil Apply 1 application topically daily.     fluocinonide (LIDEX) 0.05 % external solution Apply topically 2 (two) times a day. (Patient taking differently: Apply topically 2 (two) times a day. scalp)     fluocinonide-emollient (FLUOCINONIDE-EMOLLIENT) 0.05 % Crea Apply twice daily to palms      "fluticasone propionate (FLONASE) 50 mcg/actuation nasal spray Instill two sprays into each nostril once daily     fluticasone propionate (FLOVENT HFA) 110 mcg/actuation inhaler Inhale 2 puffs 2 (two) times a day.     loratadine (CLARITIN) 10 mg tablet Take 1 tablet (10 mg total) by mouth daily.     rifAMPin (RIFADIN) 300 MG capsule Take 300 mg by mouth.     sildenafiL (VIAGRA) 50 MG tablet Take 1 tablet (50 mg total) by mouth as needed for erectile dysfunction.     triamcinolone (KENALOG) 0.1 % cream      albuterol (PROAIR HFA;PROVENTIL HFA;VENTOLIN HFA) 90 mcg/actuation inhaler Inhale 2 puffs every 4 (four) hours as needed for wheezing.       Labs:  Recent Results (from the past 72 hour(s))   POCT hemoglobin   Result Value Ref Range    Hgb 15.8 7.0 g/dL       I have personally reviewed all imaging and PFT data available pertinent to this visit.    Imaging studies:  No results found.    PFTs:  FEV1/FVC is 82% and is normal.  FEV1 is 2.77L (75%) predicted and is reduced.  FVC is 3.38L (77%) predicted and reduced.  There was improvement in spirometry after a single inhaled dose of bronchodilator.    DLCO is 26.85ml/min/hg (93%) predicted and is normal when it is corrected for hemoglobin.    Impression:  Full Pulmonary Function Test is abnormal.  Matched reduction in FEV1 and FVC could imply mild restriction but would need lung volumes to confirm, unfortunately we cannot perform this maneuver during COVID.  There was a mild improvement with albuterol suggesting possible bronchospasm    Joe Henderson MD PhD  [facility]monary [facility] Critical [facility]            Physical Exam:  /60   Pulse 72   Ht 5' 6.5\" (1.689 m)   Wt 188 lb (85.3 kg)   SpO2 97%   BMI 29.89 kg/m    General - Well nourished, sleepy  Ears/Mouth - Deferred given mask use during pandemic  Neck - no cervical lymphadenopathy  Lungs - Clear to ausculation bilaterally   CVS - regular rhythm with no murmurs, rubs or gallups  Abdomen - soft, NT, " ND, NABS  Ext - no cyanosis, clubbing or edema  Skin - no rash  Psychology - alert and oriented, answers appropriate        Electronically signed by:    Joe Hednerson MD PhD  [facility] Pulmonary and Critical Care Medicine

## 2021-06-14 NOTE — PROGRESS NOTES
Bernard Padilla is a 35 y.o. male who is being evaluated via a billable telephone visit.      How would you like to obtain your AVS? MyChart.  If dropped from the video visit, the video invitation should be resent by: Text to cell phone: [phone]  Will anyone else be joining your video visit? No          Telephone Visit   Bernard Padilla   35 y.o. male    Date of Visit: 1/14/2021    Chief Complaint   Patient presents with     Follow-up     Covid positive 1/7- still has cough        Assessment and Plan   1. Clinical diagnosis of COVID-19  He is having symptoms of cough and shortness of breath.  He does have underlying asthma.  I am going to treat him with another course of antibiotics and prednisone.  If he is not improving or his symptoms worsen he will let us know.  - doxycycline (ADOXA) 100 MG tablet; Take 1 tablet (100 mg total) by mouth 2 (two) times a day.  Dispense: 20 tablet; Refill: 0  - predniSONE (DELTASONE) 10 mg tablet; Take 40mg by mouth daily for 3 days, then 30mg x 3 days, then 20mg x 3 days ,then 10mg x 3 days then stop.  Dispense: 30 tablet; Refill: 0    2. Mild intermittent asthma without complication  - doxycycline (ADOXA) 100 MG tablet; Take 1 tablet (100 mg total) by mouth 2 (two) times a day.  Dispense: 20 tablet; Refill: 0  - predniSONE (DELTASONE) 10 mg tablet; Take 40mg by mouth daily for 3 days, then 30mg x 3 days, then 20mg x 3 days ,then 10mg x 3 days then stop.  Dispense: 30 tablet; Refill: 0    3. Cough  - doxycycline (ADOXA) 100 MG tablet; Take 1 tablet (100 mg total) by mouth 2 (two) times a day.  Dispense: 20 tablet; Refill: 0  - predniSONE (DELTASONE) 10 mg tablet; Take 40mg by mouth daily for 3 days, then 30mg x 3 days, then 20mg x 3 days ,then 10mg x 3 days then stop.  Dispense: 30 tablet; Refill: 0  - codeine-guaiFENesin (GUAIFENESIN AC)  mg/5 mL liquid; Take 5 mL by mouth at bedtime as needed for cough.  Dispense: 120 mL; Refill: 0           No follow-ups on file.      History of Present Illness   This 35 y.o. old male is evaluated with a telephone visit today.  He has a history of asthma.  A week ago he developed symptoms of aching, cough and shortness of breath.  He was on to have COVID-19.  He ended up being seen at [facility] and states he was told he had pneumonia.  He was treated with azithromycin.  He continues to have a cough and some shortness of breath due to his asthma.  His aching and other symptoms have improved.  He has no other acute concerns today.    Review of Systems: As above, systems otherwise reviewed and negative.     Medications, Allergies and Problem List   Patient Active Problem List   Diagnosis     Depression     Other chronic pain     Hidradenitis     Allergy, subsequent encounter     Mild intermittent asthma without complication     Vitamin D deficiency     Current Outpatient Medications   Medication Sig Dispense Refill     acetaminophen (TYLENOL) 325 MG tablet Take 650 mg by mouth every 4 (four) hours as needed.       betamethasone, augmented, (DIPROLENE) 0.05 % ointment        budesonide (PULMICORT) 180 mcg/actuation inhaler Inhale 1 puff 2 (two) times a day. 3 Inhaler 3     cholecalciferol, vitamin D3, (VITAMIN D3) 50 mcg (2,000 unit) Tab Take 1 tablet (2,000 Units total) by mouth daily. 90 tablet 3     clindamycin (CLEOCIN) 300 MG capsule        clobetasoL (TEMOVATE) 0.05 % ointment Apply 1 application topically 2 (two) times a day. To hands and feet 60 g 1     crutch Misc Crutches x 4 weeks       dapsone 100 MG tablet Take 100 mg by mouth daily.        fluocinolone (DERMA-SMOOTHE) 0.01 % external oil        fluocinolone 0.01 % Oil Apply 1 application topically daily.       fluocinonide (LIDEX) 0.05 % external solution Apply topically 2 (two) times a day. (Patient taking differently: Apply topically 2 (two) times a day. scalp) 60 mL 1     fluocinonide-emollient (FLUOCINONIDE-EMOLLIENT) 0.05 % Crea Apply twice daily to palms 30 g 11      fluticasone propionate (FLONASE) 50 mcg/actuation nasal spray Instill two sprays into each nostril once daily 16 g 3     fluticasone propionate (FLOVENT HFA) 110 mcg/actuation inhaler Inhale 2 puffs 2 (two) times a day. 3 Inhaler 3     loratadine (CLARITIN) 10 mg tablet Take 1 tablet (10 mg total) by mouth daily. 90 tablet 3     rifAMPin (RIFADIN) 300 MG capsule Take 300 mg by mouth.       sildenafiL (VIAGRA) 50 MG tablet Take 1 tablet (50 mg total) by mouth as needed for erectile dysfunction. 20 tablet 1     triamcinolone (KENALOG) 0.1 % cream        albuterol (PROAIR HFA;PROVENTIL HFA;VENTOLIN HFA) 90 mcg/actuation inhaler Inhale 2 puffs every 4 (four) hours as needed for wheezing. 3 Inhaler 3     azithromycin (ZITHROMAX) 250 MG tablet Take 500 mg (2 tabs) by mouth on day 1, then 250 mg (1 tab) daily for days 2-5.       codeine-guaiFENesin (GUAIFENESIN AC)  mg/5 mL liquid Take 5 mL by mouth at bedtime as needed for cough. 120 mL 0     doxycycline (ADOXA) 100 MG tablet Take 1 tablet (100 mg total) by mouth 2 (two) times a day. 20 tablet 0     predniSONE (DELTASONE) 10 mg tablet Take 40mg by mouth daily for 3 days, then 30mg x 3 days, then 20mg x 3 days ,then 10mg x 3 days then stop. 30 tablet 0     No current facility-administered medications for this visit.      Allergies   Allergen Reactions     Hydrocodone Swelling     Upset stomach     Egg           Physical Exam     There were no vitals taken for this visit.    During our discussion there was no evidence of shortness of breath.     Additional Information   Social History     Tobacco Use     Smoking status: Current Every Day Smoker     Packs/day: 1.00     Years: 20.00     Pack years: 20.00     Types: Cigarettes     Smokeless tobacco: Never Used   Substance Use Topics     Alcohol use: Yes     Frequency: Monthly or less     Drug use: Yes         Total Time:  7 Minutes     Lillian Webb MD

## 2021-06-14 NOTE — TELEPHONE ENCOUNTER
Reason for call:  Severe Cough    Symptom or request: Patient was sent over cough medicine but said it is not strong enough and his cough has gotten worse since he left the ER on 1/7/21. Patient said he can't sleep and can barely breathe even when he uses his inhaler.        Have you been treated for this before? Yes    Additional comments: CMT at lunch when patient called.    Phone Number patient can be reached at:  Home number on file [phone] (home)    Best Time:  ASAP/ANY    Can we leave a detailed message on this number: Yes    Call taken on 1/13/2021 at 12:59 PM by Magalis Chung

## 2021-06-14 NOTE — TELEPHONE ENCOUNTER
Magalis Chung         1/8/21 11:47 AM  Note     Reason for call:  Patient exposed to covid 1/7/21     Symptom or request: severe cough     Duration (how long have symptoms been present): 2 days     Have you been treated for this before? No     Additional comments: Patient was sent over cough medication and needs stronger medication. He said his work told him that it has to go through a esential worker program as he is in quarantine and not allowed to leave his house to get the medication. He stated that Dr Webb would know what he's talking about.      Phone Number patient can be reached at:  Home number on file [phone] (home)     Best Time:  any     Can we leave a detailed message on this number: Yes     Call taken on 1/8/2021 at 11:44 AM by Magalis Chung

## 2021-06-14 NOTE — TELEPHONE ENCOUNTER
There is no stronger medication for cough  We could send in an albuterol inhaler . To help the wheeze. If he has difficulty in breathing he shoud go to ER , otherwsie he can have a virtual appointment as well

## 2021-06-14 NOTE — TELEPHONE ENCOUNTER
So the only medication for cough is codeine cough syrup , which he was already prescribed  If he is worse that his pneumonia is getting worse and he should go to the ER to get an Xray , Pulse ox checked to see if he qualifies for covid admission and treatment with steroids

## 2021-06-14 NOTE — TELEPHONE ENCOUNTER
Pt was DX with Covid in ER 1/7/2021 and states that he is requesting a medication for his cough. There is no openings for pt to have phone visit     Pt would like something sent in before the weekend

## 2021-06-14 NOTE — TELEPHONE ENCOUNTER
He is calling in today due to having upper abdominal pain for more than 2 hours, and he was recently diagnosed with covid.. He is burping, able to keep fluids down, nautous without emesis. He has been having diarrhea for the past 3 days. He says he is uncomfortable with the pain and doesn't even want to get up to eat.  Care advice is to go in to the ED or urgent care to be seen.    Joselin Matta RN, Nurse Advisor        Additional Information    Negative: Passed out (i.e., fainted, collapsed and was not responding)    Negative: Shock suspected (e.g., cold/pale/clammy skin, too weak to stand, low BP, rapid pulse)    Negative: Visible sweat on face or sweat is dripping down    Negative: Chest pain    Negative: SEVERE abdominal pain (e.g., excruciating)    Negative: Pain lasting > 10 minutes and over 35 years old and at least one cardiac risk factor    Negative: Pain lasting > 10 minutes and over 40 years old and associated chest, arm, neck, upper back, or jaw pain    Negative: Pain lasting > 10 minutes and over 50 years old    Negative: Pain lasting > 10 minutes and history of heart disease (i.e., heart attack, bypass surgery, angina, angioplasty, CHF)    Negative: Recent injury to the abdomen    Negative: Vomiting red blood or black (coffee ground) material    Negative: Bloody, black, or tarry bowel movements    Negative: Pregnant > 24 weeks and hand or face swelling    Constant abdominal pain lasting > 2 hours    Protocols used: ABDOMINAL PAIN - UPPER-A-OH

## 2021-06-14 NOTE — TELEPHONE ENCOUNTER
Pt call back to check on the status of this request. I inform pt that Dr. Webb has yet to review and he can call back back before 430pm if he hasn't heard anything.

## 2021-06-14 NOTE — PROGRESS NOTES
Clinic Care Coordination Contact    Community Health Worker Follow Up    Goals:   Goals Addressed                 This Visit's Progress       General      Financial Wellbeing (pt-stated)   50%     Goal Statement: I will have affordable housing for myself and my two daughters within one year.   Date Goal set: 9-1-2020  Barriers: have criminal records that make it difficult to find housing.  Living in a one bedroom with three people.    Strengths: Is motivated and willing to work with housing access services.  Date to Achieve By: 9-1-2021  Patient expressed understanding of goal: yes  Action steps to achieve this goal:  1. I will work with [facility] housing access services to create a plan to find affordable housing.   2. I will complete applications.   3. I will continue to pay rent on current apartment.  4. I will update CHW with any questions or progress.     Discussed 12/18/2020                      Functional (pt-stated)   30%     Goal Statement: I want to have my PCA services active within three months  Date Goal set: 9-1-2020  Barriers: unsure what to do next  Strengths: Have resources and been approved  Date to Achieve By: 12-  Patient expressed understanding of goal: yes  Action steps to achieve this goal:  1. I will contact my cousin who has a PCA agency  2. I will fill out DHS Form 4074-A with my cousin's agency and submit it to the [facility] to start my services  3. I will update CCC team on the status of this    9-1-2020  - has found someone to be his PCA.  LM at [facility] to see if he can still have PCA since the assessment was done in winter and he never hired a PCA.    10- discussed.   12-8-2020 found someone, needs new assessment  1/15/21 Not discussed            COMPLETED: Pain Management (pt-stated)        Goal Statement: I will have my pain under control and have a new pain clinic by 3 months.    Date Goal set: 9-1-2020  Barriers: Term[facility] relationship with [facility] without having another  clinic identified.    Strengths: Good relationship with PCP.   Date to Achieve By: 12-1-2020  Patient expressed understanding of goal: yes  Action steps to achieve this goal:  1. I will set up appointment with PCP to discuss referral to new pain clinic.  2. I will follow up with referral with new pain clinic.   3. I will update care coordination team with any concerns or progress.     Discussed 1/15/21                CHW Next Follow-up: 2 weeks    CHW Plan: CHW will continue to support patient with goals through routine scheduled outreach.     Discussion: Patient is going to start seeing [facility] and they will be managing his pain.    Did not discuss his housing or PCA today because he is recovering from COVID and wanted to know where he could be retested as he has no more symptoms. Sent him a link for sites that are doing the saliva testing.    Next outreach due: 1/29/21

## 2021-06-14 NOTE — TELEPHONE ENCOUNTER
Bernard has been exposed to covid.  Bernard was tested for covid results were positive on January 7th and also diagnosed with pneumonia.  Bernard has been taking cough medication and it is not working.  Bernard is currently on antibiotic Amoxicillin and Azithormycin.  Currently taking Virtussin and it is not working.  Now cough has a wheeze in it.  Bernard is wanting to know if there is a stronger medication that he can be prescribed for cough.  Please phone Bernard.  Major symptom today is cough.  Bernard is taking antibiotics and are helping but the cough is left.  Fever is gone.  Bernard has inhalers for his breathing and is working.  Cough is constant and nagging.      Reason for Disposition    Request for URGENT new prescription or refill of 'essential' medication (i.e., likelihood of harm to patient if not taken) and triager unable to fill per department policy    Additional Information    Negative: MORE THAN A DOUBLE DOSE of a prescription or over-the-counter (OTC) drug    Negative: DOUBLE DOSE (an extra dose or lesser amount) of over-the-counter (OTC) drug and any symptoms (e.g., dizziness, nausea, pain, sleepiness)    Negative: DOUBLE DOSE (an extra dose or lesser amount) of prescription drug and any symptoms (e.g., dizziness, nausea, pain, sleepiness)    Negative: Took another person's prescription drug    Negative: DOUBLE DOSE (an extra dose or lesser amount) of prescription drug and NO symptoms (Exception: a double dose of antibiotics)    Negative: Diabetes drug error or overdose (e.g., took wrong type of insulin or took extra dose)    Negative: Caller has medication question about med not prescribed by PCP and triager unable to answer question (e.g., compatibility with other med, storage)    Protocols used: MEDICATION QUESTION CALL-A-OH

## 2021-06-14 NOTE — TELEPHONE ENCOUNTER
"Dr. Mckenna, this prescription was set up to \"print\" verses being sent.  Please approve medication again.  Pharmacy closes at 5:00.    "

## 2021-06-14 NOTE — PROGRESS NOTES
Clinic Care Coordination Contact     Situation: Patient chart reviewed by care coordinator.     Background: Pts recent assessment and enrollment to Care Coordination was 9-1-2020.   Patient centered goals were developed with participation from patient.  RN CC handed patient off to CHW for continued outreach every 30 days.      Assessment: CHW has been in contact with patient monthly, last talking to patient on 1-15. Patient has made progress to goals.       Plan/Recommendations: CHW will involve SW CC as needed or if patient is ready to move to maintenance.  SW CC will continue to monitor progress to goals and CHW outreaches every 6 weeks.     Care Plan updated and mailed to patient: yes

## 2021-06-14 NOTE — TELEPHONE ENCOUNTER
Reason for call:  Patient exposed to covid 1/7/21    Symptom or request: severe cough    Duration (how long have symptoms been present): 2 days    Have you been treated for this before? No    Additional comments: Patient was sent over cough medication and needs stronger medication. He said his work told him that it has to go through a esential worker program as he is in quarantine and not allowed to leave his house to get the medication. He stated that Dr Webb would know what he's talking about.     Phone Number patient can be reached at:  Home number on file [phone] (home)    Best Time:  any    Can we leave a detailed message on this number: Yes    Call taken on 1/8/2021 at 11:44 AM by Magalis Chung

## 2021-06-15 NOTE — PROGRESS NOTES
Clinic Care Coordination Contact  [facility]/Voicemail       Clinical Data: Care Coordinator Outreach  Outreach attempted x 1.  Left message on patient's voicemail with call back information and requested return call.  Plan:   Care Coordinator will try to reach patient again in 3-5 business days.  Next outreach due: 2/12/21
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
## 2021-06-15 NOTE — PROGRESS NOTES
Clinic Care Coordination Contact    Community Health Worker Follow Up    Goals:   Goals Addressed                 This Visit's Progress       General      Financial Wellbeing (pt-stated)        Goal Statement: I will have affordable housing for myself and my two daughters within one year.   Date Goal set: 9-1-2020  Barriers: have criminal records that make it difficult to find housing.  Living in a one bedroom with three people.    Strengths: Is motivated and willing to work with housing access services.  Date to Achieve By: 9-1-2021  Patient expressed understanding of goal: yes  Action steps to achieve this goal:  1. I will work with [facility] housing access services to create a plan to find affordable housing.   2. I will complete applications.   3. I will continue to pay rent on current apartment.  4. I will update CHW with any questions or progress.     Discussed 2/24/21                    Functional (pt-stated)   40%     Goal Statement: I want to have my PCA services active within six months  Date Goal set: 9-1-2020  Barriers: unsure what to do next  Strengths: Have resources and been approved  Date to Achieve By: 4-1-2021  Patient expressed understanding of goal: yes  Action steps to achieve this goal:  1. I will contact my cousin who has a PCA agency  2. I will fill out DHS Form 4074-A with my cousin's agency and submit it to the [facility] to start my services  3. I will update CCC team on the status of this    9-1-2020  - has found someone to be his PCA.  LM at [facility] to see if he can still have PCA since the assessment was done in winter and he never hired a PCA.    10- discussed.   12-8-2020 found someone, needs new assessment  1/15/21 Not discussed  2/24/21 discussed                CHW Next Follow-up: 2 weeks    CHW Plan: CHW will continue to support patient with goals through routine scheduled outreach.   Scheduled patient for [facility] SW phone visit to discuss resources for emergency assistance to help with  his rent. He is recovering from COVID and has been unable to earn an income.    Next outreach due: 3/12/21

## 2021-06-15 NOTE — PROGRESS NOTES
Clinic Care Coordination Contact    Follow Up Progress Note      Assessment: Patient was not able to pay rent in January and February due to COVID. He is looking for work. Cannot get emergency assistance from [facility] until July as he has used it in past year.  He is getting calls from his landlord and they have started the eviction process with April date.  He was current on his rent until then.  No COVID funding available at this time.  He will watch to see if something is available. Provided him with 211 to contact as they have a housing fund now available.  He has tried to get help through [employer] but their VM is not taking calls at this time.    His aunt in [place] just  of COVID and he and his kids will be going there for her .      Goals addressed this encounter:   Goals Addressed    None          Intervention/Education provided during outreach: Discussed supports for rent.     Outreach Frequency: monthly    Plan:   Patient to call 211.   Care Coordinator will follow up in one week to assess needs.  JOHNNY Akins  Clinic Care Coordinator  [phone]

## 2021-06-16 NOTE — TELEPHONE ENCOUNTER
Telephone Encounter by Lakeshia Dailey RN at 10/21/2019  1:33 PM     Author: Lakeshia Dailey RN Service: -- Author Type: Registered Nurse    Filed: 10/21/2019  1:39 PM Encounter Date: 10/21/2019 Status: Signed    : Lakeshia Dailey RN (Registered Nurse)       Medication being requested: oxycodone 10/325 mg  Last visit date: 9/9  Provider: BE  Next visit date: 11/26  Provider: BE  Expected follow up: 8 weeks  MTM visit (Pain Center) date: no  UDS/CSA 8/2019   snipped in:    Pertinent between visit information about requested medication (telephone, mychart, prior authorization, concerns, comments):   10/18-patient cancelled  Script being sent to provider by nurse- dates and quantity:   Requested Prescriptions     Pending Prescriptions Disp Refills   ? oxyCODONE-acetaminophen (PERCOCET/ENDOCET)  mg per tablet 28 tablet 0     Sig: Take 1 tablet by mouth 2 (two) times a day for 14 days.     Pharmacy cued: [facility]  Standing orders for withdrawal protocol implemented: GOMEZ

## 2021-06-16 NOTE — TELEPHONE ENCOUNTER
Reason for Call:  Other call back      Detailed comments: Pt asking to be referred to a Pain Clinic -  No longer with [facility] pain clinic    Needs a new one    Phone Number Patient can be reached at: Cell number on file:    Telephone Information:   Mobile [phone]       Best Time: anytilme    Can we leave a detailed message on this number?: Yes    Call taken on 4/13/2021 at 8:21 AM by Rossy Alonso

## 2021-06-16 NOTE — PROGRESS NOTES
Clinic Care Coordination Contact    Follow Up Progress Note      Assessment: Call to patient who is upset at how long he has been trying to establish with a pain clinic.  He does not know where Dr. Webb sent order.  Informed him that Dr. Webb has left clinic and he is upset that he was not notified about that.  He would like to find a doctor who trusts him and will partner with him on his care.  Set up goal.  He has pca services and that goal was completed.  He is waiting for [facility] to call and it may be another month before contacting him per their wait list.      He has already signed up for housing relief through COVID act.      Goals addressed this encounter:   Goals Addressed                 This Visit's Progress       Patient Stated      Financial Wellbeing (pt-stated)        Goal Statement: I will have affordable housing for myself and my two daughters within one year.   Date Goal set: 9-1-2020  Barriers: have criminal records that make it difficult to find housing.  Living in a one bedroom with three people.    Strengths: Is motivated and willing to work with housing access services.  Date to Achieve By: 9-1-2021  Patient expressed understanding of goal: yes  Action steps to achieve this goal:  1. I will work with [facility] housing access services to create a plan to find affordable housing.   2. I will complete applications.   3. I will continue to pay rent on current apartment.  4. I will update CHW with any questions or progress.     Discussed 2/24/21, 3-3-2021 referral to [facility], 8-9 week wait  Discussed 3/30/21, 4-21                    COMPLETED: Functional (pt-stated)   100%     Goal Statement: I want to have my PCA services active within six months  Date Goal set: 9-1-2020  Barriers: unsure what to do next  Strengths: Have resources and been approved  Date to Achieve By: 4-1-2021  Patient expressed understanding of goal: yes  Action steps to achieve this goal:  1. I will contact my cousin who has a PCA  agency  2. I will fill out DHS Form 4074-A with my cousin's agency and submit it to the [facility] to start my services  3. I will update CCC team on the status of this    9-1-2020  - has found someone to be his PCA.  LM at [facility] to see if he can still have PCA since the assessment was done in winter and he never hired a PCA.    10- discussed.   12-8-2020 found someone, needs new assessment  1/15/21 Not discussed  2/24/21 discussed  3/30/21 Not discussed  4-21 discussed             Goal Statement: I will establish with new PCP and new pain clinic within 3 months.   Date Goal set: 4-  Barriers: frustrating trying to find new pain clinic.  Strengths: has insurance.  Date to Achieve By: 7-  Patient expressed understanding of goal: yes  Action steps to achieve this goal:  1. I will set up establish care with new PCP after getting recommendation from [facility].   2. I will set up appointment with new pain clinic when identified.   3. I will report progress towards this goal at scheduled outreach telephone calls from the CCC team.     Intervention/Education provided during outreach: Help with pain clinic and finding new PCP.   Called [facility] and they accept new patients by having patient call to set up intake visit and they ask for past three appointment notes be faxed to them at [phone].  Called patient and gave him this information and he is busy right now, but will try to call to set up appointment.  Asked him to call back and we can fax the pertinent information to them when he is scheduled.  There is an office in [place] that is likely the best location. They do prescribe opioids.  They do not need an order.     Gave him two options for PCP at [facility] that are taking new patients. He will use Nicole Ramirez. Encouraged him to set up establish visit with her.       Outreach Frequency: monthly    Plan:   Delegating to CHW to contact in less than 30 days.    Care Coordinator  will follow up as needed and within 45 days.     Eden Livingston, \[facility]\""  Clinic Care Coordinator  [phone]

## 2021-06-16 NOTE — PROGRESS NOTES
CHW spoke with patient briefly he was being called in for an appointment and requests a call back early next week.    Next outreach due: 3/22/21

## 2021-06-16 NOTE — TELEPHONE ENCOUNTER
Telephone Encounter by Lakeshia Dailey RN at 11/4/2019  2:45 PM     Author: Lakeshia Dailey RN Service: -- Author Type: Registered Nurse    Filed: 11/4/2019  2:54 PM Encounter Date: 11/4/2019 Status: Signed    : Lakeshia Dailey RN (Registered Nurse)       Last visit date: 9/9  Provider: KRYSTIAN  10/18-cancelled apt.  Next visit date: 11/26  Provider: KRYSTIAN  Expected follow up: 8 weeks  MTM visit (Pain Center) date: no  UDS/CSA 8/2019   snipped in:    Requested Prescriptions     Pending Prescriptions Disp Refills   ? oxyCODONE-acetaminophen (PERCOCET/ENDOCET)  mg per tablet 28 tablet 0     Sig: Take 1 tablet by mouth 2 (two) times a day for 14 days.     Pertinent between visit information about requested medication (telephone, mychart, prior authorization, concerns, comments):   ED encounter-9/12     [facility]  Standing orders for withdrawal protocol implemented: GOMEZ

## 2021-06-16 NOTE — TELEPHONE ENCOUNTER
Telephone Encounter by Lakeshia Dailey RN at 12/18/2019 11:31 AM     Author: Lakeshia Dailey RN Service: -- Author Type: Registered Nurse    Filed: 12/18/2019 11:40 AM Encounter Date: 12/18/2019 Status: Signed    : Lakeshia Dailey RN (Registered Nurse)       Medication being requested: percocet 10/325 mg  Last visit date: 11/26  Provider: BE  Next visit date:  01/21 Provider: BE  Expected follow up: 8 weeks  MTM visit (Pain Center) date: no  UDS/CSA 8/2019   snipped in:    Pertinent between visit information about requested medication (telephone, mychart, prior authorization, concerns, comments): NA  Script being sent to provider by nurse- dates and quantity:   Requested Prescriptions     Pending Prescriptions Disp Refills   ? oxyCODONE-acetaminophen (PERCOCET/ENDOCET)  mg per tablet 56 tablet 0     Sig: Take 1 tablet by mouth 2 (two) times a day.     [facility] c[facility] [place]  Standing orders for withdrawal protocol implemented: GOMEZ

## 2021-06-16 NOTE — TELEPHONE ENCOUNTER
Telephone Encounter by Lakeshia Dailey RN at 9/23/2019 11:49 AM     Author: Lakeshia Dailey RN Service: -- Author Type: Registered Nurse    Filed: 9/23/2019 11:56 AM Encounter Date: 9/23/2019 Status: Signed    : Lakeshia Dailey RN (Registered Nurse)       Medication being requested: oxycodone 10/325 mg  Last visit date: 9/9  Provider: BE  Next visit date: 10/07.  Provider: BE  Expected follow up: 8 weeks  MTM visit (Pain Center) date: no  UDS/CSA 8/2019   snipped in:    Pertinent between visit information about requested medication (telephone, mychart, prior authorization, concerns, comments):   9/12-ED visit rash and skin eruption  Script being sent to provider by nurse- dates and quantity:   Requested Prescriptions     Pending Prescriptions Disp Refills   ? oxyCODONE-acetaminophen (PERCOCET/ENDOCET)  mg per tablet 28 tablet 0     Sig: Take 1 tablet by mouth 2 (two) times a day.     Pharmacy cued: [facility]  Standing orders for withdrawal protocol implemented: GOMEZ

## 2021-06-16 NOTE — TELEPHONE ENCOUNTER
Telephone Encounter by Lakeshia Dailey RN at 10/7/2019  8:40 AM     Author: Lakeshia Dailey RN Service: -- Author Type: Registered Nurse    Filed: 10/7/2019  8:51 AM Encounter Date: 10/7/2019 Status: Signed    : Lakeshia Dailey RN (Registered Nurse)       Medication being requested: oxycodone 10/325 mg  Last visit date: 9/9  Provider: BE  Next visit date: 9/12.  Provider: BE  Expected follow up: 8 weeks  MTM visit (Pain Center) date: no  UDS/CSA 8/2019   snipped in:    Pertinent between visit information about requested medication (telephone, mychart, prior authorization, concerns, comments):   9/12-ED encounter  Provider cancelled today  Script being sent to provider by nurse- dates and quantity:   Requested Prescriptions     Pending Prescriptions Disp Refills   ? oxyCODONE-acetaminophen (PERCOCET/ENDOCET)  mg per tablet 28 tablet 0     Sig: Take 1 tablet by mouth 2 (two) times a day.     Pharmacy cued: [facility]  Standing orders for withdrawal protocol implemented: GOMEZ

## 2021-06-16 NOTE — TELEPHONE ENCOUNTER
Telephone Encounter by Hoa David RN at 3/11/2020 11:24 AM     Author: Hoa David RN Service: -- Author Type: Registered Nurse    Filed: 3/11/2020 11:39 AM Encounter Date: 3/11/2020 Status: Signed    : Hoa David RN (Registered Nurse)       Medication being requested: Percocet  Last visit date: 1/21/20.  Provider: BE  Next visit date: 3/30/20.  Provider: BE  Expected follow up: 8 weeks  MTM visit (Pain Center) date: No  UDT date: 8/2019  Agreement date: 8/2019   snipped in:    Pertinent between visit information about requested medication (telephone, mychart, prior authorization, concerns, comments):   Picked up the last three refills early  3/1-3/2 [facility]    Script being sent to provider by nurse- dates and quantity:   Requested Prescriptions     Pending Prescriptions Disp Refills   ? oxyCODONE-acetaminophen (PERCOCET/ENDOCET)  mg per tablet 56 tablet 0     Sig: Take 1 tablet by mouth 2 (two) times a day.     Pharmacy cued: [facility]  Standing orders for withdrawal protocol implemented: NA

## 2021-06-16 NOTE — TELEPHONE ENCOUNTER
Telephone Encounter by Angelika Washburn at 1/16/2020  4:20 PM     Author: Angelika Washburn Service: -- Author Type: --    Filed: 1/16/2020  4:21 PM Encounter Date: 1/16/2020 Status: Signed    : Angelika Washburn       No PA needed, per insurance patient is just too soon to fill until 1/19/2019.  Patient last filled medication 12/26/19.  Pharmacy was called and made aware.

## 2021-06-16 NOTE — TELEPHONE ENCOUNTER
Telephone Encounter by Lakeshia Dailey RN at 9/10/2019  3:53 PM     Author: Lakeshia Dailey RN Service: -- Author Type: Registered Nurse    Filed: 9/10/2019  3:58 PM Encounter Date: 9/10/2019 Status: Signed    : Lakeshia Dailey RN (Registered Nurse)       Call placed to [facility], RX has been cancelled, however there are remaining prescriptions on file from other providers.  According to staff at [facility], there is another remaining refill of percocet from another provider still on patient profile.  Patients is restricted to this pharmacy. Patient would like a new RX to be sent to [facility].  Unclear if this will go through as a restricted patient.  Per :      Please review if ok sending in another RX to a different pharmacy.  This is in cue for [facility].

## 2021-06-16 NOTE — TELEPHONE ENCOUNTER
Telephone Encounter by Tricia Lewis RN at 1/16/2020 10:44 AM     Author: Tricia Lewis RN Service: -- Author Type: Registered Nurse    Filed: 1/16/2020 11:00 AM Encounter Date: 1/16/2020 Status: Signed    : Tricia Lewis RN (Registered Nurse)       Medication being requested: Oxycodone  Last visit date: 11/26  Provider: BE  Next visit date: 1/21   Provider: BE  Expected follow up: 8 weeks  MTM visit (Pain Center) date: na  UDT date: 8/2019  Agreement date: 8/2019    snipped in:    Pertinent between visit information about requested medication (telephone, mychart, prior authorization, concerns, comments):   He does not as noted have any active lesions discuss decreasing the opioids.  Dr JUAREZ note 12/26  Script being sent to provider by nurse- dates and quantity:   Requested Prescriptions     Pending Prescriptions Disp Refills   ? oxyCODONE-acetaminophen (PERCOCET/ENDOCET)  mg per tablet 12 tablet 0     Sig: Take 1 tablet by mouth 2 (two) times a day for 6 days.     Pharmacy cued: [facility]  Standing orders for withdrawal protocol implemented: eugene

## 2021-06-16 NOTE — PROGRESS NOTES
Clinic Care Coordination Contact    Community Health Worker Follow Up    Goals:   Goals Addressed                 This Visit's Progress       General      Financial Wellbeing (pt-stated)   50%     Goal Statement: I will have affordable housing for myself and my two daughters within one year.   Date Goal set: 9-1-2020  Barriers: have criminal records that make it difficult to find housing.  Living in a one bedroom with three people.    Strengths: Is motivated and willing to work with housing access services.  Date to Achieve By: 9-1-2021  Patient expressed understanding of goal: yes  Action steps to achieve this goal:  1. I will work with [facility] housing access services to create a plan to find affordable housing.   2. I will complete applications.   3. I will continue to pay rent on current apartment.  4. I will update CHW with any questions or progress.     Discussed 2/24/21, 3-3-2021 referral to [facility], 8-9 week wait  Discussed 3/30/21                    Functional (pt-stated)        Goal Statement: I want to have my PCA services active within six months  Date Goal set: 9-1-2020  Barriers: unsure what to do next  Strengths: Have resources and been approved  Date to Achieve By: 4-1-2021  Patient expressed understanding of goal: yes  Action steps to achieve this goal:  1. I will contact my cousin who has a PCA agency  2. I will fill out DHS Form 4074-A with my cousin's agency and submit it to the [facility] to start my services  3. I will update CCC team on the status of this    9-1-2020  - has found someone to be his PCA.   at [facility] to see if he can still have PCA since the assessment was done in winter and he never hired a PCA.    10- discussed.   12-8-2020 found someone, needs new assessment  1/15/21 Not discussed  2/24/21 discussed  3/30/21 Not discussed                CHW Next Follow-up: 2-3 weeks    CHW Plan: Follow up with CCC RN or SW regarding Pain Clinic and restricted status.     Discussion: Patient states  that he has not been able to establish with a new Pain Clinic due to [facility] not releasing him. This may be due to his previous restricted status on his insurance. Will follow up to see if patient is still restricted, patient states that he is not.    Patient is aware he has to wait another 4-5 weeks to hear back from [facility] on his housing assistance.    Next outreach due: 4/15/21

## 2021-06-16 NOTE — TELEPHONE ENCOUNTER
"Telephone Encounter by Tricia Lewis RN at 11/15/2019  8:33 AM     Author: Tricia Lewis RN Service: -- Author Type: Registered Nurse    Filed: 11/15/2019  8:48 AM Encounter Date: 11/15/2019 Status: Signed    : Tricia Lewis RN (Registered Nurse)       Patient called clinic this morning requesting that his pain medication be refilled because he \"had surgery last night\". Chart review shows rectal abscess drained, outpatient procedure. He states that he cant fill the pain medication given to him because he is on a restricted medication program. He is requesting that his Percocet be refilled, the refill due date is 11/19 and he is out of medication.        Please advise       "

## 2021-06-16 NOTE — PROGRESS NOTES
Patient spoke with [facility] SW yesterday 4/21 regarding his goals.    CHW delegations: Delegating to CHW to contact in less than 30 days.      Next outreach due: 5/19/21

## 2021-06-17 ENCOUNTER — COMMUNICATION - HEALTHEAST (OUTPATIENT)
Dept: INTERNAL MEDICINE | Facility: CLINIC | Age: 36
End: 2021-06-17

## 2021-06-17 ENCOUNTER — OFFICE VISIT (OUTPATIENT)
Dept: ANESTHESIOLOGY | Facility: CLINIC | Age: 36
End: 2021-06-17
Payer: MEDICARE

## 2021-06-17 DIAGNOSIS — G89.29 OTHER CHRONIC PAIN: ICD-10-CM

## 2021-06-17 DIAGNOSIS — L73.2 HIDRADENITIS SUPPURATIVA: Primary | ICD-10-CM

## 2021-06-17 PROCEDURE — 99207 PR NO CHARGE LOS: CPT | Performed by: ANESTHESIOLOGY

## 2021-06-17 NOTE — TELEPHONE ENCOUNTER
Patient had surgery yesterday at [facility] and was given pain medications    Surgery was for his hidradenitis.    Was admitted yesterday and discharged in the same day from [facility].    Patient needs a hospital follow up so records can be pulled.    Patient transferred back to [facility].    Jayde Watson RN   Care Connection Medication Refill and Triage Nurse  7:57 AM  5/5/2021    Reason for Disposition    [1] Caller requesting NON-URGENT health information AND [2] PCP's office is the best resource    Protocols used: INFORMATION ONLY CALL-A-

## 2021-06-17 NOTE — TELEPHONE ENCOUNTER
Telephone Encounter by Deanne Zazueta CMA at 4/13/2020  9:25 AM     Author: Deanne Zazueta CMA Service: -- Author Type: Medical Assistant    Filed: 4/13/2020  9:26 AM Encounter Date: 4/8/2020 Status: Signed    : Deanne Zazueta CMA (Medical Assistant)       Left message to call back for: Bernard  Information to relay to patient:  Please relay the following message from Dr. Webb:  Lillian Webb MD Hanzel, Kristen Care Team [place] 2 days ago       I sent the prescription.  [facility], [facility]    Routing comment      Thank you.  Deanne PRABHAKAR CMA/ANGEL....................9:26 AM

## 2021-06-17 NOTE — PROGRESS NOTES
Office Visit - Follow Up   Bernard Padilla   35 y.o. male    Date of Visit: 5/5/2021    Chief Complaint   Patient presents with     Follow-up     [facility], incision & drainage peroneal abscesses, 5/5/21        Assessment and Plan   1. Hidradenitis  Patient having postoperative pain and was refused additional pain medications from the surgical team at [facility].  He stopped using Percocet a few months ago when he was dismissed from pain clinic apparently for missing appointment and will be establishing with a new pain clinic.  I did check the  and he has no recent fills of opioid pain medication and appeared to have consistent history prior to that.  I will prescribe oxycodone 13 tablets and if he needs additional medicine should contact us and establish care with a new physician as his primary physician has retired.  I also discussed with him that he could discuss with his dermatologist about additional treatment to control hidradenitis such as Humira.  - oxyCODONE (ROXICODONE) 5 MG immediate release tablet; Take 1 tablet (5 mg total) by mouth every 8 (eight) hours as needed for pain.  Dispense: 13 tablet; Refill: 0    2. Peritoneal abscess (H)  - oxyCODONE (ROXICODONE) 5 MG immediate release tablet; Take 1 tablet (5 mg total) by mouth every 8 (eight) hours as needed for pain.  Dispense: 13 tablet; Refill: 0    3. Other chronic pain  As above    Return in about 2 weeks (around 5/19/2021) for follow up with PCP.     History of Present Illness   This 35 y.o. old man comes in for post ER/hospital follow-up.  He was admitted at [facility] for a perineal abscess related to hidradenitis.  This is I indeed and he is taking antibiotics for hidradenitis, I believe clindamycin and rifampin although he is not entirely certain.  He also takes dapsone.  He follows with dermatology at [facility].  He has chronic pain I believe related to hidradenitis and is establishing in the pain clinic and  "[facility].  He was given 5 tablets of oxycodone, 5 mg each at discharge from the hospital and continues to have significant pain.  He is using acetaminophen and ibuprofen was insufficient control.    Review of Systems: A comprehensive review of systems was negative except as noted.     Medications, Allergies and Problem List   Reviewed, reconciled and updated  Post Discharge Medication Reconciliation Status:      Physical Exam   General Appearance:   No acute distress    /70 (Patient Site: Left Arm, Patient Position: Sitting, Cuff Size: Adult Regular)   Pulse 66   Ht 5' 6.5\" (1.689 m)   Wt 189 lb 4 oz (85.8 kg)   SpO2 97%   BMI 30.09 kg/m           Additional Information   Current Outpatient Medications   Medication Sig Dispense Refill     acetaminophen (TYLENOL) 325 MG tablet Take 650 mg by mouth every 4 (four) hours as needed.       acetaminophen (TYLENOL) 500 MG tablet Take 1,000 mg by mouth.       betamethasone, augmented, (DIPROLENE) 0.05 % ointment        budesonide (PULMICORT) 180 mcg/actuation inhaler Inhale 1 puff 2 (two) times a day. 3 Inhaler 3     cholecalciferol, vitamin D3, (VITAMIN D3) 50 mcg (2,000 unit) Tab Take 1 tablet (2,000 Units total) by mouth daily. 90 tablet 3     clindamycin (CLEOCIN) 300 MG capsule        clobetasoL (TEMOVATE) 0.05 % ointment Apply 1 application topically 2 (two) times a day. To hands and feet 60 g 1     codeine-guaiFENesin (GUAIFENESIN AC)  mg/5 mL liquid Take 5 mL by mouth at bedtime as needed for cough. 120 mL 0     crutch Misc Crutches x 4 weeks       dapsone 100 MG tablet Take 100 mg by mouth daily.        fluocinolone (DERMA-SMOOTHE) 0.01 % external oil        fluocinolone 0.01 % Oil Apply 1 application topically daily.       fluocinonide (LIDEX) 0.05 % external solution Apply topically 2 (two) times a day. (Patient taking differently: Apply topically 2 (two) times a day. scalp) 60 mL 1     fluocinonide-emollient " (FLUOCINONIDE-EMOLLIENT) 0.05 % Crea Apply twice daily to palms 30 g 11     fluticasone propionate (FLONASE) 50 mcg/actuation nasal spray Instill two sprays into each nostril once daily 16 g 3     fluticasone propionate (FLOVENT HFA) 110 mcg/actuation inhaler Inhale 2 puffs 2 (two) times a day. 3 Inhaler 3     loratadine (CLARITIN) 10 mg tablet Take 1 tablet (10 mg total) by mouth daily. 90 tablet 3     oxyCODONE (ROXICODONE) 5 MG immediate release tablet Take 1 tablet (5 mg total) by mouth every 8 (eight) hours as needed for pain. 13 tablet 0     oxyCODONE-acetaminophen (PERCOCET/ENDOCET) 5-325 mg per tablet Take 1 tablet by mouth every 6 (six) hours as needed for pain. 4 tablet 0     rifAMPin (RIFADIN) 300 MG capsule Take 300 mg by mouth.       sildenafiL (VIAGRA) 50 MG tablet Take 1 tablet (50 mg total) by mouth as needed for erectile dysfunction. 20 tablet 1     triamcinolone (KENALOG) 0.1 % cream        albuterol (PROAIR HFA;PROVENTIL HFA;VENTOLIN HFA) 90 mcg/actuation inhaler Inhale 2 puffs every 4 (four) hours as needed for wheezing. 3 Inhaler 3     No current facility-administered medications for this visit.      Allergies   Allergen Reactions     Hydrocodone Swelling     Upset stomach     Vicodin [Hydrocodone-Acetaminophen] Nausea And Vomiting     Egg      Social History     Tobacco Use     Smoking status: Current Every Day Smoker     Packs/day: 1.00     Years: 20.00     Pack years: 20.00     Types: Cigarettes     Smokeless tobacco: Never Used   Substance Use Topics     Alcohol use: Yes     Frequency: Monthly or less     Drug use: Yes       Review and/or order of clinical lab tests:  Review and/or order of radiology tests:  Review and/or order of medicine tests:  Discussion of test results with performing physician:  Decision to obtain old records and/or obtain history from someone other than the patient:  Review and summarization of old records and/or obtaining history from someone other than the patient  and.or discussion of case with another health care provider:  Independent visualization of image, tracing or specimen itself:    Time:      Parag Travis MD

## 2021-06-17 NOTE — TELEPHONE ENCOUNTER
New Appointment Needed  What is the reason for the visit:    Inpatient/ED Follow Up Appt Request  At what hospital or facility were you seen?: [facility]  What is the reason you were seen?:Surgery for Hidradenitis  What date were you admitted?: date: 5/4/2021  What date were you discharged?: date: n/a  What was the recommended timeframe for your follow up appointment?: as soon as possible - needs pain meds   Provider Preference: Any available - needs to be seen asap  How soon do you need to be seen?: today  Waitlist offered?: Yes  Okay to leave a detailed message:  Yes

## 2021-06-17 NOTE — TELEPHONE ENCOUNTER
"Telephone Encounter by Deanne Zazueta CMA at 11/4/2020 11:29 AM     Author: Deanne Zazueta CMA Service: -- Author Type: Medical Assistant    Filed: 11/4/2020 11:29 AM Encounter Date: 11/4/2020 Status: Signed    : Deanne Zazueta CMA (Medical Assistant)       Spoke with the patient and relayed the following message below: \"  Lillian Webb MD Hanzel, Kristen Care Team Pool Just now (11:26 AM)     I sent Pulmicort.  Thanks.      He verbalized understanding and had no further questions at this time.  Deanne PRABHAKAR CMA/ANGEL....................11:29 AM         "

## 2021-06-17 NOTE — TELEPHONE ENCOUNTER
Telephone Encounter by Staci Gonzalez LPN at 8/21/2020 11:47 AM     Author: Staci Gonzalez LPN Service: -- Author Type: Licensed Nurse    Filed: 8/21/2020  2:23 PM Encounter Date: 8/20/2020 Status: Signed    : Staci Gonzalez LPN (Licensed Nurse)       Lillian Webb MD Hanzel, Kristen Care Team Pool 43 minutes ago (11:04 AM)       I've ordered a new pain clinic consult.  [facility]

## 2021-06-17 NOTE — PROGRESS NOTES
Clinic Care Coordination Contact    Follow Up Progress Note      Assessment: Talked to patient who had his first appointment with  pain clinic and signed a pain contract on 5-20.  He was prescribed medications and he was unable to fill due to insurance. This provider reviewed [facility] pain clinic notes and did not agree to prescribe opoids.  Patient has not heard of the medications prescribed, and neither does his mom. He feels like he is being used as a guinnea pig for medications that have not been tested. He left a message at pain clinic late last week.  Per chart review plan from pain clinic:  1. Trial amitriptyline 25 mg at bedtime for chronic widespread pain. Will reassess for benefit, side effects, and additional titration at next evaluation.  2. Trial meloxicam 15 mg daily for chronic myofascial pain. Discontinue if side effects. Patient advised to avoid any other NSAID medication.  3. Trial Belbuca 150 mcg BID x 14 days for chronic pain 2/2 hidradenitis suppurativa. Additional titration can be considered if non beneficial at this dosage  4. Referral placed for pain PT for evaluation of central and peripheral sensitization and myofascial pain and treatments involving fear avoidance, pacing, mobilization, and other indicated modalities.  5. Referral placed for pain psychology to address the role of pain processing via CBT, biofeedback, mindfulness based stress reduction, and other indicated modalities  6. UDS and CSA for prescribing of opioid medications    Encouraged him to work with provider as he has only had one appointment thus far and he may need to work with provider to adjust medications.      He has not heard from [facility] regarding housing support services.  Sent email to [facility] at [facility] to see if he is still on waiting list.  He needs to move as his daughter is moving back home and they need a two bedroom.  He is up to date on bills at this time.      Goals addressed this encounter:   Goals Addressed                  This Visit's Progress       Patient Stated      Financial Wellbeing (pt-stated)        Goal Statement: I will have affordable housing for myself and my two daughters within one year.   Date Goal set: 9-1-2020  Barriers: have criminal records that make it difficult to find housing.  Living in a one bedroom with three people.    Strengths: Is motivated and willing to work with housing access services.  Date to Achieve By: 9-1-2021  Patient expressed understanding of goal: yes  Action steps to achieve this goal:  1. I will work with [facility] housing access services to create a plan to find affordable housing.   2. I will complete applications.   3. I will continue to pay rent on current apartment.  4. I will update CHW with any questions or progress.     Discussed 2/24/21, 3-3-2021 referral to [facility], 8-9 week wait  Discussed 3/30/21, 4-21 5-25 has not heard from [facility].                    Healthy Coping (pt-stated)   20%     Goal Statement: I will establish with new PCP and new pain clinic within 3 months.   Date Goal set: 4-  Barriers: frustrating trying to find new pain clinic.  Strengths: has insurance.  Date to Achieve By: 7-  Patient expressed understanding of goal: yes  Action steps to achieve this goal:  1. I will set up establish care with new PCP after getting recommendation from LORAINE ROSAS.   2. I will set up appointment with new pain clinic when identified.   3. I will report progress towards this goal at scheduled outreach telephone calls from the [facility] team.   5-25 met with new pain clinic on 5-20                Intervention/Education provided during outreach: None     Outreach Frequency: monthly    Plan:   Delegating to CHW to contact in less than 30 days.      [facility] [place] replied to email that he is 2nd on the waiting list and likely to get a call in 3-4 weeks. Called patient and gave him that message.     Care Coordinator will follow up in 6 weeks and as needed.  Eden Livingston, [facility]  Clinic Care  Coordinator  [phone]

## 2021-06-17 NOTE — TELEPHONE ENCOUNTER
Telephone Encounter by Staci Gonzalez LPN at 5/6/2020  2:07 PM     Author: Staci Gonzalez LPN Service: -- Author Type: Licensed Nurse    Filed: 5/6/2020  2:08 PM Encounter Date: 4/30/2020 Status: Signed    : Staci Gonzalez LPN (Licensed Nurse)       Mercedez Hurley LUZ MARINA  You 2 days ago       Patient stated that he had been on Flonase in the past for his seasonal allergies. He has been having sinus issues, dry itchy eyes, scratchy throat.     Message text          Ok for patient to get Flonase? If so will ha up rx

## 2021-06-17 NOTE — TELEPHONE ENCOUNTER
Telephone Encounter by Angelika Washburn at 4/29/2020 10:09 AM     Author: Angelika Washburn Service: -- Author Type: --    Filed: 4/29/2020 10:11 AM Encounter Date: 4/28/2020 Status: Addendum    : Angelika Washburn    Related Notes: Original Note by Angelika Washburn filed at 4/29/2020 10:09 AM       No PA needed, medication and quantity is covered under patient's plan.  Called [facility] pharmacy and stated patient picked this up already with a $1 copay this morning.

## 2021-06-17 NOTE — LETTER
6/17/2021       RE: Bernard Padilla  [address]  [place] [address]     Dear Colleague,    Thank you for referring your patient, Bernard Padilla, to the [facility] [place] at [facility]. Please see a copy of my visit note below.    Patient left without being seen.  No charge for visit.  Patient can follow up as needed for multidisciplinary treatment, however oxycodone will not be prescribed by our clinic due to UDS positive for cocaine metabolites.    Sami Cotter MD    Department of Anesthesiology  Pain Management Division      Again, thank you for allowing me to participate in the care of your patient.      Sincerely,    Sami Cotter MD

## 2021-06-17 NOTE — TELEPHONE ENCOUNTER
Telephone Encounter by Lakeshia Dailey RN at 7/13/2020 11:51 AM     Author: Lakeshia Dailey RN Service: -- Author Type: Registered Nurse    Filed: 7/13/2020 11:59 AM Encounter Date: 7/13/2020 Status: Signed    : Lakeshia Dailey RN (Registered Nurse)       Patient calling for refill of percocet.  Per last RX: start date of 6/29-7/27  This refill is not due at this time  Per the :      Patient filled last percocet on 6/22, however not due to start until 6/29 as directed.    Patient also needs to schedule a follow up apt as directed.  Last ov with BE: 5/22

## 2021-06-17 NOTE — TELEPHONE ENCOUNTER
Telephone Encounter by Lakeshia Dailey RN at 7/14/2020  9:25 AM     Author: Lakeshia Dailey RN Service: -- Author Type: Registered Nurse    Filed: 7/14/2020  9:29 AM Encounter Date: 7/13/2020 Status: Signed    : Lakeshia Dailey RN (Registered Nurse)       Cued as a bridge to apt on 8/14  Further review of :      Requested Prescriptions     Pending Prescriptions Disp Refills   ? oxyCODONE-acetaminophen (PERCOCET/ENDOCET)  mg per tablet 69 tablet 0     Sig: Take 1 tablet by mouth every 8 (eight) hours as needed for pain.     [facility] pharmacy

## 2021-06-17 NOTE — TELEPHONE ENCOUNTER
Telephone Encounter by Elyssa Ledesma RN at 6/22/2020 10:18 AM     Author: Elyssa Ledesma RN Service: -- Author Type: Registered Nurse    Filed: 6/22/2020 10:33 AM Encounter Date: 6/22/2020 Status: Signed    : Elyssa Ledesma RN (Registered Nurse)       Medication being requested: Perocet  Last visit date: 5/22.  Provider: E  Next visit date: NONE.  Provider: E  Expected follow up: 8 weeks  MTM visit ([facility]) date: no  UDT date: 8/2019  Agreement date: 8/2019   snipped in:  Pertinent between visit information about requested medication (telephone, mychart, prior authorization, concerns, comments): none  Script being sent to provider by nurse- dates and quantity:   Requested Prescriptions     Pending Prescriptions Disp Refills   ? oxyCODONE-acetaminophen (PERCOCET/ENDOCET)  mg per tablet 84 tablet 0     Sig: Take 1 tablet by mouth every 8 (eight) hours as needed for pain.     Pharmacy cued: HE  Standing orders for withdrawal protocol implemented: GOMEZ

## 2021-06-18 NOTE — PROGRESS NOTES
Patient left without being seen.  No charge for visit.  Patient can follow up as needed for multidisciplinary treatment, however oxycodone will not be prescribed by our clinic due to UDS positive for cocaine metabolites.    Sami Cotter MD    Department of Anesthesiology  Pain Management Division

## 2021-06-19 NOTE — LETTER
Letter by Myhre, David J, RN at      Author: Myhre, David J, RN Service: -- Author Type: --    Filed:  Encounter Date: 9/18/2019 Status: Signed       Care Plan  About Me:    Patient Name:  Bernard Padilla    YOB: 1985  Age:         34 y.o.   [facility] MRN:    [id number] Telephone Information:  Home Phone [phone]   Mobile Not on file.       Address:  [address] [place], [place] [address] Email address:  No e-mail address on record      Emergency Contact(s)  Extended Emergency Contact Information      Name: Zenobia Willett    Home Phone Number: [phone]  Relation: Sibling      Name: DECLINED, DECLINED    Relation: Declined          Primary language:  English     needed? No   [facility] [employer] [facility]:  [phone] op. 1  Other communication barriers: None  Preferred Method of Communication:     Current living arrangement: I live in a private home  Mobility Status/ Medical Equipment: Independent    Health Maintenance  Health Maintenance Reviewed: Up to date    My Access Plan  Medical Emergency 911   Primary Clinic Line Lillian Webb MD - [phone]   24 Hour Appointment Line [phone] or  [phone]) (toll-free)   24 Hour Nurse Line [phone] (toll-free)   [facility], [phone]   [facility]  [phone]   Preferred Pharmacy [id number] - [place], [place] - [address] AT [facility]     Behavioral Health Crisis Line The National Suicide Prevention Lifeline at 1-518.153.5456 or 911             My Care Team Members  Patient Care Team       Relationship Specialty Notifications Start End    Lillian Webb MD PCP - General Internal Medicine  9/10/19     Phone: [phone] Fax: [phone]         [address] [address]    Myhre, David J, RN Clinic Care Coordinator Primary Care - CC Admissions  9/23/19     Fax: [phone]         Mercedez Hurley CHW Community Health Worker Primary Care - [facility] Admissions 9/23/19     Phone: [phone] Fax: [phone]        [facility] Lead Care Coordinator Primary Care - [facility] Admissions 9/23/19     Fax: [phone]                 My Care Plans  Self Management and Treatment Plan  Goals and (Comments)  Goals        General    Functional (pt-stated)     Notes - Note edited  9/23/2019 10:00 AM by Myhre, David J, RN    Goal Statement- I would like to have affordable housing in the next year.     Action steps to achieve this goal  1.  I will meet with the [facility] MSW at scheduled appointment on 9/26/19 to discuss housing resources that may be available to me.   2.  I will provided any necessary documentation and complete any necessary paperwork that may be associated with this goal in a timely manner.   3.  If I need assistance with completing any paperwork or have questions related to any paperwork associated with this goal, I will notify the [facility] Community Health WorkerMercedez.      Date goal set: 9/19/19                  Advance Care Plans/Directives Type:        My Medical and Care Information  Problem List   Patient Active Problem List   Diagnosis   ? Allergic rhinitis   ? Depression   ? Other chronic pain      Current Medications and Allergies:  See printed Medication Report.    Care Coordination Start Date: 9/18/2019   Frequency of Care Coordination:     Form Last Updated: 09/23/2019

## 2021-06-19 NOTE — LETTER
Letter by Lillian Webb MD at      Author: Lillian Webb MD Service: -- Author Type: --    Filed:  Encounter Date: 10/29/2019 Status: Signed         Bernard Padilla  [address] [facility] [address]             October 29, 2019         Dear Mr. Padilla,    Below are the results from your recent visit:    Resulted Orders   Lipid Cascade   Result Value Ref Range    Cholesterol 163 <=199 mg/dL    Triglycerides 95 <=149 mg/dL    HDL Cholesterol 52 >=40 mg/dL    LDL Calculated 92 <=129 mg/dL    Patient Fasting > 8hrs? Yes        Hi Bernard.  Your recent cholesterol was normal.  Let me know if you have any questions.        Sincerely,        Electronically signed by Lillian Webb MD

## 2021-06-19 NOTE — LETTER
Letter by Myhre, David J, RN at      Author: Myhre, David J, RN Service: -- Author Type: --    Filed:  Encounter Date: 9/18/2019 Status: Signed       CARE COORDINATION  [facility]  [address] [place] [address]    September 23, 2019    Bernard LARRY Valerie  [address]  [facility] [address]      Dear Bernard,    I am a clinic care coordinator who works with Lillian Webb MD at [facility]. I wanted to thank you for spending the time to talk with me.  I wanted to introduce myself and provide you with our contact information so that you can call with questions or concerns about your health care. Below is a description of clinic care coordination and how I can further assist you.     The clinic care coordinator team is made up of a registered nurse,  and community health worker who understand the health care system. The goal of clinic care coordination is to help you manage your health and improve access to the health care system in the most efficient manner. The team can assist you in meeting your health care goals by providing education, coordinating services, strengthening the communication among your providers, assist you with any financial, behavioral, psychosocial, chemical dependency, counseling, and/or psychiatric resources if needed.    Please feel free to contact Mercedez, the Community Health Worker, at [phone] with any questions or concerns. We are focused on providing you with the highest-quality healthcare experience possible and that all starts with you.       Sincerely,       David J Myhre, RN  Enclosed: I have enclosed a copy of the Care Plan. This has helpful information and goals that we have talked about. Please keep this in an easy to access place to use as needed.

## 2021-06-19 NOTE — LETTER
Letter by Mercedez Hurley CHW at      Author: Mercedez Hurley CHW Service: -- Author Type: --    Filed:  Encounter Date: 10/17/2019 Status: Signed         October 17, 2019            Bernard Padilla  [address] [facility] [address]        Dear Fabiano Wagner enrolled with the [facility] Care Coordination Services.  In order for us provide guidance we need to connect on a monthly bases.  We have tried calling you 2 times in the last month and have been unsuccessful in reaching you. Please call me at [phone] at your earliest convenience.  If you reach my voicemail, please leave a message with your daytime telephone number and a date and time that I can return your call.                                                                                Sincerely,        MARVIN Newsome                                                                     Clinic Care Coordination                                          [facility]

## 2021-06-20 NOTE — LETTER
Letter by Lillian Webb MD at      Author: Lillian Webb MD Service: -- Author Type: --    Filed:  Encounter Date: 5/26/2020 Status: (Other)         Bernard Padilla  [address] [place] [address]             May 26, 2020         Dear Mr. Padilla,    Below are the results from your recent visit:    Resulted Orders   HM2(CBC w/o Differential)   Result Value Ref Range    WBC 6.6 4.0 - 11.0 thou/uL    RBC 4.52 4.40 - 6.20 mill/uL    Hemoglobin 14.8 14.0 - 18.0 g/dL    Hematocrit 43.5 40.0 - 54.0 %    MCV 96 80 - 100 fL    MCH 32.7 27.0 - 34.0 pg    MCHC 34.0 32.0 - 36.0 g/dL    RDW 11.5 11.0 - 14.5 %    Platelets 211 140 - 440 thou/uL    MPV 7.2 7.0 - 10.0 fL   Comprehensive Metabolic Panel   Result Value Ref Range    Sodium 139 136 - 145 mmol/L    Potassium 4.4 3.5 - 5.0 mmol/L    Chloride 106 98 - 107 mmol/L    CO2 25 22 - 31 mmol/L    Anion Gap, Calculation 8 5 - 18 mmol/L    Glucose 86 70 - 125 mg/dL    BUN 9 8 - 22 mg/dL    Creatinine 0.87 0.70 - 1.30 mg/dL    GFR MDRD Af Amer >60 >60 mL/min/1.73m2    GFR MDRD Non Af Amer >60 >60 mL/min/1.73m2    Bilirubin, Total 0.4 0.0 - 1.0 mg/dL    Calcium 9.1 8.5 - 10.5 mg/dL    Protein, Total 6.8 6.0 - 8.0 g/dL    Albumin 3.9 3.5 - 5.0 g/dL    Alkaline Phosphatase 101 45 - 120 U/L    AST 23 0 - 40 U/L    ALT 27 0 - 45 U/L    Narrative    Fasting Glucose reference range is 70-99 mg/dL per  American Diabetes Association (ADA) guidelines.       Please call and let him know that his preop labs were all normal.  He can proceed as planned.  Thanks.     Please call with questions or contact us using [employer].    Sincerely,        Electronically signed by Lillian Webb MD

## 2021-06-21 NOTE — LETTER
Letter by Joe Henderson MD at      Author: Joe Henderson MD Service: -- Author Type: --    Filed:  Encounter Date: 12/21/2020 Status: (Other)         Lillian Webb MD  [address] [address]                                  December 21, 2020    Patient: Bernard Padilla   MR Number: [id number]   YOB: 1985   Date of Visit: 12/21/2020     Dear Dr. Jon MD:    Thank you for referring Bernard Padilla to me for evaluation. Below are the relevant portions of my assessment and plan of care.    If you have questions, please do not hesitate to call me. I look forward to following Bernard along with you.    Sincerely,        Joe Henderson MD          CC  No Recipients  Joe Henderson MD  12/21/2020  2:04 PM  Sign when Signing Visit  Assessment and Plan:Bernard Padilla is a 35 y.o. male with a past medical history significant for anxiety who presents to clinic today for evaluation of wheezing and cough thought to be asthma.  His symptoms started with a pneumonia in February and he is a chronic smoker.  I'm not sure his situation is truly asthma, his PFTs don't really corroborate this.  It is possible he has chronic bronchitis from smoking tobacco and marijuana and this could be causing some bronchospasm.  He was just put on an inhaled steroid that I suspect will help in time.  If he can quit smoking this would help more.  I doubt he is at risk of significant symptoms or an exacerbation from his current symptoms.    1. Bronchospasm/chronic bronchitis - continue the pulmicort started last week for at least a month and see if this helps.  Albuterol can be used up to every four hours.  I don't think he needs a nebulizer.  If he is feeling good, he can try to stop the pulmicort to see if this is necessary for the long term.  2. Tobacco abuse - this is likely driving his lung issues.  We discussed smoking cessation, he is under a lot of stress as a single dad and  "unemployed.    3. RTC in 3 months to check in, if he is feeling better he can cancel.      CCx: cough/wheezing    HPI: Mr. Padilla is a 35 year old male who was referred for increasing cough and wheezing.  He has felt bad since he had pneumonia in February.  His cough has gotten better and worse over that time.  He was given albuterol initially and he thinks this helps some.  A week ago he was started on pulmicort.  He isn't sure if this is helping yet.  He has a daily \"smokers cough\" that seems to take his breath away.  He still smokes about 1ppd and uses a fair bit of marijuana in place of chronic opiates.  He did quit smoking a while back for 16 months, he states this made him gain weight, but at that time he didn't have any pulmonary issues.    He has not tried prednisone for his problems.  He isn't sure anything makes him wheeze except maybe cats.  He has seasonal allergies, but this didn't make him wheeze.  He had normal lungs growing up and was quite athletic.  He didn't have any issues prior to this pneumonia with his lungs.    PMH:  Past Medical History:   Diagnosis Date   ? Anxiety    ? Arthritis    ? Hydradenitis        PSH:  Past Surgical History:   Procedure Laterality Date   ? KNEE SURGERY     ? SKIN SURGERY         SH:  Social History     Socioeconomic History   ? Marital status: Single     Spouse name: Not on file   ? Number of children: 2   ? Years of education: Not on file   ? Highest education level: Not on file   Occupational History   ? Occupation: disability   Social Needs   ? Financial resource strain: Very hard   ? Food insecurity     Worry: Often true     Inability: Often true   ? Transportation needs     Medical: Yes     Non-medical: Yes   Tobacco Use   ? Smoking status: Current Every Day Smoker     Packs/day: 1.00     Years: 20.00     Pack years: 20.00     Types: Cigarettes   ? Smokeless tobacco: Never Used   Substance and Sexual Activity   ? Alcohol use: Yes     Frequency: Monthly or less "   ? Drug use: Yes   ? Sexual activity: Yes     Partners: Female   Lifestyle   ? Physical activity     Days per week: 0 days     Minutes per session: 0 min   ? Stress: Very much   Relationships   ? Social connections     Talks on phone: More than three times a week     Gets together: More than three times a week     Attends Yazdanism service: Never     Active member of club or organization: No     Attends meetings of clubs or organizations: Never     Relationship status: Not on file   ? Intimate partner violence     Fear of current or ex partner: Not on file     Emotionally abused: Not on file     Physically abused: Not on file     Forced sexual activity: Not on file   Other Topics Concern   ? Not on file   Social History Narrative   ? Not on file       Family history:  Family History   Problem Relation Age of Onset   ? COPD Mother    ? Hypertension Mother    ? Diabetes Father    ? Coronary artery disease Father    ? Colon cancer Neg Hx    ? Prostate cancer Neg Hx      The family history was reviewed and is not pertinent to the chief complaint or HPI.    ROS:  Review of Systems - History obtained from the patient  General ROS: negative  Psychological ROS: negative  ENT ROS: negative  Allergy and Immunology ROS: negative  Endocrine ROS: negative  Respiratory ROS: positive for - cough and wheezing  negative for - hemoptysis, orthopnea, pleuritic pain, stridor or tachypnea  Cardiovascular ROS: no chest pain or palpitations  Gastrointestinal ROS: no abdominal pain, change in bowel habits, or black or bloody stools  Genito-Urinary ROS: no dysuria, trouble voiding, or hematuria  Musculoskeletal ROS: negative  Neurological ROS: no TIA or stroke symptoms  Dermatological ROS: negative      Current Meds:  Current Outpatient Medications   Medication Sig   ? acetaminophen (TYLENOL) 325 MG tablet Take 650 mg by mouth every 4 (four) hours as needed.   ? betamethasone, augmented, (DIPROLENE) 0.05 % ointment    ? budesonide  (PULMICORT) 180 mcg/actuation inhaler Inhale 1 puff 2 (two) times a day.   ? cholecalciferol, vitamin D3, (VITAMIN D3) 50 mcg (2,000 unit) Tab Take 1 tablet (2,000 Units total) by mouth daily.   ? clindamycin (CLEOCIN) 300 MG capsule    ? clobetasoL (TEMOVATE) 0.05 % ointment Apply 1 application topically 2 (two) times a day. To hands and feet   ? crutch Misc Crutches x 4 weeks   ? dapsone 100 MG tablet Take 100 mg by mouth daily.    ? fluocinolone (DERMA-SMOOTHE) 0.01 % external oil    ? fluocinolone 0.01 % Oil Apply 1 application topically daily.   ? fluocinonide (LIDEX) 0.05 % external solution Apply topically 2 (two) times a day. (Patient taking differently: Apply topically 2 (two) times a day. scalp)   ? fluocinonide-emollient (FLUOCINONIDE-EMOLLIENT) 0.05 % Crea Apply twice daily to palms   ? fluticasone propionate (FLONASE) 50 mcg/actuation nasal spray Instill two sprays into each nostril once daily   ? fluticasone propionate (FLOVENT HFA) 110 mcg/actuation inhaler Inhale 2 puffs 2 (two) times a day.   ? loratadine (CLARITIN) 10 mg tablet Take 1 tablet (10 mg total) by mouth daily.   ? rifAMPin (RIFADIN) 300 MG capsule Take 300 mg by mouth.   ? sildenafiL (VIAGRA) 50 MG tablet Take 1 tablet (50 mg total) by mouth as needed for erectile dysfunction.   ? triamcinolone (KENALOG) 0.1 % cream    ? albuterol (PROAIR HFA;PROVENTIL HFA;VENTOLIN HFA) 90 mcg/actuation inhaler Inhale 2 puffs every 4 (four) hours as needed for wheezing.       Labs:  Recent Results (from the past 72 hour(s))   POCT hemoglobin   Result Value Ref Range    Hgb 15.8 7.0 g/dL       I have personally reviewed all imaging and PFT data available pertinent to this visit.    Imaging studies:  No results found.    PFTs:  FEV1/FVC is 82% and is normal.  FEV1 is 2.77L (75%) predicted and is reduced.  FVC is 3.38L (77%) predicted and reduced.  There was improvement in spirometry after a single inhaled dose of bronchodilator.    DLCO is 26.85ml/min/hg  "(93%) predicted and is normal when it is corrected for hemoglobin.    Impression:  Full Pulmonary Function Test is abnormal.  Matched reduction in FEV1 and FVC could imply mild restriction but would need lung volumes to confirm, unfortunately we cannot perform this maneuver during COVID.  There was a mild improvement with albuterol suggesting possible bronchospasm    Joe Henderson MD PhD  [facility] Pulmonary and Critical Care            Physical Exam:  /60   Pulse 72   Ht 5' 6.5\" (1.689 m)   Wt 188 lb (85.3 kg)   SpO2 97%   BMI 29.89 kg/m    General - Well nourished, sleepy  Ears/Mouth - Deferred given mask use during pandemic  Neck - no cervical lymphadenopathy  Lungs - Clear to ausculation bilaterally   CVS - regular rhythm with no murmurs, rubs or gallups  Abdomen - soft, NT, ND, NABS  Ext - no cyanosis, clubbing or edema  Skin - no rash  Psychology - alert and oriented, answers appropriate        Electronically signed by:    Joe Henderson MD PhD  [facility] Pulmonary and Critical Care Medicine         "

## 2021-06-21 NOTE — LETTER
Letter by Eden Livingston SW at      Author: Eden Livingston SW Service: -- Author Type: --    Filed:  Encounter Date: 1/18/2021 Status: (Other)       Care Plan  About Me:    Patient Name:  Bernard Padilla    YOB: 1985  Age:         35 y.o.   [facility] MRN:    [id number] Telephone Information:  Home Phone [phone]   Mobile [phone]       Address:  [address] [place] [address] Email address:  No e-mail address on record      Emergency Contact(s)  Extended Emergency Contact Information      Name: Zenobia Willett    Home Phone Number: [phone]  Relation: Sibling          Primary language:  English     needed? No   [facility] [employer] [facility]:  [phone] op. 1  Other communication barriers: None  Preferred Method of Communication:     Current living arrangement:    Mobility Status/ Medical Equipment:      Health Maintenance  Health Maintenance Reviewed:      My Access Plan  Medical Emergency 911   Primary Clinic Line Lillian Webb MD - [phone]   24 Hour Appointment Line [phone] or  [phone]) (toll-free)   24 Hour Nurse Line [phone] (toll-free)   Preferred Urgent Care     Preferred Hospital     Preferred Pharmacy [facility] [place] - [address] [facility]     Behavioral Health Crisis Line The National Suicide Prevention Lifeline at 1-634.497.6905 or 911             My Care Team Members  Patient Care Team       Relationship Specialty Notifications Start End    Lillian Webb MD PCP - General Internal Medicine  9/10/19     Phone: [phone] Fax: [phone]         [address] [address]    Mercedez Hurley CHW Community Health Worker Primary Care - [facility] Admissions 9/23/19     Phone: [phone] Fax: [phone]        Eden Livingston SW Lead Care Coordinator Primary Care - CC  4/9/20      Lillian Mota MD Assigned PCP   11/9/20     Phone: [phone] Fax:  [phone]         [facility] [address] [facility] [address]    Joe Henderson MD Assigned Pulmonology Provider   12/27/20     Phone: [phone] Fax: [phone] [address] [facility]  [facility] [address]            My Care Plans  Self Management and Treatment Plan  Goals and (Comments)  Goals        General    Financial Wellbeing (pt-stated)     Notes - Note edited  12/17/2020  2:53 PM by Mercedez Hurley CHW    Goal Statement: I will have affordable housing for myself and my two daughters within one year.   Date Goal set: 9-1-2020  Barriers: have criminal records that make it difficult to find housing.  Living in a one bedroom with three people.    Strengths: Is motivated and willing to work with housing access services.  Date to Achieve By: 9-1-2021  Patient expressed understanding of goal: yes  Action steps to achieve this goal:  1. I will work with [facility] housing access services to create a plan to find affordable housing.   2. I will complete applications.   3. I will continue to pay rent on current apartment.  4. I will update CHW with any questions or progress.     Discussed 12/18/2020                    Functional (pt-stated)     Notes - Note edited  1/15/2021  2:04 PM by Mercedez Hurley CHW    Goal Statement: I want to have my PCA services active within three months  Date Goal set: 9-1-2020  Barriers: unsure what to do next  Strengths: Have resources and been approved  Date to Achieve By: 12-  Patient expressed understanding of goal: yes  Action steps to achieve this goal:  1. I will contact my cousin who has a PCA agency  2. I will fill out DHS Form 4074-A with my cousin's agency and submit it to the [facility] to start my services  3. I will update CCC team on the status of this    9-1-2020  - has found someone to be his PCA.  RUFINA at [facility] to see if he can still have PCA since the assessment was done in winter and he never hired a PCA.     10- discussed.   12-8-2020 found someone, needs new assessment  1/15/21 Not discussed                   Advance Care Plans/Directives Type:        My Medical and Care Information  Problem List   Patient Active Problem List   Diagnosis   ? Depression   ? Other chronic pain   ? Hidradenitis   ? Allergy, subsequent encounter   ? Mild intermittent asthma without complication   ? Vitamin D deficiency      Current Medications and Allergies:  See printed Medication Report.    Care Coordination Start Date: 9/1/2020   Frequency of Care Coordination: monthly   Form Last Updated: 01/18/2021

## 2021-06-22 ENCOUNTER — TELEPHONE (OUTPATIENT)
Dept: DERMATOLOGY | Facility: CLINIC | Age: 36
End: 2021-06-22

## 2021-06-22 NOTE — TELEPHONE ENCOUNTER
[facility] Call Center    Phone Message    May a detailed message be left on voicemail: yes     Reason for Call: Other: Pt called regarding below. Pt said he has not heard back from the clinic and he has been having constant flare ups for the past month. Pt is very frustrated. Please call him back to discuss. Thanks    Action Taken: Message routed to:  [facility] DERM    Travel Screening: Not Applicable

## 2021-06-22 NOTE — TELEPHONE ENCOUNTER
Health Call Center    Phone Message    May a detailed message be left on voicemail: yes     Reason for Call: Symptoms or Concerns     If patient has red-flag symptoms, warm transfer to triage line    Current symptom or concern: Hidradenitis Suppurativa (HS) flare up and outbreak, Pt reports pain, boils, ongoing for 2-3 weeks. Pt requests to be seen asap    Symptoms have been present for:    2-3 week(s)    Has patient previously been seen for this? Yes    By Dr Ruiz    Date: 2/11/21    Are there any new or worsening symptoms? Yes: as above    Action Taken: Message routed to:  [facility] Derm    Travel Screening: Not Applicable     Pt reports flare up/outbreak of Hidradenitis Suppurativa (HS) as above. Pt wants to be seen right away. 1st available appt is 9/24/21 and Pt declines this option. He said he will be in pain all summer.     Pt is very upset and was swearing. He stopped when requested.    Please call regarding symptoms and possibility to be seen sooner than September.  Thanks.

## 2021-06-22 NOTE — TELEPHONE ENCOUNTER
Spoke with patient and scheduled a virtual visit with Dr. Ruiz.    Leilani Pearce [facility]

## 2021-06-25 NOTE — PROGRESS NOTES
Patient spoke with [facility] SW on 5/25/21 and discussed goals     CHW delegations: Delegating to CHW to contact in less than 30 days    Next outreach due: 6/25/21

## 2021-06-26 NOTE — TELEPHONE ENCOUNTER
Patient  Is requesting a new referral for a different pain clinic. Right now he is going to the [facility] pain clinic, he does wish to return.    He lives in [place], and would like a clinic closer to his home. He is also not being treated well at the .     Referral pended .

## 2021-06-26 NOTE — TELEPHONE ENCOUNTER
He needs to establish with a new physician.  My practice is currently closed.  I saw him in post hospital follow-up only.

## 2021-06-26 NOTE — TELEPHONE ENCOUNTER
He was wanting a referral to a new pain clinic. He was aware he needs a new PCP. He was requesting someone sign the referral in the meantime.

## 2021-06-28 NOTE — PROGRESS NOTES
Progress Notes by Myhre, David J, RN at 9/18/2019 11:00 AM     Author: Myhre, David J, RN Service: -- Author Type: Registered Nurse    Filed: 9/23/2019 10:14 AM Encounter Date: 9/18/2019 Status: Signed    : Myhre, David J, RN (Registered Nurse)       Clinic Care Coordination Contact    Clinic Care Coordination Contact  OUTREACH    Referral Information:  Referral Source: PCP    Primary Diagnosis: Behavioral Health    No chief complaint on file.       Universal Utilization:      Utilization    Last refreshed: 9/23/2019  8:13 AM:  Hospital Admissions 0           Last refreshed: 9/23/2019  8:13 AM:  ED Visits 0           Last refreshed: 9/23/2019  8:13 AM:  No Show Count (past year) 0              Current as of: 9/23/2019  8:13 AM              Clinical Concerns: Patient has a history of hidradenitis superativa. He stated has been working with the Dermatology at the [facility] for treatment of this condition. He also said he is followed by Dr. Chai Foster at the [facility] for pain management associated with this diagnosis.   Current Medical Concerns:  Patient reported a history of mental illness. He would not elaborate on his mental health during the assessment, and said he was not interested in taking any medications for it or seeing a therapist. Patient denied any chemical dependency concerns during the visit.   Current Behavioral Concerns:     Education Provided to patient:   Pain  Pain (GOAL):: No  Type: Chronic (>3mo)  Location of chronic pain:: groin, buttock, back   Radiating: No  Progression: Improving  Description of pain: Aching  Chronic pain severity:: 6  Limitation of routine activities due to chronic pain:: No  Alleviating Factors: Rest, Pain Medication  Aggravating Factors: Activity   Patient pain is currently being managed by Dr. Chai Foster at the [facility].   Health Maintenance Reviewed: Up to date       Medication Management:  Patient manages his medications  independently. He denied needing assistance with medication set-up or education.       Functional Status:  Dependent ADLs:: Independent  Dependent IADLs:: Independent  Bed or wheelchair confined:: No  Mobility Status: Independent  Fallen 2 or more times in the past year?: No  Any fall with injury in the past year?: No    Living Situation:  Current living arrangement:: I live in a private home  Type of residence:: Private home - stairs    Diet/Exercise/Sleep:  Diet:: Regular  Inadequate nutrition (GOAL):: No  Food Insecurity: No  Tube Feeding: No  Exercise:: Yes  Days per week of moderate to strenuous exercise (like a brisk walk): 7  On average, minutes per day of exercise at this level: 60  How intense was your typical exercise?: Heavy (like jogging or swimming  Exercise Minutes per Week: 420  Inadequate activity/exercise (GOAL):: No  Significant changes in sleep pattern (GOAL): No    Transportation:  Transportation concerns (GOAL):: Yes  Transportation means:: Regular car     Psychosocial:  Cheondoism or spiritual beliefs that impact treatment:: No  Mental health DX:: Yes  Mental health DX how managed:: None  Mental health management concern (GOAL):: No  Informal Support system:: Family, Friends     Financial/Insurance:   Financial/Insurance concerns (GOAL):: No  No financial concerns at this time.      Resources and Interventions:  Current Resources:    ;   Community Resources: None  Supplies used at home:: None  Equipment Currently Used at Home: none    Advance Care Plan/Directive  Advanced Care Plans/Directives on file:: No  Advanced Care Plan/Directive Status: Declined Further Information    Referrals Placed: None     Goals:     Goals        Patient Stated    ? Functional (pt-stated)      Goal Statement- I would like to have affordable housing in the next year.     Action steps to achieve this goal  1.  I will meet with the [facility] MSW at scheduled appointment on 9/26/19 to discuss housing resources that may be  available to me.   2.  I will provided any necessary documentation and complete any necessary paperwork that may be associated with this goal in a timely manner.   3.  If I need assistance with completing any paperwork or have questions related to any paperwork associated with this goal, I will notify the [facility] Community Health Worker, [place].      Date goal set: 9/19/19                 Patient/Caregiver understanding: Patient verbalized understand of goal and additional information discussed during today's assessment.        Future Appointments              Today Lillian Webb MD [facility] Internal Medicine, [facility]    In 3 days DTN CCC RN [facility] Clinical Support, [facility]          Plan: CCC RN will continue to be available as nursing needs arise. [facility] MSW will meet with patient at scheduled meeting on 9/26/19 to discuss housing resources. [facility] CHW will continue to support patient current goal.

## 2021-06-29 NOTE — PROGRESS NOTES
Progress Notes by Anna Faulkner LPN at 5/22/2020  9:20 AM     Author: Anna Faulkner LPN Service: -- Author Type: Licensed Nurse    Filed: 5/26/2020  7:55 AM Date of Service: 5/22/2020  9:20 AM Status: Signed    : Anna Faulkner LPN (Licensed Nurse)

## 2021-06-29 NOTE — PROGRESS NOTES
Progress Notes by Eden Livingston SW at 9/1/2020  9:30 AM     Author: Eden Livingston SW Service: -- Author Type:     Filed: 9/1/2020 10:46 AM Encounter Date: 9/1/2020 Status: Signed    : Eden Livingston SW ()       Clinic Care Coordination Contact    Clinic Care Coordination Contact  OUTREACH    Referral Information:  Referral Source: PCP    Primary Diagnosis: Behavioral Health    Chief Complaint   Patient presents with   ? Clinic Care Coordination - Follow-up        Universal Utilization: appropriate.   Clinic Utilization  Difficulty keeping appointments:: No  Compliance Concerns: No  No-Show Concerns: No  No PCP office visit in Past Year: No  Utilization    Last refreshed: 9/1/2020  9:56 AM:  Hospital Admissions 1           Last refreshed: 9/1/2020  9:56 AM:  ED Visits 2           Last refreshed: 9/1/2020  9:56 AM:  No Show Count (past year) 4              Current as of: 9/1/2020  9:56 AM              Clinical Concerns:  Current Medical Concerns:     Patient has a history of hidradenitis superativa. He stated has been working with the Dermatology at the [facility] for treatment of this condition and has appointment with surgeon next week to have the four flare ups removed.  He is experiencing a lot of pain related to this condition.  He terminated services with [facility] pain clinic and needs to talk with PCP to get referral to a new pain clinic.  He has only 5 pills left from his pain clinic and will run out soon.    Current Behavioral Concerns: No concerns.    Education Provided to patient: PCA activation process.   Pain  Pain (GOAL):: Yes  Type: Chronic (>3mo)  Health Maintenance Reviewed: Not assessed  Clinical Pathway: None     Medication Management:  Independent with medications. Has concerns that he does not have a current prescriber for his pain medications.      Functional Status:  Dependent ADLs:: Bathing, Dressing, Grooming  Dependent IADLs:: Cleaning, Cooking, Laundry  Bed or  wheelchair confined:: No  Mobility Status: Independent  Fallen 2 or more times in the past year?: No  Any fall with injury in the past year?: No    Living Situation:  Current living arrangement:: I live in a private home with family(two daughters)  Type of residence:: Apartment    Lifestyle & Psychosocial Needs:  Lifestyle   ? Physical activity     Days per week: 0 days     Minutes per session: 0 min   ? Stress: Very much     Social Needs   ? Financial resource strain: Very hard   ? Food insecurity     Worry: Often true     Inability: Often true   ? Transportation needs     Medical: Yes     Non-medical: Yes     Diet:: Regular  Inadequate nutrition (GOAL):: No  Tube Feeding: No  Inadequate activity/exercise (GOAL):: No  Significant changes in sleep pattern (GOAL): No  Transportation means:: Regular car     Worship or spiritual beliefs that impact treatment:: No  Mental health DX:: Yes  Mental health DX how managed:: None  Mental health management concern (GOAL):: No  Informal Support system:: Family, Friends   Socioeconomic History   ? Marital status: Single     Spouse name: Not on file   ? Number of children: 2   ? Years of education: Not on file   ? Highest education level: Not on file   Occupational History   ? Occupation: disability   Relationships   ? Social connections     Talks on phone: More than three times a week     Gets together: More than three times a week     Attends Nondenominational service: Never     Active member of club or organization: No     Attends meetings of clubs or organizations: Never     Relationship status: Not on file   ? Intimate partner violence     Fear of current or ex partner: Not on file     Emotionally abused: Not on file     Physically abused: Not on file     Forced sexual activity: Not on file     Tobacco Use   ? Smoking status: Current Every Day Smoker     Packs/day: 1.00     Years: 20.00     Pack years: 20.00     Types: Cigarettes   ? Smokeless tobacco: Never Used   Substance and  Sexual Activity   ? Alcohol use: Yes     Frequency: Monthly or less   ? Drug use: Yes   ? Sexual activity: Yes     Partners: Female          Patient completed assessment and set up new goals.        Resources and Interventions:  Current Resources:      Community Resources: None     Equipment Currently Used at Home: none    Advance Care Plan/Directive  Advanced Care Plans/Directives on file:: No  Advanced Care Plan/Directive Status: Declined Further Information    Referrals Placed: Community Resources(ARC)     Goals:   Goals        General    Financial Wellbeing (pt-stated)     Notes - Note created  9/1/2020 10:08 AM by Eden Livingston SW    Goal Statement: I will have affordable housing for myself and my two daughters within one year.   Date Goal set: 9-1-2020  Barriers: have criminal records that make it difficult to find housing.  Living in a one bedroom with three people.    Strengths: Is motivated and willing to work with housing access services.  Date to Achieve By: 9-1-2021  Patient expressed understanding of goal: yes  Action steps to achieve this goal:  1. I will work with [facility] housing access services to create a plan to find affordable housing.   2. I will complete applications.   3. I will continue to pay rent on current apartment.  4. I will update CHW with any questions or progress.         Functional (pt-stated)     Notes - Note edited  9/1/2020 10:03 AM by Eden Livingston SW    Goal Statement: I want to have my PCA services active within three months  Date Goal set: 9-1-2020  Barriers: unsure what to do next  Strengths: Have resources and been approved  Date to Achieve By: 12-  Patient expressed understanding of goal: yes  Action steps to achieve this goal:  1. I will contact my cousin who has a PCA agency  2. I will fill out DHS Form 4074-A with my cousin's agency and submit it to the Pending sale to [facility] to start my services  3. I will update CCC team on the status of this    Discussed on  6/12/20.  9-1-2020  - has found someone to be his PCA.  LM at [facility] to see if he can still have PCA since the assessment was done in winter and he never hired a PCA.            Pain Management (pt-stated)     Notes - Note created  9/1/2020 10:06 AM by Eden Livingston SW    Goal Statement: I will have my pain under control and have a new pain clinic by 3 months.    Date Goal set: 9-1-2020  Barriers: Terminated relationship with [facility] pain clinic without having another clinic identified.    Strengths: Good relationship with PCP.   Date to Achieve By: 12-1-2020  Patient expressed understanding of goal: yes  Action steps to achieve this goal:  1. I will set up appointment with PCP to discuss referral to new pain clinic.  2. I will follow up with referral with new pain clinic.   3. I will update care coordination team with any concerns or progress.               Patient/Caregiver understanding: He asked SW to assist with getting PCA services started as he has found someone he trusts who can provide the service. The previous SW emailed him a form and wants to get that form resent. Due to long time since his assessment, winter 2020, did a three way call to [facility] to make sure he can still use PCA services.  to have them call .  Will assist patient with next steps.  Call back from [facility]. His PCA agency needs to submit technical change form to [facility] and they need a copy of his assessment. If patient does not have copy of the assessment, they can request one from [facility].  Called patient and explained this and he will contact the PCA company to start the process.     He currently is living in a one bedroom that is not affordable and has his two daughters and set up a goal to find a two bedroom.  Will make referral to [facility] housing access services. Denied any mental health concerns.        Future Appointments              In 2 months Lillian Webb MD [facility] Internal Medicine, [facility]  Clinic          Plan: Will route to PCP as patient needs to discuss a referral to a pain clinic and to address the lack of a prescriber for his pain medications.  LORAINE CC completed referral to [facility] for housing services.  Will delegate to CHW to contact in one month.  LORAINE CC to do over sight in 45 days, available as needed.   Eden Livingston [facility]  Clinic Care Coordinator  [phone]

## 2021-06-30 ENCOUNTER — OFFICE VISIT - HEALTHEAST (OUTPATIENT)
Dept: FAMILY MEDICINE | Facility: CLINIC | Age: 36
End: 2021-06-30

## 2021-06-30 DIAGNOSIS — Z11.3 SCREEN FOR STD (SEXUALLY TRANSMITTED DISEASE): ICD-10-CM

## 2021-06-30 DIAGNOSIS — Z11.4 ENCOUNTER FOR SCREENING FOR HUMAN IMMUNODEFICIENCY VIRUS (HIV): ICD-10-CM

## 2021-06-30 LAB — HIV 1+2 AB+HIV1 P24 AG SERPL QL IA: NEGATIVE

## 2021-06-30 ASSESSMENT — MIFFLIN-ST. JEOR: SCORE: 1721.9

## 2021-07-01 ENCOUNTER — VIRTUAL VISIT (OUTPATIENT)
Dept: DERMATOLOGY | Facility: CLINIC | Age: 36
End: 2021-07-01
Payer: MEDICARE

## 2021-07-01 DIAGNOSIS — L80 VITILIGO: ICD-10-CM

## 2021-07-01 DIAGNOSIS — L40.9 PSORIASIS: ICD-10-CM

## 2021-07-01 DIAGNOSIS — Z79.899 ENCOUNTER FOR LONG-TERM (CURRENT) USE OF HIGH-RISK MEDICATION: ICD-10-CM

## 2021-07-01 DIAGNOSIS — L73.2 HIDRADENITIS SUPPURATIVA: Primary | ICD-10-CM

## 2021-07-01 LAB
C TRACH DNA SPEC QL NAA+PROBE: NEGATIVE
HBV SURFACE AG SERPL QL IA: NEGATIVE
HCV AB SERPL QL IA: NEGATIVE
N GONORRHOEA DNA SPEC QL NAA+PROBE: NEGATIVE
SPEC DESCRIPTION: NORMAL
SPECIMEN DESCRIPTION: NORMAL
T PALLIDUM AB SER QL: NEGATIVE

## 2021-07-01 PROCEDURE — 99214 OFFICE O/P EST MOD 30 MIN: CPT | Mod: TEL | Performed by: DERMATOLOGY

## 2021-07-01 RX ORDER — DAPSONE 100 MG/1
200 TABLET ORAL DAILY
Qty: 60 TABLET | Refills: 0 | Status: SHIPPED | OUTPATIENT
Start: 2021-07-01 | End: 2022-04-29

## 2021-07-01 NOTE — NURSING NOTE
Dermatology Rooming Note    Bernard Padilla's goals for this visit include:   Chief Complaint   Patient presents with     Derm Problem     bernard is having this visit today for a follow up on HS, states that he has a flare in the groin area that is very painful     Chaparrita Vidal CMA on 7/1/2021 at 3:52 PM

## 2021-07-01 NOTE — PROGRESS NOTES
[facility] Dermatology Note  Encounter Date: Jul 1, 2021  Telephone ([phone]). Location of teledermatologist: [facility] [place]. Start time: 5:18 PM End time: 5:34 PM.    Dermatology Problem List:  1. Hidradenitis suppuritiva   - Diagnosed in 2016 and did not respond to antibiotics.   - Seen by Dr. Bird and underwent 9 procedures   - s/p  mg BID - started 9/19, now discontinued   - Dapsone 200 mg daily, increased from 100mg daily on 7/1/21 (started 11/21/19)   - CBC in 2 weeks and 4 weeks     2. Acute onset hand foot psoriasiform dermatitis with joint pain, suspected reactive arthritis, now improving and almost resolved with minimal foot involvement   -  mg BID - started 9/19, now discontinued   - Lidex ointment at night    - DermaSmooth  3. Atopic dermatitis   - Triamcinolone 0.1% cream BID   - s/p Betamethasone 0.05% ointment BID  4. Vitiligo   - no treatment    ____________________________________________    Assessment & Plan:   1) HS  - Increase dapsone to 200mg daily, counseled on medication compliance  - If flaring take clindamycin/rifampin 300mg/300mg BID for 2 weeks  - CBC in 2 weeks and 4 weeks  - Will discuss with Dr. Maldonado for surgical management     2) Psoriasis/Atopic dermatitis   - Continue Triamcinolone cream for body  - Derma-smoothe and lidex on scalp  - s/p betamethasone 0.05% for recalcitrant areas    3) Vitiligo   - No treatment     Procedures Performed:    None    Follow-up: 1 month(s) in-person, or earlier for new or changing lesions    Staff and Resident:      Staff Physician Comments:   I saw and evaluated the patient with the resident and I agree with the assessment and plan.  I was present for the evaluation.    Apolinar Ruiz MD, FAAD    Departments of Internal Medicine and Dermatology  [facility]  [phone]        ____________________________________________    CC: Derm Problem  (bernard is having this visit today for a follow up on HS, states that he has a flare in the groin area that is very painful)    HPI:  Mr. Bernard Padilla is a(n) 35 year old male who presents today for follow-up  for HS. The patient was last seen in dermatologyon 02/11/2021 at which time his HS was well controlled on dapsone 100 mg daily.  For treatment of psoriasis/atopic dermatitis he was recommended to continue TMC cream, betamethasone diporpironate ointment for recalcitrant areas, and Derma-smoothe and lidex on scalp.     Patient states today he has been having increasing number of flares since last visit, approximately 5. Flares occurring in groin and gluteal areas, 4 active lesions. Recently restarted on dapsone 100mg daily, was off it for 1 month due to loss in family and traveling and noticed increase in symptoms when off. Continues to take clindamycin/rifampin for flares, last taken 1 week ago for a 7 day course. He was admitted to [facility] for perineal abscess s/p I&D which he states does not work for him and prefers excisions for treatment. He has chronic pain and previously followed with pain clinic however discharged d/t UDS results and has been taking ibuprofen and Tylenol without improvement. No fevers or chills. Psoriasis improved with topicals as described above, no other joint pains. Not interested in surgery under local at this time.    Dapsone Question List:  Any new weakness in your muscles: No  Any new shortness or breath: No    HS Nurse Assessment    Nurse Assessment Data 2/10/2021 2/11/2021 7/1/2021   Over the past month, about how many recurrent or new boils have you had? - 3 5   Over the past week, how many dressing changes do you do each day? - 0 3+   Over the past week, has your wound drainage been: - Mild Severe   Rate your HS overall from 0-10 (0 = no disease, 10 = worst) over the past week:  - 0 10   Rate your pain score from 0-10 (0 = no disease, 10 = worst) for the most  painful/symptomatic lesion in the past week:  - 9 10 - Worst Pain   Over the past week, how much has HS influenced your quality of life? - extremely extremely   Total DLQI Score 16 (very large effect on patient's life) - -       Physical Exam:  Telephone visit, no exam performed.  Per report, patient able to walk on heels and toes.       Medications:  Current Outpatient Medications   Medication     acetaminophen (TYLENOL) 325 MG tablet     albuterol (PROAIR HFA/PROVENTIL HFA/VENTOLIN HFA) 108 (90 Base) MCG/ACT inhaler     clindamycin (CLEOCIN) 300 MG capsule     dapsone (ACZONE) 100 MG tablet     fluocinolone acetonide (DERMA-SMOOTHE/FS BODY) 0.01 % external oil     Fluocinolone Acetonide Scalp (DERMA-SMOOTHE/FS SCALP) 0.01 % OIL oil     fluocinonide (LIDEX) 0.05 % external solution     fluticasone (FLONASE) 50 MCG/ACT nasal spray     IBUPROFEN PO     loratadine (CLARITIN) 10 MG tablet     oxyCODONE IR (ROXICODONE) 10 MG tablet     rifampin (RIFADIN) 300 MG capsule     triamcinolone (KENALOG) 0.1 % external cream     amitriptyline (ELAVIL) 25 MG tablet     meloxicam (MOBIC) 15 MG tablet     No current facility-administered medications for this visit.       Past Medical/Surgical History:   Patient Active Problem List   Diagnosis     Hidradenitis suppurativa     Tobacco use disorder     Patellofemoral arthritis of left knee     Eczema     Allergic rhinitis     Depression     Influenza     Knee pain, left     Other chronic pain     Scrotal abscess     Abscess of groin, left     Past Medical History:   Diagnosis Date     Arthritis of knee      Boil      Chronic pain      Eczema      Hidradenitis suppurativa        CC Referred Self, MD  No address on file on close of this encounter.

## 2021-07-01 NOTE — PROGRESS NOTES
HS Aga[facility] Checklist      Follow up: In-person  4 weeks    Labs today? NO    Imaging needed? NO    Referrals placed? NO    Infusion Start? NO    Infusion change/dose increase NO    Biologics change? NO    Wound care supplies? (print orders for faxing) NO    Reach out to outside providers? YES    Photos and photo consent done? NO

## 2021-07-01 NOTE — LETTER
7/1/2021       RE: Bernard Padilla  [address]  [place] [address]     Dear Colleague,    Thank you for referring your patient, Bernard Padilla, to the [facility] DERMATOLOGY [facility]IC [place] at [facility]. Please see a copy of my visit note below.    [facility] Dermatology Note  Encounter Date: Jul 1, 2021  Telephone [phone]). Location of teledermatologist: [facility] DERMATOLOGY [facility] [place]. Start time: 5:18 PM End time: 5:34 PM.    Dermatology Problem List:  1. Hidradenitis suppuritiva   - Diagnosed in 2016 and did not respond to antibiotics.   - Seen by Dr. Bird and underwent 9 procedures   - s/p  mg BID - started 9/19, now discontinued   - Dapsone 200 mg daily, increased from 100mg daily on 7/1/21 (started 11/21/19)   - CBC in 2 weeks and 4 weeks     2. Acute onset hand foot psoriasiform dermatitis with joint pain, suspected reactive arthritis, now improving and almost resolved with minimal foot involvement   -  mg BID - started 9/19, now discontinued   - Lidex ointment at night    - DermaSmooth  3. Atopic dermatitis   - Triamcinolone 0.1% cream BID   - s/p Betamethasone 0.05% ointment BID  4. Vitiligo   - no treatment    ____________________________________________    Assessment & Plan:   1) HS  - Increase dapsone to 200mg daily, counseled on medication compliance  - If flaring take clindamycin/rifampin 300mg/300mg BID for 2 weeks  - CBC in 2 weeks and 4 weeks  - Will discuss with Dr. Maldonado for surgical management     2) Psoriasis/Atopic dermatitis   - Continue Triamcinolone cream for body  - Derma-smoothe and lidex on scalp  - s/p betamethasone 0.05% for recalcitrant areas    3) Vitiligo   - No treatment     Procedures Performed:    None    Follow-up: 1 month(s) in-person, or earlier for new or changing lesions    Staff and Resident:      Staff Physician Comments:   I saw and  evaluated the patient with the resident and I agree with the assessment and plan.  I was present for the evaluation.    Apolinar Ruiz MD, FAAD    Departments of Internal Medicine and Dermatology  [facility]  [phone]        ____________________________________________    CC: Derm Problem (bernard is having this visit today for a follow up on HS, states that he has a flare in the groin area that is very painful)    HPI:  Mr. Bernard Padilla is a(n) 35 year old male who presents today for follow-up  for HS. The patient was last seen in dermatologyon 02/11/2021 at which time his HS was well controlled on dapsone 100 mg daily.  For treatment of psoriasis/atopic dermatitis he was recommended to continue TMC cream, betamethasone diporpironate ointment for recalcitrant areas, and Derma-smoothe and lidex on scalp.     Patient states today he has been having increasing number of flares since last visit, approximately 5. Flares occurring in groin and gluteal areas, 4 active lesions. Recently restarted on dapsone 100mg daily, was off it for 1 month due to loss in family and traveling and noticed increase in symptoms when off. Continues to take clindamycin/rifampin for flares, last taken 1 week ago for a 7 day course. He was admitted to [facility] for perineal abscess s/p I&D which he states does not work for him and prefers excisions for treatment. He has chronic pain and previously followed with pain clinic however discharged d/t UDS results and has been taking ibuprofen and Tylenol without improvement. No fevers or chills. Psoriasis improved with topicals as described above, no other joint pains. Not interested in surgery under local at this time.    Dapsone Question List:  Any new weakness in your muscles: No  Any new shortness or breath: No    HS Nurse Assessment    Nurse Assessment Data 2/10/2021 2/11/2021 7/1/2021   Over the past month, about how many recurrent or new boils have  you had? - 3 5   Over the past week, how many dressing changes do you do each day? - 0 3+   Over the past week, has your wound drainage been: - Mild Severe   Rate your HS overall from 0-10 (0 = no disease, 10 = worst) over the past week:  - 0 10   Rate your pain score from 0-10 (0 = no disease, 10 = worst) for the most painful/symptomatic lesion in the past week:  - 9 10 - Worst Pain   Over the past week, how much has HS influenced your quality of life? - extremely extremely   Total DLQI Score 16 (very large effect on patient's life) - -       Physical Exam:  Telephone visit, no exam performed.  Per report, patient able to walk on heels and toes.       Medications:  Current Outpatient Medications   Medication     acetaminophen (TYLENOL) 325 MG tablet     albuterol (PROAIR HFA/PROVENTIL HFA/VENTOLIN HFA) 108 (90 Base) MCG/ACT inhaler     clindamycin (CLEOCIN) 300 MG capsule     dapsone (ACZONE) 100 MG tablet     fluocinolone acetonide (DERMA-SMOOTHE/FS BODY) 0.01 % external oil     Fluocinolone Acetonide Scalp (DERMA-SMOOTHE/FS SCALP) 0.01 % OIL oil     fluocinonide (LIDEX) 0.05 % external solution     fluticasone (FLONASE) 50 MCG/ACT nasal spray     IBUPROFEN PO     loratadine (CLARITIN) 10 MG tablet     oxyCODONE IR (ROXICODONE) 10 MG tablet     rifampin (RIFADIN) 300 MG capsule     triamcinolone (KENALOG) 0.1 % external cream     amitriptyline (ELAVIL) 25 MG tablet     meloxicam (MOBIC) 15 MG tablet     No current facility-administered medications for this visit.       Past Medical/Surgical History:   Patient Active Problem List   Diagnosis     Hidradenitis suppurativa     Tobacco use disorder     Patellofemoral arthritis of left knee     Eczema     Allergic rhinitis     Depression     Influenza     Knee pain, left     Other chronic pain     Scrotal abscess     Abscess of groin, left     Past Medical History:   Diagnosis Date     Arthritis of knee      Boil      Chronic pain      Eczema      Hidradenitis  suppurativa        CC Referred Self, MD  No address on file on close of this encounter.      HS [id number]-dr-[id number] Checklist      Follow up: In-person  4 weeks    Labs today? NO    Imaging needed? NO    Referrals placed? NO    Infusion Start? NO    Infusion change/dose increase NO    Biologics change? NO    Wound care supplies? (print orders for faxing) NO    Reach out to outside providers? YES    Photos and photo consent done? NO

## 2021-07-03 NOTE — ADDENDUM NOTE
Addendum Note by Jennifer Morgan at 5/22/2020  9:20 AM     Author: Jennifer Morgan Service: -- Author Type: --    Filed: 5/28/2020  9:55 AM Date of Service: 5/22/2020  9:20 AM Status: Signed    : Jennifer Morgan    Encounter addended by: Jennifer Morgan on: 5/28/2020  9:55 AM      Actions taken: Charge Capture section accepted

## 2021-07-03 NOTE — ADDENDUM NOTE
Addendum Note by Jhoana Obrien CMA at 9/23/2019 10:40 AM     Author: Jhoana Obrien CMA Service: -- Author Type: Certified Medical Assistant    Filed: 9/23/2019 12:39 PM Encounter Date: 9/23/2019 Status: Signed    : Jhoana Obrien CMA (Certified Medical Assistant)    Addended by: JHOANA OBRIEN on: 9/23/2019 12:39 PM        Modules accepted: Orders

## 2021-07-03 NOTE — ADDENDUM NOTE
Addendum Note by Chai Foster MD at 8/16/2019  9:40 AM     Author: Chai Foster MD Service: -- Author Type: Physician    Filed: 8/23/2019  5:18 PM Date of Service: 8/16/2019  9:40 AM Status: Signed    : Chai Foster MD (Physician)    Encounter addended by: Chai Foster MD on: 8/23/2019  5:18 PM      Actions taken: Pharmacy for encounter modified, Diagnosis association   updated, Order list changed

## 2021-07-03 NOTE — ADDENDUM NOTE
Addendum Note by Eulogio Roldan MD at 1/8/2021  4:44 PM     Author: Eulogio Roldan MD Service: -- Author Type: Physician    Filed: 1/8/2021  4:44 PM Encounter Date: 1/8/2021 Status: Signed    : Eulogio Roldan MD (Physician)    Addended by: EULOGIO ROLDAN on: 1/8/2021 04:44 PM        Modules accepted: Orders

## 2021-07-03 NOTE — ADDENDUM NOTE
Addendum Note by Chai Foster MD at 8/14/2020 10:40 AM     Author: Chai Foster MD Service: -- Author Type: Physician    Filed: 8/19/2020  4:25 PM Date of Service: 8/14/2020 10:40 AM Status: Signed    : Chai Foster MD (Physician)    Encounter addended by: Chai Foster MD on: 8/19/2020  4:25 PM      Actions taken: Clinical Note Signed

## 2021-07-03 NOTE — ADDENDUM NOTE
Addendum Note by Florida Webb MD at 12/7/2020  2:50 PM     Author: Florida Webb MD Service: -- Author Type: Physician    Filed: 12/8/2020  4:09 PM Encounter Date: 12/7/2020 Status: Signed    : Florida Webb MD (Physician)    Addended by: [place] WEBB on: 12/8/2020 04:09 PM        Modules accepted: Orders

## 2021-07-03 NOTE — ADDENDUM NOTE
Addendum Note by Reanna Macias LPN at 1/8/2021  4:37 PM     Author: Reanna Macias LPN Service: -- Author Type: Licensed Nurse    Filed: 1/8/2021  4:37 PM Encounter Date: 1/8/2021 Status: Signed    : Reanna Macias LPN (Licensed Nurse)    Addended by: REANNA MACIAS on: 1/8/2021 04:37 PM        Modules accepted: Orders

## 2021-07-03 NOTE — ADDENDUM NOTE
Addendum Note by Jennifer Morgan at 8/14/2020 10:40 AM     Author: Jennifer Morgan Service: -- Author Type: --    Filed: 8/20/2020 11:29 AM Date of Service: 8/14/2020 10:40 AM Status: Signed    : Jennifer Morgan    Encounter addended by: Jennifer Morgan on: 8/20/2020 11:29 AM      Actions taken: Charge Capture section accepted

## 2021-07-03 NOTE — ADDENDUM NOTE
Addendum Note by Lakeshia Keyes RN at 7/14/2020  9:29 AM     Author: Lakeshia Keyes RN Service: -- Author Type: Registered Nurse    Filed: 7/14/2020  9:29 AM Encounter Date: 7/13/2020 Status: Signed    : Lakeshia Keyes RN (Registered Nurse)    Addended by: LAKESHIA KEYES on: 7/14/2020 09:29 AM        Modules accepted: Orders

## 2021-07-03 NOTE — ADDENDUM NOTE
Addendum Note by Odalys Borges MD at 7/15/2020  5:06 PM     Author: Odalys Borges MD Service: -- Author Type: Physician    Filed: 7/15/2020  5:06 PM Encounter Date: 7/13/2020 Status: Signed    : Odalys Borges MD (Physician)    Addended by: ODALYS BORGES on: 7/15/2020 05:06 PM        Modules accepted: Orders

## 2021-07-04 NOTE — LETTER
Letter by Lillian Webb MD at      Author: Lillian Webb MD Service: -- Author Type: --    Filed:  Encounter Date: 6/9/2021 Status: (Other)       Tripp Wagner,                   I am writing you this letter to make sure you are aware of Dr. Webb's departure from [facility]  As of April 14th 2021. So we have cancelled your appt for November 4th 2021 with Dr. Webb you were still on her schedule, please call and re-establish care with another physician at your convenience. Thank You Angela

## 2021-07-04 NOTE — PROGRESS NOTES
"Progress Notes by Himanshu Villagran MD at 6/30/2021  3:00 PM     Author: Himanshu Villagran MD Service: -- Author Type: Physician    Filed: 7/2/2021 10:48 AM Encounter Date: 6/30/2021 Status: Signed    : Himanshu Villagran MD (Physician)       OFFICE VISIT - FAMILY MEDICINE     ASSESSMENT AND PLAN     1. Screen for STD (sexually transmitted disease)  HIV Antigen/Antibody Screening [place]    Syphilis Screen, [facility]    Hepatitis B Surface Antigen (HBsAG)    Hepatitis C Antibody (Anti-HCV)    Chlamydia trachomatis by PCR    Neisseria gonorrhoeae by PCR   2. Encounter for screening for human immunodeficiency virus (HIV)   HIV Antigen/Antibody Screening [place]   Patient is here to be screened for STDs, no symptoms, we've  discussed safe sex practices, we are doing some lab and urine testing, patient will be notified of the results when available.  Encouraged healthy lifestyle changes and make an appointment for a checkup down the road.    CHIEF COMPLAINT   STD Testing (ex-girlfriend is accusing him of stuff and he wants to clear himself. )    HPI   Bernard Padilla is a 35 y.o. male.  No Patient Care Coordination Note on file.  New patient to this clinic, previously seen by internal medicine clinic, patient is stating that he has a new girlfriend, before becoming sexually active, would like to be screened for STD, according to patient, new girlfriend STD testing has been negative.  Patient denies any symptoms of STDs.  No prior treatment for STDs.  Patient also mention chronic pain syndrome secondary to degenerative joint disease of the knee and back area, planning to follow-up with a new pain clinic.      Review of Systems As per HPI, otherwise negative.    OBJECTIVE   /74   Pulse (!) 102   Resp 17   Ht 5' 6\" (1.676 m)   Wt 185 lb (83.9 kg)   SpO2 97%   BMI 29.86 kg/m    Physical Exam   Constitutional: He appears well-developed and well-nourished.   Psychiatric: He has a normal " mood and affect. His behavior is normal. Judgment and thought content normal.       [facility]     Family History   Problem Relation Age of Onset   ? COPD Mother    ? Hypertension Mother    ? Diabetes Father    ? Coronary artery disease Father    ? Colon cancer Neg Hx    ? Prostate cancer Neg Hx      Social History     Socioeconomic History   ? Marital status: Single     Spouse name: Not on file   ? Number of children: 2   ? Years of education: Not on file   ? Highest education level: Not on file   Occupational History   ? Occupation: disability   Social Needs   ? Financial resource strain: Very hard   ? Food insecurity     Worry: Often true     Inability: Often true   ? Transportation needs     Medical: Yes     Non-medical: Yes   Tobacco Use   ? Smoking status: Current Every Day Smoker     Packs/day: 1.00     Years: 20.00     Pack years: 20.00     Types: Cigarettes   ? Smokeless tobacco: Never Used   Substance and Sexual Activity   ? Alcohol use: Yes     Frequency: Monthly or less   ? Drug use: Yes   ? Sexual activity: Yes     Partners: Female   Lifestyle   ? Physical activity     Days per week: 0 days     Minutes per session: 0 min   ? Stress: Very much   Relationships   ? Social connections     Talks on phone: More than three times a week     Gets together: More than three times a week     Attends [employer] service: Never     Active member of club or organization: No     Attends meetings of clubs or organizations: Never     Relationship status: Not on file   ? Intimate partner violence     Fear of current or ex partner: Not on file     Emotionally abused: Not on file     Physically abused: Not on file     Forced sexual activity: Not on file   Other Topics Concern   ? Not on file   Social History Narrative    ** Merged History Encounter **          Relevant history was reviewed with the patient today, unless noted in HPI, nothing is pertinent for this visit.  [facility]     Patient Active Problem List    Diagnosis Date Noted    ? Allergy, subsequent encounter 11/02/2020   ? Mild intermittent asthma without complication 11/02/2020   ? Vitamin D deficiency 11/02/2020   ? Hidradenitis 10/28/2019   ? Other chronic pain 09/10/2019   ? Depression 07/21/2009     Past Surgical History:   Procedure Laterality Date   ? KNEE SURGERY     ? SKIN SURGERY         RESULTS/CONSULTS (Lab/Rad)     Recent Results (from the past 168 hour(s))   HIV Antigen/Antibody Screening [place]   Result Value Ref Range    HIV Antigen / Antibody Negative Negative   Syphilis Screen, [facility]   Result Value Ref Range    Treponema Antibody (Syphilis) Negative Negative   Hepatitis B Surface Antigen (HBsAG)   Result Value Ref Range    Hepatitis B Surface Ag Negative Negative   Hepatitis C Antibody (Anti-HCV)   Result Value Ref Range    Hepatitis C Ab Negative Negative     No results found.  MEDICATIONS     Current Outpatient Medications on File Prior to Visit   Medication Sig Dispense Refill   ? acetaminophen (TYLENOL) 500 MG tablet Take 1,000 mg by mouth.     ? betamethasone, augmented, (DIPROLENE) 0.05 % ointment      ? budesonide (PULMICORT) 180 mcg/actuation inhaler Inhale 1 puff 2 (two) times a day. 3 Inhaler 3   ? cholecalciferol, vitamin D3, (VITAMIN D3) 50 mcg (2,000 unit) Tab Take 1 tablet (2,000 Units total) by mouth daily. 90 tablet 3   ? clindamycin (CLEOCIN) 300 MG capsule      ? clobetasoL (TEMOVATE) 0.05 % ointment Apply 1 application topically 2 (two) times a day. To hands and feet 60 g 1   ? dapsone 100 MG tablet Take 100 mg by mouth daily.      ? fluocinolone (DERMA-SMOOTHE) 0.01 % external oil      ? fluocinonide (LIDEX) 0.05 % external solution Apply topically 2 (two) times a day. (Patient taking differently: Apply topically 2 (two) times a day. scalp) 60 mL 1   ? fluticasone propionate (FLONASE) 50 mcg/actuation nasal spray Instill two sprays into each nostril once daily 16 g 3   ? fluticasone propionate (FLOVENT HFA) 110 mcg/actuation inhaler Inhale 2  puffs 2 (two) times a day. 3 Inhaler 3   ? loratadine (CLARITIN) 10 mg tablet Take 1 tablet (10 mg total) by mouth daily. 90 tablet 3   ? oxyCODONE-acetaminophen (PERCOCET/ENDOCET) 5-325 mg per tablet Take 1 tablet by mouth every 6 (six) hours as needed for pain. 4 tablet 0   ? rifAMPin (RIFADIN) 300 MG capsule Take 300 mg by mouth.     ? triamcinolone (KENALOG) 0.1 % cream      ? acetaminophen (TYLENOL) 325 MG tablet Take 650 mg by mouth every 4 (four) hours as needed.     ? albuterol (PROAIR HFA;PROVENTIL HFA;VENTOLIN HFA) 90 mcg/actuation inhaler Inhale 2 puffs every 4 (four) hours as needed for wheezing. 3 Inhaler 3   ? codeine-guaiFENesin (GUAIFENESIN AC)  mg/5 mL liquid Take 5 mL by mouth at bedtime as needed for cough. 120 mL 0   ? crutch Misc Crutches x 4 weeks     ? fluocinolone 0.01 % Oil Apply 1 application topically daily.     ? oxyCODONE (ROXICODONE) 5 MG immediate release tablet Take 1 tablet (5 mg total) by mouth every 8 (eight) hours as needed for pain. 13 tablet 0     No current facility-administered medications on file prior to visit.        HEALTH MAINTENANCE / SCREENING   PHQ-2 Total Score: 0 (6/22/2021  7:16 AM)  Depression Follow-up Plan: patient follow-up to return when and if necessary (11/2/2020  8:23 AM)  , PHQ-9 Total Score: 0 (6/22/2021  7:16 AM)  ,No data recorded  Immunization History   Administered Date(s) Administered   ? DTaP, historic 08/08/2016   ? Pneumo Polysac 23-V 11/02/2020   ? Tdap 08/08/2016     Health Maintenance   Topic   ? ASTHMA ACTION PLAN    ? COVID-19 Vaccine (1)   ? ADVANCE CARE PLANNING    ? INFLUENZA VACCINE RULE BASED (1)   ? MEDICARE ANNUAL WELLNESS VISIT    ? Asthma Control Test    ? TD 18+ HE    ? Pneumococcal Vaccine: Pediatrics (0 to 5 Years) and At-Risk Patients (6 to 64 Years) (2 of 2)   ? DEPRESSION ACTION PLAN    ? HEPATITIS C SCREENING    ? HIV SCREENING    ? TDAP ADULT ONE TIME DOSE    ? HEPATITIS B VACCINES      Review of external notes as  documented above     25 minutes spent on the date of the encounter doing chart review, review of outside records, review of test results, interpretation of tests, patient visit and documentation     Himanshu Villagran MD  [facility]     This note was dictated using a voice recognition software.  Any grammatical or context distortion are unintentional and inherent to the software.

## 2021-07-06 ENCOUNTER — COMMUNICATION - HEALTHEAST (OUTPATIENT)
Dept: FAMILY MEDICINE | Facility: CLINIC | Age: 36
End: 2021-07-06

## 2021-07-06 VITALS
DIASTOLIC BLOOD PRESSURE: 74 MMHG | BODY MASS INDEX: 29.73 KG/M2 | WEIGHT: 185 LBS | HEART RATE: 102 BPM | SYSTOLIC BLOOD PRESSURE: 122 MMHG | OXYGEN SATURATION: 97 % | RESPIRATION RATE: 17 BRPM | HEIGHT: 66 IN

## 2021-07-06 NOTE — TELEPHONE ENCOUNTER
Telephone Encounter by Harlan Drake MA at 7/6/2021 10:40 AM     Author: Harlan Drake MA Service: -- Author Type: Medical Assistant    Filed: 7/6/2021 10:41 AM Encounter Date: 7/6/2021 Status: Addendum    : Harlan Drake MA (Medical Assistant)    Related Notes: Original Note by Harlan Drake MA (Medical Assistant) filed at 7/6/2021 10:40 AM       Provider response below relayed to patient. Message understood.  ----- Message from Himanshu Villagran MD sent at 7/2/2021  1:06 PM CDT -----  HIV, hepatitis B and C, syphilis, gonorrhea and chlamydia test were all negative.

## 2021-07-12 ENCOUNTER — PATIENT OUTREACH (OUTPATIENT)
Dept: CARE COORDINATION | Facility: CLINIC | Age: 36
End: 2021-07-12

## 2021-07-12 ASSESSMENT — ACTIVITIES OF DAILY LIVING (ADL): DEPENDENT_IADLS:: CLEANING;COOKING;LAUNDRY

## 2021-07-12 NOTE — PROGRESS NOTES
Clinic Care Coordination Contact    Follow Up Progress Note      Assessment: Talked with Osman from [facility] and he will email form to writer to pass onto PCP. He is aware that patient may be without PCP at this time.  The form does not have to be signed by patient, but PCP signature is needed.  Called patient to discuss lack of PCP.  He lost SW CC phone number.  Provided.  He is working with his dermatologist, Dr. Ruiz who offered to help him locate a different pain clinic.  Patient doesn't remember if he called [facility] pain clinic as that was when he was waiting to get into [facility] pain clinic. Provided him with the number to pain clinic to call.  He has been clear that he does not want to take ibuprofen and wants opioids.  He will not misuse them.     He went to [facility] and met with Dr. Alvarado for STD testing. He likes Dr. Alvarado and thinks he would be willing to be his primary care physician.  He has tried to call clinic to set up appointment without success.  Informed that a different care coordination team would follow him and he remembers CCSW from previous encounters when he was at [facility].     Goals addressed this encounter:   Goals Addressed                    This Visit's Progress       Patient Stated       Financial Wellbeing (pt-stated)   50%      2.Goal Statement: I will have affordable housing for myself and my two daughters within one year.   Date Goal set: 9-1-2020   Barriers: have criminal records that make it difficult to find housing.  Living in a one bedroom with three people.     Strengths: Is motivated and willing to work with housing access services.   Date to Achieve By: 9-1-2021   Patient expressed understanding of goal: yes   Action steps to achieve this goal:   1. I will work with [facility] housing access services to create a plan to find affordable housing.   2. I will complete applications.   3. I will continue to pay rent on current apartment.   4. I will update  CHW with any questions or progress.     Discussed 2/24/21, 3-3-2021 referral to [facility], 8-9 week wait   Discussed 3/30/21, 4-21 5-25 has not heard from [facility].7-15 needs to have form completed by PCP to access services         Other       Healthy Coping   40%      1. Goal Statement: I will establish with new PCP and new pain clinic within 3 months.   Date Goal set: 4-   Barriers: frustrating trying to find new pain clinic.   Strengths: has insurance.   Date to Achieve By: 7- updated to 12-  Patient expressed understanding of goal: yes   Action steps to achieve this goal:   1. I will set up establish care with new PCP after getting recommendation from LORAINE ROSAS.   2. I will set up appointment with new pain clinic when identified.   3. I will report progress towards this goal at scheduled outreach telephone calls from the [facility] team.   5-25 met with new pain clinic on 5-20   7-15 didn't like [facility] pain clinic, working with dermatologist to find new one. Gave number to [facility] pain clinic.             Intervention/Education provided during outreach: Will assist with professional statement of need.     Outreach Frequency: monthly    Plan:   Will route to PCP pool to see if Dr. Alvarado is willing to have patient on his panel.    Care Coordinator will follow up in 1 week.  Social Raeann Akins  [facility]  [phone]      Clinic Care Coordination Contact  Care Team Conversations  Message from Osman Balbuena with [facility] [place], . Patient has not been able to have his PCP complete the professional statement of need to receive services.  Osman asked if LORAINE ROSAS can help get it done.  Called Osman and left  asking for fax number of where to send after completion.   Plan- after getting fax number, will ask PCP to complete.  Social Raeann Akins  [facility]  [phone]          Care Coordination Clinician Chart Review  Situation: Patient chart reviewed by care  coordinator.       Background: Care Coordination initial assessment and enrollment to Care Coordination was 9-1-2020.   Patient centered goals were developed with participation from patient.  RN CC handed patient off to CHW for continued outreach every 30 days.        Assessment: Per chart review, patient outreach completed by CC CHW on July 7th leaving message.  Patient is actively working to accomplish goal.  Patient's goal remains appropriate and relevant at this time.   Patient is due for updated Complex Care Plan.  Annual assessment will be due 9-1-2021.      Goals    None             Plan/Recommendations: The patient will continue working with Care Coordination to achieve goal as above.  CHW will involve SW CC as needed or if patient is ready to move to maintenance.  SW CC will continue to monitor progress to goals and CHW outreaches every 6 weeks.   Care Plan updated and mailed to patient: Yes  Eden Livingston,   [facility]  [phone]

## 2021-07-15 ASSESSMENT — ACTIVITIES OF DAILY LIVING (ADL): DEPENDENT_IADLS:: CLEANING;COOKING;LAUNDRY

## 2021-07-23 ENCOUNTER — PATIENT OUTREACH (OUTPATIENT)
Dept: CARE COORDINATION | Facility: CLINIC | Age: 36
End: 2021-07-23

## 2021-07-23 NOTE — PROGRESS NOTES
Patient spoke with CCC SW on 7/12/21 and discussed goals     CHW delegations: follow up in 1 month    Next outreach due: 8/12/21

## 2021-07-26 ENCOUNTER — PATIENT OUTREACH (OUTPATIENT)
Dept: NURSING | Facility: CLINIC | Age: 36
End: 2021-07-26

## 2021-07-26 NOTE — PROGRESS NOTES
Clinic Care Coordination Contact    Situation: Patient chart reviewed by care coordinator.    Assessment: Did chart review and patient has appt with Dr. Villagran on 8-9. Sent email to Osman from [facility] with that information.     Plan/Recommendations: If patient establishes with [facility] provider, will transfer care coordination support to appropriate team.  Will do chart review at 8-9 to see outcome of PCP appointment.     Eden Livingston,   [facility]  [phone]          Clinic Care Coordination Contact    Follow Up Progress Note      Assessment: Email from Osman WALLACE at [facility] asking if patient has set up appt with new PCP.  Writer did not get response from Dr. Villagran's team about him accepting patient.  Called scheduling and Dr. Villagran is open for new patients.  Called patient and he will call to schedule establish care visit with Dr. Villagran and have him complete the form while he is there.      Goals addressed this encounter:   Goals Addressed                    This Visit's Progress       Patient Stated       Financial Wellbeing (pt-stated)         2.Goal Statement: I will have affordable housing for myself and my two daughters within one year.   Date Goal set: 9-1-2020   Barriers: have criminal records that make it difficult to find housing.  Living in a one bedroom with three people.     Strengths: Is motivated and willing to work with housing access services.   Date to Achieve By: 9-1-2021   Patient expressed understanding of goal: yes   Action steps to achieve this goal:   1. I will work with [facility] housing access services to create a plan to find affordable housing.   2. I will complete applications.   3. I will continue to pay rent on current apartment.   4. I will update CHW with any questions or progress.     Discussed 2/24/21, 3-3-2021 referral to [facility], 8-9 week wait   Discussed 3/30/21, 4-21 5-25 has not heard from [facility].7-15 needs to have form completed by PCP to access services, 7-26          Other       Healthy Coping         1. Goal Statement: I will establish with new PCP and new pain clinic within 3 months.   Date Goal set: 4-   Barriers: frustrating trying to find new pain clinic.   Strengths: has insurance.   Date to Achieve By: 7- updated to 12-  Patient expressed understanding of goal: yes   Action steps to achieve this goal:   1. I will set up establish care with new PCP after getting recommendation from [facility].   2. I will set up appointment with new pain clinic when identified.   3. I will report progress towards this goal at scheduled outreach telephone calls from the [facility] team.   5-25 met with new pain clinic on 5-20   7-15 didn't like [facility] pain clinic, working with dermatologist to find new one. Gave number to [facility] pain clinic. 7-26 discussed             Intervention/Education provided during outreach: NA     Outreach Frequency: monthly    Plan:   Do chart review in one week to see if patient has set up PCP appt.   Eden Livingston,   [facility]  [phone]

## 2021-08-03 DIAGNOSIS — L40.9 PSORIASIS: ICD-10-CM

## 2021-08-05 RX ORDER — BETAMETHASONE DIPROPIONATE 0.5 MG/G
OINTMENT, AUGMENTED TOPICAL 2 TIMES DAILY
Qty: 50 G | Refills: 3 | OUTPATIENT
Start: 2021-08-05

## 2021-08-09 ENCOUNTER — OFFICE VISIT (OUTPATIENT)
Dept: FAMILY MEDICINE | Facility: CLINIC | Age: 36
End: 2021-08-09
Payer: MEDICARE

## 2021-08-09 ENCOUNTER — MEDICAL CORRESPONDENCE (OUTPATIENT)
Dept: HEALTH INFORMATION MANAGEMENT | Facility: CLINIC | Age: 36
End: 2021-08-09

## 2021-08-09 VITALS
DIASTOLIC BLOOD PRESSURE: 74 MMHG | BODY MASS INDEX: 28.64 KG/M2 | SYSTOLIC BLOOD PRESSURE: 107 MMHG | TEMPERATURE: 98.5 F | RESPIRATION RATE: 18 BRPM | HEART RATE: 72 BPM | WEIGHT: 189 LBS | HEIGHT: 68 IN

## 2021-08-09 DIAGNOSIS — G89.29 OTHER CHRONIC PAIN: ICD-10-CM

## 2021-08-09 DIAGNOSIS — M54.50 CHRONIC LOW BACK PAIN, UNSPECIFIED BACK PAIN LATERALITY, UNSPECIFIED WHETHER SCIATICA PRESENT: ICD-10-CM

## 2021-08-09 DIAGNOSIS — G89.29 CHRONIC LOW BACK PAIN, UNSPECIFIED BACK PAIN LATERALITY, UNSPECIFIED WHETHER SCIATICA PRESENT: ICD-10-CM

## 2021-08-09 DIAGNOSIS — L73.2 HIDRADENITIS SUPPURATIVA: Primary | ICD-10-CM

## 2021-08-09 PROCEDURE — 99213 OFFICE O/P EST LOW 20 MIN: CPT | Performed by: FAMILY MEDICINE

## 2021-08-09 ASSESSMENT — MIFFLIN-ST. JEOR: SCORE: 1758.86

## 2021-08-09 NOTE — PROGRESS NOTES
"OFFICE VISIT - FAMILY MEDICINE     ASSESSMENT AND PLAN       ICD-10-CM    1. Hidradenitis suppurativa  L73.2 Pain Management Referral   2. Other chronic pain  G89.29 Pain Management Referral   3. Chronic low back pain, unspecified back pain laterality, unspecified whether sciatica present  M54.5     G89.29     Chronic hidradenitis suppurativa, currently followed by dermatologist, patient has had \"18 surgeries\" from groin to axillary area., currently on dapsone, clindamycin etc.,  Chronic pain syndrome secondary to hidradenitis suppurativa, low back pain, knee pain, seen by Dr. Foster in the past, he is asking for a new referral to discuss Suboxone and/or   low-dose narcotics.    CHIEF COMPLAINT   [unfilled]    Hospitals in [place]   Bernard Padilla is a 36 year old male.  Controlled Substance Agreement form signed on 5/20/21 with Dr. Sanchez, Provider at [facility].  Last UDS collected 5/20/21- MAGALI AYON    He is here to help complete a housing application form, he does have a PCA lives with 2 daughters, Current house is too small, looking for a new one.  Currently working with a  . He does have chronic pain syndrome, chronic hidradenitis suppurativa with \"18 surgeries\" from groin to axillary regions.Chronic lower back pain, posttraumatic stress disorder when he was living back in [facility].  Has been followed by dermatologist is currently on dapsone in order treatment.  He stated that he has seen Dr. Foster in the past for chronic pain management, and the plan was to do Suboxone with a low-dose of narcotic, he is asking  For a referral to be placed to meet with him to  Go over that option..    Review of Systems As per HPI, otherwise negative.    OBJECTIVE   /74 (BP Location: Left arm, Patient Position: Sitting, Cuff Size: Adult Large)   Pulse 72   Temp 98.5  F (36.9  C) (Temporal)   Resp 18   Ht 1.715 m (5' 7.5\")   Wt 85.7 kg (189 lb)   BMI 29.16 kg/m    Physical " Exam  Constitutional:       Appearance: Normal appearance.   HENT:      Head: Normocephalic and atraumatic.   Cardiovascular:      Rate and Rhythm: Normal rate and regular rhythm.   Pulmonary:      Effort: Pulmonary effort is normal.      Breath sounds: Normal breath sounds.   Musculoskeletal:      Cervical back: Normal range of motion and neck supple.   Neurological:      General: No focal deficit present.      Mental Status: He is alert and oriented to person, place, and time.   Psychiatric:         Behavior: Behavior normal.         Thought Content: Thought content normal.         Judgment: Judgment normal.         PFSH     Family History   Problem Relation Age of Onset     Hypertension Mother      Diabetes Father      Cancer No family hx of      Coronary Artery Disease No family hx of      Heart Disease No family hx of      Anesthesia Reaction No family hx of      Deep Vein Thrombosis No family hx of      Chronic Obstructive Pulmonary Disease Mother      Coronary Artery Disease Father      Colon Cancer No family hx of      Prostate Cancer No family hx of      Social History     Socioeconomic History     Marital status: Single     Spouse name: Not on file     Number of children: 2     Years of education: Not on file     Highest education level: Not on file   Occupational History     Not on file   Tobacco Use     Smoking status: Current Every Day Smoker     Packs/day: 1.00     Years: 20.00     Pack years: 20.00     Types: Cigarettes, Cigarettes     Smokeless tobacco: Never Used     Tobacco comment: 1/2 pack of day, down from 1.5 pk/day   Substance and Sexual Activity     Alcohol use: Yes     Comment: Beer occasionally      Drug use: Yes     Types: Marijuana     Comment: Occasional     Sexual activity: Yes     Partners: Female   Other Topics Concern     Parent/sibling w/ CABG, MI or angioplasty before 65F 55M? Not Asked   Social History Narrative    ** Merged History Encounter **       Social Determinants of Health      Financial Resource Strain:      Difficulty of Paying Living Expenses:    Food Insecurity:      Worried About Running Out of Food in the Last Year:      Ran Out of Food in the Last Year:    Transportation Needs:      Lack of Transportation (Medical):      Lack of Transportation (Non-Medical):    Physical Activity:      Days of Exercise per Week:      Minutes of Exercise per Session:    Stress:      Feeling of Stress :    Social Connections:      Frequency of Communication with Friends and Family:      Frequency of Social Gatherings with Friends and Family:      Attends Sikhism Services:      Active Member of Clubs or Organizations:      Attends Club or Organization Meetings:      Marital Status:    Intimate Partner Violence:      Fear of Current or Ex-Partner:      Emotionally Abused:      Physically Abused:      Sexually Abused:        PMSH   [unfilled]  Past Surgical History:   Procedure Laterality Date     EXCISE HIDRADENITIS (LOCATION) Bilateral 9/25/2020    Procedure: EXCISION, HIDRADENITIS - right medial thigh, left groin and posterior scrotum.;  Surgeon: Maura Maldonado MD;  Location: [facility] OR     IRRIGATION AND DEBRIDEMENT RECTUM, COMBINED N/A 11/14/2019    Procedure: IRRIGATION AND DEBRIDEMENT, RECTUM;  Surgeon: Yon Kenny MD;  Location: [facility] OR     KNEE SURGERY       ORTHOPEDIC SURGERY      meniscus repair     SKIN SURGERY         RESULTS/CONSULTS (Lab/Rad)   No results found for this or any previous visit (from the past 168 hour(s)).  [unfilled]    HEALTH MAINTENANCE / SCREENING   [unfilled], [unfilled],[unfilled]  Immunization History   Administered Date(s) Administered     DTaP, Unspecified 08/08/2016     TDAP Vaccine (Boostrix) 08/08/2016     Health Maintenance   Topic     ANNUAL REVIEW OF HM ORDERS      ADVANCE CARE PLANNING      COPD ACTION PLAN      DEPRESSION ACTION PLAN      COVID-19 Vaccine (1)     INFLUENZA VACCINE (1)     MEDICARE ANNUAL WELLNESS  VISIT      PHQ-9      URINE DRUG SCREEN      LIPID      DTAP/TDAP/TD IMMUNIZATION (2 - Td or Tdap)     Pneumococcal Vaccine: Pediatrics (0 to 5 Years) and At-Risk Patients (6 to 64 Years) (2 of 2 - PPSV23)     SPIROMETRY      HEPATITIS C SCREENING      HIV SCREENING      IPV IMMUNIZATION      MENINGITIS IMMUNIZATION      HEPATITIS B IMMUNIZATION      Review of external notes as documented elsewhere in note  25 minutes spent on the date of the encounter doing chart review, review of outside records, review of test results, interpretation of tests, patient visit and documentation          Himanshu Villagran MD  Family Medicine, [facility] [place]   This transcription uses voice recognition software, which may contain typographical errors.

## 2021-08-10 ENCOUNTER — PATIENT OUTREACH (OUTPATIENT)
Dept: CARE COORDINATION | Facility: CLINIC | Age: 36
End: 2021-08-10

## 2021-08-10 NOTE — PROGRESS NOTES
Contact   Chart Review     Situation: Patient chart reviewed by .    Background: patient stopped in at [facility] and brought the paperwork for [employer] [place] for housing assistance which was faxed to Osman at [facility].      Assessment: Patient is going to use Dr. Villagran at [facility].  Did warm handoff to that CC team.  Patient is aware of the switch in team members.      Plan/Recommendations: no further outreach by this CC team.     Eden Livingston,   [facility]  [phone]

## 2021-08-12 ENCOUNTER — PATIENT OUTREACH (OUTPATIENT)
Dept: CARE COORDINATION | Facility: CLINIC | Age: 36
End: 2021-08-12

## 2021-08-12 NOTE — PROGRESS NOTES
8/12/2021  Clinic Care Coordination Contact  Community Health Worker Follow Up    Care Gaps:     Goals:   Goals Addressed as of 8/12/2021 at 5:33 PM                    Today    7/12/21       Financial Wellbeing (pt-stated)   50%  50%    Added 7/15/21 by Eden Livingston LSW      2.Goal Statement: I will have affordable housing for myself and my two daughters within one year.   Date Goal set: 9-1-2020   Barriers: have criminal records that make it difficult to find housing.  Living in a one bedroom with three people.     Strengths: Is motivated and willing to work with housing access services.   Date to Achieve By: 9-1-2021   Patient expressed understanding of goal: yes   Action steps to achieve this goal:   1. I will continue to work with [facility] housing access services to create a plan to find affordable housing.   2. I will continue to pay rent on current apartment.   4. I will update CC SW on 8-18-21 at 9am  with any questions or progress.     Updated: 8-12-21 AL        Discussed 2/24/21, 3-3-2021 referral to [facility], 8-9 week wait   Discussed 3/30/21, 4-21 5-25 has not heard from [facility].7-15 needs to have form completed by PCP to access services, 7-26         Healthy Coping   50%  40%    Added 7/14/21 by Eden Livingston LSW      1. Goal Statement: I will establish with new PCP and new pain clinic within 3 months.   Date Goal set: 4-   Barriers: frustrating trying to find new pain clinic.   Strengths: has insurance.   Date to Achieve By: 7- updated to 12-  Patient expressed understanding of goal: yes   Action steps to achieve this goal:   1. I will talk to CC SW on 8-18-21 at 9am for support and recommendations.   2. I will set up appointment with new pain clinic when identified.   3. I will report progress towards this goal at scheduled outreach telephone calls from the CCC team.     Updated: 8-12-21 AL      5-25 met with new pain clinic on 5-20   7-15 didn't like [facility] pain clinic, working with  dermatologist to find new one. Gave number to [facility] pain clinic. 7-26 discussed            Intervention and Education during outreach:  Patient reported:  -completed the application and turned paperwork to [facility]     Scheduled appt with RALPH BARON for reassessment  8-18-21 at 9am      CHW Follow up: Monthly    CHW Plan: Follow up on goals    CHW Next Follow Up: 9-21-21

## 2021-08-16 ENCOUNTER — TELEPHONE (OUTPATIENT)
Dept: DERMATOLOGY | Facility: CLINIC | Age: 36
End: 2021-08-16

## 2021-08-16 DIAGNOSIS — L40.9 PSORIASIS: ICD-10-CM

## 2021-08-18 ENCOUNTER — PATIENT OUTREACH (OUTPATIENT)
Dept: NURSING | Facility: CLINIC | Age: 36
End: 2021-08-18

## 2021-08-18 SDOH — ECONOMIC STABILITY: FOOD INSECURITY: WITHIN THE PAST 12 MONTHS, YOU WORRIED THAT YOUR FOOD WOULD RUN OUT BEFORE YOU GOT MONEY TO BUY MORE.: NEVER TRUE

## 2021-08-18 SDOH — ECONOMIC STABILITY: FOOD INSECURITY: WITHIN THE PAST 12 MONTHS, THE FOOD YOU BOUGHT JUST DIDN'T LAST AND YOU DIDN'T HAVE MONEY TO GET MORE.: NEVER TRUE

## 2021-08-18 SDOH — ECONOMIC STABILITY: TRANSPORTATION INSECURITY
IN THE PAST 12 MONTHS, HAS LACK OF TRANSPORTATION KEPT YOU FROM MEETINGS, WORK, OR FROM GETTING THINGS NEEDED FOR DAILY LIVING?: NO

## 2021-08-18 SDOH — HEALTH STABILITY: PHYSICAL HEALTH: ON AVERAGE, HOW MANY MINUTES DO YOU ENGAGE IN EXERCISE AT THIS LEVEL?: 0 MIN

## 2021-08-18 SDOH — ECONOMIC STABILITY: INCOME INSECURITY: IN THE LAST 12 MONTHS, WAS THERE A TIME WHEN YOU WERE NOT ABLE TO PAY THE MORTGAGE OR RENT ON TIME?: NO

## 2021-08-18 SDOH — ECONOMIC STABILITY: TRANSPORTATION INSECURITY
IN THE PAST 12 MONTHS, HAS THE LACK OF TRANSPORTATION KEPT YOU FROM MEDICAL APPOINTMENTS OR FROM GETTING MEDICATIONS?: NO

## 2021-08-18 SDOH — HEALTH STABILITY: PHYSICAL HEALTH: ON AVERAGE, HOW MANY DAYS PER WEEK DO YOU ENGAGE IN MODERATE TO STRENUOUS EXERCISE (LIKE A BRISK WALK)?: 0 DAYS

## 2021-08-18 ASSESSMENT — LIFESTYLE VARIABLES
HOW OFTEN DO YOU HAVE A DRINK CONTAINING ALCOHOL: MONTHLY OR LESS
HOW MANY STANDARD DRINKS CONTAINING ALCOHOL DO YOU HAVE ON A TYPICAL DAY: 1 OR 2
HOW OFTEN DO YOU HAVE SIX OR MORE DRINKS ON ONE OCCASION: NEVER

## 2021-08-18 ASSESSMENT — SOCIAL DETERMINANTS OF HEALTH (SDOH)
ARE YOU MARRIED, WIDOWED, DIVORCED, SEPARATED, NEVER MARRIED, OR LIVING WITH A PARTNER?: NEVER MARRIED
WITHIN THE LAST YEAR, HAVE YOU BEEN AFRAID OF YOUR PARTNER OR EX-PARTNER?: NO
HOW HARD IS IT FOR YOU TO PAY FOR THE VERY BASICS LIKE FOOD, HOUSING, MEDICAL CARE, AND HEATING?: NOT HARD AT ALL
HOW OFTEN DO YOU ATTEND CHURCH OR RELIGIOUS SERVICES?: MORE THAN 4 TIMES PER YEAR
WITHIN THE LAST YEAR, HAVE YOU BEEN HUMILIATED OR EMOTIONALLY ABUSED IN OTHER WAYS BY YOUR PARTNER OR EX-PARTNER?: NO
HOW OFTEN DO YOU GET TOGETHER WITH FRIENDS OR RELATIVES?: MORE THAN THREE TIMES A WEEK
WITHIN THE LAST YEAR, HAVE YOU BEEN KICKED, HIT, SLAPPED, OR OTHERWISE PHYSICALLY HURT BY YOUR PARTNER OR EX-PARTNER?: NO
DO YOU BELONG TO ANY CLUBS OR ORGANIZATIONS SUCH AS CHURCH GROUPS UNIONS, FRATERNAL OR ATHLETIC GROUPS, OR SCHOOL GROUPS?: YES
HOW OFTEN DO YOU ATTENT MEETINGS OF THE CLUB OR ORGANIZATION YOU BELONG TO?: MORE THAN 4 TIMES PER YEAR
IN A TYPICAL WEEK, HOW MANY TIMES DO YOU TALK ON THE PHONE WITH FAMILY, FRIENDS, OR NEIGHBORS?: MORE THAN THREE TIMES A WEEK
WITHIN THE LAST YEAR, HAVE TO BEEN RAPED OR FORCED TO HAVE ANY KIND OF SEXUAL ACTIVITY BY YOUR PARTNER OR EX-PARTNER?: NO

## 2021-08-18 ASSESSMENT — ACTIVITIES OF DAILY LIVING (ADL): DEPENDENT_IADLS:: CLEANING;COOKING;LAUNDRY

## 2021-08-18 NOTE — PROGRESS NOTES
Clinic Care Coordination Contact  CCC LORAINE contacted Bernard by phone to complete an updated assessment for continued enrollment in [facility].  Bernard transferred to [facility] CCC team.    He reported that he has connected with someone at [facility] and they are helping him with Housing Stabilization.     He hasn't heard form the pain clinic, LORAINE confirmed referral was entered. LORAINE will send message.       Clinic Care Coordination Contact  OUTREACH    Referral Information:  Referral Source: PCP    Primary Diagnosis: Behavioral Health    Chief Complaint   Patient presents with     Clinic Care Coordination - Initial        Universal Utilization:   Clinic Utilization  Difficulty keeping appointments:: No  Compliance Concerns: No  No-Show Concerns: No  No PCP office visit in Past Year: No  Utilization    Hospital Admissions  3             ED Visits  0             No Show Count (past year)  2                Current as of: 8/16/2021  3:46 PM              Clinical Concerns:  Current Medical Concerns:  In pain    Current Behavioral Concerns: None reported    Education Provided to patient: Role of CCC   Pain  Pain (GOAL):: Yes  Type: Chronic (>3mo)  Health Maintenance Reviewed: Not assessed  Clinical Pathway: None    Medication Management:  Medication review status: Medications reviewed and no changes reported per patient.             Functional Status:  Dependent ADLs:: Independent, Ambulation-no assistive device  Dependent IADLs:: Cleaning, Cooking, Laundry  Bed or wheelchair confined:: No  Mobility Status: Independent  Fallen 2 or more times in the past year?: No  Any fall with injury in the past year?: No    Living Situation:  Current living arrangement:: I live in a private home with family (two daughters)  Type of residence:: Apartment    Lifestyle & Psychosocial Needs:    Social Determinants of Health     Tobacco Use: High Risk     Smoking Tobacco Use: Current Every Day Smoker     Smokeless Tobacco Use: Never Used   Alcohol Use: Not At  Risk     Frequency of Alcohol Consumption: Monthly or less     Average Number of Drinks: 1 or 2     Frequency of Binge Drinking: Never   Financial Resource Strain: Low Risk      Difficulty of Paying Living Expenses: Not hard at all   Food Insecurity: No Food Insecurity     Worried About Running Out of Food in the Last Year: Never true     Ran Out of Food in the Last Year: Never true   Transportation Needs: No Transportation Needs     Lack of Transportation (Medical): No     Lack of Transportation (Non-Medical): No   Physical Activity: Inactive     Days of Exercise per Week: 0 days     Minutes of Exercise per Session: 0 min   Stress: Stress Concern Present     Feeling of Stress : To some extent   Social Connections: Moderately Integrated     Frequency of Communication with Friends and Family: More than three times a week     Frequency of Social Gatherings with Friends and Family: More than three times a week     Attends Islam Services: More than 4 times per year     Active Member of Clubs or Organizations: Yes     Attends Club or Organization Meetings: More than 4 times per year     Marital Status: Never    Intimate Partner Violence: Not At Risk     Fear of Current or Ex-Partner: No     Emotionally Abused: No     Physically Abused: No     Sexually Abused: No   Depression: Not at risk     PHQ-2 Score: 0   Housing Stability: Low Risk      Unable to Pay for Housing in the Last Year: No     Number of Places Lived in the Last Year: 1     Unstable Housing in the Last Year: No     Diet:: Regular  Inadequate nutrition (GOAL):: No  Tube Feeding: No  Inadequate activity/exercise (GOAL):: No  Significant changes in sleep pattern (GOAL): No  Transportation means:: Regular car     Islam or spiritual beliefs that impact treatment:: No  Mental health DX:: Yes  Mental health DX how managed:: None  Mental health management concern (GOAL):: No  Informal Support system:: Family, Friends             Resources and  Interventions:  Current Resources:      Community Resources: [facility] Programs  Supplies used at home:: None  Equipment Currently Used at Home: none  Employment Status: disabled         Advance Care Plan/Directive  Advanced Care Plans/Directives on file:: No  Advanced Care Plan/Directive Status: Declined Further Information    Referrals Placed: Community Resources ([facility][note 2], [facility], [facility] pain clinic)     Goals:   Goals        General     Financial Wellbeing (pt-stated)      Notes - Note edited  8/18/2021  9:44 AM by Jerry Smith LICSW     2.Goal Statement: I will have affordable housing for myself and my two daughters within one year.   Date Goal set: 9-1-2020   Barriers: have criminal records that make it difficult to find housing.  Living in a one bedroom with three people.     Strengths: Is motivated and willing to work with housing access services.   Date to Achieve By: 9-1-2021   Patient expressed understanding of goal: yes   Action steps to achieve this goal:   1. I will continue to work with [facility] housing access services to create a plan to find affordable housing.   2. I will continue to pay rent on current apartment.               Discussed 2/24/21, 3-3-2021 referral to [facility], 8-9 week wait   Discussed 3/30/21, 4-21 5-25 has not heard from [facility].7-15 needs to have form completed by PCP to access services, 7-26       Healthy Coping      Notes - Note edited  8/18/2021  9:44 AM by Jerry Smith, LICSW     1. Goal Statement: I will establish with new PCP and new pain clinic within 3 months.   Date Goal set: 4-   Barriers: frustrating trying to find new pain clinic.   Strengths: has insurance.   Date to Achieve By: 7- updated to 12-  Patient expressed understanding of goal: yes   Action steps to achieve this goal:   1. I will wait to hear from Pain clinic for scheduling  2. I will update [facility] team        5-25 met with new pain clinic on 5-20   7-15 didn't like [facility] pain  clinic, working with dermatologist to find new one. Gave number to [facility] pain clinic. 7-26 discussed            Patient/Caregiver understanding: Reported understanding    Outreach Frequency: monthly      Plan: Standard Outreach

## 2021-08-18 NOTE — LETTER
[facility]  Patient Centered Plan of Care  About Me:        Patient Name:  Bernard Padilla    YOB: 1985  Age:         36 year old   Alpesh MRN:    [id number] Telephone Information:  Home Phone [phone]   Mobile [phone]       Address:  [address] [place] [address] Email address:  [email or web address]      Emergency Contact(s)    Name Relationship Lgl Grd Work Phone Home Phone Mobile Phone   1. DINO URIAS Sister   [phone]   2. DINO URIAS Other   [phone]            Primary language:  English     needed? No   [employer]:  [phone] op. 1  Other communication barriers: None  Preferred Method of Communication:     Current living arrangement: I live in a private home with family (two daughters)  Mobility Status/ Medical Equipment: Independent    Health Maintenance  Health Maintenance Reviewed: Not assessed    My Access Plan  Medical Emergency 911   Primary Clinic Line [facility] - [phone]   24 Hour Appointment Line [phone] or  [phone]) (toll-free)   24 Hour Nurse Line [phone] (toll-free)   Preferred Urgent Care [facility], [phone]   [facility]  [phone]   Preferred Pharmacy [facility] [place] - [address] [place] [address]     Behavioral Health Crisis Line The National Suicide Prevention Lifeline at 1-289.731.4521 or 911             My Care Team Members  Patient Care Team       Relationship Specialty Notifications Start End    Himanshu Villagran MD PCP - General Family Medicine Admissions 8/9/21     Phone: [phone] Fax: [phone]         [address] [place] [address]    Apolinar Ruiz MD MD Dermatology  9/11/20     Phone: [phone] Fax: [phone]         [facility] [address]    Apolinar Ruiz MD Assigned Surgical Provider    10/23/20     Phone: [phone] Fax: [phone]         [address] [facility] [address]    Griselda Mosquera PA-C Assigned Heart and Vascular Provider   3/17/21     Phone: [phone] Fax: [phone]         [facility], SURGERY [address] [facility] [address]    Joe Henderson MD Assigned Pulmonology Provider   7/16/21     Phone: [phone] Fax: [phone] [address] [facility]  [facility] [address]    Parag Travis MD Assigned PCP   7/16/21     Phone: [phone] Fax: [phone]         [facility] [address] [facility] [address]    Hudson Mccarthy Community Health Worker Primary Care - CC Admissions 8/12/21     Phone: [phone] Fax: [phone]         [address] [place] [address]    Jerry Smith LICSW Lead Care Coordinator Primary Care - CC Admissions 8/18/21             My Care Plans  Self Management and Treatment Plan  Goals and (Comments)  Goals        General     Financial Wellbeing (pt-stated)      Notes - Note edited  8/18/2021  9:44 AM by Jerry Smith LICSW     2.Goal Statement: I will have affordable housing for myself and my two daughters within one year.   Date Goal set: 9-1-2020   Barriers: have criminal records that make it difficult to find housing.  Living in a one bedroom with three people.     Strengths: Is motivated and willing to work with housing access services.   Date to Achieve By: 9-1-2021   Patient expressed understanding of goal: yes   Action steps to achieve this goal:   1. I will continue to work with [facility] housing access services to create a plan to find affordable housing.   2. I will continue to pay rent on current apartment.               Discussed 2/24/21, 3-3-2021 referral to [facility], 8-9 week wait   Discussed 3/30/21, 4-21 5-25 has not heard from [facility].7-15 needs to have form completed by PCP to access services, 7-26       Healthy Coping      Notes - Note edited  8/18/2021  9:44 AM by  Jerry Smith, [facility]     1. Goal Statement: I will establish with new PCP and new pain clinic within 3 months.   Date Goal set: 4-   Barriers: frustrating trying to find new pain clinic.   Strengths: has insurance.   Date to Achieve By: 7- updated to 12-  Patient expressed understanding of goal: yes   Action steps to achieve this goal:   1. I will wait to hear from Pain clinic for scheduling  2. I will update [facility] team        5-25 met with new pain clinic on 5-20   7-15 didn't like [facility] pain clinic, working with dermatologist to find new one. Gave number to [facility] pain clinic. 7-26 discussed             Action Plans on File:                       Advance Care Plans/Directives Type:        My Medical and Care Information  Problem List   Patient Active Problem List   Diagnosis     Hidradenitis suppurativa     Tobacco use disorder     Patellofemoral arthritis of left knee     Eczema     Allergic rhinitis     Depression     Influenza     Knee pain, left     Other chronic pain     Scrotal abscess     Abscess of groin, left      Current Medications and Allergies:  See printed Medication Report.    Care Coordination Start Date: 9/1/2020   Frequency of Care Coordination: monthly   Form Last Updated: 08/18/2021

## 2021-08-19 RX ORDER — BETAMETHASONE DIPROPIONATE 0.5 MG/G
OINTMENT, AUGMENTED TOPICAL 2 TIMES DAILY
Qty: 50 G | Refills: 3 | Status: SHIPPED | OUTPATIENT
Start: 2021-08-19 | End: 2022-05-13

## 2021-08-25 ENCOUNTER — TELEPHONE (OUTPATIENT)
Dept: DERMATOLOGY | Facility: CLINIC | Age: 36
End: 2021-08-25

## 2021-08-25 NOTE — TELEPHONE ENCOUNTER
"Benesight message also sent with below info. See encounter from 8/25/21.    \"Apolinar Ruiz MD Devore, Kindra, CMA; [facility]  Please call and let him know I have tried to get in touch with Dr Foster, but he has been out on vacation. I will try to get in touch with him on Monday. Does he want me to refill clindamycin for the flare in addition to the increased dapsone?  Apolinar\"    Chaparrita Vidal CMA on 8/25/2021 at 2:34 PM    "

## 2021-08-25 NOTE — TELEPHONE ENCOUNTER
"\"Apolinar Ruiz MD Devore, Kindra, CMA  Need labs for   - Bernard Greere     Please call and remind these patients     Apolinar\"    Mychart message sent reminding patient. Scheduling info given.    Chaparrita Vidal CMA on 8/25/2021 at 1:54 PM  "

## 2021-09-21 ENCOUNTER — TELEPHONE (OUTPATIENT)
Dept: CARE COORDINATION | Facility: CLINIC | Age: 36
End: 2021-09-21

## 2021-09-21 NOTE — TELEPHONE ENCOUNTER
.9/21/2021  Patient stated he has been waiting for someone call to schedule appt to see Pain Clinic.  Not sure what happened to the order    Please call patient to advise

## 2021-09-23 ENCOUNTER — PATIENT OUTREACH (OUTPATIENT)
Dept: CARE COORDINATION | Facility: CLINIC | Age: 36
End: 2021-09-23

## 2021-09-23 NOTE — PROGRESS NOTES
Clinic Care Coordination Contact  Program: MA Status   County: [facility] Case #:  [facility] Worker:   Subscriber #:       MARIBEL Outreach:   9/23/2021: FRW spoke with patient regarding referral. He sent in MA renewal paperwork 1-2 weeks ago. He said he tried calling [facility] to check on the status but was on hold too long. The  he called said they didn't have any information for him (this might be someone outside of the [facility]). FRW advised that he call the [facility] again between 8-9am or later in the afternoon. That is the only way patient will be able to check on his case status. Patient is able to do this on his own. Patient will update his CHW at next outreach.       Health Insurance:  MA: Medical Assistance.  Cody Type DX: Disabled  Eligibility Begin Date: 05/01/2020  Eligibility End Date: 08/31/2021    Referral/Screening:  [facility] Benefits  Is patient requesting help applying for [facility] benefits?: Yes  MA closed  Have you recently applied for any [facility] benefits?: No  How many people in your household?: 3 lives with his 2 daughters   Do you buy/eat food together?: Yes  What is the monthly gross income for the household (wages, social security, workers comp, and pension)? : $1,200 a month     [place]:  [place] No  Is this an insurance renewal?: No  Is this a new insurance application request?: No     Any other information for the FRW?: MA was closed and inactive    Goals Addressed:

## 2021-09-24 ENCOUNTER — PATIENT OUTREACH (OUTPATIENT)
Dept: NURSING | Facility: CLINIC | Age: 36
End: 2021-09-24

## 2021-09-27 NOTE — PROGRESS NOTES
Clinic Care Coordination Contact  [facility]/Voicemail       Clinical Data: Care Coordinator Outreach  Outreach attempted x 1.  Left message on patient's voicemail with call back information and requested return call.  Plan: Community Health Worker to outreach per standard work and updated on goal progression    RN CC will review in 4-6 weeks to support ongoing recommendations and plan of care will be available sooner if needed.

## 2021-10-04 ENCOUNTER — PATIENT OUTREACH (OUTPATIENT)
Dept: CARE COORDINATION | Facility: CLINIC | Age: 36
End: 2021-10-04

## 2021-10-04 NOTE — PROGRESS NOTES
Clinic Care Coordination Contact    Situation: Patient chart reviewed by care coordinator.    Background: SW completed chart review    Assessment: Patient working with FRW. Had appointment with CCC RN but didn't answer. Trying to get reconnected to the pain clinic     Plan/Recommendations: Standard outreach

## 2021-10-09 ENCOUNTER — HEALTH MAINTENANCE LETTER (OUTPATIENT)
Age: 36
End: 2021-10-09

## 2021-10-26 ENCOUNTER — PATIENT OUTREACH (OUTPATIENT)
Dept: CARE COORDINATION | Facility: CLINIC | Age: 36
End: 2021-10-26

## 2021-10-26 ENCOUNTER — PATIENT OUTREACH (OUTPATIENT)
Dept: NURSING | Facility: CLINIC | Age: 36
End: 2021-10-26

## 2021-10-26 NOTE — PROGRESS NOTES
Clinic Care Coordination Contact  Program: MA Status   County: [facility] Case #:  [facility] Worker:   Subscriber #: [id number]      FRW Outreach:   10/26/2021: FR[place]. Registration updated. No further needs from FRW.   9/23/2021: FRW spoke with patient regarding referral. He sent in MA renewal paperwork 1-2 weeks ago. He said he tried calling [facility] to check on the status but was on hold too long. The  he called said they didn't have any information for him (this might be someone outside of the [facility]). FRW advised that he call the [facility] again between 8-9am or later in the afternoon. That is the only way patient will be able to check on his case status. Patient is able to do this on his own. Patient will update his CHW at next outreach.       Health Insurance:   MA: Medical Assistance.  Elig Type AA: Parent of a dependent child  Eligibility Begin Date: 09/01/2021  Eligibility End Date: --/--/----    Referral/Screening:  [facility] Benefits  Is patient requesting help applying for [facility] benefits?: Yes  MA closed  Have you recently applied for any [facility] benefits?: No  How many people in your household?: 3 lives with his 2 daughters   Do you buy/eat food together?: Yes  What is the monthly gross income for the household (wages, social security, workers comp, and pension)? : $1,200 a month     [place]:  [place] No  Is this an insurance renewal?: No  Is this a new insurance application request?: No     Any other information for the FRW?: MA was closed and inactive    Goals Addressed:

## 2021-10-26 NOTE — PROGRESS NOTES
10/26/2021  Clinic Care Coordination Contact    Community Health Worker Follow Up    Care Gaps:     Health Maintenance Due   Topic Date Due     ADVANCE CARE PLANNING  Never done     COPD ACTION PLAN  Never done     DEPRESSION ACTION PLAN  Never done     COVID-19 Vaccine (1) Never done     INFLUENZA VACCINE (1) 09/01/2021     MEDICARE ANNUAL WELLNESS VISIT  11/02/2021       Care Gaps Last addressed on 10-26-21 will discuss the doctor at next office visit    Goals:   Goals Addressed as of 10/26/2021 at 3:37 PM                    Today    9/21/21       Financial Wellbeing (pt-stated)   80%  70%    Added 7/15/21 by Eden Livingston LSW      2.Goal Statement: I would like support to connect and access to services to find affordable housing for myself and my two daughters within 4 months   Date Goal set: 9-1-2020 Updated: 9-21-21  Barriers: have criminal records that make it difficult to find housing.  Living in a one bedroom with three people.     Strengths: Is motivated and willing to work with housing access services.   Date to Achieve By: 1-2022  Patient expressed understanding of goal: yes   Action steps to achieve this goal:   1. I continue to work with [facility] for support with housing and currently waiting to accept the housing application  2. I will continue to pay rent on current apartment.     [id number] AL      Discussed 2/24/21, 3-3-2021 referral to [facility], 8-9 week wait   Discussed 3/30/21, 4-21 5-25 has not heard from [facility].7-15 needs to have form completed by PCP to access services, 7-26         Healthy Coping   10%  10%    Added 7/14/21 by Eden Livingston LSW      1. Goal Statement: I would like to establish care and support with new doctor at new pain clinic within 3 months.   Date Goal set: 4- Updated 9-21-21  Barriers: frustrating trying to find new pain clinic.   Strengths: has insurance.   Date to Achieve By:   Patient expressed understanding of goal: yes   Action steps to achieve this goal:    1.I am waiting to get a call from the Pain Clinic at [facility] in [place] to schedule appt   2.I will update CCC team at next follow up call    Updated: 10-26-21 AL       5-25 met with new pain clinic on 5-20   7-15 didn't like [facility] pain clinic, working with dermatologist to find new one. Gave number to [facility] pain clinic. 7-26 discussed            Intervention and Education during outreach:  Called and spoke to patient and follow up on goals.  Patient reported:  -MA is active now. Received call from someone yesterday.  -waiting for them to accept housing application still working with [facility].  -was told to go back to his old pain clinic at [facility] in [place].  Still waiting to get a call from pain clinic.  Does not have phone number.    CHW sent telephone message to speciality  regarding referral to pain clinic in [place].      CHW Follow up: Monthly    CHW Plan: Follow up on goals    CHW Next Follow Up: 11-29-21

## 2021-10-27 ENCOUNTER — TELEPHONE (OUTPATIENT)
Dept: CARE COORDINATION | Facility: CLINIC | Age: 36
End: 2021-10-27

## 2021-10-27 NOTE — PROGRESS NOTES
"10/27/2021  Clinic Care Coordination Contact  Care Team Conversations    Received message from Erin 10-27-21    \"Hi Hudson I called the [facility] [phone] spoke with Gloria she will give pt a call today  if you would let him know this would be great  Thank you\"        "

## 2021-10-27 NOTE — TELEPHONE ENCOUNTER
10/26/2021  Patient reported he still did not get a call to schedule appt to go to the pain clinic.  He does not have the number to call. He said he suppose to go see the pain doctor at [facility].     Please verify which pain clinic he suppose to go and provide contact number and address.

## 2021-10-27 NOTE — TELEPHONE ENCOUNTER
Tripp Treviño I called the [facility] [phone] spoke with Gloria she will give pt a call today  if you would let him know this would be great  Thank you

## 2021-11-03 ENCOUNTER — PATIENT OUTREACH (OUTPATIENT)
Dept: CARE COORDINATION | Facility: CLINIC | Age: 36
End: 2021-11-03

## 2021-11-03 NOTE — PROGRESS NOTES
Clinic Care Coordination Contact     Situation: Patient chart reviewed by care coordinator.     Background: RN CC review for status update      Assessment: Patient engaged with CHW and continue to work toward goal progression - follow up pending on pain clinic visit status   Pt has schedule visit with PCP      Plan/Recommendations:Community Health Worker to outreach per standard work and updated on goal progression   RN CC will outreach following PCP visit to support review of recommendations

## 2021-11-12 ENCOUNTER — TELEPHONE (OUTPATIENT)
Dept: DERMATOLOGY | Facility: CLINIC | Age: 36
End: 2021-11-12
Payer: MEDICARE

## 2021-11-12 NOTE — TELEPHONE ENCOUNTER
[facility] Call Center    Phone Message    May a detailed message be left on voicemail: yes     Reason for Call: Symptoms or Concerns     If patient has red-flag symptoms, warm transfer to triage line    Current symptom or concern:   HS occurring on face  Like ingrown hair but HS    Symptoms have been present for:    2-3 week(s)    Has patient previously been seen for this? Yes    By Dr Ruiz    Date: ongoing    Are there any new or worsening symptoms? Yes: as above      Action Taken: Message routed to:  [facility] Derm    Travel Screening: Not Applicable     Pt states he's been experiencing HS on his face over the last 2 weeks. Pt set up first available appt on 3/2022.    Please call Pt regarding symptoms, if there is Rx he can take, and possible sooner appt.     Thanks!

## 2021-11-17 DIAGNOSIS — L30.9 ECZEMA, UNSPECIFIED TYPE: ICD-10-CM

## 2021-11-17 DIAGNOSIS — L30.9 DERMATITIS: ICD-10-CM

## 2021-11-17 RX ORDER — FLUOCINOLONE ACETONIDE 0.11 MG/ML
OIL TOPICAL
Qty: 118.28 ML | Refills: 5 | Status: SHIPPED | OUTPATIENT
Start: 2021-11-17 | End: 2022-03-01

## 2021-11-17 RX ORDER — FLUOCINOLONE ACETONIDE 0.11 MG/ML
OIL TOPICAL 2 TIMES DAILY
Qty: 120 ML | Refills: 2 | Status: SHIPPED | OUTPATIENT
Start: 2021-11-17 | End: 2022-07-08

## 2021-11-17 NOTE — TELEPHONE ENCOUNTER
Writer called and spoke to patient.  He has several HS boils on his face under his beard that have appeared.  He could not name an exact number but states they are draining and painful.  He is out of his dapsone medication and indicates that his family member used the medication to attempt suicide, so he would need refills on this.  Writer also discussed the pain level with the patient which he indicates is 10/10 for his most painful boil.  Patient states he has a new boil in his left inner thigh that has drained and is very uncomfortable.  The appointment he is scheduled for With Dr Ruiz is the first available.  Patient verbalized understanding.  Advised patient we will call if something sooner comes open. Candie Shipley RN

## 2021-11-17 NOTE — TELEPHONE ENCOUNTER
Fluocinolone Acetonide Scalp (DERMA-SMOOTHE/FS SCALP) 0.01 % OIL oil  Last Written Prescription Date:  5/31/2021  Last Fill Quantity: 118.28,   # refills: 5  Last Office Visit : 7/1/2021  Future Office visit:  3/10/2022  118.25 mL, 5 Refills sent to pharm     fluocinolone acetonide (DERMA-SMOOTHE/FS BODY) 0.01 % external oil  Last Written Prescription Date:  5/31/2021  Last Fill Quantity:120,   # refills: 1  Last Office Visit : 7/1/2021  Future Office visit:  3/10/2022  120 mL, 2 Refills sent to pharm       Irina Izquierdo RN  Central Triage Red Flags/Med Refills

## 2021-11-18 ENCOUNTER — PATIENT OUTREACH (OUTPATIENT)
Dept: CARE COORDINATION | Facility: CLINIC | Age: 36
End: 2021-11-18
Payer: MEDICARE

## 2021-11-18 DIAGNOSIS — L73.2 HIDRADENITIS SUPPURATIVA: Primary | ICD-10-CM

## 2021-11-18 NOTE — PROGRESS NOTES
Clinic Care Coordination Contact    Situation: Patient chart reviewed by care coordinator.    Background: SW completed chart review    Assessment: Patient in contact with care team, MA is active again. Patient has upcoming appointment with CCC RN    Plan/Recommendations: Standard outreach

## 2021-11-18 NOTE — TELEPHONE ENCOUNTER
Pt is requesting refill and surgery referral. Does not want to come in for flare. Dr Ruiz will place lab orders. Pt understands a refill will be given after values come back.

## 2021-11-22 ENCOUNTER — PATIENT OUTREACH (OUTPATIENT)
Dept: NURSING | Facility: CLINIC | Age: 36
End: 2021-11-22
Payer: MEDICARE

## 2021-11-22 NOTE — PROGRESS NOTES
Clinic Care Coordination Contact    Follow Up Progress Note   RN CC outreach as scheduled  Pt reports that he was seen a regions yesterday and that he has a follow up with speciality provider on Wednesday. He did not give details, was tired. RN CC ended call to support pt rest.    RN CC ran external report - nothing noted will continue to monitor chart for updates    RN CC will outreach next week for status update and assess needs  Patient verbalized understanding   Engaged in [facility] communication during visit       Plan:   Patient will schedule and attend recommended follow up visits with speciality providers and primary care provider.    RN CC will outreach in 1 week to support ongoing recommendations and plan of care will be available sooner if needed.

## 2021-11-24 DIAGNOSIS — R06.2 WHEEZING: ICD-10-CM

## 2021-11-26 NOTE — TELEPHONE ENCOUNTER
"Last Written Prescription:      Last office visit provider: 8/9/21    Due to medication information not transferring due to [facility] please review the medication information prior to signing to ensure accuracy.    Requested Prescriptions   Pending Prescriptions Disp Refills     albuterol (PROAIR HFA/PROVENTIL HFA/VENTOLIN HFA) 108 (90 Base) MCG/ACT inhaler 8.5 g 3     Sig: Inhale 2 puffs into the lungs every 6 hours       Asthma Maintenance Inhalers - Anticholinergics Passed - 11/24/2021 12:15 PM        Passed - Patient is age 12 years or older        Passed - Recent (12 mo) or future (30 days) visit within the authorizing provider's specialty     Patient has had an office visit with the authorizing provider or a provider within the authorizing providers department within the previous 12 mos or has a future within next 30 days. See \"Patient Info\" tab in inbasket, or \"Choose Columns\" in Meds & Orders section of the refill encounter.              Passed - Medication is active on med list       Short-Acting Beta Agonist Inhalers Protocol  Passed - 11/24/2021 12:15 PM        Passed - Patient is age 12 or older        Passed - Recent (12 mo) or future (30 days) visit within the authorizing provider's specialty     Patient has had an office visit with the authorizing provider or a provider within the authorizing providers department within the previous 12 mos or has a future within next 30 days. See \"Patient Info\" tab in inbasket, or \"Choose Columns\" in Meds & Orders section of the refill encounter.              Passed - Medication is active on med list             Brooklyn Ayala RN 11/26/21 8:58 AM  "

## 2021-11-29 ENCOUNTER — PATIENT OUTREACH (OUTPATIENT)
Dept: CARE COORDINATION | Facility: CLINIC | Age: 36
End: 2021-11-29

## 2021-11-29 ENCOUNTER — PATIENT OUTREACH (OUTPATIENT)
Dept: NURSING | Facility: CLINIC | Age: 36
End: 2021-11-29
Payer: MEDICARE

## 2021-11-29 RX ORDER — ALBUTEROL SULFATE 90 UG/1
2 AEROSOL, METERED RESPIRATORY (INHALATION) EVERY 6 HOURS
Qty: 8.5 G | Refills: 3 | Status: SHIPPED | OUTPATIENT
Start: 2021-11-29 | End: 2022-03-23

## 2021-11-29 NOTE — PROGRESS NOTES
Clinic Care Coordination Contact    Follow Up Progress Note      Assessment: RN CC outreach and spoke with patient.    Bernard is schedule for visit with PCP next week and also with pain clinic    Pt reports that he is out of pain medication- will reach out to surgeon office to support review of options until schedule visits  RN RALPH offered to outreach following schedule visit and support plan of care recommendations.    Patient verbalized understanding  Engaged in [facility] communication during visit       Care Gaps:    Health Maintenance Due   Topic Date Due     ADVANCE CARE PLANNING  Never done     COPD ACTION PLAN  Never done     DEPRESSION ACTION PLAN  Never done     COVID-19 Vaccine (1) Never done     INFLUENZA VACCINE (1) 09/01/2021     MEDICARE ANNUAL WELLNESS VISIT  11/02/2021     PHQ-9  12/22/2021       Scheduled visit with PCP next week      Goals addressed this encounter:   Goals Addressed                    This Visit's Progress       Patient Stated       Medical (pt-stated)         Goal Statement: I will have a better understanding and plan of care  within 1-2months  Date Goal set: 11/29/21  Barriers: language, knowledge deficit   Strengths: family support  Date to Achieve By:  Patient expressed understanding of goal: yes  Action steps to achieve this goal:  1. I will schedule and attend visit with PCP for annual wellness exam  2. I will update Care coordination team during next outreach  3. I will report to my Community Health Worker if any additional resources or support needed.           Other       Healthy Coping   20%      1. Goal Statement: I would like to establish care and support with new doctor at new pain clinic within 3 months.   Date Goal set: 4- Updated 9-21-21  Barriers: frustrating trying to find new pain clinic.   Strengths: has insurance.   Date to Achieve By:   Patient expressed understanding of goal: yes   Action steps to achieve this goal:   1. I waiting to get a call from  the Pain Clinic at [facility] in [place] to schedule appt   2.I will update CCC team at next follow up call    11/29/21  Pt has visitw with pain clinic schedule for December            Intervention/Education provided during outreach: reviewed upcoming visit      Outreach Frequency: monthly    Plan: Patient will schedule and attend recommended follow up visits with speciality providers and primary care provider.    RN CC will outreach in 4 weeks to support ongoing recommendations and plan of care will be available sooner if needed.

## 2021-11-29 NOTE — PROGRESS NOTES
11/29/2021  Clinic Care Coordination Contact  Care Team Conversations    Review chart  Patient connected with CC RN today for follow up.  CC RN will follow up with patient on 12-16-21.  CHW deferred today's follow up to next month.      CHW Follow up: Monthly    CHW Plan: Follow up on goals    CHW Next Follow Up: 1-4-22

## 2021-12-05 ASSESSMENT — ACTIVITIES OF DAILY LIVING (ADL)
CURRENT_FUNCTION: BATHING REQUIRES ASSISTANCE
CURRENT_FUNCTION: HOUSEWORK REQUIRES ASSISTANCE
CURRENT_FUNCTION: SHOPPING REQUIRES ASSISTANCE
CURRENT_FUNCTION: LAUNDRY REQUIRES ASSISTANCE

## 2021-12-06 ENCOUNTER — NURSE TRIAGE (OUTPATIENT)
Dept: NURSING | Facility: CLINIC | Age: 36
End: 2021-12-06

## 2021-12-06 ENCOUNTER — OFFICE VISIT (OUTPATIENT)
Dept: FAMILY MEDICINE | Facility: CLINIC | Age: 36
End: 2021-12-06
Payer: MEDICARE

## 2021-12-06 VITALS
HEART RATE: 92 BPM | HEIGHT: 67 IN | WEIGHT: 194 LBS | TEMPERATURE: 98 F | DIASTOLIC BLOOD PRESSURE: 61 MMHG | RESPIRATION RATE: 18 BRPM | SYSTOLIC BLOOD PRESSURE: 105 MMHG | BODY MASS INDEX: 30.45 KG/M2

## 2021-12-06 DIAGNOSIS — Z13.21 SCREENING FOR ENDOCRINE, NUTRITIONAL, METABOLIC AND IMMUNITY DISORDER: ICD-10-CM

## 2021-12-06 DIAGNOSIS — Z13.29 SCREENING FOR ENDOCRINE, NUTRITIONAL, METABOLIC AND IMMUNITY DISORDER: ICD-10-CM

## 2021-12-06 DIAGNOSIS — M25.562 CHRONIC PAIN OF LEFT KNEE: ICD-10-CM

## 2021-12-06 DIAGNOSIS — Z00.00 VISIT FOR PREVENTIVE HEALTH EXAMINATION: Primary | ICD-10-CM

## 2021-12-06 DIAGNOSIS — Z13.0 SCREENING FOR ENDOCRINE, NUTRITIONAL, METABOLIC AND IMMUNITY DISORDER: ICD-10-CM

## 2021-12-06 DIAGNOSIS — Z13.228 SCREENING FOR ENDOCRINE, NUTRITIONAL, METABOLIC AND IMMUNITY DISORDER: ICD-10-CM

## 2021-12-06 DIAGNOSIS — H53.69 DIMINISHED NIGHT VISION: ICD-10-CM

## 2021-12-06 DIAGNOSIS — G89.29 CHRONIC PAIN OF LEFT KNEE: ICD-10-CM

## 2021-12-06 DIAGNOSIS — M75.81 RIGHT ROTATOR CUFF TENDINITIS: ICD-10-CM

## 2021-12-06 LAB
ALBUMIN SERPL-MCNC: 3.9 G/DL (ref 3.5–5)
ALP SERPL-CCNC: 108 U/L (ref 45–120)
ALT SERPL W P-5'-P-CCNC: 42 U/L (ref 0–45)
ANION GAP SERPL CALCULATED.3IONS-SCNC: 8 MMOL/L (ref 5–18)
AST SERPL W P-5'-P-CCNC: 27 U/L (ref 0–40)
BILIRUB SERPL-MCNC: 0.3 MG/DL (ref 0–1)
BUN SERPL-MCNC: 12 MG/DL (ref 8–22)
CALCIUM SERPL-MCNC: 9.3 MG/DL (ref 8.5–10.5)
CHLORIDE BLD-SCNC: 107 MMOL/L (ref 98–107)
CHOLEST SERPL-MCNC: 186 MG/DL
CO2 SERPL-SCNC: 24 MMOL/L (ref 22–31)
CREAT SERPL-MCNC: 0.87 MG/DL (ref 0.7–1.3)
ERYTHROCYTE [DISTWIDTH] IN BLOOD BY AUTOMATED COUNT: 12.3 % (ref 10–15)
FASTING STATUS PATIENT QL REPORTED: NO
GFR SERPL CREATININE-BSD FRML MDRD: >90 ML/MIN/1.73M2
GLUCOSE BLD-MCNC: 57 MG/DL (ref 70–125)
HCT VFR BLD AUTO: 42.9 % (ref 40–53)
HDLC SERPL-MCNC: 47 MG/DL
HGB BLD-MCNC: 14.4 G/DL (ref 13.3–17.7)
LDLC SERPL CALC-MCNC: 119 MG/DL
MCH RBC QN AUTO: 31.1 PG (ref 26.5–33)
MCHC RBC AUTO-ENTMCNC: 33.6 G/DL (ref 31.5–36.5)
MCV RBC AUTO: 93 FL (ref 78–100)
PLATELET # BLD AUTO: 224 10E3/UL (ref 150–450)
POTASSIUM BLD-SCNC: 4.2 MMOL/L (ref 3.5–5)
PROT SERPL-MCNC: 7.3 G/DL (ref 6–8)
RBC # BLD AUTO: 4.63 10E6/UL (ref 4.4–5.9)
SODIUM SERPL-SCNC: 139 MMOL/L (ref 136–145)
TRIGL SERPL-MCNC: 99 MG/DL
TSH SERPL DL<=0.005 MIU/L-ACNC: 1.51 UIU/ML (ref 0.3–5)
WBC # BLD AUTO: 8.4 10E3/UL (ref 4–11)

## 2021-12-06 PROCEDURE — 80061 LIPID PANEL: CPT | Performed by: FAMILY MEDICINE

## 2021-12-06 PROCEDURE — 99395 PREV VISIT EST AGE 18-39: CPT | Performed by: FAMILY MEDICINE

## 2021-12-06 PROCEDURE — 85027 COMPLETE CBC AUTOMATED: CPT | Performed by: FAMILY MEDICINE

## 2021-12-06 PROCEDURE — 36415 COLL VENOUS BLD VENIPUNCTURE: CPT | Performed by: FAMILY MEDICINE

## 2021-12-06 PROCEDURE — 84443 ASSAY THYROID STIM HORMONE: CPT | Performed by: FAMILY MEDICINE

## 2021-12-06 PROCEDURE — 80053 COMPREHEN METABOLIC PANEL: CPT | Performed by: FAMILY MEDICINE

## 2021-12-06 ASSESSMENT — MIFFLIN-ST. JEOR: SCORE: 1778.73

## 2021-12-06 ASSESSMENT — ACTIVITIES OF DAILY LIVING (ADL)
CURRENT_FUNCTION: HOUSEWORK REQUIRES ASSISTANCE
CURRENT_FUNCTION: BATHING REQUIRES ASSISTANCE
CURRENT_FUNCTION: SHOPPING REQUIRES ASSISTANCE
CURRENT_FUNCTION: LAUNDRY REQUIRES ASSISTANCE
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
## 2021-12-06 NOTE — PATIENT INSTRUCTIONS
Patient Education     Prevention Guidelines, Men Ages 18 to 39  Screening tests and vaccines are an important part of managing your health. A screening test is done to find possible disorders or diseases in people who don't have any symptoms. The goal is to find a disease early so lifestyle changes can be made and you can be watched more closely to reduce the risk of disease, or to detect it early enough to treat it most effectively. Screening tests are not used to diagnose. Instead, they are used to decide if more testing is needed. Health counseling is essential, too. Below are guidelines for these, for men ages 18 to 39. Talk with your healthcare provider to make sure you re up-to-date on what you need.   Screening Who needs it How often   Alcohol misuse All men in this age group At routine exams   Blood pressure All men in this age group Yearly checkup if your blood pressure is normal  Normal blood pressure is less than 120/80  If your blood pressure is higher than normal, follow the advice of your healthcare provider.    Diabetes mellitus, type 2 Adults who have no symptoms but are overweight or obese and have 1 or more other risk factors for diabetes  At least every 3 years (yearly if blood sugar has already started to rise)   Hepatitis C If at increased risk At routine exams   High cholesterol or triglycerides All men ages 35 and older, and younger men at high risk for coronary artery disease At least every 5 years   HIV All men At routine exams   Obesity All men in this age group At routine exams   Syphilis Men at increased risk for infection - talk with your healthcare provider At routine exams   Tuberculosis Men at increased risk for infection - talk with your healthcare provider Check with your healthcare provider   Vision All men in this age group Every 5 to 10 years if no risk factors for eye disease   Vaccines Who needs it How often   Chickenpox (varicella) All men in this age group who have no record  of this infection or vaccine 2 doses; the second dose should be given at least 4 weeks after the first dose   Hepatitis A Men at increased risk for infection - talk with your healthcare provider 2 doses given at least 6 months apart   Hepatitis B Men at increased risk for infection - talk with your healthcare provider 3 doses over 6 months; second dose should be given 1 month after the first dose; the third dose should be given at least 2 months after the second dose and at least 4 months after the first dose    Haemophilus influenzae Type B (HIB) Men at increased risk for infection - talk with your healthcare provider 1 to 3 doses   Human papillomavirus (HPV) All men in this age group up to age 26 3 doses; the second dose should be given 1 to 2 months after the first dose and the third dose given 6 months after the first dose    Influenza (flu) All men in this age group Once a year   Measles, mumps, rubella (MMR) All men in this age group who have no record of these infections or vaccines 1 or 2 doses through age 55   Meningococcal Men at increased risk for infection - talk with your healthcare provider 1 or more doses   Pneumococcal (PCV13) and Pneumococcal (PPSV23)  Men at increased risk for infection - talk with your healthcare provider PCV13: 1 dose ages 19 to 65 (protects against 13 types of pneumococcal bacteria)  PPSV23: 1 to 2 doses through age 64, or 1 dose at 65 or older (protects against 23 types of pneumococcal bacteria)    Tetanus/diphtheria/pertussis (Td/Tdap) booster All men in this age group A one-time Tdap booster after age 18, then Td every 10 years    Counseling Who needs it How often   Diet and exercise Overweight or obese people When diagnosed, and then at routine exams   Use of tobacco and the health effects it can cause All men in this age group Every visit   Sexually transmitted infection prevention Men who are sexually active At routine exams   Skin cancer All men in this age group.  At  routine exams. You may be reminded to avoid intentional tanning and tanning beds.    1Those who are 18 years of age, who are not up-to-date on their childhood immunizations, should get all appropriate catch-up vaccines recommended by the CDC.   Inder last reviewed this educational content on 4/1/2020 2000-2021 [employer]. All rights reserved. This information is not intended as a substitute for professional medical care. Always follow your healthcare professional's instructions.

## 2021-12-06 NOTE — PROGRESS NOTES
"Answers for HPI/ROS submitted by the patient on 12/5/2021  In general, how would you rate your overall physical health?: fair  Frequency of exercise:: None  Do you usually eat at least 4 servings of fruit and vegetables a day, include whole grains & fiber, and avoid regularly eating high fat or \"junk\" foods? : Yes  Taking medications regularly:: Yes  Medication side effects:: Not applicable  Activities of Daily Living: shopping requires assistance, housework requires assistance, bathing requires assistance, laundry requires assistance  Home safety: no safety concerns identified  Hearing Impairment:: difficulty following a conversation in a noisy restaurant or crowded room, need to ask people to speak up or repeat themselves, no hearing concerns  In the past 6 months, have you been bothered by leaking of urine?: No  In general, how would you rate your overall mental or emotional health?: fair  Additional concerns today:: No    OFFICE VISIT - FAMILY MEDICINE     ASSESSMENT AND PLAN   No diagnosis found.     CHIEF COMPLAINT   Physical       HPI   Bernard Padilla is a 36 year old male.  Controlled Substance Agreement form signed on 5/20/21 with Dr. Sanchez, Provider at [facility].  Last UDS collected 5/20/21- MAGALI AYON              Review of Systems As per HPI, otherwise negative.    OBJECTIVE   There were no vitals taken for this visit.  Physical Exam    PFSH     Family History   Problem Relation Age of Onset     Hypertension Mother      Diabetes Father      Cancer No family hx of      Coronary Artery Disease No family hx of      Heart Disease No family hx of      Anesthesia Reaction No family hx of      Deep Vein Thrombosis No family hx of      Chronic Obstructive Pulmonary Disease Mother      Coronary Artery Disease Father      Colon Cancer No family hx of      Prostate Cancer No family hx of      Social History     Socioeconomic History     Marital status: Single     Spouse name: Not on file     Number of " children: 2     Years of education: Not on file     Highest education level: Not on file   Occupational History     Not on file   Tobacco Use     Smoking status: Current Every Day Smoker     Packs/day: 1.00     Years: 20.00     Pack years: 20.00     Types: Cigarettes, Cigarettes     Smokeless tobacco: Never Used     Tobacco comment: 1/2 pack of day, down from 1.5 pk/day   Substance and Sexual Activity     Alcohol use: Yes     Comment: Beer occasionally/Social Use     Drug use: Yes     Types: Marijuana     Comment: Occasional     Sexual activity: Yes     Partners: Female   Other Topics Concern     Parent/sibling w/ CABG, MI or angioplasty before 65F 55M? Not Asked   Social History Narrative    ** Merged History Encounter **       Social Determinants of Health     Financial Resource Strain: Low Risk      Difficulty of Paying Living Expenses: Not hard at all   Food Insecurity: No Food Insecurity     Worried About Running Out of Food in the Last Year: Never true     Ran Out of Food in the Last Year: Never true   Transportation Needs: No Transportation Needs     Lack of Transportation (Medical): No     Lack of Transportation (Non-Medical): No   Physical Activity: Inactive     Days of Exercise per Week: 0 days     Minutes of Exercise per Session: 0 min   Stress: Stress Concern Present     Feeling of Stress : To some extent   Social Connections: Moderately Integrated     Frequency of Communication with Friends and Family: More than three times a week     Frequency of Social Gatherings with Friends and Family: More than three times a week     Attends [employer] Services: More than 4 times per year     Active Member of Clubs or Organizations: Yes     Attends Club or Organization Meetings: More than 4 times per year     Marital Status: Never    Intimate Partner Violence: Not At Risk     Fear of Current or Ex-Partner: No     Emotionally Abused: No     Physically Abused: No     Sexually Abused: No   Housing Stability: Low  Risk      Unable to Pay for Housing in the Last Year: No     Number of Places Lived in the Last Year: 1     Unstable Housing in the Last Year: No       [facility]   [unfilled]  Past Surgical History:   Procedure Laterality Date     EXCISE HIDRADENITIS (LOCATION) Bilateral 9/25/2020    Procedure: EXCISION, HIDRADENITIS - right medial thigh, left groin and posterior scrotum.;  Surgeon: Maura Maldonado MD;  Location: [facility] OR     IRRIGATION AND DEBRIDEMENT RECTUM, COMBINED N/A 11/14/2019    Procedure: IRRIGATION AND DEBRIDEMENT, RECTUM;  Surgeon: Yon Kenny MD;  Location: [facility] OR     KNEE SURGERY       ORTHOPEDIC SURGERY      meniscus repair     SKIN SURGERY         RESULTS/CONSULTS (Lab/Rad)   No results found for this or any previous visit (from the past 168 hour(s)).  XR Chest Port 1 View  Narrative: EXAM: XR CHEST 1 VIEW PORTABLE  LOCATION: [facility]  DATE/TIME: 7/20/2020 6:07 AM    INDICATION: Shortness of breath  COMPARISON: 03/01/2020  Impression: Negative chest.     [unfilled]    HEALTH MAINTENANCE / SCREENING   [unfilled], [unfilled],[unfilled]  Immunization History   Administered Date(s) Administered     DTaP, Unspecified 08/08/2016     TDAP Vaccine (Boostrix) 08/08/2016     Health Maintenance   Topic     ANNUAL REVIEW OF HM ORDERS      ADVANCE CARE PLANNING      COPD ACTION PLAN      DEPRESSION ACTION PLAN      COVID-19 Vaccine (1)     INFLUENZA VACCINE (1)     MEDICARE ANNUAL WELLNESS VISIT      PHQ-9      URINE DRUG SCREEN      LIPID      DTAP/TDAP/TD IMMUNIZATION (2 - Td or Tdap)     Pneumococcal Vaccine: Pediatrics (0 to 5 Years) and At-Risk Patients (6 to 64 Years) (2 of 2 - PPSV23)     SPIROMETRY      HEPATITIS C SCREENING      HIV SCREENING      IPV IMMUNIZATION      MENINGITIS IMMUNIZATION      HEPATITIS B IMMUNIZATION                 Kayleigh Love  [facility], [facility]   This transcription uses voice recognition software,  which may contain typographical errors.

## 2021-12-07 ENCOUNTER — TELEPHONE (OUTPATIENT)
Dept: FAMILY MEDICINE | Facility: CLINIC | Age: 36
End: 2021-12-07
Payer: MEDICARE

## 2021-12-07 NOTE — TELEPHONE ENCOUNTER
Panda from [facility] lab calling with critical lab at 21:25pm.    BGM blood sugar 57 drawn on 12-6-21 at 1:18pm.    RN called patient who said he hadn't eaten all day.   Reports eating now and and feeling fine and denies being diabetic.    Paged on-call provider to [phone] per protocol via smart web who called back and no further advisements at this time.    Renetta Dasilva RN  [facility]      Reason for Disposition    Lab or radiology calling with CRITICAL test results    Protocols used: PCP CALL - NO TRIAGE-A-

## 2021-12-07 NOTE — TELEPHONE ENCOUNTER
"RN spoke to pt regarding Dr. Villagran's message below. Pt verbalizes understanding, and indicates he \"didn't eat anything prior to visit yesterday, which explains why his BS was low.\" RN educated pt on eating frequent snack to reduct drop in BS. Pt verbalizes understanding, agree with plan and has no further questions.    KENNETH Davila, RN   [facility]    ----- Message from Himanshu Villagran MD sent at 12/7/2021 11:17 AM CST -----    Blood sugar was on the low side, make sure to eat frequent snack to minimize drop of blood sugar.    "

## 2021-12-08 ENCOUNTER — TRANSFERRED RECORDS (OUTPATIENT)
Dept: HEALTH INFORMATION MANAGEMENT | Facility: CLINIC | Age: 36
End: 2021-12-08
Payer: MEDICARE

## 2021-12-09 ENCOUNTER — HOSPITAL ENCOUNTER (OUTPATIENT)
Dept: PHYSICAL THERAPY | Facility: REHABILITATION | Age: 36
End: 2021-12-09
Attending: FAMILY MEDICINE
Payer: MEDICARE

## 2021-12-09 DIAGNOSIS — M75.81 RIGHT ROTATOR CUFF TENDINITIS: ICD-10-CM

## 2021-12-09 PROCEDURE — 97110 THERAPEUTIC EXERCISES: CPT | Mod: GP

## 2021-12-09 PROCEDURE — 97161 PT EVAL LOW COMPLEX 20 MIN: CPT | Mod: GP
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
## 2021-12-10 ENCOUNTER — OFFICE VISIT (OUTPATIENT)
Dept: PALLIATIVE MEDICINE | Facility: OTHER | Age: 36
End: 2021-12-10
Payer: MEDICARE

## 2021-12-10 VITALS
DIASTOLIC BLOOD PRESSURE: 71 MMHG | WEIGHT: 193.2 LBS | BODY MASS INDEX: 29.97 KG/M2 | SYSTOLIC BLOOD PRESSURE: 133 MMHG | HEART RATE: 71 BPM

## 2021-12-10 DIAGNOSIS — L73.2 HIDRADENITIS SUPPURATIVA: Primary | ICD-10-CM

## 2021-12-10 DIAGNOSIS — G89.4 CHRONIC PAIN SYNDROME: ICD-10-CM

## 2021-12-10 PROCEDURE — 80307 DRUG TEST PRSMV CHEM ANLYZR: CPT | Performed by: ANESTHESIOLOGY

## 2021-12-10 PROCEDURE — 80349 CANNABINOIDS NATURAL: CPT | Performed by: ANESTHESIOLOGY

## 2021-12-10 PROCEDURE — 99214 OFFICE O/P EST MOD 30 MIN: CPT | Performed by: ANESTHESIOLOGY

## 2021-12-10 PROCEDURE — G0463 HOSPITAL OUTPT CLINIC VISIT: HCPCS

## 2021-12-10 RX ORDER — BUPRENORPHINE AND NALOXONE 2; .5 MG/1; MG/1
FILM, SOLUBLE BUCCAL; SUBLINGUAL
Qty: 28 EACH | Refills: 1 | Status: SHIPPED | OUTPATIENT
Start: 2021-12-10 | End: 2022-04-27

## 2021-12-10 ASSESSMENT — PAIN SCALES - GENERAL: PAINLEVEL: EXTREME PAIN (9)

## 2021-12-10 NOTE — PATIENT INSTRUCTIONS
[facility] Number:  [phone]    Call with any questions about your care and for scheduling assistance.     Calls are returned Monday through Friday between 8 AM and 4:30 PM. We usually get back to you within 2 business days depending on the issue/request.    If we are prescribing your medications:    For opioid medication refills, call the clinic or send a Schedule Savvyt message 7 days in advance.  Please include:    Name of requested medication    Name of the pharmacy.    For non-opioid medications, call your pharmacy directly to request a refill. Please allow 3-4 days to be processed.     Per [place] State Law:    All controlled substance prescriptions must be filled within 30 days of being written.      For those controlled substances allowing refills, pickup must occur within 30 days of last fill.      We believe regular attendance is key to your success in our program!      Any time you are unable to keep your appointment we ask that you call us at least 24 hours in advance to cancel.This will allow us to offer the appointment time to another patient.     Multiple missed appointments may lead to dismissal from the clinic.      PLAN:  You will be enrolled in the [place] medical cannabis program.    Begin Suboxone 2 mg film under tongue twice a day, after 1 week call Dr. Foster's office with update.    Discussed the off label use of Amantadine to help with with weather related joint pain changes.  You may do some research and call if you would like to try that    Follow-up with Dr. Foster in 6 weeks
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
## 2021-12-10 NOTE — PROGRESS NOTES
Patient in clinic to see Dr. Foster.  Patient was a previous patient and now looking to re-establish.      PEG Score 12/10/2021   PEG Total Score 9.67

## 2021-12-11 LAB
BARBITURATES UR QL: NORMAL
CANNABINOIDS UR QL SCN: ABNORMAL
CREAT UR-MCNC: 179 MG/DL
CREAT UR-MCNC: 182 MG/DL
CREAT UR-MCNC: 182 MG/DL
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
## 2021-12-13 ENCOUNTER — TELEPHONE (OUTPATIENT)
Dept: PHYSICAL THERAPY | Facility: REHABILITATION | Age: 36
End: 2021-12-13
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
## 2021-12-16 ENCOUNTER — MYC MEDICAL ADVICE (OUTPATIENT)
Dept: PALLIATIVE MEDICINE | Facility: OTHER | Age: 36
End: 2021-12-16

## 2021-12-16 ENCOUNTER — PATIENT OUTREACH (OUTPATIENT)
Dept: NURSING | Facility: CLINIC | Age: 36
End: 2021-12-16
Payer: MEDICARE

## 2021-12-16 LAB
CANNABINOIDS UR CFM-MCNC: 1806 NG/ML
CARBOXYTHC/CREAT UR: 1009 NG/MG CREAT

## 2021-12-16 NOTE — PROGRESS NOTES
Clinic Care Coordination Contact    Follow Up Progress Note      Assessment: RN CC outreach and spoke with pt  Pt attended with PCP for preventative care  Recommended follow up with PT and eye exam  Pt has engaged with PT - missed one visit , reviewed next scheduled appt information    Referral placed for eye - new goal - will support scheduling if not completed at next outreach     Care Gaps:   Goals addressed this encounter:   Goals Addressed                    This Visit's Progress       Patient Stated       COMPLETED: Financial Wellbeing (pt-stated)         2.Goal Statement: I would like support to connect and access to services to find affordable housing for myself and my two daughters within 4 months   Date Goal set: 9-1-2020 Updated: 9-21-21  Barriers: have criminal records that make it difficult to find housing.  Living in a one bedroom with three people.     Strengths: Is motivated and willing to work with housing access services.   Date to Achieve By: 1-2022  Patient expressed understanding of goal: yes   Action steps to achieve this goal:   1. I continue to work with [facility] for support with housing and currently waiting to accept the housing application  2. I will continue to pay rent on current apartment.     [id number] AL      Discussed 2/24/21, 3-3-2021 referral to [facility], 8-9 week wait   Discussed 3/30/21, 4-21 5-25 has not heard from [facility].7-15 needs to have form completed by PCP to access services, 7-26         Medical (pt-stated)           Goal Statement- I would like to schedule and attend an evaluation and eye exam within 4-6 months   DateGoal set: 12/16/21      Barriers: access  Strengths: Patient engagement, provider identified   Date to Achieve By: February  Patient expressed understanding of goal: yes      Action stepsto achieve this goal  1.  I will review my insurance coverage for eye exam and outreach and schedule a visit  2.  I will attend visit and follow up with recommendations   3.  I  will report back to my CommunityHealth Worker if I need additional support or resources            Other       COMPLETED: Healthy Coping         1. Goal Statement: I would like to establish care and support with new doctor at new pain clinic within 3 months.   Date Goal set: 4- Updated 9-21-21  Barriers: frustrating trying to find new pain clinic.   Strengths: has insurance.   Date to Achieve By:   Patient expressed understanding of goal: yes   Action steps to achieve this goal:   1. I waiting to get a call from the [facility] at [facility] to schedule appt   2.I will update CCC team at next follow up call    11/29/21  Pt has visitw with pain clinic schedule for December                Plan: Patient will schedule and attend recommended follow up visits with speciality providers and primary care provider.  Appointments in Next Year    Dec 16, 2021 11:00 AM  Phone Visit with LAINE [facility] RN  [facility] ([facility] ) [phone]   Dec 31, 2021  9:30 AM  Ortho Treatment with Kizzy Ferreira PT  [facility] ([facility]  ) [phone]   Jan 07, 2022  9:30 AM  Ortho Treatment with Georgiana Gonzalez PT  [facility] ([facility]  ) [phone]   Jan 14, 2022  9:30 AM  Ortho Treatment with Kizzy Ferreira PT  [facility] ([facility]  ) [phone]   Jan 21, 2022  9:30 AM  Ortho Treatment with Georgiana Gonzalez PT  [facility] ([facility]  ) [phone]   Jan 28, 2022  9:30 AM  Ortho Treatment with Kizzy Ferreira PT  [facility] ([facility]  ) [phone]   Mar 10, 2022  1:45 PM  (Arrive by 1:30 PM)  RETURN HIDRADENITIS SUPPURA with Apolinar Ruiz MD  [facility]  [facility] ) [phone]        Community Health Worker to outreach per standard work and updated on goal progression    RN CC will review in 4-6 weeks to support ongoing recommendations and plan of care will be available sooner if needed.

## 2021-12-17 ENCOUNTER — TRANSFERRED RECORDS (OUTPATIENT)
Dept: HEALTH INFORMATION MANAGEMENT | Facility: CLINIC | Age: 36
End: 2021-12-17
Payer: MEDICARE

## 2021-12-17 LAB
ETHANOL UR QL CFM: NEGATIVE
ETHYL GLUCURONIDE UR QL SCN: NOT DETECTED NG/MG CREAT
ETHYL SULFATE/CREAT UR: NOT DETECTED NG/MG CREAT
LEVEL OF DETECTION (ETHANOL): NORMAL

## 2021-12-17 NOTE — TELEPHONE ENCOUNTER
RN spoke to patient and informed patient that per Dr. Foster he can increase the Suboxone to TID instead of BID then please give us an update in about 1 week.  Patient agrees to this plan, denies questions at this time and confirmed understanding.

## 2021-12-22 ENCOUNTER — TRANSFERRED RECORDS (OUTPATIENT)
Dept: HEALTH INFORMATION MANAGEMENT | Facility: CLINIC | Age: 36
End: 2021-12-22
Payer: MEDICARE

## 2021-12-22 DIAGNOSIS — G89.29 OTHER CHRONIC PAIN: Primary | ICD-10-CM

## 2021-12-22 RX ORDER — BUPRENORPHINE AND NALOXONE 4; 1 MG/1; MG/1
1 FILM, SOLUBLE BUCCAL; SUBLINGUAL 3 TIMES DAILY
Qty: 21 EACH | Refills: 1 | Status: SHIPPED | OUTPATIENT
Start: 2021-12-22 | End: 2022-04-27

## 2021-12-22 NOTE — PROGRESS NOTES
See patient's message, increasing Suboxone to 2 mg 3 times a day has not been helpful for pain, no side effects.  Will change to 4 mg 3 times a day for a trial

## 2021-12-28 ENCOUNTER — TELEPHONE (OUTPATIENT)
Dept: PALLIATIVE MEDICINE | Facility: OTHER | Age: 36
End: 2021-12-28
Payer: MEDICARE

## 2021-12-28 NOTE — TELEPHONE ENCOUNTER
Patient calls, request to review the suboxone prescription.  Patient reports increasing the daily dosing last week however he is reporting that even with the increase in dosing he is still not able to carry out his tasks of daily living and is asking for further review.

## 2021-12-29 ENCOUNTER — PATIENT OUTREACH (OUTPATIENT)
Dept: CARE COORDINATION | Facility: CLINIC | Age: 36
End: 2021-12-29
Payer: MEDICARE

## 2021-12-29 NOTE — PROGRESS NOTES
Clinic Care Coordination Contact    Situation: Patient chart reviewed by care coordinator.    Background: SW completed chart review    Assessment: CCC RN has been in contact with patient, patient also in contact with care team    Plan/Recommendations: Standard outreach

## 2022-01-04 ENCOUNTER — PATIENT OUTREACH (OUTPATIENT)
Dept: NURSING | Facility: CLINIC | Age: 37
End: 2022-01-04
Payer: MEDICARE

## 2022-01-04 NOTE — PROGRESS NOTES
1/4/2022  Clinic Care Coordination Contact    Community Health Worker Follow Up    Care Gaps:     Health Maintenance Due   Topic Date Due     ASTHMA ACTION PLAN  Never done     DEPRESSION ACTION PLAN  Never done     COVID-19 Vaccine (1) Never done     INFLUENZA VACCINE (1) 09/01/2021     ASTHMA CONTROL TEST  12/22/2021     PHQ-9  12/22/2021       Care Gaps Last addressed on did not discuss care gaps on 1-4-22    Goals:   Goals Addressed as of 1/4/2022 at 11:15 AM                    Today       Medical (pt-stated)   40%    Added 12/16/21 by Lisbeth Bynum, RN        Goal Statement- I would like to schedule and attend an evaluation and eye exam within 4-6 months   DateGoal set: 12/16/21  Barriers: access  Strengths: Patient engagement, provider identified   Date to Achieve By: February  Patient expressed understanding of goal: yes    Action stepsto achieve this goal  1.  I will call to eye clinics to check if they take my insurance and if they do will schedule eye exam.  [facility] - [facility]  [address], [place], [place] [address] [phone]  Fax: [phone]  Located on the skyway level of the [facility] [place].  Office Hours:  Mon, Tue, Wed, Thu, Fri 8:30 am - 5:00 pm     Health [facility]  [address] [place], [address]  [phone]     [facility] [address]  [phone]  STORE HOURS  Mon     10:00 AM - 6:00 PM  Tue      10:00 AM - 6:00 PM  Wed     10:00 AM - 6:00 PM  Thu      10:00 AM - 6:00 PM  Fri        9:30 AM - 5:00 PM  Sat       9:30 AM - 3:00 PM  Sun      Closed     ACCEPTED INSURANCES work with most major health and vision plans.  [employer]  [facility] Care  Health Partners  Preferred One  [facility]  [facility]  Medical Assistance  [employer]    2.  I will report back to my CommunityHealth Worker if I need additional support or resources     Updated: 1-4-22 AL              Intervention  and Education during outreach:   Called and spoke to patient and follow up on goals.  Patient reported:  -did not schedule eye appt don;'t know where or which eye clinic to go.  Want to go clinic close to home [facility]  Researched eye clinics in [facility]  Patient stated to send information to [facility].    CHW sent list of eye clinics to [facility].  [facility] - [facility]  [address] [place] [address] [address], [place] [address]  [phone]  Fax: [phone]  Located on the skyway level of the  [employer] [facility] [place].  Office Hours:  Mon, Tue, Wed, Thu, Fri 8:30 am - 5:00 pm     Health [facility]  [address] [place], [address]  [phone]     [facility] EYE[address] [address]  [phone]  STORE HOURS  Mon     10:00 AM - 6:00 PM  Tue      10:00 AM - 6:00 PM  Wed     10:00 AM - 6:00 PM  Thu      10:00 AM - 6:00 PM  Fri        9:30 AM - 5:00 PM  Sat       9:30 AM - 3:00 PM  Sun      Closed     ACCEPTED INSURANCES work with most major health and vision plans.  [facility]  [facility]  Health [facility]  Preferred One  [facility]  [facility]  Medical Assistance  [employer]        CHW Follow up: Monthly    CHW Plan: Follow up on goals    CHW Next Follow Up: 2-4-22

## 2022-01-28 ENCOUNTER — PATIENT OUTREACH (OUTPATIENT)
Dept: CARE COORDINATION | Facility: CLINIC | Age: 37
End: 2022-01-28
Payer: MEDICARE

## 2022-01-28 NOTE — COMMUNITY RESOURCES LIST (ENGLISH)
01/28/2022   [facility] Connected Care   Lisbeth Bynum Phone: N/A   Email: [email or web address]   Address: [address] [place] [address]   Hours: N/A        Dental, Vision, and Hearing       Eye care  1  [facility] - [facility] Distance: 4.77 miles      COVID-19 Status: Regular Operations   [address] [place] [address]  Language: English  Hours: Mon - Thu 9:00 AM - 7:00 PM , Fri 9:00 AM - 6:00 PM , Sat 9:00 AM - 5:00 PM  Fees: Insurance, Self Pay   Phone: (559) 974-5557 Email: [email or web address] Website: [email or web address]     2  [facility] Distance: 8.2 miles      COVID- Status: Regular Operations   [address] [place] [address]  Language: English  Hours: Mon - Tue 7:30 AM - 4:30 PM , Wed 7:30 AM - 5:30 PM , Thu - Fri 7:30 AM - 4:30 PM  Fees: Insurance, Self Pay   Phone: (767) 609-2853 Email: mckayla@[employer] Website: http://www.[email or web address].htm          Important Numbers & Websites       Emergency Services   911  [facility]   311  Poison Control   (352) 181-7967  Suicide Prevention Lifeline   (761) 296-3653 (TALK)  Child Abuse Hotline   (904) 778-9849 (4-A-Child)  Sexual Assault Hotline   (564) 939-3577 (HOPE)  National Runaway Safeline   (966) 781-7827 (RUNAWAY)  All-Options Talkline   (770) 942-2344  Substance Abuse Referral   (795) 385-8164 (HELP)

## 2022-01-28 NOTE — PROGRESS NOTES
Clinic Care Coordination Contact    Follow Up Progress Note      Assessment: RN RALPH outreach and spoke with patient   Patient was recently evaluated in ED for abscess on forehead, pt state that is healing but still very painful  Pt notes he is frustrated with pain clinic provider and unclear if he would like to discharge  Pt was last seen by PCP in December with RTC recommend for one year, RN CC offered to support scheduling of ED follow up,pt declined this time.  Will calderon and schedule if needed    Addressed and updated goals  Pt request resource support for Eye care provider, RN CC sent via [employer], verbal permission give for txt   Pt confirmed received info      Goals addressed this encounter:   Goals Addressed                    This Visit's Progress       Patient Stated       COMPLETED: Medical (pt-stated)         Goal Statement: I will have a better understanding and plan of care  within 1-2months  Date Goal set: 11/29/21  Barriers: language, knowledge deficit   Strengths: family support  Date to Achieve By:  Patient expressed understanding of goal: yes  Action steps to achieve this goal:  1. I will schedule and attend visit with PCP for annual wellness exam  2. I will update Care coordination team during next outreach  3. I will report to my Community Health Worker if any additional resources or support needed.                  Plan: Community Health Worker to outreach per standard work and updated on goal progression    RN CC will review in 4-6 weeks to support ongoing recommendations and plan of care will be available sooner if needed.

## 2022-02-04 ENCOUNTER — PATIENT OUTREACH (OUTPATIENT)
Dept: NURSING | Facility: CLINIC | Age: 37
End: 2022-02-04
Payer: MEDICARE

## 2022-02-04 NOTE — PROGRESS NOTES
2/4/2022  Clinic Care Coordination Contact    Community Health Worker Follow Up    Care Gaps:     Health Maintenance Due   Topic Date Due     ASTHMA ACTION PLAN  Never done     DEPRESSION ACTION PLAN  Never done     COVID-19 Vaccine (1) Never done     INFLUENZA VACCINE (1) 09/01/2021     ASTHMA CONTROL TEST  12/22/2021     PHQ-9  12/22/2021       Care Gap Goal set: No    Goals:   Goals Addressed as of 2/4/2022 at 10:26 AM                    Today    1/4/22       Medical (pt-stated)   40%  40%    Added 12/16/21 by Lisbeth Bynum, RN      Goal Statement- I would like to schedule and attend an evaluation and eye exam within 4-6 months   DateGoal set: 12/16/21  Barriers: access  Strengths: Patient engagement, provider identified   Date to Achieve By: February  Patient expressed understanding of goal: yes    Action stepsto achieve this goal  1.  I will call eye clinics schedule eye exam when I have address my pain.  [facility] [place] [address], [place] [address]  [phone]  Fax: [phone]  Located on the skyway level of the  [employer] [facility] at [address] [place] [address].  Office Hours:  Mon, Tue, Wed, Thu, Fri 8:30 am - 5:00 pm     Health [facility]  [address] [place], [address]  [phone]     [facility] [address]  [phone]  STORE HOURS  Mon     10:00 AM - 6:00 PM  Tue      10:00 AM - 6:00 PM  Wed     10:00 AM - 6:00 PM  Thu      10:00 AM - 6:00 PM  Fri        9:30 AM - 5:00 PM  Sat       9:30 AM - 3:00 PM  Sun      Closed     ACCEPTED INSURANCES work with most major health and vision plans.  [facility]  Health Partners  Preferred One  [facility]  [facility]  Medical Assistance  [employer]    2.  I will call CommunityHealth Worker or send [employer] message if I need additional support or resources     Updated: 2-4-22 AL              Intervention and Education during outreach:     Called and  spoke to patient and follow up if he schedule ER follow up with PCP.  Patient reported  -he has not schedule yet and feel that he does not need tof follow up  -he [place] in [place] and not happy with the pain clinic they are not helping with his pain.  Would like to see a different pain clinic or doctor  Suggested to schedule ER follow up with PCP and discuss and consult with PCP.  Patient agreed to schedule ER follow up    Conference call with ER  Line and connected to schedule appt.  Pt okay to see different provider  Appt schedule with Dr Sexton 2-7-22 at 1:25pm    Patient agreed to schedule follow up with CC RN 2-10-22 at 3pm  for support after appt with PCP.    CHW sent information to consult with PCP/triage RN to address health concerns through [place]  [facility] [address] [place] [address]  [phone]    24/7 Care Connection to connect with Triage nurse [phone]    [address] [id number] [phone]  Walk-in Care Hours   Monday - Friday, 7:00 a.m. to 7:00 p.m.   Saturday & Sunday, 8:00 a.m. to 4:00 p.m      CC RN 2-10-22  CHW Follow up: Monthly    CHW Plan: Follow up on goal    CHW Next Follow Up: 3-10-22

## 2022-02-04 NOTE — PROGRESS NOTES
2/4/2022  Clinic Care Coordination Contact  Care Team Conversations    Chart review  Patient connected with CC RN 1-28-22 post hospital  No ER follow up PCP been schedule

## 2022-02-15 ENCOUNTER — PATIENT OUTREACH (OUTPATIENT)
Dept: CARE COORDINATION | Facility: CLINIC | Age: 37
End: 2022-02-15
Payer: MEDICARE

## 2022-02-15 NOTE — PROGRESS NOTES
Clinic Care Coordination Contact    Situation: Patient chart reviewed by care coordinator.    Background: SW completed chart review    Assessment: CHW and CCC RN in contact with patient. It does not appear patient attended ED follow up appointment.     Plan/Recommendations: Standard outreach

## 2022-02-17 NOTE — DISCHARGE SUMMARY
12/09/21 0930   Signing Clinician's Name / Credentials   Signing clinician's name / credentials Nicholas Buck, PT   Session Number   Session Number 1/8   Progress Note/Recertification   Recertification Due Date 02/06/22   Adult Goals   PT Ortho Eval Goals 1;2;3   Ortho Goal 1   Goal Identifier HEP   Goal Description Pt. to demostrate independence with HEP   Goal Progress yes   Target Date 01/28/22   Ortho Goal 2   Goal Identifier SPADI   Goal Description Reduce SPADI score from 90.7% to 80% or less   Goal Progress initiated SPADI 90.7%   Target Date 01/28/21   Ortho Goal 3   Goal Identifier Dressing   Goal Description Patient will be able don/doff shirt and jacket with pain no greater than 3 out of 10 (currently rates at 8 out of 10)   Target Date 01/28/21   Subjective Report   Subjective Report See eval   Treatment Interventions   Interventions Manual Therapy;Therapeutic Procedure/Exercise   Therapeutic Procedure/exercise   Therapeutic Procedures: strength, endurance, ROM, flexibillity minutes (49445) 10   Skilled Intervention HEP   Patient Response tolerated well   Treatment Detail Created and implemented HEP via PTrx: 10x standing rows with blue T-band (cueing to squeze scaps and keep arms close to sides), standing ER with orange t-band (cueing to decrease ROM), doorway pec stretch, and edu on cross friction massage   Manual Therapy   Manual Therapy: Mobilization, MFR, MLD, friction massage minutes (98072) 10   Skilled Intervention Mobs, MFR, cross friction massage   Patient Response mobs painful   Treatment Detail Mobs to R 1st rib, and posterior shoulder mobs, cross friction massage of anterior shoulder   Education   Learner Patient   Readiness Acceptance   Method Video;Demonstration   Response Demonstrates Understanding   Communication with other professionals   Communication with other professionals Himanshu Villagran   Plan   Homework HEP   Home program rows, shoulder ER, pec stretch, cross friction massage    Plan for next session manual, HEP and advance   Total Session Time   Timed Code Treatment Minutes 20   Total Treatment Time (sum of timed and untimed services) 45   AMBULATORY CLINICS ONLY-MEDICAL AND TREATMENT DIAGNOSIS   Medical Diagnosis Right rotator cuff tendonitis   PT Diagnosis Shoulder pain, impaired ROM                                                                              [facility]    Outpatient Physical Therapy Discharge Note  Patient: Bernard Padilla  : 1985    Beginning/End Dates of Reporting Period:  22 to 22    Referring Provider: Himanshu Villagran Diagnosis: Impaired ROM, shoulder pain     Client Self Report: See eval               Goals:  Goal Identifier HEP   Goal Description Pt. to demostrate independence with HEP   Target Date 22   Date Met      Progress (detail required for progress note): yes     Goal Identifier SPADI   Goal Description Reduce SPADI score from 90.7% to 80% or less   Target Date 21   Date Met      Progress (detail required for progress note): initiated SPADI 90.7%     Goal Identifier Dressing   Goal Description Patient will be able don/doff shirt and jacket with pain no greater than 3 out of 10 (currently rates at 8 out of 10)   Target Date 21   Date Met      Progress (detail required for progress note):       Goal Identifier     Goal Description     Target Date     Date Met      Progress (detail required for progress note):       Goal Identifier     Goal Description     Target Date     Date Met      Progress (detail required for progress note):       Goal Identifier     Goal Description     Target Date     Date Met      Progress (detail required for progress note):       Goal Identifier     Goal Description     Target Date     Date Met      Progress (detail required for progress note):       Goal Identifier     Goal Description     Target Date     Date Met      Progress (detail required for progress  note):             Plan:  Discharge from therapy.    Discharge:    Reason for Discharge: Patient has failed to schedule further appointments.    Equipment Issued: none    Discharge Plan: Patient only showed for evaluation and no follow visits

## 2022-02-17 NOTE — ADDENDUM NOTE
Encounter addended by: Nicholas Buck, PT on: 2/17/2022 8:37 AM   Actions taken: Clinical Note Signed, Flowsheet accepted, Episode resolved

## 2022-02-22 NOTE — TELEPHONE ENCOUNTER
Left VM for pt stating that Dr. Lassiter will send rx to [facility] today and to call back with any further questions.    Per Dr. Foster, this is not an issue that he will be handling at this time.  Patient will need to defer this question to his primary provider

## 2022-02-25 DIAGNOSIS — L30.9 DERMATITIS: ICD-10-CM

## 2022-03-01 RX ORDER — FLUOCINOLONE ACETONIDE 0.11 MG/ML
OIL TOPICAL
Qty: 118.28 ML | Refills: 5 | Status: SHIPPED | OUTPATIENT
Start: 2022-03-01 | End: 2022-08-25

## 2022-03-01 NOTE — TELEPHONE ENCOUNTER
Fluocinolone Acetonide Scalp (DERMA-SMOOTHE/FS SCALP) 0.01 % OIL oil   Last Written Prescription Date: 11/17/2021  Last Fill Quantity:  118.28   # refills:  5  Last Office Visit : 7/1/2021  Future Office visit:   3/10/2022  118.28 mL, 5 Refills sent to pharm 3/1/2022      Irina Izquierdo RN  Central Triage Red Flags/Med Refills

## 2022-03-04 NOTE — NURSING NOTE
"Chief Complaint   Patient presents with     Consult     Pt here for HS consult       Vitals:    09/08/20 0827   Weight: 83 kg (183 lb)   Height: 1.702 m (5' 7\")       Body mass index is 28.66 kg/m .      KAYLA McgillT                    No vitals taken per provider  " Never

## 2022-03-10 ENCOUNTER — PATIENT OUTREACH (OUTPATIENT)
Dept: NURSING | Facility: CLINIC | Age: 37
End: 2022-03-10

## 2022-03-10 ENCOUNTER — OFFICE VISIT (OUTPATIENT)
Dept: DERMATOLOGY | Facility: CLINIC | Age: 37
End: 2022-03-10
Payer: MEDICARE

## 2022-03-10 DIAGNOSIS — Z79.899 ENCOUNTER FOR LONG-TERM (CURRENT) USE OF HIGH-RISK MEDICATION: ICD-10-CM

## 2022-03-10 DIAGNOSIS — L73.2 HIDRADENITIS SUPPURATIVA: Primary | ICD-10-CM

## 2022-03-10 PROCEDURE — 11900 INJECT SKIN LESIONS </W 7: CPT | Mod: GC | Performed by: DERMATOLOGY

## 2022-03-10 PROCEDURE — 99214 OFFICE O/P EST MOD 30 MIN: CPT | Mod: 25 | Performed by: DERMATOLOGY

## 2022-03-10 RX ORDER — DAPSONE 100 MG/1
200 TABLET ORAL DAILY
Qty: 60 TABLET | Refills: 0 | Status: SHIPPED | OUTPATIENT
Start: 2022-03-10 | End: 2022-04-09

## 2022-03-10 RX ORDER — DAPSONE 100 MG/1
200 TABLET ORAL DAILY
Qty: 60 TABLET | Refills: 2 | Status: SHIPPED | OUTPATIENT
Start: 2022-03-10 | End: 2022-03-10

## 2022-03-10 NOTE — NURSING NOTE
Dermatology Rooming Note    Bernard Padilla's goals for this visit include:   Chief Complaint   Patient presents with     Derm Problem     Bernard is here for HS follow up

## 2022-03-10 NOTE — PROGRESS NOTES
[facility] Dermatology Note  Encounter Date: Mar 10, 2022  Office Visit     Dermatology Problem List:  1. Hidradenitis suppuritiva              - Diagnosed in 2016 and did not respond to antibiotics.              - Seen by Dr. Bird and underwent 9 procedures and 1 procedure with Dr. Maldonado              - s/p  mg BID - started 9/19, now discontinued              - Dapsone 200 mg daily, increased from 100mg daily on 7/1/21 (started 11/21/19), restarted 3/10/22              - CBC in 2 weeks and 4 weeks                2. Acute onset hand foot psoriasiform dermatitis with joint pain, suspected reactive arthritis, now improving and almost resolved with minimal foot involvement              -  mg BID - started 9/19, now discontinued              - Lidex ointment at night               - DermaSmooth  3. Atopic dermatitis              - Triamcinolone 0.1% cream BID              - s/p Betamethasone 0.05% ointment BID  4. Vitiligo              - no treatment    ____________________________________________    Assessment & Plan:  # Hidradenitis suppurativa  Given the good response to dapsone in the past, will restart this medication today. He is still hesitant about other medications at this time, specifically biologics, therefore will continue with oral antibiotics/anti-inflammatories for now. He prefers to have these areas surgically excised, as this has been helpful for him in the past. Additionally, will refer him to different pain specialists to manage his chronic pain.  - Restart dapsone to 200mg daily, counseled on medication and lab compliance. States his daughter is no longer living in [place] so he is no longer worried about her taking his medications.  - CBC today   - CBC  in 3 weeks  - Will reach out to Dr. Maldonado for another surgical procedure   - Referred to [facility] pain management    1) Nodulocystic acne  - Versus possibly facial HS  - Injected 0.7cc of kenalog 10 in  CHARISSA/CRISTINO Discharge Plan  Informed patient is ready for discharge. Patient’s discharge destination is Rehabilitation/Skilled Care. Patient to be picked up by [facility].  Patient/interested person has been counseled for post hospitalization care.  Patient agrees and understands goals and plan. Initial implementation of the patient’s discharge plan has been arranged, including any devices/equipment needed for discharge. Discharge plan communicated to MD, RN, COLE, Receiving Facility/Agency and pt's wife, Berry.      Plan: Discharge arranged to [facility] at 1400 hours via [facility] for ZAY. Patient/family aware and agreeable. COLE, RN, and receiving facility aware and agreeable. [facility] alerted to complete discharge paperwork per policy. Plan appears to appropriately meet patient's identified needs.    Spoke DELIA Gardner CM ([phone]) to inquire which ambulance service was in network with pt's insurance.  Per Jj, 'don't worry about that, just use whatever ambulance services you typically use.'     After Visit Summary - Transition Report Information  Family Member Name/Contact Number: Berry Ag [phone]  Family Member Relationship: Power of  for Health Care  Receiving Agency/Facility: [facility]  Receiving Agency/Facility phone number: [phone]  Receiving Agency/Facility address: [address] [place] [address]   Receiving Agency/Facility city/state: [place] [address]   nodule on rt jhawline.     Procedures Performed:   - Intra-lesional triamcinolone procedure note. After positioning and cleansing with isopropyl alcohol, 0.6 total mL of triamcinolone 10 mg/mL was injected into 1 lesion(s) on the submandibular chin. The patient tolerated the procedure well and left the dermatology clinic in good condition.    Follow-up: 3 month(s) in-person, or earlier for new or changing lesions    Staff and Scribe:     Annemarie Henderson DO PGY-3  Department of Dermatology  [facility]    Staffed with Dr. Apolinar Ruiz    Staff Physician Comments:   I saw and evaluated the patient with the resident and I agree with the assessment and plan.  I was present for the key portions of the above major procedure and examination.    Apolinar Ruiz MD, FAAD    Departments of Internal Medicine and Dermatology  [facility]  [phone]      ____________________________________________    CC: Follow-up HS  HPI:  Mr. Bernard Padilla is a(n) 36 year old male who presents today as a return patient for HS. Last seen by myself virtually on 7/1/21 at which point we increased his dapsone to 200 mg daily and was advised to take clindamycin/rifampin 300mg/300mg BID for 2 weeks if flaring. For psoriasis/atopic dermatitis, he was advised to use triamcinolone cream on the body, Derma-smoothe and Lidex on the scalp. We elected to not pursue any treatments for vitiligo at that time.     Bernard reports that he is currently doing well both physically and emotionally due to his hidradenitis as well as family events that have occurred recently.  He states that he is not currently taking his dapsone because his daughter attempted suicide 2 to 3 months ago and ingested all of his pills at once.  He does however take the clindamycin and rifampin for 2 weeks at a time if he knows that he is having a flare and reports recently restarting this.  He continues to experience new lesions in the  groin, buttock, beard area, face and abdominal pannus and states that the perineal lesions are significantly affecting his sexual life.  He also reports being sober pain for most of the day and was seen previously by pain management and discharged from their clinic after positive urine drug test however was rereferred to pain management who supplied with Suboxone, however he states that this caused him to have nightmares and pruritus or paresthesias of the mouth, therefore he did not wish to continue on this.  He states that they were not able to supply him with pain medication so he has been resorting to other options such as obtaining pain meds from the streets.  He also uses recreational marijuana to alleviate some of his pain and anxiety on a daily basis.    He was last seen in the ED on 1/27/2022 at which time an abscess of the forehead was drained.  This area has not recurred however he is left with a hyperpigmented scar.  He is interested in discussing other treatment options today, which she states were perhaps presented to him before however he is cautious and wary of the side effects that many of them have given his family history of multiple medical problems.    HS Nurse Assessment    Nurse Assessment Data 2/10/2021 2/11/2021 7/1/2021   Over the past 30 days how many new lesions did you get? - 3 5   Over the past week, how many dressing changes do you do each day? - 0 3+   Over the past week, has your wound drainage been: - Mild Severe   Rate your HS overall from 0 - 10 (0 = no disease, 10 = worst) over the past week:  - 0 10   Rate your pain score from 0 - 10 (0 = no disease, 10 = worst) for the most painful/symptomatic lesion in the past week:  - 9 10 - Worst Pain   Over the past week, how much has HS influenced your quality of life? - extremely extremely   Total DLQI Score 16 (very large effect on patient's life) - -       Physical Exam:  Vitals: There were no vitals taken for this visit.  SKIN:  Focused examination of face, axillae, chest, groin, abdomen, scrotum, buttocks was performed.  HS Data  HS Exam Data 3/10/2022   LC Type LC1   Clinical Subtypes Regular type   Acne? No   Dissecting Cellulitis? No   Visual analogue score (0-100) 45   Total Han Stage II   Total Inflammatory Nodules 1   Total Abcesses 0   Total Draining Tunnels 1   Total Abscess and Nodule Count 1   IHS4 Score  5   HS-PGA 3     - No other lesions of concern on areas examined.     Medications:  Current Outpatient Medications   Medication     acetaminophen (TYLENOL) 325 MG tablet     albuterol (PROAIR HFA/PROVENTIL HFA/VENTOLIN HFA) 108 (90 Base) MCG/ACT inhaler     augmented betamethasone dipropionate (DIPROLENE-AF) 0.05 % external ointment     buprenorphine HCl-naloxone HCl (SUBOXONE) 2-0.5 MG per film     buprenorphine HCl-naloxone HCl (SUBOXONE) 4-1 MG per film     clindamycin (CLEOCIN) 300 MG capsule     dapsone (ACZONE) 100 MG tablet     fluocinolone acetonide (DERMA-SMOOTHE/FS BODY) 0.01 % external oil     Fluocinolone Acetonide Scalp (DERMA-SMOOTHE/FS SCALP) 0.01 % OIL oil     fluocinonide (LIDEX) 0.05 % external solution     fluticasone (FLONASE) 50 MCG/ACT nasal spray     IBUPROFEN PO     loratadine (CLARITIN) 10 MG tablet     triamcinolone (KENALOG) 0.1 % external cream     No current facility-administered medications for this visit.      Past Medical History:   Patient Active Problem List   Diagnosis     Hidradenitis suppurativa     Tobacco use disorder     Patellofemoral arthritis of left knee     Eczema     Allergic rhinitis     Depression     Influenza     Knee pain, left     Other chronic pain     Scrotal abscess     Abscess of groin, left     Right rotator cuff tendinitis     Past Medical History:   Diagnosis Date     Anxiety      Arthritis      Arthritis of knee      Boil      Chronic pain      Eczema      Hidradenitis suppurativa      Hydradenitis

## 2022-03-10 NOTE — LETTER
3/10/2022       RE: Bernard Padilla  [address]  [place] [address]     Dear Colleague,    Thank you for referring your patient, Bernard Padilla, to the [facility] [place] at [facility]. Please see a copy of my visit note below.    [facility] Dermatology Note  Encounter Date: Mar 10, 2022  Office Visit     Dermatology Problem List:  1. Hidradenitis suppuritiva              - Diagnosed in 2016 and did not respond to antibiotics.              - Seen by Dr. Bird and underwent 9 procedures and 1 procedure with Dr. Maldonado              - s/p  mg BID - started 9/19, now discontinued              - Dapsone 200 mg daily, increased from 100mg daily on 7/1/21 (started 11/21/19), restarted 3/10/22              - CBC in 2 weeks and 4 weeks                2. Acute onset hand foot psoriasiform dermatitis with joint pain, suspected reactive arthritis, now improving and almost resolved with minimal foot involvement              -  mg BID - started 9/19, now discontinued              - Lidex ointment at night               - DermaSmooth  3. Atopic dermatitis              - Triamcinolone 0.1% cream BID              - s/p Betamethasone 0.05% ointment BID  4. Vitiligo              - no treatment    ____________________________________________    Assessment & Plan:    # Hidradenitis suppurativa  Given the good response to dapsone in the past, will restart this medication today. He is still hesitant about other medications at this time, specifically biologics, therefore will continue with oral antibiotics/anti-inflammatories for now. He prefers to have these areas surgically excised, as this has been helpful for him in the past. Additionally, will refer him to different pain specialists to manage his chronic pain.  - Restart dapsone to 200mg daily, counseled on medication and lab compliance. States his daughter  is no longer living in [place] so he is no longer worried about her taking his medications.  - If flaring take clindamycin/rifampin 300mg/300mg BID for 2 weeks  - CBC today   - CBC and CMP in 3 weeks  - Will reach out to Dr. Maldonado for another surgical procedure   - Referred to [facility] pain management    Procedures Performed:   - Intra-lesional triamcinolone procedure note. After positioning and cleansing with isopropyl alcohol, 0.6 total mL of triamcinolone 10 mg/mL was injected into 1 lesion(s) on the submandibular chin. The patient tolerated the procedure well and left the dermatology clinic in good condition.    Follow-up: 3 month(s) in-person, or earlier for new or changing lesions    Staff and Scribe:     Scribe Disclosure:  I, Vanita Hanna, am serving as a scribe to document services personally performed by Apolinar Ruiz MD based on data collection and the provider's statements to me.     Annemarie Henderson DO PGY-3  Department of Dermatology  [facility]    Staffed with Dr. Apolinar Ruiz  ____________________________________________    CC: No chief complaint on file.    HPI:  Mr. Bernard Padilla is a(n) 36 year old male who presents today as a return patient for HS. Last seen by myself virtually on 7/1/21 at which point we increased his dapsone to 200 mg daily and was advised to take clindamycin/rifampin 300mg/300mg BID for 2 weeks if flaring. For psoriasis/atopic dermatitis, he was advised to use triamcinolone cream on the body, Derma-smoothe and Lidex on the scalp. We elected to not pursue any treatments for vitiligo at that time.     Bernard reports that he is currently doing well both physically and emotionally due to his hidradenitis as well as family events that have occurred recently.  He states that he is not currently taking his dapsone because his daughter attempted suicide 2 to 3 months ago and ingested all of his pills at once.  He does however take the clindamycin  and rifampin for 2 weeks at a time if he knows that he is having a flare and reports recently restarting this.  He continues to experience new lesions in the groin, buttock, beard area, face and abdominal pannus and states that the perineal lesions are significantly affecting his sexual life.  He also reports being sober pain for most of the day and was seen previously by pain management and discharged from their clinic after positive urine drug test however was rereferred to pain management who supplied with Suboxone, however he states that this caused him to have nightmares and pruritus or paresthesias of the mouth, therefore he did not wish to continue on this.  He states that they were not able to supply him with pain medication so he has been resorting to other options such as obtaining pain meds from the streets.  He also uses recreational marijuana to alleviate some of his pain and anxiety on a daily basis.    He was last seen in the ED on 1/27/2022 at which time an abscess of the forehead was drained.  This area has not recurred however he is left with a hyperpigmented scar.  He is interested in discussing other treatment options today, which she states were perhaps presented to him before however he is cautious and wary of the side effects that many of them have given his family history of multiple medical problems.    HS Nurse Assessment    Nurse Assessment Data 2/10/2021 2/11/2021 7/1/2021   Over the past 30 days how many new lesions did you get? - 3 5   Over the past week, how many dressing changes do you do each day? - 0 3+   Over the past week, has your wound drainage been: - Mild Severe   Rate your HS overall from 0 - 10 (0 = no disease, 10 = worst) over the past week:  - 0 10   Rate your pain score from 0 - 10 (0 = no disease, 10 = worst) for the most painful/symptomatic lesion in the past week:  - 9 10 - Worst Pain   Over the past week, how much has HS influenced your quality of life? - extremely  extremely   Total DLQI Score 16 (very large effect on patient's life) - -       Physical Exam:  Vitals: There were no vitals taken for this visit.  SKIN: Focused examination of face, axillae, chest, groin, abdomen, scrotum, buttocks was performed.  HS Data  HS Exam Data 3/10/2022   LC Type LC1   Clinical Subtypes Regular type   Acne? No   Dissecting Cellulitis? No   Visual analogue score (0-100) 45   Total Han Stage II   Total Inflammatory Nodules 1   Total Abcesses 0   Total Draining Tunnels 1   Total Abscess and Nodule Count 1   IHS4 Score  5   HS-PGA 3     - No other lesions of concern on areas examined.     HS Data  No flowsheet data found.    Medications:  Current Outpatient Medications   Medication     acetaminophen (TYLENOL) 325 MG tablet     albuterol (PROAIR HFA/PROVENTIL HFA/VENTOLIN HFA) 108 (90 Base) MCG/ACT inhaler     augmented betamethasone dipropionate (DIPROLENE-AF) 0.05 % external ointment     buprenorphine HCl-naloxone HCl (SUBOXONE) 2-0.5 MG per film     buprenorphine HCl-naloxone HCl (SUBOXONE) 4-1 MG per film     clindamycin (CLEOCIN) 300 MG capsule     dapsone (ACZONE) 100 MG tablet     fluocinolone acetonide (DERMA-SMOOTHE/FS BODY) 0.01 % external oil     Fluocinolone Acetonide Scalp (DERMA-SMOOTHE/FS SCALP) 0.01 % OIL oil     fluocinonide (LIDEX) 0.05 % external solution     fluticasone (FLONASE) 50 MCG/ACT nasal spray     IBUPROFEN PO     loratadine (CLARITIN) 10 MG tablet     triamcinolone (KENALOG) 0.1 % external cream     No current facility-administered medications for this visit.      Past Medical History:   Patient Active Problem List   Diagnosis     Hidradenitis suppurativa     Tobacco use disorder     Patellofemoral arthritis of left knee     Eczema     Allergic rhinitis     Depression     Influenza     Knee pain, left     Other chronic pain     Scrotal abscess     Abscess of groin, left     Right rotator cuff tendinitis     Past Medical History:   Diagnosis Date     Anxiety       Arthritis      Arthritis of knee      Boil      Chronic pain      Eczema      Hidradenitis suppurativa      Hydradenitis         CC Referred Self, MD  No address on file on close of this encounter.

## 2022-03-10 NOTE — NURSING NOTE
Drug Administration Record    Prior to injection, verified patient identity using patient's name and date of birth.  Due to injection administration, patient instructed to remain in clinic for 15 minutes  afterwards, and to report any adverse reaction to me immediately.    Drug Name: triamcinolone acetonide(kenalog)  Dose: 1mL of triamcinolone 10mg/mL, 10mg dose  Route administered: ID  NDC #: Kenalog-10 (8384-5994-45)  Amount of waste(mL):4mL  Reason for waste: Multi dose vial    LOT #: [id number]  SITE: skin  : [employer]  EXPIRATION DATE: 4/2023    LEANDRA Minor

## 2022-03-10 NOTE — PROGRESS NOTES
3/10/2022  Clinic Care Coordination Contact  Community Health Worker Follow Up    Intervention and Education during outreach:     Called and spoke to patient and follow up on goal.  Patient reported:  -he was busy has not been able to schedule eye appt.  He moved to new apartment   New address   [address]  [place] [address]  Updated in chart  Has [facility] [employer] [facility]  Preferred to go to [facility].    Conference call with patient and connected with [facility]  [phone]  Spoke to staff   Prompted pt to provide insurance information and address and telephone.  Patient scheduled regular eye exam for 3-28-22 at 12pm.  Pt confirmed eye appt.  Sent [facility] address [facility].    Patient declined COVID vaccine and flu shot    CHW Follow up: Monthly    CHW Plan: Follow up on goal (s)    CHW Next Follow Up: 4-14-22      Hudson Mccarthy  Community Health Worker  [facility]  Clinic Care Coordination  [email or web address]   Office: [phone]  Fax: [phone]    Care Gaps:     Health Maintenance Due   Topic Date Due     ASTHMA ACTION PLAN  Never done     DEPRESSION ACTION PLAN  Never done     COVID-19 Vaccine (1) Never done     INFLUENZA VACCINE (1) 09/01/2021     ASTHMA CONTROL TEST  12/22/2021     PHQ-9  12/22/2021       Care Gaps Last addressed on 3-10-22 declined COVID vaccine.     Goals:   Goals Addressed as of 3/10/2022 at 11:27 AM                    Today    2/4/22       Medical (pt-stated)   40%  40%    Added 12/16/21 by Lisbeth Bynum, RN      Goal Statement- I would like to schedule and attend an evaluation and eye exam within 2-3 months   DateGoal set: 12/16/21 Updated 3-10-22  Barriers: access  Strengths: Patient engagement, provider identified   Date to Achieve By: 5-2022  Patient expressed understanding of goal: yes    Action stepsto achieve this goal  1.  I will attend eye appt on 3-28-22 at 12pm  [facility]  [place]  [address] [place] [id number]  [phone]  STORE HOURS  Mon     10:00 AM - 6:00 PM  Tue      10:00 AM - 6:00 PM  Wed     10:00 AM - 6:00 PM  Thu      10:00 AM - 6:00 PM  Fri        9:30 AM - 5:00 PM  Sat       9:30 AM - 3:00 PM  Sun      Closed     2.  I will call CommunityHealth Worker or send [employer] message if I need additional support or resources     Updated: 3-10-22 AL

## 2022-03-10 NOTE — PATIENT INSTRUCTIONS
- Restart dapsone 200 mg daily  - Return in 3 weeks to have labs drawn at any [facility]           Pain management option  - Anna Escalante MD  - Tony Maldonado MD  - [facility] (Referral place)

## 2022-03-15 ENCOUNTER — PATIENT OUTREACH (OUTPATIENT)
Dept: CARE COORDINATION | Facility: CLINIC | Age: 37
End: 2022-03-15
Payer: MEDICARE

## 2022-03-15 NOTE — PROGRESS NOTES
Clinic Care Coordination Contact     Situation: Patient chart reviewed by care coordinator.     Background: RN CC review for status update      Assessment: Patient engaged with CHW and continue to work toward goal progression      Plan/Recommendations:Community Health Worker to outreach per standard work and updated on goal progression      CC will review  in 4-6 weeks to support ongoing recommendations and plan of care will be available sooner if needed.

## 2022-03-22 DIAGNOSIS — Z76.0 ENCOUNTER FOR MEDICATION REFILL: Primary | ICD-10-CM

## 2022-03-22 DIAGNOSIS — R06.2 WHEEZING: ICD-10-CM

## 2022-03-23 ENCOUNTER — OFFICE VISIT (OUTPATIENT)
Dept: PLASTIC SURGERY | Facility: CLINIC | Age: 37
End: 2022-03-23
Payer: MEDICARE

## 2022-03-23 VITALS
HEIGHT: 67 IN | WEIGHT: 192 LBS | SYSTOLIC BLOOD PRESSURE: 130 MMHG | HEART RATE: 87 BPM | DIASTOLIC BLOOD PRESSURE: 77 MMHG | OXYGEN SATURATION: 97 % | BODY MASS INDEX: 30.13 KG/M2

## 2022-03-23 DIAGNOSIS — L73.2 HIDRADENITIS SUPPURATIVA: Primary | ICD-10-CM

## 2022-03-23 DIAGNOSIS — L73.2 HIDRADENITIS SUPPURATIVA: ICD-10-CM

## 2022-03-23 PROCEDURE — 99214 OFFICE O/P EST MOD 30 MIN: CPT | Performed by: PLASTIC SURGERY

## 2022-03-23 RX ORDER — CLINDAMYCIN PHOSPHATE 900 MG/50ML
900 INJECTION, SOLUTION INTRAVENOUS SEE ADMIN INSTRUCTIONS
Status: CANCELLED | OUTPATIENT
Start: 2022-03-23

## 2022-03-23 RX ORDER — CLINDAMYCIN PHOSPHATE 900 MG/50ML
900 INJECTION, SOLUTION INTRAVENOUS
Status: CANCELLED | OUTPATIENT
Start: 2022-03-23

## 2022-03-23 RX ORDER — ALBUTEROL SULFATE 90 UG/1
2 AEROSOL, METERED RESPIRATORY (INHALATION) EVERY 6 HOURS
Qty: 8.5 G | Refills: 3 | Status: SHIPPED | OUTPATIENT
Start: 2022-03-23 | End: 2023-05-08

## 2022-03-23 ASSESSMENT — PAIN SCALES - GENERAL: PAINLEVEL: WORST PAIN (10)

## 2022-03-23 NOTE — LETTER
3/23/2022       RE: Bernard Padilla  [address]  [place] [address]     Dear Colleague,    Thank you for referring your patient, Bernard Padilla, to the [facility] PLASTIC AND RECONSTRUCTIVE SURGERY CLINIC [place] at [facility]. Please see a copy of my visit note below.    FOLLOWUP VISIT NOTE    PRESENTING COMPLAINT:  Followup visit for hidradenitis suppurativa.    HISTORY OF PRESENTING COMPLAINT:  Mr. Padilla is 36 years old, well known to my service.  In the interim, he has been doing medical treatments with Dr. Ruiz and has optimized quite a bit.  He continues to have a couple of specific areas, one in the right groin and one in the right perineal area, that are causing significant issues and would like those removed.  Otherwise, he continues to smoke about 1/2 pack a day.  No change otherwise in his history and physical exam except for starting dapsone.    PHYSICAL EXAMINATION:  Vital signs are stable.  He is afebrile, in minimal distress.  He has a palpable nodule in the right groin and in the perineal area.  He has no obvious infection currently.    ASSESSMENT AND PLAN:  Based upon the above findings, a diagnosis of hidradenitis suppurativa was made.  I discussed with the patient removal of the 2 areas in question with allowing it to heal in secondarily.  I advised to be off of all nicotine products.  He understood.  All risks, benefits and alternatives of the procedure, including pain, infection, bleeding, scarring, asymmetry, seromas, hematomas, wound breakdown, wound dehiscence, long-term wound-healing issues, recurrences, injury to deeper structures, DVT, PE, MI, CVA, pneumonia, renal failure and death, were explained.  He understood everything and wants to proceed.  I look forward to helping him out in the near future.  All exam and discussion from beginning to end done in the presence of my nurse, Annalee Chavira.    Total time  spent with chart review, the visit itself and post visit paperwork was 30 minutes.      CURT Leos MD

## 2022-03-23 NOTE — NURSING NOTE
"Chief Complaint   Patient presents with     Consult     Bernard, is being seen today for a consult regarding HS.       Vitals:    03/23/22 0811   BP: 130/77   BP Location: Left arm   Patient Position: Chair   Cuff Size: Adult Regular   Pulse: 87   SpO2: 97%   Weight: 87.1 kg (192 lb)   Height: 1.71 m (5' 7.32\")       Body mass index is 29.79 kg/m .      Kizzy Augustine LPN    "

## 2022-03-24 ENCOUNTER — PATIENT OUTREACH (OUTPATIENT)
Dept: SURGERY | Facility: CLINIC | Age: 37
End: 2022-03-24
Payer: MEDICARE

## 2022-03-25 NOTE — TELEPHONE ENCOUNTER
Diagnosis, Referred by & from: [facility]   Appt date: 5/18/2022   NOTES STATUS DETAILS   OFFICE NOTE from referring provider [facility]:  3/23/22 - PLASTIC OV with Dr. Leos   OFFICE NOTE from other specialist Care Everywhere / [facility]:  3/10/22 - DERM OV with Dr. Ruiz  9/8/20 - PLASTIC OV with Dr. Maldonado  11/13/19 - CR OV with Destiny Jones NP    [facility]:  12/17/19 - URO OV with ADRIEL Segundo  8/27/19 - GEN SURG OV with Dr. Bird   DISCHARGE SUMMARY from hospital N/A    DISCHARGE REPORT from the ER Care Everywhere / [facility]:  3/16/22 - ED OV with Dr. Martinez  11/21/21 - ED OV with ADRIEL Allen  12/17/19 - ED OV with Dr. Lindsay  * Additional ED visits in Care Everywhere    [facility]:  11/22/19 - ED OV with Dr. Judith Betts [facility]:  12/5/17 - ED OV with Dr. Travis   OPERATIVE REPORT Care Everywhere / [facility]:  3/17/22 - OP Note for ANAL EXAM with Dr. Robbins  11/21/21 - OP Note for IRRIGATION AND DEBRIDEMENT PELVIS, PERINEAL ABSCESS with Dr. Vance  5/4/21 - OP Note for INCISION AND DRAINAGE OF PERONEAL ABSCESSES with Dr. Rodriguez  * Additional OP Notes in Care Everywhere    [facility]:  9/25/20 - OP Note for HIDRADENITIS EXCISION with Dr. Leos  11/14/19 - OP Note for IRRIGATION AND DEBRIDEMENT RECTUM with Dr. Kenny   MEDICATION LIST Internal    LABS     BIOPSIES/PATHOLOGY RELATED TO DIAGNOSIS Care Everywhere / [facility]:  9/25/20 - Groin Skin Biopsy (Case: [id number])    [facility]:  8/7/19 - Scrotum Biopsy (Case: [id number])  * Additional groin skin biopsies in Care Everywhere   DIAGNOSTIC PROCEDURES N/A    IMAGING (DISC & REPORT)      CT Received / Internal [facility]:  3/16/22 - CT Abd/Pelvis  11/3/19 - CT Abd/Pelvis  7/26/19 - CT Pelvis  10/19/18 - CT Pelvis  9/25/18 - CT Abd/Pelvis  8/3/18 - CT Pelvis    [facility]:  11/9/20 - CTA Chest/Abd/Pelvis    [facility]:  12/5/17 - CT Abd/Pelvis   ULTRASOUND  (ENDOANAL/ENDORECTAL) Received / Internal  [facility]:  5/4/21 - US Testicle    [facility]:  6/16/17 - US Testicular/Scrotum     Records Requested  03/25/22    Facility  [facility]  Fax: [phone]   [facility]  Fax: [phone]   Outcome * 3/25/22 2:22 PM Faxed req to [facility] and [facility] for images to be pushed to [place] PACs. - Mirian    * 3/28/22 12:09 PM Images received from [facility] and attached to the patient in PACs. - Mirian    * 4/1/22 1:08 PM Images received from [facility] and attached to the patient in PACs. - Mirian

## 2022-03-30 ENCOUNTER — PATIENT OUTREACH (OUTPATIENT)
Dept: CARE COORDINATION | Facility: CLINIC | Age: 37
End: 2022-03-30
Payer: MEDICARE

## 2022-04-04 DIAGNOSIS — T78.40XD ALLERGY, SUBSEQUENT ENCOUNTER: Primary | ICD-10-CM

## 2022-04-04 DIAGNOSIS — J45.20 MILD INTERMITTENT ASTHMA WITHOUT COMPLICATION: ICD-10-CM

## 2022-04-04 DIAGNOSIS — J30.1 SEASONAL ALLERGIC RHINITIS DUE TO POLLEN: ICD-10-CM

## 2022-04-04 DIAGNOSIS — N52.01 ERECTILE DYSFUNCTION DUE TO ARTERIAL INSUFFICIENCY: ICD-10-CM

## 2022-04-05 RX ORDER — SILDENAFIL 50 MG/1
50 TABLET, FILM COATED ORAL DAILY PRN
Qty: 30 TABLET | Refills: 3 | Status: SHIPPED | OUTPATIENT
Start: 2022-04-05 | End: 2022-04-27

## 2022-04-05 RX ORDER — FLUTICASONE PROPIONATE 50 MCG
1-2 SPRAY, SUSPENSION (ML) NASAL DAILY
Qty: 48 G | Refills: 2 | Status: SHIPPED | OUTPATIENT
Start: 2022-04-05 | End: 2023-07-12

## 2022-04-05 RX ORDER — LORATADINE 10 MG/1
10 TABLET ORAL DAILY
Qty: 30 TABLET | Refills: 3 | Status: SHIPPED | OUTPATIENT
Start: 2022-04-05 | End: 2023-07-12

## 2022-04-05 NOTE — TELEPHONE ENCOUNTER
"Outpatient Medication Detail     Disp Refills Start End PRESLEY   loratadine (CLARITIN) 10 mg tablet 90 tablet 3 11/2/2020  No   Sig - Route: Take 1 tablet (10 mg total) by mouth daily. - Oral   Sent to pharmacy as: loratadine 10 mg tablet (CLARITIN)   E-Prescribing Status: Receipt confirmed by pharmacy (11/2/2020  8:37 AM CST)       loratadine (CLARITIN) 10 mg tablet [[id number]]    Electronically signed by: Lillian Webb MD on 11/02/20 0837 Status: Active   Ordering user: Lillian Webb MD 11/02/20 0837 Authorized by: Lillian Webb MD   Frequency: DAILY 11/02/20 - Until Discontinued   Diagnoses  Allergy, subsequent encounter [T78.40XD]  Mild intermittent asthma without complication [J45.20]     Routing refill request to provider for review/approval because:  Drug not active on patient's medication list  A break in medication    Last Written Prescription Date:  ???  Last Fill Quantity: ???,  # refills: ???   Last office visit provider:  12/6/21     Requested Prescriptions   Pending Prescriptions Disp Refills     loratadine (CLARITIN) 10 MG tablet       Sig: Take 1 tablet (10 mg) by mouth daily       Antihistamines Protocol Passed - 4/4/2022  8:48 AM        Passed - Patient is 3-64 years of age     Apply weight-based dosing for peds patients age 3 - 12 years of age.    Forward request to provider for patients under the age of 3 or over the age of 64.          Passed - Recent (12 mo) or future (30 days) visit within the authorizing provider's specialty     Patient has had an office visit with the authorizing provider or a provider within the authorizing providers department within the previous 12 mos or has a future within next 30 days. See \"Patient Info\" tab in inbasket, or \"Choose Columns\" in Meds & Orders section of the refill encounter.              Passed - Medication is active on med list           sildenafil (VIAGRA) 50 MG tablet       Sig: Take 1 tablet (50 mg) by mouth daily as needed       Erectile " "Dysfuction Protocol Failed - 4/4/2022  8:48 AM        Failed - Medication is active on med list        Passed - Absence of nitrates on medication list        Passed - Absence of Alpha Blockers on Med list        Passed - Recent (12 mo) or future (30 days) visit within the authorizing provider's specialty     Patient has had an office visit with the authorizing provider or a provider within the authorizing providers department within the previous 12 mos or has a future within next 30 days. See \"Patient Info\" tab in inbasket, or \"Choose Columns\" in Meds & Orders section of the refill encounter.              Passed - Patient is age 18 or older         Signed Prescriptions Disp Refills    fluticasone (FLONASE) 50 MCG/ACT nasal spray 48 g 2     Sig: Spray 1-2 sprays into both nostrils daily       Nasal Allergy Protocol Passed - 4/4/2022  8:48 AM        Passed - Patient is age 12 or older        Passed - Recent (12 mo) or future (30 days) visit within the authorizing provider's specialty     Patient has had an office visit with the authorizing provider or a provider within the authorizing providers department within the previous 12 mos or has a future within next 30 days. See \"Patient Info\" tab in inbasket, or \"Choose Columns\" in Meds & Orders section of the refill encounter.              Passed - Medication is active on med list             Rich Senior RN 04/05/22 12:36 PM  "

## 2022-04-05 NOTE — TELEPHONE ENCOUNTER
"Outpatient Medication Detail     Disp Refills Start End PRESLEY   fluticasone propionate (FLONASE) 50 mcg/actuation nasal spray 16 g 3 4/11/2021  No   Sig: Instill two sprays into each nostril once daily   Sent to pharmacy as: fluticasone propionate 50 mcg/actuation nasal spray,suspension (FLONASE)   E-Prescribing Status: Receipt confirmed by pharmacy (4/11/2021  2:38 PM CDT)       Last office visit provider:  12/6/21     Requested Prescriptions   Pending Prescriptions Disp Refills     fluticasone (FLONASE) 50 MCG/ACT nasal spray 16 g 2     Sig: Spray 1-2 sprays into both nostrils daily       Nasal Allergy Protocol Passed - 4/4/2022  8:48 AM        Passed - Patient is age 12 or older        Passed - Recent (12 mo) or future (30 days) visit within the authorizing provider's specialty     Patient has had an office visit with the authorizing provider or a provider within the authorizing providers department within the previous 12 mos or has a future within next 30 days. See \"Patient Info\" tab in inCitiusTechsket, or \"Choose Columns\" in Meds & Orders section of the refill encounter.              Passed - Medication is active on med list           loratadine (CLARITIN) 10 MG tablet       Sig: Take 1 tablet (10 mg) by mouth daily       Antihistamines Protocol Passed - 4/4/2022  8:48 AM        Passed - Patient is 3-64 years of age     Apply weight-based dosing for peds patients age 3 - 12 years of age.    Forward request to provider for patients under the age of 3 or over the age of 64.          Passed - Recent (12 mo) or future (30 days) visit within the authorizing provider's specialty     Patient has had an office visit with the authorizing provider or a provider within the authorizing providers department within the previous 12 mos or has a future within next 30 days. See \"Patient Info\" tab in inbasket, or \"Choose Columns\" in Meds & Orders section of the refill encounter.              Passed - Medication is active on med list    " "       sildenafil (VIAGRA) 50 MG tablet       Sig: Take 1 tablet (50 mg) by mouth daily as needed       Erectile Dysfuction Protocol Failed - 4/4/2022  8:48 AM        Failed - Medication is active on med list        Passed - Absence of nitrates on medication list        Passed - Absence of Alpha Blockers on Med list        Passed - Recent (12 mo) or future (30 days) visit within the authorizing provider's specialty     Patient has had an office visit with the authorizing provider or a provider within the authorizing providers department within the previous 12 mos or has a future within next 30 days. See \"Patient Info\" tab in inbasket, or \"Choose Columns\" in Meds & Orders section of the refill encounter.              Passed - Patient is age 18 or older             Rich Senior RN 04/05/22 12:33 PM  "

## 2022-04-14 ENCOUNTER — PATIENT OUTREACH (OUTPATIENT)
Dept: NURSING | Facility: CLINIC | Age: 37
End: 2022-04-14
Payer: MEDICARE

## 2022-04-14 NOTE — PROGRESS NOTES
4/14/2022  [place] Department of Human Services: [facility] Care Programs Eligibility Response ([phone])             SUBSCRIBER INFORMATION  Date of Service Subscriber ID  Subscriber Name  Birthdate           Age Gender                  [id number] ZAHIRA MATUTE 1985 36 MALE     Address  [address] , [address] , [place]  [address]     PROVIDER INFORMATION  Provider ID Submitter Transaction ID Provider Name Taxonomy Code Qualifier Taxonomy Code  [id number] xx [facility]  This subscriber has eligibility for QM: Qualified Medicare Beneficiary - Medical Assistance Covers Medicare Coinsurance and Deductibles Only.  Elig Type DQ: Disabled/QMB only  Eligibility Begin Date: 03/01/2022  Eligibility End Date: --/--/----  This subscriber is eligible for the following service types: Medical Care ,  Chiropractic ,  Hospital ,  Hospital - Inpatient ,  Hospital - Outpatient ,  Emergency Services ,  Professional (Physician) Visit - Office ,  Mental Health ,  Urgent Care  Prepaid Health Plan  None     Other Eligibility Information  No Special Transportation.  This subscribers eligibility is not determined for Long term Care and waiver services.  No Hospice.  Refer to Health Care Programs and Services Overview of the [facility] Provider Manual for a list of covered services.  Waivers  None    Subscriber Responsibility Information  None    Restricted Recipient Program  None     Medicare  Medicare ID: [id number]  Part A Effective Date:  05/01/2020  Part B Effective Date: 05/01/2020

## 2022-04-14 NOTE — PROGRESS NOTES
"4/14/2022  Clinic Care Coordination Contact  Community Health Worker Follow Up    Intervention and Education during outreach:   Called and spoke to patient and follow up on goal.  Patient reported:  -there was issue with his insurance so he was not able to get it (appt on 3-28-22 at [employer]) not sure if insurance covers eye   Patient not available to talk long.  Scheduled different time to follow up on regarding insurance.    [place]   \"This subscriber has eligibility for QM: Qualified Medicare Beneficiary - Medical Assistance Covers Medicare Coinsurance and Deductibles Only.\"  CHW sent message to patient Kerrie to call Senior Linkage Line for support to clarify Medicare/QM coverage for vision  Senior Linkage Line [phone]  \"This subscriber has eligibility for QM: Qualified Medicare Beneficiary - Medical Assistance Covers Medicare Coinsurance and Deductibles Only.\"      CHW Follow up: Monthly  CHW Plan: Follow up on goal   CHW Next Follow Up:5-18-22      Hudson Mccarthy  Community Health Worker  [facility]  Clinic Care Coordination  [email or web address]   Office: [phone]  Fax: [phone]    Clinic Care Coordination Contact    Community Health Worker Follow Up    Care Gaps:     Health Maintenance Due   Topic Date Due     ASTHMA ACTION PLAN  Never done     DEPRESSION ACTION PLAN  Never done     COVID-19 Vaccine (1) Never done     INFLUENZA VACCINE (1) 09/01/2021     ASTHMA CONTROL TEST  12/22/2021     PHQ-9  12/22/2021       Care Gaps Last addressed on 4-14-22 will discuss with PCP at next office visit.    Goals:    Goals Addressed as of 4/14/2022 at 2:51 PM                    Today    3/10/22       Medical (pt-stated)   40%  50%    Added 12/16/21 by Lisbeth Bynum, RN      Goal Statement- I would like to schedule and attend an evaluation and eye exam within 2-3 months   DateGoal set: 12/16/21 Updated 3-10-22  Barriers: access  Strengths: Patient " engagement, provider identified   Date to Achieve By: 5-2022  Patient expressed understanding of goal: yes    Action stepsto achieve this goal  1.  I will call to Senior Linkage Line [phone] for support to clarify insurance coverage for eye appt.  [facility] EYE[address] [address]  [phone]  STORE HOURS  Mon     10:00 AM - 6:00 PM  Tue      10:00 AM - 6:00 PM  Wed     10:00 AM - 6:00 PM  Thu      10:00 AM - 6:00 PM  Fri        9:30 AM - 5:00 PM  Sat       9:30 AM - 3:00 PM  Sun      Closed     2.  I will call CommunityHealth Worker or send [employer] message if I need additional support or resources     Updated: 4-14-22 AL

## 2022-04-21 ENCOUNTER — DOCUMENTATION ONLY (OUTPATIENT)
Dept: SURGERY | Facility: CLINIC | Age: 37
End: 2022-04-21
Payer: MEDICARE

## 2022-04-22 ENCOUNTER — TELEPHONE (OUTPATIENT)
Dept: SURGERY | Facility: CLINIC | Age: 37
End: 2022-04-22
Payer: MEDICARE

## 2022-04-22 DIAGNOSIS — Z11.59 ENCOUNTER FOR SCREENING FOR OTHER VIRAL DISEASES: Primary | ICD-10-CM

## 2022-04-22 NOTE — TELEPHONE ENCOUNTER
----- Message from CURT Leos MD sent at 3/23/2022 10:20 AM CDT -----  Regarding: Update  Hi Dusty    K, orders placed    Thanks    Cari

## 2022-04-22 NOTE — PROGRESS NOTES
Sent MyChart to patient regarding scheduling surgery with Dr. Leos, offered 5/2.    Will watch for response and follow up.

## 2022-04-22 NOTE — TELEPHONE ENCOUNTER
RN Care Coordinator: Annalee Chavira; [phone] and/or Nathan Blair; [phone]     Surgery is scheduled with Dr. Leos on 5/2 at the [facility].  Scheduled per patient.    H&P to be completed by NP clinic on 4/27    Surgical consult:   4/27 with Dr. Leos    COVID-19 test:   4/28 at [facility]    Post-op:   5/10 with Karen Duran PA-C    Patient will receive a phone call from pre-admission nurses 1-2 days prior to surgery with arrival and start time.    Patient will complete COVID-19 test that was scheduled by surgical coordinator 2-4 days prior to surgery.     Confirmed with patient via [employer].    The surgery packet was sent via US mail and via [employer]

## 2022-04-27 ENCOUNTER — OFFICE VISIT (OUTPATIENT)
Dept: PLASTIC SURGERY | Facility: CLINIC | Age: 37
End: 2022-04-27
Payer: MEDICARE

## 2022-04-27 ENCOUNTER — TELEPHONE (OUTPATIENT)
Dept: SURGERY | Facility: CLINIC | Age: 37
End: 2022-04-27

## 2022-04-27 ENCOUNTER — OFFICE VISIT (OUTPATIENT)
Dept: FAMILY MEDICINE | Facility: CLINIC | Age: 37
End: 2022-04-27
Payer: MEDICARE

## 2022-04-27 ENCOUNTER — LAB (OUTPATIENT)
Dept: LAB | Facility: CLINIC | Age: 37
End: 2022-04-27
Payer: MEDICARE

## 2022-04-27 VITALS
TEMPERATURE: 97.8 F | HEART RATE: 70 BPM | OXYGEN SATURATION: 98 % | DIASTOLIC BLOOD PRESSURE: 70 MMHG | SYSTOLIC BLOOD PRESSURE: 106 MMHG | WEIGHT: 192 LBS | BODY MASS INDEX: 30.13 KG/M2 | RESPIRATION RATE: 18 BRPM | HEIGHT: 67 IN

## 2022-04-27 VITALS
BODY MASS INDEX: 30.13 KG/M2 | WEIGHT: 192 LBS | TEMPERATURE: 97.8 F | HEART RATE: 75 BPM | SYSTOLIC BLOOD PRESSURE: 117 MMHG | HEIGHT: 67 IN | OXYGEN SATURATION: 99 % | DIASTOLIC BLOOD PRESSURE: 76 MMHG

## 2022-04-27 DIAGNOSIS — L73.2 HIDRADENITIS SUPPURATIVA: Primary | ICD-10-CM

## 2022-04-27 DIAGNOSIS — Z11.59 ENCOUNTER FOR SCREENING FOR OTHER VIRAL DISEASES: Primary | ICD-10-CM

## 2022-04-27 DIAGNOSIS — Z01.818 PRE-OP EXAM: Primary | ICD-10-CM

## 2022-04-27 DIAGNOSIS — Z79.899 ENCOUNTER FOR LONG-TERM (CURRENT) USE OF HIGH-RISK MEDICATION: ICD-10-CM

## 2022-04-27 DIAGNOSIS — Z01.818 PRE-OP EXAM: ICD-10-CM

## 2022-04-27 LAB
ALBUMIN SERPL-MCNC: 3.5 G/DL (ref 3.4–5)
ALP SERPL-CCNC: 126 U/L (ref 40–150)
ALT SERPL W P-5'-P-CCNC: 37 U/L (ref 0–70)
ANION GAP SERPL CALCULATED.3IONS-SCNC: 5 MMOL/L (ref 3–14)
AST SERPL W P-5'-P-CCNC: 21 U/L (ref 0–45)
BASOPHILS # BLD AUTO: 0 10E3/UL (ref 0–0.2)
BASOPHILS NFR BLD AUTO: 1 %
BILIRUB DIRECT SERPL-MCNC: 0.1 MG/DL (ref 0–0.2)
BILIRUB SERPL-MCNC: 0.6 MG/DL (ref 0.2–1.3)
BUN SERPL-MCNC: 10 MG/DL (ref 7–30)
CALCIUM SERPL-MCNC: 9.2 MG/DL (ref 8.5–10.1)
CHLORIDE BLD-SCNC: 107 MMOL/L (ref 94–109)
CO2 SERPL-SCNC: 28 MMOL/L (ref 20–32)
CREAT SERPL-MCNC: 1.03 MG/DL (ref 0.66–1.25)
EOSINOPHIL # BLD AUTO: 1.4 10E3/UL (ref 0–0.7)
EOSINOPHIL NFR BLD AUTO: 16 %
ERYTHROCYTE [DISTWIDTH] IN BLOOD BY AUTOMATED COUNT: 12.4 % (ref 10–15)
GFR SERPL CREATININE-BSD FRML MDRD: >90 ML/MIN/1.73M2
GLUCOSE BLD-MCNC: 86 MG/DL (ref 70–99)
HCT VFR BLD AUTO: 39.4 % (ref 40–53)
HGB BLD-MCNC: 13.4 G/DL (ref 13.3–17.7)
IMM GRANULOCYTES # BLD: 0 10E3/UL
IMM GRANULOCYTES NFR BLD: 0 %
LYMPHOCYTES # BLD AUTO: 3.2 10E3/UL (ref 0.8–5.3)
LYMPHOCYTES NFR BLD AUTO: 36 %
MCH RBC QN AUTO: 30.9 PG (ref 26.5–33)
MCHC RBC AUTO-ENTMCNC: 34 G/DL (ref 31.5–36.5)
MCV RBC AUTO: 91 FL (ref 78–100)
MONOCYTES # BLD AUTO: 0.6 10E3/UL (ref 0–1.3)
MONOCYTES NFR BLD AUTO: 6 %
NEUTROPHILS # BLD AUTO: 3.7 10E3/UL (ref 1.6–8.3)
NEUTROPHILS NFR BLD AUTO: 41 %
NRBC # BLD AUTO: 0 10E3/UL
NRBC BLD AUTO-RTO: 0 /100
PLATELET # BLD AUTO: 205 10E3/UL (ref 150–450)
POTASSIUM BLD-SCNC: 4 MMOL/L (ref 3.4–5.3)
PROT SERPL-MCNC: 7 G/DL (ref 6.8–8.8)
RBC # BLD AUTO: 4.34 10E6/UL (ref 4.4–5.9)
RETICS # AUTO: 0.08 10E6/UL (ref 0.03–0.1)
RETICS/RBC NFR AUTO: 1.8 % (ref 0.5–2)
SODIUM SERPL-SCNC: 140 MMOL/L (ref 133–144)
WBC # BLD AUTO: 8.9 10E3/UL (ref 4–11)

## 2022-04-27 PROCEDURE — 85025 COMPLETE CBC W/AUTO DIFF WBC: CPT | Performed by: PATHOLOGY

## 2022-04-27 PROCEDURE — 85045 AUTOMATED RETICULOCYTE COUNT: CPT | Performed by: PATHOLOGY

## 2022-04-27 PROCEDURE — 99213 OFFICE O/P EST LOW 20 MIN: CPT | Performed by: PLASTIC SURGERY

## 2022-04-27 PROCEDURE — 99213 OFFICE O/P EST LOW 20 MIN: CPT | Performed by: NURSE PRACTITIONER

## 2022-04-27 PROCEDURE — 80053 COMPREHEN METABOLIC PANEL: CPT | Performed by: PATHOLOGY

## 2022-04-27 PROCEDURE — 36415 COLL VENOUS BLD VENIPUNCTURE: CPT | Performed by: PATHOLOGY

## 2022-04-27 PROCEDURE — 82248 BILIRUBIN DIRECT: CPT | Performed by: PATHOLOGY

## 2022-04-27 ASSESSMENT — PAIN SCALES - GENERAL: PAINLEVEL: EXTREME PAIN (8)

## 2022-04-27 NOTE — TELEPHONE ENCOUNTER
PATIENT NEEDED TO BE RESCHEDULED DUE TO AN EMERGENT SURGERY SCHEDULED ON 5/2. Tried calling the patient but no VM is set up. Sent message on [employer].    RN Care Coordinator: Annalee Chavira; [phone] and/or Nathan Blair; [phone]      Surgery is scheduled with Dr. Leos on 5/4 at [facility]     H&P completed by NP clinic on 4/27     Surgical consult:   4/27 with Dr. Leos     COVID-19 test:   5/2 at [facility]     Post-op:   5/10 with Karen Duran PA-C     Patient will receive a phone call from pre-admission nurses 1-2 days prior to surgery with arrival and start time.     Patient will complete COVID-19 test that was scheduled by surgical coordinator 2-4 days prior to surgery.

## 2022-04-27 NOTE — LETTER
2022       RE: Bernard Padilla  [address]  [place] [address]     Dear Colleague,    Thank you for referring your patient, Bernard Padilla, to the [facility] PLASTIC AND RECONSTRUCTIVE SURGERY CLINIC [place] at [facility]. Please see a copy of my visit note below.      Service Date: 2022    PRESENTING COMPLAINT:  Preoperative visit for upcoming hidradenitis suppurativa I and D in the right groin and right perineal area scheduled for next week.    HISTORY OF PRESENTING COMPLAINT:  Mr. Padilla is 36 years old, well known to my service, here for his preoperative visit.  No change in history and physical exam.  Continues to smoke half pack a day.    ASSESSMENT AND PLAN:  Based on the above findings, a diagnosis of hidradenitis suppurativa was made.  Plan is to proceed with I and D of his 2 areas of concern.  He was made very clear that his active smoking increases his chance for wound healing complications.  Plan is to just let these heal in secondarily.  He will need to do dressing changes at home.  We will patch him into wound care clinic within the [place] system.  He understood it will take weeks for this to heal in.  All risks, benefits, and alternatives of the procedure including pain, infection, bleeding, scarring, asymmetry, seromas, hematomas, wound breakdown, wound dehiscence, chronic wound problems, requirement of further surgeries, injury to deeper structures, DVT, PE, MI, CVA, pneumonia, renal failure and death were all explained.  He understood them all and wants to proceed.  I look forward to helping him out in the near future.  All exam and discussion done in presence of my nurse, Annalee Chavira.    CURT Leos MD        D: 2022   T: 2022   MT: WILBERT    Name:     BERNARD PADILLA  MRN:      [id number]        Account:      [id number]   :      1985           Service Date: 2022        Document: [id number]

## 2022-04-27 NOTE — PROGRESS NOTES
[facility] [address]  [address] [facility] [address]  Phone: [phone]  Fax: [phone]  Primary Provider: Himanshu Villagran  Pre-op Performing Provider: YOLA FRIEDMAN      PREOPERATIVE EVALUATION:  Today's date: 4/27/2022    Bernard Padilla is a 36 year old male who presents for a preoperative evaluation.    Surgical Information:  Surgery/Procedure: INCISION AND DRAINAGE, ABSCESS,  and wound excision right groin and perineum  [place] [facility] [place]: [place]  Surgery Date: 5/2/22  Time of Surgery: 12:30pm  Where patient plans to recover: At home with family  Fax number for surgical facility: Note does not need to be faxed, will be available electronically in Epic.    Type of Anesthesia Anticipated: General    Assessment & Plan     The proposed surgical procedure is considered LOW risk.    Pre-op exam  Patient has hx of asthma, reports infrequent use of inhaler and lung sounds CTA today. Will check labs, if labs normal- patient cleared from primary care perspective. He denies currently taking suboxone.  - Basic metabolic panel; Future  - CBC with platelets; Future         Risks and Recommendations:  The patient has the following additional risks and recommendations for perioperative complications:  Social and Substance:    - Active nicotine user, advised smoking cessation  -Uses marijuana for pain relief, discuss with PCP to see if he qualifies for medicinal Rx.    Medication Instructions:  Patient is to take all scheduled medications on the day of surgery EXCEPT for modifications listed below:   - ibuprofen (Advil, Motrin): HOLD 1 day before surgery.     RECOMMENDATION:  APPROVAL GIVEN to proceed with proposed procedure pending review of diagnostic evaluation.    Review of external notes as documented above         Subjective     HPI related to upcoming procedure:  36 year old male presents for preop exam. Patient has history of  hydradenitis suppurativa and had a recent I&D of a perianal abscess on 3/17/22. Patient reached out to general surgery to due increased pain postop and is scheduled for INCISION AND DRAINAGE, ABSCESS,  and wound excision right groin and perineum on 5/2/22 with Dr. Leos. Patient has been using marijuana daily for pain control and ibuprofen/tylenol. Patient currently denies fevers, fatigue, nausea, vomiting, diarrhea, shortness of breath, CP.       Preop Questions 4/27/2022   1. Have you ever had a heart attack or stroke? No   2. Have you ever had surgery on your heart or blood vessels, such as a stent placement, a coronary artery bypass, or surgery on an artery in your head, neck, heart, or legs? No   3. Do you have chest pain with activity? No   4. Do you have a history of  heart failure? No   5. Do you currently have a cold, bronchitis or symptoms of other infection? No   6. Do you have a cough, shortness of breath, or wheezing? No   7. Do you or anyone in your family have previous history of blood clots? No   8. Do you or does anyone in your family have a serious bleeding problem such as prolonged bleeding following surgeries or cuts? No   9. Have you ever had problems with anemia or been told to take iron pills? No   10. Have you had any abnormal blood loss such as black, tarry or bloody stools? No   11. Have you ever had a blood transfusion? No   12. Are you willing to have a blood transfusion if it is medically needed before, during, or after your surgery? Yes   13. Have you or any of your relatives ever had problems with anesthesia? No   14. Do you have sleep apnea, excessive snoring or daytime drowsiness? No   15. Do you have any artifical heart valves or other implanted medical devices like a pacemaker, defibrillator, or continuous glucose monitor? No   16. Do you have artificial joints? No   17. Are you allergic to latex? No     Health Care Directive:  Patient does not have a Health Care Directive or  Living Will: Discussed advance care planning with patient; however, patient declined at this time.    Preoperative Review of :   reviewed - controlled substances prescribed by other outside provider(s).      Status of Chronic Conditions:  See problem list for active medical problems.  Problems all longstanding and stable, except as noted/documented.  See ROS for pertinent symptoms related to these conditions.      Review of Systems  CONSTITUTIONAL: NEGATIVE for fever, chills, change in weight  INTEGUMENTARY/SKIN: NEGATIVE for worrisome rashes, moles or lesions  EYES: NEGATIVE for vision changes or irritation  ENT/MOUTH: NEGATIVE for ear, mouth and throat problems  RESP: NEGATIVE for significant cough or SOB  CV: NEGATIVE for chest pain, palpitations or peripheral edema  GI: NEGATIVE for nausea, abdominal pain, heartburn, or change in bowel habits  : NEGATIVE for frequency, dysuria, or hematuria  MUSCULOSKELETAL: NEGATIVE for significant arthralgias or myalgia  NEURO: NEGATIVE for weakness, dizziness or paresthesias  ENDOCRINE: NEGATIVE for temperature intolerance, skin/hair changes  HEME: NEGATIVE for bleeding problems  PSYCHIATRIC: NEGATIVE for changes in mood or affect    Patient Active Problem List    Diagnosis Date Noted     Right rotator cuff tendinitis 12/09/2021     Priority: Medium     Influenza 03/01/2020     Priority: Medium     Abscess of groin, left 12/14/2019     Priority: Medium     Added automatically from request for surgery [id number]       Scrotal abscess 11/24/2019     Priority: Medium     Added automatically from request for surgery [id number]       Other chronic pain 09/10/2019     Priority: Medium     Hidradenitis suppurativa 01/23/2019     Priority: Medium     Allergic rhinitis 08/16/2018     Priority: Medium     Tobacco use disorder 08/08/2016     Priority: Medium     Patellofemoral arthritis of left knee 06/10/2016     Priority: Medium     Eczema 12/05/2012     Priority: Medium     Knee  pain, left 12/05/2012     Priority: Medium     Torn meniscus in 2009, s/p repair in 2010. Now with chronic pain.       Depression 07/21/2009     Priority: Medium      Past Medical History:   Diagnosis Date     Anxiety      Arthritis      Arthritis of knee      Boil      Chronic pain      Eczema      Hidradenitis suppurativa      Hydradenitis      Past Surgical History:   Procedure Laterality Date     EXCISE HIDRADENITIS (LOCATION) Bilateral 9/25/2020    Procedure: EXCISION, HIDRADENITIS - right medial thigh, left groin and posterior scrotum.;  Surgeon: Maura Maldonado MD;  Location: [facility] OR     IRRIGATION AND DEBRIDEMENT RECTUM, COMBINED N/A 11/14/2019    Procedure: IRRIGATION AND DEBRIDEMENT, RECTUM;  Surgeon: Yon Kenny MD;  Location: [facility] OR     KNEE SURGERY       ORTHOPEDIC SURGERY      meniscus repair     SKIN SURGERY       Current Outpatient Medications   Medication     acetaminophen (TYLENOL) 325 MG tablet     albuterol (PROAIR HFA/PROVENTIL HFA/VENTOLIN HFA) 108 (90 Base) MCG/ACT inhaler     augmented betamethasone dipropionate (DIPROLENE-AF) 0.05 % external ointment     dapsone (ACZONE) 100 MG tablet     fluocinolone acetonide (DERMA-SMOOTHE/FS BODY) 0.01 % external oil     Fluocinolone Acetonide Scalp (DERMA-SMOOTHE/FS SCALP) 0.01 % OIL oil     fluticasone (FLONASE) 50 MCG/ACT nasal spray     IBUPROFEN PO     loratadine (CLARITIN) 10 MG tablet     triamcinolone (KENALOG) 0.1 % external cream     Current Facility-Administered Medications   Medication     triamcinolone acetonide (KENALOG-10) injection 10 mg         Allergies   Allergen Reactions     Vicodin [Hydrocodone-Acetaminophen] Shortness Of Breath     Chicken-Derived Products (Egg) Nausea and Vomiting and GI Disturbance     Hydrocodone Swelling     Other reaction(s): Throat Irritation  Tolerated oxycodone   Upset stomach          Social History     Tobacco Use     Smoking status: Current Every Day Smoker     Packs/day: 1.00     Years:  "20.00     Pack years: 20.00     Types: Cigarettes, Cigarettes     Smokeless tobacco: Never Used     Tobacco comment: 1/2 pack of day, down from 1.5 pk/day   Substance Use Topics     Alcohol use: Yes     Comment: Beer occasionally/Social Use     Family History   Problem Relation Age of Onset     Hypertension Mother      Diabetes Father      Cancer No family hx of      Coronary Artery Disease No family hx of      Heart Disease No family hx of      Anesthesia Reaction No family hx of      Deep Vein Thrombosis No family hx of      Chronic Obstructive Pulmonary Disease Mother      Coronary Artery Disease Father      Colon Cancer No family hx of      Prostate Cancer No family hx of      History   Drug Use     Types: Marijuana     Comment: Occasional         Objective     /70 (BP Location: Right arm, Patient Position: Sitting, Cuff Size: Adult Large)   Pulse 70   Temp 97.8  F (36.6  C) (Oral)   Resp 18   Ht 1.702 m (5' 7\")   Wt 87.1 kg (192 lb)   SpO2 98%   BMI 30.07 kg/m          Physical Exam    GENERAL APPEARANCE: healthy, alert and no distress     EYES: EOMI,  PERRL     HENT: ear canals and TM's normal and nose and mouth without ulcers or lesions     NECK: no adenopathy, no asymmetry, masses, or scars and thyroid normal to palpation     RESP: lungs clear to auscultation - no rales, rhonchi or wheezes     CV: regular rates and rhythm, normal S1 S2, no S3 or S4 and no murmur, click or rub     ABDOMEN:  soft, nontender, no HSM or masses and bowel sounds normal     MS: extremities normal- no gross deformities noted, no evidence of inflammation in joints, FROM in all extremities.     SKIN: no suspicious lesions or rashes     NEURO: Normal strength and tone, sensory exam grossly normal, mentation intact and speech normal     PSYCH: mentation appears normal. and affect normal/bright     LYMPHATICS: No cervical adenopathy    Recent Labs   Lab Test 12/06/21  1318 12/21/20  1022 09/28/20  0932   HGB 14.4 15.8 12.7* "     --  172     --  138   POTASSIUM 4.2  --  3.8   CR 0.87  --  0.79        Diagnostics:  Labs pending at this time.  Results will be reviewed when available.   No EKG required, no history of coronary heart disease, significant arrhythmia, peripheral arterial disease or other structural heart disease.    Revised Cardiac Risk Index (RCRI):  The patient has the following serious cardiovascular risks for perioperative complications:   - No serious cardiac risks = 0 points     RCRI Interpretation: 0 points: Class I (very low risk - 0.4% complication rate)    All questions/concerns addressed. Patient stated understanding/agreement to plan of care.         Signed Electronically by: ROSEY Salgado CNP  Copy of this evaluation report is provided to requesting physician.

## 2022-04-27 NOTE — NURSING NOTE
"Chief Complaint   Patient presents with     YESSICA Wagner, is being seen today for a consult surgery DOS 5/2.       Vitals:    04/27/22 0854   BP: 117/76   BP Location: Left arm   Patient Position: Chair   Cuff Size: Adult Large   Pulse: 75   Temp: 97.8  F (36.6  C)   TempSrc: Oral   SpO2: 99%   Weight: 87.1 kg (192 lb)   Height: 1.71 m (5' 7.32\")       Body mass index is 29.79 kg/m .      Kizzy Augustine LPN    "

## 2022-04-27 NOTE — PROGRESS NOTES
Service Date: 2022    PRESENTING COMPLAINT:  Preoperative visit for upcoming hidradenitis suppurativa I and D in the right groin and right perineal area scheduled for next week.    HISTORY OF PRESENTING COMPLAINT:  Mr. Padilla is 36 years old, well known to my service, here for his preoperative visit.  No change in history and physical exam.  Continues to smoke half pack a day.    ASSESSMENT AND PLAN:  Based on the above findings, a diagnosis of hidradenitis suppurativa was made.  Plan is to proceed with I and D of his 2 areas of concern.  He was made very clear that his active smoking increases his chance for wound healing complications.  Plan is to just let these heal in secondarily.  He will need to do dressing changes at home.  We will patch him into wound care clinic within the [facility] system.  He understood it will take weeks for this to heal in.  All risks, benefits, and alternatives of the procedure including pain, infection, bleeding, scarring, asymmetry, seromas, hematomas, wound breakdown, wound dehiscence, chronic wound problems, requirement of further surgeries, injury to deeper structures, DVT, PE, MI, CVA, pneumonia, renal failure and death were all explained.  He understood them all and wants to proceed.  I look forward to helping him out in the near future.  All exam and discussion done in presence of my nurse, Annalee Chavira.    CURT Leos MD        D: 2022   T: 2022   MT: WILBERT    Name:     ZAHIRA PADILLA  MRN:      [id number]        Account:      [id number]   :      1985           Service Date: 2022       Document: [id number]

## 2022-04-29 DIAGNOSIS — L73.2 HIDRADENITIS SUPPURATIVA: ICD-10-CM

## 2022-04-29 RX ORDER — DAPSONE 100 MG/1
200 TABLET ORAL DAILY
Qty: 60 TABLET | Refills: 0 | Status: SHIPPED | OUTPATIENT
Start: 2022-04-29 | End: 2023-08-03

## 2022-05-02 ENCOUNTER — LAB (OUTPATIENT)
Dept: LAB | Facility: CLINIC | Age: 37
End: 2022-05-02
Attending: PLASTIC SURGERY
Payer: MEDICARE

## 2022-05-02 DIAGNOSIS — Z11.59 ENCOUNTER FOR SCREENING FOR OTHER VIRAL DISEASES: ICD-10-CM

## 2022-05-02 LAB — SARS-COV-2 RNA RESP QL NAA+PROBE: NEGATIVE

## 2022-05-02 PROCEDURE — U0003 INFECTIOUS AGENT DETECTION BY NUCLEIC ACID (DNA OR RNA); SEVERE ACUTE RESPIRATORY SYNDROME CORONAVIRUS 2 (SARS-COV-2) (CORONAVIRUS DISEASE [COVID-19]), AMPLIFIED PROBE TECHNIQUE, MAKING USE OF HIGH THROUGHPUT TECHNOLOGIES AS DESCRIBED BY CMS-2020-01-R: HCPCS

## 2022-05-02 PROCEDURE — U0005 INFEC AGEN DETEC AMPLI PROBE: HCPCS

## 2022-05-03 ENCOUNTER — ANESTHESIA EVENT (OUTPATIENT)
Dept: SURGERY | Facility: AMBULATORY SURGERY CENTER | Age: 37
End: 2022-05-03
Payer: MEDICARE

## 2022-05-04 ENCOUNTER — HOSPITAL ENCOUNTER (OUTPATIENT)
Facility: AMBULATORY SURGERY CENTER | Age: 37
Discharge: HOME | End: 2022-05-04
Attending: PLASTIC SURGERY
Payer: MEDICARE

## 2022-05-04 ENCOUNTER — ANESTHESIA (OUTPATIENT)
Dept: SURGERY | Facility: AMBULATORY SURGERY CENTER | Age: 37
End: 2022-05-04
Payer: MEDICARE

## 2022-05-04 VITALS
DIASTOLIC BLOOD PRESSURE: 65 MMHG | SYSTOLIC BLOOD PRESSURE: 102 MMHG | TEMPERATURE: 97.3 F | HEART RATE: 52 BPM | HEIGHT: 66 IN | BODY MASS INDEX: 30.53 KG/M2 | WEIGHT: 190 LBS | RESPIRATION RATE: 16 BRPM | OXYGEN SATURATION: 99 %

## 2022-05-04 DIAGNOSIS — L02.214 ABSCESS OF GROIN, LEFT: Primary | ICD-10-CM

## 2022-05-04 PROCEDURE — 11470 EXC SKN H/P/P/U SMPL/NTRM: CPT

## 2022-05-04 PROCEDURE — 11462 EXC SKN HDRDNT ING SMPL/NTRM: CPT | Mod: GC | Performed by: PLASTIC SURGERY

## 2022-05-04 PROCEDURE — 88304 TISSUE EXAM BY PATHOLOGIST: CPT | Mod: TC | Performed by: PLASTIC SURGERY

## 2022-05-04 PROCEDURE — 11462 EXC SKN HDRDNT ING SMPL/NTRM: CPT

## 2022-05-04 PROCEDURE — 11470 EXC SKN H/P/P/U SMPL/NTRM: CPT | Mod: GC | Performed by: PLASTIC SURGERY

## 2022-05-04 RX ORDER — ONDANSETRON 2 MG/ML
INJECTION INTRAMUSCULAR; INTRAVENOUS PRN
Status: DISCONTINUED | OUTPATIENT
Start: 2022-05-04 | End: 2022-05-04

## 2022-05-04 RX ORDER — FENTANYL CITRATE 50 UG/ML
INJECTION, SOLUTION INTRAMUSCULAR; INTRAVENOUS PRN
Status: DISCONTINUED | OUTPATIENT
Start: 2022-05-04 | End: 2022-05-04

## 2022-05-04 RX ORDER — CLINDAMYCIN PHOSPHATE 900 MG/50ML
900 INJECTION, SOLUTION INTRAVENOUS SEE ADMIN INSTRUCTIONS
Status: DISCONTINUED | OUTPATIENT
Start: 2022-05-04 | End: 2022-05-04 | Stop reason: HOSPADM

## 2022-05-04 RX ORDER — PROPOFOL 10 MG/ML
INJECTION, EMULSION INTRAVENOUS CONTINUOUS PRN
Status: DISCONTINUED | OUTPATIENT
Start: 2022-05-04 | End: 2022-05-04

## 2022-05-04 RX ORDER — FENTANYL CITRATE 50 UG/ML
25 INJECTION, SOLUTION INTRAMUSCULAR; INTRAVENOUS EVERY 5 MIN PRN
Status: DISCONTINUED | OUTPATIENT
Start: 2022-05-04 | End: 2022-05-04 | Stop reason: HOSPADM

## 2022-05-04 RX ORDER — TRAMADOL HYDROCHLORIDE 50 MG/1
50 TABLET ORAL EVERY 6 HOURS PRN
Qty: 10 TABLET | Refills: 0 | Status: SHIPPED | OUTPATIENT
Start: 2022-05-04 | End: 2022-05-04

## 2022-05-04 RX ORDER — LIDOCAINE HYDROCHLORIDE 20 MG/ML
INJECTION, SOLUTION INFILTRATION; PERINEURAL PRN
Status: DISCONTINUED | OUTPATIENT
Start: 2022-05-04 | End: 2022-05-04

## 2022-05-04 RX ORDER — SODIUM CHLORIDE, SODIUM LACTATE, POTASSIUM CHLORIDE, CALCIUM CHLORIDE 600; 310; 30; 20 MG/100ML; MG/100ML; MG/100ML; MG/100ML
INJECTION, SOLUTION INTRAVENOUS CONTINUOUS
Status: DISCONTINUED | OUTPATIENT
Start: 2022-05-04 | End: 2022-05-04 | Stop reason: HOSPADM

## 2022-05-04 RX ORDER — SODIUM CHLORIDE, SODIUM LACTATE, POTASSIUM CHLORIDE, CALCIUM CHLORIDE 600; 310; 30; 20 MG/100ML; MG/100ML; MG/100ML; MG/100ML
INJECTION, SOLUTION INTRAVENOUS CONTINUOUS PRN
Status: DISCONTINUED | OUTPATIENT
Start: 2022-05-04 | End: 2022-05-04

## 2022-05-04 RX ORDER — SODIUM CHLORIDE, SODIUM LACTATE, POTASSIUM CHLORIDE, CALCIUM CHLORIDE 600; 310; 30; 20 MG/100ML; MG/100ML; MG/100ML; MG/100ML
INJECTION, SOLUTION INTRAVENOUS CONTINUOUS
Status: DISCONTINUED | OUTPATIENT
Start: 2022-05-04 | End: 2022-05-05 | Stop reason: HOSPADM

## 2022-05-04 RX ORDER — KETOROLAC TROMETHAMINE 30 MG/ML
INJECTION, SOLUTION INTRAMUSCULAR; INTRAVENOUS PRN
Status: DISCONTINUED | OUTPATIENT
Start: 2022-05-04 | End: 2022-05-04

## 2022-05-04 RX ORDER — ONDANSETRON 4 MG/1
4-8 TABLET, ORALLY DISINTEGRATING ORAL EVERY 8 HOURS PRN
Qty: 4 TABLET | Refills: 0 | Status: SHIPPED | OUTPATIENT
Start: 2022-05-04

## 2022-05-04 RX ORDER — ONDANSETRON 4 MG/1
4 TABLET, ORALLY DISINTEGRATING ORAL EVERY 30 MIN PRN
Status: DISCONTINUED | OUTPATIENT
Start: 2022-05-04 | End: 2022-05-05 | Stop reason: HOSPADM

## 2022-05-04 RX ORDER — AMOXICILLIN 250 MG
1-2 CAPSULE ORAL 2 TIMES DAILY
Qty: 30 TABLET | Refills: 0 | Status: SHIPPED | OUTPATIENT
Start: 2022-05-04 | End: 2023-12-18

## 2022-05-04 RX ORDER — PROPOFOL 10 MG/ML
INJECTION, EMULSION INTRAVENOUS PRN
Status: DISCONTINUED | OUTPATIENT
Start: 2022-05-04 | End: 2022-05-04

## 2022-05-04 RX ORDER — MEPERIDINE HYDROCHLORIDE 25 MG/ML
12.5 INJECTION INTRAMUSCULAR; INTRAVENOUS; SUBCUTANEOUS
Status: DISCONTINUED | OUTPATIENT
Start: 2022-05-04 | End: 2022-05-05 | Stop reason: HOSPADM

## 2022-05-04 RX ORDER — FENTANYL CITRATE 50 UG/ML
25 INJECTION, SOLUTION INTRAMUSCULAR; INTRAVENOUS
Status: DISCONTINUED | OUTPATIENT
Start: 2022-05-04 | End: 2022-05-05 | Stop reason: HOSPADM

## 2022-05-04 RX ORDER — DEXAMETHASONE SODIUM PHOSPHATE 4 MG/ML
INJECTION, SOLUTION INTRA-ARTICULAR; INTRALESIONAL; INTRAMUSCULAR; INTRAVENOUS; SOFT TISSUE PRN
Status: DISCONTINUED | OUTPATIENT
Start: 2022-05-04 | End: 2022-05-04

## 2022-05-04 RX ORDER — ACETAMINOPHEN 325 MG/1
975 TABLET ORAL ONCE
Status: COMPLETED | OUTPATIENT
Start: 2022-05-04 | End: 2022-05-04

## 2022-05-04 RX ORDER — LIDOCAINE 40 MG/G
CREAM TOPICAL
Status: DISCONTINUED | OUTPATIENT
Start: 2022-05-04 | End: 2022-05-04 | Stop reason: HOSPADM

## 2022-05-04 RX ORDER — OXYCODONE HYDROCHLORIDE 5 MG/1
5 TABLET ORAL EVERY 6 HOURS PRN
Qty: 6 TABLET | Refills: 0 | Status: SHIPPED | OUTPATIENT
Start: 2022-05-04 | End: 2022-05-07

## 2022-05-04 RX ORDER — BUPIVACAINE HYDROCHLORIDE 2.5 MG/ML
INJECTION, SOLUTION EPIDURAL; INFILTRATION; INTRACAUDAL PRN
Status: DISCONTINUED | OUTPATIENT
Start: 2022-05-04 | End: 2022-05-04 | Stop reason: HOSPADM

## 2022-05-04 RX ORDER — ONDANSETRON 2 MG/ML
4 INJECTION INTRAMUSCULAR; INTRAVENOUS EVERY 30 MIN PRN
Status: DISCONTINUED | OUTPATIENT
Start: 2022-05-04 | End: 2022-05-05 | Stop reason: HOSPADM

## 2022-05-04 RX ORDER — CLINDAMYCIN PHOSPHATE 900 MG/50ML
900 INJECTION, SOLUTION INTRAVENOUS
Status: COMPLETED | OUTPATIENT
Start: 2022-05-04 | End: 2022-05-04

## 2022-05-04 RX ORDER — OXYCODONE HYDROCHLORIDE 5 MG/1
5 TABLET ORAL ONCE
Status: COMPLETED | OUTPATIENT
Start: 2022-05-04 | End: 2022-05-04

## 2022-05-04 RX ADMIN — ACETAMINOPHEN 975 MG: 325 TABLET ORAL at 12:24

## 2022-05-04 RX ADMIN — LIDOCAINE HYDROCHLORIDE 100 MG: 20 INJECTION, SOLUTION INFILTRATION; PERINEURAL at 14:38

## 2022-05-04 RX ADMIN — DEXAMETHASONE SODIUM PHOSPHATE 4 MG: 4 INJECTION, SOLUTION INTRA-ARTICULAR; INTRALESIONAL; INTRAMUSCULAR; INTRAVENOUS; SOFT TISSUE at 14:42

## 2022-05-04 RX ADMIN — ONDANSETRON 4 MG: 2 INJECTION INTRAMUSCULAR; INTRAVENOUS at 14:53

## 2022-05-04 RX ADMIN — KETOROLAC TROMETHAMINE 30 MG: 30 INJECTION, SOLUTION INTRAMUSCULAR; INTRAVENOUS at 14:56

## 2022-05-04 RX ADMIN — FENTANYL CITRATE 50 MCG: 50 INJECTION, SOLUTION INTRAMUSCULAR; INTRAVENOUS at 14:42

## 2022-05-04 RX ADMIN — SODIUM CHLORIDE, SODIUM LACTATE, POTASSIUM CHLORIDE, CALCIUM CHLORIDE: 600; 310; 30; 20 INJECTION, SOLUTION INTRAVENOUS at 14:32

## 2022-05-04 RX ADMIN — SODIUM CHLORIDE, SODIUM LACTATE, POTASSIUM CHLORIDE, CALCIUM CHLORIDE: 600; 310; 30; 20 INJECTION, SOLUTION INTRAVENOUS at 12:35

## 2022-05-04 RX ADMIN — PROPOFOL 200 MG: 10 INJECTION, EMULSION INTRAVENOUS at 14:38

## 2022-05-04 RX ADMIN — CLINDAMYCIN PHOSPHATE 900 MG: 900 INJECTION, SOLUTION INTRAVENOUS at 14:34

## 2022-05-04 RX ADMIN — OXYCODONE HYDROCHLORIDE 5 MG: 5 TABLET ORAL at 16:12

## 2022-05-04 RX ADMIN — PROPOFOL 200 MCG/KG/MIN: 10 INJECTION, EMULSION INTRAVENOUS at 14:39

## 2022-05-04 RX ADMIN — FENTANYL CITRATE 50 MCG: 50 INJECTION, SOLUTION INTRAMUSCULAR; INTRAVENOUS at 14:53

## 2022-05-04 RX ADMIN — PROPOFOL 50 MG: 10 INJECTION, EMULSION INTRAVENOUS at 14:39

## 2022-05-04 ASSESSMENT — LIFESTYLE VARIABLES: TOBACCO_USE: 1

## 2022-05-04 NOTE — ANESTHESIA PREPROCEDURE EVALUATION
Anesthesia Pre-Procedure Evaluation    Patient: Bernard Padilla   MRN: [id number] : 1985        Procedure : Procedure(s):  INCISION AND DRAINAGE, ABSCESS,  and wound excision right groin and perineum          Past Medical History:   Diagnosis Date     Anxiety      Arthritis      Arthritis of knee      Boil      Chronic pain      Eczema      Hidradenitis suppurativa      Hydradenitis      Mild intermittent asthma without complication       Past Surgical History:   Procedure Laterality Date     EXCISE HIDRADENITIS (LOCATION) Bilateral 2020    Procedure: EXCISION, HIDRADENITIS - right medial thigh, left groin and posterior scrotum.;  Surgeon: Maura Maldonado MD;  [facility]     IRRIGATION AND DEBRIDEMENT RECTUM, COMBINED N/A 2019    Procedure: IRRIGATION AND DEBRIDEMENT, RECTUM;  Surgeon: Yon Kenny MD;  Location: [facility] OR     KNEE SURGERY       ORTHOPEDIC SURGERY      meniscus repair     SKIN SURGERY        Allergies   Allergen Reactions     Vicodin [Hydrocodone-Acetaminophen] Shortness Of Breath     Chicken-Derived Products (Egg) Nausea and Vomiting and GI Disturbance     Hydrocodone Swelling     Other reaction(s): Throat Irritation  Tolerated oxycodone   Upset stomach        Social History     Tobacco Use     Smoking status: Current Every Day Smoker     Packs/day: 1.00     Years: 20.00     Pack years: 20.00     Types: Cigarettes, Cigarettes     Smokeless tobacco: Never Used     Tobacco comment: 1/2 pack of day, down from 1.5 pk/day   Substance Use Topics     Alcohol use: Yes     Comment: Beer occasionally/Social Use      Wt Readings from Last 1 Encounters:   22 86.2 kg (190 lb)        Anesthesia Evaluation   Pt has had prior anesthetic.         ROS/MED HX  ENT/Pulmonary:     (+) tobacco use, Current use, Intermittent, asthma Treatment: Inhaler prn,      Neurologic:       Cardiovascular:       METS/Exercise Tolerance:     Hematologic:       Musculoskeletal:        GI/Hepatic:       Renal/Genitourinary:       Endo:     (+) Obesity,     Psychiatric/Substance Use:     (+) Recreational drug usage: Cannabis.    Infectious Disease:       Malignancy:       Other:            Physical Exam    Airway        Mallampati: II   TM distance: > 3 FB   Neck ROM: full   Mouth opening: > 3 cm    Respiratory Devices and Support         Dental  no notable dental history         Cardiovascular   cardiovascular exam normal          Pulmonary   pulmonary exam normal                OUTSIDE LABS:  CBC:   Lab Results   Component Value Date    WBC 8.9 04/27/2022    WBC 8.4 12/06/2021    HGB 13.4 04/27/2022    HGB 14.4 12/06/2021    HCT 39.4 (L) 04/27/2022    HCT 42.9 12/06/2021     04/27/2022     12/06/2021     BMP:   Lab Results   Component Value Date     04/27/2022     12/06/2021    POTASSIUM 4.0 04/27/2022    POTASSIUM 4.2 12/06/2021    CHLORIDE 107 04/27/2022    CHLORIDE 107 12/06/2021    CO2 28 04/27/2022    CO2 24 12/06/2021    BUN 10 04/27/2022    BUN 12 12/06/2021    CR 1.03 04/27/2022    CR 0.87 12/06/2021    GLC 86 04/27/2022    GLC 57 (LL) 12/06/2021     COAGS: No results found for: PTT, INR, FIBR  POC:   Lab Results   Component Value Date    BGM 87 09/25/2020     HEPATIC:   Lab Results   Component Value Date    ALBUMIN 3.5 04/27/2022    PROTTOTAL 7.0 04/27/2022    ALT 37 04/27/2022    AST 21 04/27/2022    ALKPHOS 126 04/27/2022    BILITOTAL 0.6 04/27/2022     OTHER:   Lab Results   Component Value Date    LACT 0.6 (L) 12/05/2017    CHOLO 9.2 04/27/2022    PHOS 1.9 (L) 03/01/2020    MAG 2.2 03/01/2020    TSH 1.51 12/06/2021    CRP 0.1 06/18/2020       Anesthesia Plan    ASA Status:  2   NPO Status:  NPO Appropriate    Anesthesia Type: General.     - Airway: LMA   Induction: Intravenous.   Maintenance: Balanced.        Consents    Anesthesia Plan(s) and associated risks, benefits, and realistic alternatives discussed. Questions answered and patient/representative(s)  expressed understanding.     - Discussed: Risks, Benefits and Alternatives for BOTH SEDATION and the PROCEDURE were discussed     - Discussed with:  Patient         Postoperative Care    Pain management: IV analgesics, Oral pain medications, Multi-modal analgesia.   PONV prophylaxis: Background Propofol Infusion, Dexamethasone or Solumedrol, Ondansetron (or other 5HT-3)     Comments:                MICHEAL BOND MD

## 2022-05-04 NOTE — ANESTHESIA POSTPROCEDURE EVALUATION
Patient: Bernard Padilla    Procedure: Procedure(s):  INCISION AND closure  ABSCESS,  and wound excision right groin and perineum       Anesthesia Type:  General    Note:  Disposition: Outpatient   Postop Pain Control: Uneventful            Sign Out: Well controlled pain   PONV:    Neuro/Psych: Uneventful            Sign Out: Acceptable/Baseline neuro status   Airway/Respiratory: Uneventful            Sign Out: Acceptable/Baseline resp. status   CV/Hemodynamics: Uneventful            Sign Out: Acceptable CV status; No obvious hypovolemia; No obvious fluid overload   Other NRE:    DID A NON-ROUTINE EVENT OCCUR?            Last vitals:  Vitals Value Taken Time   BP 95/54 05/04/22 1545   Temp 36.2  C (97.1  F) 05/04/22 1515   Pulse 54 05/04/22 1553   Resp 14 05/04/22 1553   SpO2 98 % 05/04/22 1553   Vitals shown include unvalidated device data.    Electronically Signed By: Anjel Jackson MD  May 4, 2022  4:00 PM

## 2022-05-04 NOTE — ANESTHESIA CARE TRANSFER NOTE
Patient: Bernard Padilla    Procedure: Procedure(s):  INCISION AND closure  ABSCESS,  and wound excision right groin and perineum       Diagnosis: Hidradenitis suppurativa [L73.2]  Diagnosis Additional Information: No value filed.    Anesthesia Type:   General     Note:    Oropharynx: oropharynx clear of all foreign objects  Level of Consciousness: drowsy  Oxygen Supplementation: face mask  Level of Supplemental Oxygen (L/min / FiO2): 6  Independent Airway: airway patency satisfactory and stable  Dentition: dentition unchanged  Vital Signs Stable: post-procedure vital signs reviewed and stable  Report to RN Given: handoff report given  Patient transferred to: PACU    Handoff Report: Identifed the Patient, Identified the Reponsible Provider, Reviewed the pertinent medical history, Discussed the surgical course, Reviewed Intra-OP anesthesia mangement and issues during anesthesia, Set expectations for post-procedure period and Allowed opportunity for questions and acknowledgement of understanding      Vitals:  Vitals Value Taken Time   BP     Temp     Pulse     Resp     SpO2         Electronically Signed By: ROSEY Lincoln CRNA  May 4, 2022  3:17 PM

## 2022-05-04 NOTE — DISCHARGE INSTRUCTIONS
WOUND EXCISION POST-OPERATIVE INSTRUCTIONS    Instructions      Have someone drive you home after surgery and help you at home for 1-2      days.     Get plenty of rest.     Follow balanced diet.     Decreased activity may promote constipation, so you may want to add      more raw fruit to your diet, and be sure to increase fluid intake.     Take pain medication as prescribed. Do not take aspirin or any products      containing aspirin.     Do not drink alcohol when taking pain medications.     Even when not taking pain medications, no alcohol for 3 weeks as it      causes fluid retention.     If you are taking vitamins with iron, resume these as tolerated.     Do not smoke, as smoking delays healing and increases the risk of      complications.    Activities     Do not drive until you are no longer taking any pain medications      (narcotics).     Start walking as soon as possible, this helps to reduce swelling and       lowers the chance of blood clots.     Resume normal activities gradually.     Return to work in 1-2 days, depending upon extent of surgery     No strenuous work or stress on wound for 2-3 weeks.    Incision Care     Pack wounds with saline gauze twice daily. May shower from tomorrow.      Avoid exposing scars to sun for at least 12 months.     Always use a strong sunblock, if sun exposure is unavoidable (SPF 50 or      greater).     Inspect daily for signs of infection.     No tub soaking, bathing, or swimming while sutures or drains are in place.     Keep area clean and dry for first 24 hours.     What to Expect     Some swelling and bruising.     May have slight bleeding from incision.  Apply 4 x 4 gauze with slight pressure to       control bleeding.     Skin grafts and flaps may take several weeks or months to heal; a support garment or      bandage may be necessary for up to a year.    Appearance     Final results of surgery may take a year or more.    Follow-Up Care     If external sutures  have been used, they will be removed in 5-14 days.    Please note my office will call you 1-2 business days after your procedure to check up on how you're doing and to schedule your post-operative appointment.        When to Call     If you have increased swelling or bruising.     If swelling and redness persist after a few days.     If you have increased redness along the incision.     If you have severe or increased pain not relieved by medication.     If you have any side effects to medications; such as, rash, nausea,      headache, vomiting.     If you have an oral temperature over 100.4 degrees.     If you have any yellowish or greenish drainage from the incisions or      notice a foul odor.     If you have bleeding from the incisions that is difficult to control with      light pressure.     If you have loss of feeling or motion.    For Medical Questions, Please Call:     [phone], Monday - Friday, 8 a.m. - 4:30 p.m.     After hours and on weekends, call Hospital Paging at [phone] and      ask for the Plastic Surgeon on call.              [facility]  Home Care Following Anesthesia  For 24 hours after surgery:  Get plenty of rest.  A responsible adult must stay with you for at least 24 hours after you leave the surgery center.  Do not drive or use heavy equipment.  If you have weakness or tingling, don't drive or use heavy equipment until this feeling goes away.   Do not drink alcohol.   Avoid strenuous or risky activities.  Ask for help when climbing stairs.  You may feel lightheaded.  IF so, sit for a few minutes before standing.  Have someone help you get up.   If you have nausea (feel sick to your stomach): Drink only clear liquids such as apple juice, ginger ale, broth or 7-Up.  Rest may also help.  Be sure to drink enough fluids.  Move to a regular diet as you feel able.   You may have a slight fever.  Call the doctor if your fever is over 100 F (37.7 C)  (taken under the tongue) or lasts longer than 24 hours.  You may have a dry mouth, a sore throat, muscle aches or trouble sleeping. These should go away after 24 hours.  Do not make important or legal decisions.   It is recommended to avoid smoking.               Tips for taking pain medications  To get the best pain relief possible, remember these points:  Take pain medications as directed, before pain becomes severe.  Pain medication can upset your stomach: taking it with food may help.  Constipation is a common side effect of pain medication. Drink plenty of  fluids.  Eat foods high in fiber. Take a stool softener if recommended by your doctor or pharmacist.  Do not drink alcohol, drive or operate machinery while taking pain medications.  Ask about other ways to control pain, such as with heat, ice or relaxation.    Tylenol/Acetaminophen Consumption  To help encourage the safe use of acetaminophen, the makers of TYLENOL  have lowered the maximum daily dose for single-ingredient Extra Strength TYLENOL  (acetaminophen) products sold in the [place] from 8 pills per day (4,000 mg) to 6 pills per day (3,000 mg). The dosing interval has also changed from 2 pills every 4-6 hours to 2 pills every 6 hours.  If you feel your pain relief is insufficient, you may take Tylenol/Acetaminophen in addition to your narcotic pain medication.   Be careful not to exceed 3,000 mg of Tylenol/Acetaminophen in a 24 hour period from all sources.  If you are taking extra strength Tylenol/acetaminophen (500 mg), the maximum dose is 6 tablets in 24 hours.  If you are taking regular strength acetaminophen (325 mg), the maximum dose is 9 tablets in 24 hours.    Call a doctor for any of the following:  Signs of infection (fever, growing tenderness at the surgery site, a large amount of drainage or bleeding, severe pain, foul-smelling drainage, redness, swelling).  It has been over 8 to 10 hours since surgery and you are still not able to urinate  (pass water).  Headache for over 24 hours.  Signs of Covid-19 infection (temperature over 100 degrees, shortness of breath, cough, loss of taste/smell, generalized body aches, persistent headache, chills, sore throat, nausea/vomiting/diarrhea)      Your doctor is:  Dr. Cari Leos, Plastic Surgery: [phone] (Monday-Friday 8-4)  Or for After Hours concerns: dial [phone] and ask for the resident on call for:  Plastics  For emergency care, call the:  [facility]:  [phone] (TTY for hearing impaired: [phone])

## 2022-05-04 NOTE — BRIEF OP NOTE
[facility]    Brief Operative Note    Pre-operative diagnosis: Hidradenitis suppurativa [L73.2]  Post-operative diagnosis Same as pre-operative diagnosis    Procedure: Procedure(s):  INCISION AND closure  ABSCESS,  and wound excision right groin and perineum  Surgeon: Surgeon(s) and Role:     * CURT Leos MD - Primary     * Donald Martin MD - Fellow - Assisting  Anesthesia: General   Estimated Blood Loss: 5cc    Drains: None  Specimens:   ID Type Source Tests Collected by Time Destination   1 : Perineal Wound Tissue Perineum SURGICAL PATHOLOGY EXAM CURT eLos MD 5/4/2022  2:52 PM    2 : Left Groin Wound Tissue Groin, Left SURGICAL PATHOLOGY EXAM CURT Leos MD 5/4/2022  2:52 PM      Findings:   None, perineal and left groin scarred sinus tracts excised and closed primarily   Complications: None.  Implants: * No implants in log *    Discharge from PACU when able

## 2022-05-05 ENCOUNTER — PATIENT OUTREACH (OUTPATIENT)
Dept: PLASTIC SURGERY | Facility: CLINIC | Age: 37
End: 2022-05-05
Payer: MEDICARE

## 2022-05-05 NOTE — TELEPHONE ENCOUNTER
Pt informed, no narcotic refills at this time per Dr Leos.     Pt states he will plan to proceed to ER, has pain doctor but does not see them until the end of the month. Call disconnected.

## 2022-05-05 NOTE — OP NOTE
Procedure Date: 2022    PREOPERATIVE DIAGNOSIS:  Hidradenitis suppurativa of the perineum and left groin.    POSTOPERATIVE DIAGNOSIS:  Hidradenitis suppurativa of the perineum and left groin.    PROCEDURES:    1.  Perineal wound excision with simple closure, total length, 2 cm.  2.  Left groin wound excision including skin and subcutaneous tissue and layered closure, total length 4 cm.    SURGEON:  CRYSTAL Leos MD    RESIDENT:  Donald Martin MD    ANESTHESIA:  General anesthesia, LMA.    COMPLICATIONS:  Nil.    DRAINS:  Nil.    SPECIMENS:    1.  Perineal wound.  2.  Left groin wound.    BLOOD LOSS:  5 mL.    DESCRIPTION OF PROCEDURE:  After informed consent was taken from the patient and the proper site and procedure was ascertained with him and he was appropriately marked, he was taken to the operating room.  He was placed in the supine position and general anesthesia administered without any complications.  He was then placed in the lithotomy position.  Preoperative antibiotics were given.  He was prepped and draped in standard surgical fashion.  I began by first excising the wound on the perineum and on the left groin, both with skin and subcutaneous tissues.  Hemostasis was ensured.  The perineal wound was closed with 2-0 Monocryl suture in an interrupted horizontal mattress fashion, and the groin wound was closed in layers using 2-0 Monocryl suture in a deep dermal layer and 2-0 Monocryl suture in interrupted horizontal mattress fashion.  Then 0.25% plain Marcaine was injected.  Dressings were placed.  The patient tolerated the procedure well.  All counts were correct at the end of the case.  The patient was extubated and sent to recovery room in stable condition.    CURT Leos MD        D: 2022   T: 2022   MT: [facility]    Name:     ZAHIRA MATUTE  MRN:      [id number]        Account:        [id number]   :      1985           Procedure Date: 2022      Document: [id number]

## 2022-05-05 NOTE — TELEPHONE ENCOUNTER
Bernard had surgery yesterday 5/4 for excision and closure of HS lesions in groin and perineum.    He is asking for oxycodone refill, states he is in 10/10 pain with sitting and lying down (lying because he has back problems, and sitting because of the surgical site pain). He had a BM this morning and sitting on the toilet was highly painful for him.    He is using 800mg ibuprofen every 8 hours and 1,000mg tylenol every 6 hours, taking that at the same time as the oxycodone.     Pharmacy is [facility] as entered. See separate request message from the pharmacy

## 2022-05-05 NOTE — PROGRESS NOTES
"Plastic Surgery Outpatient Visit    ID: Bernard Padilla is a 36 year old male S/P HS excision of perineum and L groin 5/4 with Dr. Leos.     S: complains of pain to surgical site. Taking 800mg ibuprofen and tylenol. Requesting pain meds. Has pain medicine provider but not seeing him until the end of the month.     O:  /79 (BP Location: Left arm, Patient Position: Chair, Cuff Size: Adult Large)   Pulse 85   Temp 98.6  F (37  C) (Oral)   Ht 1.702 m (5' 7\")   Wt 87.5 kg (193 lb)   SpO2 99%   BMI 30.23 kg/m     General: NAD  Perineum excision c/d/i, L groin incision c/d/i. No open areas. No erythema.     PATH:  Final Diagnosis   A.  Perineal wound tissue, excision:  - Benign skin with acute and chronic inflammation consistent with hidradenitis    B.  Left groin wound tissue, excision:  - Benign skin with acute and chronic inflammation consistent with hidradenitis     A/P:  -healing well  -shower daily  -tylenol/ibuprofen for pain. Offered small Rx of tramadol, pt declined. Discussed that for skin excision we do not offer extended narcotic pain medication. Recommend sooner follow up with pain medicine clinic.  RTC 2 weeks with Dr. Leos    -visit performed with chaperone Kizzy Duran PA-C  Plastic and Reconstructive Surgery    10 minutes spent on the date of the encounter doing chart review, history and physical, dressing changes, documentation and further activity as noted above.    "

## 2022-05-09 LAB
PATH REPORT.COMMENTS IMP SPEC: NORMAL
PATH REPORT.COMMENTS IMP SPEC: NORMAL
PATH REPORT.FINAL DX SPEC: NORMAL
PATH REPORT.GROSS SPEC: NORMAL
PATH REPORT.MICROSCOPIC SPEC OTHER STN: NORMAL
PATH REPORT.RELEVANT HX SPEC: NORMAL
PHOTO IMAGE: NORMAL

## 2022-05-09 PROCEDURE — 88304 TISSUE EXAM BY PATHOLOGIST: CPT | Mod: 26 | Performed by: PATHOLOGY

## 2022-05-10 ENCOUNTER — OFFICE VISIT (OUTPATIENT)
Dept: PLASTIC SURGERY | Facility: CLINIC | Age: 37
End: 2022-05-10
Payer: MEDICARE

## 2022-05-10 VITALS
SYSTOLIC BLOOD PRESSURE: 117 MMHG | WEIGHT: 193 LBS | DIASTOLIC BLOOD PRESSURE: 79 MMHG | OXYGEN SATURATION: 99 % | HEART RATE: 85 BPM | BODY MASS INDEX: 30.29 KG/M2 | TEMPERATURE: 98.6 F | HEIGHT: 67 IN

## 2022-05-10 DIAGNOSIS — L73.2 HIDRADENITIS SUPPURATIVA: Primary | ICD-10-CM

## 2022-05-10 PROCEDURE — 99024 POSTOP FOLLOW-UP VISIT: CPT | Performed by: PHYSICIAN ASSISTANT

## 2022-05-10 ASSESSMENT — PAIN SCALES - GENERAL: PAINLEVEL: WORST PAIN (10)

## 2022-05-10 NOTE — NURSING NOTE
"Chief Complaint   Patient presents with     YESSICA Wagner, is being seen today for a 1 week post op DOS 5/4.       Vitals:    05/10/22 1151   BP: 117/79   BP Location: Left arm   Patient Position: Chair   Cuff Size: Adult Large   Pulse: 85   Temp: 98.6  F (37  C)   TempSrc: Oral   SpO2: 99%   Weight: 87.5 kg (193 lb)   Height: 1.702 m (5' 7\")       Body mass index is 30.23 kg/m .      Kizzy Augustine LPN    "

## 2022-05-10 NOTE — LETTER
"5/10/2022       RE: Bernard Padilla  [address]  [place] [address]     Dear Colleague,    Thank you for referring your patient, Bernard Padilla, to the [facility] PLASTIC AND RECON[facility] SURGERY CLINIC [place] [facility]. Please see a copy of my visit note below.    Plastic Surgery Outpatient Visit    ID: Bernard Padilla is a 36 year old male S/P HS excision of perineum and L groin 5/4 with Dr. Leos.     S: complains of pain to surgical site. Taking 800mg ibuprofen and tylenol. Requesting pain meds. Has pain medicine provider but not seeing him until the end of the month.     O:  /79 (BP Location: Left arm, Patient Position: Chair, Cuff Size: Adult Large)   Pulse 85   Temp 98.6  F (37  C) (Oral)   Ht 1.702 m (5' 7\")   Wt 87.5 kg (193 lb)   SpO2 99%   BMI 30.23 kg/m     General: NAD  Perineum excision c/d/i, L groin incision c/d/i. No open areas. No erythema.     PATH:  Final Diagnosis   A.  Perineal wound tissue, excision:  - Benign skin with acute and chronic inflammation consistent with hidradenitis    B.  Left groin wound tissue, excision:  - Benign skin with acute and chronic inflammation consistent with hidradenitis     A/P:  -healing well  -shower daily  -tylenol/ibuprofen for pain. Offered small Rx of tramadol, pt declined. Discussed that for skin excision we do not offer extended narcotic pain medication. Recommend sooner follow up with pain medicine clinic.  RTC 2 weeks with Dr. Leos    -visit performed with chaperone Kizzy Duran PA-C  Plastic and Reconstructive Surgery      10 minutes spent on the date of the encounter doing chart review, history and physical, dressing changes, documentation and further activity as noted above.  "

## 2022-05-11 ENCOUNTER — PATIENT OUTREACH (OUTPATIENT)
Dept: CARE COORDINATION | Facility: CLINIC | Age: 37
End: 2022-05-11
Payer: MEDICARE

## 2022-05-11 NOTE — LETTER
[facility]  Patient Centered Plan of Care  About Me:        Patient Name:  Bernard Padilla    YOB: 1985  Age:         36 year old   Alpesh MRN:    [id number] Telephone Information:  Home Phone [phone]   Mobile [phone]       Address:  [address] [place] [address] Email address:  [email or web address]      Emergency Contact(s)    Name Relationship Lgl Grd Work Phone Home Phone Mobile Phone   1. DINO URIAS Sister   [phone]   2. DINO URIAS Other   [phone]            Primary language:  English     needed? No   [facility]:  [phone] op. 1  Other communication barriers:None    Preferred Method of Communication:     Current living arrangement: I live in a private home with family (two daughters)    Mobility Status/ Medical Equipment: Independent        Health Maintenance  Health Maintenance Reviewed: Due/Overdue       My Access Plan  Medical Emergency 911   Primary Clinic Line [facility] - [phone]   24 Hour Appointment Line [phone] or  [phone]) (toll-free)   24 Hour Nurse Line [phone] (toll-free)   Preferred Urgent Care [facility], [phone]     [facility]  [phone]     Preferred Pharmacy [facility] [place] - [address]     Behavioral Health Crisis Line The National Suicide Prevention Lifeline at 1-436.859.6375 or 911             My Care Team Members  Patient Care Team       Relationship Specialty Notifications Start End    Himanshu Villagran MD PCP - General Family Medicine Admissions 8/9/21     Phone: [phone] Fax: [phone]         [address] [place] [address]    Apolinar Ruiz MD MD Dermatology  9/11/20     Phone: [phone] Fax: [phone] [address] [facility] [address]    Apolinar Ruiz MD Assigned Surgical  Provider   10/23/20     Phone: [phone] Fax: [phone] [address] [facility] [address]    Joe Henderson MD Assigned Pulmonology Provider   7/16/21     Phone: [phone] Fax: [phone] [address] [facility] [address]    Hudson Mccarthy Community Health Worker Primary Care - CC Admissions 8/12/21     Phone: [phone] Fax: [phone] [address] [place] [address]    Himanshu Villagran MD Assigned PCP   8/15/21     Phone: [phone] Fax: [phone] [address] [place] [address]    Jerry Smith [facility] Lead Care Coordinator Primary Care - CC Admissions 8/18/21     Lisbeth Bynum RN Clinic Care Coordinator Primary Care - CC  9/24/21     Chai Foster MD Assigned Behavioral Health Provider   12/19/21     Phone: [phone] Fax: [phone]         [address] [place] [address]            My Care Plans  Self Management and Treatment Plan  Goals and (Comments)   Goals        General     Medical (pt-stated)      Notes - Note edited  4/14/2022  2:51 PM by Hudson Mccarthy     Goal Statement- I would like to schedule and attend an evaluation and eye exam within 2-3 months   DateGoal set: 12/16/21 Updated 3-10-22  Barriers: access  Strengths: Patient engagement, provider identified   Date to Achieve By: 5-2022  Patient expressed understanding of goal: yes    Action stepsto achieve this goal  1.  I will call to [facility] Linkage Line [phone] for support to clarify insurance coverage for eye appt.  [facility] [address] [address]  [phone]  STORE HOURS  Mon     10:00 AM - 6:00 PM  Tue      10:00 AM - 6:00 PM  Wed     10:00 AM - 6:00 PM  Thu      10:00 AM - 6:00 PM  Fri        9:30 AM - 5:00 PM  Sat       9:30 AM - 3:00 PM  Sun      Closed     2.  I will call CommunityHealth Worker or send MyChart message if I need additional support or resources     Updated: 4-14-22 AL                  Action Plans on File:                       Advance Care Plans/Directives Type:   No data recorded    My Medical and Care Information  Problem List   Patient Active Problem List   Diagnosis     Hidradenitis suppurativa     Tobacco use disorder     Patellofemoral arthritis of left knee     Eczema     Allergic rhinitis     Depression     Influenza     Knee pain, left     Other chronic pain     Scrotal abscess     Abscess of groin, left     Right rotator cuff tendinitis      Current Medications and Allergies:  See printed Medication Report.    Care Coordination Start Date: 9/1/2020   Frequency of Care Coordination: monthly     Form Last Updated: 05/11/2022

## 2022-05-12 DIAGNOSIS — L40.9 PSORIASIS: ICD-10-CM

## 2022-05-13 ENCOUNTER — PATIENT OUTREACH (OUTPATIENT)
Dept: CARE COORDINATION | Facility: CLINIC | Age: 37
End: 2022-05-13
Payer: MEDICARE

## 2022-05-13 RX ORDER — BETAMETHASONE DIPROPIONATE 0.5 MG/G
OINTMENT, AUGMENTED TOPICAL 2 TIMES DAILY
Qty: 50 G | Refills: 3 | Status: SHIPPED | OUTPATIENT
Start: 2022-05-13 | End: 2022-08-25

## 2022-05-13 NOTE — TELEPHONE ENCOUNTER
augmented betamethasone dipropionate (DIPROLENE-AF) 0.05 % external ointment  Last Written Prescription Date:   8/19/2021  Last Fill Quantity: 50,   # refills: 3  Last Office Visit :  3/10/2022  Future Office visit:  6/23/2022  50 g, 3 refills sent to pharm 5/13/2022      Irina Izquierdo RN  Central Triage Red Flags/Med Refills

## 2022-05-13 NOTE — PROGRESS NOTES
Clinic Care Coordination Contact    Situation: Patient chart reviewed by care coordinator.    Background: CCC team role review, RN CC added as lead    Assessment: CHW engaged with patient and support progress to goal    Plan/Recommendations:  CC will review in 4-6 weeks, available sooner as needed.

## 2022-05-17 NOTE — PROGRESS NOTES
Colon and Rectal Surgery Consult Clinic Note    Referring provider:  CURT Leos MD  [address]  [place] [address]       RE: Bernard Padilla  : 1985  JOHNNY: 2022      Bernard Padilla is a very pleasant 36 year old male who presents today for perianal hidradenitis suppurativa.    HISTORY OF PRESENT ILNESS:  Bernard has followed with Dr. Apolinar Ruiz of dermatology for hidradenitis and is on dapsone. He was diagnosed in 2016 and did not respond to antibiotics. He has had multiple surgeries involving his axilla, buttock, and groins-last on 22 with Dr. Leos with perineal wound excision with closure and left groin wound excision. He saw Dr. Kenny back in 2019 with I&D of a perianal abscess.  He has a boil by his anus that flares up a few times a year. He does not think it ever drains. When it flares up he has pain with sitting and using the bathroom. It is currently settled down and he is asymptomatic. He has had 3-4 anorectal procedures for perianal HS in the past and is interested in having something more definitive done.     PLEASE SEE NOTE BELOW FOR PHYSICAL EXAMINATION, REVIEW OF SYSTEMS, AND OTHER HISTORY.    Assessment/Plan: 36 year old male with hidradenitis suppurativa.  On exam he has an old scar in the right posterior position and some more recent appearing scarring in the left lateral position. No open areas. We had a long discussion regarding options for his perianal HS. His disease looks fairly mild on exam today so I would favor further medical management with a biologic if Dr. Ruiz is okay with this. However, if they decide that biologics are not the right choice for him, either a local excision or possibly more extensive resection with flap would be an option. I would like to get a 3T MRI to ensure there is no true perianal fistula. Will follow up with him again after his meeting with Dr. Ruiz to discuss further. Patient's questions were answered to  his stated satisfaction and he is in agreement with this plan.    20 minutes spent on the date of encounter (excluding time performing procedures) performing chart review, history and exam, documentation and further activities as noted above with without additional procedures performed.      Marcie Krishna MD  Colon and Rectal Surgery Staff  [facility]      -------------------------------------------------------------------------------------------------------------------          Medical history:  Past Medical History:   Diagnosis Date     Anxiety      Arthritis      Arthritis of knee      Boil      Chronic pain      Eczema      Hidradenitis suppurativa      Hydradenitis      Mild intermittent asthma without complication        Surgical history:  Past Surgical History:   Procedure Laterality Date     EXCISE HIDRADENITIS (LOCATION) Bilateral 9/25/2020    Procedure: EXCISION, HIDRADENITIS - right medial thigh, left groin and posterior scrotum.;  Surgeon: Maura Maldonado MD;  [facility]     INCISION AND DRAINAGE, ABSCESS, SIMPLE N/A 5/4/2022    Procedure: INCISION AND closure  ABSCESS,  and wound excision right groin and perineum;  Surgeon: CURT Leos MD;  Location: [facility] OR     IRRIGATION AND DEBRIDEMENT RECTUM, COMBINED N/A 11/14/2019    Procedure: IRRIGATION AND DEBRIDEMENT, RECTUM;  Surgeon: Yon Kenny MD;  [facility] OR     KNEE SURGERY       ORTHOPEDIC SURGERY      meniscus repair     SKIN SURGERY         Problem list:    Patient Active Problem List    Diagnosis Date Noted     Right rotator cuff tendinitis 12/09/2021     Priority: Medium     Influenza 03/01/2020     Priority: Medium     Abscess of groin, left 12/14/2019     Priority: Medium     Added automatically from request for surgery [id number]       Scrotal abscess 11/24/2019     Priority: Medium     Added automatically from request for surgery [id number]       Other chronic pain 09/10/2019      Priority: Medium     Hidradenitis suppurativa 01/23/2019     Priority: Medium     Allergic rhinitis 08/16/2018     Priority: Medium     Tobacco use disorder 08/08/2016     Priority: Medium     Patellofemoral arthritis of left knee 06/10/2016     Priority: Medium     Eczema 12/05/2012     Priority: Medium     Knee pain, left 12/05/2012     Priority: Medium     Torn meniscus in 2009, s/p repair in 2010. Now with chronic pain.       Depression 07/21/2009     Priority: Medium       Medications:  Current Outpatient Medications   Medication Sig Dispense Refill     acetaminophen (TYLENOL) 325 MG tablet Take 650 mg by mouth every 4 hours as needed       albuterol (PROAIR HFA/PROVENTIL HFA/VENTOLIN HFA) 108 (90 Base) MCG/ACT inhaler Inhale 2 puffs into the lungs every 6 hours 8.5 g 3     augmented betamethasone dipropionate (DIPROLENE-AF) 0.05 % external ointment Apply topically 2 times daily 50 g 3     dapsone (ACZONE) 100 MG tablet Take 2 tablets (200 mg) by mouth daily 60 tablet 0     fluocinolone acetonide (DERMA-SMOOTHE/FS BODY) 0.01 % external oil Apply topically 2 times daily 120 mL 2     Fluocinolone Acetonide Scalp (DERMA-SMOOTHE/FS SCALP) 0.01 % OIL oil Apply topically daily as instructed. 118.28 mL 5     fluticasone (FLONASE) 50 MCG/ACT nasal spray Spray 1-2 sprays into both nostrils daily 48 g 2     IBUPROFEN PO Take 800 mg by mouth 3 times daily as needed (Last dose 9.19.2020)        loratadine (CLARITIN) 10 MG tablet Take 1 tablet (10 mg) by mouth daily 30 tablet 3     senna-docusate (SENOKOT-S/PERICOLACE) 8.6-50 MG tablet Take 1-2 tablets by mouth 2 times daily 30 tablet 0     triamcinolone (KENALOG) 0.1 % external cream Apply topically 2 times daily 464 g 3     ondansetron (ZOFRAN ODT) 4 MG ODT tab Take 1-2 tablets (4-8 mg) by mouth every 8 hours as needed for nausea (Patient not taking: Reported on 5/18/2022) 4 tablet 0       Allergies:  Allergies   Allergen Reactions     Vicodin  [Hydrocodone-Acetaminophen] Shortness Of Breath     Chicken-Derived Products (Egg) Nausea and Vomiting and GI Disturbance     Hydrocodone Swelling     Other reaction(s): Throat Irritation  Tolerated oxycodone   Upset stomach         Family history:  Family History   Problem Relation Age of Onset     Hypertension Mother      Diabetes Father      Cancer No family hx of      Coronary Artery Disease No family hx of      Heart Disease No family hx of      Anesthesia Reaction No family hx of      Deep Vein Thrombosis No family hx of      Chronic Obstructive Pulmonary Disease Mother      Coronary Artery Disease Father      Colon Cancer No family hx of      Prostate Cancer No family hx of        Social history:  Social History     Socioeconomic History     Marital status: Single     Spouse name: Not on file     Number of children: 2     Years of education: Not on file     Highest education level: Not on file   Occupational History     Comment: Stand-up    Tobacco Use     Smoking status: Current Every Day Smoker     Packs/day: 1.00     Years: 20.00     Pack years: 20.00     Types: Cigarettes, Cigarettes     Smokeless tobacco: Never Used     Tobacco comment: 1/2 pack of day, down from 1.5 pk/day   Vaping Use     Vaping Use: Never used   Substance and Sexual Activity     Alcohol use: Yes     Comment: Beer occasionally/Social Use     Drug use: Yes     Types: Marijuana     Comment: Daily     Sexual activity: Yes     Partners: Female   Other Topics Concern     Parent/sibling w/ CABG, MI or angioplasty before 65F 55M? Not Asked   Social History Narrative    ** Merged History Encounter **       Social Determinants of Health     Financial Resource Strain: Low Risk      Difficulty of Paying Living Expenses: Not hard at all   Food Insecurity: No Food Insecurity     Worried About Running Out of Food in the Last Year: Never true     Ran Out of Food in the Last Year: Never true   Transportation Needs: No Transportation Needs      "Lack of Transportation (Medical): No     Lack of Transportation (Non-Medical): No   Physical Activity: Inactive     Days of Exercise per Week: 0 days     Minutes of Exercise per Session: 0 min   Stress: Stress Concern Present     Feeling of Stress : To some extent   Social Connections: Moderately Integrated     Frequency of Communication with Friends and Family: More than three times a week     Frequency of Social Gatherings with Friends and Family: More than three times a week     Attends Jewish Services: More than 4 times per year     Active Member of Clubs or Organizations: Yes     Attends Club or Organization Meetings: More than 4 times per year     Marital Status: Never    Intimate Partner Violence: Not At Risk     Fear of Current or Ex-Partner: No     Emotionally Abused: No     Physically Abused: No     Sexually Abused: No   Housing Stability: Low Risk      Unable to Pay for Housing in the Last Year: No     Number of Places Lived in the Last Year: 1     Unstable Housing in the Last Year: No         Nursing Notes:   Anya Deutsch, EMT  5/18/2022  3:17 PM  Signed  Chief Complaint   Patient presents with     New Patient     Perianal HS       Vitals:    05/18/22 1514   BP: 111/79   BP Location: Left arm   Patient Position: Sitting   Cuff Size: Adult Large   Pulse: 64   SpO2: 98%   Weight: 88.1 kg (194 lb 4.8 oz)   Height: 1.702 m (5' 7\")       Body mass index is 30.43 kg/m .                          Anya Deutsch, EMT         Physical Examination:  /79 (BP Location: Left arm, Patient Position: Sitting, Cuff Size: Adult Large)   Pulse 64   Ht 5' 7\"   Wt 194 lb 4.8 oz   SpO2 98%   BMI 30.43 kg/m    General: alert, oriented, in no acute distress, sitting comfortably  HEENT: mucous membranes moist  Perianal external examination:  Old well healed scar in the right posterior position. Some more recent appearing scars in the left lateral position. No induration, fluctuance, or drainage " present. Non tender on palpation.

## 2022-05-18 ENCOUNTER — OFFICE VISIT (OUTPATIENT)
Dept: SURGERY | Facility: CLINIC | Age: 37
End: 2022-05-18

## 2022-05-18 ENCOUNTER — PRE VISIT (OUTPATIENT)
Dept: SURGERY | Facility: CLINIC | Age: 37
End: 2022-05-18
Payer: MEDICARE

## 2022-05-18 ENCOUNTER — PATIENT OUTREACH (OUTPATIENT)
Dept: NURSING | Facility: CLINIC | Age: 37
End: 2022-05-18
Payer: MEDICARE

## 2022-05-18 VITALS
DIASTOLIC BLOOD PRESSURE: 79 MMHG | HEART RATE: 64 BPM | OXYGEN SATURATION: 98 % | HEIGHT: 67 IN | BODY MASS INDEX: 30.49 KG/M2 | WEIGHT: 194.3 LBS | SYSTOLIC BLOOD PRESSURE: 111 MMHG

## 2022-05-18 DIAGNOSIS — L73.2 HIDRADENITIS SUPPURATIVA: ICD-10-CM

## 2022-05-18 PROCEDURE — 99203 OFFICE O/P NEW LOW 30 MIN: CPT | Mod: 24 | Performed by: COLON & RECTAL SURGERY

## 2022-05-18 ASSESSMENT — PAIN SCALES - GENERAL: PAINLEVEL: NO PAIN (0)

## 2022-05-18 NOTE — PROGRESS NOTES
5/18/2022  Clinic Care Coordination Contact  Community Health Worker Follow Up    Intervention and Education during outreach:   .Called and spoke to patient briefly stated he is not available to talk to call back tomorrow at 9am 5-19-22    CHW Next Follow Up: 5-19-22    Hudson Mccarthy  Community Health Worker  [facility]  Clinic Care Coordination  [email or web address]   Office: [phone]  Fax: [phone]

## 2022-05-18 NOTE — LETTER
2022       RE: Bernard Padilla  [address]  [place] [address]     Dear Colleague,    Thank you for referring your patient, Bernard Padilla, to the [facility] COLON AND RECTAL SURGERY CLINIC [place] [facility]. Please see a copy of my visit note below.    Colon and Rectal Surgery Consult Clinic Note    Referring provider:  CURT Leos MD  [address] [place] [address]       RE: Bernard Padilla  : 1985  JOHNNY: 2022      Bernard Padilla is a very pleasant 36 year old male who presents today for perianal hidradenitis suppurativa.    HISTORY OF PRESENT ILNESS:  Bernard has followed with Dr. Apolinar Ruiz of dermatology for hidradenitis and is on dapsone. He was diagnosed in 2016 and did not respond to antibiotics. He has had multiple surgeries involving his axilla, buttock, and groins-last on 22 with Dr. Leos with perineal wound excision with closure and left groin wound excision. He saw Dr. Kenny back in 2019 with I&D of a perianal abscess.  He has a boil by his anus that flares up a few times a year. He does not think it ever drains. When it flares up he has pain with sitting and using the bathroom. It is currently settled down and he is asymptomatic. He has had 3-4 anorectal procedures for perianal HS in the past and is interested in having something more definitive done.     PLEASE SEE NOTE BELOW FOR PHYSICAL EXAMINATION, REVIEW OF SYSTEMS, AND OTHER HISTORY.    Assessment/Plan: 36 year old male with hidradenitis suppurativa.  On exam he has an old scar in the right posterior position and some more recent appearing scarring in the left lateral position. No open areas. We had a long discussion regarding options for his perianal HS. His disease looks fairly mild on exam today so I would favor further medical management with a biologic if Dr. Ruiz is okay with this. However, if they decide  that biologics are not the right choice for him, either a local excision or possibly more extensive resection with flap would be an option. I would like to get a 3T MRI to ensure there is no true perianal fistula. Will follow up with him again after his meeting with Dr. Ruiz to discuss further. Patient's questions were answered to his stated satisfaction and he is in agreement with this plan.    20 minutes spent on the date of encounter (excluding time performing procedures) performing chart review, history and exam, documentation and further activities as noted above with without additional procedures performed.      Marcie Krishna MD  Colon and Rectal Surgery Staff  [facility]      -------------------------------------------------------------------------------------------------------------------          Medical history:  Past Medical History:   Diagnosis Date     Anxiety      Arthritis      Arthritis of knee      Boil      Chronic pain      Eczema      Hidradenitis suppurativa      Hydradenitis      Mild intermittent asthma without complication        Surgical history:  Past Surgical History:   Procedure Laterality Date     EXCISE HIDRADENITIS (LOCATION) Bilateral 9/25/2020    Procedure: EXCISION, HIDRADENITIS - right medial thigh, left groin and posterior scrotum.;  Surgeon: Maura Maldonado MD;  [facility]     INCISION AND DRAINAGE, ABSCESS, SIMPLE N/A 5/4/2022    Procedure: INCISION AND closure  ABSCESS,  and wound excision right groin and perineum;  Surgeon: CURT Leos MD;  Location: [facility] OR     IRRIGATION AND DEBRIDEMENT RECTUM, COMBINED N/A 11/14/2019    Procedure: IRRIGATION AND DEBRIDEMENT, RECTUM;  Surgeon: Yon Kenny MD;  [facility]: [facility] OR     KNEE SURGERY       ORTHOPEDIC SURGERY      meniscus repair     SKIN SURGERY         Problem list:    Patient Active Problem List    Diagnosis Date Noted     Right rotator cuff tendinitis  12/09/2021     Priority: Medium     Influenza 03/01/2020     Priority: Medium     Abscess of groin, left 12/14/2019     Priority: Medium     Added automatically from request for surgery [id number]       Scrotal abscess 11/24/2019     Priority: Medium     Added automatically from request for surgery [id number]       Other chronic pain 09/10/2019     Priority: Medium     Hidradenitis suppurativa 01/23/2019     Priority: Medium     Allergic rhinitis 08/16/2018     Priority: Medium     Tobacco use disorder 08/08/2016     Priority: Medium     Patellofemoral arthritis of left knee 06/10/2016     Priority: Medium     Eczema 12/05/2012     Priority: Medium     Knee pain, left 12/05/2012     Priority: Medium     Torn meniscus in 2009, s/p repair in 2010. Now with chronic pain.       Depression 07/21/2009     Priority: Medium       Medications:  Current Outpatient Medications   Medication Sig Dispense Refill     acetaminophen (TYLENOL) 325 MG tablet Take 650 mg by mouth every 4 hours as needed       albuterol (PROAIR HFA/PROVENTIL HFA/VENTOLIN HFA) 108 (90 Base) MCG/ACT inhaler Inhale 2 puffs into the lungs every 6 hours 8.5 g 3     augmented betamethasone dipropionate (DIPROLENE-AF) 0.05 % external ointment Apply topically 2 times daily 50 g 3     dapsone (ACZONE) 100 MG tablet Take 2 tablets (200 mg) by mouth daily 60 tablet 0     fluocinolone acetonide (DERMA-SMOOTHE/FS BODY) 0.01 % external oil Apply topically 2 times daily 120 mL 2     Fluocinolone Acetonide Scalp (DERMA-SMOOTHE/FS SCALP) 0.01 % OIL oil Apply topically daily as instructed. 118.28 mL 5     fluticasone (FLONASE) 50 MCG/ACT nasal spray Spray 1-2 sprays into both nostrils daily 48 g 2     IBUPROFEN PO Take 800 mg by mouth 3 times daily as needed (Last dose 9.19.2020)        loratadine (CLARITIN) 10 MG tablet Take 1 tablet (10 mg) by mouth daily 30 tablet 3     senna-docusate (SENOKOT-S/PERICOLACE) 8.6-50 MG tablet Take 1-2 tablets by mouth 2 times daily 30 tablet 0      triamcinolone (KENALOG) 0.1 % external cream Apply topically 2 times daily 464 g 3     ondansetron (ZOFRAN ODT) 4 MG ODT tab Take 1-2 tablets (4-8 mg) by mouth every 8 hours as needed for nausea (Patient not taking: Reported on 5/18/2022) 4 tablet 0       Allergies:  Allergies   Allergen Reactions     Vicodin [Hydrocodone-Acetaminophen] Shortness Of Breath     Chicken-Derived Products (Egg) Nausea and Vomiting and GI Disturbance     Hydrocodone Swelling     Other reaction(s): Throat Irritation  Tolerated oxycodone   Upset stomach         Family history:  Family History   Problem Relation Age of Onset     Hypertension Mother      Diabetes Father      Cancer No family hx of      Coronary Artery Disease No family hx of      Heart Disease No family hx of      Anesthesia Reaction No family hx of      Deep Vein Thrombosis No family hx of      Chronic Obstructive Pulmonary Disease Mother      Coronary Artery Disease Father      Colon Cancer No family hx of      Prostate Cancer No family hx of        Social history:  Social History     Socioeconomic History     Marital status: Single     Spouse name: Not on file     Number of children: 2     Years of education: Not on file     Highest education level: Not on file   Occupational History     Comment: Stand-up    Tobacco Use     Smoking status: Current Every Day Smoker     Packs/day: 1.00     Years: 20.00     Pack years: 20.00     Types: Cigarettes, Cigarettes     Smokeless tobacco: Never Used     Tobacco comment: 1/2 pack of day, down from 1.5 pk/day   Vaping Use     Vaping Use: Never used   Substance and Sexual Activity     Alcohol use: Yes     Comment: Beer occasionally/Social Use     Drug use: Yes     Types: Marijuana     Comment: Daily     Sexual activity: Yes     Partners: Female   Other Topics Concern     Parent/sibling w/ CABG, MI or angioplasty before 65F 55M? Not Asked   Social History Narrative    ** Merged History Encounter **       Social Determinants  "of Health     Financial Resource Strain: Low Risk      Difficulty of Paying Living Expenses: Not hard at all   Food Insecurity: No Food Insecurity     Worried About Running Out of Food in the Last Year: Never true     Ran Out of Food in the Last Year: Never true   Transportation Needs: No Transportation Needs     Lack of Transportation (Medical): No     Lack of Transportation (Non-Medical): No   Physical Activity: Inactive     Days of Exercise per Week: 0 days     Minutes of Exercise per Session: 0 min   Stress: Stress Concern Present     Feeling of Stress : To some extent   Social Connections: Moderately Integrated     Frequency of Communication with Friends and Family: More than three times a week     Frequency of Social Gatherings with Friends and Family: More than three times a week     Attends [employer] Services: More than 4 times per year     Active Member of Clubs or Organizations: Yes     Attends Club or Organization Meetings: More than 4 times per year     Marital Status: Never    Intimate Partner Violence: Not At Risk     Fear of Current or Ex-Partner: No     Emotionally Abused: No     Physically Abused: No     Sexually Abused: No   Housing Stability: Low Risk      Unable to Pay for Housing in the Last Year: No     Number of Places Lived in the Last Year: 1     Unstable Housing in the Last Year: No         Nursing Notes:   Anya Deutsch, EMT  5/18/2022  3:17 PM  Signed  Chief Complaint   Patient presents with     New Patient     Perianal HS       Vitals:    05/18/22 1514   BP: 111/79   BP Location: Left arm   Patient Position: Sitting   Cuff Size: Adult Large   Pulse: 64   SpO2: 98%   Weight: 88.1 kg (194 lb 4.8 oz)   Height: 1.702 m (5' 7\")       Body mass index is 30.43 kg/m .                          Anya Deutsch, EMT         Physical Examination:  /79 (BP Location: Left arm, Patient Position: Sitting, Cuff Size: Adult Large)   Pulse 64   Ht 5' 7\"   Wt 194 lb 4.8 oz   SpO2 98%  "  BMI 30.43 kg/m    General: alert, oriented, in no acute distress, sitting comfortably  HEENT: mucous membranes moist  Perianal external examination:  Old well healed scar in the right posterior position. Some more recent appearing scars in the left lateral position. No induration, fluctuance, or drainage present. Non tender on palpation.        Sincerely,    Marcie Krishna MD

## 2022-05-18 NOTE — PATIENT INSTRUCTIONS
Follow up:    Schedule MRI in the waiting room   Follow up 6/29    MIRANDA Pond [phone]    Clinic Fax Number [phone]    Surgery Scheduling [phone]    My Chart is available 24 hours a day and is a secure way to access your records and communicate with your care team.  I strongly recommend signing up if you haven't already done so, if you are comfortable with computers.  If you would like to inquire about this or are having problems with My Chart access, you may call [phone] or go online at [email or web address].  Please allow at least 24 hours for a response and extra time on weekends and Holidays.

## 2022-05-19 ENCOUNTER — PATIENT OUTREACH (OUTPATIENT)
Dept: NURSING | Facility: CLINIC | Age: 37
End: 2022-05-19
Payer: MEDICARE

## 2022-05-19 NOTE — PROGRESS NOTES
5/19/2022  Clinic Care Coordination Contact  Community Health Worker Follow Up    Intervention and Education during outreach:   Called and spoke to patient and follow up on goal.  Patient reported:  -he not able to talk getting his children to school  Stated he went to the eye place stated his insurance does not cover eye exam.  Stated to call next week.    CHW [place] and [place] for [place]  Patient had [employer] insurance card dated     Mailed to patient upcoming appts and Senior Linkage Line number to call or call to [employer] customer services [phone] regarding vision coverage.    CHW Follow up: Monthly  CHW Plan: Follow up on goal  CHW Next Follow Up: 6-27-22    Hudson Mccarthy  Community Health Worker  [facility]  Clinic Care Coordination  [email or web address]   Office: [phone]  Fax: [phone]  Clinic Care Coordination Contact    Community Health Worker Follow Up    Care Gaps:     Health Maintenance Due   Topic Date Due     ASTHMA ACTION PLAN  Never done     COVID-19 Vaccine (1) Never done     Pneumococcal Vaccine: Pediatrics (0 to 5 Years) and At-Risk Patients (6 to 64 Years) (2 - PCV) 11/02/2021     ASTHMA CONTROL TEST  12/22/2021     PHQ-9  12/22/2021       Care Gaps Last addressed on will discuss at next office visit    Goals:    Goals Addressed as of 5/19/2022 at 9:17 AM                    4/14/22    3/10/22       Medical (pt-stated)   40%  50%    Added 12/16/21 by Lisbeth Bynum, RN      Goal Statement- I would like to schedule and attend an evaluation and eye exam within 2-3 months   DateGoal set: 12/16/21 Updated 3-10-22  Barriers: access  Strengths: Patient engagement, provider identified   Date to Achieve By: 5-2022  Patient expressed understanding of goal: yes    Action stepsto achieve this goal  1.  I will call to Senior Linkage Line [phone] for support to clarify insurance coverage for eye appt.  2 I  can also call to [employer] customer services regarding vision coverage.    [facility]  [address] [place] [id number] [phone]    [facility]   [address] [place] [id number] [phone]    [facility]  [address] [place] [address]  [phone]  STORE HOURS  Mon     10:00 AM - 6:00 PM  Tue      10:00 AM - 6:00 PM  Wed     10:00 AM - 6:00 PM  Thu      10:00 AM - 6:00 PM  Fri        9:30 AM - 5:00 PM  Sat       9:30 AM - 3:00 PM  Sun      Closed     2.  I will call CommunityHealth Worker or send [employer] message if I need additional support or resources     Updated: 5-19-22 AL

## 2022-05-19 NOTE — PROGRESS NOTES
[place] Department of Human Services: [facility] Care Programs Eligibility Response (469)  print Print Page           SUBSCRIBER INFORMATION  Date of Service Subscriber ID Subscriber Name Birthdate Age Gender  05/19/2022 [id number] ZAHIRA MATUTE 1985 36 MALE     Address  [address] , [address] , [place]  [address]     PROVIDER INFORMATION  Provider ID Submitter Transaction ID Provider Name Taxonomy Code Qualifier Taxonomy Code  [id number] xx [facility] Programs  This subscriber has eligibility for QM: Qualified Medicare Beneficiary - Medical Assistance Covers Medicare Coinsurance and Deductibles Only.  Elig Type DQ: Disabled/QMB only  Eligibility Begin Date: 03/01/2022  Eligibility End Date: --/--/----  This subscriber is eligible for the following service types: Medical Care ,  Chiropractic ,  Hospital ,  Hospital - Inpatient ,  Hospital - Outpatient ,  Emergency Services ,  Professional (Physician) Visit - Office ,  Mental Health ,  Urgent Care  Prepaid Health Plan  None     Other Eligibility Information  No Special Transportation.  This subscribers eligibility is not determined for Long term Care and waiver services.  No Hospice.  Refer to Health Care Programs and Services Overview of the [facility] Provider Manual for a list of covered services.  Waivers  None    Subscriber Responsibility Information  None    Restricted Recipient Program  None     Medicare  Medicare ID: [id number]  Part A Effective Date:  05/01/2020  Part B Effective Date: 05/01/2020

## 2022-05-24 ENCOUNTER — OFFICE VISIT (OUTPATIENT)
Dept: PALLIATIVE MEDICINE | Facility: OTHER | Age: 37
End: 2022-05-24
Payer: MEDICARE

## 2022-05-24 VITALS — SYSTOLIC BLOOD PRESSURE: 133 MMHG | HEART RATE: 86 BPM | DIASTOLIC BLOOD PRESSURE: 86 MMHG

## 2022-05-24 DIAGNOSIS — L73.2 HIDRADENITIS SUPPURATIVA: Primary | ICD-10-CM

## 2022-05-24 PROCEDURE — 99213 OFFICE O/P EST LOW 20 MIN: CPT | Performed by: ANESTHESIOLOGY

## 2022-05-24 PROCEDURE — G0463 HOSPITAL OUTPT CLINIC VISIT: HCPCS

## 2022-05-24 ASSESSMENT — PAIN SCALES - GENERAL: PAINLEVEL: WORST PAIN (10)

## 2022-05-24 NOTE — LETTER
Opioid / Opioid Plus Controlled Substance Agreement    This is an agreement between you and your provider about the safe and appropriate use of controlled substance/opioids prescribed by your care team. Controlled substances are medicines that can cause physical and mental dependence (abuse).    There are strict laws about having and using these medicines. We here at [facility] are committing to working with you in your efforts to get better. To support you in this work, we ll help you schedule regular office appointments for medicine refills. If we must cancel or change your appointment for any reason, we ll make sure you have enough medicine to last until your next appointment.     As a Provider, I will:    Listen carefully to your concerns and treat you with respect.     Recommend a treatment plan that I believe is in your best interest. This plan may involve therapies other than opioid pain medication.     Talk with you often about the possible benefits, and the risk of harm of any medicine that we prescribe for you.     Provide a plan on how to taper (discontinue or go off) using this medicine if the decision is made to stop its use.    As a Patient, I understand that opioid(s):     Are a controlled substance prescribed by my care team to help me function or work and manage my condition(s).     Are strong medicines and can cause serious side effects such as:    Drowsiness, which can seriously affect my driving ability    A lower breathing rate, enough to cause death    Harm to my thinking ability     Depression     Abuse of and addiction to this medicine    Need to be taken exactly as prescribed. Combining opioids with certain medicines or chemicals (such as illegal drugs, sedatives, sleeping pills, and benzodiazepines) can be dangerous or even fatal. If I stop opioids suddenly, I may have severe withdrawal symptoms.    Do not work for all types of pain nor for all patients. If they re not helpful, I may  be asked to stop them.        The risks, benefits and side effects of these medicine(s) were explained to me. I agree that:  1. I will take part in other treatments as advised by my care team. This may be psychiatry or counseling, physical therapy, behavioral therapy, group treatment or a referral to a specialist.     2. I will keep all my appointments. I understand that this is part of the monitoring of opioids. My care team may require an office visit for EVERY opioid/controlled substance refill. If I miss appointments or don t follow instructions, my care team may stop my medicine.    3. I will take my medicines as prescribed. I will not change the dose or schedule unless my care team tells me to. There will be no refills if I run out early.     4. I may be asked to come to the clinic and complete a urine drug test or complete a pill count at any time. If I don t give a urine sample or participate in a pill count, the care team may stop my medicine.    5. I will only receive prescriptions from this clinic for chronic pain. If I am treated by another provider for acute pain issues, I will tell them that I am taking opioid pain medication for chronic pain and that I have a treatment agreement with this provider. I will inform my [facility] care team within one business day if I am given a prescription for any pain medication by another healthcare provider. My [facility] care team can contact other providers and pharmacists about my use of any medicines.    6. It is up to me to make sure that I don t run out of my medicines on weekends or holidays. If my care team is willing to refill my opioid prescription without a visit, I must request refills only during office hours. Refills may take up to 3 business days to process. I will use one pharmacy to fill all my opioid and other controlled substance prescriptions. I will notify the clinic about any changes to my insurance or medication  availability.    7. I am responsible for my prescriptions. If the medicine/prescription is lost, stolen or destroyed, it will not be replaced. I also agree not to share controlled substance medicines with anyone.    8. I am aware I should not use any illegal or recreational drugs. I agree not to drink alcohol unless my care team says I can.       9. If I enroll in the [place] Medical Cannabis program, I will tell my care team prior to my next refill.     10. I will tell my care team right away if I become pregnant, have a new medical problem treated outside of my regular clinic, or have a change in my medications.    11. I understand that this medicine can affect my thinking, judgment and reaction time. Alcohol and drugs affect the brain and body, which can affect the safety of my driving. Being under the influence of alcohol or drugs can affect my decision-making, behaviors, personal safety, and the safety of others. Driving while impaired (DWI) can occur if a person is driving, operating, or in physical control of a car, motorcycle, boat, snowmobile, ATV, motorbike, off-road vehicle, or any other motor vehicle ([place] Statute 169A.20). I understand the risk if I choose to drive or operate any vehicle or machinery.    I understand that if I do not follow any of the conditions above, my prescriptions or treatment may be stopped or changed.          Opioids  What You Need to Know    What are opioids?   Opioids are pain medicines that must be prescribed by a doctor. They are also known as narcotics.     Examples are:   1. morphine (MS Contin, Alma)  2. oxycodone (Oxycontin)  3. oxycodone and acetaminophen (Percocet)  4. hydrocodone and acetaminophen (Vicodin, Norco)   5. fentanyl patch (Duragesic)   6. hydromorphone (Dilaudid)   7. methadone  8. codeine (Tylenol #3)     What do opioids do well?   Opioids are best for severe short-term pain such as after a surgery or injury. They may work well for cancer pain. They may  help some people with long-lasting (chronic) pain.     What do opioids NOT do well?   Opioids never get rid of pain entirely, and they don t work well for most patients with chronic pain. Opioids don t reduce swelling, one of the causes of pain.                                    Other ways to manage chronic pain and improve function include:       Treat the health problem that may be causing pain    Anti-inflammation medicines, which reduce swelling and tenderness, such as ibuprofen (Advil, Motrin) or naproxen (Aleve)    Acetaminophen (Tylenol)    Antidepressants and anti-seizure medicines, especially for nerve pain    Topical treatments such as patches or creams    Injections or nerve blocks    Chiropractic or osteopathic treatment    Acupuncture, massage, deep breathing, meditation, visual imagery, aromatherapy    Use heat or ice at the pain site    Physical therapy     Exercise    Stop smoking    Take part in therapy       Risks and side effects     Talk to your doctor before you start or decide to keep taking opioids. Possible side effects include:      Lowering your breathing rate enough to cause death    Overdose, including death, especially if taking higher than prescribed doses    Worse depression symptoms; less pleasure in things you usually enjoy    Feeling tired or sluggish    Slower thoughts or cloudy thinking    Being more sensitive to pain over time; pain is harder to control    Trouble sleeping or restless sleep    Changes in hormone levels (for example, less testosterone)    Changes in sex drive or ability to have sex    Constipation    Unsafe driving    Itching and sweating    Dizziness    Nausea, throwing up and dry mouth    What else should I know about opioids?    Opioids may lead to dependence, tolerance, or addiction.      Dependence means that if you stop or reduce the medicine too quickly, you will have withdrawal symptoms. These include loose poop (diarrhea), jitters, flu-like symptoms,  nervousness and tremors. Dependence is not the same as addiction.                       Tolerance means needing higher doses over time to get the same effect. This may increase the chance of serious side effects.      Addiction is when people improperly use a substance that harms their body, their mind or their relations with others. Use of opiates can cause a relapse of addiction if you have a history of drug or alcohol abuse.      People who have used opioids for a long time may have a lower quality of life, worse depression, higher levels of pain and more visits to doctors.    You can overdose on opioids. Take these steps to lower your risk of overdose:    1. Recognize the signs:  Signs of overdose include decrease or loss of consciousness (blackout), slowed breathing, trouble waking up and blue lips. If someone is worried about overdose, they should call 911.    2. Talk to your doctor about Narcan (naloxone).   If you are at risk for overdose, you may be given a prescription for Narcan. This medicine very quickly reverses the effects of opioids.   If you overdose, a friend or family member can give you Narcan while waiting for the ambulance. They need to know the signs of overdose and how to give Narcan.     3. Don't use alcohol or street drugs.   Taking them with opioids can cause death.    4. Do not take any of these medicines unless your doctor says it s OK. Taking these with opioids can cause death:    Benzodiazepines, such as lorazepam (Ativan), alprazolam (Xanax) or diazepam (Valium)    Muscle relaxers, such as cyclobenzaprine (Flexeril)    Sleeping pills like zolpidem (Ambien)     Other opioids      How to keep you and other people safe while taking opioids:    1. Never share your opioids with others.  Opioid medicines are regulated by the Drug Enforcement Agency (CASANDRA). Selling or sharing medications is a criminal act.    2. Be sure to store opioids in a secure place, locked up if possible. Young children  can easily swallow them and overdose.    3. When you are traveling with your medicines, keep them in the original bottles. If you use a pill box, be sure you also carry a copy of your medicine list from your clinic or pharmacy.    4. Safe disposal of opioids    Most pharmacies have places to get rid of medicine, called disposal kiosks. Medicine disposal options are also available in every [facility]. Search your county and  medication disposal  to find more options. You can find more details at:  [email or web address]-[email or web address]-medications     I agree that my provider, clinic care team, and pharmacy may work with any city, state or federal law enforcement agency that investigates the misuse, sale, or other diversion of my controlled medicine. I will allow my provider to discuss my care with, or share a copy of, this agreement with any other treating provider, pharmacy or emergency room where I receive care.    I have read this agreement and have asked questions about anything I did not understand.    _______________________________________________________  Patient Signature - Bernard Padilla _____________________                   Date     _______________________________________________________  Provider Signature - ODALYS BORGES MD   _____________________                   Date     _______________________________________________________  Witness Signature (required if provider not present while patient signing)   _____________________                   Date

## 2022-05-24 NOTE — PATIENT INSTRUCTIONS
[facility] Number:  [phone]  Call with any questions about your care and for scheduling assistance.   Calls are returned Monday through Friday between 8 AM and 4:30 PM. We usually get back to you within 2 business days depending on the issue/request.    If we are prescribing your medications:  For opioid medication refills, call the clinic or send a [employer] message 7 days in advance.  Please include:  Name of requested medication  Name of the pharmacy.  For non-opioid medications, call your pharmacy directly to request a refill. Please allow 3-4 days to be processed.   Per [place] State Law:  All controlled substance prescriptions must be filled within 30 days of being written.    For those controlled substances allowing refills, pickup must occur within 30 days of last fill.      We believe regular attendance is key to your success in our program!    Any time you are unable to keep your appointment we ask that you call us at least 24 hours in advance to cancel.This will allow us to offer the appointment time to another patient.   Multiple missed appointments may lead to dismissal from the clinic.     Plan:    You may call if you would like to try Celebrex, and begin ketamine.

## 2022-05-24 NOTE — PROGRESS NOTES
[facility] - Office Visit    ASSESSMENT & PLAN     There are no diagnoses linked to this encounter.    Patient Instructions     [facility]    Clinic Number:  [phone]    Call with any questions about your care and for scheduling assistance.     Calls are returned Monday through Friday between 8 AM and 4:30 PM. We usually get back to you within 2 business days depending on the issue/request.    If we are prescribing your medications:    For opioid medication refills, call the clinic or send a [employer] message 7 days in advance.  Please include:    Name of requested medication    Name of the pharmacy.    For non-opioid medications, call your pharmacy directly to request a refill. Please allow 3-4 days to be processed.     Per [place] State Law:    All controlled substance prescriptions must be filled within 30 days of being written.      For those controlled substances allowing refills, pickup must occur within 30 days of last fill.      We believe regular attendance is key to your success in our program!      Any time you are unable to keep your appointment we ask that you call us at least 24 hours in advance to cancel.This will allow us to offer the appointment time to another patient.     Multiple missed appointments may lead to dismissal from the clinic.     Plan:    You may call if you would like to try Celebrex, and begin ketamine.        -----  ODALYS BORGES MD  [facility]       SUBJECTIVE      Bernard Padilla is a 36 year old year old male who presents to clinic today for the following:     Hydroadenitis suppurativa.    Chart reflects when last seen in December we have talked about using a trial of Suboxone.  There were several calls where he described at 8 mg dose did not help, he did not like the taste and had a sense of choking.  I offered Celebrex and ketamine.    Reviewing the record he has had some surgeries for his  "hidradenitis and received small doses of oxycodone last on 5/4.    He reviews today he will be having a few more surgeries for his hidradenitis suppurativa.  He has had 1 removed from his near his scrotum, has another 1 in his rectum, will be having MRI to see if he might need a colonoscopy.    Reviews the surgeons did give him a short supply of oxycodone and then told him they would not give more and to follow-up here.    Reviewed the Suboxone did not work.    He has been using marijuana, not involved in the medical cannabis program.  States he plans to stop it as it is making it hard to breathe.  I reviewed with the [place] medical cannabis program there are products such as the oils and capsules that had would not affect his breathing.    Describes his arthritis continues to be a pain.    He requests again the oxycodone noted he is tired of being a \"guinea pig\".  Reviewed that I had not been prescribing the oxycodone as he missed a urine drug test.  Stated he had had 5 teeth removed at that time and could not come in.  I also reminded him he had a urine drug test at the [facility] positive for cocaine.  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    Were reviewed our typical sequence if the person is not compliant with the urine drug testing program and/or cocaine is involved to offer buprenorphine products, medical cannabis and ketamine.  Reviewed we have that many patients using the ketamine with benefit, some the prefer that the opioids, and it is far from treating him as a guinea pig.    I reviewed that I would again start with Celebrex presently and begin the ketamine process for which we would be speaking frequently to adjust the best dose.  Would run it through [employer] to see if his insurance covers.  He made reference to chat rooms " "he is on about how unfair that other people that abuse the system so the people like him and pain cannot get the opioids.  I encouraged him to access those chat rooms to see if anybody has experience with using ketamine to help with pain    At this point he indicated again did not want to be a guinea pig, left, indicating he might have to \"buy pain pills on the streets\".    Review of Systems   General, psych, musculoskeletal, bowels and bladder otherwise normal other than above.          OBJECTIVE   BP (!) 171/102   Pulse 106   Ht 1.829 m (6')   Wt 103.8 kg (228 lb 14.4 oz)   SpO2 97%   BMI 31.04 kg/m        Physical Exam  General: Alert clear sensorium.  Indeed appeared quite uncomfortable changing positions several times referencing pain in rectum and groin.  Abnormal gait, left hip externally rotated.    He did have a healing area under his left eye which he indicated was not another recent at bedtime lesion.  Cardiovascular: Normal rate  Lungs: Pulmonary effort is normal, speaking in full sentences  MSK: normal muscle bulk and tone, ROM, equal strength in all extremities  Skin: Warm and dry. No concerning rashes or lesions.  Neurologic: No focal deficit, alert and oriented x3  Psychiatric:       Assessment: Hidradenitis suppurativa, frequent surgeries for lesions.  Due to a urine drug test that was not kept, and [facility], I have reviewed options to the opioids including medical cannabis, buprenorphine, and ketamine.  As patient left I again informed him if he changes his mind he can call to initiate above.    Total time more than 20 minutes  "

## 2022-05-24 NOTE — PROGRESS NOTES
Patient reports finishing the medical cannabis program paperwork but smoking cannabis to ease the pain. UDS is due today. 05/24/2022    Zenobia Reis MA  [facility] [place]

## 2022-05-25 ENCOUNTER — OFFICE VISIT (OUTPATIENT)
Dept: PLASTIC SURGERY | Facility: CLINIC | Age: 37
End: 2022-05-25
Payer: MEDICARE

## 2022-05-25 VITALS
BODY MASS INDEX: 30.29 KG/M2 | HEIGHT: 67 IN | TEMPERATURE: 98.4 F | SYSTOLIC BLOOD PRESSURE: 112 MMHG | WEIGHT: 193 LBS | DIASTOLIC BLOOD PRESSURE: 75 MMHG | HEART RATE: 70 BPM | OXYGEN SATURATION: 97 %

## 2022-05-25 DIAGNOSIS — L73.2 HIDRADENITIS SUPPURATIVA: Primary | ICD-10-CM

## 2022-05-25 PROCEDURE — 99024 POSTOP FOLLOW-UP VISIT: CPT | Performed by: PLASTIC SURGERY

## 2022-05-25 ASSESSMENT — PAIN SCALES - GENERAL: PAINLEVEL: NO PAIN (0)

## 2022-05-25 NOTE — LETTER
5/25/2022       RE: Bernard Padilla  [address]  [place] [address]     Dear Colleague,    Thank you for referring your patient, Bernard Padilla, to the [facility] [place] at [facility]. Please see a copy of my visit note below.    PRESENTING COMPLAINT:  Postoperative visit status post hidradenitis suppurativa of the perineum and left groin wound excisions and closures were done on 05/04/2022.    HISTORY OF PRESENTING COMPLAINT:  Mr. Padilla is 36 years old.  He is about 3 weeks out from surgery.  Fully healed.  Happy with the results.  No issues.    PHYSICAL ASSESSMENT:  Vital signs stable.  She is afebrile, in no obvious distress.  Wounds are healed.    ASSESSMENT:  Based on the above findings, a diagnosis of status post perineal and groin hidradenitis suppurativa wound excisions and closures was made.  They are healed.  I will see him back on a p.r.n. basis.  He was happy with the visit.  All exam and discussion done in the presence of my nurse, Annalee Chavira.        Sincerely,    CURT Leos MD

## 2022-05-25 NOTE — PROGRESS NOTES
PRESENTING COMPLAINT:  Postoperative visit status post hidradenitis suppurativa of the perineum and left groin wound excisions and closures were done on 05/04/2022.    HISTORY OF PRESENTING COMPLAINT:  Mr. Padilla is 36 years old.  He is about 3 weeks out from surgery.  Fully healed.  Happy with the results.  No issues.    PHYSICAL ASSESSMENT:  Vital signs stable.  She is afebrile, in no obvious distress.  Wounds are healed.    ASSESSMENT:  Based on the above findings, a diagnosis of status post perineal and groin hidradenitis suppurativa wound excisions and closures was made.  They are healed.  I will see him back on a p.r.n. basis.  He was happy with the visit.  All exam and discussion done in the presence of my nurse, Annalee Chavira.

## 2022-05-25 NOTE — NURSING NOTE
"Chief Complaint   Patient presents with     YESSICA Wagner, is being seen today for a follow up DOS 5/4 HS groin.       Vitals:    05/25/22 1013   BP: 112/75   BP Location: Left arm   Patient Position: Chair   Cuff Size: Adult Large   Pulse: 70   Temp: 98.4  F (36.9  C)   TempSrc: Oral   SpO2: 97%   Weight: 87.5 kg (193 lb)   Height: 1.702 m (5' 7\")       Body mass index is 30.23 kg/m .      Kizzy Augustine LPN    "

## 2022-05-27 NOTE — DISCHARGE INSTRUCTIONS
The lesion on the scrotum is not large and appears to be close to the skin surface.  Urology is not available at this time to drain it.  You are advised to fill all of the prescriptions from your dermatologist for the hydradenitis suppurativa and see if this helps with the lesion on the scrotum.  Return to the Emergency Department if you have fever, increased size of the lesion, increased pain or other concerns.      Please make an appointment to follow up with Urology Clinic (phone: [phone]) as soon as possible if not improving.      CXR negative - No infiltrates, No consolidation, No atelectasis seen

## 2022-06-13 ENCOUNTER — TELEPHONE (OUTPATIENT)
Dept: DERMATOLOGY | Facility: CLINIC | Age: 37
End: 2022-06-13

## 2022-06-13 NOTE — TELEPHONE ENCOUNTER
----- Message from Lillian Haque sent at 6/9/2022  9:14 AM CDT -----  Regarding: Appointments  Hello,    Patient was reschedule from 6/23/22 appt with Dr. Ruiz. Next opening is in Oct. Patient stated there's no way he can wait that long to see a Provider. He states he's having flare ups in his face.    Please advise    Thank you    Lillian Carrillo

## 2022-06-22 DIAGNOSIS — L30.9 DERMATITIS: ICD-10-CM

## 2022-06-27 ENCOUNTER — PATIENT OUTREACH (OUTPATIENT)
Dept: CARE COORDINATION | Facility: CLINIC | Age: 37
End: 2022-06-27

## 2022-06-27 RX ORDER — FLUOCINOLONE ACETONIDE 0.11 MG/ML
OIL TOPICAL
Qty: 118.28 ML | Refills: 5 | OUTPATIENT
Start: 2022-06-27

## 2022-06-27 NOTE — PROGRESS NOTES
6/27/2022  Clinic Care Coordination Contact  Community Health Worker Follow Up    Intervention and Education during outreach:   Called and spoke to patient and follow up on goal.  Patient reported:  -he will go next week to get [facility] exam [facility] [place]  He will get it done next week.      CHW Follow up: Monthly  CHW Plan: Follow up on goal   CHW Next Follow Up: 7-29-22    Hudson Mccarthy  Community Health Worker  [facility]  Clinic Care Coordination  [email or web address]   Office: [phone]  Fax: [phone]  Clinic Care Coordination Contact    Community Health Worker Follow Up    Care Gaps:     Health Maintenance Due   Topic Date Due     ASTHMA ACTION PLAN  Never done     COVID-19 Vaccine (1) Never done     Pneumococcal Vaccine: Pediatrics (0 to 5 Years) and At-Risk Patients (6 to 64 Years) (2 - PCV) 11/02/2021     ASTHMA CONTROL TEST  12/22/2021     PHQ-2 (once per calendar year)  01/01/2022       Care Gaps Last addressed on will discuss at next office visit with the doctor.    Goals:    Goals Addressed as of 6/27/2022 at 4:14 PM                    Today    5/19/22       Medical (pt-stated)   50%  40%    Added 12/16/21 by Lisbeth Bynum, RN      Goal Statement- I would like to schedule and attend an evaluation and eye exam within 2-3 months   DateGoal set: 12/16/21 Updated 3-10-22  Barriers: access  Strengths: Patient engagement, provider identified   Date to Achieve By: 5-2022  Patient expressed understanding of goal: yes    Action stepsto achieve this goal  1.  I will go to [facility] [place] [place] next week to get eye exam.    [facility] EYE[address] [place] [address]  [phone]  STORE HOURS  Mon     10:00 AM - 6:00 PM  Tue      10:00 AM - 6:00 PM  Wed     10:00 AM - 6:00 PM  Thu      10:00 AM - 6:00 PM  Fri        9:30 AM - 5:00 PM  Sat       9:30 AM - 3:00 PM  Sun      Closed       Updated: 6-27-22  AL

## 2022-07-02 DIAGNOSIS — L30.9 ECZEMA, UNSPECIFIED TYPE: ICD-10-CM

## 2022-07-08 RX ORDER — FLUOCINOLONE ACETONIDE 0.11 MG/ML
OIL TOPICAL 2 TIMES DAILY
Qty: 120 ML | Refills: 5 | Status: SHIPPED | OUTPATIENT
Start: 2022-07-08 | End: 2022-08-25

## 2022-07-08 NOTE — TELEPHONE ENCOUNTER
Fluocinolone Acetonide Scalp (DERMA-SMOOTHE/FS SCALP) 0.01 % OIL oil  Resent order.    Order from [facility] was  inactivated and they needed a new order.   118.28 mL, 5 Refills sent to pharm for Pt care.     Irina Izquierdo RN  Central Triage Red Flags/Med Refills

## 2022-07-25 ENCOUNTER — PATIENT OUTREACH (OUTPATIENT)
Dept: CARE COORDINATION | Facility: CLINIC | Age: 37
End: 2022-07-25

## 2022-07-28 NOTE — TELEPHONE ENCOUNTER
Patient called back in and advised I can forward a message but that Dr. Ruiz is out of clinic. I gave him the surgery number he was last seen at as he was told to contact Derm or Surgery to get more pain medication. The ER only gave him 4 pills.     Will route to LISA    Simple / Intermediate / Complex Repair - Final Wound Length In Cm: 0

## 2022-07-29 ENCOUNTER — PATIENT OUTREACH (OUTPATIENT)
Dept: CARE COORDINATION | Facility: CLINIC | Age: 37
End: 2022-07-29

## 2022-07-29 NOTE — PROGRESS NOTES
7/29/2022  Clinic Care Coordination Contact  Community Health Worker Follow Up    Intervention and Education during outreach:   Called and spoke to patient and follow up on goal.  Patient reported:  -he went to [facility] on [address] they said they don't his insurance.    Suggested to go [facility] clinic on [address]  [facility]  [address] [place] [address]  Make an appointment  [phone] Appt Line   [phone] General    Stated he does not know his insurance informtion and would like to schedule for his daughter too.  Stated daughter has [employer] insurance.  He does not have daughter insurance ID.  Suggested patient before calling to have insurance ID     Stated he does not know his insurance informtion and would like to schedule for his daughter too.  Stated daughter has [employer] insurance.  He does not have daughter insurance ID.  Suggested patient before calling to have insurance ID   Chart review and information patient has MA and to provide the MA number when he calls.  Unable to login to [employer] to check verify insurance.  Conference with patient to support to connect with [facility]  On hold for over 7min.  Suggested patient to call later and schedule with is MA ID.  Patient requested CHW to send information through  [employer].    CHW Follow up: Monthly  CHW Plan: Follow up on goal  CHW Next Follow Up: 8-26-22    Hudson Mccarthy  Community Health Worker  [facility]  Clinic Care Coordination  [email or web address]   Office: [phone]  Fax: [phone]  Clinic Care Coordination Contact    Community Health Worker Follow Up    Care Gaps:     Health Maintenance Due   Topic Date Due     ASTHMA ACTION PLAN  Never done     COVID-19 Vaccine (1) Never done     Pneumococcal Vaccine: Pediatrics (0 to 5 Years) and At-Risk Patients (6 to 64 Years) (2 - PCV) 11/02/2021     ASTHMA CONTROL TEST  12/22/2021      PHQ-2 (once per calendar year)  01/01/2022       Care Gaps Last addressed on will discuss at next office visit with PCP    Goals:    Goals Addressed as of 7/29/2022 at 11:37 AM                    Today    6/27/22       Medical (pt-stated)   50%  50%    Added 12/16/21 by Lisbeth Bynum, RN      Goal Statement- I would like to schedule and attend an evaluation and eye exam within 2-3 months   DateGoal set: 12/16/21 Updated 3-10-22 updated 7-29-22  Barriers: access  Strengths: Patient engagement, provider identified   Date to Achieve By: 9-2022-  Patient expressed understanding of goal: yes    Action stepsto achieve this goal  1.  I will call [facility] to schedule eye exam.  [address] [place] [address]  Make an appointment  [phone] [facility]  [phone] General     Updated: 7-29-22 AL

## 2022-08-23 NOTE — TELEPHONE ENCOUNTER
Patient called Call Center. Clinic coordinators state patient very upset that he has not been called to schedule follow-up with Dr. Ruiz. Informed clinic coordinators that I will send message to Jessica requesting follow-up.     Vern Alba, EMT

## 2022-08-23 NOTE — TELEPHONE ENCOUNTER
Left msg asking pt to contact clinic for scheduling.   Writer can offer an appointment This Thursday 8/25 at 1:00 or 2:00pm. These spots will not be held. Please schedule or route to writer for scheduling.

## 2022-08-24 ENCOUNTER — PATIENT OUTREACH (OUTPATIENT)
Dept: CARE COORDINATION | Facility: CLINIC | Age: 37
End: 2022-08-24

## 2022-08-24 NOTE — PROGRESS NOTES
8/24/2022  Clinic Care Coordination - Chart Review Only    Situation/Background: Patient chart reviewed by care coordinator related to [employer] conversion.    Assessment: Patient continues to be followed by Clinic Care Coordination.    Plan: Patient's chart updated to align with [employer] [facility] program for ongoing patient management.    Hudson Mccarthy  Community Health Worker  [facility] Care Coordination  [email or web address]   Office: [phone]  Fax: [phone]

## 2022-08-24 NOTE — PROGRESS NOTES
[facility] Dermatology Note  Encounter Date: Aug 25, 2022  Office Visit     Dermatology Problem List:  1. Hidradenitis suppuritiva              - Diagnosed in 2016 and did not respond to antibiotics.              - Seen by Dr. Bird and underwent 9 procedures and 1 procedure with Dr. Maldonado              - s/p  mg BID - started 9/19, now discontinued              - Dapsone 200 mg daily, increased from 100mg daily on 7/1/21 (started 11/21/19), restarted 3/10/22              - CBC in 2 weeks and 4 weeks           2. Acute onset hand foot psoriasiform dermatitis with joint pain, suspected reactive arthritis, now improving and almost resolved with minimal foot involvement              -  mg BID - started 9/19, now discontinued              - Lidex ointment at night               - DermaSmooth  3. Atopic dermatitis              - fluocinolone oil, Triamcinolone 0.1% cream BID, Betamethasone 0.05% ointment BID  4. Vitiligo  5. Acne vulgaris  - tretinoin 0.025% cream, BPO 5% wash  ____________________________________________    Assessment & Plan:    # Hidradenitis suppurativa  He is still hesitant about other medications at this time, specifically biologics, therefore will continue with oral antibiotics/anti-inflammatories for now. He prefers to have these areas surgically excised, as this has been helpful for him in the past. Additionally, will refer him to different pain specialists to manage his chronic pain.  - Continue dapsone to 200mg daily, counseled on medication and lab compliance. States his daughter is no longer living in [place] so he is no longer worried about her taking his medications.  - Labs ordered today: CBC (he needs the lab to get refills)  - Referred to plastic surgery for removal  - Referred for pain management    # Atopic dermatitis. Patient reports that he stopped using the triamcinolone.   - Continue fluocinolone oil on the scalp at night  - Continue lidex  0.05% solution daily as needed  - Fluocinolone oil for body      # Psoriasis,. palms and soles  - Re-ordered betamethasone 0.05% ointment BID    # Acne  - Start tretinoin 0.025% cream every other night, increase to nightly  - Start BPO 5% wash daily    Procedures Performed:   None.    Follow-up: prn for new or changing lesions    Staff and Scribe:     Scribe Disclosure:  I, YANETH MENDEZ, am serving as a scribe to document services personally performed by Apolinar Riuz MD based on data collection and the provider's statements to me.     Provider Disclosure:   The documentation recorded by the scribe accurately reflects the services I personally performed and the decisions made by me.    Apolinar Ruiz MD, FAAD    Departments of Internal Medicine and Dermatology  [facility]  [phone]      ____________________________________________    CC: Derm Problem (Bernard is here today for HS flares in groin buttocks and face.)    HPI:  Mr. Bernard Padilla is a(n) 37 year old male who presents today as a return patient for HS follow up. Last seen by myself on 3/10/22, at which time patient was restarted on dapsone 200 mg and underwent ILK injections for treatment of HS on the submandibular chin.     Today, patient reports an increase in flares. He has been in a great deal of pain. He was originally taking an opiate that provider stopped refilling, so he been using recreational Mariguana for pain. He reports that he is still using dapsone 200 mg daily. Denies any side effects. He had a lesion surgically removed on his left buttock, but he notes that this caused some other problems in the area.    Patient also reports some flaking of the scalp that he has been treating with fluocinolone oil. He has been using this on his scalp and body every day. He has also been treating this with Aveeno lotion and Vaseline.    HS Nurse Assessment    Nurse Assessment Data 7/1/2021 3/10/2022 8/25/2022    Over the past 30 days how many old lesions flared back up? - 5 4   Over the past 30 days how many new lesions did you get? 5 2-3 0   Over the past week, how many dressing changes do you do each day? 3+ 0 0   Over the past week, has your wound drainage been: Severe Severe Very Severe   Rate your HS overall from 0 - 10 (0 = no disease, 10 = worst) over the past week:  10 8 8   Rate your pain score from 0 - 10 (0 = no disease, 10 = worst) for the most painful/symptomatic lesion in the past week:  10 - Worst Pain 10 - Worst Pain 8   Over the past week, how much has HS influenced your quality of life? extremely very much slightly   Total DLQI Score - - -       Patient is otherwise feeling well, without additional skin concerns.    Labs Reviewed:  N/A    Physical Exam:  Vitals: There were no vitals taken for this visit.  SKIN: Focused examination of the face, axillae, and buttocks was performed.  - Patient reported flaking on the scalp and legs.   HS Data  HS Exam Data 3/10/2022   LC Type LC1   Clinical Subtypes Regular type   Acne? No   Dissecting Cellulitis? No   Visual analogue score (0-100) 45   Total Han Stage II   Total Inflammatory Nodules 1   Total Abcesses 0   Total Draining Tunnels 1   Total Abscess and Nodule Count 1   IHS4 Score  5   HS-PGA 3     - No other lesions of concern on areas examined.     Medications:  Current Outpatient Medications   Medication     acetaminophen (TYLENOL) 325 MG tablet     albuterol (PROAIR HFA/PROVENTIL HFA/VENTOLIN HFA) 108 (90 Base) MCG/ACT inhaler     augmented betamethasone dipropionate (DIPROLENE-AF) 0.05 % external ointment     dapsone (ACZONE) 100 MG tablet     fluocinolone acetonide (DERMA-SMOOTHE/FS BODY) 0.01 % external oil     Fluocinolone Acetonide Scalp (DERMA-SMOOTHE/FS SCALP) 0.01 % OIL oil     fluticasone (FLONASE) 50 MCG/ACT nasal spray     IBUPROFEN PO     loratadine (CLARITIN) 10 MG tablet     senna-docusate (SENOKOT-S/PERICOLACE) 8.6-50 MG tablet      triamcinolone (KENALOG) 0.1 % external cream     ondansetron (ZOFRAN ODT) 4 MG ODT tab     Current Facility-Administered Medications   Medication     triamcinolone acetonide (KENALOG-10) injection 10 mg      Past Medical History:   Patient Active Problem List   Diagnosis     Hidradenitis suppurativa     Tobacco use disorder     Patellofemoral arthritis of left knee     Eczema     Allergic rhinitis     Depression     Influenza     Knee pain, left     Other chronic pain     Scrotal abscess     Abscess of groin, left     Right rotator cuff tendinitis     Past Medical History:   Diagnosis Date     Anxiety      Arthritis      Arthritis of knee      Boil      Chronic pain      Eczema      Hidradenitis suppurativa      Hydradenitis      Mild intermittent asthma without complication         CC No referring provider defined for this encounter. on close of this encounter.

## 2022-08-25 ENCOUNTER — OFFICE VISIT (OUTPATIENT)
Dept: DERMATOLOGY | Facility: CLINIC | Age: 37
End: 2022-08-25
Payer: MEDICARE

## 2022-08-25 ENCOUNTER — TELEPHONE (OUTPATIENT)
Dept: PLASTIC SURGERY | Facility: CLINIC | Age: 37
End: 2022-08-25

## 2022-08-25 DIAGNOSIS — L40.9 PSORIASIS: ICD-10-CM

## 2022-08-25 DIAGNOSIS — L73.2 HIDRADENITIS SUPPURATIVA: Primary | ICD-10-CM

## 2022-08-25 DIAGNOSIS — L70.0 ACNE VULGARIS: ICD-10-CM

## 2022-08-25 DIAGNOSIS — L30.9 DERMATITIS: ICD-10-CM

## 2022-08-25 DIAGNOSIS — L30.9 ECZEMA, UNSPECIFIED TYPE: ICD-10-CM

## 2022-08-25 DIAGNOSIS — L20.89 OTHER ATOPIC DERMATITIS: ICD-10-CM

## 2022-08-25 PROCEDURE — 99214 OFFICE O/P EST MOD 30 MIN: CPT | Performed by: DERMATOLOGY

## 2022-08-25 RX ORDER — FLUOCINOLONE ACETONIDE 0.11 MG/ML
OIL TOPICAL
Qty: 118.28 ML | Refills: 5 | Status: SHIPPED | OUTPATIENT
Start: 2022-08-25 | End: 2023-08-03

## 2022-08-25 RX ORDER — FLUOCINOLONE ACETONIDE 0.11 MG/ML
OIL TOPICAL 2 TIMES DAILY
Qty: 120 ML | Refills: 5 | Status: SHIPPED | OUTPATIENT
Start: 2022-08-25 | End: 2023-07-14

## 2022-08-25 RX ORDER — TRETINOIN 0.25 MG/G
CREAM TOPICAL
Qty: 45 G | Refills: 11 | Status: SHIPPED | OUTPATIENT
Start: 2022-08-25 | End: 2023-12-18

## 2022-08-25 RX ORDER — FLUOCINONIDE TOPICAL SOLUTION USP, 0.05% 0.5 MG/ML
SOLUTION TOPICAL 2 TIMES DAILY
Qty: 60 ML | Refills: 3 | Status: SHIPPED | OUTPATIENT
Start: 2022-08-25 | End: 2023-08-03

## 2022-08-25 RX ORDER — BETAMETHASONE DIPROPIONATE 0.5 MG/G
OINTMENT, AUGMENTED TOPICAL 2 TIMES DAILY
Qty: 50 G | Refills: 3 | Status: SHIPPED | OUTPATIENT
Start: 2022-08-25 | End: 2023-08-03

## 2022-08-25 NOTE — NURSING NOTE
Dermatology Rooming Note    Bernard Padilla's goals for this visit include:   Chief Complaint   Patient presents with     Derm Problem     Bernard is here today for HS flares in groin buttocks and face.

## 2022-08-25 NOTE — PATIENT INSTRUCTIONS
Labs today   Dapsone 100mg daily and increase to 200mg for flare  Tretinoin cream at night   Benzoyl peroxide 5% every day

## 2022-08-25 NOTE — TELEPHONE ENCOUNTER
[facility] Call Center    Phone Message    May a detailed message be left on voicemail: no     Reason for Call: Other: Patient has a referral from derm to be seen in plastics for Hidradentis Suppurativa. Per protos send encounter to team for pt assessment. Pt has not been contacted yet.    Action Taken: Message routed to:  [facility] plastic surgery    Travel Screening: Not Applicable

## 2022-08-26 ENCOUNTER — PATIENT OUTREACH (OUTPATIENT)
Dept: CARE COORDINATION | Facility: CLINIC | Age: 37
End: 2022-08-26

## 2022-08-26 NOTE — PROGRESS NOTES
8/26/2022  Clinic Care Coordination Contact  [facility]/Voicemail    Clinical Data: Care Coordinator Outreach  Outreach attempted x 1.  Left message on patient's voicemail with call back information and requested return call.  Plan: Care Coordinator  will try to reach patient again in 10 business days.    CHW follow up: 9-8-22    Hudson Mccarthy  Community Health Worker  [facility]  Clinic Care Coordination  [email or web address]   Office: [phone]  Fax: [phone]

## 2022-09-08 ENCOUNTER — PATIENT OUTREACH (OUTPATIENT)
Dept: CARE COORDINATION | Facility: CLINIC | Age: 37
End: 2022-09-08

## 2022-09-08 NOTE — PROGRESS NOTES
9/8/2022  Clinic Care Coordination Contact  [facility]/Voicemail       Clinical Data: Care Coordinator Outreach  Outreach attempted x 2. Left message on patient's voicemail with call back information and requested return call.    Plan: Care Coordinator will route to CC RN to review chart before sending disenrollment letter with care coordinator contact information via mail.     Care Coordinator will wait for CC RN to respond by 9-16-22    CHW follow up 9-22-22     Hudson Mccarthy  Community Health Worker  [facility] Care Coordination  [email or web address]   Office: [phone]  Fax: [phone]

## 2022-09-08 NOTE — PROGRESS NOTES
9/8/2022  Clinic Care Coordination Contact  Care Team Conversations    Routed to CC RN  Please review chart  2nd unsuccessful attempts    Patient has 1  goal left. Schedule eye appt.  Patient has contact information for eye clinic at [facility].  CHW sent in [employer] message to patient eye clinic information at  [facility]  [address] [place] [address]  Make an appointment  [phone] clinic  [phone] General    CHW follow up 9-22-22    Hudson Mccarthy  Community Health Worker  [facility]  Clinic Care Coordination  [email or web address]   Office: [phone]  Fax: [phone]

## 2022-09-09 NOTE — PROGRESS NOTES
9/8/2022  Clinic Care Coordination Contact  Care Team Conversations  Routed to CC RN  Please review chart  2nd unsuccessful attempts    Patient has 1  goal left. Schedule eye appt.  Patient has contact information for eye clinic at [facility].    CHW sent in [employer] message to patient eye clinic information at  [facility]  [address] [place] [address]    Hudson Mccarthy  Community Health Worker  [facility]  Clinic Care Coordination  [email or web address]   Office: [phone]  Fax: [phone]

## 2022-09-11 ENCOUNTER — HEALTH MAINTENANCE LETTER (OUTPATIENT)
Age: 37
End: 2022-09-11

## 2022-09-13 ENCOUNTER — PATIENT OUTREACH (OUTPATIENT)
Dept: CARE COORDINATION | Facility: CLINIC | Age: 37
End: 2022-09-13

## 2022-09-13 NOTE — PROGRESS NOTES
Clinic Care Coordination Contact    Follow Up Progress Note      Assessment: RN CC outreach to support patient engagement  Spoke with patient  Patient mother recently passed away   Patient ask that we call back in 2-3 weeks to give him some time as he is grieving and unable to review goals at this time    RN CC expressed condolences and with CHW reach out in 2 weeks and if needed schedule follow up with RN   Patient agreed       Care Gaps:    Health Maintenance Due   Topic Date Due     ASTHMA ACTION PLAN  Never done     COVID-19 Vaccine (1) Never done     Pneumococcal Vaccine: Pediatrics (0 to 5 Years) and At-Risk Patients (6 to 64 Years) (2 - PCV) 11/02/2021     ASTHMA CONTROL TEST  12/22/2021     INFLUENZA VACCINE (1) 09/01/2022       Care Plans    Plan: Patient will schedule and attend recommended follow up visits with speciality providers and primary care provider.  Community Health Worker to outreach per standard work and updated on goal progression    RN CC will review in 6 weeks to support ongoing recommendations and plan of care will be available sooner if needed.

## 2022-09-13 NOTE — PROGRESS NOTES
9/13/2022  Clinic Care Coordination Contact  Care Team Conversations    Received message from CC RN today 9-13-22  Lisbeth Bynum, Hudson Gong  See note - please call in a couple of weeks and if needed set up RN CC follow up   Patient mother recently passed away   Patient ask that we call back in 2-3 weeks to give   Thanks   KB     CHW  Follow Up: 9-27-22    Hudson Mccarthy  Community Health Worker  [facility] Care Coordination  [email or web address]   Office: [phone]  Fax: [phone]

## 2022-09-27 ENCOUNTER — PATIENT OUTREACH (OUTPATIENT)
Dept: CARE COORDINATION | Facility: CLINIC | Age: 37
End: 2022-09-27

## 2022-09-27 NOTE — PROGRESS NOTES
9/27/2022  Clinic Care Coordination Contact  Community Health Worker Follow Up    Intervention and Education during outreach:   Called and spoke to patient and follow up on goal.  Patient reported:  -did not schedule eye exam yet due to death in the family.  Reminded of appt tomorrow 9-28-22 at 8:30am with Dr Leos  Patient confirmed appt and knows where it is.  Scheduled phone visit to follow up with CC RN 10-3-22 at 11am after appt.    CHW sent [employer] message to patient eye clinic information at  [facility]  [address] [place] [address]  Make an appointment  [phone] clinic  [phone] General    CC RN 10-3-22 reassess and follow up  CHW Follow up: Monthly  CHW Plan: Follow up on goal  CHW Next Follow Up: 10-31-22    Hudson Mccarthy  Community Health Worker  [facility]  Clinic Care Coordination  [email or web address]   Office: [phone]  Fax: [phone]    Clinic Care Coordination Contact  Community Health Worker Follow Up  Care Gaps:   Health Maintenance Due   Topic Date Due     ASTHMA ACTION PLAN  Never done     COVID-19 Vaccine (1) Never done     Pneumococcal Vaccine: Pediatrics (0 to 5 Years) and At-Risk Patients (6 to 64 Years) (2 - PCV) 11/02/2021     ASTHMA CONTROL TEST  12/22/2021     INFLUENZA VACCINE (1) 09/01/2022     Care Gaps Last addressed on did not discuss care gaps focus on eye exam and speciality appt.    Care Plan:   Care Plan: General: Eye Exam     Problem: HP GENERAL PROBLEM     Goal: General Goal - I would like to schedule and attend an evaluation and eye exam within 2-3 months     Start Date: 7/29/2022 Expected End Date: 10/26/2022    This Visit's Progress: 50% Recent Progress: 50%    Note:     Goal Statement- I would like to schedule and attend an evaluation and eye exam within 2-3 months   DateGoal set: 12/16/21 Updated 3-10-22 updated 7-29-22  Barriers: access  Strengths: Patient engagement,  provider identified   Date to Achieve By:   Patient expressed understanding of goal: yes    Action stepsto achieve this goal  1. I will call [facility] [phone] to schedule eye exam.  2. I will call CHW if I need help to schedule.  [facility] [address] [place] [address]  Make an appointment  [phone] [facility]   [phone] General     Updated: 9-27-22 AL

## 2022-09-28 ENCOUNTER — OFFICE VISIT (OUTPATIENT)
Dept: PLASTIC SURGERY | Facility: CLINIC | Age: 37
End: 2022-09-28
Payer: MEDICARE

## 2022-09-28 VITALS
TEMPERATURE: 98 F | BODY MASS INDEX: 30.29 KG/M2 | DIASTOLIC BLOOD PRESSURE: 80 MMHG | OXYGEN SATURATION: 98 % | WEIGHT: 193 LBS | HEIGHT: 67 IN | SYSTOLIC BLOOD PRESSURE: 118 MMHG | HEART RATE: 75 BPM

## 2022-09-28 DIAGNOSIS — L73.2 HIDRADENITIS SUPPURATIVA: ICD-10-CM

## 2022-09-28 PROCEDURE — 99214 OFFICE O/P EST MOD 30 MIN: CPT | Performed by: PLASTIC SURGERY

## 2022-09-28 ASSESSMENT — PAIN SCALES - GENERAL: PAINLEVEL: SEVERE PAIN (7)

## 2022-09-28 NOTE — LETTER
9/28/2022       RE: Bernard Padilla  [address]  [place] [address]     Dear Colleague,    Thank you for referring your patient, Bernard Padilla, to the [facility]LASTIC [facility] RECON[facility] SURGERY CLINIC [place] at [facility]. Please see a copy of my visit note below.    FOLLOWUP NOTE    PRESENTING COMPLAINT:  Followup visit for hidradenitis suppurativa of the groin, perianal area.    HISTORY OF PRESENTING COMPLAINT:  Mr. Padilla is 37 years old.  Knows me from previous surgeries.  He has been having recurrences in the areas.  He has been followed by Dermatology but has not started any form of preventative treatments like biologics.    ASSESSMENT AND PLAN:  Based on the above findings, a diagnosis of hidradenitis suppurativa was made.  I have advised that he go back to Dermatology and have a serious consideration to start biologics and preventative medications rather than surgeries.  He understood. That will be the plan.  I will see him back p.r.n.  All questions answered.  All exam and discussion done in the presence of my nurse, Annalee Chavira.    Total time spent with chart review, visit itself and post-visit paperwork was 30 minutes.      Sincerely,    CURT Leos MD

## 2022-09-28 NOTE — NURSING NOTE
"Chief Complaint   Patient presents with     YESSICA Wagner, is being seen today for a follow up HS, referred by Dr. Ruiz.       Vitals:    09/28/22 0827   BP: 118/80   BP Location: Left arm   Patient Position: Chair   Cuff Size: Adult Large   Pulse: 75   Temp: 98  F (36.7  C)   TempSrc: Oral   SpO2: 98%   Weight: 87.5 kg (193 lb)   Height: 1.702 m (5' 7\")       Body mass index is 30.23 kg/m .      Kizzy Augustine LPN    "

## 2022-09-28 NOTE — PROGRESS NOTES
FOLLOWUP NOTE    PRESENTING COMPLAINT:  Followup visit for hidradenitis suppurativa of the groin, perianal area.    HISTORY OF PRESENTING COMPLAINT:  Mr. Padilla is 37 years old.  Knows me from previous surgeries.  He has been having recurrences in the areas.  He has been followed by Dermatology but has not started any form of preventative treatments like biologics.    ASSESSMENT AND PLAN:  Based on the above findings, a diagnosis of hidradenitis suppurativa was made.  I have advised that he go back to Dermatology and have a serious consideration to start biologics and preventative medications rather than surgeries.  He understood. That will be the plan.  I will see him back p.r.n.  All questions answered.  All exam and discussion done in the presence of my nurse, Annalee Chavira.    Total time spent with chart review, visit itself and post-visit paperwork was 30 minutes.

## 2022-09-29 NOTE — LETTER
5/24/2022         RE: Bernard Padilla  [address]  [place] [address]        Dear Colleague,    Thank you for referring your patient, Bernard Padilla, to the [facility] [place]. Please see a copy of my visit note below.    Patient reports finishing the medical cannabis program paperwork but smoking cannabis to ease the pain. UDS is due today. 05/24/2022    Zenobia Reis MA  [facility]    [facility] - Office Visit    ASSESSMENT & PLAN     There are no diagnoses linked to this encounter.    Patient Instructions     [facility] Number:  [phone]    Call with any questions about your care and for scheduling assistance.     Calls are returned Monday through Friday between 8 AM and 4:30 PM. We usually get back to you within 2 business days depending on the issue/request.    If we are prescribing your medications:    For opioid medication refills, call the clinic or send a [employer] message 7 days in advance.  Please include:    Name of requested medication    Name of the pharmacy.    For non-opioid medications, call your pharmacy directly to request a refill. Please allow 3-4 days to be processed.     Per [place] State Law:    All controlled substance prescriptions must be filled within 30 days of being written.      For those controlled substances allowing refills, pickup must occur within 30 days of last fill.      We believe regular attendance is key to your success in our program!      Any time you are unable to keep your appointment we ask that you call us at least 24 hours in advance to cancel.This will allow us to offer the appointment time to another patient.     Multiple missed appointments may lead to dismissal from the clinic.     Plan:    You may call if you would like to try Celebrex, and begin ketamine.        -----  ODALYS BORGES MD  [facility]        "SUBJECTIVE      Bernard Padilla is a 36 year old year old male who presents to clinic today for the following:     Hydroadenitis suppurativa.    Chart reflects when last seen in December we have talked about using a trial of Suboxone.  There were several calls where he described at 8 mg dose did not help, he did not like the taste and had a sense of choking.  I offered Celebrex and ketamine.    Reviewing the record he has had some surgeries for his hidradenitis and received small doses of oxycodone last on 5/4.    He reviews today he will be having a few more surgeries for his hidradenitis suppurativa.  He has had 1 removed from his near his scrotum, has another 1 in his rectum, will be having MRI to see if he might need a colonoscopy.    Reviews the surgeons did give him a short supply of oxycodone and then told him they would not give more and to follow-up here.    Reviewed the Suboxone did not work.    He has been using marijuana, not involved in the medical cannabis program.  States he plans to stop it as it is making it hard to breathe.  I reviewed with the [place] medical cannabis program there are products such as the oils and capsules that had would not affect his breathing.    Describes his arthritis continues to be a pain.    He requests again the oxycodone noted he is tired of being a \"guinea pig\".  Reviewed that I had not been prescribing the oxycodone as he missed a urine drug test.  Stated he had had 5 teeth removed at that time and could not come in.  I also reminded him he had a urine drug test at the [facility] positive for cocaine.  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    Were reviewed our typical sequence if the person is not compliant with the urine drug testing program and/or cocaine is involved to offer buprenorphine products, " "medical cannabis and ketamine.  Reviewed we have that many patients using the ketamine with benefit, some the prefer that the opioids, and it is far from treating him as a guinea pig.    I reviewed that I would again start with Celebrex presently and begin the ketamine process for which we would be speaking frequently to adjust the best dose.  Would run it through [employer] to see if his insurance covers.  He made reference to chat rooms he is on about how unfair that other people that abuse the system so the people like him and pain cannot get the opioids.  I encouraged him to access those chat rooms to see if anybody has experience with using ketamine to help with pain    At this point he indicated again did not want to be a guinea pig, left, indicating he might have to \"buy pain pills on the streets\".    Review of Systems   General, psych, musculoskeletal, bowels and bladder otherwise normal other than above.          OBJECTIVE   BP (!) 171/102   Pulse 106   Ht 1.829 m (6')   Wt 103.8 kg (228 lb 14.4 oz)   SpO2 97%   BMI 31.04 kg/m        Physical Exam  General: Alert clear sensorium.  Indeed appeared quite uncomfortable changing positions several times referencing pain in rectum and groin.  Abnormal gait, left hip externally rotated.    He did have a healing area under his left eye which he indicated was not another recent at bedtime lesion.  Cardiovascular: Normal rate  Lungs: Pulmonary effort is normal, speaking in full sentences  MSK: normal muscle bulk and tone, ROM, equal strength in all extremities  Skin: Warm and dry. No concerning rashes or lesions.  Neurologic: No focal deficit, alert and oriented x3  Psychiatric:       Assessment: Hidradenitis suppurativa, frequent surgeries for lesions.  Due to a urine drug test that was not kept, and cocaine on a urine drug test at [facility], I have reviewed options to the opioids including medical cannabis, buprenorphine, and ketamine.  As patient " left I again informed him if he changes his mind he can call to initiate above.    Total time more than 20 minutes      Again, thank you for allowing me to participate in the care of your patient.        Sincerely,        ODALYS BORGES MD     no

## 2022-10-03 ENCOUNTER — PATIENT OUTREACH (OUTPATIENT)
Dept: NURSING | Facility: CLINIC | Age: 37
End: 2022-10-03

## 2022-10-03 NOTE — PROGRESS NOTES
Clinic Care Coordination Contact  [facility]/Voicemail       Clinical Data: Care Coordinator Outreach  Outreach attempted x 1.  Unable to leave a message     Plan:Community Health Worker to outreach per standard work and updated on goal progression

## 2022-10-28 ENCOUNTER — PATIENT OUTREACH (OUTPATIENT)
Dept: CARE COORDINATION | Facility: CLINIC | Age: 37
End: 2022-10-28

## 2022-10-28 NOTE — PROGRESS NOTES
Clinic Care Coordination Contact  [facility]/Voicemail       Clinical Data: CHW Outreach    Outreach attempted x 1. CHW Briefly spoke to Patient (Bernard)      Patient expressed that he is currently busy. Further expressing that he would like for CHW to call Patient at a later time.     CHW acknowledged and provided call back information.     Plan: CHW will make another attempt to reach the patient via phone or MyChart.    CHW outreach in the next 2 weeks.      MARVIN Clemons  Clinic Care Coordination   [facility]   Phone: [phone]  [email or web address]

## 2022-11-11 ENCOUNTER — PATIENT OUTREACH (OUTPATIENT)
Dept: CARE COORDINATION | Facility: CLINIC | Age: 37
End: 2022-11-11

## 2022-11-11 NOTE — PROGRESS NOTES
11/11/2022  Clinic Care Coordination Contact  [facility]/Voicemail    Clinical Data: Care Coordinator Outreach  Outreach attempted x 1.  No answer. Send MNF notification.  fulled unable to leave messages on patient's voicemail with call back information and requested return call.  Plan: Care Coordinator  will try to reach patient again in 10 business days.    CHW follow up:11-25-22    Hudson Mccarthy  Community Health Worker  [facility] Care Coordination  [email or web address]   Office: [phone]  Fax: [phone]

## 2022-11-25 ENCOUNTER — PATIENT OUTREACH (OUTPATIENT)
Dept: CARE COORDINATION | Facility: CLINIC | Age: 37
End: 2022-11-25

## 2022-11-25 NOTE — PROGRESS NOTES
11/25/2022  Clinic Care Coordination Contact  Community Health Worker Follow Up    Intervention and Education during outreach:   Called and spoke to patient and follow up on goal.  Patient reported:  -he has not schedule yet. No available to talk  Requested to call him on Monday he has family over.    CHW Follow up: Monthly  CHW Plan: Follow up on goal   CHW Next Follow Up: 11-28-22    Hudson Mccarthy  Community Health Worker  [facility]  Clinic Care Coordination  [email or web address]   Office: [phone]  Fax: [phone]

## 2022-11-28 ENCOUNTER — PATIENT OUTREACH (OUTPATIENT)
Dept: CARE COORDINATION | Facility: CLINIC | Age: 37
End: 2022-11-28

## 2022-11-28 NOTE — PROGRESS NOTES
11/28/2022  Clinic Care Coordination Contact  Community Health Worker Follow Up    Intervention and Education during outreach:   Called and spoke to patient and follow up on goal.  Patient reported:  - will have his girlfriend call today to schedule eye appt.  -he has been feeling depress since his mother passed away. Still grieving and has not dealt or talk to anyone about it.  Would like help from  for support. He about to lose his housing.    Patient wants to schedule annual check up with is PCP stated he has not seen his doctor for awhile.  Conference call with patient and connected with a  to schedule AWV appt.  AWV appt scheduled for 1-11-23 at 4:20pm with PCP.    Scheduled appt with RALPH BARON 12-1-22 at 10am for re assessment and MH-depression/ grieving support    Routed to CC LORAINE, CC RN for support.    CHW Follow up: Monthly  CHW Plan: Follow up on goals  CHW Next Follow Up: 12-28-22    Hudson Mccarthy  Community Health Worker  [facility]  Clinic Care Coordination  [email or web address]   Office: [phone]  Fax: [phone]    Clinic Care Coordination Contact    Community Health Worker Follow Up    Care Gaps:     Health Maintenance Due   Topic Date Due     ASTHMA ACTION PLAN  Never done     HEPATITIS B IMMUNIZATION (1 of 3 - 3-dose series) Never done     COVID-19 Vaccine (1) Never done     Pneumococcal Vaccine: Pediatrics (0 to 5 Years) and At-Risk Patients (6 to 64 Years) (2 - PCV) 11/02/2021     ASTHMA CONTROL TEST  12/22/2021     PHQ-9  12/22/2021     INFLUENZA VACCINE (1) 09/01/2022     NICOTINE/TOBACCO CESSATION COUNSELING Q 1 YR  12/06/2022     MEDICARE ANNUAL WELLNESS VISIT  12/06/2022     URINE DRUG SCREEN  12/10/2022       Care Gap Goal set: Yes and Scheduled 1-11-23 at 4:20pm with PCP      Care Plan:   Care Plan: General: Eye Exam     Problem: HP GENERAL PROBLEM     Goal: General Goal - I would like to schedule and attend an evaluation and eye  exam within 2-3 months     Start Date: 7/29/2022 Expected End Date: 12/30/2022    This Visit's Progress: 50% Recent Progress: 50%    Note:     Barriers: access  Strengths: Patient engagement, provider identified   Patient expressed understanding of goal: yes  Action stepsto achieve this goal  1. My girlfriend will to call [facility] [phone] to schedule eye exam.  2. I will call CHW if I need help to schedule.  [facility]  [address] [place] [address]  Make an appointment  [phone] clinic   [phone] General  Updated: 11-28-22 AL                      Care Plan: General: Annual Wellness Visit     Problem: HP GENERAL PROBLEM     Goal: General Goal - I will attend and complete my annual wellness visit on 1-     Start Date: 11/28/2022 Expected End Date: 1/31/2023    This Visit's Progress: 50%    Note:     Barriers: access  Strengths: support from CCC team  Patient expressed understanding of goal: yes  Action steps to achieve this goal:  1.  I will attend my annual wellness visit 1-11-23 at 4:20pm  2 I will contact my Care Management or clinic team if I have barriers to attending my annual wellness visit.

## 2022-11-29 ENCOUNTER — PATIENT OUTREACH (OUTPATIENT)
Dept: CARE COORDINATION | Facility: CLINIC | Age: 37
End: 2022-11-29

## 2022-11-30 NOTE — PROGRESS NOTES
Clinic Care Coordination Contact    Situation: Patient chart reviewed by care coordinator.    Background:Pt due for 6 week reivew    Assessment: Pt working with CHW for goal progression, schedule visit with  CC and schedule AWE in January    Plan/Recommendations: Pt to attend schedule visit and work with CCC team to support goal progression

## 2022-12-01 ENCOUNTER — PATIENT OUTREACH (OUTPATIENT)
Dept: NURSING | Facility: CLINIC | Age: 37
End: 2022-12-01
Payer: MEDICARE

## 2022-12-01 DIAGNOSIS — F32.A DEPRESSION, UNSPECIFIED DEPRESSION TYPE: Primary | ICD-10-CM

## 2022-12-01 NOTE — PROGRESS NOTES
Clinic Care Coordination Contact    Follow Up Progress Note      Assessment: CC SW connect with pt regarding housing and mental health support    Care Gaps:    Health Maintenance Due   Topic Date Due    ASTHMA ACTION PLAN  Never done    HEPATITIS B IMMUNIZATION (1 of 3 - 3-dose series) Never done    COVID-19 Vaccine (1) Never done    Pneumococcal Vaccine: Pediatrics (0 to 5 Years) and At-Risk Patients (6 to 64 Years) (2 - PCV) 11/02/2021    ASTHMA CONTROL TEST  12/22/2021    PHQ-9  12/22/2021    INFLUENZA VACCINE (1) 09/01/2022    NICOTINE/TOBACCO CESSATION COUNSELING Q 1 YR  12/06/2022    MEDICARE ANNUAL WELLNESS VISIT  12/06/2022    URINE DRUG SCREEN  12/10/2022       Postponed to a later date. Pt was out and about running errands     Care Plans  Care Plan: General: Eye Exam       Problem: HP GENERAL PROBLEM       Goal: General Goal - I would like to schedule and attend an evaluation and eye exam within 2-3 months       Start Date: 7/29/2022 Expected End Date: 12/30/2022    This Visit's Progress: 50% Recent Progress: 50%    Note:     Barriers: access  Strengths: Patient engagement, provider identified   Patient expressed understanding of goal: yes  Action stepsto achieve this goal  1. My girlfriend will to call [facility] [phone] to schedule eye exam.  2. I will call CHW if I need help to schedule.  [facility] [address] [place] [address]  Make an appointment  [phone] clinic   [phone] General  Updated: 11-28-22 AL                              Care Plan: General: Annual Wellness Visit       Problem: HP GENERAL PROBLEM       Goal: General Goal - I will attend and complete my annual wellness visit on 1-       Start Date: 11/28/2022 Expected End Date: 1/31/2023    This Visit's Progress: 50%    Note:     Barriers: access  Strengths: support from CCC team  Patient expressed understanding of goal: yes  Action steps to achieve this  goal:  1.  I will attend my annual wellness visit 23 at 4:20pm  2 I will contact my Care Management or clinic team if I have barriers to attending my annual wellness visit.                               CC SW called and spoke to pt this morning. SW noted that it sounded like pt was out running errands and checked if it was a good time for him and SW to connect. Pt confirmed it was fine and discussion around needs began. Pt explained that his mother had passed away 3 months ago and pt was having a difficult time with her passing. Pt identified his mother as being a big support for him and with her gone, he is concerned that his mental health & depression are going to really effect him. SW asked pt if he was looking for support in the [facility] system or from an outside agency. Pt confirmed that he was fine with going through [facility]. SW to put in a [facility] referral at the end of the call. Pt also talked about his current rental situation and that he is behind 2 months on rent. Pt explained that he was on a fixed incomes (SSDI) and with his mother's  expenses, his savings got drained and he is concerned as he is currently living in a 1 bedroom with his 3 kids. SW offered to send pt some rent assistance information and pt agreed that he would like to see what options are available    Intervention/Education provided during outreach: Housing resources, [facility] MH referral      The patient consented via Verbal consent to have contact information and resources sent via email in an unencrypted manner.    Plan: Pt to connect with resources sent via email and connect with SW in a week    Care Coordinator will follow up in a week to check on how resource connecting went

## 2022-12-06 DIAGNOSIS — L30.9 DERMATITIS: ICD-10-CM

## 2022-12-09 RX ORDER — FLUOCINOLONE ACETONIDE 0.11 MG/ML
OIL TOPICAL
Qty: 118.28 ML | Refills: 5 | OUTPATIENT
Start: 2022-12-09

## 2022-12-16 ENCOUNTER — PATIENT OUTREACH (OUTPATIENT)
Dept: CARE COORDINATION | Facility: CLINIC | Age: 37
End: 2022-12-16

## 2022-12-16 NOTE — LETTER
[facility]  Patient Centered Plan of Care  About Me:        Patient Name:  Bernard Padilla    YOB: 1985  Age:         37 year old   Alpesh MRN:    [id number] Telephone Information:  Home Phone [phone]   Mobile [phone]       Address:  [address] [place] [address] Email address:  [email or web address]      Emergency Contact(s)    Name Relationship Lgl Grd Work Phone Home Phone Mobile Phone   1. DINO URIAS Sister   [phone]   2. DINO URIAS Other   [phone]            Primary language:  English     needed? No   [facility]:  [phone] op. 1  Other communication barriers:No data recorded  Preferred Method of Communication:     Current living arrangement: No data recorded  Mobility Status/ Medical Equipment: No data recorded      Health Maintenance  Health Maintenance Reviewed: No data recorded    My Access Plan  Medical Emergency 911   Primary Clinic Line [facility] - [phone]   24 Hour Appointment Line [phone] or  [phone]) (toll-free)   24 Hour Nurse Line [phone] (toll-free)   Preferred Urgent Care No data recorded   Preferred Hospital No data recorded   Preferred Pharmacy [facility] [place]  [facility]     Behavioral Health Crisis Line The National Suicide Prevention Lifeline at 1-244.593.5891 or Text/Call 858             My Care Team Members  Patient Care Team       Relationship Specialty Notifications Start End    Himanshu Villagran MD PCP - General Family Medicine Admissions 8/9/21     Phone: [phone] Fax: [phone]         [address] [place] [address]    Hudson Mccarthy Community Health Worker Primary Care - CC Admissions 9/1/20     Phone: [phone] Fax: [phone]         [address] [place] [address]    Lisbeth Bynum, RN Lead Care Coordinator Primary Care - CC Admissions 9/1/20     Apolinar Ruiz MD  MD Dermatology  9/11/20     Phone: [phone] Fax: [phone] [address] [facility] [address]    Himanshu Villagran MD Assigned PCP   8/15/21     Phone: [phone] Fax: [phone]         [address] [place] [address]    Chai Foster MD Assigned Behavioral Health Provider   12/19/21     Phone: [phone] Fax: [phone]         [address] [facility] [address]    CURT Leos MD Assigned Surgical Provider   10/8/22     Phone: [phone] Fax: [phone]         [address] [facility] [address] [facility] [address]    Rika Schrader, [facility] Care Coordinator  Admissions 12/16/22             My Care Plans  Self Management and Treatment Plan  Care Plan  Care Plan: General: Eye Exam     Problem: HP GENERAL PROBLEM     Goal: General Goal - I would like to schedule and attend an evaluation and eye exam within 2-3 months     Start Date: 7/29/2022 Expected End Date: 12/30/2022    This Visit's Progress: 50% Recent Progress: 50%    Note:     Barriers: access  Strengths: Patient engagement, provider identified   Patient expressed understanding of goal: yes  Action stepsto achieve this goal  1. My girlfriend will to call [facility] [phone] to schedule eye exam.  2. I will call CHW if I need help to schedule.  [facility]  [address] [place] [address]  Make an appointment  [phone] clinic   [phone] General  Updated: 11-28-22 AL                      Care Plan: General: Annual Wellness Visit     Problem: HP GENERAL PROBLEM     Goal: General Goal - I will attend and complete my annual wellness visit on 1-     Start Date: 11/28/2022 Expected End Date: 1/31/2023    This Visit's Progress: 50%    Note:     Barriers: access  Strengths: support from CCC team  Patient expressed understanding of goal: yes  Action steps to achieve this goal:  1.  I will attend my annual wellness visit 1-11-23 at 4:20pm  2 I  will contact my Care Management or clinic team if I have barriers to attending my annual wellness visit.                            Action Plans on File:                       Advance Care Plans/Directives Type:   No data recorded    My Medical and Care Information  Problem List   Patient Active Problem List   Diagnosis     Hidradenitis suppurativa     Tobacco use disorder     Patellofemoral arthritis of left knee     Eczema     Allergic rhinitis     Depression     Influenza     Knee pain, left     Other chronic pain     Scrotal abscess     Abscess of groin, left     Right rotator cuff tendinitis      Current Medications and Allergies:    Current Outpatient Medications   Medication     acetaminophen (TYLENOL) 325 MG tablet     albuterol (PROAIR HFA/PROVENTIL HFA/VENTOLIN HFA) 108 (90 Base) MCG/ACT inhaler     augmented betamethasone dipropionate (DIPROLENE-AF) 0.05 % external ointment     benzoyl peroxide 5 % external liquid     dapsone (ACZONE) 100 MG tablet     fluocinolone acetonide (DERMA-SMOOTHE/FS BODY) 0.01 % external oil     Fluocinolone Acetonide Scalp (DERMA-SMOOTHE/FS SCALP) 0.01 % OIL oil     fluocinonide (LIDEX) 0.05 % external solution     fluticasone (FLONASE) 50 MCG/ACT nasal spray     IBUPROFEN PO     loratadine (CLARITIN) 10 MG tablet     ondansetron (ZOFRAN ODT) 4 MG ODT tab     senna-docusate (SENOKOT-S/PERICOLACE) 8.6-50 MG tablet     tretinoin (RETIN-A) 0.025 % external cream     Current Facility-Administered Medications   Medication     triamcinolone acetonide (KENALOG-10) injection 10 mg         Care Coordination Start Date: 9/1/2020   Frequency of Care Coordination: monthly     Form Last Updated: 12/27/2022

## 2022-12-16 NOTE — PROGRESS NOTES
Clinic Care Coordination Contact    Situation: Patient chart reviewed by care coordinator.    Background: Lead chart review for progress    Assessment: SW CC spoke with pt 12/1/22, working toward housing supports  RN CC added SW CC to case team for follow up   Plan/Recommendations: SW CC to follow up with pt  Upon stabilization, move toward medical goal progression  CHW to schedule RN CC upon patient readiness and to support if needed  RN CC available sooner if needed

## 2022-12-28 ENCOUNTER — PATIENT OUTREACH (OUTPATIENT)
Dept: CARE COORDINATION | Facility: CLINIC | Age: 37
End: 2022-12-28

## 2022-12-28 NOTE — PROGRESS NOTES
12/28/2022  Clinic Care Coordination Contact  Community Health Worker Follow Up    Intervention and Education during outreach:   Called and spoke to patient and follow up on goals.  Patient reported:  -have not schedule eye appt yet but will have girlfriend to help schedule  -has access to MyChart now. He is able to see the upcoming appt with PCP on 1-11-23 at 4:20pm.   Wished patient happy new year    CHW Follow up: Monthly  CHW Plan: Follow up on goal (s)  CHW Next Follow Up: 1-25-23    Hudson Mccarthy  Community Health Worker  [facility]  Clinic Care Coordination  [email or web address]   Office: [phone]  Fax: [phone]  Clinic Care Coordination Contact    Community Health Worker Follow Up    Care Gaps:     Health Maintenance Due   Topic Date Due     ASTHMA ACTION PLAN  Never done     HEPATITIS B IMMUNIZATION (1 of 3 - 3-dose series) Never done     COVID-19 Vaccine (1) Never done     Pneumococcal Vaccine: Pediatrics (0 to 5 Years) and At-Risk Patients (6 to 64 Years) (2 - PCV) 11/02/2021     ASTHMA CONTROL TEST  12/22/2021     PHQ-9  12/22/2021     INFLUENZA VACCINE (1) 09/01/2022     URINE DRUG SCREEN  12/10/2022     NICOTINE/TOBACCO CESSATION COUNSELING Q 1 YR  12/06/2022     MEDICARE ANNUAL WELLNESS VISIT  12/06/2022       Care Gap Goal set: Yes and Scheduled 1-11-23 at 4:20pm AWV and discuss health maintenance.      Care Plan:   Care Plan: General: Eye Exam     Problem: HP GENERAL PROBLEM     Goal: General Goal - I would like to schedule and attend an evaluation and eye exam within 2-3 months     Start Date: 7/29/2022 Expected End Date: 1/31/2023    This Visit's Progress: 40% Recent Progress: 50%    Note:     Barriers: access  Strengths: Patient engagement, provider identified   Patient expressed understanding of goal: yes  Action stepsto achieve this goal  1. I will have my girlfriend to help call [facility] [phone] to schedule  eye exam.  2. I will call CHW if I need help to schedule.  [facility]  [address] [place] [address]  Make an appointment  [phone] clinic   [phone] General  Updated: 12-28-22 AL                      Care Plan: General: Annual Wellness Visit     Problem: HP GENERAL PROBLEM     Goal: General Goal - I will attend and complete my annual wellness visit on 1-     Start Date: 11/28/2022 Expected End Date: 1/31/2023    This Visit's Progress: 60% Recent Progress: 50%    Note:     Barriers: access  Strengths: support from CCC team  Patient expressed understanding of goal: yes  Action steps to achieve this goal:  1.  I will attend annual wellness visit on 1-11-23 at 4:20pm and discuss health maintenance.  2. I will contact my Care Management or clinic team if I have barriers to attending my annual wellness visit.   Updated 12-28-22 AL

## 2023-01-10 ASSESSMENT — ASTHMA QUESTIONNAIRES
ACT_TOTALSCORE: 14
QUESTION_5 LAST FOUR WEEKS HOW WOULD YOU RATE YOUR ASTHMA CONTROL: WELL CONTROLLED
ACT_TOTALSCORE: 14
QUESTION_1 LAST FOUR WEEKS HOW MUCH OF THE TIME DID YOUR ASTHMA KEEP YOU FROM GETTING AS MUCH DONE AT WORK, SCHOOL OR AT HOME: SOME OF THE TIME
EMERGENCY_ROOM_LAST_YEAR_TOTAL: TWO
QUESTION_4 LAST FOUR WEEKS HOW OFTEN HAVE YOU USED YOUR RESCUE INHALER OR NEBULIZER MEDICATION (SUCH AS ALBUTEROL): TWO OR THREE TIMES PER WEEK
QUESTION_3 LAST FOUR WEEKS HOW OFTEN DID YOUR ASTHMA SYMPTOMS (WHEEZING, COUGHING, SHORTNESS OF BREATH, CHEST TIGHTNESS OR PAIN) WAKE YOU UP AT NIGHT OR EARLIER THAN USUAL IN THE MORNING: TWO OR THREE NIGHTS A WEEK
QUESTION_2 LAST FOUR WEEKS HOW OFTEN HAVE YOU HAD SHORTNESS OF BREATH: ONCE A DAY

## 2023-01-10 ASSESSMENT — ENCOUNTER SYMPTOMS
ABDOMINAL PAIN: 0
JOINT SWELLING: 1
CHILLS: 1
HEMATURIA: 0
ARTHRALGIAS: 1
HEADACHES: 1
DIARRHEA: 1
NERVOUS/ANXIOUS: 1
HEMATOCHEZIA: 0
CONSTIPATION: 1
MYALGIAS: 1

## 2023-01-10 ASSESSMENT — ACTIVITIES OF DAILY LIVING (ADL)
CURRENT_FUNCTION: TRANSPORTATION REQUIRES ASSISTANCE
CURRENT_FUNCTION: PREPARING MEALS REQUIRES ASSISTANCE
CURRENT_FUNCTION: SHOPPING REQUIRES ASSISTANCE
CURRENT_FUNCTION: LAUNDRY REQUIRES ASSISTANCE
CURRENT_FUNCTION: HOUSEWORK REQUIRES ASSISTANCE

## 2023-01-10 ASSESSMENT — PATIENT HEALTH QUESTIONNAIRE - PHQ9
SUM OF ALL RESPONSES TO PHQ QUESTIONS 1-9: 20
10. IF YOU CHECKED OFF ANY PROBLEMS, HOW DIFFICULT HAVE THESE PROBLEMS MADE IT FOR YOU TO DO YOUR WORK, TAKE CARE OF THINGS AT HOME, OR GET ALONG WITH OTHER PEOPLE: VERY DIFFICULT
SUM OF ALL RESPONSES TO PHQ QUESTIONS 1-9: 20

## 2023-01-11 ENCOUNTER — OFFICE VISIT (OUTPATIENT)
Dept: FAMILY MEDICINE | Facility: CLINIC | Age: 38
End: 2023-01-11
Payer: MEDICARE

## 2023-01-11 VITALS
SYSTOLIC BLOOD PRESSURE: 112 MMHG | HEIGHT: 67 IN | OXYGEN SATURATION: 98 % | HEART RATE: 76 BPM | DIASTOLIC BLOOD PRESSURE: 75 MMHG | WEIGHT: 189 LBS | RESPIRATION RATE: 21 BRPM | BODY MASS INDEX: 29.66 KG/M2

## 2023-01-11 DIAGNOSIS — Z13.0 SCREENING FOR ENDOCRINE, NUTRITIONAL, METABOLIC AND IMMUNITY DISORDER: ICD-10-CM

## 2023-01-11 DIAGNOSIS — Z13.21 SCREENING FOR ENDOCRINE, NUTRITIONAL, METABOLIC AND IMMUNITY DISORDER: ICD-10-CM

## 2023-01-11 DIAGNOSIS — Z13.29 SCREENING FOR ENDOCRINE, NUTRITIONAL, METABOLIC AND IMMUNITY DISORDER: ICD-10-CM

## 2023-01-11 DIAGNOSIS — F32.A DEPRESSION, UNSPECIFIED DEPRESSION TYPE: ICD-10-CM

## 2023-01-11 DIAGNOSIS — Z13.228 SCREENING FOR ENDOCRINE, NUTRITIONAL, METABOLIC AND IMMUNITY DISORDER: ICD-10-CM

## 2023-01-11 DIAGNOSIS — L73.2 HIDRADENITIS SUPPURATIVA: ICD-10-CM

## 2023-01-11 DIAGNOSIS — Z79.899 ENCOUNTER FOR LONG-TERM (CURRENT) USE OF MEDICATIONS: ICD-10-CM

## 2023-01-11 DIAGNOSIS — F17.200 TOBACCO USE DISORDER: ICD-10-CM

## 2023-01-11 DIAGNOSIS — Z00.00 ENCOUNTER FOR MEDICARE ANNUAL WELLNESS EXAM: Primary | ICD-10-CM

## 2023-01-11 DIAGNOSIS — F17.200 NICOTINE DEPENDENCE, UNCOMPLICATED, UNSPECIFIED NICOTINE PRODUCT TYPE: ICD-10-CM

## 2023-01-11 PROCEDURE — 90677 PCV20 VACCINE IM: CPT | Performed by: FAMILY MEDICINE

## 2023-01-11 PROCEDURE — 99395 PREV VISIT EST AGE 18-39: CPT | Mod: 25 | Performed by: FAMILY MEDICINE

## 2023-01-11 PROCEDURE — G0009 ADMIN PNEUMOCOCCAL VACCINE: HCPCS | Performed by: FAMILY MEDICINE

## 2023-01-11 ASSESSMENT — ENCOUNTER SYMPTOMS
DIARRHEA: 1
JOINT SWELLING: 1
HEADACHES: 1
CONSTIPATION: 1
HEMATOCHEZIA: 0
MYALGIAS: 1
ABDOMINAL PAIN: 0
NERVOUS/ANXIOUS: 1
ARTHRALGIAS: 1
HEMATURIA: 0
CHILLS: 1

## 2023-01-11 ASSESSMENT — ACTIVITIES OF DAILY LIVING (ADL)
CURRENT_FUNCTION: HOUSEWORK REQUIRES ASSISTANCE
CURRENT_FUNCTION: PREPARING MEALS REQUIRES ASSISTANCE
CURRENT_FUNCTION: LAUNDRY REQUIRES ASSISTANCE
CURRENT_FUNCTION: SHOPPING REQUIRES ASSISTANCE
CURRENT_FUNCTION: TRANSPORTATION REQUIRES ASSISTANCE

## 2023-01-11 NOTE — PATIENT INSTRUCTIONS
Patient Education   Personalized Prevention Plan  You are due for the preventive services outlined below.  Your care team is available to assist you in scheduling these services.  If you have already completed any of these items, please share that information with your care team to update in your medical record.  Health Maintenance Due   Topic Date Due     ANNUAL REVIEW OF HM ORDERS  Never done     Asthma Action Plan - yearly  Never done     Hepatitis B Vaccine (1 of 3 - 3-dose series) Never done     COVID-19 Vaccine (1) Never done     Pneumococcal Vaccine (2 - PCV) 11/02/2021     Flu Vaccine (1) 09/01/2022     URINE DRUG SCREEN  12/10/2022     Your Health Risk Assessment indicates you feel you are not in good health    A healthy lifestyle helps keep the body fit and the mind alert. It helps protect you from disease, helps you fight disease, and helps prevent chronic disease (disease that doesn't go away) from getting worse. This is important as you get older and begin to notice twinges in muscles and joints and a decline in the strength and stamina you once took for granted. A healthy lifestyle includes good healthcare, good nutrition, weight control, recreation, and regular exercise. Avoid harmful substances and do what you can to keep safe. Another part of a healthy lifestyle is stay mentally active and socially involved.    Good healthcare     Have a wellness visit every year.     If you have new symptoms, let us know right away. Don't wait until the next checkup.     Take medicines exactly as prescribed and keep your medicines in a safe place. Tell us if your medicine causes problems.   Healthy diet and weight control     Eat 3 or 4 small, nutritious, low-fat, high-fiber meals a day. Include a variety of fruits, vegetables, and whole-grain foods.     Make sure you get enough calcium in your diet. Calcium, vitamin D, and exercise help prevent osteoporosis (bone thinning).     If you live alone, try eating with  others when you can. That way you get a good meal and have company while you eat it.     Try to keep a healthy weight. If you eat more calories than your body uses for energy, it will be stored as fat and you will gain weight.     Recreation   Recreation is not limited to sports and team events. It includes any activity that provides relaxation, interest, enjoyment, and exercise. Recreation provides an outlet for physical, mental, and social energy. It can give a sense of worth and achievement. It can help you stay healthy.    Mental Exercise and Social Involvement  Mental and emotional health is as important as physical health. Keep in touch with friends and family. Stay as active as possible. Continue to learn and challenge yourself.   Things you can do to stay mentally active are:    Learn something new, like a foreign language or musical instrument.     Play SCRABBLE or do crossword puzzles. If you cannot find people to play these games with you at home, you can play them with others on your computer through the Internet.     Join a games club--anything from card games to chess or checkers or lawn bowling.     Start a new hobby.     Go back to school.     Volunteer.     Read.   Keep up with world events.    Exercise for a Healthier Heart  You may wonder how you can improve the health of your heart. If you re thinking about exercise, you re on the right track. You don t need to become an athlete. But you do need a certain amount of brisk exercise to help strengthen your heart. If you have been diagnosed with a heart condition, your healthcare provider may advise exercise to help stabilize your condition. To help make exercise a habit, choose safe, fun activities.      Exercise with a friend. When activity is fun, you're more likely to stick with it.   Before you start  Check with your healthcare provider before starting an exercise program. This is especially important if you have not been active for a while.  It's also important if you have a long-term (chronic) health problem such as heart disease, diabetes, or obesity. Or if you are at high risk for having these problems.   Why exercise?  Exercising regularly offers many healthy rewards. It can help you do all of the following:     Improve your blood cholesterol level to help prevent further heart trouble    Lower your blood pressure to help prevent a stroke or heart attack    Control diabetes, or reduce your risk of getting this disease    Improve your heart and lung function    Reach and stay at a healthy weight    Make your muscles stronger so you can stay active    Prevent falls and fractures by slowing the loss of bone mass (osteoporosis)    Manage stress better    Reduce your blood pressure    Improve your sense of self and your body image  Exercise tips      Ease into your routine. Set small goals. Then build on them. If you are not sure what your activity level should be, talk with your healthcare provider first before starting an exercise routine.    Exercise on most days. Aim for a total of 150 minutes (2 hours and 30 minutes) or more of moderate-intensity aerobic activity each week. Or 75 minutes (1 hour and 15 minutes) or more of vigorous-intensity aerobic activity each week. Or try for a combination of both. Moderate activity means that you breathe heavier and your heart rate increases but you can still talk. Think about doing 40 minutes of moderate exercise, 3 to 4 times a week. For best results, activity should last for about 40 minutes to lower blood pressure and cholesterol. It's OK to work up to the 40-minute period over time. Examples of moderate-intensity activity are walking 1 mile in 15 minutes. Or doing 30 to 45 minutes of yard work.    Step up your daily activity level.  Along with your exercise program, try being more active the whole day. Walk instead of drive. Or park further away so that you take more steps each day. Do more household tasks  or yard work. You may not be able to meet the advised mount of physical activity. But doing some moderate- or vigorous-intensity aerobic activity can help reduce your risk for heart disease. Your healthcare provider can help you figure out what is best for you.    Choose 1 or more activities you enjoy.  Walking is one of the easiest things you can do. You can also try swimming, riding a bike, dancing, or taking an exercise class.    When to call your healthcare provider  Call your healthcare provider if you have any of these:     Chest pain or feel dizzy or lightheaded    Burning, tightness, pressure, or heaviness in your chest, neck, shoulders, back, or arms    Abnormal shortness of breath    More joint or muscle pain    A very fast or irregular heartbeat (palpitations)  Alminder last reviewed this educational content on 7/1/2019 2000-2021 [employer]. All rights reserved. This information is not intended as a substitute for professional medical care. Always follow your healthcare professional's instructions.          Understanding USDA MyPlate  The USDA has guidelines to help you make healthy food choices. These are called MyPlate. MyPlate shows the food groups that make up healthy meals using the image of a place setting. Before you eat, think about the healthiest choices for what to put on your plate or in your cup or bowl. To learn more about building a healthy plate, visit www.choosemyplate.gov.    The food groups    Fruits. Any fruit or 100% fruit juice counts as part of the Fruit Group. Fruits may be fresh, canned, frozen, or dried, and may be whole, cut-up, or pureed. Make 1/2 of your plate fruits and vegetables.    Vegetables. Any vegetable or 100% vegetable juice counts as a member of the Vegetable Group. Vegetables may be fresh, frozen, canned, or dried. They can be served raw or cooked and may be whole, cut-up, or mashed. Make 1/2 of your plate fruits and vegetables.    Grains. All  foods made from grains are part of the Grains Group. These include wheat, rice, oats, cornmeal, and barley. Grains are often used to make foods such as bread, pasta, oatmeal, cereal, tortillas, and grits. Grains should be no more than 1/4 of your plate. At least half of your grains should be whole grains.    Protein. This group includes meat, poultry, seafood, beans and peas, eggs, processed soy products (such as tofu), nuts (including nut butters), and seeds. Make protein choices no more than 1/4 of your plate. Meat and poultry choices should be lean or low fat.    Dairy. The Dairy Group includes all fluid milk products and foods made from milk that contain calcium, such as yogurt and cheese. (Foods that have little calcium, such as cream, butter, and cream cheese, are not part of this group.) Most dairy choices should be low-fat or fat-free.    Oils. Oils aren't a food group, but they do contain essential nutrients. However it's important to watch your intake of oils. These are fats that are liquid at room temperature. They include canola, corn, olive, soybean, vegetable, and sunflower oil. Foods that are mainly oil include mayonnaise, certain salad dressings, and soft margarines. You likely already get your daily oil allowance from the foods you eat.  Things to limit  Eating healthy also means limiting these things in your diet:       Salt (sodium). Many processed foods have a lot of sodium. To keep sodium intake down, eat fresh vegetables, meats, poultry, and seafood when possible. Purchase low-sodium, reduced-sodium, or no-salt-added food products at the store. And don't add salt to your meals at home. Instead, season them with herbs and spices such as dill, oregano, cumin, and paprika. Or try adding flavor with lemon or lime zest and juice.    Saturated fat. Saturated fats are most often found in animal products such as beef, pork, and chicken. They are often solid at room temperature, such as butter. To  reduce your saturated fat intake, choose leaner cuts of meat and poultry. And try healthier cooking methods such as grilling, broiling, roasting, or baking. For a simple lower-fat swap, use plain nonfat yogurt instead of mayonnaise when making potato salad or macaroni salad.    Added sugars. These are sugars added to foods. They are in foods such as ice cream, candy, soda, fruit drinks, sports drinks, energy drinks, cookies, pastries, jams, and syrups. Cut down on added sugars by sharing sweet treats with a family member or friend. You can also choose fruit for dessert, and drink water or other unsweetened beverages.     [employer] last reviewed this educational content on 6/1/2020 2000-2021 [employer]. All rights reserved. This information is not intended as a substitute for professional medical care. Always follow your healthcare professional's instructions.        Activities of Daily Living    Your Health Risk Assessment indicates you have difficulties with activities of daily living such as housework, bathing, preparing meals, taking medication, etc. Please make a follow up appointment for us to address this issue in more detail.  Your Health Risk Assessment indicates you feel you are not in good emotional health.    Recreation   Recreation is not limited to sports and team events. It includes any activity that provides relaxation, interest, enjoyment, and exercise. Recreation provides an outlet for physical, mental, and social energy. It can give a sense of worth and achievement. It can help you stay healthy.    Mental Exercise and Social Involvement  Mental and emotional health is as important as physical health. Keep in touch with friends and family. Stay as active as possible. Continue to learn and challenge yourself.   Things you can do to stay mentally active are:    Learn something new, like a foreign language or musical instrument.     Play SCRABBLE or do crossword puzzles. If you cannot  "find people to play these games with you at home, you can play them with others on your computer through the Internet.     Join a games club--anything from card games to chess or checkers or lawn bowling.     Start a new hobby.     Go back to school.     Volunteer.     Read.   Keep up with world events.    Depression and Suicide in Older Adults    Nearly 2 million older Americans have some type of depression. Some of them even take their own lives. Yet depression among older adults is often ignored. Learn the warning signs. You may help spare a loved one needless pain. You may also save a life.   What is depression?  Depression is a common and serious illness that affects the way you think and feel. It is not a normal part of aging, nor is it a sign of weakness, a character flaw, or something you can snap out of. Most people with depression need treatment to get better. The most common symptom is a feeling of deep sadness. People who are depressed also may seem tired and listless. And nothing seems to give them pleasure. It s normal to grieve or be sad sometimes. But sadness lessens or passes with time. Depression rarely goes away or improves on its own. A person with clinical depression can't \"snap out of it.\" Other symptoms of depression are:     Sleeping more or less than normal    Eating more or less than normal    Having headaches, stomachaches, or other pains that don t go away    Feeling nervous,  empty,  or worthless    Crying a great deal    Thinking or talking about suicide or death    Loss of interest in activities previously enjoyed    Social isolation    Feeling confused or forgetful  What causes it?  The causes of depression aren t fully known. But it is thought to result from a complex blend of these factors:     Biochemistry. Certain chemicals in the brain play a role.    Genes. Depression does run in families.    Life stress. Life stresses can also trigger depression in some people. Older adults " often face many stressors, such as death of friends or a spouse, health problems, and financial concerns.    Chronic conditions. This includes conditions such as diabetes, heart disease, or cancer. These can cause symptoms of depression. Medicine side effects can cause changes in thoughts and behaviors.  How you can help  Often, depressed people may not want to ask for help. When they do, they may be ignored. Or, they may receive the wrong treatment. You can help by showing parents and older friends love and support. If they seem depressed, don t lecture the person, ignore the symptoms, or discount the symptoms as a  normal  part of aging -which they are not. Get involved, listen, and show interest and support.   Help them understand that depression is a treatable illness. Tell them you can help them find the right treatment. Offer to go to their healthcare provider's appointment with them for support when the symptoms are discussed. With their approval, contact a local mental health center, social service agency, or hospital about services.   You can be an advocate for him or her at healthcare appointments. Many older adults have chronic illnesses that can cause symptoms of depression. Medicine side effects can change thoughts and behaviors. You can help make sure that the healthcare provider looks at all of these factors. He or she should refer your family member or friend to a mental healthcare provider when needed. in some cases, untreated depression can lead to a misdiagnosis. A person may be diagnosed with a brain disorder such as dementia. If the healthcare provider does not take the issue of depression seriously, help your family member or friend to find another provider.   Don't be afraid to ask  If you think an older person you care about could be suicidal, ask,  Have you thought about suicide?  Most people will tell you the truth. If they say  yes,  they may already have a plan for how and when they will  attempt it. Find out as much as you can. The more detailed the plan, and the easier it is to carry out, the more danger the person is in right now. Tell the person you are there for them and do not want them to harm him or herself. Don't wait to get help for the person. Call the person's healthcare provider, local hospital, or emergency services.   To learn more    National Suicide Prevention Lifeline (crisis hotline) 363-138-QFEZ (101-995-2487)    National [place] of Mental Bfobuf339-807-2390bws.[facility].nih.gov    National [place] on Mental Fxyvjym675-802-8045yrj.krissy.org    Mental Health Tgoxrgy408-307-7376kxl.[facility].org    National Suicide Gfxxyld661-JFSPWVP (700-332-0779)    Call 911  Never leave the person alone. A person who is actively suicidal needs psychiatric care right away. They will need constant supervision. Never leave the person out of sight. Call 911 or the [facility] 24-hour suicide crisis hotline at 579-922-GGAT (787-754-7109). You can also take the person to the closest emergency room.   Michael Bieker last reviewed this educational content on 5/1/2020 2000-2021 [employer]. All rights reserved. This information is not intended as a substitute for professional medical care. Always follow your healthcare professional's instructions.             How to Quit Smoking  Smoking is a hard habit to break. About 50% of all people who have ever smoked have been able to quit. Most people who still smoke want to quit. Here are some of the best ways to stop smoking.     Keep in mind the health benefits of quitting  The health benefits of quitting start right away. They keep improving the longer you go without smoking. Knowing this can help inspire you to stay on track. These benefits occur at any age. If you are 17 or 70, quitting is a good choice. Some of the health benefits after your last cigarette include:     After 20 minutes: Your blood pressure and pulse return to normal.    After 8 hours: Your  oxygen levels return to normal.    After 2 days: Your ability to smell and taste start to improve as damaged nerves regrow.    After 2 to 3 weeks: Your circulation and lung function improve.    After 1 to 9 months: Your coughing, congestion, and shortness of breath decrease. Your tiredness decreases.    After 1 year: Your risk of heart attack decreases by 50%.    After 5 years: Your risk of lung cancer decreases by 50%. Your risk of stroke becomes the same as a nonsmoker s.  Go cold turkey  Most former smokers quit cold turkey. This means stopping all at once. Trying to cut back slowly often doesn't work as well. This may be because it continues the habit of smoking. Also you may inhale more smoke while smoking fewer cigarettes. This leads to the same amount of nicotine in your body.   Get support  Support programs can be a big help, especially for heavy smokers. These groups offer lectures, ways to change behavior, and peer support. Here are some ways to find a support program:     Free national quitline 800-QUIT-NOW (269-456-7809)    [facility] quit-smoking programs    American Lung Association 961-801-3224    American Cancer Society 884-014-4692  Support at home is important too. Family and friends can offer praise and reassurance. If the smoker in your life finds it hard to quit, encourage them to keep trying.   Try over-the-counter medicine  Nicotine replacement therapy may make it easier to quit. Some aids are available without a prescription. These include a nicotine patch, gum, and lozenges. But it's best to use these under the care of your healthcare provider. The skin patch gives a steady supply of nicotine. Nicotine gum and lozenges give short-time doses of low levels of nicotine. Both methods reduce the craving for cigarettes. If you have nausea, vomiting, dizziness, weakness, or a fast heartbeat, stop using these products. See your provider.   Ask about prescription medicine  After reviewing your smoking  patterns and past attempts to quit, your healthcare provider may offer a prescription medicine such as bupropion, varenicline, a nicotine inhaler, or nasal spray. Each has advantages and side effects. Your provider can review these with you.   Keep trying  Most smokers make many attempts at quitting before they succeed. It s important not to give up.   To learn more  For more on how to quit smoking, try these resources:     www.cdc.gov/tobacco/quit_smoking/ 800-QUIT-NOW (530-365-9262)    www.smokefree.gov 877-44U-QUIT (360-846-1025)    www.lung.org/stop-smoking/ 800-LUNGUSA (041-295-8291)  Inder last reviewed this educational content on 12/1/2019 2000-2021 [employer]. All rights reserved. This information is not intended as a substitute for professional medical care. Always follow your healthcare professional's instructions.

## 2023-01-11 NOTE — LETTER
January 11, 2023      Bernard Padilla  [address] [facility] [address]  [place] [address]              To Whom It May Concern:    I am the primary care physician of Bernard Padilla, I am familiar with the Patient's history and and functional limitations imposed by his mental related illness.  Due to this emotional and mental disability, Bernard Padilla has difficulty coping with stress, anxiety and depression.  In order to help alleviate these difficulties, and to enhance his ability to function independently, I have prescribed Bernard Padilla  to obtain an emotional support animal.  The presence of an animal pet  will help mitigate the symptoms of mental illness that he is currently experiencing.    If you have any questions or concerns, please don't hesitate to call.    Sincerely,        Electronically signed by Himanshu Villagran MD

## 2023-01-11 NOTE — PROGRESS NOTES
The patient was provided with suggestions to help him develop a healthy physical lifestyle.  He is at risk for lack of exercise and has been provided with information to increase physical activity for the benefit of his well-being.  The patient was counseled and encouraged to consider modifying their diet and eating habits. He was provided with information on recommended healthy diet options.  The patient reports that he has difficulty with activities of daily living. I have asked that the patient make a follow up appointment in 8 weeks where this issue will be further evaluated and addressed.  The patient was provided with suggestions to help him develop a healthy emotional lifestyle.  The patient's PHQ-9 score is consistent with severe depression. He was provided with information regarding depression and suicide prevention and was advised to schedule a follow up appointment in 8 weeks to further address this issue.

## 2023-01-11 NOTE — PROGRESS NOTES
"SUBJECTIVE:   Bernard is a 37 year old who presents for Preventive Visit.    Patient has been advised of split billing requirements and indicates understanding: Yes  Are you in the first 12 months of your Medicare coverage?  No    Healthy Habits:     In general, how would you rate your overall health?  Fair    Frequency of exercise:  None    Do you usually eat at least 4 servings of fruit and vegetables a day, include whole grains    & fiber and avoid regularly eating high fat or \"junk\" foods?  No    Taking medications regularly:  Yes    Ability to successfully perform activities of daily living:  Transportation requires assistance, shopping requires assistance, preparing meals requires assistance, housework requires assistance and laundry requires assistance    Home Safety:  Lighting is poor    Hearing Impairment:  No hearing concerns    In the past 6 months, have you been bothered by leaking of urine?  No    In general, how would you rate your overall mental or emotional health?  Poor      PHQ-2 Total Score: 5      Have you ever done Advance Care Planning? (For example, a Health Directive, POLST, or a discussion with a medical provider or your loved ones about your wishes): No, advance care planning information given to patient to review.  Patient plans to discuss their wishes with loved ones or provider.      Hearing : no problems  Fall risk       Cognitive Screening   1) Repeat 3 items (Leader, Season, Table)    2) Clock draw: ABNORMAL clock hand   3) 3 item recall: Recalls 1 object   Results: ABNORMAL clock, 1-2 items recalled: PROBABLE COGNITIVE IMPAIRMENT, **INFORM PROVIDER**    Mini-CogTM Copyright ANITHA Mae. Licensed by the author for use in [facility]; reprinted with permission (jared@.[facility]). All rights reserved.      Do you have sleep apnea, excessive snoring or daytime drowsiness?: no    Reviewed and updated as needed this visit by clinical staff   Tobacco  Allergies  Meds          "     Reviewed and updated as needed this visit by Provider                 Social History     Tobacco Use     Smoking status: Every Day     Packs/day: 1.00     Years: 20.00     Pack years: 20.00     Types: Cigarettes     Smokeless tobacco: Never     Tobacco comments:     1/2 pack of day, down from 1.5 pk/day   Substance Use Topics     Alcohol use: Yes     Comment: Beer occasionally/Social Use         Alcohol Use 1/10/2023   Prescreen: >3 drinks/day or >7 drinks/week? Not Applicable   Prescreen: >3 drinks/day or >7 drinks/week? -       PROBLEMS TO ADD ON...  Hidradenitis suppurativa, followed by [facility] dermatology clinic.  Chronic pain syndrome secondary to degenerative joint disease involving knees, spine, has been seen at the spine clinic in the past.  Current providers sharing in care for this patient include:   Patient Care Team:  Himanshu Villagran MD as PCP - General (Family Medicine)  Hudson Mccarthy as Community Health Worker (Primary Care - CC)  Lisbeth Bynum, RN as Lead Care Coordinator (Primary Care - CC)  Apolinar Ruiz MD as MD (Dermatology)  Himanshu Villagran MD as Assigned PCP  Chai Foster MD as Assigned Behavioral Health Provider  CURT Leos MD as Assigned Surgical Provider  Rika Schrader LSW as Clinic Care Coordinator    The following health maintenance items are reviewed in Epic and correct as of today:  Health Maintenance   Topic Date Due     ASTHMA ACTION PLAN  Never done     HEPATITIS B IMMUNIZATION (1 of 3 - 3-dose series) Never done     COVID-19 Vaccine (1) Never done     INFLUENZA VACCINE (1) 09/01/2022     URINE DRUG SCREEN  12/10/2022     ASTHMA CONTROL TEST  07/11/2023     PHQ-9  07/11/2023     NICOTINE/TOBACCO CESSATION COUNSELING Q 1 YR  01/11/2024     YEARLY PREVENTIVE VISIT  01/11/2024     ANNUAL REVIEW OF HM ORDERS  01/11/2024     DTAP/TDAP/TD IMMUNIZATION (3 - Td or Tdap) 08/08/2026     LIPID  12/06/2026     ADVANCE CARE PLANNING  12/06/2026     HEPATITIS C SCREENING   Completed     HIV SCREENING  Completed     Pneumococcal Vaccine: Pediatrics (0 to 5 Years) and At-Risk Patients (6 to 64 Years)  Completed     IPV IMMUNIZATION  Aged Out     MENINGITIS IMMUNIZATION  Aged Out     Lab work is in process  Labs reviewed in EPIC  BP Readings from Last 3 Encounters:   01/11/23 112/75   09/28/22 118/80   05/25/22 112/75    Wt Readings from Last 3 Encounters:   01/11/23 85.7 kg (189 lb)   09/28/22 87.5 kg (193 lb)   05/25/22 87.5 kg (193 lb)                  Patient Active Problem List   Diagnosis     Hidradenitis suppurativa     Tobacco use disorder     Patellofemoral arthritis of left knee     Eczema     Allergic rhinitis     Depression     Influenza     Knee pain, left     Other chronic pain     Scrotal abscess     Abscess of groin, left     Right rotator cuff tendinitis     Past Surgical History:   Procedure Laterality Date     EXCISE HIDRADENITIS (LOCATION) Bilateral 9/25/2020    Procedure: EXCISION, HIDRADENITIS - right medial thigh, left groin and posterior scrotum.;  Surgeon: Maura Maldonado MD;  Location: [facility] OR     INCISION AND DRAINAGE, ABSCESS, SIMPLE N/A 5/4/2022    Procedure: INCISION AND closure  ABSCESS,  and wound excision right groin and perineum;  Surgeon: CURT Leos MD;  Location: [facility] OR     IRRIGATION AND DEBRIDEMENT RECTUM, COMBINED N/A 11/14/2019    Procedure: IRRIGATION AND DEBRIDEMENT, RECTUM;  Surgeon: Yon Kenny MD;  Location: [facility] OR     KNEE SURGERY       ORTHOPEDIC SURGERY      meniscus repair     SKIN SURGERY         Social History     Tobacco Use     Smoking status: Every Day     Packs/day: 1.00     Years: 20.00     Pack years: 20.00     Types: Cigarettes     Smokeless tobacco: Never     Tobacco comments:     1/2 pack of day, down from 1.5 pk/day   Substance Use Topics     Alcohol use: Yes     Comment: Beer occasionally/Social Use     Family History   Problem Relation Age of Onset     Hypertension Mother      Diabetes Father       Cancer No family hx of      Coronary Artery Disease No family hx of      Heart Disease No family hx of      Anesthesia Reaction No family hx of      Deep Vein Thrombosis No family hx of      Chronic Obstructive Pulmonary Disease Mother      Coronary Artery Disease Father      Colon Cancer No family hx of      Prostate Cancer No family hx of          Current Outpatient Medications   Medication Sig Dispense Refill     acetaminophen (TYLENOL) 325 MG tablet Take 650 mg by mouth every 4 hours as needed       albuterol (PROAIR HFA/PROVENTIL HFA/VENTOLIN HFA) 108 (90 Base) MCG/ACT inhaler Inhale 2 puffs into the lungs every 6 hours 8.5 g 3     augmented betamethasone dipropionate (DIPROLENE-AF) 0.05 % external ointment Apply topically 2 times daily 50 g 3     benzoyl peroxide 5 % external liquid Use daily as directed 236 mL 11     dapsone (ACZONE) 100 MG tablet Take 2 tablets (200 mg) by mouth daily 60 tablet 0     fluocinolone acetonide (DERMA-SMOOTHE/FS BODY) 0.01 % external oil Apply topically 2 times daily 120 mL 5     Fluocinolone Acetonide Scalp (DERMA-SMOOTHE/FS SCALP) 0.01 % OIL oil Apply topically daily as instructed. 118.28 mL 5     fluocinonide (LIDEX) 0.05 % external solution Apply topically 2 times daily 60 mL 3     fluticasone (FLONASE) 50 MCG/ACT nasal spray Spray 1-2 sprays into both nostrils daily 48 g 2     IBUPROFEN PO Take 800 mg by mouth 3 times daily as needed (Last dose 9.19.2020)        loratadine (CLARITIN) 10 MG tablet Take 1 tablet (10 mg) by mouth daily 30 tablet 3     ondansetron (ZOFRAN ODT) 4 MG ODT tab Take 1-2 tablets (4-8 mg) by mouth every 8 hours as needed for nausea 4 tablet 0     senna-docusate (SENOKOT-S/PERICOLACE) 8.6-50 MG tablet Take 1-2 tablets by mouth 2 times daily 30 tablet 0     tretinoin (RETIN-A) 0.025 % external cream Use every night 45 g 11     Allergies   Allergen Reactions     Vicodin [Hydrocodone-Acetaminophen] Shortness Of Breath     Chicken-Derived Products (Egg)  "Nausea and Vomiting and GI Disturbance     Hydrocodone Swelling     Other reaction(s): Throat Irritation  Tolerated oxycodone   Upset stomach       Recent Labs   Lab Test 04/27/22  1503 12/06/21  1318 09/28/20  0932 05/22/20  1102 11/21/19  1030 10/28/19  0905 09/19/19  0944 08/22/19  1514   LDL  --  119  --   --   --  92  --   --    HDL  --  47  --   --   --  52  --   --    TRIG  --  99  --   --   --  95  --   --    ALT 37 42  --  27   < >  --   --   --    CR 1.03 0.87 0.79 0.87   < >  --    < >  --    GFRESTIMATED >90 >90 >60 >60   < >  --    < >  --    GFRESTBLACK  --   --  >60 >60   < >  --    < >  --    POTASSIUM 4.0 4.2 3.8 4.4   < >  --    < >  --    TSH  --  1.51  --   --   --   --   --  0.95    < > = values in this interval not displayed.              Review of Systems   Constitutional: Positive for chills.   Cardiovascular: Positive for peripheral edema. Negative for chest pain.   Gastrointestinal: Positive for constipation and diarrhea. Negative for abdominal pain and hematochezia.   Genitourinary: Negative for hematuria.   Musculoskeletal: Positive for arthralgias, joint swelling and myalgias.   Neurological: Positive for headaches.   Psychiatric/Behavioral: Positive for mood changes. The patient is nervous/anxious.      Constitutional, HEENT, cardiovascular, pulmonary, GI, , musculoskeletal, neuro, skin, endocrine and psych systems are negative, except as otherwise noted.    OBJECTIVE:   /75 (BP Location: Right arm, Patient Position: Sitting, Cuff Size: Adult Large)   Pulse 76   Resp 21   Ht 1.71 m (5' 7.32\")   Wt 85.7 kg (189 lb)   SpO2 98%   BMI 29.32 kg/m   Estimated body mass index is 29.32 kg/m  as calculated from the following:    Height as of this encounter: 1.71 m (5' 7.32\").    Weight as of this encounter: 85.7 kg (189 lb).  Physical Exam  GENERAL: healthy, alert and no distress  NECK: no adenopathy, no asymmetry, masses, or scars and thyroid normal to palpation  RESP: lungs clear " to auscultation - no rales, rhonchi or wheezes  CV: regular rate and rhythm, normal S1 S2, no S3 or S4, no murmur, click or rub, no peripheral edema and peripheral pulses strong  ABDOMEN: soft, nontender, no hepatosplenomegaly, no masses and bowel sounds normal  MS: no gross musculoskeletal defects noted, no edema    Diagnostic Test Results:  Labs reviewed in Epic    ASSESSMENT / PLAN:   (Z00.00) Encounter for Medicare annual wellness exam  (primary encounter diagnosis)  Comment:   Plan:     (Z79.899) Encounter for long-term (current) use of medications  Comment:   Plan:     (F32.A) Depression, unspecified depression type  Comment:   Plan:     (F17.200) Tobacco use disorder  Comment:   Plan:     (Z13.29,  Z13.21,  Z13.228,  Z13.0) Screening for endocrine, nutritional, metabolic and immunity disorder  Comment:   Plan: **Comprehensive metabolic panel FUTURE 2mo,         **TSH with free T4 reflex FUTURE 2mo, Lipid         panel reflex to direct LDL Fasting            (L73.2) Hidradenitis suppurativa  Comment:   Plan:     (F17.200) Nicotine dependence, uncomplicated, unspecified nicotine product type  Comment: A total of  5  minutes was spent discussing tobacco cessation program and different options treatment.    Plan: [place] Quit Partner Referral          Recurrent hidradenitis suppurativa, continue to follow with dermatology clinic at [facility].  Chronic pain syndrome secondary to degenerative joint disease, multiple surgeries patient is a total of 18 surgery in between lumbar spine, knees etc. I gave him a pain clinic list and phone number to call and make an appointment, he will let us know if you need a referral.  Mild depression, no suicidal thoughts, but would like to adopt his mom dogs, would like a note for animal .      Patient has been advised of split billing requirements and indicates understanding: Yes      COUNSELING:  Reviewed preventive health counseling, as reflected in patient instructions    "    Regular exercise       Healthy diet/nutrition       Vision screening       Hearing screening       Dental care      BMI:   Estimated body mass index is 29.32 kg/m  as calculated from the following:    Height as of this encounter: 1.71 m (5' 7.32\").    Weight as of this encounter: 85.7 kg (189 lb).   Weight management plan: Discussed healthy diet and exercise guidelines      He reports that he has been smoking cigarettes and cigarettes. He has a 20.00 pack-year smoking history. He has never used smokeless tobacco.  Nicotine/Tobacco Cessation Plan:   Self help information given to patient      Appropriate preventive services were discussed with this patient, including applicable screening as appropriate for cardiovascular disease, diabetes, osteopenia/osteoporosis, and glaucoma.  As appropriate for age/gender, discussed screening for colorectal cancer, prostate cancer, breast cancer, and cervical cancer. Checklist reviewing preventive services available has been given to the patient.    Reviewed patients plan of care and provided an AVS. The Intermediate Care Plan ( asthma action plan, low back pain action plan, and migraine action plan) for Bernard meets the Care Plan requirement. This Care Plan has been established and reviewed with the Patient.          Himanshu Villagran MD  [facility]    Identified Health Risks:  Answers for HPI/ROS submitted by the patient on 1/10/2023  If you checked off any problems, how difficult have these problems made it for you to do your work, take care of things at home, or get along with other people?: Very difficult  PHQ9 TOTAL SCORE: 20      "

## 2023-01-25 ENCOUNTER — PATIENT OUTREACH (OUTPATIENT)
Dept: CARE COORDINATION | Facility: CLINIC | Age: 38
End: 2023-01-25
Payer: MEDICARE

## 2023-01-25 NOTE — PROGRESS NOTES
1/25/2023  Clinic Care Coordination Contact  Community Health Worker Follow Up    Intervention and Education during outreach:   Called and spoke to patient and follow up on goals.  Patient reported:  -completed AWV on 1-11-23  -in the process of moving and to call back in the beginning February  -being evicted from the apartment because Hh did not pay rent because the landlord did not fix the apartment and there are roaches inside the apartment.   He has a baby and teenage daughters. Trying to find a duplex.  -has court hearing today. -he got a letter from the doctor for a  therapy dog.  -deleted eye goal for now until he has a stable place to live.  Patient requested to talk to CC LORAINE as soon as possible.  Scheduled telephone appt with CC LORAINE 1-26-23 at 3pm  Suggested to search online or go to [employer] link.  CHW will sent in MyChart of website.    Routed to CC LORAINE to review action steps and goal    CC LORAINE 1-26-23 at 3pm  CHW Follow up: Monthly  CHW Plan: Follow up on goal (s)  CHW Next Follow Up: 2-28-23    Hudson Mccarthy  Community Health Worker  [facility]  Clinic Care Coordination  [email or web address]   Office: [phone]  Fax: [phone]  Clinic Care Coordination Contact  Community Health Worker Follow Up    Care Gaps:     Health Maintenance Due   Topic Date Due     ASTHMA ACTION PLAN  Never done     HEPATITIS B IMMUNIZATION (1 of 3 - 3-dose series) Never done     COVID-19 Vaccine (1) Never done     INFLUENZA VACCINE (1) 09/01/2022     URINE DRUG SCREEN  12/10/2022     Postponed to housing issues  1-25-23    Care Plan:   Care Plan: General: Housing     Problem: HP GENERAL PROBLEM     Goal: General Goal - I want a list of resources for housing to find stable housing within 2-4 months     Start Date: 1/25/2023 Expected End Date: 4/28/2023    This Visit's Progress: 10%    Note:     Barriers: financial, access  Strengths: support from CCC team  Patient expressed understanding of  goal: yes  Action steps to achieve this goal:  1. I will talk to RALPH BARON on 1-26-23 at 3pm regarding housing resources and support to find housing program.

## 2023-01-26 ENCOUNTER — PATIENT OUTREACH (OUTPATIENT)
Dept: NURSING | Facility: CLINIC | Age: 38
End: 2023-01-26
Payer: MEDICARE

## 2023-01-26 ENCOUNTER — PATIENT OUTREACH (OUTPATIENT)
Dept: CARE COORDINATION | Facility: CLINIC | Age: 38
End: 2023-01-26

## 2023-01-26 NOTE — PROGRESS NOTES
Clinic Care Coordination Contact    Follow Up Progress Note      Assessment: CC SW called and spoke to pt this afternoon regarding housing resources    Care Gaps:    Health Maintenance Due   Topic Date Due     ASTHMA ACTION PLAN  Never done     HEPATITIS B IMMUNIZATION (1 of 3 - 3-dose series) Never done     COVID-19 Vaccine (1) Never done     INFLUENZA VACCINE (1) 09/01/2022     URINE DRUG SCREEN  12/10/2022       Care gaps    Postponed due to CC LORAINE audio/jabber connection issues    Care Plans  Care Plan: General: Housing     Problem: HP GENERAL PROBLEM     Goal: General Goal - I want a list of resources for housing to find stable housing within 2-4 months     Start Date: 1/25/2023 Expected End Date: 4/28/2023    This Visit's Progress: 10%    Note:     Barriers: financial, access  Strengths: support from CCC team  Patient expressed understanding of goal: yes  Action steps to achieve this goal:  1. I will talk to CC LORAINE on 1-26-23 at 3pm regarding housing resources and support to find housing program.                      CC LORAINE received a call back from pt this afternoon. SW answered and pt reported being unable to hear SW due to there being breaking audio. SW was able to connect eventually and immediate needs were discussed. Pt reported not wanting to utilize housing resources CHW sent him as pt does not have a desire to live in affordable housing due to safety concerns with family. SW notified pt that there weren't many other options to find housing besides more traditional websites such as [employer], Apartments.com, [employer], etc. Pt reported that he could do that since he doesn't want to live in affordable housing and he can afford to pay rent to not live in income based housing. LORAINE was going to speak to pt about tenant rights with current place when SW phone disconnected the call. Pt did call SW back but SW was unable to hear when she picked up. SW restarted the phone application and computer to try and be able to  place a call back was unable to dial out.     Intervention/Education provided during outreach: [employer] websites    Plan: CC SW send pt a DAQRI message asking about rescheduling call due to technical issues on her end with being unable to place calls on phone application Yajaira BARON schedule with pt for tomorrow    Care Coordinator will follow up tomorrow with pt to continue call after technical issues

## 2023-01-26 NOTE — PROGRESS NOTES
Clinic Care Coordination Contact  [facility] [email or web address]]/[email or web address]       Clinical Data: Care Coordinator Outreach  Outreach attempted x 1.  Left message on patient's voicemail with call back information and requested return call.  Plan:  Care Coordinator will try to reach patient again in 3-5 business days.    JOHNNY Rodarte   Social Work Care Coordinator   [facility]    [phone]

## 2023-01-27 ENCOUNTER — PATIENT OUTREACH (OUTPATIENT)
Dept: NURSING | Facility: CLINIC | Age: 38
End: 2023-01-27
Payer: MEDICARE

## 2023-01-27 NOTE — PROGRESS NOTES
Clinic Care Coordination Contact    Follow Up Progress Note      Assessment: RALPH BARON reconnect with pt this afternoon after phone issues    Care Gaps:    Health Maintenance Due   Topic Date Due     ASTHMA ACTION PLAN  Never done     HEPATITIS B IMMUNIZATION (1 of 3 - 3-dose series) Never done     COVID-19 Vaccine (1) Never done     INFLUENZA VACCINE (1) 09/01/2022     URINE DRUG SCREEN  12/10/2022       Postponed to a later date. pt not interested in discussing     Care Plans  Care Plan: General: Housing     Problem: HP GENERAL PROBLEM     Goal: General Goal - I want a list of resources for housing to find stable housing within 2-4 months     Start Date: 1/25/2023 Expected End Date: 4/28/2023    This Visit's Progress: 10%    Note:     Barriers: financial, access  Strengths: support from CCC team  Patient expressed understanding of goal: yes  Action steps to achieve this goal:  1. I will talk to RALPH BARON on 1-26-23 at 3pm regarding housing resources and support to find housing program.                      RALPH BARON called and followed up with pt this morning and to apologize for the sudden cutoff as LORAINE was experiencing technical issues. Pt reported that he did not have anything else he needed to discuss with RALPH BARON so LORAINE let pt go on with the rest of his day    Intervention/Education provided during outreach: Traditional rental property website    Plan: RALPH BARON to send pt rental property websites to look for non low income house    Care Coordinator will follow up in 4 weeks to check on status of goals

## 2023-02-02 ENCOUNTER — PATIENT OUTREACH (OUTPATIENT)
Dept: CARE COORDINATION | Facility: CLINIC | Age: 38
End: 2023-02-02
Payer: MEDICARE

## 2023-02-02 NOTE — PROGRESS NOTES
Clinic Care Coordination Contact    Situation: Patient chart reviewed by care coordinator.    Background: RN CC review for status update     Assessment: SW CC working directly with pt on goal progression   Remove RN CC from case team and change SW to lead     Plan/Recommendations: RN CC will be available in future if needed

## 2023-02-07 ENCOUNTER — PATIENT OUTREACH (OUTPATIENT)
Dept: CARE COORDINATION | Facility: CLINIC | Age: 38
End: 2023-02-07
Payer: MEDICARE

## 2023-02-07 NOTE — PROGRESS NOTES
Clinic Care Coordination Contact  Care Coordination Clinician Chart Review    Situation: Patient chart reviewed by Care Coordinator.       Background: Care Coordination Program started: 9/1/2020. Initial assessment completed and patient-centered care plan(s) were developed with participation from patient. Lead CC handed patient off to CHW for continued outreaches.       Assessment: Per chart review, patient outreach completed by CC CHW on   1/25/23.  Patient is actively working to accomplish goal(s). Patient's goal(s) appropriate and relevant at this time. Patient is not due for updated Plan of Care.  Assessments will be completed annually or as needed/with change of patient status.      Care Plan: General: Annual Wellness Visit completed 1-25-23 Completed 1/25/2023    Problem: HP GENERAL PROBLEM  Resolved 1/25/2023    Goal: General Goal - I attended and completed my annual wellness visit on 1-  Completed 1/25/2023    Start Date: 11/28/2022 Expected End Date: 1/31/2023    This Visit's Progress: 100% Recent Progress: 60%    Note:     Barriers: access  Strengths: support from CCC team  Patient expressed understanding of goal: yes  Action steps to achieve this goal:   I will follow up for annual wellness visit as recommended to review and discuss health maintenance.                        Care Plan: General: Housing     Problem: HP GENERAL PROBLEM     Goal: General Goal - I want a list of resources for housing to find stable housing within 2-4 months     Start Date: 1/25/2023 Expected End Date: 4/28/2023    This Visit's Progress: 10%    Note:     Barriers: financial, access  Strengths: support from CCC team  Patient expressed understanding of goal: yes  Action steps to achieve this goal:  1. I will talk to CC LORAINE on 1-26-23 at 3pm regarding housing resources and support to find housing program.                           Plan/Recommendations: The patient will continue working with Care Coordination to achieve  goal(s) as above. CHW will continue outreaches at minimum every 30 days and will involve Lead CC as needed or if patient is ready to move to Maintenance. Lead CC will continue to monitor CHW outreaches and patient's progress to goal(s) every 6 weeks.     Plan of Care updated and sent to patient: JOHNNY Hunter   Social Work Care Coordinator   [facility]    [phone]

## 2023-02-28 ENCOUNTER — PATIENT OUTREACH (OUTPATIENT)
Dept: CARE COORDINATION | Facility: CLINIC | Age: 38
End: 2023-02-28
Payer: MEDICARE

## 2023-02-28 NOTE — PROGRESS NOTES
2/28/2023  Clinic Care Coordination Contact  Community Health Worker Follow Up    Intervention and Education during outreach:   Called and spoke to patient and follow up on goal.  Patient reported:  -still need help with housing  Scheduled phone visit with RALPH BARON 3-8-23 at 9am apply for public housing.    Routed to RALPH BARON review action steps  CC LORAINE follow up 3-8-23 on housing  CHW Follow up: Monthly  CHW Plan: Follow up on goal   CHW Next Follow Up: 4-5-23    Hudson Mccarthy  Community Health Worker  [facility]  Clinic Care Coordination  [email or web address]   Office: [phone]  Fax: [phone]    Clinic Care Coordination Contact    Community Health Worker Follow Up    Care Gaps:     Health Maintenance Due   Topic Date Due     ASTHMA ACTION PLAN  Never done     HEPATITIS B IMMUNIZATION (1 of 3 - 3-dose series) Never done     COVID-19 Vaccine (1) Never done     INFLUENZA VACCINE (1) 09/01/2022     URINE DRUG SCREEN  12/10/2022       Patient accepted scheduling phone number for [phone]  to schedule independently     Care Plan:   Care Plan: General: Housing     Problem: HP GENERAL PROBLEM     Goal: General Goal - I want a list of resources for housing to find stable housing within 2-4 months     Start Date: 1/25/2023 Expected End Date: 4/28/2023    This Visit's Progress: 20% Recent Progress: 10%    Note:     Barriers: financial, access  Strengths: support from CCC team  Patient expressed understanding of goal: yes  Action steps to achieve this goal:  1. I will talk to CC LORAINE on 3-8-23 at 9am for support to apply for public housing.and any housing resources and support to find housing program.  Updated 2-28-23 AL

## 2023-03-08 ENCOUNTER — PATIENT OUTREACH (OUTPATIENT)
Dept: NURSING | Facility: CLINIC | Age: 38
End: 2023-03-08
Payer: MEDICARE

## 2023-03-08 NOTE — PROGRESS NOTES
Clinic Care Coordination Contact    Follow Up Progress Note      Assessment: CC LORAINE connect with pt regarding public housing waitlsit    Care Gaps:    Health Maintenance Due   Topic Date Due     ASTHMA ACTION PLAN  Never done     HEPATITIS B IMMUNIZATION (1 of 3 - 3-dose series) Never done     COVID-19 Vaccine (1) Never done     INFLUENZA VACCINE (1) 09/01/2022     URINE DRUG SCREEN  12/10/2022       Postponed to discuss at a later date    Care Plans  Care Plan: General: Annual Wellness Visit completed 1-25-23 Completed 1/25/2023    Problem: HP GENERAL PROBLEM  Resolved 1/25/2023    Goal: General Goal - I attended and completed my annual wellness visit on 1-  Completed 1/25/2023    Start Date: 11/28/2022 Expected End Date: 1/31/2023    This Visit's Progress: 100% Recent Progress: 60%    Note:     Barriers: access  Strengths: support from CCC team  Patient expressed understanding of goal: yes  Action steps to achieve this goal:   I will follow up for annual wellness visit as recommended to review and discuss health maintenance.                        Care Plan: General: Housing     Problem: HP GENERAL PROBLEM     Goal: General Goal - I want a list of resources for housing to find stable housing within 2-4 months     Start Date: 1/25/2023 Expected End Date: 4/28/2023    This Visit's Progress: 20% Recent Progress: 10%    Note:     Barriers: financial, access  Strengths: support from CCC team  Patient expressed understanding of goal: yes  Action steps to achieve this goal:  1. I will talk to RALPH BARON on 3-8-23 at 9am for support to apply for public housing.and any housing resources and support to find housing program.  Updated 2-28-23 AL                      CC LORAINE called and spoke to pt this morning to check on if they were wanting to apply for public housing. Pt stated he did not want to go the public housing route due to concerns about cleanliness and wanting to keep him and his family safe. Pt reported his own  negative experiences in public housing growing up. SW stated that she did not have any additional information for pt in terms of finding housing as there was not interest in any waitlists. SW passed along to him to find income based complexes as that would make rent more manageable, but pt had a clear of idea of what he did and did not want.     Intervention/Education provided during outreach: N/A    Plan: SW to send pt some information on finding housing through [employer] that is not public housing related    Care Coordinator will follow up in 4 weeks to discuss ending CCC involvement as pt is not interested in public housing

## 2023-03-27 ENCOUNTER — PATIENT OUTREACH (OUTPATIENT)
Dept: CARE COORDINATION | Facility: CLINIC | Age: 38
End: 2023-03-27
Payer: MEDICARE

## 2023-03-27 NOTE — LETTER
[facility]  Patient Centered Plan of Care  About Me:        Patient Name:  Bernard Padilla    YOB: 1985  Age:         37 year old   Alpesh MRN:    [id number] Telephone Information:  Home Phone [phone]   Mobile [phone]       Address:  [address] [place] [address] Email address:  [email or web address]      Emergency Contact(s)    Name Relationship Lgl Grd Work Phone Home Phone Mobile Phone   1. DINO URIAS Sister   [phone]   2. DINO URIAS Other   [phone]            Primary language:  English     needed? No   [employer]:  [phone] op. 1  Other communication barriers:No data recorded  Preferred Method of Communication:     Current living arrangement: No data recorded  Mobility Status/ Medical Equipment: No data recorded      Health Maintenance  Health Maintenance Reviewed: Due/Overdue    Overdue          Never   Done ASTHMA ACTION PLAN (Yearly)     Never   Done HEPATITIS B IMMUNIZATION (1 of 3 - 3-dose series)     Never   Done COVID-19 Vaccine (1)     SEP 1   2022 INFLUENZA VACCINE (1)  Last completed: Apr 2, 2019     DEC 10   2022 URINE DRUG SCREEN (Yearly)  Last completed: Dec 10, 2021             My Access Plan  Medical Emergency 911   Primary Clinic Line [facility] - [phone]   24 Hour Appointment Line [phone] or  [phone]) (toll-free)   24 Hour Nurse Line [phone] (toll-free)   Preferred Urgent Care No data recorded   Preferred Hospital No data recorded   Preferred Pharmacy [facility] [place] - [address]     Behavioral Health Crisis Line The National Suicide Prevention Lifeline at 1-414.896.9829 or Text/Call 588             My Care Team Members  Patient Care Team       Relationship Specialty Notifications Start End    Himanshu Villagran MD PCP - General Family Medicine Admissions 8/9/21     Phone: [phone] Fax:  [phone]         [address] [place] [address]    Hudson Mccarthy Community Health Worker Primary Care - CC Admissions 9/1/20     Phone: [phone] Fax: [phone]         [address] [place] [address]    Lisbeth Bynum, RN Lead Care Coordinator Primary Care - CC Admissions 9/1/20     Apolinar Ruiz MD MD Dermatology  9/11/20     Phone: [phone] Fax: [phone] [address] [address]    Himanshu Villagran MD Assigned PCP   8/15/21     Phone: [phone] Fax: [phone]         [address] [place] [address]    Chai Foster MD Assigned Behavioral Health Provider   12/19/21     Phone: [phone] Fax: [phone]         [address] [address]    CURT Leos MD Assigned Surgical Provider   10/8/22     Phone: [phone] Fax: [phone]         [address] [facility] [address]    Rika Schrader, LSW Lead Care Coordinator  Admissions 12/16/22             My Care Plans  Self Management and Treatment Plan  Care Plan  Care Plan: General: Housing     Problem: HP GENERAL PROBLEM     Goal: General Goal - I want a list of resources for housing to find stable housing within 2-4 months     Start Date: 1/25/2023 Expected End Date: 4/28/2023    This Visit's Progress: 20% Recent Progress: 10%    Note:     Barriers: financial, access  Strengths: support from CCC team  Patient expressed understanding of goal: yes  Action steps to achieve this goal:  1. I will talk to CC SW on 3-8-23 at 9am for support to apply for public housing.and any housing resources and support to find housing program.  Updated 2-28-23 AL                         Action Plans on File:                       Advance Care Plans/Directives Type:   No data recorded    My Medical and Care Information  Problem List   Patient Active Problem List   Diagnosis     Hidradenitis suppurativa     Tobacco use disorder     Patellofemoral arthritis of left knee     Eczema     Allergic  rhinitis     Depression     Influenza     Knee pain, left     Other chronic pain     Scrotal abscess     Abscess of groin, left     Right rotator cuff tendinitis      Current Medications and Allergies:  See printed Medication Report.    Care Coordination Start Date: 9/1/2020   Frequency of Care Coordination: monthly     Form Last Updated: 03/27/2023

## 2023-03-27 NOTE — PROGRESS NOTES
Clinic Care Coordination Contact  Care Coordination Clinician Chart Review    Situation: Patient chart reviewed by Care Coordinator.       Background: Care Coordination Program started: 9/1/2020. Initial assessment completed and patient-centered care plan(s) were developed with participation from patient. Lead CC handed patient off to CHW for continued outreaches.       Assessment: Per chart review, patient outreach completed by CC CHW on 3/28/2023.  Patient is actively working to accomplish goal(s). Patient's goal(s) appropriate and relevant at this time. Patient is due for updated Plan of Care.  Assessments will be completed annually or as needed/with change of patient status.      Care Plan: General: Housing     Problem: HP GENERAL PROBLEM     Goal: General Goal - I want a list of resources for housing to find stable housing within 2-4 months     Start Date: 1/25/2023 Expected End Date: 4/28/2023    This Visit's Progress: 20% Recent Progress: 10%    Note:     Barriers: financial, access  Strengths: support from CCC team  Patient expressed understanding of goal: yes  Action steps to achieve this goal:  1. I will talk to CC SW on 3-8-23 at 9am for support to apply for public housing.and any housing resources and support to find housing program.  Updated 2-28-23 AL                           Plan/Recommendations: The patient will continue working with Care Coordination to achieve goal(s) as above. CHW will continue outreaches at minimum every 30 days and will involve Lead CC as needed or if patient is ready to move to Maintenance. Lead CC will continue to monitor CHW outreaches and patient's progress to goal(s) every 6 weeks.     Plan of Care updated and sent to patient: Yes    JOHNNY Rodarte   Social Work Care Coordinator   [facility]    [phone]

## 2023-04-05 ENCOUNTER — PATIENT OUTREACH (OUTPATIENT)
Dept: CARE COORDINATION | Facility: CLINIC | Age: 38
End: 2023-04-05
Payer: MEDICARE

## 2023-04-05 NOTE — PROGRESS NOTES
4/5/2023  Clinic Care Coordination Contact  Community Health Worker Follow Up    Intervention and Education during outreach:   Called and spoke to patient and follow up on goal.  Patient reported:  -he went to look at one place but was a fit. He has children would like a side by side duplex or single home.  -will check his email if there any notifications from public housing.  Will continue to search for housing.    Routed to [facility] to review action steps and goal housing    CHW Follow up: Monthly  CHW Plan: Follow up on goal  CHW Next Follow Up: 5-15-23    Hudson Mccarthy  Community Health Worker  [facility]  Clinic Care Coordination  [email or web address]   Office: [phone]  Fax: [phone]  Clinic Care Coordination Contact    Community Health Worker Follow Up    Care Gaps:     Health Maintenance Due   Topic Date Due     ASTHMA ACTION PLAN  Never done     HEPATITIS B IMMUNIZATION (1 of 3 - 3-dose series) Never done     COVID-19 Vaccine (1) Never done     INFLUENZA VACCINE (1) 09/01/2022     URINE DRUG SCREEN  12/10/2022       Postponed to due looking for housing      Care Plan:   Care Plan: General: Housing     Problem: HP GENERAL PROBLEM     Goal: General Goal - I want a list of resources for housing to find stable housing within 2-4 months     Start Date: 1/25/2023 Expected End Date: 5/31/2023    This Visit's Progress: 40% Recent Progress: 20%    Note:     Barriers: financial, access  Strengths: support from CCC team  Patient expressed understanding of goal: yes  Action steps to achieve this goal:  1. I will check for any notifications from public housing status of application.  2. I will continue to look for  a side by side duplex or single home.  Updated 4-5-23 AL

## 2023-04-05 NOTE — PROGRESS NOTES
"4/5/2023  Clinic Care Coordination Contact  Care Team Conversations    Received message from RALPH BARON  \"You can defer your outreach. I sent an email to the cultural  and am waiting to hear back in regards to if she can help the pt with housing. I want to confirm first if this is an appropriate use for the  \"    CHW Follow up: Monthly  CHW Plan: Follow up on goal  CHW Next Follow Up: 5-31-23    Hudson Mccarthy  Community Health Worker  [facility]  Clinic Care Coordination  [email or web address]   Office: [phone]  Fax: [phone]          "

## 2023-05-04 ENCOUNTER — LAB (OUTPATIENT)
Dept: LAB | Facility: CLINIC | Age: 38
End: 2023-05-04
Payer: MEDICARE

## 2023-05-04 ENCOUNTER — OFFICE VISIT (OUTPATIENT)
Dept: DERMATOLOGY | Facility: CLINIC | Age: 38
End: 2023-05-04
Payer: MEDICARE

## 2023-05-04 DIAGNOSIS — L73.2 HIDRADENITIS SUPPURATIVA: ICD-10-CM

## 2023-05-04 DIAGNOSIS — Z79.899 ENCOUNTER FOR LONG-TERM (CURRENT) USE OF HIGH-RISK MEDICATION: ICD-10-CM

## 2023-05-04 DIAGNOSIS — L40.9 PSORIASIS: ICD-10-CM

## 2023-05-04 DIAGNOSIS — Z11.59 ENCOUNTER FOR SCREENING FOR OTHER VIRAL DISEASES: ICD-10-CM

## 2023-05-04 DIAGNOSIS — L70.0 ACNE VULGARIS: ICD-10-CM

## 2023-05-04 DIAGNOSIS — Z72.89 OTHER PROBLEMS RELATED TO LIFESTYLE: ICD-10-CM

## 2023-05-04 DIAGNOSIS — L73.2 HIDRADENITIS SUPPURATIVA: Primary | ICD-10-CM

## 2023-05-04 DIAGNOSIS — L84 CORN: ICD-10-CM

## 2023-05-04 LAB
ALBUMIN SERPL BCG-MCNC: 4.6 G/DL (ref 3.5–5.2)
ALP SERPL-CCNC: 129 U/L (ref 40–129)
ALT SERPL W P-5'-P-CCNC: 31 U/L (ref 10–50)
ANION GAP SERPL CALCULATED.3IONS-SCNC: 7 MMOL/L (ref 7–15)
AST SERPL W P-5'-P-CCNC: 23 U/L (ref 10–50)
BILIRUB SERPL-MCNC: 0.3 MG/DL
BUN SERPL-MCNC: 12.5 MG/DL (ref 6–20)
CALCIUM SERPL-MCNC: 9.5 MG/DL (ref 8.6–10)
CHLORIDE SERPL-SCNC: 105 MMOL/L (ref 98–107)
CREAT SERPL-MCNC: 0.93 MG/DL (ref 0.67–1.17)
DEPRECATED HCO3 PLAS-SCNC: 28 MMOL/L (ref 22–29)
ERYTHROCYTE [DISTWIDTH] IN BLOOD BY AUTOMATED COUNT: 12.4 % (ref 10–15)
GFR SERPL CREATININE-BSD FRML MDRD: >90 ML/MIN/1.73M2
GLUCOSE SERPL-MCNC: 93 MG/DL (ref 70–99)
HCT VFR BLD AUTO: 44.3 % (ref 40–53)
HGB BLD-MCNC: 14.9 G/DL (ref 13.3–17.7)
MCH RBC QN AUTO: 31.2 PG (ref 26.5–33)
MCHC RBC AUTO-ENTMCNC: 33.6 G/DL (ref 31.5–36.5)
MCV RBC AUTO: 93 FL (ref 78–100)
PLATELET # BLD AUTO: 216 10E3/UL (ref 150–450)
POTASSIUM SERPL-SCNC: 4.4 MMOL/L (ref 3.4–5.3)
PROT SERPL-MCNC: 7.8 G/DL (ref 6.4–8.3)
RBC # BLD AUTO: 4.78 10E6/UL (ref 4.4–5.9)
SODIUM SERPL-SCNC: 140 MMOL/L (ref 136–145)
WBC # BLD AUTO: 11.1 10E3/UL (ref 4–11)

## 2023-05-04 PROCEDURE — 86481 TB AG RESPONSE T-CELL SUSP: CPT | Performed by: DERMATOLOGY

## 2023-05-04 PROCEDURE — 82306 VITAMIN D 25 HYDROXY: CPT | Performed by: DERMATOLOGY

## 2023-05-04 PROCEDURE — 36415 COLL VENOUS BLD VENIPUNCTURE: CPT | Performed by: PATHOLOGY

## 2023-05-04 PROCEDURE — 86706 HEP B SURFACE ANTIBODY: CPT | Performed by: DERMATOLOGY

## 2023-05-04 PROCEDURE — 85027 COMPLETE CBC AUTOMATED: CPT | Performed by: PATHOLOGY

## 2023-05-04 PROCEDURE — 86803 HEPATITIS C AB TEST: CPT | Performed by: DERMATOLOGY

## 2023-05-04 PROCEDURE — 80053 COMPREHEN METABOLIC PANEL: CPT | Performed by: PATHOLOGY

## 2023-05-04 PROCEDURE — 86704 HEP B CORE ANTIBODY TOTAL: CPT | Performed by: DERMATOLOGY

## 2023-05-04 PROCEDURE — 87340 HEPATITIS B SURFACE AG IA: CPT | Performed by: DERMATOLOGY

## 2023-05-04 PROCEDURE — 87389 HIV-1 AG W/HIV-1&-2 AB AG IA: CPT | Performed by: DERMATOLOGY

## 2023-05-04 PROCEDURE — 99214 OFFICE O/P EST MOD 30 MIN: CPT | Performed by: DERMATOLOGY

## 2023-05-04 NOTE — LETTER
5/4/2023       RE: Bernard Padilla  [address]  [place] [address]     Dear Colleague,    Thank you for referring your patient, Bernard Padilla, to the [facility] [place] at [facility]. Please see a copy of my visit note below.    [facility] Dermatology Note  Encounter Date: May 4, 2023  Office Visit     Dermatology Problem List:  1. Hidradenitis suppuritiva              - Diagnosed in 2016 and did not respond to antibiotics.              - Seen by Dr. Bird and underwent 9 procedures and 1 procedure with Dr. Maldonado              - s/p  mg BID - started 9/19, now discontinued              - Dapsone 200 mg daily, increased from 100mg daily on 7/1/21 (started 11/21/19), restarted 3/10/22              - Humira started 5/4/2023 pending insurance        2. Acute onset hand foot psoriasiform dermatitis with joint pain, suspected reactive arthritis, now improving and almost resolved with minimal foot involvement              -  mg BID - started 9/19, now discontinued              - Lidex ointment at night               - DermaSmooth   - Humira   3. Atopic dermatitis              - fluocinolone oil, Triamcinolone 0.1% cream BID, Betamethasone 0.05% ointment BID  4. Vitiligo  5. Acne vulgaris  - tretinoin 0.025% cream, BPO 5% wash  ____________________________________________    Assessment & Plan:  # Hidradenitis suppurativa. Scheduled for excision on May 17th. In general, prefers to have areas surgically excised and remains hesitant about other medications at this time, specifically biologics. However, discussed options, including Humira, Otezla and Cosentyx as patient is in a great deal of pain. His daughter is no longer living in [place] so he is no longer worried about her taking his medications.  - Follow up with surgery as scheduled on May 17th   - Start antibiotics per surgeon   - Start  Humira. Discussed side effects and expected benefits of the medication.   Day 1:160mg dose(given as four 40mg injections), 4 pens  Day 15: 80mg dose(given as two 40mg injections), 2 pens  Day 29: 40mg doase (given as one 40mg injection), 1 pen  Followed by 40 mg weekly, thereafter   - Safety Labs ordered today: HIV, TB, Hep B, Hep C, CBC, CMP  - Recommended immunizations: pneumonia, shingles, flu, COVID    # Corn, left sole  - Lesion popped today     # Psoriasis,. palms and soles  - Continue betamethasone 0.05% ointment BID  - Start Humira as above     Follow-up: 3 month(s) in-person, or earlier for new or changing lesions    Staff and Scribe:     Scribe Disclosure:  I, YANETH MENDEZ, am serving as a scribe to document services personally performed by Apolinar Ruiz MD based on data collection and the provider's statements to me.     Provider Disclosure:   The documentation recorded by the scribe accurately reflects the services I personally performed and the decisions made by me.    Apolinar Ruiz MD, FAAD    Departments of Internal Medicine and Dermatology  [facility]  [phone]    ____________________________________________    CC: Derm Problem (Bernard is following up for HS. Has had several flares. Has HS surgery planned for 17th of May.)    HPI:  Mr. Bernard Padilla is a(n) 37 year old male who presents today as a return patient for HS. Last seen by myself on 8/25/22, at which time patient was started on tretinoin and BP for treatment of acne.    Today, the patient reports that he has had several flares since his last visit. Stopped going to his pain doc because he was not helpful. Is currently in a great deal of pain. Has been off dapsone for awhile. Gets 4-5 new lesions per month.    His mother recently passed away in August.     HS Nurse Assessment        3/10/2022     1:40 PM 8/25/2022     2:36 PM 5/4/2023     3:09 PM   Nurse Assessment Data   Over the past 30 days  how many old lesions flared back up? 5 4 >5   Over the past 30 days how many new lesions did you get? 2-3 0 1   Over the past week, how many dressing changes do you do each day? 0 0 0   Over the past week, has your wound drainage been: Severe Very Severe None   Rate your HS overall from 0 - 10 (0 = no disease, 10 = worst) over the past week:  8 8 9-10   Rate your pain score from 0 - 10 (0 = no disease, 10 = worst) for the most painful/symptomatic lesion in the past week:  10 - Worst Pain 8 10 - Worst Pain   Over the past week, how much has HS influenced your quality of life? very much slightly extremely     Patient is otherwise feeling well, without additional skin concerns.    Physical Exam:  GEN: NAD  SKIN: Full skin, which includes the head/face, both arms, chest, back, abdomen,both legs, genitalia and/or groin buttocks, digits and/or nails, was examined.  - Some patchy hypopigmentation on the shins  - Mild xerosis of the shins  - Corn on the left sole.  HS Data      3/10/2022     4:50 PM 8/25/2022     4:01 PM 5/4/2023     3:57 PM   HS Exam Data   LC Type LC1 LC1 LC1   Clinical Subtypes Regular type Regular type Regular type   Acne? No Yes Yes   Acne Comments  acneiform papules throughout the forehead, and on left jaw  x 3    Dissecting Cellulitis? No No No   Visual analogue score (0-100) 45 20 30   Total Han Stage II II I   Total Inflammatory Nodules 1 1 0   Total Abcesses 0 0 1   Total Draining Tunnels 1 0 0   Total Abscess and Nodule Count 1 1 1   IHS4 Score  5 1 2   Total  HASI Score  0 0   HS-PGA 3       - No other lesions of concern on areas examined.     Medications:  Current Outpatient Medications   Medication    acetaminophen (TYLENOL) 325 MG tablet    albuterol (PROAIR HFA/PROVENTIL HFA/VENTOLIN HFA) 108 (90 Base) MCG/ACT inhaler    augmented betamethasone dipropionate (DIPROLENE-AF) 0.05 % external ointment    benzoyl peroxide 5 % external liquid    dapsone (ACZONE) 100 MG tablet    fluocinolone  acetonide (DERMA-SMOOTHE/FS BODY) 0.01 % external oil    Fluocinolone Acetonide Scalp (DERMA-SMOOTHE/FS SCALP) 0.01 % OIL oil    fluocinonide (LIDEX) 0.05 % external solution    fluticasone (FLONASE) 50 MCG/ACT nasal spray    IBUPROFEN PO    loratadine (CLARITIN) 10 MG tablet    ondansetron (ZOFRAN ODT) 4 MG ODT tab    senna-docusate (SENOKOT-S/PERICOLACE) 8.6-50 MG tablet    tretinoin (RETIN-A) 0.025 % external cream     Current Facility-Administered Medications   Medication    triamcinolone acetonide (KENALOG-10) injection 10 mg      Past Medical History:   Patient Active Problem List   Diagnosis    Hidradenitis suppurativa    Tobacco use disorder    Patellofemoral arthritis of left knee    Eczema    Allergic rhinitis    Depression    Influenza    Knee pain, left    Other chronic pain    Scrotal abscess    Abscess of groin, left    Right rotator cuff tendinitis     Past Medical History:   Diagnosis Date    Anxiety     Arthritis     Arthritis of knee     Boil     Chronic pain     Eczema     Hidradenitis suppurativa     Hydradenitis     Mild intermittent asthma without complication         CC No referring provider defined for this encounter. on close of this encounter.

## 2023-05-04 NOTE — NURSING NOTE
Dermatology Rooming Note    Bernard Padilla's goals for this visit include:   Chief Complaint   Patient presents with     Derm Problem     Bernard is following up for HS. Has had several flares. Has HS surgery planned for 17th of May.

## 2023-05-04 NOTE — PATIENT INSTRUCTIONS
Humira Dosing    Day 1:160mg dose(given as four 40mg injections), 4 pens  Day 15: 80mg dose(given as two 40mg injections), 2 pens  Day 29: 40mg doase (given as one 40mg injection), 1 pen  Followed by 40 mg weekly, thereafter    HIDRADENITIS SUPPURATIVA     What is hidradenitis suppurativa (HS)?  Hidradenitis suppurativa (HS) is a chronic skin disorder affecting the hair follicles. The disease starts with sore red lumps (or boils), typically involving the armpits, breasts, lower abdomen, groin, and buttocks. These lesions appear suddenly, increase in size and then burst or rupture, usually under the surface of the skin. This causes severe pain. Sometimes they rupture to the surface of the skin, draining pus. In some people, they can result in tunnels under the skin that may or may not continue to drain. When the lumps heal, they can leave scars.     Facts:   HS is a chronic skin disease, that comes and goes in flares, and is very painful  HS happens in many people - men and women, young and elderly, all races and ethnicities. But HS is more common in young adults, women  and skin of color  One out of three people with HS has a family member with HS   HS is NOT due to an infection or washing habits  HS is NOT contagious, so it cannot be spread from one person to another person   HS is NOT caused by how well you wash or what soaps you use  HS is NOT caused by smoking or obesity, but quitting smoking and losing weight have helped some people        Causes  The cause of HS remains unclear. It is thought that there is a problem in the hair follicle that makes it weaker and easier to break open. The current theory is that there are three steps that cause an HS lesion to form::   The hair follicle gets plugged   The hair follicle swells and then ruptures, causing pain and inflammation   In some people, the inflammation does not stop and results in tunneling through the skin       Associated Conditions  HS is associated with  several other medical conditions and activities including:  Tobacco use  High cholesterol, heart disease and stroke   Type 2 diabetes   Depression and anxiety   Arthritis, which causes stiffness and pain in joints   Inflammatory bowel disease (including Crohn's disease and ulcerative colitis)  Severe acne and pilonidal sinus/cyst  Polycystic ovarian syndrome  Skin cancer in areas of longstanding HS lesions, typically in the groin or buttocks (rare)    It is important to ask your physician to watch out for these conditions.      To help manage mental health issues related to HS and emotional support:  Help finding a mental health specialist: https://www.psychologytoday.com/us/therapists  Suicide prevention (24/7 free confidential support):   Phone: (968) 691-1593  Website: https://suicidepreventionlifeline.org/    Support groups:    Hope for HS: www.hopeforhs.org  HS Connect: www.[facility]org    Intimacy support: American Association of Sexuality Educators, Counselors and Therapists (AASECT) website: https://www.aasect.org    For help quitting smoking   Phone:  English: 3-409-XVOY-NOW (1-486.201.4760)  Gibraltarian: 6-852-KAMHQT-YA (1-874.129.5968)    Website:   English: https://smokefree.gov/  [place]: [place].smokefree.gov     Lifestyle Modifications   Quitting smoking or weight loss can help your overall health and may help improve HS symptoms in some people, but not everyone.   It is recommended to eat a well-balanced, healthy diet (https://health.gov/dietaryguidelines) and to maintain a healthy weight. Avoiding dietary triggers can help some people suffering with HS, but will not necessarily help others with HS.    Some people may find that friction, irritation, and rubbing may worsen HS symptoms. Some people do better with loose, breathable clothing and fabrics. Shaving close to the affected areas may also worsen HS in some people. But, not everyone has the same triggers for their HS, so see what works for  you!    Some medications may worsen HS such as lithium, testosterone, and some progesterone-only birth control.  Please discuss this with your provider before stopping medication.     Zinc supplementation has been shown in some studies to help HS. However, every treatment can have a side effects,  so talk to your provider before starting any treatment.     Treatment                    Medicines, procedures and surgeries are offered to treat HS. Treatment can help reduce breakouts and improve quality of life.  Medicines used:  Topical washes (e.g. chlorhexidine, benzoyl peroxide)    Clindamycin on the skin - seems effective for pustules and papules. Probably not helpful for larger chronic lesions.     Oral antibiotics (e.g. doxycycline, clindamycin + rifampin, dapsone) - antibiotics may help some, but not all patients    Hormonal therapies (e.g. spironolactone, oral contraceptives) - primarily used in females though to have a hormonal component to their HS (e.g perimenstrual flares, worsening in pregnancy, polycystic ovarian syndrome)    Immunosuppression (e.g. prednisone)     Injectables (e.g. adalimumab, infliximab) - Adalimumab is the only federal drug administration (FDA) approved medication for HS  Education for adalimumab injections: https://www.[employer]-complete/injection  Adalimumab abassador program:   Website: https://www.[employer]-complete/sign-up/  Phone: 7.218.0IOJVND (1.708.973.7035)    Procedures:  Intralesional steroid injections - may help areas of acute active inflammation     Some hair removal lasers - If considering this option, we recommend doing this only with a medical professional that has experience treating HS.     Surgeries:  Incision and drainage - Provides fast, short-term relief, but symptoms likely to recur.        Deroofing - This surgery involves opening the lesion and cleaning out the base. This treatment is effective for recurring lesions.  Recurrence rates seem to  be similar to excision. These are typically left open and allowed to heal on their own over 1-3 months      Excision - This involves cutting out chronic lesions.  This may entail cutting out a large affected area referred to as wide local excision, or cutting out single lesions, referred to as localized excisions.  Excision may be done with a scalpel, electric heating device or laser (e.g. carbon dioxide [CO2] laser).  These are either allowed to heal on their own, closed with sutures, or closed with a graft or flap.  Grafts are typically done by taking skin from one site of the body and using it to close another part of the body.  Flaps are done by moving tissue around to close a wound.     Check out this website to help decide the best treatment options for you!     https://www.informed-decisions.org/hidradenitispda.php

## 2023-05-04 NOTE — PROGRESS NOTES
[facility] Dermatology Note  Encounter Date: May 4, 2023  Office Visit     Dermatology Problem List:  1. Hidradenitis suppuritiva              - Diagnosed in 2016 and did not respond to antibiotics.              - Seen by Dr. Bird and underwent 9 procedures and 1 procedure with Dr. Maldonado              - s/p  mg BID - started 9/19, now discontinued              - Dapsone 200 mg daily, increased from 100mg daily on 7/1/21 (started 11/21/19), restarted 3/10/22              - Humira started 5/4/2023 pending insurance        2. Acute onset hand foot psoriasiform dermatitis with joint pain, suspected reactive arthritis, now improving and almost resolved with minimal foot involvement              -  mg BID - started 9/19, now discontinued              - Lidex ointment at night               - DermaSmooth   - Humira   3. Atopic dermatitis              - fluocinolone oil, Triamcinolone 0.1% cream BID, Betamethasone 0.05% ointment BID  4. Vitiligo  5. Acne vulgaris  - tretinoin 0.025% cream, BPO 5% wash  ____________________________________________    Assessment & Plan:  # Hidradenitis suppurativa. Scheduled for excision on May 17th. In general, prefers to have areas surgically excised and remains hesitant about other medications at this time, specifically biologics. However, discussed options, including Humira, Otezla and Cosentyx as patient is in a great deal of pain. His daughter is no longer living in [place] so he is no longer worried about her taking his medications.  - Follow up with surgery as scheduled on May 17th   - Start antibiotics per surgeon   - Start Humira. Discussed side effects and expected benefits of the medication.   Day 1:160mg dose(given as four 40mg injections), 4 pens  Day 15: 80mg dose(given as two 40mg injections), 2 pens  Day 29: 40mg doase (given as one 40mg injection), 1 pen  Followed by 40 mg weekly, thereafter   - Safety Labs ordered today: HIV, TB, Hep  B, Hep C, CBC, CMP  - Recommended immunizations: pneumonia, shingles, flu, COVID    # Corn, left sole  - Lesion popped today     # Psoriasis,. palms and soles  - Continue betamethasone 0.05% ointment BID  - Start Humira as above     Follow-up: 3 month(s) in-person, or earlier for new or changing lesions    Staff and Scribe:     Scribe Disclosure:  I, YANETH MENDEZ, am serving as a scribe to document services personally performed by Apolinar Ruiz MD based on data collection and the provider's statements to me.     Provider Disclosure:   The documentation recorded by the scribe accurately reflects the services I personally performed and the decisions made by me.    Apolinar Ruiz MD, FAAD    Departments of Internal Medicine and Dermatology  [facility]  [phone]    ____________________________________________    CC: Derm Problem (Bernard is following up for HS. Has had several flares. Has HS surgery planned for 17th of May.)    HPI:  Mr. Bernard Padilla is a(n) 37 year old male who presents today as a return patient for HS. Last seen by myself on 8/25/22, at which time patient was started on tretinoin and BP for treatment of acne.    Today, the patient reports that he has had several flares since his last visit. Stopped going to his pain doc because he was not helpful. Is currently in a great deal of pain. Has been off dapsone for awhile. Gets 4-5 new lesions per month.    His mother recently passed away in August.     HS Nurse Assessment        3/10/2022     1:40 PM 8/25/2022     2:36 PM 5/4/2023     3:09 PM   Nurse Assessment Data   Over the past 30 days how many old lesions flared back up? 5 4 >5   Over the past 30 days how many new lesions did you get? 2-3 0 1   Over the past week, how many dressing changes do you do each day? 0 0 0   Over the past week, has your wound drainage been: Severe Very Severe None   Rate your HS overall from 0 - 10 (0 = no disease, 10 = worst)  over the past week:  8 8 9-10   Rate your pain score from 0 - 10 (0 = no disease, 10 = worst) for the most painful/symptomatic lesion in the past week:  10 - Worst Pain 8 10 - Worst Pain   Over the past week, how much has HS influenced your quality of life? very much slightly extremely     Patient is otherwise feeling well, without additional skin concerns.    Physical Exam:  GEN: NAD  SKIN: Full skin, which includes the head/face, both arms, chest, back, abdomen,both legs, genitalia and/or groin buttocks, digits and/or nails, was examined.  - Some patchy hypopigmentation on the shins  - Mild xerosis of the shins  - Corn on the left sole.  HS Data      3/10/2022     4:50 PM 8/25/2022     4:01 PM 5/4/2023     3:57 PM   HS Exam Data   LC Type LC1 LC1 LC1   Clinical Subtypes Regular type Regular type Regular type   Acne? No Yes Yes   Acne Comments  acneiform papules throughout the forehead, and on left jaw  x 3    Dissecting Cellulitis? No No No   Visual analogue score (0-100) 45 20 30   Total Han Stage II II I   Total Inflammatory Nodules 1 1 0   Total Abcesses 0 0 1   Total Draining Tunnels 1 0 0   Total Abscess and Nodule Count 1 1 1   IHS4 Score  5 1 2   Total  HASI Score  0 0   HS-PGA 3       - No other lesions of concern on areas examined.     Medications:  Current Outpatient Medications   Medication     acetaminophen (TYLENOL) 325 MG tablet     albuterol (PROAIR HFA/PROVENTIL HFA/VENTOLIN HFA) 108 (90 Base) MCG/ACT inhaler     augmented betamethasone dipropionate (DIPROLENE-AF) 0.05 % external ointment     benzoyl peroxide 5 % external liquid     dapsone (ACZONE) 100 MG tablet     fluocinolone acetonide (DERMA-SMOOTHE/FS BODY) 0.01 % external oil     Fluocinolone Acetonide Scalp (DERMA-SMOOTHE/FS SCALP) 0.01 % OIL oil     fluocinonide (LIDEX) 0.05 % external solution     fluticasone (FLONASE) 50 MCG/ACT nasal spray     IBUPROFEN PO     loratadine (CLARITIN) 10 MG tablet     ondansetron (ZOFRAN ODT) 4 MG ODT  tab     senna-docusate (SENOKOT-S/PERICOLACE) 8.6-50 MG tablet     tretinoin (RETIN-A) 0.025 % external cream     Current Facility-Administered Medications   Medication     triamcinolone acetonide (KENALOG-10) injection 10 mg      Past Medical History:   Patient Active Problem List   Diagnosis     Hidradenitis suppurativa     Tobacco use disorder     Patellofemoral arthritis of left knee     Eczema     Allergic rhinitis     Depression     Influenza     Knee pain, left     Other chronic pain     Scrotal abscess     Abscess of groin, left     Right rotator cuff tendinitis     Past Medical History:   Diagnosis Date     Anxiety      Arthritis      Arthritis of knee      Boil      Chronic pain      Eczema      Hidradenitis suppurativa      Hydradenitis      Mild intermittent asthma without complication         CC No referring provider defined for this encounter. on close of this encounter.

## 2023-05-04 NOTE — PROGRESS NOTES
HS Nurse Assessment        3/10/2022     1:40 PM 8/25/2022     2:36 PM 5/4/2023     3:09 PM   Nurse Assessment Data   Over the past 30 days how many old lesions flared back up? 5 4 >5   Over the past 30 days how many new lesions did you get? 2-3 0 1   Over the past week, how many dressing changes do you do each day? 0 0 0   Over the past week, has your wound drainage been: Severe Very Severe None   Rate your HS overall from 0 - 10 (0 = no disease, 10 = worst) over the past week:  8 8 9-10   Rate your pain score from 0 - 10 (0 = no disease, 10 = worst) for the most painful/symptomatic lesion in the past week:  10 - Worst Pain 8 10 - Worst Pain   Over the past week, how much has HS influenced your quality of life? very much slightly extremely

## 2023-05-05 LAB
DEPRECATED CALCIDIOL+CALCIFEROL SERPL-MC: 8 UG/L (ref 20–75)
HBV CORE AB SERPL QL IA: NONREACTIVE
HBV SURFACE AB SERPL IA-ACNC: 31.12 M[IU]/ML
HBV SURFACE AB SERPL IA-ACNC: REACTIVE M[IU]/ML
HBV SURFACE AG SERPL QL IA: NONREACTIVE
HCV AB SERPL QL IA: NONREACTIVE
HIV 1+2 AB+HIV1 P24 AG SERPL QL IA: NONREACTIVE

## 2023-05-08 ENCOUNTER — OFFICE VISIT (OUTPATIENT)
Dept: FAMILY MEDICINE | Facility: CLINIC | Age: 38
End: 2023-05-08
Payer: MEDICARE

## 2023-05-08 VITALS
SYSTOLIC BLOOD PRESSURE: 109 MMHG | RESPIRATION RATE: 18 BRPM | WEIGHT: 187 LBS | HEIGHT: 67 IN | BODY MASS INDEX: 29.35 KG/M2 | OXYGEN SATURATION: 99 % | DIASTOLIC BLOOD PRESSURE: 69 MMHG | TEMPERATURE: 97.8 F | HEART RATE: 63 BPM

## 2023-05-08 DIAGNOSIS — J45.20 MILD INTERMITTENT ASTHMA, UNSPECIFIED WHETHER COMPLICATED: ICD-10-CM

## 2023-05-08 DIAGNOSIS — Z01.818 PREOP GENERAL PHYSICAL EXAM: Primary | ICD-10-CM

## 2023-05-08 DIAGNOSIS — L73.2 HIDRADENITIS SUPPURATIVA: ICD-10-CM

## 2023-05-08 DIAGNOSIS — Z76.0 ENCOUNTER FOR MEDICATION REFILL: ICD-10-CM

## 2023-05-08 LAB
CRP SERPL-MCNC: 3.83 MG/L
ERYTHROCYTE [DISTWIDTH] IN BLOOD BY AUTOMATED COUNT: 12.4 % (ref 10–15)
GAMMA INTERFERON BACKGROUND BLD IA-ACNC: 0.08 IU/ML
HCT VFR BLD AUTO: 43.8 % (ref 40–53)
HGB BLD-MCNC: 14.8 G/DL (ref 13.3–17.7)
M TB IFN-G BLD-IMP: NEGATIVE
M TB IFN-G CD4+ BCKGRND COR BLD-ACNC: 9.92 IU/ML
MCH RBC QN AUTO: 31.4 PG (ref 26.5–33)
MCHC RBC AUTO-ENTMCNC: 33.8 G/DL (ref 31.5–36.5)
MCV RBC AUTO: 93 FL (ref 78–100)
MITOGEN IGNF BCKGRD COR BLD-ACNC: -0.01 IU/ML
MITOGEN IGNF BCKGRD COR BLD-ACNC: -0.03 IU/ML
PLATELET # BLD AUTO: 222 10E3/UL (ref 150–450)
QUANTIFERON MITOGEN: 10 IU/ML
QUANTIFERON NIL TUBE: 0.08 IU/ML
QUANTIFERON TB1 TUBE: 0.07 IU/ML
QUANTIFERON TB2 TUBE: 0.05
RBC # BLD AUTO: 4.72 10E6/UL (ref 4.4–5.9)
WBC # BLD AUTO: 7.9 10E3/UL (ref 4–11)

## 2023-05-08 PROCEDURE — 99215 OFFICE O/P EST HI 40 MIN: CPT | Performed by: FAMILY MEDICINE

## 2023-05-08 PROCEDURE — 85027 COMPLETE CBC AUTOMATED: CPT | Performed by: FAMILY MEDICINE

## 2023-05-08 PROCEDURE — 86140 C-REACTIVE PROTEIN: CPT | Performed by: FAMILY MEDICINE

## 2023-05-08 PROCEDURE — 36415 COLL VENOUS BLD VENIPUNCTURE: CPT | Performed by: FAMILY MEDICINE

## 2023-05-08 RX ORDER — ALBUTEROL SULFATE 90 UG/1
2 AEROSOL, METERED RESPIRATORY (INHALATION) EVERY 6 HOURS
Qty: 8.5 G | Refills: 3 | Status: SHIPPED | OUTPATIENT
Start: 2023-05-08 | End: 2023-10-18

## 2023-05-08 ASSESSMENT — ASTHMA QUESTIONNAIRES
QUESTION_1 LAST FOUR WEEKS HOW MUCH OF THE TIME DID YOUR ASTHMA KEEP YOU FROM GETTING AS MUCH DONE AT WORK, SCHOOL OR AT HOME: SOME OF THE TIME
ACT_TOTALSCORE: 16
EMERGENCY_ROOM_LAST_YEAR_TOTAL: THREE OR MORE
HOSPITALIZATION_OVERNIGHT_LAST_YEAR_TOTAL: ONE
QUESTION_4 LAST FOUR WEEKS HOW OFTEN HAVE YOU USED YOUR RESCUE INHALER OR NEBULIZER MEDICATION (SUCH AS ALBUTEROL): ONCE A WEEK OR LESS
QUESTION_2 LAST FOUR WEEKS HOW OFTEN HAVE YOU HAD SHORTNESS OF BREATH: THREE TO SIX TIMES A WEEK
QUESTION_5 LAST FOUR WEEKS HOW WOULD YOU RATE YOUR ASTHMA CONTROL: WELL CONTROLLED
ACT_TOTALSCORE: 16
QUESTION_3 LAST FOUR WEEKS HOW OFTEN DID YOUR ASTHMA SYMPTOMS (WHEEZING, COUGHING, SHORTNESS OF BREATH, CHEST TIGHTNESS OR PAIN) WAKE YOU UP AT NIGHT OR EARLIER THAN USUAL IN THE MORNING: TWO OR THREE NIGHTS A WEEK

## 2023-05-08 ASSESSMENT — PATIENT HEALTH QUESTIONNAIRE - PHQ9
SUM OF ALL RESPONSES TO PHQ QUESTIONS 1-9: 7
SUM OF ALL RESPONSES TO PHQ QUESTIONS 1-9: 7
10. IF YOU CHECKED OFF ANY PROBLEMS, HOW DIFFICULT HAVE THESE PROBLEMS MADE IT FOR YOU TO DO YOUR WORK, TAKE CARE OF THINGS AT HOME, OR GET ALONG WITH OTHER PEOPLE: SOMEWHAT DIFFICULT

## 2023-05-08 NOTE — PATIENT INSTRUCTIONS
For informational purposes only. Not to replace the advice of your health care provider. Copyright   2003,  [facility]. All rights reserved. Clinically reviewed by Bette Green MD. [place] [employer] 201576 - REV .  Preparing for Your Surgery  Getting started  A nurse will call you to review your health history and instructions. They will give you an arrival time based on your scheduled surgery time. Please be ready to share:  Your doctor's clinic name and phone number  Your medical, surgical, and anesthesia history  A list of allergies and sensitivities  A list of medicines, including herbal treatments and over-the-counter drugs  Whether the patient has a legal guardian (ask how to send us the papers in advance)  Please tell us if you're pregnant--or if there's any chance you might be pregnant. Some surgeries may injure a fetus (unborn baby), so they require a pregnancy test. Surgeries that are safe for a fetus don't always need a test, and you can choose whether to have one.   If you have a child who's having surgery, please ask for a copy of Preparing for Your Child's Surgery.    Preparing for surgery  Within 10 to 30 days of surgery: Have a pre-op exam (sometimes called an H&P, or History and Physical). This can be done at a clinic or pre-operative center.  If you're having a , you may not need this exam. Talk to your care team.  At your pre-op exam, talk to your care team about all medicines you take. If you need to stop any medicines before surgery, ask when to start taking them again.  We do this for your safety. Many medicines can make you bleed too much during surgery. Some change how well surgery (anesthesia) drugs work.  Call your insurance company to let them know you're having surgery. (If you don't have insurance, call [phone].)  Call your clinic if there's any change in your health. This includes signs of a cold or flu (sore throat, runny nose, cough, rash, fever). It  also includes a scrape or scratch near the surgery site.  If you have questions on the day of surgery, call your hospital or surgery center.  Eating and drinking guidelines  For your safety: Unless your surgeon tells you otherwise, follow the guidelines below.  Eat and drink as usual until 8 hours before you arrive for surgery. After that, no food or milk.  Drink clear liquids until 2 hours before you arrive. These are liquids you can see through, like water, Gatorade, and Propel Water. They also include plain black coffee and tea (no cream or milk), candy, and breath mints. You can spit out gum when you arrive.  If you drink alcohol: Stop drinking it the night before surgery.  If your care team tells you to take medicine on the morning of surgery, it's okay to take it with a sip of water.  Preventing infection  Shower or bathe the night before and morning of your surgery. Follow the instructions your clinic gave you. (If no instructions, use regular soap.)  Don't shave or clip hair near your surgery site. We'll remove the hair if needed.  Don't smoke or vape the morning of surgery. You may chew nicotine gum up to 2 hours before surgery. A nicotine patch is okay.  Note: Some surgeries require you to completely quit smoking and nicotine. Check with your surgeon.  Your care team will make every effort to keep you safe from infection. We will:  Clean our hands often with soap and water (or an alcohol-based hand rub).  Clean the skin at your surgery site with a special soap that kills germs.  Give you a special gown to keep you warm. (Cold raises the risk of infection.)  Wear special hair covers, masks, gowns and gloves during surgery.  Give antibiotic medicine, if prescribed. Not all surgeries need antibiotics.  What to bring on the day of surgery  Photo ID and insurance card  Copy of your health care directive, if you have one  Glasses and hearing aids (bring cases)  You can't wear contacts during surgery  Inhaler and  eye drops, if you use them (tell us about these when you arrive)  CPAP machine or breathing device, if you use them  A few personal items, if spending the night  If you have . . .  A pacemaker, ICD (cardiac defibrillator) or other implant: Bring the ID card.  An implanted stimulator: Bring the remote control.  A legal guardian: Bring a copy of the certified (court-stamped) guardianship papers.  Please remove any jewelry, including body piercings. Leave jewelry and other valuables at home.  If you're going home the day of surgery  You must have a responsible adult drive you home. They should stay with you overnight as well.  If you don't have someone to stay with you, and you aren't safe to go home alone, we may keep you overnight. Insurance often won't pay for this.  After surgery  If it's hard to control your pain or you need more pain medicine, please call your surgeon's office.  Questions?   If you have any questions for your care team, list them here: _________________________________________________________________________________________________________________________________________________________________________ ____________________________________ ____________________________________ ____________________________________    How to Take Your Medication Before Surgery  - STOP taking all vitamins and herbal supplements 14 days before surgery.

## 2023-05-08 NOTE — PROGRESS NOTES
[facility] [address] [place] [address]  Phone: [phone]  Fax: [phone]  Primary Provider: Deedee Ballesteros  Pre-op Performing Provider: DEEDEE BALLESTEROS      PREOPERATIVE EVALUATION:  Today's date: 5/8/2023    Bernard Padilla is a 37 year old male who presents for a preoperative evaluation.      5/8/2023    10:05 AM   Additional Questions   Roomed by Alec ELIAS   Accompanied by wife and daughter     Surgical Information:  Surgery/Procedure: EXCISION NON AXILLARY HIDRADENITIS, right groin  Surgery Location: [facility]   Surgeon: Carson Gilbert  Surgery Date: 5/17/2023  Time of Surgery: 940am  Where patient plans to recover: At home with family  Fax number for surgical facility: [phone]    Assessment & Plan     The proposed surgical procedure is considered LOW risk.    Preop general physical exam    - CBC with platelets; Future  - CRP, inflammation; Future  - CBC with platelets  - CRP, inflammation    Hidradenitis suppurativa  Scheduled for incision and drainage.    Mild intermittent asthma, unspecified whether complicated    - albuterol (PROAIR HFA/PROVENTIL HFA/VENTOLIN HFA) 108 (90 Base) MCG/ACT inhaler; Inhale 2 puffs into the lungs every 6 hours    Encounter for medication refill    - albuterol (PROAIR HFA/PROVENTIL HFA/VENTOLIN HFA) 108 (90 Base) MCG/ACT inhaler; Inhale 2 puffs into the lungs every 6 hours            - No identified additional risk factors other than previously addressed    Antiplatelet or Anticoagulation Medication Instructions:      Additional Medication Instructions:  Patient is to take all scheduled medications on the day of surgery    RECOMMENDATION:  Reviewed risks (not limited to bleeding,infection,pain,un-anticipated response to anesthesia ecc) and benefits (diagnostic and therapeutic) of surgery with patient, he understands the risks of the procedure and would like to proceed.  Patient's active problems diagnostically and  therapeutically optimized for planned procedure with, Local or General anesthesia    APPROVAL GIVEN to proceed with proposed procedure, without further diagnostic evaluation.    Review of external notes as documented elsewhere in note  40 minutes spent by me on the date of the encounter doing chart review, review of outside records, review of test results, interpretation of tests, patient visit and documentation       Subjective     HPI related to upcoming procedure: Recurrent hidradenitis suppurativa, Has had multiple surgery in the past, seen by surgery and deemed to be a candidate for right groin incision and drainage.  Will be starting immunomodulator thereafter.        5/8/2023     9:41 AM   Preop Questions   1. Have you ever had a heart attack or stroke? No   2. Have you ever had surgery on your heart or blood vessels, such as a stent placement, a coronary artery bypass, or surgery on an artery in your head, neck, heart, or legs? No   3. Do you have chest pain with activity? No   4. Do you have a history of  heart failure? No   5. Do you currently have a cold, bronchitis or symptoms of other infection? No   6. Do you have a cough, shortness of breath, or wheezing? No   7. Do you or anyone in your family have previous history of blood clots? YES - mother   8. Do you or does anyone in your family have a serious bleeding problem such as prolonged bleeding following surgeries or cuts? No   9. Have you ever had problems with anemia or been told to take iron pills? No   10. Have you had any abnormal blood loss such as black, tarry or bloody stools? No   11. Have you ever had a blood transfusion? No   12. Are you willing to have a blood transfusion if it is medically needed before, during, or after your surgery? Yes   13. Have you or any of your relatives ever had problems with anesthesia? No   14. Do you have sleep apnea, excessive snoring or daytime drowsiness? No   15. Do you have any artifical heart valves or  other implanted medical devices like a pacemaker, defibrillator, or continuous glucose monitor? No   16. Do you have artificial joints? No   17. Are you allergic to latex? No       Health Care Directive:  Patient does not have a Health Care Directive or Living Will: Discussed advance care planning with patient; information given to patient to review.    Preoperative Review of :   reviewed - controlled substances reflected in medication list.      Status of Chronic Conditions:  See problem list for active medical problems.  Problems all longstanding and stable, except as noted/documented.  See ROS for pertinent symptoms related to these conditions.      Review of Systems  Constitutional, neuro, ENT, endocrine, pulmonary, cardiac, gastrointestinal, genitourinary, musculoskeletal, integument and psychiatric systems are negative, except as otherwise noted.    Patient Active Problem List    Diagnosis Date Noted     Right rotator cuff tendinitis 12/09/2021     Priority: Medium     Influenza 03/01/2020     Priority: Medium     Abscess of groin, left 12/14/2019     Priority: Medium     Added automatically from request for surgery [id number]       Scrotal abscess 11/24/2019     Priority: Medium     Added automatically from request for surgery [id number]       Other chronic pain 09/10/2019     Priority: Medium     Hidradenitis suppurativa 01/23/2019     Priority: Medium     Allergic rhinitis 08/16/2018     Priority: Medium     Tobacco use disorder 08/08/2016     Priority: Medium     Patellofemoral arthritis of left knee 06/10/2016     Priority: Medium     Eczema 12/05/2012     Priority: Medium     Knee pain, left 12/05/2012     Priority: Medium     Torn meniscus in 2009, s/p repair in 2010. Now with chronic pain.       Depression 07/21/2009     Priority: Medium      Past Medical History:   Diagnosis Date     Anxiety      Arthritis      Arthritis of knee      Boil      Chronic pain      Eczema      Hidradenitis suppurativa       Hydradenitis      Mild intermittent asthma without complication      Past Surgical History:   Procedure Laterality Date     EXCISE HIDRADENITIS (LOCATION) Bilateral 9/25/2020    Procedure: EXCISION, HIDRADENITIS - right medial thigh, left groin and posterior scrotum.;  Surgeon: Muara Maldonado MD;  Location: [facility] OR     INCISION AND DRAINAGE, ABSCESS, SIMPLE N/A 5/4/2022    Procedure: INCISION AND closure  ABSCESS,  and wound excision right groin and perineum;  Surgeon: CURT Leos MD;  Location: [facility] OR     IRRIGATION AND DEBRIDEMENT RECTUM, COMBINED N/A 11/14/2019    Procedure: IRRIGATION AND DEBRIDEMENT, RECTUM;  Surgeon: Yon Kenny MD;  Location: [facility] OR     KNEE SURGERY       ORTHOPEDIC SURGERY      meniscus repair     SKIN SURGERY       Current Outpatient Medications   Medication Sig Dispense Refill     acetaminophen (TYLENOL) 325 MG tablet Take 650 mg by mouth every 4 hours as needed       albuterol (PROAIR HFA/PROVENTIL HFA/VENTOLIN HFA) 108 (90 Base) MCG/ACT inhaler Inhale 2 puffs into the lungs every 6 hours 8.5 g 3     augmented betamethasone dipropionate (DIPROLENE-AF) 0.05 % external ointment Apply topically 2 times daily 50 g 3     benzoyl peroxide 5 % external liquid Use daily as directed 236 mL 11     dapsone (ACZONE) 100 MG tablet Take 2 tablets (200 mg) by mouth daily 60 tablet 0     fluocinolone acetonide (DERMA-SMOOTHE/FS BODY) 0.01 % external oil Apply topically 2 times daily 120 mL 5     Fluocinolone Acetonide Scalp (DERMA-SMOOTHE/FS SCALP) 0.01 % OIL oil Apply topically daily as instructed. 118.28 mL 5     fluocinonide (LIDEX) 0.05 % external solution Apply topically 2 times daily 60 mL 3     fluticasone (FLONASE) 50 MCG/ACT nasal spray Spray 1-2 sprays into both nostrils daily 48 g 2     IBUPROFEN PO Take 800 mg by mouth 3 times daily as needed (Last dose 9.19.2020)        loratadine (CLARITIN) 10 MG tablet Take 1 tablet (10 mg) by mouth daily 30 tablet 3      "ondansetron (ZOFRAN ODT) 4 MG ODT tab Take 1-2 tablets (4-8 mg) by mouth every 8 hours as needed for nausea 4 tablet 0     senna-docusate (SENOKOT-S/PERICOLACE) 8.6-50 MG tablet Take 1-2 tablets by mouth 2 times daily 30 tablet 0     tretinoin (RETIN-A) 0.025 % external cream Use every night 45 g 11       Allergies   Allergen Reactions     Vicodin [Hydrocodone-Acetaminophen] Shortness Of Breath     Chicken-Derived Products (Egg) Nausea and Vomiting and GI Disturbance     Hydrocodone Swelling     Other reaction(s): Throat Irritation  Tolerated oxycodone   Upset stomach          Social History     Tobacco Use     Smoking status: Every Day     Packs/day: 1.00     Years: 20.00     Pack years: 20.00     Types: Cigarettes     Smokeless tobacco: Never     Tobacco comments:     1/2 pack of day, down from 1.5 pk/day   Vaping Use     Vaping status: Never Used   Substance Use Topics     Alcohol use: Yes     Comment: Beer occasionally/Social Use     Family History   Problem Relation Age of Onset     Hypertension Mother      Diabetes Father      Cancer No family hx of      Coronary Artery Disease No family hx of      Heart Disease No family hx of      Anesthesia Reaction No family hx of      Deep Vein Thrombosis No family hx of      Chronic Obstructive Pulmonary Disease Mother      Coronary Artery Disease Father      Colon Cancer No family hx of      Prostate Cancer No family hx of      History   Drug Use     Types: Marijuana     Comment: Daily         Objective     /69 (BP Location: Right arm, Patient Position: Sitting, Cuff Size: Adult Large)   Pulse 63   Temp 97.8  F (36.6  C) (Temporal)   Resp 18   Ht 1.703 m (5' 7.05\")   Wt 84.8 kg (187 lb)   SpO2 99%   BMI 29.25 kg/m      Physical Exam    GENERAL APPEARANCE: healthy, alert and no distress     EYES: EOMI,  PERRL     HENT: ear canals and TM's normal and nose and mouth without ulcers or lesions     NECK: no adenopathy, no asymmetry, masses, or scars and thyroid " normal to palpation     RESP: lungs clear to auscultation - no rales, rhonchi or wheezes     CV: regular rates and rhythm, normal S1 S2, no S3 or S4 and no murmur, click or rub     ABDOMEN:  soft, nontender, no HSM or masses and bowel sounds normal     MS: extremities normal- no gross deformities noted, no evidence of inflammation in joints, FROM in all extremities.     SKIN: Recurrent groin hidradenitis     NEURO: Normal strength and tone, sensory exam grossly normal, mentation intact and speech normal     PSYCH: mentation appears normal. and affect normal/bright     LYMPHATICS: No cervical adenopathy    Recent Labs   Lab Test 05/04/23  1634 04/27/22  1503   HGB 14.9 13.4    205    140   POTASSIUM 4.4 4.0   CR 0.93 1.03        Diagnostics:  No results found for this or any previous visit (from the past 24 hour(s)).   No EKG required, no history of coronary heart disease, significant arrhythmia, peripheral arterial disease or other structural heart disease.    Revised Cardiac Risk Index (RCRI):  The patient has the following serious cardiovascular risks for perioperative complications:   - No serious cardiac risks = 0 points     RCRI Interpretation: 0 points: Class I (very low risk - 0.4% complication rate)           Signed Electronically by: Himanshu Villagran MD  Copy of this evaluation report is provided to requesting physician.      Answers for HPI/ROS submitted by the patient on 5/8/2023  If you checked off any problems, how difficult have these problems made it for you to do your work, take care of things at home, or get along with other people?: Somewhat difficult  PHQ9 TOTAL SCORE: 7

## 2023-05-10 ENCOUNTER — TELEPHONE (OUTPATIENT)
Dept: DERMATOLOGY | Facility: CLINIC | Age: 38
End: 2023-05-10
Payer: MEDICARE

## 2023-05-10 DIAGNOSIS — E55.9 VITAMIN D DEFICIENCY: Primary | ICD-10-CM

## 2023-05-10 DIAGNOSIS — L73.2 HIDRADENITIS SUPPURATIVA: ICD-10-CM

## 2023-05-10 PROBLEM — J45.20 MILD INTERMITTENT ASTHMA, UNSPECIFIED WHETHER COMPLICATED: Status: ACTIVE | Noted: 2023-05-10

## 2023-05-10 RX ORDER — CHOLECALCIFEROL (VITAMIN D3) 50 MCG
1 TABLET ORAL DAILY
Qty: 90 TABLET | Refills: 4 | Status: SHIPPED | OUTPATIENT
Start: 2023-05-10 | End: 2024-07-22

## 2023-05-10 NOTE — TELEPHONE ENCOUNTER
PA Initiation    Medication: Humira  Insurance Company: [employer] ([facility]) - Phone [phone] Fax [phone]  Pharmacy Filling the Rx: [facility] - [place], [place] - [address]  Filling Pharmacy Phone:    Filling Pharmacy Fax:    Start Date: 5/10/2023    Key: [id number]

## 2023-05-11 ENCOUNTER — PATIENT OUTREACH (OUTPATIENT)
Dept: CARE COORDINATION | Facility: CLINIC | Age: 38
End: 2023-05-11
Payer: MEDICARE

## 2023-05-11 NOTE — PROGRESS NOTES
Care Coordination Clinician Chart Review    Situation: Patient chart reviewed by Care Coordinator.       Background: Care Coordination Program started: 9/1/2020. Initial assessment completed and patient-centered care plan(s) were developed with participation from patient. Lead CC handed patient off to CHW for continued outreaches.       Assessment: Per chart review, patient outreach completed by CC CHW on 4/5/23.  Patient is actively working to accomplish goal(s). Patient's goal(s) appropriate and relevant at this time. Patient is not due for updated Plan of Care.  Assessments will be completed annually or as needed/with change of patient status.      Care Plan: General: Housing     Problem: HP GENERAL PROBLEM     Goal: General Goal - I want a list of resources for housing to find stable housing within 2-4 months     Start Date: 1/25/2023 Expected End Date: 5/31/2023    This Visit's Progress: 40% Recent Progress: 20%    Note:     Barriers: financial, access  Strengths: support from CCC team  Patient expressed understanding of goal: yes  Action steps to achieve this goal:  1. I will check for any notifications from public housing status of application.  2. I will continue to look for  a side by side duplex or single home.  Updated 4-5-23 AL                           Plan/Recommendations: The patient will continue working with Care Coordination to achieve goal(s) as above. CHW will continue outreaches at minimum every 30 days and will involve Lead CC as needed or if patient is ready to move to Maintenance. Lead CC will continue to monitor CHW outreaches and patient's progress to goal(s) every 6 weeks.     Plan of Care updated and sent to patient: JOHNNY Hunter   Social Work Care Coordinator   [facility]    [phone]

## 2023-05-15 NOTE — TELEPHONE ENCOUNTER
Prior Authorization Approval    Authorization Effective Date: 5/10/2023  Authorization Expiration Date: 11/10/2023  Medication: Humira  Approved Dose/Quantity: 4 per 28 days  Reference #: Key: [id number]   Insurance Company: [employer] ([facility]) - Phone [phone] Fax [phone]  Expected CoPay: $0.00     CoPay Card Available: No    Foundation Assistance Needed:    Which Pharmacy is filling the prescription (Not needed for infusion/clinic administered): [facility] - [address]  Pharmacy Notified: Yes  Patient Notified: Yes

## 2023-05-15 NOTE — TELEPHONE ENCOUNTER
Mychart sent to patient informing them of approval see 5/15/2023 mychart encounter. Closing this encounter.    Vern Alba, EMT

## 2023-05-19 ENCOUNTER — TELEPHONE (OUTPATIENT)
Dept: DERMATOLOGY | Facility: CLINIC | Age: 38
End: 2023-05-19
Payer: MEDICARE

## 2023-05-19 NOTE — TELEPHONE ENCOUNTER
Humira training  Received: 1 week ago  Apolinar Ruiz MD  [facility]  Please contact patient for humira training.   Apolinar

## 2023-05-19 NOTE — TELEPHONE ENCOUNTER
LVM requesting patient call back to schedule Humira training per Dr. Ruiz.    Humira injection training is done on Fridays with derm RN. Patient can be scheduled into any open nurse visit on Friday. Patient needs to bring his Humira injection with him as clinic does not have training shots.    Vern Alba, EMT

## 2023-05-25 ENCOUNTER — ALLIED HEALTH/NURSE VISIT (OUTPATIENT)
Dept: DERMATOLOGY | Facility: CLINIC | Age: 38
End: 2023-05-25
Payer: MEDICARE

## 2023-05-25 DIAGNOSIS — L73.2 HIDRADENITIS SUPPURATIVA: Primary | ICD-10-CM

## 2023-05-25 PROCEDURE — 99211 OFF/OP EST MAY X REQ PHY/QHP: CPT | Performed by: DERMATOLOGY

## 2023-05-25 NOTE — PROGRESS NOTES
Bernard Padilla comes into clinic today at the request of Dr. Ruiz Ordering Provider for Humira injection training    This service provided today was under the supervising provider of the day Dr. Ibarra, who was available if needed.    Stephanie Morataya RN    Relevant Diagnosis:  HS    Teaching Topic:  Self injection of Humira    Person(s) involved in teaching:  Patient    Motivation Level:   Asks Questions:   Yes  Eager to Learn:  Yes  Cooperative:  Yes  Receptive (willing/able to accept information):  Yes  Comments: Pt had reviewed Humira injection educational material prior to visit.    Patient demonstrates understanding of the following:  Reason for the appointment, diagnosis and treatment plan:  Yes  Knowledge of proper use of medications and conditions for which they are ordered (with special attention to potential side effects or drug interactions):  Yes  Which situations necessitate calling provider and whom to contact:  Yes (signs of infection, temp > 101, recurrent bouts of illness or infection or if patient has been put on an antibiotic)    Teaching Concerns:  No.  Demonstration was given by nurse with patient doing return demonstrations using practice supplies until he is comfortable  and Discharge to home.    Proper use and care of Pens:  Yes  Nutritional needs and diet plan:  N/A  Pain management techniques:  Yes  Patient instructed on hand hygiene:  Yes  How and/when to access community resources:  Yes      Infection Prevention:  Patient demonstrates understanding of the following:  Signs and symptoms of infection taught:  Yes      Instructional Materials Used/Given: Humira patient starter kit      Time spent teaching with patient:  Spent 20 minutes with patient teaching proper technique and observation of patient performing self injection. Answered all of the patient s questions to his satisfaction.

## 2023-05-30 ENCOUNTER — PATIENT OUTREACH (OUTPATIENT)
Dept: CARE COORDINATION | Facility: CLINIC | Age: 38
End: 2023-05-30
Payer: MEDICARE

## 2023-05-30 NOTE — PROGRESS NOTES
Clinic Care Coordination Contact  [facility]/Voicemail       Clinical Data: CHW Outreach    Outreach attempted x 1.  Left message on patient's voicemail with call back information and requested return call.    Plan: CHW will make another attempt to reach the patient via phone or MyChart.    CHW outreach in the next 2 weeks.      MARVIN Clemons  Clinic Care Coordination   [facility]   Phone: [phone]  [email or web address]

## 2023-06-13 ENCOUNTER — PATIENT OUTREACH (OUTPATIENT)
Dept: CARE COORDINATION | Facility: CLINIC | Age: 38
End: 2023-06-13
Payer: MEDICARE

## 2023-06-13 NOTE — PROGRESS NOTES
6/13/2023  Clinic Care Coordination Contact    Community Health Worker Follow Up    Care Gaps:     Health Maintenance Due   Topic Date Due     ASTHMA ACTION PLAN  Never done     HEPATITIS B IMMUNIZATION (1 of 3 - 3-dose series) Never done     COVID-19 Vaccine (1) Never done     URINE DRUG SCREEN  12/10/2022       Patient accepted scheduling phone number for [phone]  to schedule independently     Care Plan:   Care Plan: General: Housing     Problem: HP GENERAL PROBLEM     Goal: General Goal - I want a list of resources for housing to find stable housing within 2-4 months     Start Date: 1/25/2023 Expected End Date: 7/28/2023    This Visit's Progress: 50% Recent Progress: 40%    Note:     Barriers: financial, access  Strengths: support from CCC team  Patient expressed understanding of goal: yes  Action steps to achieve this goal:  1. I will complete the housing application and submit application.  2. I will update RALPH BARON on 6-15-23 at 10am of housing situation and application  Updated 6-13-23 AL                    Clinic Care Coordination Contact  Community Health Worker Follow Up    Intervention and Education during outreach:   Called and spoke to patient and follow up on goal.  Patient reported:  -he found a place putting in an application but he needs to pay down the utility bills from the previous place.  He already apply for emergency assistance in June and he used to  Apply  for Energy assistance. EA application closed as of 5-31-23  Would like other options and resources to address the utility bills.    Scheduled with RALPH BARON 6-15-23 at 10am Braulio re assess goals and f there any other options support with utility bills.    RALPH BARON 6-15-23  CHW Follow up: Monthly  CHW Plan: Follow up on goal  CHW Next Follow Up: 7-18-23    Hudson Mccarthy  Community Health Worker  [facility] Care Coordination  [email or web address]   Office: [phone]  Fax: [phone]

## 2023-06-13 NOTE — PROGRESS NOTES
6/13/2023  Clinic Care Coordination Contact  [facility]/Voicemail    Clinical Data: Care Coordinator Outreach: Follow up on goal(s)  Outreach attempted x 1.  Phone went straight to . Left message on patient's voicemail with call back information and requested return call.    Plan: Care Coordinator will try to reach patient again in 10 business days.    CHW follow up: 2nd attempt 6-27-23    Hudson Mccarthy  Community Health Worker  [facility] Care Coordination  [email or web address]   Office: [phone]  Fax: [phone]

## 2023-06-15 ENCOUNTER — PATIENT OUTREACH (OUTPATIENT)
Dept: NURSING | Facility: CLINIC | Age: 38
End: 2023-06-15
Payer: MEDICARE

## 2023-06-15 NOTE — PROGRESS NOTES
Clinic Care Coordination Contact    Follow Up Progress Note      Assessment: CC LORAINE connected with pt regarding utility bill resources    Care Gaps:    Health Maintenance Due   Topic Date Due     ASTHMA ACTION PLAN  Never done     HEPATITIS B IMMUNIZATION (1 of 3 - 3-dose series) Never done     COVID-19 Vaccine (1) Never done     URINE DRUG SCREEN  12/10/2022       Postponed to a later date. Concerned with bills and payments     Care Plans  Care Plan: General: Housing     Problem: HP GENERAL PROBLEM     Goal: General Goal - I want a list of resources for housing to find stable housing within 2-4 months     Start Date: 1/25/2023 Expected End Date: 7/28/2023    This Visit's Progress: 50% Recent Progress: 40%    Note:     Barriers: financial, access  Strengths: support from CCC team  Patient expressed understanding of goal: yes  Action steps to achieve this goal:  1. I will complete the housing application and submit application.  2. I will update CC SW on 6-15-23 at 10am of housing situation and application  Updated 6-13-23 AL                      CC LORAINE called and connected with pt this morning. SW and pt discussed concerns regarding energy bills as pt had $2000 due to [employer]. Pt explained that he had already used [facility] emergency assistance and would have to wait 2 years before being able to use it again. SW offered to send pt utility assistance information via email for him to follow up on and see what he qualifies for.    Intervention/Education provided during outreach: Utility/energy bills     Outreach Frequency: monthly    The patient consented via Verbal consent to have contact information and resources sent via email in an unencrypted manner.    Plan: SW to send resources to pt via email for him to follow up with agencies to get energy assistance    Care Coordinator will follow up in 4 weeks to check on status of utility bills    JOHNNY Rodarte   Social Work Care Coordinator   [facility]     [phone]

## 2023-07-12 DIAGNOSIS — J45.20 MILD INTERMITTENT ASTHMA WITHOUT COMPLICATION: ICD-10-CM

## 2023-07-12 DIAGNOSIS — T78.40XD ALLERGY, SUBSEQUENT ENCOUNTER: ICD-10-CM

## 2023-07-12 DIAGNOSIS — L30.9 ECZEMA, UNSPECIFIED TYPE: ICD-10-CM

## 2023-07-12 DIAGNOSIS — J30.1 SEASONAL ALLERGIC RHINITIS DUE TO POLLEN: ICD-10-CM

## 2023-07-12 DIAGNOSIS — N52.01 ERECTILE DYSFUNCTION DUE TO ARTERIAL INSUFFICIENCY: ICD-10-CM

## 2023-07-12 RX ORDER — FLUTICASONE PROPIONATE 50 MCG
SPRAY, SUSPENSION (ML) NASAL
Qty: 48 G | Refills: 1 | Status: SHIPPED | OUTPATIENT
Start: 2023-07-12

## 2023-07-12 RX ORDER — LORATADINE 10 MG/1
TABLET ORAL
Qty: 30 TABLET | Refills: 0 | Status: SHIPPED | OUTPATIENT
Start: 2023-07-12 | End: 2023-09-05

## 2023-07-12 NOTE — TELEPHONE ENCOUNTER
"Last Written Prescription Date:  4/5/2022  Last Fill Quantity: 48g,  # refills: 2   Last office visit provider:  5/8/2023    Last Written Prescription Date:  4/5/2022  Last Fill Quantity: 30,  # refills: 3   Last office visit: 5/8/2023          Requested Prescriptions   Pending Prescriptions Disp Refills    fluticasone (FLONASE) 50 MCG/ACT nasal spray [Pharmacy Med Name: FLUTICASONE NASAL SPRAY]  1     Sig: INSTILL 1-2 SPRAYS INTO BOTH NOSTRILS ONCE DAILY       Nasal Allergy Protocol Passed - 7/12/2023  8:09 AM        Passed - Patient is age 12 or older        Passed - Recent (12 mo) or future (30 days) visit within the authorizing provider's specialty     Patient has had an office visit with the authorizing provider or a provider within the authorizing providers department within the previous 12 mos or has a future within next 30 days. See \"Patient Info\" tab in inbasket, or \"Choose Columns\" in Meds & Orders section of the refill encounter.              Passed - Medication is active on med list          loratadine (CLARITIN) 10 MG tablet [Pharmacy Med Name: LORATADINE 10MG TABS] 30 tablet 0     Sig: TAKE ONE TABLET BY MOUTH ONCE DAILY       Antihistamines Protocol Passed - 7/12/2023  8:09 AM        Passed - Patient is 3-64 years of age     Apply weight-based dosing for peds patients age 3 - 12 years of age.    Forward request to provider for patients under the age of 3 or over the age of 64.          Passed - Recent (12 mo) or future (30 days) visit within the authorizing provider's specialty     Patient has had an office visit with the authorizing provider or a provider within the authorizing providers department within the previous 12 mos or has a future within next 30 days. See \"Patient Info\" tab in inbasket, or \"Choose Columns\" in Meds & Orders section of the refill encounter.              Passed - Medication is active on med list             PAMELA ALMANZA RN 07/12/23 12:25 PM  "

## 2023-07-13 DIAGNOSIS — L73.2 HIDRADENITIS SUPPURATIVA: ICD-10-CM

## 2023-07-13 NOTE — TELEPHONE ENCOUNTER
[facility] Call Center    Phone Message    May a detailed message be left on voicemail: yes     Reason for Call: Medication Refill Request    Has the patient contacted the pharmacy for the refill? Yes   Name of medication being requested: adalimumab (HUMIRA *CF*)  Provider who prescribed the medication: Dr. Ruiz  Pharmacy: [facility] - [facility] [address]   Date medication is needed: ASAP   Thanks      Action Taken: Message routed to:  [facility] Derm    Travel Screening: Not Applicable

## 2023-07-13 NOTE — TELEPHONE ENCOUNTER
[facility] Call Center    Phone Message    May a detailed message be left on voicemail: yes     Reason for Call: Medication Refill Request    Has the patient contacted the pharmacy for the refill? Yes   Name of medication being requested: fluocinolone acetonide (DERMA-SMOOTHE/FS BODY) 0.01 % external oil  Provider who prescribed the medication: Dr. Ruiz  Pharmacy: [facility] Address: [address] [place] [address]  Date medication is needed: ASAP   Thanks       Action Taken: Message routed to:  [facility] Derm    Travel Screening: Not Applicable

## 2023-07-14 RX ORDER — FLUOCINOLONE ACETONIDE 0.11 MG/ML
OIL TOPICAL 2 TIMES DAILY
Qty: 118.28 ML | Refills: 11 | Status: SHIPPED | OUTPATIENT
Start: 2023-07-14 | End: 2023-12-14

## 2023-07-18 ENCOUNTER — PATIENT OUTREACH (OUTPATIENT)
Dept: CARE COORDINATION | Facility: CLINIC | Age: 38
End: 2023-07-18
Payer: MEDICARE

## 2023-07-18 NOTE — LETTER
[facility]  Patient Centered Plan of Care  About Me:        Patient Name:  Bernard Padilla    YOB: 1985  Age:         38 year old   Alpesh MRN:    [id number] Telephone Information:  Home Phone [phone]   Mobile [phone]       Address:  [address] [facility] [address] [place] [address] Email address:  [email or web address]      Emergency Contact(s)    Name Relationship Lgl Grd Work Phone Home Phone Mobile Phone   1. ANASTASIA URIASLE EVANGELISTA Sister   [phone]   2. DINO URIAS Other   [phone]            Primary language:  English     needed? No   [employer]:  [phone] op. 1  Other communication barriers:No data recorded  Preferred Method of Communication:     Current living arrangement: No data recorded  Mobility Status/ Medical Equipment: No data recorded      Health Maintenance  Health Maintenance Reviewed: Overdue          Never  Done ASTHMA ACTION PLAN (Yearly)     Never  Done HEPATITIS B IMMUNIZATION (1 of 3 - 3-dose series)     Never  Done COVID-19 Vaccine (1)     DEC 10  2022 URINE DRUG SCREEN (Yearly)  Last completed: Dec 10, 2021       My Access Plan  Medical Emergency 911   Primary Clinic Line [facility] - [phone]   24 Hour Appointment Line [phone] or  [phone]) (toll-free)   24 Hour Nurse Line [phone] (toll-free)   Preferred Urgent Care No data recorded   Preferred Hospital No data recorded   Preferred Pharmacy [facility] [place] [address] [facility]     Behavioral Health Crisis Line The National Suicide Prevention Lifeline at 1-699.702.9790 or Text/Call 798             My Care Team Members  Patient Care Team         Relationship Specialty Notifications Start End    Himanshu Villagran MD PCP - General Family Medicine Admissions 8/9/21     Phone: [phone] Fax: [phone]         [address] [place] [address]    Hudson Mccarthy [facility]  Worker Primary Care - CC Admissions 9/1/20     Phone: [phone] Fax: [phone] [address] [place] [address]    Lisbeth Bynum, RN Lead Care Coordinator Primary Care - CC Admissions 9/1/20     Apolinar Ruiz MD MD Dermatology  9/11/20     Phone: [phone] Fax: [phone] [address] [facility] [address]    Himanshu Villagran MD Assigned PCP   8/15/21     Phone: [phone] Fax: [phone]         [address] [place] [address]    Chai Foster MD Assigned Behavioral Health Provider   12/19/21     Phone: [phone] Fax: [phone]         [address] [facility] [address]    Rika Schrader, LSW Lead Care Coordinator  Admissions 12/16/22     CURT Leos MD Assigned Surgical Provider   5/6/23     Phone: [phone] Fax: [phone]         [address] [address]              My Care Plans  Self Management and Treatment Plan  Care Plan       Action Plans on File:                       Advance Care Plans/Directives Type:   No data recorded    My Medical and Care Information  Problem List   Patient Active Problem List   Diagnosis    Hidradenitis suppurativa    Tobacco use disorder    Patellofemoral arthritis of left knee    Eczema    Allergic rhinitis    Depression    Influenza    Knee pain, left    Other chronic pain    Scrotal abscess    Abscess of groin, left    Right rotator cuff tendinitis    Mild intermittent asthma, unspecified whether complicated      Current Medications and Allergies:  See printed Medication Report.    Care Coordination Start Date: 9/1/2020   Frequency of Care Coordination: monthly     Form Last Updated: 08/03/2023

## 2023-07-18 NOTE — PROGRESS NOTES
Clinic Care Coordination Contact    Situation: Patient chart reviewed by care coordinator.     Background: RALPH BARON to determine if maintenance is approved for pt after pt completed goals     Assessment: RALPH BARON reviewed chart and determined appropriate to move to maintenance as pt's goals are completed     Plan/Recommendations: Outreaches moved to every 2 month     JOHNNY Rodarte   Social Work Care Coordinator   [facility]    [phone]

## 2023-07-18 NOTE — PROGRESS NOTES
7/18/2023  Clinic Care Coordination Contact    Community Health Worker Follow Up    Care Gaps:     Health Maintenance Due   Topic Date Due     ASTHMA ACTION PLAN  Never done     HEPATITIS B IMMUNIZATION (1 of 3 - 3-dose series) Never done     COVID-19 Vaccine (1) Never done     URINE DRUG SCREEN  12/10/2022       Patient accepted scheduling phone number for [phone]  to schedule independently     Care Plan:     Clinic Care Coordination Contact  Community Health Worker Follow Up  Intervention and Education during outreach:     Called and spoke to patient and follow up on goal.  Patient reported:  -he moved into a new apartment   New address is  [address] [place] [address] [address], [place] [address]  -[employer] only pays $200 for the electric bill.  -his rent is $1188 so he will apply for Energy Assistance this fall   Reminded patient that EA will be open again October.  Patient will apply for EA on his own.    Discussed with patient if he has any other goals or needs from CCC Team before transitioning to Maintenance  Patient okay to follow up in 2 months.    Patient has completed all goals with Clinic Care Coordination.    Routed to CC SW to review the chart and confirm if maintenance is approved    CHW Follow up: 2 months  CHW Plan: Discuss graduating from CCC if no other goals or needs from CC Team  CHW Next Follow Up: 9-15-23    Hudson Mccarthy  Community Health Worker  [facility]  Clinic Care Coordination  [email or web address]   Office: [phone]  Fax: [phone]

## 2023-07-18 NOTE — PROGRESS NOTES
7/18/2023  Clinic Care Coordination Contact  Care Team Conversations    Received message from CC LORAINE today patient transition to maintenance as of 7-18-23    CHW to follow up in 2 months.    CHW Follow up: 2 months  CHW Plan: Discuss graduating from CCC if no other goals or needs from [facility] team  CHW Next Follow Up: 9-15-23    Hudson Mccarthy  Community Health Worker  [facility] Care Coordination  [email or web address]   Office: [phone]  Fax: [phone]

## 2023-07-19 NOTE — PROGRESS NOTES
[facility] Dermatology Note  Encounter Date: Aug 3, 2023  Telephone ([phone] ). Location of teledermatologist: [facility] [place].     Dermatology Problem List:  1. Hidradenitis suppuritiva, much improved on Humira               - Diagnosed in 2016 and did not respond to antibiotics.              - Seen by Dr. Bird and underwent 9 procedures and 1 procedure with Dr. Maldonado              - s/p  mg BID - started 9/19, now discontinued              - Dapsone 200 mg daily, increased from 100mg daily on 7/1/21 (started 11/21/19), restarted 3/10/22              - Humira started 5/4/2023 , seems to be working well, has not had any ER visits of other issues      - does however has residual chin lesions which occasionally drain, requesting ILK  which was perfomred as below 08/03/23      2. Acute onset hand foot psoriasiform dermatitis with joint pain, suspected reactive arthritis, now improving and almost resolved with minimal foot involvement , well controlled                - Previous treatment:  mg BID               - Current treatment Lidex ointment at night, DermaSmooth, Humira           3. Atopic dermatitis              - fluocinolone oil, Triamcinolone 0.1% cream BID, Betamethasone 0.05% ointment BID    4. Vitiligo    5. Acne vulgaris  - tretinoin 0.025% cream, BPO 5% wash    __________________________________     Assessment & Plan:  # Hidradenitis suppurativa.   Pt had surgical excision on May 17th, and started Humira injections following surgery. He reports a marked improvement in flare ups. He is happy with Humira and feels that is has been life changing, he does continue to note that it tends to make him sleepy.   - Continue Humira 40 mg injections weekly.  - Safety Labs ordered  : HIV, TB, Hep B, Hep C, CBC, CMP, quantifron wnl 5/4/23  - Recommended immunizations: shingles, flu, COVID   - Had PCV20, doesn't want shingrix at this time    #  Psoriasis/Atopic dermatitis  - palms and soles   - Pt reports some improvement with Humira, has been using his ointment as well.   - betamethasone 0.05% ointment BID PRN for flares.  - Humira as above.    #Acne vulgaris  - ddX; pseudofolliculitis barbae vs facial HS  - Pt with 4x inflammatory nodules on jawline  - Interestingly, it is the worst his face has been, while his HS is doing so well after starting humira  - Will start treatment for acne vulgaris with tretinoin, BPO and doxycyline 100mg BID  - COuld consider isotretinoin in future     Follow-up: 3 month(s) in-person, or earlier for new or changing lesions    Apolinar Ruiz MD, FAAD, FACP     Departments of Internal Medicine and Dermatology  [facility]  [phone]    __________________________________    CC: Derm Problem (Bernard is following up for HS.)    HPI:  Mr. Bernard Padilla is a(n) 38 year old male who presents today as a return patient for HS. Last seen May 4, 2023 by myself.     Today, he reports that he is very seldom getting flares of his HS. He started Humira about 2 months ago and is happy to continue, but does report that he feels more sleepy since starting the medication.    HS Nurse Assessment        8/25/2022     2:36 PM 5/4/2023     3:09 PM 8/3/2023     1:41 PM   Nurse Assessment Data   Over the past 30 days how many old lesions flared back up? 4 >5 0   Over the past 30 days how many new lesions did you get? 0 1 1   Over the past week, how many dressing changes do you do each day? 0 0 0   Over the past week, has your wound drainage been: Very Severe None None   Rate your HS overall from 0 - 10 (0 = no disease, 10 = worst) over the past week:  8 9-10 5-6   Rate your pain score from 0 - 10 (0 = no disease, 10 = worst) for the most painful/symptomatic lesion in the past week:  8 10 - Worst Pain 7   Over the past week, how much has HS influenced your quality of life? slightly extremely very much     Patient is  otherwise feeling well, without additional skin concerns.    Physical Exam:  Gen: NAD  - small palpable nodules under mandible , ~#4 ~1 CM PALPABLE NODULES under mandible     Medications:  Current Outpatient Medications   Medication    acetaminophen (TYLENOL) 325 MG tablet    adalimumab (HUMIRA *CF*) 40 MG/0.4ML pen kit    adalimumab (HUMIRA *CF*) 80 MG/0.8ML pen kit    albuterol (PROAIR HFA/PROVENTIL HFA/VENTOLIN HFA) 108 (90 Base) MCG/ACT inhaler    augmented betamethasone dipropionate (DIPROLENE-AF) 0.05 % external ointment    benzoyl peroxide 5 % external liquid    dapsone (ACZONE) 100 MG tablet    fluocinolone acetonide (DERMA SMOOTHE/FS BODY) 0.01 % external oil    Fluocinolone Acetonide Scalp (DERMA-SMOOTHE/FS SCALP) 0.01 % OIL oil    fluocinonide (LIDEX) 0.05 % external solution    fluticasone (FLONASE) 50 MCG/ACT nasal spray    IBUPROFEN PO    loratadine (CLARITIN) 10 MG tablet    ondansetron (ZOFRAN ODT) 4 MG ODT tab    senna-docusate (SENOKOT-S/PERICOLACE) 8.6-50 MG tablet    tretinoin (RETIN-A) 0.025 % external cream    vitamin D3 (CHOLECALCIFEROL) 1.25 MG (95923 UT) capsule    vitamin D3 (CHOLECALCIFEROL) 50 mcg (2000 units) tablet     Current Facility-Administered Medications   Medication    triamcinolone acetonide (KENALOG-10) injection 10 mg      Past Medical History:   Patient Active Problem List   Diagnosis    Hidradenitis suppurativa    Tobacco use disorder    Patellofemoral arthritis of left knee    Eczema    Allergic rhinitis    Depression    Influenza    Knee pain, left    Other chronic pain    Scrotal abscess    Abscess of groin, left    Right rotator cuff tendinitis    Mild intermittent asthma, unspecified whether complicated     Past Medical History:   Diagnosis Date    Anxiety     Arthritis     Arthritis of knee     Boil     Chronic pain     Eczema     Hidradenitis suppurativa     Hydradenitis     Mild intermittent asthma without complication

## 2023-08-03 ENCOUNTER — VIRTUAL VISIT (OUTPATIENT)
Dept: DERMATOLOGY | Facility: CLINIC | Age: 38
End: 2023-08-03
Payer: MEDICARE

## 2023-08-03 DIAGNOSIS — L40.9 PSORIASIS: ICD-10-CM

## 2023-08-03 DIAGNOSIS — L20.89 OTHER ATOPIC DERMATITIS: ICD-10-CM

## 2023-08-03 DIAGNOSIS — L70.0 ACNE VULGARIS: ICD-10-CM

## 2023-08-03 DIAGNOSIS — L73.2 HIDRADENITIS SUPPURATIVA: Primary | ICD-10-CM

## 2023-08-03 DIAGNOSIS — L30.9 DERMATITIS: ICD-10-CM

## 2023-08-03 PROCEDURE — 99214 OFFICE O/P EST MOD 30 MIN: CPT | Performed by: DERMATOLOGY

## 2023-08-03 RX ORDER — FLUOCINONIDE TOPICAL SOLUTION USP, 0.05% 0.5 MG/ML
SOLUTION TOPICAL 2 TIMES DAILY
Qty: 60 ML | Refills: 3 | Status: SHIPPED | OUTPATIENT
Start: 2023-08-03

## 2023-08-03 RX ORDER — TRETINOIN 0.25 MG/G
GEL TOPICAL AT BEDTIME
Qty: 45 G | Refills: 4 | Status: SHIPPED | OUTPATIENT
Start: 2023-08-03 | End: 2023-08-03

## 2023-08-03 RX ORDER — BETAMETHASONE DIPROPIONATE 0.5 MG/G
OINTMENT, AUGMENTED TOPICAL 2 TIMES DAILY
Qty: 50 G | Refills: 3 | Status: SHIPPED | OUTPATIENT
Start: 2023-08-03 | End: 2023-08-03

## 2023-08-03 RX ORDER — BETAMETHASONE DIPROPIONATE 0.5 MG/G
OINTMENT, AUGMENTED TOPICAL 2 TIMES DAILY
Qty: 50 G | Refills: 3 | Status: SHIPPED | OUTPATIENT
Start: 2023-08-03 | End: 2023-12-18

## 2023-08-03 RX ORDER — BETAMETHASONE DIPROPIONATE 0.5 MG/G
OINTMENT, AUGMENTED TOPICAL 2 TIMES DAILY
Qty: 50 G | Refills: 3 | Status: SHIPPED | OUTPATIENT
Start: 2023-08-03 | End: 2023-12-14

## 2023-08-03 RX ORDER — FLUOCINOLONE ACETONIDE 0.11 MG/ML
OIL TOPICAL
Qty: 118.28 ML | Refills: 5 | Status: SHIPPED | OUTPATIENT
Start: 2023-08-03 | End: 2023-08-03

## 2023-08-03 RX ORDER — FLUOCINONIDE TOPICAL SOLUTION USP, 0.05% 0.5 MG/ML
SOLUTION TOPICAL 2 TIMES DAILY
Qty: 60 ML | Refills: 3 | Status: SHIPPED | OUTPATIENT
Start: 2023-08-03 | End: 2023-08-03

## 2023-08-03 RX ORDER — DOXYCYCLINE 100 MG/1
100 CAPSULE ORAL 2 TIMES DAILY
Qty: 180 CAPSULE | Refills: 1 | Status: SHIPPED | OUTPATIENT
Start: 2023-08-03 | End: 2023-08-03

## 2023-08-03 RX ORDER — FLUOCINOLONE ACETONIDE 0.11 MG/ML
OIL TOPICAL
Qty: 118.28 ML | Refills: 5 | Status: SHIPPED | OUTPATIENT
Start: 2023-08-03 | End: 2023-12-14

## 2023-08-03 RX ORDER — TRETINOIN 0.25 MG/G
GEL TOPICAL AT BEDTIME
Qty: 45 G | Refills: 4 | Status: SHIPPED | OUTPATIENT
Start: 2023-08-03

## 2023-08-03 RX ORDER — DOXYCYCLINE 100 MG/1
100 CAPSULE ORAL 2 TIMES DAILY
Qty: 180 CAPSULE | Refills: 1 | Status: SHIPPED | OUTPATIENT
Start: 2023-08-03 | End: 2024-07-22

## 2023-08-03 NOTE — LETTER
8/3/2023       RE: Bernard Padilla  [address]  [place] [address]     Dear Colleague,    Thank you for referring your patient, Bernard Padilla, to the [facility] [place] at [facility]. Please see a copy of my visit note below.    [facility] Dermatology Note  Encounter Date: Aug 3, 2023  Telephone ([phone] ). Location of teledermatologist: [facility] [place].     Dermatology Problem List:  1. Hidradenitis suppuritiva, much improved on Humira               - Diagnosed in 2016 and did not respond to antibiotics.              - Seen by Dr. Bird and underwent 9 procedures and 1 procedure with Dr. Maldonado              - s/p  mg BID - started 9/19, now discontinued              - Dapsone 200 mg daily, increased from 100mg daily on 7/1/21 (started 11/21/19), restarted 3/10/22              - Humira started 5/4/2023 , seems to be working well, has not had any ER visits of other issues      - does however has residual chin lesions which occasionally drain, requesting ILK  which was perfomred as below 08/03/23      2. Acute onset hand foot psoriasiform dermatitis with joint pain, suspected reactive arthritis, now improving and almost resolved with minimal foot involvement , well controlled                - Previous treatment:  mg BID               - Current treatment Lidex ointment at night, DermaSmooth, Humira           3. Atopic dermatitis              - fluocinolone oil, Triamcinolone 0.1% cream BID, Betamethasone 0.05% ointment BID    4. Vitiligo    5. Acne vulgaris  - tretinoin 0.025% cream, BPO 5% wash    __________________________________     Assessment & Plan:  # Hidradenitis suppurativa.   Pt had surgical excision on May 17th, and started Humira injections following surgery. He reports a marked improvement in flare ups. He is happy with Humira and feels that is  has been life changing, he does continue to note that it tends to make him sleepy.   - Continue Humira 40 mg injections weekly.  - Safety Labs ordered  : HIV, TB, Hep B, Hep C, CBC, CMP, quantifron wnl 5/4/23  - Recommended immunizations: shingles, flu, COVID   - Had PCV20, doesn't want shingrix at this time    # Psoriasis/Atopic dermatitis  - palms and soles   - Pt reports some improvement with Humira, has been using his ointment as well.   - betamethasone 0.05% ointment BID PRN for flares.  - Humira as above.    #Acne vulgaris  - ddX; pseudofolliculitis barbae vs facial HS  - Pt with 4x inflammatory nodules on jawline  - Interestingly, it is the worst his face has been, while his HS is doing so well after starting humira  - Will start treatment for acne vulgaris with tretinoin, BPO and doxycyline 100mg BID  - COuld consider isotretinoin in future         Follow-up: 3 month(s) in-person, or earlier for new or changing lesions    __________________________________    CC: Derm Problem (Bernard is following up for HS.)    HPI:  Mr. Bernard Padilla is a(n) 38 year old male who presents today as a return patient for HS. Last seen May 4, 2023 by myself.     Today, he reports that he is very seldom getting flares of his HS. He started Humira about 2 months ago and is happy to continue, but does report that he feels more sleepy since starting the medication.    HS Nurse Assessment        8/25/2022     2:36 PM 5/4/2023     3:09 PM 8/3/2023     1:41 PM   Nurse Assessment Data   Over the past 30 days how many old lesions flared back up? 4 >5 0   Over the past 30 days how many new lesions did you get? 0 1 1   Over the past week, how many dressing changes do you do each day? 0 0 0   Over the past week, has your wound drainage been: Very Severe None None   Rate your HS overall from 0 - 10 (0 = no disease, 10 = worst) over the past week:  8 9-10 5-6   Rate your pain score from 0 - 10 (0 = no disease, 10 = worst) for the most  painful/symptomatic lesion in the past week:  8 10 - Worst Pain 7   Over the past week, how much has HS influenced your quality of life? slightly extremely very much     Patient is otherwise feeling well, without additional skin concerns.    Physical Exam:  Gen: NAD  - small palpable nodules under mandible , ~#4 ~1 CM PALPABLE NODULES under mandible     Medications:  Current Outpatient Medications   Medication    acetaminophen (TYLENOL) 325 MG tablet    adalimumab (HUMIRA *CF*) 40 MG/0.4ML pen kit    adalimumab (HUMIRA *CF*) 80 MG/0.8ML pen kit    albuterol (PROAIR HFA/PROVENTIL HFA/VENTOLIN HFA) 108 (90 Base) MCG/ACT inhaler    augmented betamethasone dipropionate (DIPROLENE-AF) 0.05 % external ointment    benzoyl peroxide 5 % external liquid    dapsone (ACZONE) 100 MG tablet    fluocinolone acetonide (DERMA SMOOTHE/FS BODY) 0.01 % external oil    Fluocinolone Acetonide Scalp (DERMA-SMOOTHE/FS SCALP) 0.01 % OIL oil    fluocinonide (LIDEX) 0.05 % external solution    fluticasone (FLONASE) 50 MCG/ACT nasal spray    IBUPROFEN PO    loratadine (CLARITIN) 10 MG tablet    ondansetron (ZOFRAN ODT) 4 MG ODT tab    senna-docusate (SENOKOT-S/PERICOLACE) 8.6-50 MG tablet    tretinoin (RETIN-A) 0.025 % external cream    vitamin D3 (CHOLECALCIFEROL) 1.25 MG (35711 UT) capsule    vitamin D3 (CHOLECALCIFEROL) 50 mcg (2000 units) tablet     Current Facility-Administered Medications   Medication    triamcinolone acetonide (KENALOG-10) injection 10 mg      Past Medical History:   Patient Active Problem List   Diagnosis    Hidradenitis suppurativa    Tobacco use disorder    Patellofemoral arthritis of left knee    Eczema    Allergic rhinitis    Depression    Influenza    Knee pain, left    Other chronic pain    Scrotal abscess    Abscess of groin, left    Right rotator cuff tendinitis    Mild intermittent asthma, unspecified whether complicated     Past Medical History:   Diagnosis Date    Anxiety     Arthritis     Arthritis of knee      Boil     Chronic pain     Eczema     Hidradenitis suppurativa     Hydradenitis     Mild intermittent asthma without complication

## 2023-08-03 NOTE — NURSING NOTE
Drug Administration Record    Prior to injection, verified patient identity using patient's name and date of birth.  Due to injection administration, patient instructed to remain in clinic for 15 minutes  afterwards, and to report any adverse reaction to me immediately.    Drug Name: triamcinolone acetonide(kenalog)  Dose: 1mL of triamcinolone 10mg/mL, 10mg dose  Route administered: ID  NDC #: Kenalog-10 (4206-1296-88)  Amount of waste(mL):4  Reason for waste: Single use vial    LOT #: [id number]  : [employer]  EXPIRATION DATE: 09/2025

## 2023-08-03 NOTE — PATIENT INSTRUCTIONS
1. Hidradenitis suppuritiva  - Continue weekly humira injection     2. Psoriasis/Atopic dermatitis  - Continue topicals as needd (refilled)   - Continue weekly humira           5. Acne vulgaris  - BPO 5% wash in morning  - tretinoin 0.025% gel at night   - Doxycyline 100mg twice a day

## 2023-08-07 ENCOUNTER — OFFICE VISIT (OUTPATIENT)
Dept: DERMATOLOGY | Facility: CLINIC | Age: 38
End: 2023-08-07
Payer: MEDICARE

## 2023-08-07 DIAGNOSIS — L73.2 HIDRADENITIS SUPPURATIVA: Primary | ICD-10-CM

## 2023-08-07 DIAGNOSIS — L02.01: ICD-10-CM

## 2023-08-07 PROCEDURE — 99213 OFFICE O/P EST LOW 20 MIN: CPT | Mod: 25 | Performed by: DERMATOLOGY

## 2023-08-07 PROCEDURE — 87070 CULTURE OTHR SPECIMN AEROBIC: CPT | Performed by: DERMATOLOGY

## 2023-08-07 PROCEDURE — 99000 SPECIMEN HANDLING OFFICE-LAB: CPT | Performed by: PATHOLOGY

## 2023-08-07 PROCEDURE — 10060 I&D ABSCESS SIMPLE/SINGLE: CPT | Mod: GC | Performed by: DERMATOLOGY

## 2023-08-07 ASSESSMENT — PAIN SCALES - GENERAL: PAINLEVEL: MODERATE PAIN (5)

## 2023-08-07 NOTE — LETTER
8/7/2023       RE: Bernard Padilla  [address]  [place] [address]     Dear Colleague,    Thank you for referring your patient, Bernard Padilla, to the [facility] DERMATOLOGY CLINIC [place] at [facility]. Please see a copy of my visit note below.    [facility] Dermatology Note  Encounter Date: Aug 7, 2023  Office Visit     Dermatology Problem List:  1. Hidradenitis suppuritiva, much improved on Humira               - Diagnosed in 2016 and did not respond to antibiotics.              - Seen by Dr. Bird and underwent 9 procedures and 1 procedure with Dr. Maldonado              - s/p  mg BID - started 9/19, now discontinued              - s/p dapsone 200 mg daily, increased from 100mg daily on 7/1/21 (started 11/21/19), restarted 3/10/22              - Humira started 5/4/2023 , seems to be working well, has not had any ER visits or other issues      - does however has residual chin lesions which occasionally drain, requesting ILK which was performed as below 08/03/23    - start doxycycline 100 mg BID 8/3/23  - topicals: Dermasmoothe oil, Lidex solution, augmented betamethasone, BPO wash  2. Acute onset hand foot psoriasiform dermatitis with joint pain, suspected reactive arthritis, now improving and almost resolved with minimal foot involvement , well controlled                - Previous treatment:  mg BID               - Current treatment Lidex ointment at night, DermaSmooth, Humira   3. Atopic dermatitis              - fluocinolone oil, Triamcinolone 0.1% cream BID, Betamethasone 0.05% ointment BID  4. Vitiligo  5. Acne vulgaris  - tretinoin 0.025% cream, BPO 5% wash  6. Abscess, L jawline - developed following ILK injections   - s/p incision and drainage 8/7/23   - bacterial culture collected 8/7/23, results pending   - on doxycycline 100 mg BID for HS, will treat with this while awaiting culture  results  ____________________________________________    Assessment & Plan:     1. Abscess, L jawline - developed following IL triamcinolone injections  While there is a possibility that this could be a significant HS flare, the new onset warmth, tenderness, swelling, and drainage is concerning for abscess formation. Options for treatment, including oral antibiotics with or without incision and drainage versus IL triamcinolone injection were discussed. Risks, including scarring, discussed with patient in detail. Patient elects to pursue antibiotics with incision and drainage of affected area in clinic today.   - Incision and drainage performed today (see procedure note(s) below).  - Bacterial culture collected of drainage, results pending  - Will start doxycycline 100 mg BID (prescribed by Dr. Ruiz last week, has not yet started) while awaiting culture results    Procedures Performed:   - Incision and Drainage (I&D). After discussion of the risks including bleeding, infection, scar, non diagnosis and skin discoloration, verbal and/or written consent was obtained. The area was prepped with alcohol and 0.5 mL of lidocaine with epi (1:100,000) was injected into the lesion on the L jawline.  An 11 blade was used to incise the lesion and the lesion was drained. A culture was performed. A dressing was placed.  The patient tolerated the procedure well and left the dermatology clinic in good condition.     Follow-up: pending culture results    Staff and Resident:     Tanja Grayson MD  PGY3 Dermatology Resident    Staff Physician Comments:   I saw and evaluated the patient with the resident and I edited the assessment and plan as documented in the note. I was present for the entire minor procedure and examination.    Nicholas Ybarra MD   of Dermatology  Department of Dermatology  [facility]      ____________________________________________    CC: Derm Problem (Bernard  is here for f/up from ILK injection. Bernard reports his L jaw is swollen and painful. )    HPI:  Mr. Bernard Padilla is a(n) 38 year old male who presents today as a return patient for HS, following ILK injections on 8/3/23 (4 days prior).    - Since Friday (3 days ago, 1 day after undergoing ILK injection in this area), Bernard states that the area along his L jawline has been particularly swollen and tender, with symptoms gradually worsening over time. Last night, he states that the area was warm to the touch. Has noticed minor amounts of bloody drainage. Otherwise feeling well, no fevers, chills, night sweats.   - He reports using Derma Smoothe oil and Lidex solution to the affected area for HS/seborrheic dermatitis, prescribed by Dr. Ruiz.   - Patient states that he has been following with a dentist routinely, denies having any recent tooth/gum pain. He denies having any issues eating, drinking, or swallowing.  - Previously, he states that he has tolerated ILK injections with Dr. Ruiz without concerns.    Patient is otherwise feeling well, without additional skin concerns.    Labs Reviewed:  N/A    Physical Exam:  Vitals: There were no vitals taken for this visit.  SKIN: Focused examination of beard area, oral mucosa, and neck was performed.  - Along the L jawline, there is an exquisitely tender and edematous solitary inflammatory nodule, with scant amount of serosanguinous crust surrounding it.  - Oral mucosa healthy, normal dentition.  - There is no lymphadenopathy on palpation of cervical, post auricular, and occipital nodes.   - No other lesions of concern on areas examined.     Medications:  Current Outpatient Medications   Medication    acetaminophen (TYLENOL) 325 MG tablet    adalimumab (HUMIRA *CF*) 40 MG/0.4ML pen kit    adalimumab (HUMIRA *CF*) 80 MG/0.8ML pen kit    albuterol (PROAIR HFA/PROVENTIL HFA/VENTOLIN HFA) 108 (90 Base) MCG/ACT inhaler    augmented betamethasone dipropionate  (DIPROLENE-AF) 0.05 % external ointment    augmented betamethasone dipropionate (DIPROLENE-AF) 0.05 % external ointment    benzoyl peroxide 5 % external liquid    benzoyl peroxide 5 % external liquid    benzoyl peroxide 5 % external liquid    doxycycline monohydrate (MONODOX) 100 MG capsule    fluocinolone acetonide (DERMA SMOOTHE/FS BODY) 0.01 % external oil    Fluocinolone Acetonide Scalp (DERMA-SMOOTHE/FS SCALP) 0.01 % OIL oil    fluocinonide (LIDEX) 0.05 % external solution    fluticasone (FLONASE) 50 MCG/ACT nasal spray    IBUPROFEN PO    loratadine (CLARITIN) 10 MG tablet    ondansetron (ZOFRAN ODT) 4 MG ODT tab    senna-docusate (SENOKOT-S/PERICOLACE) 8.6-50 MG tablet    tretinoin (RETIN-A) 0.025 % external cream    tretinoin (RETIN-A) 0.025 % external gel    vitamin D3 (CHOLECALCIFEROL) 1.25 MG (01075 UT) capsule    vitamin D3 (CHOLECALCIFEROL) 50 mcg (2000 units) tablet     Current Facility-Administered Medications   Medication    triamcinolone acetonide (KENALOG-10) injection 10 mg      Past Medical History:   Patient Active Problem List   Diagnosis    Hidradenitis suppurativa    Tobacco use disorder    Patellofemoral arthritis of left knee    Eczema    Allergic rhinitis    Depression    Influenza    Knee pain, left    Other chronic pain    Scrotal abscess    Abscess of groin, left    Right rotator cuff tendinitis    Mild intermittent asthma, unspecified whether complicated     Past Medical History:   Diagnosis Date    Anxiety     Arthritis     Arthritis of knee     Boil     Chronic pain     Eczema     Hidradenitis suppurativa     Hydradenitis     Mild intermittent asthma without complication      CC No referring provider defined for this encounter. on close of this encounter.

## 2023-08-07 NOTE — PROGRESS NOTES
[facility] Dermatology Note  Encounter Date: Aug 7, 2023  Office Visit     Dermatology Problem List:  1. Hidradenitis suppuritiva, much improved on Humira               - Diagnosed in 2016 and did not respond to antibiotics.              - Seen by Dr. Bird and underwent 9 procedures and 1 procedure with Dr. Maldonado              - s/p  mg BID - started 9/19, now discontinued              - s/p dapsone 200 mg daily, increased from 100mg daily on 7/1/21 (started 11/21/19), restarted 3/10/22              - Humira started 5/4/2023 , seems to be working well, has not had any ER visits or other issues      - does however has residual chin lesions which occasionally drain, requesting ILK which was performed as below 08/03/23    - start doxycycline 100 mg BID 8/3/23  - topicals: Dermasmoothe oil, Lidex solution, augmented betamethasone, BPO wash  2. Acute onset hand foot psoriasiform dermatitis with joint pain, suspected reactive arthritis, now improving and almost resolved with minimal foot involvement , well controlled                - Previous treatment:  mg BID               - Current treatment Lidex ointment at night, DermaSmooth, Humira   3. Atopic dermatitis              - fluocinolone oil, Triamcinolone 0.1% cream BID, Betamethasone 0.05% ointment BID  4. Vitiligo  5. Acne vulgaris  - tretinoin 0.025% cream, BPO 5% wash  6. Abscess, L jawline - developed following ILK injections   - s/p incision and drainage 8/7/23   - bacterial culture collected 8/7/23, results pending   - on doxycycline 100 mg BID for HS, will treat with this while awaiting culture results  ____________________________________________    Assessment & Plan:     1. Abscess, L jawline - developed following IL triamcinolone injections  While there is a possibility that this could be a significant HS flare, the new onset warmth, tenderness, swelling, and drainage is concerning for abscess formation. Options for  treatment, including oral antibiotics with or without incision and drainage versus IL triamcinolone injection were discussed. Risks, including scarring, discussed with patient in detail. Patient elects to pursue antibiotics with incision and drainage of affected area in clinic today.   - Incision and drainage performed today (see procedure note(s) below).  - Bacterial culture collected of drainage, results pending  - Will start doxycycline 100 mg BID (prescribed by Dr. Ruiz last week, has not yet started) while awaiting culture results    Procedures Performed:   - Incision and Drainage (I&D). After discussion of the risks including bleeding, infection, scar, non diagnosis and skin discoloration, verbal and/or written consent was obtained. The area was prepped with alcohol and 0.5 mL of lidocaine with epi (1:100,000) was injected into the lesion on the L jawline.  An 11 blade was used to incise the lesion and the lesion was drained. A culture was performed. A dressing was placed.  The patient tolerated the procedure well and left the dermatology clinic in good condition.     Follow-up: pending culture results    Staff and Resident:     Tanja Grayson MD  PGY3 Dermatology Resident    Staff Physician Comments:   I saw and evaluated the patient with the resident and I edited the assessment and plan as documented in the note. I was present for the entire minor procedure and examination.    Nicholas Ybarra MD   of Dermatology  Department of Dermatology  [facility]      ____________________________________________    CC: Derm Problem (Bernard is here for f/up from ILK injection. Bernard reports his L jaw is swollen and painful. )    HPI:  Mr. Bernard Padilla is a(n) 38 year old male who presents today as a return patient for HS, following ILK injections on 8/3/23 (4 days prior).    - Since Friday (3 days ago, 1 day after undergoing ILK injection in this area), Bernard  states that the area along his L jawline has been particularly swollen and tender, with symptoms gradually worsening over time. Last night, he states that the area was warm to the touch. Has noticed minor amounts of bloody drainage. Otherwise feeling well, no fevers, chills, night sweats.   - He reports using Derma Smoothe oil and Lidex solution to the affected area for HS/seborrheic dermatitis, prescribed by Dr. Ruiz.   - Patient states that he has been following with a dentist routinely, denies having any recent tooth/gum pain. He denies having any issues eating, drinking, or swallowing.  - Previously, he states that he has tolerated ILK injections with Dr. Ruiz without concerns.    Patient is otherwise feeling well, without additional skin concerns.    Labs Reviewed:  N/A    Physical Exam:  Vitals: There were no vitals taken for this visit.  SKIN: Focused examination of beard area, oral mucosa, and neck was performed.  - Along the L jawline, there is an exquisitely tender and edematous solitary inflammatory nodule, with scant amount of serosanguinous crust surrounding it.  - Oral mucosa healthy, normal dentition.  - There is no lymphadenopathy on palpation of cervical, post auricular, and occipital nodes.   - No other lesions of concern on areas examined.     Medications:  Current Outpatient Medications   Medication    acetaminophen (TYLENOL) 325 MG tablet    adalimumab (HUMIRA *CF*) 40 MG/0.4ML pen kit    adalimumab (HUMIRA *CF*) 80 MG/0.8ML pen kit    albuterol (PROAIR HFA/PROVENTIL HFA/VENTOLIN HFA) 108 (90 Base) MCG/ACT inhaler    augmented betamethasone dipropionate (DIPROLENE-AF) 0.05 % external ointment    augmented betamethasone dipropionate (DIPROLENE-AF) 0.05 % external ointment    benzoyl peroxide 5 % external liquid    benzoyl peroxide 5 % external liquid    benzoyl peroxide 5 % external liquid    doxycycline monohydrate (MONODOX) 100 MG capsule    fluocinolone acetonide (DERMA SMOOTHE/FS  BODY) 0.01 % external oil    Fluocinolone Acetonide Scalp (DERMA-SMOOTHE/FS SCALP) 0.01 % OIL oil    fluocinonide (LIDEX) 0.05 % external solution    fluticasone (FLONASE) 50 MCG/ACT nasal spray    IBUPROFEN PO    loratadine (CLARITIN) 10 MG tablet    ondansetron (ZOFRAN ODT) 4 MG ODT tab    senna-docusate (SENOKOT-S/PERICOLACE) 8.6-50 MG tablet    tretinoin (RETIN-A) 0.025 % external cream    tretinoin (RETIN-A) 0.025 % external gel    vitamin D3 (CHOLECALCIFEROL) 1.25 MG (11298 UT) capsule    vitamin D3 (CHOLECALCIFEROL) 50 mcg (2000 units) tablet     Current Facility-Administered Medications   Medication    triamcinolone acetonide (KENALOG-10) injection 10 mg      Past Medical History:   Patient Active Problem List   Diagnosis    Hidradenitis suppurativa    Tobacco use disorder    Patellofemoral arthritis of left knee    Eczema    Allergic rhinitis    Depression    Influenza    Knee pain, left    Other chronic pain    Scrotal abscess    Abscess of groin, left    Right rotator cuff tendinitis    Mild intermittent asthma, unspecified whether complicated     Past Medical History:   Diagnosis Date    Anxiety     Arthritis     Arthritis of knee     Boil     Chronic pain     Eczema     Hidradenitis suppurativa     Hydradenitis     Mild intermittent asthma without complication      CC No referring provider defined for this encounter. on close of this encounter.

## 2023-08-07 NOTE — NURSING NOTE
Dermatology Rooming Note    Bernard Padilla's goals for this visit include:   Chief Complaint   Patient presents with    Derm Problem     Bernard is here for f/up from ILK injection. Bernard reports his L jaw is swollen and painful.      Stephanie Douglas, EMT

## 2023-08-07 NOTE — PATIENT INSTRUCTIONS
We will reach out to you with wound culture results when they are made available.    Please start on the doxycycline 100 mg twice daily that Dr. Ruiz prescribed last week while awaiting culture results.

## 2023-08-10 LAB
BACTERIA ABSC ANAEROBE+AEROBE CULT: ABNORMAL

## 2023-09-05 DIAGNOSIS — N52.01 ERECTILE DYSFUNCTION DUE TO ARTERIAL INSUFFICIENCY: ICD-10-CM

## 2023-09-05 DIAGNOSIS — J45.20 MILD INTERMITTENT ASTHMA WITHOUT COMPLICATION: ICD-10-CM

## 2023-09-05 DIAGNOSIS — T78.40XD ALLERGY, SUBSEQUENT ENCOUNTER: ICD-10-CM

## 2023-09-05 RX ORDER — LORATADINE 10 MG/1
TABLET ORAL
Qty: 90 TABLET | Refills: 2 | Status: SHIPPED | OUTPATIENT
Start: 2023-09-05 | End: 2024-05-06

## 2023-09-15 ENCOUNTER — PATIENT OUTREACH (OUTPATIENT)
Dept: CARE COORDINATION | Facility: CLINIC | Age: 38
End: 2023-09-15
Payer: MEDICARE

## 2023-09-15 NOTE — PROGRESS NOTES
9/15/2023  Clinic Care Coordination Contact  Community Health Worker Follow Up    Care Gaps:     Health Maintenance Due   Topic Date Due    ASTHMA ACTION PLAN  Never done    HEPATITIS B IMMUNIZATION (1 of 3 - 3-dose series) Never done    COVID-19 Vaccine (1) Never done    URINE DRUG SCREEN  12/10/2022    INFLUENZA VACCINE (1) 09/01/2023       Patient accepted scheduling phone number for [phone]  to schedule independently     Care Plan:   Care Plan: RENEW MA       Problem: HP GENERAL PROBLEM       Goal: I want support to renew health insurance MA within 1-2 months.       Start Date: 9/15/2023 Expected End Date: 11/30/2023    This Visit's Progress: 80%    Note:     Barriers: access info  Strengths: support from CCC team  Patient expressed understanding of goal: yes  Action steps to achieve this goal:  1. I will wait to connect with FRW for support to renew health insurance.                              Clinic Care Coordination Contact  Community Health Worker Follow Up    Intervention and Education during outreach:   Maintenance 2 months follow up call.  Called and spoke to patient briefly stated he is not able to talk at the hospital  Stated to call back later in the afternoon    Discussed with patient if he has any new goals or other needs from CC team before graduating from [facility].  Patient stated he needs help to renew his insurance MA  He received a notice to renew his insurance.  Patient requested help from [facility] team    CHW sent referral to FRW for support renew for MA    Routed to CC SW to review note    CHW Follow up: Monthly  CHW Plan: Follow up on goal  CHW Next Follow Up: 10-24-23    Hudson Mccarthy  Community Health Worker  [facility] Care Coordination  [email or web address]   Office: [phone]  Fax: [phone]

## 2023-09-15 NOTE — PROGRESS NOTES
9/15/2023  Clinic Care Coordination Contact  Financial Resource Worker Screening  [facility] Benefits  Is patient requesting help applying for [facility] benefits?: Yes (Received notice to renew MA insurance)  Have you recently applied for any [facility] benefits?: No  How many people in your household?: 3 (2 daughters)  Do you buy/eat food together?: Yes    [place]:  [place]  Yes  Is this an insurance renewal?: Yes  Is this a new insurance application request?: No    Any other information for the FRW?: Received notice to renew [place] insurance. call anytime ASAP    Care Coordination team will tell patient:   Thank you for answering all the questions, based on screening questions, our Financial Resource Worker will reach out to you with additional questions and next steps.      [place] Department of [place]: [place] Health [place] Programs Eligibility Response (045)  print Print Page      SUBSCRIBER INFORMATION  Date of Service Subscriber ID Subscriber Name Birthdate Age Gender  09/15/2023 [id number] ZAHIRA MATUTE 1985 38 MALE     Address  [address] , [place]  [address]     PROVIDER INFORMATION  Provider ID Submitter Transaction ID Provider Name Taxonomy Code Qualifier Taxonomy Code  [id number] [facility]  This subscriber has eligibility for [place]: Medical Assistance.  Elig Type DX: Disabled  Eligibility Begin Date: 03/01/2022  Eligibility End Date: --/--/----  This subscriber is eligible for the following service types: Medical Care ,  Chiropractic ,  Dental Care ,  Hospital ,  Hospital - Inpatient ,  Hospital - Outpatient ,  Emergency Services ,  Pharmacy ,  Professional (Physician) Visit - Office ,  Vision (Optometry) ,  Mental Health ,  Urgent Care  This subscriber has eligibility for QM: Qualified Medicare Beneficiary - Medical Assistance Covers Medicare Coinsurance and Deductibles Only.  Elig Type DQ: Disabled/QMB  only  Eligibility Begin Date: 03/01/2022  Eligibility End Date: --/--/----  This subscriber is eligible for the following service types: Medical Care ,  Chiropractic ,  Hospital ,  Hospital - Inpatient ,  Hospital - Outpatient ,  Emergency Services ,  Professional (Physician) Visit - Office ,  Mental Health ,  Urgent Care  Prepaid Health Plan  This subscriber receives [place]37 - Special Needs BasicCare (SNBC) delivered through [facility]. The phone numbers are: [phone] (metro) or [phone] (toll free).  SNBC does not cover personal care assistant (PCA) services, private duty nursing (PDN), or home and community based waiver services. SNBC recipients can obtain these services through fee-for-service [facility].  If the subscriber has Medicare, Medicare services must be submitted to original Medicare FFS or the selected Medicare Part D plan prior to submitting to the health plan.  The health plan is responsible for paying for Nursing Facility Services.  Other Eligibility Information  No Special Transportation.  No Hospice.  Refer to Health Care Programs and Services Overview of the [facility] Provider Manual for a list of covered services.  Waivers  This subscriber is eligible for the CADI Waiver.  Subscriber Responsibility Information  An office visit copay of 3 dollars may exist for this subscriber.  A copay of 3.50 dollars for Non-Emergency ER visits may exist for this subscriber.  Restricted Recipient Program  None     Medicare  Medicare ID: [id number]  Part A Effective Date:  05/01/2020  Part B Effective Date: 05/01/2020

## 2023-09-18 ENCOUNTER — PATIENT OUTREACH (OUTPATIENT)
Dept: CARE COORDINATION | Facility: CLINIC | Age: 38
End: 2023-09-18
Payer: MEDICARE

## 2023-09-18 NOTE — PROGRESS NOTES
Clinic Care Coordination Contact  Program: Health insurance  County:  [facility] Case #:  County Worker: [place] [phone]  Mnsure #:   Subscriber #:   Renewal:  Date Applied:     FRW Outreach:   9/18/23 - FRW spoke with patient. Patient was informed he was due for renewal via text message from his . Patient did not receive a paper application. FRW and patient called [facility]. Per [facility]S renewal was for Luda, patient's daughter who no longer lives in [place]. FRW and patient left message for  to call patient back. FRW will follow up with patient within 30 days.  Stephanie Manrique  Financial Resource Worker  [facility]  Clinic Care Coordination  [phone]    Health Insurance:  This subscriber has eligibility for [place]: Medical Assistance.  Elig Type DX: Disabled  Eligibility Begin Date: 03/01/2022  Eligibility End Date: --/--/----  This subscriber is eligible for the following service types: Medical Care ,  Chiropractic ,  Dental Care ,  Hospital ,  Hospital - Inpatient ,  Hospital - Outpatient ,  Emergency Services ,  Pharmacy ,  Professional (Physician) Visit - Office ,  Vision (Optometry) ,  Mental Health ,  Urgent Care  This subscriber has eligibility for QM: Qualified Medicare Beneficiary - Medical Assistance Covers Medicare Coinsurance and Deductibles Only.  Elig Type DQ: Disabled/QMB only  Eligibility Begin Date: 03/01/2022  Eligibility End Date: --/--/----  This subscriber is eligible for the following service types: Medical Care ,  Chiropractic ,  Hospital ,  Hospital - Inpatient ,  Hospital - Outpatient ,  Emergency Services ,  Professional (Physician) Visit - Office ,  Mental Health ,  Urgent Care    Referral/Screening:  W Screening    Row Name 09/15/23 1321       [facility] Benefits   Is patient requesting help applying for [facility] benefits? Yes  Received notice to renew [place] insurance       Have you recently applied for any county benefits? No       How many people in your  household? 3  2 daughters       Do you buy/eat food together? Yes       Insurance:   [place] No       Is this an insurance renewal? Yes       Is this a new insurance application request? No       OTHER   Is this a linda care application? No       Any other information for the FRW? Received notice to renew [place] insurance. call anytime ASAP

## 2023-09-22 ENCOUNTER — PATIENT OUTREACH (OUTPATIENT)
Dept: CARE COORDINATION | Facility: CLINIC | Age: 38
End: 2023-09-22
Payer: MEDICARE

## 2023-09-22 NOTE — PROGRESS NOTES
Clinic Care Coordination Contact  Program: Health insurance  County:  [facility] Case #:  County Worker: Sal[place] [phone]  Mnsure #:   Subscriber #:   Renewal:  Date Applied:     FRW Outreach:   9/22/23 - FRW spoke with patient. Patient states renewal was not for him and for his daughter who no longer lives with him.   Plan: FRW closed the FRW program and remove patient from panel. Patient can be referred back to FRW if needed.     Stephanie Burton  Financial Resource Worker  [facility]  Clinic Care Coordination  [phone]    9/18/23 - FRW spoke with patient. Patient was informed he was due for renewal via text message from his . Patient did not receive a paper application. FRW and patient called [facility]. Per METS renewal was for Luda, patient's daughter who no longer lives in [place]. FRW and patient left message for  to call patient back. FRW will follow up with patient within 30 days.  Stephanie Vue  Financial Resource Worker  [facility]  Clinic Care Coordination  [phone]    Health Insurance:  This subscriber has eligibility for [place]: Medical Assistance.  Elig Type DX: Disabled  Eligibility Begin Date: 03/01/2022  Eligibility End Date: --/--/----  This subscriber is eligible for the following service types: Medical Care ,  Chiropractic ,  Dental Care ,  Hospital ,  Hospital - Inpatient ,  Hospital - Outpatient ,  Emergency Services ,  Pharmacy ,  Professional (Physician) Visit - Office ,  Vision (Optometry) ,  Mental Health ,  Urgent Care  This subscriber has eligibility for QM: Qualified Medicare Beneficiary - Medical Assistance Covers Medicare Coinsurance and Deductibles Only.  Elig Type DQ: Disabled/QMB only  Eligibility Begin Date: 03/01/2022  Eligibility End Date: --/--/----  This subscriber is eligible for the following service types: Medical Care ,  Chiropractic ,  Hospital ,  Hospital - Inpatient ,  Hospital - Outpatient ,  Emergency Services ,   Professional (Physician) Visit - Office ,  Mental Health ,  Urgent Care    Referral/Screening:  FRW Screening    Row Name 09/15/23 1321       [facility] Benefits   Is patient requesting help applying for [facility] benefits? Yes  Received notice to renew MA insurance       Have you recently applied for any [facility] benefits? No       How many people in your household? 3  2 daughters       Do you buy/eat food together? Yes       Insurance:   Was [place] verified for active insurance? No       Is this an insurance renewal? Yes       Is this a new insurance application request? No       OTHER   Is this a linda care application? No       Any other information for the [facility]? Received notice to renew MA insurance. call anytime ASAP

## 2023-09-25 ENCOUNTER — PATIENT OUTREACH (OUTPATIENT)
Dept: CARE COORDINATION | Facility: CLINIC | Age: 38
End: 2023-09-25
Payer: MEDICARE

## 2023-09-25 NOTE — PROGRESS NOTES
Clinic Care Coordination Contact    Assessment: Care Coordinator contacted patient for 2 month follow up.  Patient has continued to follow the plan of care and assessment is negative for any new needs or concerns.    Enrollment status: Graduated.      Plan: No further outreaches at this time.  Patient will continue to follow the plan of care.  If new needs arise a new Care Coordination referral may be placed.  FYI to PCP    JOHNNY Rodarte   Social Work Care Coordinator   [facility]    [phone]

## 2023-09-25 NOTE — PROGRESS NOTES
9/25/2023  Clinic Care Coordination Contact  Care Team Conversations    Received message from RALPH BARON today patient graduated from [facility] at of 9-25-23.    No further outreaches from CCC team   Removed from Care Team.    Hudson Mccarthy  Community Health Worker  [facility] Care Coordination  [email or web address]   Office: [phone]  Fax: [phone]

## 2023-10-18 ENCOUNTER — OFFICE VISIT (OUTPATIENT)
Dept: FAMILY MEDICINE | Facility: CLINIC | Age: 38
End: 2023-10-18
Payer: MEDICARE

## 2023-10-18 VITALS
HEART RATE: 65 BPM | DIASTOLIC BLOOD PRESSURE: 85 MMHG | TEMPERATURE: 97.8 F | RESPIRATION RATE: 16 BRPM | HEIGHT: 68 IN | OXYGEN SATURATION: 95 % | WEIGHT: 191 LBS | BODY MASS INDEX: 28.95 KG/M2 | SYSTOLIC BLOOD PRESSURE: 130 MMHG

## 2023-10-18 DIAGNOSIS — Z76.0 ENCOUNTER FOR MEDICATION REFILL: ICD-10-CM

## 2023-10-18 DIAGNOSIS — Z13.9 ENCOUNTER FOR SCREENING INVOLVING SOCIAL DETERMINANTS OF HEALTH (SDOH): ICD-10-CM

## 2023-10-18 DIAGNOSIS — J45.20 MILD INTERMITTENT ASTHMA, UNSPECIFIED WHETHER COMPLICATED: Primary | ICD-10-CM

## 2023-10-18 DIAGNOSIS — Z23 NEED FOR PROPHYLACTIC VACCINATION AGAINST HEPATITIS B VIRUS: ICD-10-CM

## 2023-10-18 PROCEDURE — G0010 ADMIN HEPATITIS B VACCINE: HCPCS | Performed by: FAMILY MEDICINE

## 2023-10-18 PROCEDURE — 90746 HEPB VACCINE 3 DOSE ADULT IM: CPT | Performed by: FAMILY MEDICINE

## 2023-10-18 PROCEDURE — 99214 OFFICE O/P EST MOD 30 MIN: CPT | Mod: 25 | Performed by: FAMILY MEDICINE

## 2023-10-18 RX ORDER — BUDESONIDE 0.5 MG/2ML
0.5 INHALANT ORAL 2 TIMES DAILY
Qty: 30 ML | Refills: 1 | Status: SHIPPED | OUTPATIENT
Start: 2023-10-18

## 2023-10-18 RX ORDER — ALBUTEROL SULFATE 0.83 MG/ML
2.5 SOLUTION RESPIRATORY (INHALATION) EVERY 6 HOURS PRN
Qty: 90 ML | Refills: 1 | Status: SHIPPED | OUTPATIENT
Start: 2023-10-18 | End: 2024-05-06

## 2023-10-18 RX ORDER — ALBUTEROL SULFATE 90 UG/1
2 AEROSOL, METERED RESPIRATORY (INHALATION) EVERY 6 HOURS
Qty: 8.5 G | Refills: 3 | Status: SHIPPED | OUTPATIENT
Start: 2023-10-18

## 2023-10-18 ASSESSMENT — PATIENT HEALTH QUESTIONNAIRE - PHQ9
SUM OF ALL RESPONSES TO PHQ QUESTIONS 1-9: 0
10. IF YOU CHECKED OFF ANY PROBLEMS, HOW DIFFICULT HAVE THESE PROBLEMS MADE IT FOR YOU TO DO YOUR WORK, TAKE CARE OF THINGS AT HOME, OR GET ALONG WITH OTHER PEOPLE: NOT DIFFICULT AT ALL
SUM OF ALL RESPONSES TO PHQ QUESTIONS 1-9: 0

## 2023-10-18 NOTE — PROGRESS NOTES
"  Assessment & Plan     Mild intermittent asthma, unspecified whether complicated  Tight control of risk factor discussed, Pulmicort nebulizer to use during flareup.  Appropriate immunization discussed, patient declined.  - albuterol (PROAIR HFA/PROVENTIL HFA/VENTOLIN HFA) 108 (90 Base) MCG/ACT inhaler; Inhale 2 puffs into the lungs every 6 hours  - Nebulizer and Supplies Order for DME - ONLY FOR DME  - albuterol (PROVENTIL) (2.5 MG/3ML) 0.083% neb solution; Take 1 vial (2.5 mg) by nebulization every 6 hours as needed for shortness of breath, wheezing or cough  - budesonide (PULMICORT) 0.5 MG/2ML neb solution; Take 2 mLs (0.5 mg) by nebulization 2 times daily    Encounter for screening involving social determinants of health (SDoH)    - Primary Care - Care Coordination Referral; Future    Encounter for medication refill    - albuterol (PROAIR HFA/PROVENTIL HFA/VENTOLIN HFA) 108 (90 Base) MCG/ACT inhaler; Inhale 2 puffs into the lungs every 6 hours    Need for prophylactic vaccination against hepatitis B virus    - hepatitis b vaccine recombinant (RECOMBIVAX-HB) 10 MCG/ML injection; Inject 0.5 mLs (5 mcg) into the muscle once for 1 dose    Review of external notes as documented elsewhere in note  30 minutes spent by me on the date of the encounter doing chart review, review of outside records, review of test results, interpretation of tests, patient visit, and documentation      MED REC REQUIRED  Post Medication Reconciliation Status: discharge medications reconciled and changed, per note/orders  BMI:   Estimated body mass index is 29.47 kg/m  as calculated from the following:    Height as of this encounter: 1.715 m (5' 7.5\").    Weight as of this encounter: 86.6 kg (191 lb).   Weight management plan: Discussed healthy diet and exercise guidelines    Regular exercise    Himanshu Villagran MD  [facility]    Jovanni Wagner is a 38 year old, presenting for the following health " "issues:  Hospital F/U and Forms        10/18/2023     9:48 AM   Additional Questions   Roomed by hser   Accompanied by self       HPI     Recently seen in the ER for mild asthma exacerbation following COVID exposure.  Was given steroid and symptoms improved.  Currently on Humira for hidradenitis suppurativa, has noticed a marked improvement of symptoms.  Planning to start martial art and would like a clearance form to be completed.      Review of Systems   Constitutional, HEENT, cardiovascular, pulmonary, gi and gu systems are negative, except as otherwise noted.      Objective    /85 (BP Location: Left arm, Patient Position: Sitting, Cuff Size: Adult Regular)   Pulse 65   Temp 97.8  F (36.6  C) (Temporal)   Resp 16   Ht 1.715 m (5' 7.5\")   Wt 86.6 kg (191 lb)   SpO2 95%   BMI 29.47 kg/m    Body mass index is 29.47 kg/m .  Physical Exam   GENERAL: healthy, alert and no distress  NECK: no adenopathy, no asymmetry, masses, or scars and thyroid normal to palpation  RESP: lungs clear to auscultation - no rales, rhonchi or wheezes  CV: regular rate and rhythm, normal S1 S2, no S3 or S4, no murmur, click or rub, no peripheral edema and peripheral pulses strong  ABDOMEN: soft, nontender, no hepatosplenomegaly, no masses and bowel sounds normal  MS: no gross musculoskeletal defects noted, no edema    No results found for any visits on 10/18/23.    This note was completed in part using a voice recognition software, any grammatical or context distortion are unintentional and inherent to the software.            Prior to immunization administration, verified patients identity using patient s name and date of birth. Please see Immunization Activity for additional information.     Screening Questionnaire for Adult Immunization    Are you sick today?   No   Do you have allergies to medications, food, a vaccine component or latex?   Yes   Have you ever had a serious reaction after receiving a vaccination?   No   Do " you have a long-term health problem with heart, lung, kidney, or metabolic disease (e.g., diabetes), asthma, a blood disorder, no spleen, complement component deficiency, a cochlear implant, or a spinal fluid leak?  Are you on long-term aspirin therapy?   Yes   Do you have cancer, leukemia, HIV/AIDS, or any other immune system problem?   No   Do you have a parent, brother, or sister with an immune system problem?   No   In the past 3 months, have you taken medications that affect  your immune system, such as prednisone, other steroids, or anticancer drugs; drugs for the treatment of rheumatoid arthritis, Crohn s disease, or psoriasis; or have you had radiation treatments?   Yes   Have you had a seizure, or a brain or other nervous system problem?   No   During the past year, have you received a transfusion of blood or blood    products, or been given immune (gamma) globulin or antiviral drug?   No   For women: Are you pregnant or is there a chance you could become       pregnant during the next month?   No   Have you received any vaccinations in the past 4 weeks?   No     Immunization questionnaire was positive for at least one answer.  Notified provider.      Patient instructed to remain in clinic for 15 minutes afterwards, and to report any adverse reactions.     Screening performed by Kulwinder Robles MA on 10/18/2023 at 9:52 AM.         Answers submitted by the patient for this visit:  Patient Health Questionnaire (Submitted on 10/18/2023)  If you checked off any problems, how difficult have these problems made it for you to do your work, take care of things at home, or get along with other people?: Not difficult at all  PHQ9 TOTAL SCORE: 0

## 2023-10-18 NOTE — LETTER
My Asthma Action Plan    Name: Bernard Padilla   YOB: 1985  Date: 10/18/2023   My doctor: Himanshu Villagran MD   My clinic: [facility]        My Control Medicine: Budesonide (Pulmicort) nebulizer solution -  0.5mg/2ml bid  My Rescue Medicine: Albuterol (Proair/Ventolin/Proventil HFA) 2-4 puffs EVERY 4 HOURS as needed. Use a spacer if recommended by your provider.  My Oral Steroid Medicine: call My Asthma Severity:   Mild Persistent  Know your asthma triggers: animal dander               GREEN ZONE   Good Control  I feel good  No cough or wheeze  Can work, sleep and play without asthma symptoms       Take your asthma control medicine every day.     If exercise triggers your asthma, take your rescue medication  15 minutes before exercise or sports, and  During exercise if you have asthma symptoms  Spacer to use with inhaler: If you have a spacer, make sure to use it with your inhaler             YELLOW ZONE Getting Worse  I have ANY of these:  I do not feel good  Cough or wheeze  Chest feels tight  Wake up at night   Keep taking your Green Zone medications  Start taking your rescue medicine:  every 20 minutes for up to 1 hour. Then every 4 hours for 24-48 hours.  If you stay in the Yellow Zone for more than 12-24 hours, contact your doctor.  If you do not return to the Green Zone in 12-24 hours or you get worse, start taking your oral steroid medicine if prescribed by your provider.           RED ZONE Medical Alert - Get Help  I have ANY of these:  I feel awful  Medicine is not helping  Breathing getting harder  Trouble walking or talking  Nose opens wide to breathe       Take your rescue medicine NOW  If your provider has prescribed an oral steroid medicine, start taking it NOW  Call your doctor NOW  If you are still in the Red Zone after 20 minutes and you have not reached your doctor:  Take your rescue medicine again and  Call 911 or go to the emergency room right away    See  your regular doctor within 2 weeks of an Emergency Room or Urgent Care visit for follow-up treatment.          Annual Reminders:  Meet with Asthma Educator,  Flu Shot in the Fall, consider Pneumonia Vaccination for patients with asthma (aged 19 and older).    Pharmacy:    [facility] - [place], [place] - [address] [facility] - [place] - [address] [facility] - [place], [place] - [address]    Electronically signed by Himanshu Villagran MD   Date: 10/18/23                      Asthma Triggers  How To Control Things That Make Your Asthma Worse    Triggers are things that make your asthma worse.  Look at the list below to help you find your triggers and what you can do about them.  You can help prevent asthma flare-ups by staying away from your triggers.      Trigger                                                          What you can do   Cigarette Smoke  Tobacco smoke can make asthma worse. Do not allow smoking in your home, car or around you.  Be sure no one smokes at a child s day care or school.  If you smoke, ask your health care provider for ways to help you quit.  Ask family members to quit too.  Ask your health care provider for a referral to Quit Plan to help you quit smoking, or call 1-182-010-PLAN.     Colds, Flu, Bronchitis  These are common triggers of asthma. Wash your hands often.  Don t touch your eyes, nose or mouth.  Get a flu shot every year.     Dust Mites  These are tiny bugs that live in cloth or carpet. They are too small to see. Wash sheets and blankets in hot water every week.   Encase pillows and mattress in dust mite proof covers.  Avoid having carpet if you can. If you have carpet, vacuum weekly.   Use a dust mask and HEPA vacuum.   Pollen and Outdoor Mold  Some people are allergic to trees, grass, or weed pollen, or molds. Try to keep your windows closed.  Limit time out doors when pollen count is high.    Ask you health care provider about taking medicine during allergy season.     Animal Dander  Some people are allergic to skin flakes, urine or saliva from pets with fur or feathers. Keep pets with fur or feathers out of your home.    If you can t keep the pet outdoors, then keep the pet out of your bedroom.  Keep the bedroom door closed.  Keep pets off cloth furniture and away from stuffed toys.     Mice, Rats, and Cockroaches   Some people are allergic to the waste from these pests.   Cover food and garbage.  Clean up spills and food crumbs.  Store grease in the refrigerator.   Keep food out of the bedroom.   Indoor Mold  This can be a trigger if your home has high moisture. Fix leaking faucets, pipes, or other sources of water.   Clean moldy surfaces.  Dehumidify basement if it is damp and smelly.   Smoke, Strong Odors, and Sprays  These can reduce air quality. Stay away from strong odors and sprays, such as perfume, powder, hair spray, paints, smoke incense, paint, cleaning products, candles and new carpet.   Exercise or Sports  Some people with asthma have this trigger. Be active!  Ask your doctor about taking medicine before sports or exercise to prevent symptoms.    Warm up for 5-10 minutes before and after sports or exercise.     Other Triggers of Asthma  Cold air:  Cover your nose and mouth with a scarf.  Sometimes laughing or crying can be a trigger.  Some medicines and food can trigger asthma.

## 2023-10-18 NOTE — COMMUNITY RESOURCES LIST (ENGLISH)
10/18/2023   [facility]  N/A  For questions about this resource list or additional care needs, please contact your primary care clinic or care manager.  Phone: [phone]   Email: N/A   Address: [address] [place] [address]   Hours: N/A        Financial Stability       Rent and mortgage payment assistance  1  [facility] - Health and [facility] - Security Deposit Assistance Distance: 0.96 miles      In-Person   [address] [place] [address]  Language: English  Hours: Mon - Fri 8:00 AM - 4:30 PM  Fees: Free   Phone: (975) 789-1420 Email: NeurogesX@[employer] Website: https://www.Outrigger Media/yourCupomNowgovernment/departments/health-and-wellness     2  [facility] [place] Finance Agency - Section 811 Supportive Housing Project-Based Rental Assistance Program (811 PRA) Distance: 0.98 miles      Phone/Virtual   [address] [place] [address]  Language: English  Hours: Mon - Fri 8:00 AM - 5:00 PM Appt. Only  Fees: Free   Phone: (308) 425-8069 Email: m[place].CE2 Carbon Capital@[facility]. Website: https://www.PaySimple.gov/index.html     Utility payment assistance  3  [place] Snipi - Energy and Utilities Distance: 0.92 miles      In-Person, Phone/Virtual   [address] [place] [address]  Language: English  Hours: Mon - Fri 8:30 AM - 4:30 PM  Fees: Free   Phone: (326) 238-5661 Website: https://mn.Tres Amigas/Bluenote/energy/consumer-assistance/energy-assistance-program/     4  [facility] Health and [facility] Distance: 0.96 miles      In-Person, Phone/Virtual   [address] [place] [address]  Language: English  Hours: Mon - Fri 8:00 AM - 4:30 PM  Fees: Free   Phone: (401) 287-2782 Email: NeurogesX@[employer] Website: https://www.Out[employer]ger [employer]/yourCupomNowgovernment/departments/health-and-wellness          Food and Nutrition       Food pantry  5  Quin MCDANIEL [facility]. Distance: 0.95 miles      Delivery,  Pickup   [address] [place] [address]  Language: English, French  Hours: Mon 9:00 AM - 6:00 PM Appt. Only, Tue - Fri 9:00 AM - 5:00 PM Appt. Only  Fees: Free   Phone: (903) 786-1712 Email: info@[employer]achLog[employer].Etherpad Website: http://www.MerryMarry.org/site     6  [facility] Distance: 1.27 miles      [facility]   [address] [place] [address]  Language: English, Hmong, Guyanese, French  Hours: Mon 11:00 AM - 3:00 PM , Wed 11:00 AM - 3:00 PM , Fri 11:00 AM - 3:00 PM  Fees: Free, Self Pay   Phone: (828) 637-2190 Email: Rony@[facility].MelroseWakefield HospitalAppUpper - ASO.org Website: http://[facility]/[address]/     SNAP application assistance  7  [employer] [place] Distance: 0.17 miles      Phone/Virtual   [address] [place] [address]  Language: English, Hmong, Ivorian, Barbadian, French  Hours: Mon - Fri 8:30 AM - 4:30 PM  Fees: Free   Phone: (121) 631-5752 Email: helpline@hungersolutions.org Website: https://www.hungersolutions.org/programs/[place]-food-helpline/     8  [place] Department of Human Services - MNFoodHelMUS[facility] (SNAP) Distance: 0.91 miles      Phone/Virtual   [address] [address] [place] [address]  Language: English, Hmong, Ivorian, Barbadian, French, Cymraes  Hours: Mon - Fri 9:00 AM - 5:00 PM  Fees: Free   Phone: (164) 731-3000 Website: https://[place].gov/dhs/people-we-serve/adults/economic-assistance/food-nutrition/programs-and-services/supplemental-nutrition-assistance-program.jsp     Soup kitchen or free meals  9  [facility] & Programs Distance: 1.07 miles      In-Person   [address] [facility] [place] [address]  Language: English  Hours: Mon - Sun 6:30 AM - 7:30 AM , Mon - Sun 12:00 PM - 1:00 PM , Mon - Sun 5:30 PM - 6:30 PM  Fees: Free   Phone: (198) 995-3241 Email: info@[facility].Etherpad Website: https://[facility].org/about-us/contact/     10  [facility] [place] [facility] - Free Summer Meals Distance:  1.96 miles      In-Person   [address] [place] [address]  Language: English, Hmong, Malaysian  Hours: Mon - Fri 12:00 PM - 1:00 PM , Mon - Fri 3:00 PM - 4:00 PM  Fees: Free   Phone: (968) 510-1024 Email: Eladio@.[facility]. Website: https://www.\[facility]\"".[facility]/departments/rushing-recreation/[facility]          Hotlines and Helplines       Hotline - Housing crisis  11  [facility] Distance: 7.56 miles      Phone/Virtual   [address] [place] [address]  Language: English  Hours: Mon - Sun Open 24 Hours   Phone: (305) 542-5793 Email: communications@[facility].org Website: https://[facility].org/oursaviourshousing/     12  [facility] Distance: 9.15 miles      Phone/Virtual   [address] [place] [address]  Language: English  Hours: Mon - Sun Open 24 Hours   Phone: (465) 782-5241 Email: info@[facility] Website: http://www.[facility].org          Housing       Coordinated Entry access point  13  [facility] Distance: 0.82 miles      In-Person, Phone/Virtual   [address] [place] [address]  Language: English, Malaysian  Hours: Mon - Fri 8:30 AM - 4:30 PM  Fees: Free   Phone: (139) 844-4741 Email: info@[facility].org Website: https://www.[facility].org/locations/downPound Ridgen-clinic/     14  [facility] - Coordinated Access to Housing and Shelter (CAHS) - Coordinated Access Distance: 2.25 miles      In-Person, Phone/Virtual   [address] [place] [address]  Language: English  Hours: Mon - Fri 8:00 AM - 4:30 PM  Fees: Free   Phone: (550) 316-2739 Website: https://www.[facility]./residents/assistance-support/assistance/housing-services-support     Drop-in center or day shelter  15  [facility] Distance: 1.62 miles      In-Person   [address] [place] [address]  Language: English  Hours: Mon - Fri 9:00 AM - 4:00 PM  Fees: Free   Phone: (774) 653-1464  Email: carlosk@[employer].org Website: http://Stix Games.org     16  [facility] and [place] - Opportunity Center Distance: 7.61 miles      In-Person   [address] [place] [address]  Language: English, Botswanan, Malaysian  Hours: Mon - Sat 7:00 AM - 3:00 PM  Fees: Free, Self Pay   Phone: (121) 889-8368 Email: info@nkf-pharma Website: https://www.SouthDoctors.Shocking Technologies/locations/opportunity-center/     Housing search assistance  17  [facility] - Housing Search Assistance Distance: 2.4 miles      Phone/Virtual   [address] [place] [address]  Language: Romansh, English, Hmong, Tracey, Botswanan, Malaysian  Hours: Mon - Fri Appt. Only  Fees: Free   Phone: (893) 957-7656 Website: https://[facility] Warren HospitalThe Cambridge Satchel Company.Shocking Technologies/     18  [facility] [place] - Mental Health Crisis Housing Search Assistance Distance: 3.56 miles      In-Person, Phone/Virtual   [address] [place] [address]  Language: English  Hours: Mon - Tue 8:00 AM - 4:30 PM , Wed 8:00 AM - 6:00 PM , Thu - Fri 8:00 AM - 4:30 PM  Fees: Free   Phone: (756) 735-4482 Email: [facility]. Website: https://www.[facility]./residents/health-medical/clinics-services/mental-health/adult-mental-health     Shelter for families  19  [facility] Distance: 14.9 miles      In-Person   [address] [place] [address]  Language: English  Hours: Mon - Fri 3:00 PM - 9:00 AM , Sat - Sun Open 24 Hours  Fees: Free   Phone: (637) 525-5540 Ext.1 Website: https://www.saintandrews.org/2020/07/03/emergency-family-shelter/     Shelter for individuals  20  [facility] [place] - [facility] [place] [facility] - [facility] [place] [facility] Distance: 0.77 miles      In-Person   [address] [place] [address]  Language: English  Hours: Mon - Sun 5:00 PM - 10:00 AM  Fees: Free, Self Pay   Phone: (492)  152-0509 Email: info@[employer]ency Spotter.org Website: https://www.[facility].org/locations/[facility]-saint-paul/     21  [facility] Distance: 7.56 miles      In-Person   [address] [place] [address]  Language: English  Hours: Mon - Sun Open 24 Hours  Fees: Free   Phone: (536) 930-6776 Email: communications@[facility].org Website: https://[facility].org/oursaviourshousin/          Transportation       Free or low-cost transportation  22  [facility] Distance: 4.39 miles      In-Person   [address] [place] [address]  Language: English, Icelandic  Hours: Mon 9:00 AM - 5:00 PM , Tue 9:30 AM - 7:00 PM , Wed 9:00 AM - 5:00 PM , Thu 9:30 AM - 7:00 PM , Fri 9:00 AM - 5:00 PM  Fees: Free   Phone: (491) 429-6349 Email: contactus@BeanJ[employer] Website: http://www.BeanJockey     23  [facility] Circulator Bus Distance: 4.52 miles      In-Person   [address] [place] [place] [address]  Language: English  Hours: Tue 9:00 AM - 2:00 PM  Fees: Self Pay   Phone: (256) 937-2164 Email: info@Equities.com.Anthology Solutions Website: http://www.Varxity Development Corp.org/     Transportation to medical appointments  24  [facility] - Non-Emergency Medical Transportation Distance: 1.13 miles      In-Person   [address] [place] [address]  Language: English  Hours: Mon - Fri 8:00 AM - 4:00 PM Appt. Only  Fees: Self Pay   Phone: (585) 434-2299 Website: http://www.[facility].org/Medical-Services/Emergency-medical-services/Non-emergency-transportation/     25  Discover Ride Distance: 3.71 miles      In-Person   [address] [address]  Language: English  Hours: Mon - Thu 6:00 AM - 6:00 PM , Fri 6:00 AM - 5:00 PM  Fees: Insurance, Self Pay   Phone: (335) 454-6721 Email: office@[employer] Website: https://www.Insuritas/          Important Numbers & Websites       Emergency Services   911  [facility] Services   [facility]  Poison Control   (427) 647-4679  Suicide Prevention  Lifeline   (455) 216-7731 (TALK)  Child Abuse Hotline   (597) 535-8062 (4-A-Child)  Sexual Assault Hotline   (799) 130-2309 (HOPE)  National Runaway Safeline   (718) 646-8474 (RUNAWAY)  All-Options Talkline   (275) 892-4144  Substance Abuse Referral   (267) 802-1023 (HELP)

## 2023-10-18 NOTE — COMMUNITY RESOURCES LIST (ENGLISH)
10/18/2023   [facility]  N/A  For questions about this resource list or additional care needs, please contact your primary care clinic or care manager.  Phone: [phone]   Email: N/A   Address: [address] [place] [address]   Hours: N/A        Financial Stability       Rent and mortgage payment assistance  1  [facility] - Health and [facility] - Security Deposit Assistance Distance: 0.96 miles      In-Person   [address] [place] [address]  Language: English  Hours: Mon - Fri 8:00 AM - 4:30 PM  Fees: Free   Phone: (892) 613-8666 Email: Ligandal@[employer] Website: https://www.Sumbola/yourTranscast Mediagovernment/departments/health-and-wellness     2  [facility] Finance Agency - Section 811 Supportive Housing Project-Based Rental Assistance Program (811 PRA) Distance: 0.98 miles      Phone/Virtual   [address] [place] [address]  Language: English  Hours: Mon - Fri 8:00 AM - 5:00 PM Appt. Only  Fees: Free   Phone: (235) 998-4140 Email: mn.Weblo.com@[facility]. Website: https://www.TabSquare.gov/index.html     Utility payment assistance  3  [place] [employer] - Energy and Utilities Distance: 0.92 miles      In-Person, Phone/Virtual   [address] [place] [address]  Language: English  Hours: Mon - Fri 8:30 AM - 4:30 PM  Fees: Free   Phone: (282) 668-8106 Website: https://mn.DuraSweeper/Matchup/energy/consumer-assistance/energy-assistance-program/     4  [facility] Health and [facility] Distance: 0.96 miles      In-Person, Phone/Virtual   [address] [place] [address]  Language: English  Hours: Mon - Fri 8:00 AM - 4:30 PM  Fees: Free   Phone: (652) 527-7256 Email: Ligandal@[employer] Website: https://www.Sumbola/yourTranscast Mediagovernment/departments/health-and-wellness          Food and Nutrition       Food pantry  5  Quin MCDANIEL [facility]. Distance: 0.95 miles      Delivery,  Pickup   [address] [place] [address]  Language: English, Omani  Hours: Mon 9:00 AM - 6:00 PM Appt. Only, Tue - Fri 9:00 AM - 5:00 PM Appt. Only  Fees: Free   Phone: (664) 915-8761 Email: info@[employer]parent Outsourcing Website: http://www.Jawbone.org/site     6  [facility] Distance: 1.27 miles      [facility]   [address] [place] [address]  Language: English, Hmong, Solomon Islander, Omani  Hours: Mon 11:00 AM - 3:00 PM , Wed 11:00 AM - 3:00 PM , Fri 11:00 AM - 3:00 PM  Fees: Free, Self Pay   Phone: (124) 133-8834 Email: Rony@[facility] HospitalRe[facility] Loan Group.org Website: http://[facility]/[address]/     SNAP application assistance  7  [employer] [place] Distance: 0.17 miles      Phone/Virtual   [address] [place] [address]  Language: English, Hmong, Moroccan, English, Omani  Hours: Mon - Fri 8:30 AM - 4:30 PM  Fees: Free   Phone: (993) 381-9207 Email: helpline@hungersolutions.org Website: https://www.hungersolutions.org/programs/[place]-food-helpline/     8  [place] Department of Human Services - MNFoodHelMUSC Health [facility] (SNAP) Distance: 0.91 miles      Phone/Virtual   [address] [address] [place] [address]  Language: English, Hmong, Moroccan, English, Omani, Irish  Hours: Mon - Fri 9:00 AM - 5:00 PM  Fees: Free   Phone: (371) 306-8832 Website: https://[place].gov/dhs/people-we-serve/adults/economic-assistance/food-nutrition/programs-and-services/supplemental-nutrition-assistance-program.jsp     Soup kitchen or free meals  9  [facility] Programs Distance: 1.07 miles      In-Person   [address] [place] [address]  Language: English  Hours: Mon - Sun 6:30 AM - 7:30 AM , Mon - Sun 12:00 PM - 1:00 PM , Mon - Sun 5:30 PM - 6:30 PM  Fees: Free   Phone: (360) 902-8879 Email: info@[facility].[employer]sourcing Website: https://[facility].org/about-us/contact/     10  [facility] [place] [facility] - Free Summer Meals Distance:  1.96 miles      In-Person   [address] [place] [address]  Language: English, Hmong, Romanian  Hours: Mon - Fri 12:00 PM - 1:00 PM , Mon - Fri 3:00 PM - 4:00 PM  Fees: Free   Phone: (653) 945-7397 Email: Eladio@.[facility]. Website: https://www.[facility]/departments/rushing-recreation/[facility]          Hotlines and Helplines       Hotline - Housing crisis  11  [facility] Distance: 7.56 miles      Phone/Virtual   [address] [place] [address]  Language: English  Hours: Mon - Sun Open 24 Hours   Phone: (381) 473-2278 Email: communications@[facility].org Website: https://[facility].org/oursaviourshousing/     12  [facility] Distance: 9.15 miles      Phone/Virtual   [address] [place] [address]  Language: English  Hours: Mon - Sun Open 24 Hours   Phone: (997) 504-6021 Email: info@[facility] Website: http://www.[facility].org          Housing       Coordinated Entry access point  13  [facility] Distance: 0.82 miles      In-Person, Phone/Virtual   [address] [place] [address]  Language: English, Romanian  Hours: Mon - Fri 8:30 AM - 4:30 PM  Fees: Free   Phone: (813) 142-6471 Email: info@[facility].org Website: https://www.[facility].org/locations/downGraysonn-clinic/     14  [facility] - Coordinated Access to Housing and Shelter (CAHS) - Coordinated Access Distance: 2.25 miles      In-Person, Phone/Virtual   [address] [place] [address]  Language: English  Hours: Mon - Fri 8:00 AM - 4:30 PM  Fees: Free   Phone: (370) 535-1341 Website: https://www.[facility]./residents/assistance-support/assistance/housing-services-support     Drop-in center or day shelter  15  [facility] Distance: 1.62 miles      In-Person   [address] [place] [address]  Language: English  Hours: Mon - Fri 9:00 AM - 4:00 PM  Fees: Free   Phone: (462) 202-8768  Email: carlosk@[employer].org Website: http://[employer]ience.org     16  [facility] - Opportunity Center Distance: 7.61 miles      In-Person   [address] [place] [address]  Language: English, Cymraes, Kazakh  Hours: Mon - Sat 7:00 AM - 3:00 PM  Fees: Free, Self Pay   Phone: (863) 352-4180 Email: info@Blackstrap Website: https://www.Voxy.NMRKT/locations/opportunity-center/     Housing search assistance  17  [facility] - Housing Search Assistance Distance: 2.4 miles      Phone/Virtual   [address] [place] [place] [address]  Language: Uzbek, English, Hmong, Tracey, Cymraes, Kazakh  Hours: Mon - Fri Appt. Only  Fees: Free   Phone: (939) 666-2646 Website: https://[facility] Health[facility]ler.NMRKT/     18  [facility] Health [place] - Mental Health Crisis Housing Search Assistance Distance: 3.56 miles      In-Person, Phone/Virtual   [address] [place] [address]  Language: English  Hours: Mon - Tue 8:00 AM - 4:30 PM , Wed 8:00 AM - 6:00 PM , Thu - Fri 8:00 AM - 4:30 PM  Fees: Free   Phone: (295) 624-6594 Email: [facility]. Website: https://www.[facility]./residents/health-medical/clinics-services/mental-health/adult-mental-health     Shelter for families  19  [facility] Family [facility] Distance: 14.9 miles      In-Person   [address] [place] [address]  Language: English  Hours: Mon - Fri 3:00 PM - 9:00 AM , Sat - Sun Open 24 Hours  Fees: Free   Phone: (413) 521-2187 Ext.1 Website: https://www.saintandrews.org/2020/07/03/emergency-family-shelter/     Shelter for individuals  20  [facility] - [facility] [place] [facility] - [facility] [place] [facility] Distance: 0.77 miles      In-Person   [address] [place] [address]  Language: English  Hours: Mon - Sun 5:00 PM - 10:00 AM  Fees: Free, Self Pay   Phone: (760)  215-5155 Email: info@DanceOn.org Website: https://www.[facility].org/locations/[facility]-saint-paul/     21  [facility] Distance: 7.56 miles      In-Person   [address] [place] [address]  Language: English  Hours: Mon - Sun Open 24 Hours  Fees: Free   Phone: (540) 376-6487 Email: communications@[facility].org Website: https://[facility].org/oursaviourshousin/          Transportation       Free or low-cost transportation  22  [facility] Distance: 4.39 miles      In-Person   [address] [place] [address]  Language: English, Slovenian  Hours: Mon 9:00 AM - 5:00 PM , Tue 9:30 AM - 7:00 PM , Wed 9:00 AM - 5:00 PM , Thu 9:30 AM - 7:00 PM , Fri 9:00 AM - 5:00 PM  Fees: Free   Phone: (444) 174-9190 Email: contactus@[employer]a Online Website: http://www.Yicha Online     23  [facility] Circulator Bus Distance: 4.52 miles      In-Person   [address] [place] [place] [address]  Language: English  Hours: Tue 9:00 AM - 2:00 PM  Fees: Self Pay   Phone: (636) 377-5451 Email: info@[employer] Home [employer].Collarity Website: http://www.Zank.org/     Transportation to medical appointments  24  [facility] - Non-Emergency Medical Transportation Distance: 1.13 miles      In-Person   [address] [place] [address]  Language: English  Hours: Mon - Fri 8:00 AM - 4:00 PM Appt. Only  Fees: Self Pay   Phone: (380) 664-9340 Website: http://www.[facility].org/Medical-Services/Emergency-medical-services/Non-emergency-transportation/     25  Discover Ride Distance: 3.71 miles      In-Person   [address] [address]  Language: English  Hours: Mon - Thu 6:00 AM - 6:00 PM , Fri 6:00 AM - 5:00 PM  Fees: Insurance, Self Pay   Phone: (648) 894-9533 Email: office@[employer]iva Deng[employer] Website: https://www.Viva Dengi/          Important Numbers & Websites       Emergency Services   911  [facility] Services   [facility]  Poison Control   (337) 500-4085  Suicide Prevention  Lifeline   (970) 651-3744 (TALK)  Child Abuse Hotline   (909) 172-9076 (4-A-Child)  Sexual Assault Hotline   (216) 401-1168 (HOPE)  National Runaway Safeline   (281) 200-2276 (RUNAWAY)  All-Options Talkline   (912) 515-1922  Substance Abuse Referral   (757) 408-3487 (HELP)

## 2023-10-20 ENCOUNTER — PATIENT OUTREACH (OUTPATIENT)
Dept: CARE COORDINATION | Facility: CLINIC | Age: 38
End: 2023-10-20
Payer: MEDICARE

## 2023-10-20 NOTE — PROGRESS NOTES
Clinic Care Coordination Contact  Community Health Worker Initial Outreach    CHW Initial Information Gathering:  Referral Source: PCP  Current living arrangement:: I live in a private home with family  Type of residence::  (Living with sister at this time)  Community Resources: Financial/Insurance, County Programs (Medical Assistance and SNAP)  Informal Support system:: Family  No PCP office visit in Past Year: No (10/18/23 with Dr. Villagran)  CHW Additional Questions  Mirthahart active?: Yes  Patient sent Social Determinants of Health questionnaire?: Yes    Patient accepts CC: Yes. Patient scheduled for assessment with LORAINE Meléndez on 10/24/23 at 2:30. Patient noted desire to discuss:    - Food Resources    - Transportation resources    - Financial Resources    ** patient is currently living with his sister. Patient also recently applied for Public Housing.     ** CHW sent Care Coordination Questionnaires through BOARDZ.    Erendira Green  Community Health Worker  [facility]  Clinic Care Coordination   [facility], [place], [place], [place] [facility]  Office: [phone]

## 2023-10-24 ENCOUNTER — PATIENT OUTREACH (OUTPATIENT)
Dept: NURSING | Facility: CLINIC | Age: 38
End: 2023-10-24
Payer: MEDICARE

## 2023-10-24 NOTE — PROGRESS NOTES
Clinic Care Coordination Contact    CC SW called and connected with pt. Pt reported that he was unavailable at this time due to not being home and wanted to talk to SW while in the privacy of his home. SW and pt arranged to talk on 10/26 @ 9am to complete initial assessment.    Rika Schrader LUZ MARINA   Social Work Care Coordinator   [facility]    [phone]

## 2023-10-26 ENCOUNTER — PATIENT OUTREACH (OUTPATIENT)
Dept: NURSING | Facility: CLINIC | Age: 38
End: 2023-10-26
Payer: MEDICARE

## 2023-10-27 ENCOUNTER — TELEPHONE (OUTPATIENT)
Dept: DERMATOLOGY | Facility: CLINIC | Age: 38
End: 2023-10-27
Payer: MEDICARE

## 2023-10-27 NOTE — TELEPHONE ENCOUNTER
PA Needed    Medication: Humira  QTY/DS: 4 per 28 ds  NEW INS: No  Insurance Company: [facility] PART D   Pharmacy Filling the Rx: [facility]  PA : 11/10/23  Date of last fill: 10/02/23    **Pt also has [facility] ins coverage**

## 2023-10-27 NOTE — TELEPHONE ENCOUNTER
Prior Authorization Approval    Medication: HUMIRA *CF* PEN 40 MG/0.4ML SC PNKT  Authorization Effective Date: 10/27/2023  Authorization Expiration Date: 12/31/2024  Approved Dose/Quantity: 4 per 28 days  Reference #: Key: [id number]   Insurance Company: [employer] ([facility]) - Phone [phone] Fax [phone]  Expected CoPay: $    CoPay Card Available: No    Financial Assistance Needed: no  Which Pharmacy is filling the prescription: [facility] - [place], [place] - [address]   Pharmacy Notified: yes  Patient Notified: no

## 2023-10-27 NOTE — LETTER
March 16, 2024      Bernard Padilla  [address]  [place] [address]    [URGENT]: Appeals & Grievances                                      Re: Prior Authorization Request   Plan: [employer]    Patient: Bernard Padilla   Member ID: [id number] YOB: 1985     To Whom It May Concern:    I am writing this letter of medical necessity as a dermatologist caring for Bernard Padilla with regard to the medical necessity of Humira (adalimumab) to treat the patient's diagnosis of Hidradenitis Suppurativa (HS) [L73.2]. Of note, the patient's Psoriasis (PsO) [L40.0] and Arthritis [M19.90] are also being treated with the requested medication, Humira (adalimumab) 40 mg once weekly for maintenance.     Prior & Current Treatments Tried & Response: for Hidradenitis suppurativa & Psoriasis & Arthritis   - Humira (adalimumab) 40 mg subcutaneous once weekly (May 2023-Present): highly effective for HS, PsO & arthritis, estimates >80% reduction in chronic HS abscesses and no new flares in HS or PsO since initiation   - benzoyl peroxide wash as needed (May 2023-Present): effective in combination with Humira for HS  - cyclosporine 200mg twice daily (Sept 2019-Jan 2020): somewhat effective, use limited by safety concerns   - Oral antibiotics: dapsone 100 mg daily (not effective), doxycycline 100 mg twice daily (not effective), clindamycin + rifampin (short-term relief)   - Topical therapies (short-term relief of PsO, long-term/chronic use limited by side effects): fluocinolone 0.01% oil, augmented betamethasone 0.05% ointment twice daily as needed, fluocinonide 0.05% solution  - Multiple incision & debridement procedures  - Intralesional Kenalog injections intermittently prior to Humira initiation: short-term relief of acute flares    I recently prescribed this patient Humira (adalimumab), however the medication was denied as this is no longer a preferred medication of the plans. I have reviewed the patient's diagnosis,  care plan, and clinical guidelines for treatment and request a formal appeal of your denial for adalimumab.    I am writing to highlight the severe nature of this patient's condition. They have suffered from hidradenitis suppurativa (HS) for many years and prior to initiating Humira (adalimumab) had extensive disease affecting the face, abdominal pannus, groin, and perineal region . The severity of his skin condition had affected his life in many ways including but not limited to chronic pain, depression/anxiety, impaired personal/intimate relationships, ability to perform activities of daily living, missed work/disability. He has tried numerous treatments listed above, however, he had an inadequate clinical response or side effects from the treatments prior to starting Humira (adalimumab). In May 2023, Bernard started Humira (adalimumab) and since initiating treatment his HS, PsO, and arthritis have significantly improved, as well as his overall quality of life. More specifically, Bernard no longer experiences new acute HS lesions/abscesses and his chronic recurrent abscesses have reduced by ~85%. Given the positive & sustained response that Bernard has had since initiating Humira (adalimumab), we feel strongly that continuing his current regimen with Humira (adalimumab) 40 mg once weekly is essential to maintaining his well controlled HS & comorbid conditions, and sustaining his improved quality of life.     Since the plan now prefers biosimilars to Humira (adalimumab) we discussed these alternatives with the patient. However, the potential of modifying his current regimen has had a negative impact on his mental health & has caused signifcant distress for the patient. Given he has been well controlled with his current regimen of Humira (adalimumab) the patient feels strongly that modifying therapy is not appropriate & raises concerns for the potential for his conditions to once again flare.     Our recommendation for  using Humira (adalimumab) to treat this patient's HS is based on:  Patient's sustained clinical response to treatment over almost a year on treatment with Humira (adalimumab) 40 mg weekly   Adalimumab is one of two FDA approved medications for the treatment of moderate-severe HS  Ample published data demonstrating safety and efficacy of adalimumab for HS. In two phase three multicenter, double-blind, randomized placebo-controlled trials (PIONEER I and II) comprising >600 participants, treatment with adalimumab 40 mg weekly resulted in significantly higher clinical response rates than placebo with no increased incidence of serious adverse events compared to placebo groups.1 Based on pooled results of the PIONEER phase 3 trials and an open-label extension study, long term (through week 168) clinical response to weekly adalimumab was assessed. This study found that continuous weekly dosing of adalimumab can lead to sustained disease improvement in >50% of patients with HS.2 Aside from improvement in disease severity, adalimumab was also found to improve pain and depressive symptoms in a post-hoc analysis.3 This high quality evidence has resulted in the North American and International HS treatment guidelines recommending adalimumab as a first line agent for moderate-severe HS.4-8    In my clinical opinion as a dermatologist, supported by the medical literature described above, the combination of prior failed treatments, the sustained & positive clinical response to treatment with the requested medication Humira (adalimumab) 40 mg, and the patient's current distress over potentially modifying therapy, that continuing Humira (adalimumab) 40 mg once weekly is the most appropriate treatment option in continuing giovanna Wagner's HS and restored quality of life.     On behalf of this patient, I would be grateful for your prompt approval of this medication. Please let me know if I can provide any further support or  clarification that will help result in coverage of this medication.  Thank you for your time and consideration.     Sincerely,    Apolinra Ruiz MD, FAAD, FACP     Departments of Internal Medicine and Dermatology  [facility]  [phone]    Office Contact:   Jennifer Montgomeryjacquie  Pharmacy Liaison Dermatology  P: [phone]  F: [phone]  [email or web address]      References:    Jeremiah LYLES, Henrik RODRIGUEZ, Santiago DA, et al. Two Phase 3 Trials of Adalimumab for Hidradenitis Suppurativa. N Engl J Med. 2016;375(5):422-34. PMID: 37257062  Aundrea ROSAS, Henrik RODRIGUEZ, Rebeca EP, et al. Long-term adalimumab efficacy in patients with moderate-to-severe hidradenitis suppurativa/acne inversa: 3-year results of a phase 3 open-label extension study. J Am Acad Dermatol. 2019;80(1):60-69.e2. PMID: 03769573  Charley N, Mel M, Marylou H, Ivan Y, Henrik ELIAS. Reduction in pain scores and improvement in depressive symptoms in patients with hidradenitis suppurativa treated with adalimumab in a phase 2, randomized, placebo-controlled trial. Dermatol Online J. 2016;22(3):59884/[id number]. PMID: 28206455  Drake REID, Nubia C, Mali A, et al. North American clinical management guidelines for hidradenitis suppurativa: A publication from the [place] and [place] Hidradenitis Suppurativa Foundations: Part II: Topical, intralesional, and systemic medical management. J Am Acad Dermatol. 2019;81(1):. PMID: 76858557  Henry JR, Staley F, Brown D, et al. [place] Association of Dermatologists guidelines for the management of hidradenitis suppurativa (acne inversa) 2018. Br J Dermatol. 2019;180(5):1009-17. PMID: 80918757  Aundrea ROSAS, Obdulio FG, Trish JL, et al. Hidradenitis suppurativa/acne inversa: a practical framework for treatment optimization - systematic review and recommendations from the HS ALLIANCE working group. J Eur Acad Dermatol Venereol. 2019;33(1):19-31. PMID: 99181733  Aundrea ROSAS,  Giovana N, Seth L, et al.  S1 guideline for the treatment of hidradenitis suppurativa/acne inversa. J Eur Acad Dermatol Venereol. 2015;29(4):619-44. PMID: 90984008  Aundrea CC, Derrick H, Ananda Menendez RD, et al. Adalimumab Dose Intensification in Recalcitrant Hidradenitis Suppurativa/Acne Inversa. Dermatology. 2020;236(1):25-30. PMID: 99847401

## 2023-10-27 NOTE — TELEPHONE ENCOUNTER
PA Initiation    Medication: HUMIRA *CF* PEN 40 MG/0.4ML SC PNKT  Insurance Company: [employer] ([facility]) - Phone [phone] Fax [phone]  Pharmacy Filling the Rx: [facility] - [place], [place] - [address]  Filling Pharmacy Phone:    Filling Pharmacy Fax:    Start Date: 10/27/2023      Key: [id number]

## 2023-10-30 ENCOUNTER — TELEPHONE (OUTPATIENT)
Dept: DERMATOLOGY | Facility: CLINIC | Age: 38
End: 2023-10-30
Payer: MEDICARE

## 2023-10-30 NOTE — PROGRESS NOTES
Clinic Care Coordination Contact  Clinic Care Coordination Contact  OUTREACH    Referral Information:     CC SW called and connected with pt this morning. SW introduced herself, explained why she was calling, and discussed pt immediate needs. Pt stated that he was looking for food resources and legal services due to his previous landlord. Pt states that he is currently living with a family member. Pt reports that he still has the previous apartment he was living in, but was unhappy about the state of the apartment as it's in a dirty and unsanitary building. Pt reports that he applied for [facility] Section 8 housing voucher and waitlist programs and is receiving housing help from a  . Pt also reports that he is on CADI waiver and is receiving PCA. Pt was wanting some food resources as he only receives $200 a month. SW passed along food bank information to pt via [employer]. Pt also discussed needing legal resources as he is going to be going to court or is wanting to take his previous landlord to court. SW passed along [employer] information as well about legal services. Pt reports that he is getting help from one of his MH workers around housing and resources currently.       Chief Complaint   Patient presents with    Clinic Care Coordination - Initial        Universal Utilization: Appropriate     Utilization      No Show Count (past year)  0             ED Visits  0             Hospital Admissions  0                    Current as of: 10/27/2023  3:25 PM                Clinical Concerns:  Current Medical Concerns:  Knee pain, left    Current Behavioral Concerns: Depression    Education Provided to patient:       Health Maintenance Reviewed:    Clinical Pathway: None    Medication Management:  Medication review status: Not reviewed due to nature of call     Functional Status:       Living Situation:       Lifestyle & Psychosocial Needs:    Social Determinants of Health     Food Insecurity: High Risk  (10/18/2023)    Food Insecurity     Within the past 12 months, did you worry that your food would run out before you got money to buy more?: Yes     Within the past 12 months, did the food you bought just not last and you didn t have money to get more?: Yes   Depression: Not at risk (10/18/2023)    PHQ-2     PHQ-2 Score: 0   Housing Stability: High Risk (10/18/2023)    Housing Stability     Do you have housing? : No     Are you worried about losing your housing?: Yes   Tobacco Use: High Risk (10/18/2023)    Patient History     Smoking Tobacco Use: Every Day     Smokeless Tobacco Use: Never     Passive Exposure: Not on file   Financial Resource Strain: High Risk (10/18/2023)    Financial Resource Strain     Within the past 12 months, have you or your family members you live with been unable to get utilities (heat, electricity) when it was really needed?: Yes   Alcohol Use: Not At Risk (8/18/2021)    AUDIT-C     Frequency of Alcohol Consumption: Monthly or less     Average Number of Drinks: 1 or 2     Frequency of Binge Drinking: Never   Transportation Needs: High Risk (10/18/2023)    Transportation Needs     Within the past 12 months, has lack of transportation kept you from medical appointments, getting your medicines, non-medical meetings or appointments, work, or from getting things that you need?: Yes   Physical Activity: Inactive (8/18/2021)    Exercise Vital Sign     Days of Exercise per Week: 0 days     Minutes of Exercise per Session: 0 min   Interpersonal Safety: Low Risk  (10/18/2023)    Interpersonal Safety     Do you feel physically and emotionally safe where you currently live?: Yes     Within the past 12 months, have you been hit, slapped, kicked or otherwise physically hurt by someone?: No     Within the past 12 months, have you been humiliated or emotionally abused in other ways by your partner or ex-partner?: No   Stress: Stress Concern Present (8/18/2021)    [employer]  - Occupational Stress Questionnaire     Feeling of Stress : To some extent   Social Connections: Moderately Integrated (8/18/2021)    Social Connection and Isolation Panel [NHANES]     Frequency of Communication with Friends and Family: More than three times a week     Frequency of Social Gatherings with Friends and Family: More than three times a week     Attends [employer] Services: More than 4 times per year     Active Member of Clubs or Organizations: Yes     Attends Club or Organization Meetings: More than 4 times per year     Marital Status: Never         Resources and Interventions:    [employer]  [email or web address]/groceries/[email or web address]-[employer]/markets/     [employer] [facility] [employer] Shelf  [address] [place] [address]  Call for an appointment  [email or web address]/site/index.[email or web address]/basic-needs/  [phone]      [employer]  [address], [place] [address], [facility]  [email or web address]  [phone]     [employer]  [address] [place] [address]  [email or web address]/find-support/the-corner-[email or web address]  [phone]     Food Bank :     [employer]  https://www.hungersolutions.org/find-help/                 :     [facility] Services ([facility])  [address] [facility], [address] [place] [address]  [email or web address]/  [phone]     HOME Line  [email or web address]  [phone]     [employer] [place]  [email or web address].[email or web address]/          The patient consented via Verbal consent to have contact information and resources sent via email in an unencrypted manner.     Care Plan:      Patient/Caregiver understanding: Yes       Future Appointments                In 1 month Apolinar Ruiz MD [facility]            Plan: [facility] team to pass along additional resources to pt via email. Pt has CADI waiver case  management, MH case management, as well as PCA services at this time. Dual care management is taking place, CC SW to close [facility] enrollment    JOHNNY Rodarte   Social Work Care Coordinator   [facility]    [phone]

## 2023-10-30 NOTE — TELEPHONE ENCOUNTER
Called & left voicemail for patient to review medications & how he is doing on Humira. PA approved for Humira 40 mg weekly thru 12/31/2024. Sent [employer] message to follow.     Vi Baugh, PharmD  [facility] Pharmacist

## 2023-11-08 ENCOUNTER — TELEPHONE (OUTPATIENT)
Dept: FAMILY MEDICINE | Facility: CLINIC | Age: 38
End: 2023-11-08
Payer: MEDICARE

## 2023-11-08 NOTE — TELEPHONE ENCOUNTER
Forms/Letter Request    Type of form/letter:  Service Animal    Have you been seen for this request: N/A    Do we have the form/letter: Yes    Who is the form from? Patient    Where did/will the form come from? Patient or family brought in       When is form/letter needed by: 11/15/2023    How would you like the form/letter returned:     Patient Notified form requests are processed in 3-5 business days:Yes    Could we send this information to you in [place] or would you prefer to receive a phone call?:   Patient would prefer a phone call   Okay to leave a detailed message?: Yes at Cell number on file:    Telephone Information:   Mobile [phone]

## 2023-11-09 NOTE — TELEPHONE ENCOUNTER
Called and spoke with pt letting him know form is completed and ready to . Copy was made, and placed in urgent folder to be scanned to pt's chart.

## 2023-12-14 ENCOUNTER — OFFICE VISIT (OUTPATIENT)
Dept: DERMATOLOGY | Facility: CLINIC | Age: 38
End: 2023-12-14
Payer: MEDICARE

## 2023-12-14 VITALS — DIASTOLIC BLOOD PRESSURE: 96 MMHG | WEIGHT: 195.1 LBS | BODY MASS INDEX: 30.11 KG/M2 | SYSTOLIC BLOOD PRESSURE: 122 MMHG

## 2023-12-14 DIAGNOSIS — L30.9 DERMATITIS: ICD-10-CM

## 2023-12-14 DIAGNOSIS — L73.2 HIDRADENITIS SUPPURATIVA: Primary | ICD-10-CM

## 2023-12-14 DIAGNOSIS — L40.9 PSORIASIS: ICD-10-CM

## 2023-12-14 DIAGNOSIS — L30.9 ECZEMA, UNSPECIFIED TYPE: ICD-10-CM

## 2023-12-14 DIAGNOSIS — L84 CORN: ICD-10-CM

## 2023-12-14 PROCEDURE — 99214 OFFICE O/P EST MOD 30 MIN: CPT | Performed by: DERMATOLOGY

## 2023-12-14 RX ORDER — FLUOCINOLONE ACETONIDE 0.11 MG/ML
OIL TOPICAL
Qty: 236.56 ML | Refills: 5 | Status: SHIPPED | OUTPATIENT
Start: 2023-12-14 | End: 2024-07-22

## 2023-12-14 RX ORDER — FLUOCINOLONE ACETONIDE 0.11 MG/ML
OIL TOPICAL 2 TIMES DAILY
Qty: 236.56 ML | Refills: 11 | Status: SHIPPED | OUTPATIENT
Start: 2023-12-14

## 2023-12-14 RX ORDER — BENZOYL PEROXIDE 10 G/100G
SUSPENSION TOPICAL
Qty: 187 ML | Refills: 11 | Status: SHIPPED | OUTPATIENT
Start: 2023-12-14

## 2023-12-14 RX ORDER — BETAMETHASONE DIPROPIONATE 0.5 MG/G
OINTMENT, AUGMENTED TOPICAL 2 TIMES DAILY
Qty: 50 G | Refills: 3 | Status: SHIPPED | OUTPATIENT
Start: 2023-12-14 | End: 2024-07-09

## 2023-12-14 NOTE — PROGRESS NOTES
[facility] Dermatology Note  Encounter Date: Dec 14, 2023  Office Visit     Dermatology Problem List:  1. Hidradenitis suppurativa (dx 2016), well-controlled on Humira  -Current tx: Humira (started 5/4/2023; neg TB testing 5/4/2023), BPO 10% wash  -Previous tx: multiple surgical procedures (I&D), CSA, dapsone, doxycyline, Lidex  2. Psoriasis vs Atopic dermatitis, mild  - Current tx: fluocinolone oil, Augmented Betamethasone 0.05% ointment BID  3. Clavus, L foot 5th digit  4. Vitiligo  5. Acne vulgaris, mild  -Current tx: BPO 10% wash  6. Abscess, L jawline, resolved  -Developed after ILK (8/3/2023), s/p I&D 8/7/2023 and doxycycline  7. Suspected reactive arthritis, resolved  ____________________________________________    Assessment & Plan:    # Hidradenitis suppurativa, well-controlled  Patient reports remarkable improvement on Humira. Side effects (fatigue, headache) are manageable. 1 small nodule on hip and 2 on chin. Discussed ILK today, but patient wants to defer (given previous abscess development 8/3/2023). Continue Humira. Patient had negative TB testing 5/4/2023. He has no new TB risk factors. He recently got the PCV20 with his PCP, but does not want the shingles vaccine. Patient also wants refills on BPO wash for beard. Since he is doing very well, RTC in 6 months. Encouraged patient to notify clinic if he has worsening control of his HS.    -Continue Humira 40 mg injections weekly  -MTM referral (to ensure there is no gap in coverage for Humira)  -BPO 10% wash, refill provided     # Psoriasis/Atopic dermatitis  Small plaque on L foot today. Patient uses augmented betamtheason ointment for lesions. Also uses fluocinolone oil for his scalp. He says the oil comes in small containers, so he has to frequently return to the pharmacy for refills. Will try to have pharmacy give him 2 bottles.   - Fluocinolone 0.01% oil BID to scalp, refill provided  - Augmented betamethasone 0.05% ointment BID  PRN for flares, refill provided    #Clavus, L foot 5th digit  Patient requesting shave of his corn. Performed this today. Will also send prescription for corn pads.   -Start Mediplast application to corn daily    Procedures Performed:   -Clavus shave: Verbal consent was provided for razor shave of clavus on 5th digit of L foot. There was no bleeding at the site. Vaseline and a bandage were applied. The patient tolerated the procedure and no complications were noted.  -Procedure performed by faculty    Follow-up: 6 month(s), or earlier for new or changing lesions    Staff and Medical Student:   Seen and staffed with Dr. Joseph Ibarra, MS3  Provider Disclosure:   Staff Physician:  I was present with the medical student who participated in the service and in the documentation of the note. I have verified the history and personally performed the physical exam and medical decision making. I agree with the assessment and plan of care as documented in the note.     Apolinar Ruiz MD    Department of Dermatology  [facility]: Phone: [phone], Fax: [phone]  12/19/2023        ____________________________________________    CC: HS follow up     HPI:  Mr. Bernard Padilla is a(n) 38 year old male who presents today as a return patient for HS. He reports that his HS is almost completely resolved. He has 1 nodule on his L hip and a couple on the skin under his beard. He sometimes squeezes the lesions and pus comes out (the ones on his beard smell), but they don't fully go away. He reports that this is very manageable. He wants a refill of BPO wash. He sometimes gets sleepy with the Humira shots and sometimes gets headaches. Otherwise, he has no other side effects and no recent illnesses. He has a corn on his L foot that hurts when it gets hit. He is also dealing with his psoriasis, but it is manageable with the scalp oil  and the betamethasone ointment (patient is requesting refills).  He has new jobs ([employer] during holiday season, and work with [employer]). He has stopped smoking for 4 months and is participating in boxing matches. Overall, he reports that he is doing great.     Physical Exam:  Vitals: BP (!) 122/96 (BP Location: Left arm)   Wt 88.5 kg (195 lb 1.6 oz)   BMI 30.11 kg/m    SKIN: Focused examination of chin, hip, L foot was performed.  - 1 skin-colored nodule on L hip without drainage  -2 skin-colored nodules on chin without drainage  -Hyperkeratotic tan/yellow papule on lateral side of 5th digit of L foot  -Lichenified plaque with white/silver scale on lateral side of L foot  - No other lesions of concern on areas examined.     HS Data      8/25/2022     4:01 PM 5/4/2023     3:57 PM 12/14/2023     2:56 PM   HS Exam Data   LC Type LC1 LC1 LC1   Clinical Subtypes Regular type Regular type Regular type   Acne? Yes Yes Yes   Acne Comments acneiform papules throughout the forehead, and on left jaw  x 3     Dissecting Cellulitis? No No No   Visual analogue score (0-100) 20 30 0   Total Han Stage II I I   Total Inflammatory Nodules 1 0 0   Total Abcesses 0 1 0   Total Draining Tunnels 0 0 0   Total Abscess and Nodule Count 1 1 0   IHS4 Score  1 2 0   Total  HASI Score 0 10 0   HS-PGA  1 0         Medications:  Current Outpatient Medications   Medication    acetaminophen (TYLENOL) 325 MG tablet    adalimumab (HUMIRA *CF*) 40 MG/0.4ML pen kit    albuterol (PROAIR HFA/PROVENTIL HFA/VENTOLIN HFA) 108 (90 Base) MCG/ACT inhaler    albuterol (PROVENTIL) (2.5 MG/3ML) 0.083% neb solution    augmented betamethasone dipropionate (DIPROLENE-AF) 0.05 % external ointment    augmented betamethasone dipropionate (DIPROLENE-AF) 0.05 % external ointment    benzoyl peroxide (PANOXYL) 10 % external liquid    benzoyl peroxide 5 % external liquid    benzoyl peroxide 5 % external liquid    benzoyl peroxide 5 % external liquid    budesonide  (PULMICORT) 0.5 MG/2ML neb solution    doxycycline monohydrate (MONODOX) 100 MG capsule    fluocinolone acetonide (DERMA SMOOTHE/FS BODY) 0.01 % external oil    Fluocinolone Acetonide Scalp (DERMA-SMOOTHE/FS SCALP) 0.01 % OIL oil    fluocinonide (LIDEX) 0.05 % external solution    fluticasone (FLONASE) 50 MCG/ACT nasal spray    IBUPROFEN PO    loratadine (CLARITIN) 10 MG tablet    ondansetron (ZOFRAN ODT) 4 MG ODT tab    salicylic acid (MEDIPLAST) 40 % miscellaneous    senna-docusate (SENOKOT-S/PERICOLACE) 8.6-50 MG tablet    tretinoin (RETIN-A) 0.025 % external cream    tretinoin (RETIN-A) 0.025 % external gel    vitamin D3 (CHOLECALCIFEROL) 1.25 MG (11698 UT) capsule    vitamin D3 (CHOLECALCIFEROL) 50 mcg (2000 units) tablet    adalimumab (HUMIRA *CF*) 80 MG/0.8ML pen kit     Current Facility-Administered Medications   Medication    triamcinolone acetonide (KENALOG-10) injection 10 mg      Past Medical History:   Patient Active Problem List   Diagnosis    Hidradenitis suppurativa    Patellofemoral arthritis of left knee    Eczema    Allergic rhinitis    Depression    Influenza    Knee pain, left    Other chronic pain    Scrotal abscess    Abscess of groin, left    Right rotator cuff tendinitis    Mild intermittent asthma, unspecified whether complicated     Past Medical History:   Diagnosis Date    Anxiety     Arthritis     Arthritis of knee     Boil     Chronic pain     Eczema     Hidradenitis suppurativa     Hydradenitis     Mild intermittent asthma without complication      CC Himanshu Villagran MD  [address] [place] [address] on close of this encounter.

## 2023-12-14 NOTE — LETTER
12/14/2023       RE: Bernard Padilla  [address]  [place] [address]     Dear Colleague,    Thank you for referring your patient, Bernard Padilla, to the [facility] [place] [facility]. Please see a copy of my visit note below.    [facility] Dermatology Note  Encounter Date: Dec 14, 2023  Office Visit     Dermatology Problem List:  1. Hidradenitis suppurativa (dx 2016), well-controlled on Humira  -Current tx: Humira (started 5/4/2023; neg TB testing 5/4/2023), BPO 10% wash  -Previous tx: multiple surgical procedures (I&D), CSA, dapsone, doxycyline, Lidex  2. Psoriasis vs Atopic dermatitis, mild  - Current tx: fluocinolone oil, Augmented Betamethasone 0.05% ointment BID  3. Clavus, L foot 5th digit  4. Vitiligo  5. Acne vulgaris, mild  -Current tx: BPO 10% wash  6. Abscess, L jawline, resolved  -Developed after ILK (8/3/2023), s/p I&D 8/7/2023 and doxycycline  7. Suspected reactive arthritis, resolved  ____________________________________________    Assessment & Plan:    # Hidradenitis suppurativa, well-controlled  Patient reports remarkable improvement on Humira. Side effects (fatigue, headache) are manageable. 1 small nodule on hip and 2 on chin. Discussed ILK today, but patient wants to defer (given previous abscess development 8/3/2023). Continue Humira. Patient had negative TB testing 5/4/2023. He has no new TB risk factors. He recently got the PCV20 with his PCP, but does not want the shingles vaccine. Patient also wants refills on BPO wash for beard. Since he is doing very well, RTC in 6 months. Encouraged patient to notify clinic if he has worsening control of his HS.    -Continue Humira 40 mg injections weekly  -MTM referral (to ensure there is no gap in coverage for Humira)  -BPO 10% wash, refill provided     # Psoriasis/Atopic dermatitis  Small plaque on L foot today. Patient uses augmented  betamtheason ointment for lesions. Also uses fluocinolone oil for his scalp. He says the oil comes in small containers, so he has to frequently return to the pharmacy for refills. Will try to have pharmacy give him 2 bottles.   - Fluocinolone 0.01% oil BID to scalp, refill provided  - Augmented betamethasone 0.05% ointment BID PRN for flares, refill provided    #Clavus, L foot 5th digit  Patient requesting shave of his corn. Performed this today. Will also send prescription for corn pads.   -Start Mediplast application to corn daily    Procedures Performed:   -Clavus shave: Verbal consent was provided for razor shave of clavus on 5th digit of L foot. There was no bleeding at the site. Vaseline and a bandage were applied. The patient tolerated the procedure and no complications were noted.  -Procedure performed by faculty    Follow-up: 6 month(s), or earlier for new or changing lesions    Staff and Medical Student:   Seen and staffed with Dr. Joseph Ibarra, MS3  Provider Disclosure:   Staff Physician:  I was present with the medical student who participated in the service and in the documentation of the note. I have verified the history and personally performed the physical exam and medical decision making. I agree with the assessment and plan of care as documented in the note.     Apolinar Ruiz MD    Department of Dermatology  [facility]: Phone: [phone], Fax: [phone]  12/19/2023        ____________________________________________    CC: HS follow up     HPI:  Mr. Bernard Padilla is a(n) 38 year old male who presents today as a return patient for HS. He reports that his HS is almost completely resolved. He has 1 nodule on his L hip and a couple on the skin under his beard. He sometimes squeezes the lesions and pus comes out (the ones on his beard smell), but they don't fully go away. He reports that this  is very manageable. He wants a refill of BPO wash. He sometimes gets sleepy with the Humira shots and sometimes gets headaches. Otherwise, he has no other side effects and no recent illnesses. He has a corn on his L foot that hurts when it gets hit. He is also dealing with his psoriasis, but it is manageable with the scalp oil and the betamethasone ointment (patient is requesting refills).  He has new jobs ([employer] during holiday season, and work with [employer]). He has stopped smoking for 4 months and is participating in boxing matches. Overall, he reports that he is doing great.     Physical Exam:  Vitals: BP (!) 122/96 (BP Location: Left arm)   Wt 88.5 kg (195 lb 1.6 oz)   BMI 30.11 kg/m    SKIN: Focused examination of chin, hip, L foot was performed.  - 1 skin-colored nodule on L hip without drainage  -2 skin-colored nodules on chin without drainage  -Hyperkeratotic tan/yellow papule on lateral side of 5th digit of L foot  -Lichenified plaque with white/silver scale on lateral side of L foot  - No other lesions of concern on areas examined.     HS Data      8/25/2022     4:01 PM 5/4/2023     3:57 PM 12/14/2023     2:56 PM   HS Exam Data   LC Type LC1 LC1 LC1   Clinical Subtypes Regular type Regular type Regular type   Acne? Yes Yes Yes   Acne Comments acneiform papules throughout the forehead, and on left jaw  x 3     Dissecting Cellulitis? No No No   Visual analogue score (0-100) 20 30 0   Total Han Stage II I I   Total Inflammatory Nodules 1 0 0   Total Abcesses 0 1 0   Total Draining Tunnels 0 0 0   Total Abscess and Nodule Count 1 1 0   IHS4 Score  1 2 0   Total  HASI Score 0 10 0   HS-PGA  1 0         Medications:  Current Outpatient Medications   Medication    acetaminophen (TYLENOL) 325 MG tablet    adalimumab (HUMIRA *CF*) 40 MG/0.4ML pen kit    albuterol (PROAIR HFA/PROVENTIL HFA/VENTOLIN HFA) 108 (90 Base) MCG/ACT inhaler    albuterol (PROVENTIL) (2.5 MG/3ML) 0.083% neb solution    augmented  betamethasone dipropionate (DIPROLENE-AF) 0.05 % external ointment    augmented betamethasone dipropionate (DIPROLENE-AF) 0.05 % external ointment    benzoyl peroxide (PANOXYL) 10 % external liquid    benzoyl peroxide 5 % external liquid    benzoyl peroxide 5 % external liquid    benzoyl peroxide 5 % external liquid    budesonide (PULMICORT) 0.5 MG/2ML neb solution    doxycycline monohydrate (MONODOX) 100 MG capsule    fluocinolone acetonide (DERMA SMOOTHE/FS BODY) 0.01 % external oil    Fluocinolone Acetonide Scalp (DERMA-SMOOTHE/FS SCALP) 0.01 % OIL oil    fluocinonide (LIDEX) 0.05 % external solution    fluticasone (FLONASE) 50 MCG/ACT nasal spray    IBUPROFEN PO    loratadine (CLARITIN) 10 MG tablet    ondansetron (ZOFRAN ODT) 4 MG ODT tab    salicylic acid (MEDIPLAST) 40 % miscellaneous    senna-docusate (SENOKOT-S/PERICOLACE) 8.6-50 MG tablet    tretinoin (RETIN-A) 0.025 % external cream    tretinoin (RETIN-A) 0.025 % external gel    vitamin D3 (CHOLECALCIFEROL) 1.25 MG (21257 UT) capsule    vitamin D3 (CHOLECALCIFEROL) 50 mcg (2000 units) tablet    adalimumab (HUMIRA *CF*) 80 MG/0.8ML pen kit     Current Facility-Administered Medications   Medication    triamcinolone acetonide (KENALOG-10) injection 10 mg      Past Medical History:   Patient Active Problem List   Diagnosis    Hidradenitis suppurativa    Patellofemoral arthritis of left knee    Eczema    Allergic rhinitis    Depression    Influenza    Knee pain, left    Other chronic pain    Scrotal abscess    Abscess of groin, left    Right rotator cuff tendinitis    Mild intermittent asthma, unspecified whether complicated     Past Medical History:   Diagnosis Date    Anxiety     Arthritis     Arthritis of knee     Boil     Chronic pain     Eczema     Hidradenitis suppurativa     Hydradenitis     Mild intermittent asthma without complication      CC Himanshu Villagran MD  [address] [place] [address] on close of this encounter.

## 2023-12-18 ENCOUNTER — VIRTUAL VISIT (OUTPATIENT)
Dept: RHEUMATOLOGY | Facility: CLINIC | Age: 38
End: 2023-12-18
Attending: DERMATOLOGY
Payer: MEDICARE

## 2023-12-18 DIAGNOSIS — L84 CLAVUS: ICD-10-CM

## 2023-12-18 DIAGNOSIS — J30.9 ALLERGIC RHINITIS, UNSPECIFIED SEASONALITY, UNSPECIFIED TRIGGER: ICD-10-CM

## 2023-12-18 DIAGNOSIS — L40.0 PLAQUE PSORIASIS: ICD-10-CM

## 2023-12-18 DIAGNOSIS — L73.2 HIDRADENITIS SUPPURATIVA: Primary | ICD-10-CM

## 2023-12-18 DIAGNOSIS — J45.20 MILD INTERMITTENT ASTHMA, UNSPECIFIED WHETHER COMPLICATED: ICD-10-CM

## 2023-12-18 DIAGNOSIS — G89.29 OTHER CHRONIC PAIN: ICD-10-CM

## 2023-12-18 DIAGNOSIS — L70.0 ACNE VULGARIS: ICD-10-CM

## 2023-12-18 DIAGNOSIS — E55.9 VITAMIN D DEFICIENCY: ICD-10-CM

## 2023-12-18 NOTE — PATIENT INSTRUCTIONS
"Recommendations from today's MTM visit:                                                      Continue Humira 40 mg weekly.   Prior authorization approved thru end of 2024.   I sent refills to [facility] today.     I contacted [facility] to request Vitamin D3 supplement be filled. They informed me that you filled the high-dose Vitamin D3 supplement back in June 2023. We will have you continue with the maintenance daily dose of Vitamin D and recheck your levels at your next in-person office visit.     Start Vitamin D3 1 capsule (2000 units) by mouth daily.  at pharmacy.     Start Mediplast (salicylic acid 40%) - apply pad directly to corn daily.  at pharmacy.     Follow-up: with me (Vi Baugh [facility]) for an - MTM appointmen in around 6 months (6/18/23)    It was great speaking with you today.  I value your experience and would be very thankful for your time in providing feedback in our clinic survey. In the next few days, you may receive an email or text message from [employer] with a link to a survey related to your  clinical pharmacist.\"     To schedule another MTM appointment, please call the clinic directly or you may call the MTM scheduling line at [phone] or toll-free at [phone].     My Clinical Pharmacist's contact information:                                                      Please feel free to contact me with any questions or concerns you have.      Vi Baugh, PharmD  MTM Pharmacist  [facility] Dermat[facility]  "

## 2023-12-18 NOTE — Clinical Note
12/18/2023       RE: Bernard Padilla  [address]  [place] [address]     Dear Colleague,    Thank you for referring your patient, Bernard Padilla, to the [facility] [place] [facility]. Please see a copy of my visit note below.    Medication Therapy Management (MTM) Encounter    ASSESSMENT:                            Medication Adherence/Access:   {adherencechoices:726776}  *** coverage - *** pending / approved / needs appeal.     Hidradenitis suppurativa:   Improving, patient would benefit from ***.   Flaring, patient would benefit from ***.  Provided education on *** today including dosing, administration, side effects, precautions, monitoring, and time to efficacy.   Discussed avoiding live vaccines and encouraged receiving the following non-live vaccines: *** Covid-19 vaccine (2023-24), annual influenza, Shingrix, Prevnar 20, Tetanus booster, and avoidance of live vaccines.   Pre-biologic labs up-to-date / need to be completed ***.     PLAN:                            Continue Humira 40 mg weekly.   Resent orders today   Will renew prior authorization in Jan 2024      For Review by  ***:   -     Follow-up:   - MTM appointment: with me (Vi Baugh [facility]) in around *** weeks (***)   - Dermatology appointment: with  *** in around *** weeks (***)     SUBJECTIVE/OBJECTIVE:                          Bernard Padilla is a 38 year old male called for an initial visit. He was referred to me from Dr. Apolinar Ruiz MD.      Reason for visit: ***.    Medication Adherence/Access:   ***  Humira coverage:   - PA approved thru 12/31/24  - Fills [facility]     Hidradenitis suppurativa:   - Humira (adalimumab) *** mg SC weekly (started May 2023): ***  - benzoyl peroxide wash as needed: ***  Side effects: fatigue, headache (manageable), ***.    Symptoms: ***.  Han Stage: ***    Specialist: Dr. Apolinar Ruiz MD, Dermatology. Last  visit on 12/14/23. The following was recommended:   - ***  Patient reports remarkable improvement on Humira. Side effects (fatigue, headache) are manageable. 1 small nodule on hip and 2 on chin. Discussed ILK today, but patient wants to defer (given previous abscess development 8/3/2023). Continue Humira. Patient had negative TB testing 5/4/2023. He has no new TB risk factors. He recently got the PCV20 with his PCP, but does not want the shingles vaccine. Patient also wants refills on BPO wash for beard. Since he is doing very well, RTC in 6 months. Encouraged patient to notify clinic if he has worsening control of his HS.    -Continue Humira 40 mg injections weekly  -MTM referral (to ensure there is no gap in coverage for Humira)  -BPO 10% wash, refill provided    Previous treatment:   {HS MEDICATIONS:590164}  -Previous tx: multiple surgical procedures (I&D), CSA, dapsone, doxycyline, Lidex    Pre-Biologic Screening: completed 5/4/23  Hep B Surface Antibody Reactive, considered immune      Hep B Core Antibody  Non-reactive    Hep B Surface Antigen Non-reactive     Hep C Antibody  Non-reactive     HIV Antigen Antibody Non-reactive    Quantiferon TB Gold Negative       CBC (5/8/23): within normal limits   CMP (5/4/23): eGFR >90 mL/min, liver panel within normal limits     Immunization History   Covid-19 vaccine (6569-6854 version)  Due to receive   Influenza (annual, 1569-9824) Due to receive, avoid live FluMist   Pneumococcal  Pneumovax-23: 11/2/20   Prevnar-20: 1/11/23 Up-to-date   Tetanus/Tdap  Up-to-date   Shingrix Due to receive, declined at last Derm appt     Pain:   - acetaminophen *** mg as needed   - ibuprofen *** mg as needed     No concerns with side effects or symptoms reported.     Psoriasis/Atopic Dermatitis:   - Fluocinolone 0.01% oil BID to scalp  - Augmented betamethasone 0.05% ointment twice daily as needed for flares  Side effects: ***    Symptoms: ***  Small plaque on L foot today. Patient uses  "augmented BMZ ointment for lesions. Also uses fluocinolone oil for his scalp. He says the oil comes in small containers, so he has to frequently return to the pharmacy for refills. Will try to have pharmacy give him 2 bottles.      Acne Vulgaris:   - doxycyline 100 mg twice daily ***  - benzoyl peroxide 10% wash daily as needed   - tretinoin 0.025% cream/gel ***    Side effects: ***    Symptoms: ***    Clavus:   - Mediplast (salicylic acid 40%) daily to corn      L foot 5th digit  Patient requesting shave of his corn. Performed this today. Will also send prescription for corn pads.     Asthma:   - Albuterol HFA 2 puffs as needed (frequency of use: ***)   - budesonide (Pulmicort) nebulizer twice daily as needed ***  Side effects: ***    Symptoms: ***    Allergic Rhinitis:   loratadine 10 mg daily ***  Flonase (fluticasone) nasal spray - 1-2 spray(s) each nostril ***    Patient reports {:896754}.    {allergy:706546}   Patient feels that current therapy {IS NOT/IS:988539} effective.     Vitamin D3 Deficiency:  - Vitamin D3 2000 units (50 mcg) daily ***    Completed 8 weeks course of high-dose Vitamin D supplementation.     Vitamin D Deficiency Screening Results:  Lab Results   Component Value Date    VITDT 8 (L) 05/04/2023     Allergies/ADRs: {1/2/3/4/5:804623}  Past Medical History: {1/2/3/4/5:227699}  Tobacco: He reports that he has been smoking cigarettes. He has a 20.00 pack-year smoking history. He has never used smokeless tobacco.Nicotine/Tobacco Cessation Plan:   {Nicotine/Tobacco Cessation Plan:407268}    Alcohol: {ALCOHOL CONSUMPTION HX:694644}  {Social and Goals:379758}  ----------------  {BROCK?:132253}    I spent {mtm total time 3:471497} with this patient today. { :289354}. A copy of the visit note was provided to the patient's provider(s).    A summary of these recommendations {GIVEN/NOT GIVEN:759602}.    ***    Telemedicine Visit Details  Type of service:  {[facility]::\"Telephone visit\"}  Start " Time: {video/phone visit start time:152948}  End Time: {video/phone visit end time:408568}       Medication Therapy Recommendations  No medication therapy recommendations to display     Medication Therapy Management (MTM) Encounter    ASSESSMENT:                            Medication Adherence/Access:   Humira coverage - approved thru 12/31/24.     Hidradenitis suppurativa:   Well controlled on current regimen, patient would benefit from continuing Humira 40 mg weekly.  Due for the following non-live vaccines Covid-19 vaccine (2023-24), annual influenza, Shingrix - will address at follow-up. Pre-biologic labs up-to-date.     Pain:   Stable.     Psoriasis:   Stable, continue topical steroids as needed.     Acne Vulgaris:   Stable.     Clavus:   Recommended filling Mediplast (salicylic acid 40%) daily to corn as prescribed by Dr. Ruiz.     Asthma:   Improved with smoking cessation. Congratulated patient on efforts.     Allergic Rhinitis:   Stable.     Vitamin D3 Deficiency:  Last vitamin D level below normal limits of 20 - 75 ug/L. Per pharmacy, high-dose Vitamin D regimen was filled in June 2023. Patient would benefit from starting Vitamin D3 2000 units daily as prescribed. Recommend rechecking Vitamin D levels in around 3 months.     PLAN:                            Continue Humira 40 mg weekly. Resent orders today to [facility].   Contacted [facility] to request Vitamin D3 & Mediplast pads be filled. Patient to  from pharmacy.   Start Vitamin D3 1 capsule (2000 units) by mouth daily.   Start Mediplast (salicylic acid 40%) - apply pad directly to corn daily.     Follow-up: with me (Vi Baugh [facility]) for an - MTM appointmen in around 6 months (6/18/23)    SUBJECTIVE/OBJECTIVE:                          Bernard Padilla is a 38 year old male called for an initial visit. He was referred to me from Dr. Apolinar Ruiz MD.      Reason for visit: Humira  continuation.    Medication Adherence/Access:   Humira coverage:   - PA approved thru 12/31/24  - Fills at [facility]     Hidradenitis suppurativa:   - Humira (adalimumab) 40 mg weekly (started May 2023)  - benzoyl peroxide wash daily as needed   Side effects: initially noticed some fatigue & more frequent headaches with Humira - feels this has improved with time.     Symptoms: Patient reports significant improvement in HS since starting the Humira. Reports ~85% reduction in boils and no flares since starting.     Specialist: Dr. Apolinar Ruiz MD, Dermatology. Last visit on 12/14/23. The following was recommended:   -Continue Humira 40 mg injections weekly    Previous treatment:   - Oral antibiotics: dapsone, doxycyline  - Cyclosporine   - multiple surgical procedures (I&D)     Pre-Biologic Screening: completed 5/4/23  Hep B Surface Antibody Reactive, considered immune      Hep B Core Antibody  Non-reactive    Hep B Surface Antigen Non-reactive     Hep C Antibody  Non-reactive     HIV Antigen Antibody Non-reactive    Quantiferon TB Gold Negative , no risk factors or symptoms concerning for TB.      CBC (5/8/23): within normal limits   CMP (5/4/23): eGFR >90 mL/min, liver panel within normal limits     Immunization History   Covid-19 vaccine ([id number] version)  Due to receive   Influenza (annual, 4843-0875) Due to receive, avoid live FluMist   Pneumococcal  Pneumovax-23: 11/2/20   Prevnar-20: 1/11/23 Up-to-date   Tetanus/Tdap  Up-to-date   Shingrix Due to receive, declined at last Derm appt     Pain:   - acetaminophen 650 mg as needed   - ibuprofen 400 mg as needed     No concerns with side effects.   Finds both effective when needed for arthritic-type pain and general headaches.    Psoriasis:   - Fluocinolone 0.01% oil BID to scalp  - Augmented betamethasone 0.05% ointment twice daily as needed for flares  Side effects: None.     Symptoms: Patient reports occasional mild flares which are controlled with as needed  topical treatments.      Acne Vulgaris:   - doxycyline 100 mg twice daily (only with flares)   - benzoyl peroxide 10% wash daily as needed   - tretinoin 0.025% gel daily at bedtime  Side effects: None.     Symptoms: Patient reports acne has been well controlled with current regimen.     Clavus:   - Mediplast (salicylic acid 40%) daily to corn   Has not started this, needs  from pharmacy.     Asthma:   - Albuterol HFA 2 puffs as needed (frequency of use: rarely)   - budesonide (Pulmicort) nebulizer twice daily as needed (rarely requires)   Side effects: None     Patient reports asthma symptoms have significantly improved since quitting smoking. No concerns with symptoms at this time.     Allergic Rhinitis:   - loratadine 10 mg daily   - Flonase (fluticasone) nasal spray - 1-2 spray(s) each nostril daily as needed   Side effects: None.     Patient feels that current therapy is effective.     Vitamin D3 Deficiency:  No current medications.     Patient is not sure if this he completed 8-week course of high-dose Vitamin D supplementation. Contacted pharmacy and they report this was filled in June 2023. Did not realize he was supposed to continue with maintenance dose.      Lab Results   Component Value Date    VITDT 8 (L) 05/04/2023     Allergies/ADRs: Reviewed in chart  Past Medical History: Reviewed in chart  Tobacco: He reports that he quit smoking about 4 months ago. His smoking use included cigarettes. He has never used smokeless tobacco. Reports he did not use any pharmacotherapies - slowly cut back use & started up with boxing to help minimize cravings.   Alcohol: Reviewed in chart    ----------------    I spent 15 minutes with this patient today. All changes were made via collaborative practice agreement with Apolinar Ruiz Rich. A copy of the visit note was provided to the patient's provider(s).    A summary of these recommendations was sent via [employer].    Vi Baugh PharmD  Medication Therapy  Management Pharmacist   [facility] Dermatology    Telemedicine Visit Details  Type of service:  Telephone visit  Start Time:  10:30am  End Time:  10:45am     Medication Therapy Recommendations  No medication therapy recommendations to display       Again, thank you for allowing me to participate in the care of your patient.      Sincerely,    Vi Baugh RPH

## 2023-12-18 NOTE — PROGRESS NOTES
Medication Therapy Management (MTM) Encounter    ASSESSMENT:                            Medication Adherence/Access:   Humira coverage - approved thru 12/31/24.     Hidradenitis suppurativa:   Well controlled on current regimen, patient would benefit from continuing Humira 40 mg weekly.  Due for the following non-live vaccines Covid-19 vaccine (2023-24), annual influenza, Shingrix - will address at follow-up. Pre-biologic labs up-to-date.     Pain:   Stable.     Psoriasis:   Stable, continue topical steroids as needed.     Acne Vulgaris:   Stable.     Clavus:   Recommended filling Mediplast (salicylic acid 40%) daily to corn as prescribed by Dr. Ruiz.     Asthma:   Improved with smoking cessation. Congratulated patient on efforts.     Allergic Rhinitis:   Stable.     Vitamin D3 Deficiency:  Last vitamin D level below normal limits of 20 - 75 ug/L. Per pharmacy, high-dose Vitamin D regimen was filled in June 2023. Patient would benefit from starting Vitamin D3 2000 units daily as prescribed. Recommend rechecking Vitamin D levels in around 3 months.     PLAN:                            Continue Humira 40 mg weekly.   Prior authorization approved thru end of 2024.   Sent refills to [facility] today.     Contacted [facility] to request Vitamin D3 & Mediplast pads be filled.     Start Vitamin D3 1 capsule (2000 units) by mouth daily.     Start Mediplast (salicylic acid 40%) - apply pad directly to corn daily.     Future: recheck Vitamin D level in around 3 months (3/18/23) or at next office visit. Ordered lab today.     Follow-up: with me (Vi Baugh [facility]) for an - MTM appointmen in around 6 months (6/18/23)    SUBJECTIVE/OBJECTIVE:                          Bernard Padilla is a 38 year old male called for an initial visit. He was referred to me from Dr. Apolinar Ruiz MD.      Reason for visit: Humira continuation.    Medication Adherence/Access:   Humira coverage:   - PA  approved thru 12/31/24  - Fills at [facility]     Hidradenitis suppurativa:   - Humira (adalimumab) 40 mg weekly (started May 2023)  - benzoyl peroxide wash daily as needed   Side effects: initially noticed some fatigue & more frequent headaches with Humira - feels this has improved with time.     Symptoms: Patient reports significant improvement in HS since starting the Humira. Reports ~85% reduction in boils and no flares since starting.     Specialist: Dr. Apolinar Ruiz MD, Dermatology. Last visit on 12/14/23. The following was recommended:   -Continue Humira 40 mg injections weekly    Previous treatment:   - Oral antibiotics: dapsone, doxycyline  - Cyclosporine   - multiple surgical procedures (I&D)     Pre-Biologic Screening: completed 5/4/23  Hep B Surface Antibody Reactive, considered immune      Hep B Core Antibody  Non-reactive    Hep B Surface Antigen Non-reactive     Hep C Antibody  Non-reactive     HIV Antigen Antibody Non-reactive    Quantiferon TB Gold Negative , no risk factors or symptoms concerning for TB.      CBC (5/8/23): within normal limits   CMP (5/4/23): eGFR >90 mL/min, liver panel within normal limits     Immunization History   Covid-19 vaccine (9879-0714 version)  Due to receive   Influenza (annual, 7218-3660) Due to receive, avoid live FluMist   Pneumococcal  Pneumovax-23: 11/2/20   Prevnar-20: 1/11/23 Up-to-date   Tetanus/Tdap  Up-to-date   Shingrix Due to receive, declined at last Derm appt     Pain:   - acetaminophen 650 mg as needed   - ibuprofen 400 mg as needed     No concerns with side effects.   Finds both effective when needed for arthritic-type pain and general headaches.    Psoriasis:   - Fluocinolone 0.01% oil BID to scalp  - Augmented betamethasone 0.05% ointment twice daily as needed for flares  Side effects: None.     Symptoms: Patient reports occasional mild flares which are controlled with as needed topical treatments.      Acne Vulgaris:   - doxycyline 100 mg twice daily  (only with flares)   - benzoyl peroxide 10% wash daily as needed   - tretinoin 0.025% gel daily at bedtime  Side effects: None.     Symptoms: Patient reports acne has been well controlled with current regimen.     Clavus:   - Mediplast (salicylic acid 40%) daily to corn   Has not started this, needs  from pharmacy.     Asthma:   - Albuterol HFA 2 puffs as needed (frequency of use: rarely)   - budesonide (Pulmicort) nebulizer twice daily as needed (rarely requires)   Side effects: None     Patient reports asthma symptoms have significantly improved since quitting smoking. No longer regularly requires rescue inhalers. No concerns with symptoms at this time.     Allergic Rhinitis:   - loratadine 10 mg daily   - Flonase (fluticasone) nasal spray - 1-2 spray(s) each nostril daily as needed   Side effects: None.     Patient feels that current therapy is effective.     Vitamin D3 Deficiency:  No current medications.     Patient is not sure if this he completed 8-week course of high-dose Vitamin D supplementation. Contacted pharmacy and they report this was filled in June 2023. Did not realize he was supposed to continue with maintenance dose.      Lab Results   Component Value Date    VITDT 8 (L) 05/04/2023     Allergies/ADRs: Reviewed in chart  Past Medical History: Reviewed in chart  Tobacco: He reports that he quit smoking about 4 months ago. His smoking use included cigarettes. He has never used smokeless tobacco. Reports he did not use any pharmacotherapies - slowly cut back use & started up with boxing to help minimize cravings.   Alcohol: Reviewed in chart    ----------------    I spent 15 minutes with this patient today. All changes were made via collaborative practice agreement with Apolinar Ruiz. A copy of the visit note was provided to the patient's provider(s).    A summary of these recommendations was sent via [employer].    Vi Baugh, PharmD  Medication Therapy Management Pharmacist   [facility] Dermatology    Telemedicine Visit Details  Type of service:  Telephone visit  Start Time:  10:30am  End Time:  10:45am     Medication Therapy Recommendations  Vitamin D deficiency    Rationale: Untreated condition - Needs additional medication therapy - Indication   Recommendation: Start Medication - vitamin D3 50 MCG (2000 UT) Caps   Status: Accepted - no CPA Needed

## 2023-12-19 ENCOUNTER — TELEPHONE (OUTPATIENT)
Dept: DERMATOLOGY | Facility: CLINIC | Age: 38
End: 2023-12-19
Payer: MEDICARE

## 2023-12-19 NOTE — TELEPHONE ENCOUNTER
Patient Contacted and schedule the following:    Appointment type: Return HS  Provider: Dr. Ruiz  Return date: 6/27/24  Specialty phone number: [phone]

## 2024-02-20 NOTE — TELEPHONE ENCOUNTER
Pt's insurance ([employer]) is no longer cover Humira regardless of PA approval on file. Looks like either Yuflyma or Cyltezo are the preferred products and will more likely need a new PA done as well. Please advise.

## 2024-02-23 ENCOUNTER — TELEPHONE (OUTPATIENT)
Dept: RHEUMATOLOGY | Facility: CLINIC | Age: 39
End: 2024-02-23
Payer: MEDICARE

## 2024-02-23 NOTE — TELEPHONE ENCOUNTER
Update I spoke with this patient and he is not willing to take a biosimilar and would like to appeal the insurance's decision for Humira.  Are we able to appeal this Jennifer?    Vi Baugh RP

## 2024-02-23 NOTE — TELEPHONE ENCOUNTER
Called and spoke with patient about insurance's recent denial of Humira due to plans preferred biosimilar's.  Patient reports he is not willing to take it biosimilar and that he would like to pursue an appeal of the insurance's decision for Humira.  Notes that he is currently out of Humira and took his last dose around 1 week ago.  Explained this process can take up to 30 days and patient expressed understanding that he will go without the Humira for now.  Will reach out to the pharmacy liaison to initiate an appeal.    Vi Baugh, PharmD  [facility] Pharmacist

## 2024-02-24 ENCOUNTER — HEALTH MAINTENANCE LETTER (OUTPATIENT)
Age: 39
End: 2024-02-24

## 2024-02-27 NOTE — TELEPHONE ENCOUNTER
Sent the patient a [employer] message on 2/27/24 informing him that there are no other options to obtain coverage of Humira vs biosimilars.     Vi Baugh RPh

## 2024-03-16 NOTE — TELEPHONE ENCOUNTER
Routing to Derm liaisons to submit to insurance on behalf of patient. Please submit appeal to insurance for coverage of Humira 40 mg weekly (vs plan preferred biosimilars) per patient request. See attached appeal from 3/16/24.     Vi Baugh, PharmD  [facility] Pharmacist

## 2024-03-26 ENCOUNTER — MEDICAL CORRESPONDENCE (OUTPATIENT)
Dept: HEALTH INFORMATION MANAGEMENT | Facility: CLINIC | Age: 39
End: 2024-03-26
Payer: MEDICARE

## 2024-03-28 ENCOUNTER — OFFICE VISIT (OUTPATIENT)
Dept: FAMILY MEDICINE | Facility: CLINIC | Age: 39
End: 2024-03-28
Payer: MEDICARE

## 2024-03-28 VITALS
BODY MASS INDEX: 33.91 KG/M2 | DIASTOLIC BLOOD PRESSURE: 65 MMHG | HEART RATE: 63 BPM | HEIGHT: 66 IN | RESPIRATION RATE: 16 BRPM | TEMPERATURE: 97.4 F | OXYGEN SATURATION: 98 % | SYSTOLIC BLOOD PRESSURE: 122 MMHG | WEIGHT: 211 LBS

## 2024-03-28 DIAGNOSIS — J45.20 MILD INTERMITTENT ASTHMA, UNSPECIFIED WHETHER COMPLICATED: ICD-10-CM

## 2024-03-28 DIAGNOSIS — Z11.3 SCREEN FOR STD (SEXUALLY TRANSMITTED DISEASE): Primary | ICD-10-CM

## 2024-03-28 DIAGNOSIS — F32.A DEPRESSION, UNSPECIFIED DEPRESSION TYPE: ICD-10-CM

## 2024-03-28 PROCEDURE — 99213 OFFICE O/P EST LOW 20 MIN: CPT | Performed by: FAMILY MEDICINE

## 2024-03-28 PROCEDURE — 87491 CHLMYD TRACH DNA AMP PROBE: CPT | Performed by: FAMILY MEDICINE

## 2024-03-28 PROCEDURE — 87591 N.GONORRHOEAE DNA AMP PROB: CPT | Performed by: FAMILY MEDICINE

## 2024-03-28 ASSESSMENT — ASTHMA QUESTIONNAIRES
QUESTION_5 LAST FOUR WEEKS HOW WOULD YOU RATE YOUR ASTHMA CONTROL: WELL CONTROLLED
ACT_TOTALSCORE: 17
ACT_TOTALSCORE: 17
QUESTION_2 LAST FOUR WEEKS HOW OFTEN HAVE YOU HAD SHORTNESS OF BREATH: THREE TO SIX TIMES A WEEK
QUESTION_3 LAST FOUR WEEKS HOW OFTEN DID YOUR ASTHMA SYMPTOMS (WHEEZING, COUGHING, SHORTNESS OF BREATH, CHEST TIGHTNESS OR PAIN) WAKE YOU UP AT NIGHT OR EARLIER THAN USUAL IN THE MORNING: ONCE OR TWICE
QUESTION_1 LAST FOUR WEEKS HOW MUCH OF THE TIME DID YOUR ASTHMA KEEP YOU FROM GETTING AS MUCH DONE AT WORK, SCHOOL OR AT HOME: SOME OF THE TIME
QUESTION_4 LAST FOUR WEEKS HOW OFTEN HAVE YOU USED YOUR RESCUE INHALER OR NEBULIZER MEDICATION (SUCH AS ALBUTEROL): TWO OR THREE TIMES PER WEEK

## 2024-03-28 ASSESSMENT — PATIENT HEALTH QUESTIONNAIRE - PHQ9
SUM OF ALL RESPONSES TO PHQ QUESTIONS 1-9: 17
10. IF YOU CHECKED OFF ANY PROBLEMS, HOW DIFFICULT HAVE THESE PROBLEMS MADE IT FOR YOU TO DO YOUR WORK, TAKE CARE OF THINGS AT HOME, OR GET ALONG WITH OTHER PEOPLE: SOMEWHAT DIFFICULT
SUM OF ALL RESPONSES TO PHQ QUESTIONS 1-9: 17

## 2024-03-28 NOTE — PROGRESS NOTES
Assessment & Plan     Screen for STD (sexually transmitted disease)    - Chlamydia & Gonorrhea by PCR, [facility] - Clinic Collect    Mild intermittent asthma, unspecified whether complicated  Recent exacerbation, but improving.    Depression, unspecified depression type  Continue to follow with a therapist.    New housing form brought in by patient was completed and given to patient, copy to be scanned.    Review of external notes as documented elsewhere in note  30 minutes spent by me on the date of the encounter doing chart review, review of outside records, review of test results, interpretation of tests, patient visit, and documentation       Depression Screening Follow Up        Follow Up Actions Taken  Crisis resource information provided in After Visit Summary  Referred patient back to current mental health provider.       Regular exercise    Jovanni Wagner is a 38 year old, presenting for the following health issues:  STD and Forms      3/28/2024     3:17 PM   Additional Questions   Roomed by hser   Accompanied by self   Failed to redirect to the Timeline version of the [employer] SmartLink.  STD    History of Present Illness       Reason for visit:  Check up and paperwork    He eats 0-1 servings of fruits and vegetables daily.He consumes 6 sweetened beverage(s) daily.He exercises with enough effort to increase his heart rate 9 or less minutes per day.  He exercises with enough effort to increase his heart rate 3 or less days per week. He is missing 7 dose(s) of medications per week.     He would like to be screened  for STDs, no symptoms.  Has been with same female partner.  Patient is already seeing a therapist for depression management, he also sees a dermatologist for recurrent skin abscesses.  He also brought a housing paperwork to be completed, he would like to be nearby all his clinic in this area rather than moving to a different area that does not appear safe for him.  Recent asthma exacerbation,  "likely from URI, but improving.    Review of Systems  Constitutional, HEENT, cardiovascular, pulmonary, gi and gu systems are negative, except as otherwise noted.      Objective    /65 (BP Location: Right arm, Patient Position: Sitting, Cuff Size: Adult Regular)   Pulse 63   Temp 97.4  F (36.3  C) (Temporal)   Resp 16   Ht 1.68 m (5' 6.14\")   Wt 95.7 kg (211 lb)   SpO2 98%   BMI 33.91 kg/m    Body mass index is 33.91 kg/m .  Physical Exam   GENERAL: alert and no distress  NECK: no adenopathy, no asymmetry, masses, or scars  RESP: lungs clear to auscultation - no rales, rhonchi or wheezes  CV: regular rate and rhythm, normal S1 S2, no S3 or S4, no murmur, click or rub, no peripheral edema  ABDOMEN: soft, nontender, no hepatosplenomegaly, no masses and bowel sounds normal  MS: no gross musculoskeletal defects noted, no edema    No results found for any visits on 03/28/24.        Signed Electronically by: Himanshu Villagran MD        Prior to immunization administration, verified patients identity using patient s name and date of birth. Please see Immunization Activity for additional information.     Screening Questionnaire for Adult Immunization    Are you sick today?   No   Do you have allergies to medications, food, a vaccine component or latex?   Yes   Have you ever had a serious reaction after receiving a vaccination?   No   Do you have a long-term health problem with heart, lung, kidney, or metabolic disease (e.g., diabetes), asthma, a blood disorder, no spleen, complement component deficiency, a cochlear implant, or a spinal fluid leak?  Are you on long-term aspirin therapy?   No   Do you have cancer, leukemia, HIV/AIDS, or any other immune system problem?   No   Do you have a parent, brother, or sister with an immune system problem?   No   In the past 3 months, have you taken medications that affect  your immune system, such as prednisone, other steroids, or anticancer drugs; drugs for the treatment of " rheumatoid arthritis, Crohn s disease, or psoriasis; or have you had radiation treatments?   No   Have you had a seizure, or a brain or other nervous system problem?   No   During the past year, have you received a transfusion of blood or blood    products, or been given immune (gamma) globulin or antiviral drug?   No   For women: Are you pregnant or is there a chance you could become       pregnant during the next month?   No   Have you received any vaccinations in the past 4 weeks?   No     Immunization questionnaire was positive for at least one answer.  Notified provider.      Patient instructed to remain in clinic for 15 minutes afterwards, and to report any adverse reactions.     Screening performed by Kulwinder Robles MA on 3/28/2024 at 3:21 PM.

## 2024-03-29 LAB
C TRACH DNA SPEC QL PROBE+SIG AMP: NEGATIVE
N GONORRHOEA DNA SPEC QL NAA+PROBE: NEGATIVE

## 2024-05-06 DIAGNOSIS — T78.40XD ALLERGY, SUBSEQUENT ENCOUNTER: ICD-10-CM

## 2024-05-06 DIAGNOSIS — J45.20 MILD INTERMITTENT ASTHMA, UNSPECIFIED WHETHER COMPLICATED: ICD-10-CM

## 2024-05-06 DIAGNOSIS — N52.01 ERECTILE DYSFUNCTION DUE TO ARTERIAL INSUFFICIENCY: ICD-10-CM

## 2024-05-06 DIAGNOSIS — J45.20 MILD INTERMITTENT ASTHMA WITHOUT COMPLICATION: ICD-10-CM

## 2024-05-06 RX ORDER — ALBUTEROL SULFATE 0.83 MG/ML
SOLUTION RESPIRATORY (INHALATION)
Qty: 90 ML | Refills: 0 | Status: SHIPPED | OUTPATIENT
Start: 2024-05-06

## 2024-05-06 RX ORDER — LORATADINE 10 MG/1
TABLET ORAL
Qty: 90 TABLET | Refills: 2 | Status: SHIPPED | OUTPATIENT
Start: 2024-05-06

## 2024-06-10 ENCOUNTER — TELEPHONE (OUTPATIENT)
Dept: DERMATOLOGY | Facility: CLINIC | Age: 39
End: 2024-06-10
Payer: MEDICARE

## 2024-06-10 NOTE — TELEPHONE ENCOUNTER
[facility] Call Center    Phone Message    May a detailed message be left on voicemail: yes     Reason for Call: Symptoms or Concerns     If patient has red-flag symptoms, warm transfer to triage line    Current symptom or concern: Pt called and said that his HS is flaring up really bad and would like to speak to the care team. I was unable to connect him. Please call him back. Thanks     Symptoms have been present for:  4 day(s)    Has patient previously been seen for this? Yes    By : Dr. Ruiz     Date: 12/14/23    Are there any new or worsening symptoms? Yes: see above    Action Taken: Message routed to:  [facility] DERM    Travel Screening: Not Applicable     Date of Service:

## 2024-06-11 NOTE — TELEPHONE ENCOUNTER
Pt states he is flaring and flare becomes worse after being sexually active even after showering. States the site always drains but irritates his skin. Denies needing injections. Would like to discuss possibility of surgery for the area. Pt's telephone appt changed to in person. Writer will reach out if appt becomes available.

## 2024-06-27 ENCOUNTER — OFFICE VISIT (OUTPATIENT)
Dept: DERMATOLOGY | Facility: CLINIC | Age: 39
End: 2024-06-27
Attending: DERMATOLOGY
Payer: MEDICARE

## 2024-06-27 DIAGNOSIS — R21 ACUTE ERUPTION OF SKIN: ICD-10-CM

## 2024-06-27 DIAGNOSIS — L73.2 HIDRADENITIS SUPPURATIVA: Primary | ICD-10-CM

## 2024-06-27 LAB
KOH PREPARATION: NORMAL
KOH PREPARATION: NORMAL

## 2024-06-27 PROCEDURE — 87116 MYCOBACTERIA CULTURE: CPT | Mod: 90 | Performed by: PATHOLOGY

## 2024-06-27 PROCEDURE — 87102 FUNGUS ISOLATION CULTURE: CPT | Performed by: DERMATOLOGY

## 2024-06-27 PROCEDURE — 88305 TISSUE EXAM BY PATHOLOGIST: CPT | Mod: 26 | Performed by: DERMATOLOGY

## 2024-06-27 PROCEDURE — 87210 SMEAR WET MOUNT SALINE/INK: CPT | Performed by: DERMATOLOGY

## 2024-06-27 PROCEDURE — 11104 PUNCH BX SKIN SINGLE LESION: CPT | Performed by: DERMATOLOGY

## 2024-06-27 PROCEDURE — 88305 TISSUE EXAM BY PATHOLOGIST: CPT | Mod: TC | Performed by: DERMATOLOGY

## 2024-06-27 PROCEDURE — 11105 PUNCH BX SKIN EA SEP/ADDL: CPT | Performed by: DERMATOLOGY

## 2024-06-27 PROCEDURE — 87206 SMEAR FLUORESCENT/ACID STAI: CPT | Mod: 90 | Performed by: PATHOLOGY

## 2024-06-27 PROCEDURE — 99000 SPECIMEN HANDLING OFFICE-LAB: CPT | Performed by: PATHOLOGY

## 2024-06-27 PROCEDURE — 88312 SPECIAL STAINS GROUP 1: CPT | Mod: 26 | Performed by: DERMATOLOGY

## 2024-06-27 PROCEDURE — 87076 CULTURE ANAEROBE IDENT EACH: CPT | Performed by: DERMATOLOGY

## 2024-06-27 PROCEDURE — 87205 SMEAR GRAM STAIN: CPT | Performed by: DERMATOLOGY

## 2024-06-27 PROCEDURE — 88342 IMHCHEM/IMCYTCHM 1ST ANTB: CPT | Mod: 26 | Performed by: DERMATOLOGY

## 2024-06-27 PROCEDURE — 99214 OFFICE O/P EST MOD 30 MIN: CPT | Mod: 25 | Performed by: DERMATOLOGY

## 2024-06-27 PROCEDURE — 87070 CULTURE OTHR SPECIMN AEROBIC: CPT | Performed by: DERMATOLOGY

## 2024-06-27 NOTE — PROGRESS NOTES
[facility] Dermatology Note  Encounter Date: Jun 27, 2024      Dermatology Problem List:  1.  Hidradenitis suppurativa (dx 2016), well-controlled on Humira  -Current tx: Humira (started 5/4/2023; neg TB testing 5/4/2023), BPO 10% wash  -Previous tx: multiple surgical procedures (I&D), CSA, dapsone, doxycyline, Lidex  2. Psoriasis vs Atopic dermatitis, mild  - Current tx: fluocinolone oil, Augmented Betamethasone 0.05% ointment BID  3. Clavus, L foot 5th digit  4. Vitiligo  5. Acne vulgaris, mild  -Current tx: BPO 10% wash  6. Abscess, L jawline, resolved  -Developed after ILK (8/3/2023), s/p I&D 8/7/2023 and doxycycline  7. Suspected reactive arthritis, resolved  8. Chin/jawline eruption    ____________________________________________    Assessment & Plan:  # Hidradenitis suppurativa, well-controlled  - Previously well controlled on humira. Off for 2-3 momnths and just restarted.  - Will check an adalimumabn level and make sure he hasn't developed abs.   -Continue Humira 40 mg injections weekly     # Psoriasis/Atopic dermatitis  - cont Fluocinolone 0.01% oil BID to scalp  - cpnt Augmented betamethasone 0.05% ointment BID PRN for flares     #Clavus, L foot 5th digit  Patient requesting shave of his corn. Performed this today. Will also send prescription for corn pads.   -Mediplast application to corn daily    # Chin/jawline eruption  - HS vs infectious  folliculitis vs prurigo nodules vs other  - Biopsy x2 for H&E and sterile cultures   Punch biopsy:  After discussion of benefits and risks including but not limited to bleeding/bruising, pain/swelling, infection, scar, incomplete removal, nerve damage/numbness, recurrence, and non-diagnostic biopsy,  verbal consent and photographs were obtained. Time-out was performed. The area was cleaned chlorhexidine x1 and  then isopropyl alcohol. 0.5mL of 1% lidocaine injected to obtain adequate anesthesia of the lesion. Two 4 mm punch biopsy was performed.   4-0 prolene sutures were utilized to approximate the epidermal edges.  White petroleum jelly/VaselineTM and a bandage was applied to the wound.  Explicit verbal and written wound care instructions were provided.  The patient left the Dermatology Clinic in good condition. The patient was counseled to follow up for suture removal in approximately 14 days. One biopsy was sent for H&E, the other for bacterial, anaerobic, aerobic and mycobacterial cultures.     Follow-up: 12 weeks in clinic    Staff and Scribe:     Scribe Disclosure:   I, Shawna Flower, am serving as a scribe; to document services personally performed by Apolinar Ruiz MD -based on data collection and the provider's statements to me.     Provider Disclosure:  I agree with above History, Review of Systems, Physical exam and Plan.  I have reviewed the content of the documentation and have edited it as needed. I have personally performed the services documented here and the documentation accurately represents those services and the decisions I have made.      Electronically signed by:  Apolinar Ruiz MD, FAAD, FACP     Departments of Internal Medicine and Dermatology  [facility]    ____________________________________________    CC:  Chief Complaint   Patient presents with    Derm Problem     HS follow up more frequently. Would like to discuss lesions on chin.       HPI:  Mr. Bernard Padilla is a(n) 38 year old male who presents today as a return patient for HS. Has been off humira for 23 months. Falring a lot. Restarted last few weeks and it is getting better.     He also has had increasing nodules on his chin/jawline.     Last seen in clinic on 12/14/23 by me.      Patient is otherwise feeling well, without additional skin concerns.  HS Nurse Assessment        5/4/2023     3:09 PM 8/3/2023     1:41 PM 12/14/2023     2:56 PM   Nurse Assessment Data   Over the past 30 days how many old lesions flared back up? >5 0 1    Over the past 30 days how many new lesions did you get? 1 1 1   Over the past week, how many dressing changes do you do each day? 0 0 0   Over the past week, has your wound drainage been: None None Mild   Rate your HS overall from 0 - 10 (0 = no disease, 10 = worst) over the past week:  9-10 5-6 2-3   Rate your pain score from 0 - 10 (0 = no disease, 10 = worst) for the most painful/symptomatic lesion in the past week:  10 - Worst Pain 7 4   Over the past week, how much has HS influenced your quality of life? extremely very much not at all          Physical Exam:  Vitals: There were no vitals taken for this visit.  SKIN:   - 5 rubbery papules on chin/jawline with alopecia  HS Data      5/4/2023     3:57 PM 12/14/2023     2:56 PM 6/27/2024     4:33 PM   HS Exam Data   LC Type LC1 LC1 LC3   Clinical Subtypes Regular type Regular type Regular type   Acne? Yes Yes Yes   Dissecting Cellulitis? No No No   Visual analogue score (0-100) 30 0    Total Han Stage I I II   Total Inflammatory Nodules 0 0 2   Total Abcesses 1 0 0   Total Draining Tunnels 0 0 1   Total Abscess and Nodule Count 1 0 2   IHS4 Score  2 0 6   Total  HASI Score 10 0    HS-PGA 1 0 3       - No other lesions of concern on areas examined.     HS Data      8/25/2022     4:01 PM 5/4/2023     3:57 PM 12/14/2023     2:56 PM   HS Exam Data   LC Type LC1 LC1 LC1   Clinical Subtypes Regular type Regular type Regular type   Acne? Yes Yes Yes   Acne Comments acneiform papules throughout the forehead, and on left jaw  x 3     Dissecting Cellulitis? No No No   Visual analogue score (0-100) 20 30 0   Total Han Stage II I I   Total Inflammatory Nodules 1 0 0   Total Abcesses 0 1 0   Total Draining Tunnels 0 0 0   Total Abscess and Nodule Count 1 1 0   IHS4 Score  1 2 0   Total  HASI Score 0 10 0   HS-PGA  1 0       Medications:  Current Outpatient Medications   Medication Sig Dispense Refill    acetaminophen (TYLENOL) 325 MG tablet Take 650 mg by mouth every 4  hours as needed      adalimumab (HUMIRA *CF*) 40 MG/0.4ML pen kit Inject 0.4 mLs (40 mg) Subcutaneous every 7 days 1.6 mL 6    albuterol (PROAIR HFA/PROVENTIL HFA/VENTOLIN HFA) 108 (90 Base) MCG/ACT inhaler Inhale 2 puffs into the lungs every 6 hours 8.5 g 3    albuterol (PROVENTIL) (2.5 MG/3ML) 0.083% neb solution TAKE 1 VIAL BY NEBULIZATION EVERY 6 HOURS AS NEEDED FOR SHORTNESS OF BREATH, WHEEZING, OR COUGH 90 mL 0    augmented betamethasone dipropionate (DIPROLENE-AF) 0.05 % external ointment Apply topically 2 times daily 50 g 3    benzoyl peroxide (PANOXYL) 10 % external liquid Use daily as directed 187 mL 11    budesonide (PULMICORT) 0.5 MG/2ML neb solution Take 2 mLs (0.5 mg) by nebulization 2 times daily 30 mL 1    doxycycline monohydrate (MONODOX) 100 MG capsule Take 1 capsule (100 mg) by mouth 2 times daily 180 capsule 1    fluocinolone acetonide (DERMA SMOOTHE/FS BODY) 0.01 % external oil Apply topically 2 times daily 236.56 mL 11    Fluocinolone Acetonide Scalp (DERMA-SMOOTHE/FS SCALP) 0.01 % OIL oil Apply topically daily as instructed. 236.56 mL 5    fluocinonide (LIDEX) 0.05 % external solution Apply topically 2 times daily 60 mL 3    fluticasone (FLONASE) 50 MCG/ACT nasal spray INSTILL 1-2 SPRAYS INTO BOTH NOSTRILS ONCE DAILY 48 g 1    IBUPROFEN PO Take 800 mg by mouth 3 times daily as needed (Last dose 9.19.2020)       loratadine (CLARITIN) 10 MG tablet TAKE ONE TABLET BY MOUTH ONCE DAILY 90 tablet 2    ondansetron (ZOFRAN ODT) 4 MG ODT tab Take 1-2 tablets (4-8 mg) by mouth every 8 hours as needed for nausea 4 tablet 0    salicylic acid (MEDIPLAST) 40 % miscellaneous Apply to corn daily 90 each 1    tretinoin (RETIN-A) 0.025 % external gel Apply topically At Bedtime 45 g 4    vitamin D3 (CHOLECALCIFEROL) 50 mcg (2000 units) tablet Take 1 tablet (50 mcg) by mouth daily 90 tablet 4     Current Facility-Administered Medications   Medication Dose Route Frequency Provider Last Rate Last Admin     triamcinolone acetonide (KENALOG-10) injection 10 mg  10 mg Intra-Lesional Once Apolinar Ruiz MD          Past Medical History:   Patient Active Problem List   Diagnosis    Hidradenitis suppurativa    Patellofemoral arthritis of left knee    Eczema    Allergic rhinitis    Depression    Influenza    Knee pain, left    Other chronic pain    Scrotal abscess    Abscess of groin, left    Right rotator cuff tendinitis    Mild intermittent asthma, unspecified whether complicated     Past Medical History:   Diagnosis Date    Anxiety     Arthritis     Arthritis of knee     Boil     Chronic pain     Eczema     Hidradenitis suppurativa     Hydradenitis     Mild intermittent asthma without complication         CC Apolinar Ruiz MD  [address] [place] [address] on close of this encounter.

## 2024-06-27 NOTE — LETTER
6/27/2024       RE: Bernard Padilla  [address]  [place] [address]     Dear Colleague,    Thank you for referring your patient, Bernard Padilla, to the [facility] D[facility] [place] [facility]. Please see a copy of my visit note below.    [facility] Dermatology Note  Encounter Date: Jun 27, 2024      Dermatology Problem List:  1.  Hidradenitis suppurativa (dx 2016), well-controlled on Humira  -Current tx: Humira (started 5/4/2023; neg TB testing 5/4/2023), BPO 10% wash  -Previous tx: multiple surgical procedures (I&D), CSA, dapsone, doxycyline, Lidex  2. Psoriasis vs Atopic dermatitis, mild  - Current tx: fluocinolone oil, Augmented Betamethasone 0.05% ointment BID  3. Clavus, L foot 5th digit  4. Vitiligo  5. Acne vulgaris, mild  -Current tx: BPO 10% wash  6. Abscess, L jawline, resolved  -Developed after ILK (8/3/2023), s/p I&D 8/7/2023 and doxycycline  7. Suspected reactive arthritis, resolved  8. Chin/jawline eruption    ____________________________________________    Assessment & Plan:  # Hidradenitis suppurativa, well-controlled  - Previously well controlled on humira. Off for 2-3 momnths and just restarted.  - Will check an adalimumabn level and make sure he hasn't developed abs.   -Continue Humira 40 mg injections weekly     # Psoriasis/Atopic dermatitis  - cont Fluocinolone 0.01% oil BID to scalp  - cpnt Augmented betamethasone 0.05% ointment BID PRN for flares     #Clavus, L foot 5th digit  Patient requesting shave of his corn. Performed this today. Will also send prescription for corn pads.   -Mediplast application to corn daily    # Chin/jawline eruption  - HS vs infectious  folliculitis vs prurigo nodules vs other  - Biopsy x2 for H&E and sterile cultures   Punch biopsy:  After discussion of benefits and risks including but not limited to bleeding/bruising, pain/swelling, infection, scar, incomplete  removal, nerve damage/numbness, recurrence, and non-diagnostic biopsy,  verbal consent and photographs were obtained. Time-out was performed. The area was cleaned chlorhexidine x1 and  then isopropyl alcohol. 0.5mL of 1% lidocaine injected to obtain adequate anesthesia of the lesion. Two 4 mm punch biopsy was performed.  4-0 prolene sutures were utilized to approximate the epidermal edges.  White petroleum jelly/VaselineTM and a bandage was applied to the wound.  Explicit verbal and written wound care instructions were provided.  The patient left the Dermatology Clinic in good condition. The patient was counseled to follow up for suture removal in approximately 14 days. One biopsy was sent for H&E, the other for bacterial, anaerobic, aerobic and mycobacterial cultures.     Follow-up: 12 weeks in clinic    Staff and Scribe:     Scribe Disclosure:   I, Shawna Flower, am serving as a scribe; to document services personally performed by Apolinar Ruiz MD -based on data collection and the provider's statements to me.     Provider Disclosure:  I agree with above History, Review of Systems, Physical exam and Plan.  I have reviewed the content of the documentation and have edited it as needed. I have personally performed the services documented here and the documentation accurately represents those services and the decisions I have made.      Electronically signed by:  Apolinar Ruiz MD, FAAD, FACP     Departments of Internal Medicine and Dermatology  [facility]    ____________________________________________    CC:  Chief Complaint   Patient presents with    Derm Problem     HS follow up more frequently. Would like to discuss lesions on chin.       HPI:  Mr. Bernard Padilla is a(n) 38 year old male who presents today as a return patient for HS. Has been off humira for 23 months. Falring a lot. Restarted last few weeks and it is getting better.     He also has had increasing nodules on  his chin/jawline.     Last seen in clinic on 12/14/23 by me.      Patient is otherwise feeling well, without additional skin concerns.  HS Nurse Assessment        5/4/2023     3:09 PM 8/3/2023     1:41 PM 12/14/2023     2:56 PM   Nurse Assessment Data   Over the past 30 days how many old lesions flared back up? >5 0 1   Over the past 30 days how many new lesions did you get? 1 1 1   Over the past week, how many dressing changes do you do each day? 0 0 0   Over the past week, has your wound drainage been: None None Mild   Rate your HS overall from 0 - 10 (0 = no disease, 10 = worst) over the past week:  9-10 5-6 2-3   Rate your pain score from 0 - 10 (0 = no disease, 10 = worst) for the most painful/symptomatic lesion in the past week:  10 - Worst Pain 7 4   Over the past week, how much has HS influenced your quality of life? extremely very much not at all          Physical Exam:  Vitals: There were no vitals taken for this visit.  SKIN:   - 5 rubbery papules on chin/jawline with alopecia  HS Data      5/4/2023     3:57 PM 12/14/2023     2:56 PM 6/27/2024     4:33 PM   HS Exam Data   LC Type LC1 LC1 LC3   Clinical Subtypes Regular type Regular type Regular type   Acne? Yes Yes Yes   Dissecting Cellulitis? No No No   Visual analogue score (0-100) 30 0    Total Han Stage I I II   Total Inflammatory Nodules 0 0 2   Total Abcesses 1 0 0   Total Draining Tunnels 0 0 1   Total Abscess and Nodule Count 1 0 2   IHS4 Score  2 0 6   Total  HASI Score 10 0    HS-PGA 1 0 3       - No other lesions of concern on areas examined.     HS Data      8/25/2022     4:01 PM 5/4/2023     3:57 PM 12/14/2023     2:56 PM   HS Exam Data   LC Type LC1 LC1 LC1   Clinical Subtypes Regular type Regular type Regular type   Acne? Yes Yes Yes   Acne Comments acneiform papules throughout the forehead, and on left jaw  x 3     Dissecting Cellulitis? No No No   Visual analogue score (0-100) 20 30 0   Total Han Stage II I I   Total Inflammatory  Nodules 1 0 0   Total Abcesses 0 1 0   Total Draining Tunnels 0 0 0   Total Abscess and Nodule Count 1 1 0   IHS4 Score  1 2 0   Total  HASI Score 0 10 0   HS-PGA  1 0       Medications:  Current Outpatient Medications   Medication Sig Dispense Refill    acetaminophen (TYLENOL) 325 MG tablet Take 650 mg by mouth every 4 hours as needed      adalimumab (HUMIRA *CF*) 40 MG/0.4ML pen kit Inject 0.4 mLs (40 mg) Subcutaneous every 7 days 1.6 mL 6    albuterol (PROAIR HFA/PROVENTIL HFA/VENTOLIN HFA) 108 (90 Base) MCG/ACT inhaler Inhale 2 puffs into the lungs every 6 hours 8.5 g 3    albuterol (PROVENTIL) (2.5 MG/3ML) 0.083% neb solution TAKE 1 VIAL BY NEBULIZATION EVERY 6 HOURS AS NEEDED FOR SHORTNESS OF BREATH, WHEEZING, OR COUGH 90 mL 0    augmented betamethasone dipropionate (DIPROLENE-AF) 0.05 % external ointment Apply topically 2 times daily 50 g 3    benzoyl peroxide (PANOXYL) 10 % external liquid Use daily as directed 187 mL 11    budesonide (PULMICORT) 0.5 MG/2ML neb solution Take 2 mLs (0.5 mg) by nebulization 2 times daily 30 mL 1    doxycycline monohydrate (MONODOX) 100 MG capsule Take 1 capsule (100 mg) by mouth 2 times daily 180 capsule 1    fluocinolone acetonide (DERMA SMOOTHE/FS BODY) 0.01 % external oil Apply topically 2 times daily 236.56 mL 11    Fluocinolone Acetonide Scalp (DERMA-SMOOTHE/FS SCALP) 0.01 % OIL oil Apply topically daily as instructed. 236.56 mL 5    fluocinonide (LIDEX) 0.05 % external solution Apply topically 2 times daily 60 mL 3    fluticasone (FLONASE) 50 MCG/ACT nasal spray INSTILL 1-2 SPRAYS INTO BOTH NOSTRILS ONCE DAILY 48 g 1    IBUPROFEN PO Take 800 mg by mouth 3 times daily as needed (Last dose 9.19.2020)       loratadine (CLARITIN) 10 MG tablet TAKE ONE TABLET BY MOUTH ONCE DAILY 90 tablet 2    ondansetron (ZOFRAN ODT) 4 MG ODT tab Take 1-2 tablets (4-8 mg) by mouth every 8 hours as needed for nausea 4 tablet 0    salicylic acid (MEDIPLAST) 40 % miscellaneous Apply to corn  daily 90 each 1    tretinoin (RETIN-A) 0.025 % external gel Apply topically At Bedtime 45 g 4    vitamin D3 (CHOLECALCIFEROL) 50 mcg (2000 units) tablet Take 1 tablet (50 mcg) by mouth daily 90 tablet 4     Current Facility-Administered Medications   Medication Dose Route Frequency Provider Last Rate Last Admin    triamcinolone acetonide (KENALOG-10) injection 10 mg  10 mg Intra-Lesional Once Apolinar Ruiz MD          Past Medical History:   Patient Active Problem List   Diagnosis    Hidradenitis suppurativa    Patellofemoral arthritis of left knee    Eczema    Allergic rhinitis    Depression    Influenza    Knee pain, left    Other chronic pain    Scrotal abscess    Abscess of groin, left    Right rotator cuff tendinitis    Mild intermittent asthma, unspecified whether complicated     Past Medical History:   Diagnosis Date    Anxiety     Arthritis     Arthritis of knee     Boil     Chronic pain     Eczema     Hidradenitis suppurativa     Hydradenitis     Mild intermittent asthma without complication         CC Apolinar Ruiz MD  [address] [place] [address]

## 2024-06-30 LAB
BACTERIA TISS BX CULT: ABNORMAL
GRAM STAIN RESULT: ABNORMAL
GRAM STAIN RESULT: ABNORMAL

## 2024-07-02 DIAGNOSIS — L40.9 PSORIASIS: ICD-10-CM

## 2024-07-03 LAB
PATH REPORT.COMMENTS IMP SPEC: NORMAL
PATH REPORT.FINAL DX SPEC: NORMAL
PATH REPORT.GROSS SPEC: NORMAL
PATH REPORT.MICROSCOPIC SPEC OTHER STN: NORMAL
PATH REPORT.RELEVANT HX SPEC: NORMAL

## 2024-07-09 ENCOUNTER — PATIENT OUTREACH (OUTPATIENT)
Dept: CARE COORDINATION | Facility: CLINIC | Age: 39
End: 2024-07-09
Payer: MEDICARE

## 2024-07-09 ENCOUNTER — MYC MEDICAL ADVICE (OUTPATIENT)
Dept: DERMATOLOGY | Facility: CLINIC | Age: 39
End: 2024-07-09
Payer: MEDICARE

## 2024-07-09 RX ORDER — BETAMETHASONE DIPROPIONATE 0.5 MG/G
OINTMENT, AUGMENTED TOPICAL 2 TIMES DAILY
Qty: 50 G | Refills: 0 | Status: SHIPPED | OUTPATIENT
Start: 2024-07-09

## 2024-07-09 NOTE — TELEPHONE ENCOUNTER
augmented betamethasone dipropionate (DIPROLENE-AF) 0.05 % external ointment 50 g 3 12/14/2023 -- No   Sig - Route: Apply topically 2 times daily - Topical     ----------------------  Last Office Visit : 6/27/2024  [facility] Dermatology [facility]      Future Office visit:  0  ----------------------      Pass/Fail Protocol Criteria:  Process 1

## 2024-07-11 ENCOUNTER — PATIENT OUTREACH (OUTPATIENT)
Dept: CARE COORDINATION | Facility: CLINIC | Age: 39
End: 2024-07-11
Payer: MEDICARE

## 2024-07-17 DIAGNOSIS — L73.2 HIDRADENITIS SUPPURATIVA: Primary | ICD-10-CM

## 2024-07-22 ENCOUNTER — VIRTUAL VISIT (OUTPATIENT)
Dept: DERMATOLOGY | Facility: CLINIC | Age: 39
End: 2024-07-22
Attending: DERMATOLOGY
Payer: MEDICARE

## 2024-07-22 ENCOUNTER — TELEPHONE (OUTPATIENT)
Dept: DERMATOLOGY | Facility: CLINIC | Age: 39
End: 2024-07-22

## 2024-07-22 ENCOUNTER — LAB (OUTPATIENT)
Dept: LAB | Facility: CLINIC | Age: 39
End: 2024-07-22
Payer: MEDICARE

## 2024-07-22 DIAGNOSIS — E55.9 VITAMIN D DEFICIENCY: ICD-10-CM

## 2024-07-22 DIAGNOSIS — Z51.81 MEDICATION MONITORING ENCOUNTER: Primary | ICD-10-CM

## 2024-07-22 DIAGNOSIS — L73.2 HIDRADENITIS SUPPURATIVA: ICD-10-CM

## 2024-07-22 DIAGNOSIS — L73.2 HIDRADENITIS SUPPURATIVA: Primary | ICD-10-CM

## 2024-07-22 DIAGNOSIS — Z51.81 MEDICATION MONITORING ENCOUNTER: ICD-10-CM

## 2024-07-22 DIAGNOSIS — L73.9 FOLLICULITIS: ICD-10-CM

## 2024-07-22 LAB
ALBUMIN SERPL BCG-MCNC: 4.1 G/DL (ref 3.5–5.2)
ALP SERPL-CCNC: 91 U/L (ref 40–150)
ALT SERPL W P-5'-P-CCNC: 28 U/L (ref 0–70)
ANION GAP SERPL CALCULATED.3IONS-SCNC: 9 MMOL/L (ref 7–15)
AST SERPL W P-5'-P-CCNC: 26 U/L (ref 0–45)
BASOPHILS # BLD AUTO: 0 10E3/UL (ref 0–0.2)
BASOPHILS NFR BLD AUTO: 0 %
BILIRUB SERPL-MCNC: 0.5 MG/DL
BUN SERPL-MCNC: 12.8 MG/DL (ref 6–20)
CALCIUM SERPL-MCNC: 8.8 MG/DL (ref 8.8–10.4)
CHLORIDE SERPL-SCNC: 105 MMOL/L (ref 98–107)
CREAT SERPL-MCNC: 0.98 MG/DL (ref 0.67–1.17)
EGFRCR SERPLBLD CKD-EPI 2021: >90 ML/MIN/1.73M2
EOSINOPHIL # BLD AUTO: 0.8 10E3/UL (ref 0–0.7)
EOSINOPHIL NFR BLD AUTO: 10 %
ERYTHROCYTE [DISTWIDTH] IN BLOOD BY AUTOMATED COUNT: 12 % (ref 10–15)
GLUCOSE SERPL-MCNC: 85 MG/DL (ref 70–99)
HCO3 SERPL-SCNC: 25 MMOL/L (ref 22–29)
HCT VFR BLD AUTO: 42.8 % (ref 40–53)
HGB BLD-MCNC: 14.4 G/DL (ref 13.3–17.7)
IMM GRANULOCYTES # BLD: 0 10E3/UL
IMM GRANULOCYTES NFR BLD: 0 %
LYMPHOCYTES # BLD AUTO: 3.9 10E3/UL (ref 0.8–5.3)
LYMPHOCYTES NFR BLD AUTO: 45 %
MCH RBC QN AUTO: 30.4 PG (ref 26.5–33)
MCHC RBC AUTO-ENTMCNC: 33.6 G/DL (ref 31.5–36.5)
MCV RBC AUTO: 90 FL (ref 78–100)
MONOCYTES # BLD AUTO: 0.6 10E3/UL (ref 0–1.3)
MONOCYTES NFR BLD AUTO: 6 %
NEUTROPHILS # BLD AUTO: 3.3 10E3/UL (ref 1.6–8.3)
NEUTROPHILS NFR BLD AUTO: 38 %
PLATELET # BLD AUTO: 194 10E3/UL (ref 150–450)
POTASSIUM SERPL-SCNC: 4 MMOL/L (ref 3.4–5.3)
PROT SERPL-MCNC: 6.9 G/DL (ref 6.4–8.3)
RBC # BLD AUTO: 4.74 10E6/UL (ref 4.4–5.9)
SODIUM SERPL-SCNC: 139 MMOL/L (ref 135–145)
VIT D+METAB SERPL-MCNC: 19 NG/ML (ref 20–50)
WBC # BLD AUTO: 8.6 10E3/UL (ref 4–11)

## 2024-07-22 PROCEDURE — 80053 COMPREHEN METABOLIC PANEL: CPT

## 2024-07-22 PROCEDURE — 80145 DRUG ASSAY ADALIMUMAB: CPT | Mod: 90

## 2024-07-22 PROCEDURE — 99000 SPECIMEN HANDLING OFFICE-LAB: CPT

## 2024-07-22 PROCEDURE — 82306 VITAMIN D 25 HYDROXY: CPT

## 2024-07-22 PROCEDURE — 36415 COLL VENOUS BLD VENIPUNCTURE: CPT

## 2024-07-22 PROCEDURE — 82542 COL CHROMOTOGRAPHY QUAL/QUAN: CPT | Mod: 90

## 2024-07-22 PROCEDURE — 85025 COMPLETE CBC W/AUTO DIFF WBC: CPT

## 2024-07-22 NOTE — PATIENT INSTRUCTIONS
"Recommendations from today's MTM visit:                                                      Scheduled for lab only appointment today at [facility] at 3:00pm.   We will check your trough Humira (adalimumab) levels & for antibodies against Humira   Also will check an updated complete blood count, comprehensive metabolic panel (kidney & liver function), and Vitamin D level     Continue Humira 40 mg once weekly. I sent refills to [facility] (#[phone]). Call them to set up your next refill      Start Augmentin (amoxicillin-clavulanate) 1 tablet by mouth twice daily (ordered by Dr. Ruiz for folliculitis on chin/jawline)     Follow-up: with me (Vi Baugh [facility]) for an - MTM appointmen in around 6 months (1/22/25)    It was great speaking with you today.  I value your experience and would be very thankful for your time in providing feedback in our clinic survey. In the next few days, you may receive an email or text message from [employer] with a link to a survey related to your  clinical pharmacist.\"     To schedule another MTM appointment, please call the clinic directly or you may call the MTM scheduling line at [phone] or toll-free at [phone].     My Clinical Pharmacist's contact information:                                                      Please feel free to contact me with any questions or concerns you have.      Vi Baugh, PharmD  MTM Pharmacist  [facility]   "

## 2024-07-22 NOTE — TELEPHONE ENCOUNTER
Called to check in on Humira continuation. See MTM progress note from today (7/22/24).     Vi Baugh, PharmD  MTM Pharmacist  Dermatology

## 2024-07-22 NOTE — Clinical Note
Update that I spoke with him today - we scheduled him to have trough Humira level & Abs rechecked as you recommended at Derm appt in June 2024. He's been back on Humira for around 3 months. Also added CBC & CMP lab since this hasn't been checked since May 2023 & Vitamin D level (he stopped supplement & levels were extremely low at 8 when last checked)   Vi

## 2024-07-22 NOTE — PROGRESS NOTES
Medication Therapy Management (MTM) Encounter    ASSESSMENT:                            Hidradenitis suppurativa/Folliculitis:   Improved with resuming Humira around 2-3 months ago after 3 months off Humira. Continues to experiences occasional flares but these are reportedly minor in scale compared to patient was off Humira. Dermatology recommended checking for adalimumab antibodies due to gap in therapy at last visit. Reviewed with patient today & he was in favor of having this checked. Scheduled for trough adalimumab level & antibody lab this afternoon. Reviewed instructions to start Augmentin as ordered for folliculitis.   - Pre-biologic labs: previously completed.   - Safety labs: added labs for annual CBC & CMP since these were last checked 05/2023.   - Vaccines: Avoid live vaccines. Due for the following non-live vaccines: Covid-19 vaccine (2023-24), Shingrix (2 dose series) - historically declined by patient.     Vitamin D3 Deficiency:  Last vitamin D level below normal limits. Not currently on daily supplement. Recheck levels with labs today.     PLAN:                            Scheduled for lab only appointment today at [facility] at 3:00pm.   Labs: adalimumab level & antibodies plus updated CBC, CMP & Vitamin D level     Continue Humira 40 mg once weekly. Sent refills to [facility] today     Start Augmentin (amoxicillin-clavulanate) 1 tablet by mouth twice daily (ordered by Dr. Ruiz for folliculitis on chin/jawline)     Follow-up: with me (Vi Baugh, [facility]) for an - MTM appointmen in around 6 months (1/22/25)    SUBJECTIVE/OBJECTIVE:                          Bernard Padilla is a 38 year old male called for a follow-up visit from 12/18/23.       Reason for visit: Humira follow-up     Medication Adherence/Access:   Humira coverage:   - PA approved thru 12/31/24  - Fills at [facility]     Hidradenitis suppurativa/Folliculitis:   - Humira (adalimumab) 40 mg weekly  (1st started 05/2023, restarted 2-3 months ago, Tuesdays)  - benzoyl peroxide wash daily as needed   - Augmentin 875-125 mg tablet twice daily (added for folliculitis on 7/18/24, plans to start today)     Patient reports he had been off Humira for around 3-4 months. Restarted Humira around 2-3 months ago. Denies any concerns with side effects since resuming. Reports since resuming Humira that HS has improved. Still has occasional breakthrough flares - reports 2 over the past 2 weeks but that these were minor. No new lesions have developed since resuming. Patient states he would like to check Humira level & antibodies as recommended by Dr. Ruiz to ensure he is responding as well as he can be.     Patient reports he plans to start the Augmentin for folliculitis on face/jawline today.     Specialist: Dr. Apolinar Ruiz MD, Dermatology. Last visit on 6/27/24.The following was recommended:   - Previously well controlled on humira. Off for 2-3 months and just restarted.  - Will check an adalimumabn level and make sure he hasn't developed abs.   -Continue Humira 40 mg injections weekly    Previous treatment:   - Oral antibiotics: dapsone, doxycyline  - Cyclosporine   - multiple surgical procedures (I&D)     Pre-Biologic Screening: completed 5/4/23  Hep B Surface Antibody Reactive, considered immune      Hep B Core Antibody  Non-reactive    Hep B Surface Antigen Non-reactive     Hep C Antibody  Non-reactive     HIV Antigen Antibody Non-reactive    Quantiferon TB Gold Negative , no risk factors or symptoms concerning for TB.      CBC (5/8/23): within normal limits   CMP (5/4/23): eGFR >90 mL/min, liver panel within normal limits     Immunization History   Covid-19 vaccine (7734-3759 version)  Due to receive   Influenza (annual, 1695-5254) Consider vaccine in 2024-25 season, avoid live FluMist   Pneumococcal  Pneumovax-23: 11/2/20   Prevnar-20: 1/11/23 Up-to-date   Tetanus/Tdap  Up-to-date   Shingrix Due to receive,  declined at last Derm appt     Vitamin D3 Deficiency:  No current medications.     Patient reports he has not been on a vitamin D supplement for quite some time but he suspects his levels would likely be low. Completed 8 week course of high dose supplementation last summer 2023.     Lab Results   Component Value Date    VITDT 8 (L) 05/04/2023     Allergies/ADRs: Reviewed in chart  Past Medical History: Reviewed in chart  Tobacco: He reports that he quit smoking about 11 months ago. His smoking use included cigarettes. He started smoking about 20 years ago. He has never used smokeless tobacco. Reports he did not use any pharmacotherapies - slowly cut back use & started up with boxing to help minimize cravings.   Alcohol: Reviewed in chart    ----------------    I spent 10 minutes with this patient today. All changes were made via collaborative practice agreement with Apolinar Ruiz. A copy of the visit note was provided to the patient's provider(s).    A summary of these recommendations was sent via [employer].    Vi Baugh, PharmD  Medication Therapy Management Pharmacist   [facility]     Telemedicine Visit Details  Type of service:  Telephone visit  Start Time:  10:00am  End Time:  10:10am     Medication Therapy Recommendations  No medication therapy recommendations to display

## 2024-07-22 NOTE — Clinical Note
7/22/2024       RE: Bernard Padilla  [address]  [place] [address]     Dear Colleague,    Thank you for referring your patient, Bernard Padilla, to the [facility] [place] at [facility]. Please see a copy of my visit note below.    Medication Therapy Management (MTM) Encounter    ASSESSMENT:                            Hidradenitis suppurativa/Folliculitis:   Improved with resuming Humira around 2-3 months ago after 3 months off Humira. Continues to experiences occasional flares but these are reportedly minor in scale compared to patient was off Humira. Dermatology recommended checking for adalimumab antibodies due to gap in therapy at last visit. Reviewed with patient today & he was in favor of having this checked. Scheduled for trough adalimumab level & antibody lab this afternoon. Reviewed instructions to start Augmentin as ordered for folliculitis.   - Pre-biologic labs: previously completed.   - Safety labs: added labs for annual CBC & CMP since these were last checked 05/2023.   - Vaccines: Avoid live vaccines. Due for the following non-live vaccines: Covid-19 vaccine (2023-24), Shingrix (2 dose series) - historically declined by patient.     Vitamin D3 Deficiency:  Last vitamin D level below normal limits. Not currently on daily supplement. Recheck levels with labs today.     PLAN:                          Scheduled for lab only appointment today at [facility] at 3:00pm.   Labs: adalimumab level & antibodies plus updated CBC, CMP & Vitamin D level   Continue Humira 40 mg once weekly. Sent refills to [facility] today   Start Augmentin (amoxicillin-clavulanate) 1 tablet by mouth twice daily (ordered by Dr. Ruiz for folliculitis on chin/jawline)     Follow-up: with me (Vi Baguh [facility]) for an - MTM appointmen in around 6 months (6/18/23)    SUBJECTIVE/OBJECTIVE:                           Bernard Padilla is a 38 year old male called for a follow-up visit from 12/18/23.       Reason for visit: Humira follow-up     Medication Adherence/Access:   Humira coverage:   - PA approved thru 12/31/24  - Fills at [facility]     Hidradenitis suppurativa/Folliculitis:   - Humira (adalimumab) 40 mg weekly (1st started 05/2023, restarted 2-3 months ago, Tuesdays)  - benzoyl peroxide wash daily as needed   - Augmentin 875-125 mg tablet twice daily (added for folliculitis on 7/18/24, plans to start today)     Patient reports he had been off Humira for around 3-4 months. Restarted Humira around 2-3 months ago. Denies any concerns with side effects since resuming. Reports since resuming Humira that HS has improved. Still has occasional breakthrough flares - reports 2 over the past 2 weeks but that these were minor. No new lesions have developed since resuming. Patient states he would like to check Humira level & antibodies as recommended by Dr. Ruiz to ensure he is responding as well as he can be.     Patient reports he plans to start the Augmentin for folliculitis on face/jawline today.     Specialist: Dr. Apolinar Ruiz MD, Dermatology. Last visit on 6/27/24.The following was recommended:   - Previously well controlled on humira. Off for 2-3 months and just restarted.  - Will check an adalimumabn level and make sure he hasn't developed abs.   -Continue Humira 40 mg injections weekly    Previous treatment:   - Oral antibiotics: dapsone, doxycyline  - Cyclosporine   - multiple surgical procedures (I&D)     Pre-Biologic Screening: completed 5/4/23  Hep B Surface Antibody Reactive, considered immune      Hep B Core Antibody  Non-reactive    Hep B Surface Antigen Non-reactive     Hep C Antibody  Non-reactive     HIV Antigen Antibody Non-reactive    Quantiferon TB Gold Negative , no risk factors or symptoms concerning for TB.      CBC (5/8/23): within normal limits   CMP (5/4/23): eGFR >90 mL/min, liver panel within  normal limits     Immunization History   Covid-19 vaccine (9464-1010 version)  Due to receive   Influenza (annual, 5992-0387) Consider vaccine in 2024-25 season, avoid live FluMist   Pneumococcal  Pneumovax-23: 11/2/20   Prevnar-20: 1/11/23 Up-to-date   Tetanus/Tdap  Up-to-date   Shingrix Due to receive, declined at last Derm appt     Vitamin D3 Deficiency:  No current medications.     Patient reports he has not been on a vitamin D supplement for quite some time but he suspects his levels would likely be low. Completed 8 week course of high dose supplementation last summer 2023.     Lab Results   Component Value Date    VITDT 8 (L) 05/04/2023     Allergies/ADRs: Reviewed in chart  Past Medical History: Reviewed in chart  Tobacco: He reports that he quit smoking about 11 months ago. His smoking use included cigarettes. He started smoking about 20 years ago. He has never used smokeless tobacco. Reports he did not use any pharmacotherapies - slowly cut back use & started up with boxing to help minimize cravings.   Alcohol: Reviewed in chart    ----------------    I spent 10 minutes with this patient today. All changes were made via collaborative practice agreement with Apolinar Ruiz. A copy of the visit note was provided to the patient's provider(s).    A summary of these recommendations was sent via [employer].    Vi Baugh, PharmD  Medication Therapy Management Pharmacist   [facility]     Telemedicine Visit Details  Type of service:  Telephone visit  Start Time:  10:00am  End Time:  10:10am     Medication Therapy Recommendations  No medication therapy recommendations to display            Again, thank you for allowing me to participate in the care of your patient.      Sincerely,    Vi Baugh RPH

## 2024-07-25 ENCOUNTER — TELEPHONE (OUTPATIENT)
Dept: DERMATOLOGY | Facility: CLINIC | Age: 39
End: 2024-07-25
Payer: MEDICARE

## 2024-07-25 DIAGNOSIS — E55.9 VITAMIN D DEFICIENCY: Primary | ICD-10-CM

## 2024-07-25 DIAGNOSIS — E55.9 VITAMIN D DEFICIENCY: ICD-10-CM

## 2024-07-25 LAB
ADALIMUMAB AB SERPL IA-MCNC: <1.7 U/ML
ADALIMUMAB SERPL-MCNC: 16 UG/ML
BACTERIA TISS BX CULT: NO GROWTH

## 2024-07-25 RX ORDER — FAMOTIDINE 20 MG
1000 TABLET ORAL DAILY
Qty: 90 CAPSULE | Refills: 3 | Status: SHIPPED | OUTPATIENT
Start: 2024-07-25

## 2024-07-25 RX ORDER — FAMOTIDINE 20 MG
1000 TABLET ORAL
Qty: 90 CAPSULE | Refills: 3 | Status: SHIPPED | OUTPATIENT
Start: 2024-07-25 | End: 2024-07-25

## 2024-07-25 NOTE — TELEPHONE ENCOUNTER
[facility] Call Center    Phone Message    May a detailed message be left on voicemail: no     Reason for Call: Other: Pharmacy calling- kalee [phone] wondering about Vit D prescription- Is it once a day or once every 28 days.  Route: Take 1,000 Int'l Units by mouth every 28 (twenty-eight) days - Oral. Please call to discuss.       Action Taken: Message routed to:  [facility] DERM/ RHEUM    Travel Screening: Not Applicable     Date of Service:                                                                  \

## 2024-08-23 LAB
ACID FAST STAIN (ARUP): NORMAL

## 2024-09-05 ENCOUNTER — MYC MEDICAL ADVICE (OUTPATIENT)
Dept: DERMATOLOGY | Facility: CLINIC | Age: 39
End: 2024-09-05
Payer: MEDICARE

## 2024-09-19 NOTE — TELEPHONE ENCOUNTER
The abscess site is healing well.      It just needs a little bit of antibiotic ointment and a Band-Aid for about 10 days.      If there are any issues with it completely healing please call the office to make an appointment    Our office phone numbers are  [phone] and      FUTURE VISIT INFORMATION      FUTURE VISIT INFORMATION:  Date: 12/18/24  Time: 9:30am  [place] [facility] [place]  REFERRAL INFORMATION:  Referring provider:  Dr. Apolinar Ruiz  Referring providers clinic:  [facility] derm  Reason for visit/diagnosis  Hidradenitis suppurativa     RECORDS REQUESTED FROM:       Clinic name Comments Records Status Imaging Status   [facility] derm Ov/referral 6/27/24  Ov/notes 7/22/24-8/22/19 epic

## 2024-09-26 ENCOUNTER — TELEPHONE (OUTPATIENT)
Dept: FAMILY MEDICINE | Facility: CLINIC | Age: 39
End: 2024-09-26
Payer: MEDICARE

## 2024-09-26 NOTE — TELEPHONE ENCOUNTER
Forms/Letter Request    Type of form/letter: OTHER: ICD-10 Codes       Do we have the form/letter: Yes: 09/26/2024    Who is the form from? Kacey Woodson,  at [employer]. (if other please explain)    Where did/will the form come from? form was mailed in    When is form/letter needed by: ASAP    How would you like the form/letter returned: Fax : [phone]    Patient Notified form requests are processed in 5-7 business days:No    Could we send this information to you in [employer] or would you prefer to receive a phone call?:   Patient would prefer a phone call   Okay to leave a detailed message?: Yes at Other phone number:  [phone] (Kacey Woodson)

## 2024-09-30 ENCOUNTER — HOSPITAL ENCOUNTER (EMERGENCY)
Age: 39
Discharge: HOME | End: 2024-09-30
Payer: SELF-PAY

## 2024-09-30 VITALS
DIASTOLIC BLOOD PRESSURE: 80 MMHG | HEART RATE: 85 BPM | RESPIRATION RATE: 18 BRPM | SYSTOLIC BLOOD PRESSURE: 132 MMHG | OXYGEN SATURATION: 98 %

## 2024-09-30 VITALS — HEART RATE: 100 BPM | RESPIRATION RATE: 23 BRPM

## 2024-09-30 VITALS
TEMPERATURE: 98.1 F | OXYGEN SATURATION: 97 % | RESPIRATION RATE: 18 BRPM | HEART RATE: 82 BPM | DIASTOLIC BLOOD PRESSURE: 86 MMHG | SYSTOLIC BLOOD PRESSURE: 133 MMHG

## 2024-09-30 VITALS
SYSTOLIC BLOOD PRESSURE: 123 MMHG | HEART RATE: 99 BPM | DIASTOLIC BLOOD PRESSURE: 85 MMHG | OXYGEN SATURATION: 100 % | RESPIRATION RATE: 19 BRPM

## 2024-09-30 VITALS — HEART RATE: 89 BPM

## 2024-09-30 VITALS — HEART RATE: 85 BPM | RESPIRATION RATE: 19 BRPM

## 2024-09-30 VITALS — OXYGEN SATURATION: 97 %

## 2024-09-30 DIAGNOSIS — R94.31: ICD-10-CM

## 2024-09-30 DIAGNOSIS — J45.901: Primary | ICD-10-CM

## 2024-09-30 LAB
ADD MANUAL DIFF: YES
ALANINE AMINOTRANSFERASE: 25 U/L (ref 6–50)
ALBUMIN LEVEL: 4 G/DL (ref 3.5–5.1)
ALBUMIN SPEC-MCNC: 4 G/DL (ref 3.5–5.1)
ALKALINE PHOSPHATASE: 120 U/L (ref 38–126)
ALP SERPL-CCNC: 120 U/L (ref 38–126)
ALT SERPL-CCNC: 25 U/L (ref 6–50)
ANION GAP: 7 MMOL/L (ref 4–12)
ASPARTATE AMINO TRANSFERASE: 31 U/L (ref 17–59)
AST SERPL-CCNC: 31 U/L (ref 17–59)
BASIC METABOLIC AND HEMATOCRIT PNL BLD: 40.1 % (ref 42–52)
BASOPHILS ABSOLUTE MANUAL: 0.12 K/MM3 (ref 0–0.1)
BASOPHILS NFR SPEC MANUAL: 1 % (ref 0–1)
BILIRUBIN,TOTAL: 0.7 MG/DL (ref 0.2–1.3)
BLOOD UREA NITROGEN: 11 MG/DL (ref 9–20)
BUN SERPL-MCNC: 11 MG/DL (ref 9–20)
CALCIUM: 8.7 MG/DL (ref 8.4–10.2)
CARBON DIOXIDE: 23 MMOL/L (ref 22–30)
CELLS COUNTED: 100
CHLORIDE SPEC-MCNC: 106 MMOL/L (ref 98–107)
CHLORIDE: 106 MMOL/L (ref 98–107)
CREAT CL PREDICTED SERPL C-G-VRATE: 117 ML/MIN
EOSINOPHILS ABSOLUTE MANUAL: 1.03 K/MM3 (ref 0.02–0.5)
EOSINOPHILS PERCENT MANUAL: 8 % (ref 0–4)
ESTIMATED CRCL CALCULATION: 117 ML/MIN
ESTIMATED GLOMERULAR FILT RATE: > 60
GFRSERPLBLD MDRD-ARVRAT: > 60 ML/MIN/{1.73_M2}
GLUCOSE SERPL-MCNC: 98 MG/DL (ref 65–110)
GLUCOSE: 98 MG/DL (ref 65–110)
HEMATOCRIT: 40.1 % (ref 42–52)
HEMOGLOBIN: 13.9 G/DL (ref 14–18)
HGB+HCT PNL BLD: 40.1 % (ref 42–52)
IPF CHARGE: (no result)
LYMPHOCYTES  ABSOLUTE MANUAL: 4.25 K/MM3 (ref 1.1–4.5)
LYMPHOCYTES PERCENT MANUAL: 33 % (ref 18–44)
MCV RBC: 90.5 FL (ref 80–100)
MEAN CORPUSCULAR HEMOGLOBIN: 31.4 PG (ref 26–34)
MEAN CORPUSCULAR HGB CONC: 34.7 G/DL (ref 32–36)
NEUTROPHILS PERCENT MANUAL: 56 % (ref 46–73)
PLATELET COUNT RESULT: 197 K/MM3 (ref 150–375)
POTASSIUM: 3.4 MMOL/L (ref 3.4–5)
PROMYELOCYTES PERCENT: 2 %
PROT SERPL-MCNC: 8 G/DL (ref 6.3–8.2)
RED BLOOD COUNT: 4.43 M/MM3 (ref 4.6–6.2)
SMUDGE CELLS: PRESENT
SODIUM: 136 MMOL/L (ref 137–145)
TOTAL PROTEIN: 8 G/DL (ref 6.3–8.2)
WHITE BLOOD COUNT: 12.9 K/MM3 (ref 4.5–10)

## 2024-09-30 PROCEDURE — 36415 COLL VENOUS BLD VENIPUNCTURE: CPT

## 2024-09-30 PROCEDURE — 96375 TX/PRO/DX INJ NEW DRUG ADDON: CPT

## 2024-09-30 PROCEDURE — 93005 ELECTROCARDIOGRAM TRACING: CPT

## 2024-09-30 PROCEDURE — 80053 COMPREHEN METABOLIC PANEL: CPT

## 2024-09-30 PROCEDURE — 85025 COMPLETE CBC W/AUTO DIFF WBC: CPT

## 2024-09-30 PROCEDURE — 99284 EMERGENCY DEPT VISIT MOD MDM: CPT

## 2024-09-30 PROCEDURE — 96365 THER/PROPH/DIAG IV INF INIT: CPT

## 2024-09-30 PROCEDURE — 71045 X-RAY EXAM CHEST 1 VIEW: CPT

## 2024-09-30 PROCEDURE — 94640 AIRWAY INHALATION TREATMENT: CPT

## 2024-09-30 NOTE — XR_ITS
Portable chest x-ray 
 
Comparison: None 
 
Clinical History: Shortness of breath 
 
Findings:  Lungs are clear, without focal consolidation or pleural effusion.  Cardiomediastinal silho
uette is unremarkable. Bones and soft tissues are unremarkable. 
  
Impression: 
  
Normal chest. 
 
__________________________________________________ 
Reviewed, dictated and finalized at [facility]. 
Electronically signed by Deion Jackson M.D. on 9/30/2024 6:34 CDT 
Impression:  
   
Normal chest.

## 2024-09-30 NOTE — ED_ITS
HPI - SOB/Dyspnea    
General    
Chief Complaint: Shortness of Breath/Dyspnea    
Stated Complaint: sob    
Time Seen by Provider: 09/30/24 01:36    
History of Present Illness    
HPI Narrative:     
39-year-old male with a past medical history significant for asthma presenting   
to the emergency department via EMS for concerns of respiratory distress and   
shortness of breath.  Patient was outside playing football today and did not   
have his rescue inhaler with him.  He states he is visiting from out of town.    
He had sudden onset worsening discal TM breathing and required EMS transport.    
EMS provided him albuterol which did improve his symptoms.  On my initial   
encounter patient is resting comfortably in the stretcher but does have very   
coarse breath sounds.  He states he feels slightly better but not to his   
baseline self.  Otherwise was in his normal state of health and has not had a   
recent injuries or illnesses.  Denies any chest pain, nausea, vomiting,   
abdominal pain, back pain, fever, chills.    
Related Data    
                                    Allergies    
    
    
    
Allergy/AdvReac Type Severity Reaction Status Date / Time    
     
acetaminophen [From Vicodin] Allergy  Unknown Verified 09/30/24 02:42    
     
egg Allergy  Unknown Verified 09/30/24 02:42    
     
hydrocodone [From Vicodin] Allergy  Unknown Verified 09/30/24 02:42    
    
    
    
    
Review of Systems    
    
    
Review of Systems:       
As reviewed above in HPI    
       
    
    
Exam    
    
    
Narrative:       
GENERAL: [Well-appearing, well-nourished, and in no acute distress.]    
HEAD: [Normocephalic, atraumatic.]    
EYES: [PERRLA and EOMI.]    
ENT: Nares clear, no rhinorrhea or epistaxis.  Mucous membranes moist.    
NECK: Supple.    
CHEST:  Very coarse breath sounds bilaterally, no significant respiratory   
distress, conversing in full sentences.  Good air entry but expiratory wheezing   
appreciated.    
HEART: [Regular rate and rhythm].  No murmur heard.  [Normal peripheral pulses.]    
ABDOMEN: [Soft, nondistended], [nontender], [No rigidity or guarding]    
EXTREMITIES: Normal range of motion.  [No edema.]    
SKIN: Warm, dry, no rash.    
NEURO: [No focal deficits].  Alert and oriented [x3.]    
PSYCH: [Normal mood and affect.]    
       
    
    
Course    
Vital Signs    
Vital signs:     
    
                                   Vital Signs    
    
    
    
Temperature  36.7 C   09/30/24 00:44    
     
Pulse Rate  82   09/30/24 00:44    
     
Respiratory Rate  18   09/30/24 00:44    
     
Blood Pressure  133/86   09/30/24 00:44    
     
Pulse Oximetry  97   09/30/24 00:44    
     
Oxygen Delivery  Room Air   09/30/24 00:44    
    
    
                                            
    
    
    
Temperature  36.7 C   09/30/24 00:44    
     
Pulse Rate  85   09/30/24 05:47    
     
Respiratory Rate  18   09/30/24 05:47    
     
Blood Pressure  132/80   09/30/24 05:47    
     
Pulse Oximetry  98   09/30/24 05:47    
     
Oxygen Delivery  Room Air   09/30/24 00:51    
    
    
    
    
    
MDM - SOB/Dyspnea    
MDM Narrative    
Medical decision making narrative:     
39-year-old male with a history of asthma presenting to the emergency department  
for an asthma exacerbation.  He has very coarse breath sounds with end-  
expiratory wheezing.  He is saturating well on room air.  EMS was called as he   
was having sudden onset respiratory distress while playing football.  He was   
provided albuterol which did improve some of the wheezing and his mentation.    
Patient states that he has asthma but does not have his inhaler with him.    
Differential diagnosis includes asthma exacerbation, pneumonia, less likely   
pneumothorax but he was playing football and that could have been a potential   
injury.  Workup including a CBC, CMP, chest x-ray, EKG.  He was gi

## 2024-09-30 NOTE — ECG_ITS
Test Date:    2024-09-30 00:49:32                     
                                 Measurements 
Intervals                              Axis           
Rate:         90                       P:            55 
ME:           191                      QRS:          59 
QRSD:         84                       T:            38 
QT:           337                                     
QTc:          413                                     
                           Interpretive Statements 
SINUS RHYTHM 
POSSIBLE LEFT ATRIAL ENLARGEMENT   
NONSPECIFIC ST-T WAVE ABNORMALITY- INFERIOR LEADS 
BASELINE ARTIFACT- I, II, AVR, AVL, V1-V3 
BORDERLINE ECG 
No previous ECG available for comparison 
Electronically Signed On 09- 11:08:06 CDT by Jer Rodriguez D.O.

## 2024-09-30 NOTE — ED.SOB
HPI - SOB/Dyspnea
General
Chief Complaint: Shortness of Breath/Dyspnea
Stated Complaint: sob
Time Seen by Provider: 09/30/24 01:36
History of Present Illness
HPI Narrative: 
39-year-old male with a past medical history significant for asthma presenting to the emergency department via EMS for concerns of respiratory distress and shortness of breath.  Patient was outside playing football today and did not have his rescue 
inhaler with him.  He states he is visiting from out of town.  He had sudden onset worsening discal TM breathing and required EMS transport.  EMS provided him albuterol which did improve his symptoms.  On my initial encounter patient is resting 
comfortably in the stretcher but does have very coarse breath sounds.  He states he feels slightly better but not to his baseline self.  Otherwise was in his normal state of health and has not had a recent injuries or illnesses.  Denies any chest 
pain, nausea, vomiting, abdominal pain, back pain, fever, chills.
Related Data
Allergies

Allergy/AdvReac Type Severity Reaction Status Date / Time
acetaminophen [From Vicodin] Allergy  Unknown Verified 09/30/24 02:42
egg Allergy  Unknown Verified 09/30/24 02:42
hydrocodone [From Vicodin] Allergy  Unknown Verified 09/30/24 02:42



Review of Systems
Review of Systems:   
As reviewed above in HPI
 

Exam
Narrative:   
GENERAL: [Well-appearing, well-nourished, and in no acute distress.]
HEAD: [Normocephalic, atraumatic.]
EYES: [PERRLA and EOMI.]
ENT: Nares clear, no rhinorrhea or epistaxis.  Mucous membranes moist.
NECK: Supple.
CHEST:  Very coarse breath sounds bilaterally, no significant respiratory distress, conversing in full sentences.  Good air entry but expiratory wheezing appreciated.
HEART: [Regular rate and rhythm].  No murmur heard.  [Normal peripheral pulses.]
ABDOMEN: [Soft, nondistended], [nontender], [No rigidity or guarding]
EXTREMITIES: Normal range of motion.  [No edema.]
SKIN: Warm, dry, no rash.
NEURO: [No focal deficits].  Alert and oriented [x3.]
PSYCH: [Normal mood and affect.]
 

Course
Vital Signs
Vital signs: 

Vital Signs

Temperature  36.7 C   09/30/24 00:44
Pulse Rate  82   09/30/24 00:44
Respiratory Rate  18   09/30/24 00:44
Blood Pressure  133/86   09/30/24 00:44
Pulse Oximetry  97   09/30/24 00:44
Oxygen Delivery  Room Air   09/30/24 00:44



Temperature  36.7 C   09/30/24 00:44
Pulse Rate  85   09/30/24 05:47
Respiratory Rate  18   09/30/24 05:47
Blood Pressure  132/80   09/30/24 05:47
Pulse Oximetry  98   09/30/24 05:47
Oxygen Delivery  Room Air   09/30/24 00:51




MDM - SOB/Dyspnea
MDM Narrative
Medical decision making narrative: 
39-year-old male with a history of asthma presenting to the emergency department for an asthma exacerbation.  He has very coarse breath sounds with end-expiratory wheezing.  He is saturating well on room air.  EMS was called as he was having sudden 
onset respiratory distress while playing football.  He was provided albuterol which did improve some of the wheezing and his mentation.  Patient states that he has asthma but does not have his inhaler with him.  Differential diagnosis includes 
asthma exacerbation, pneumonia, less likely pneumothorax but he was playing football and that could have been a potential injury.  Workup including a CBC, CMP, chest x-ray, EKG.  He was given nebulizer treatments including albuterol, ipratropium, IV 
treatments of magnesium, Solu-Medrol and a fluid bolus.  Patient was re-evaluated after treatments.  He was on continuous pulse ox reading cardiac monitor with reassuring vital signs.

Patient had symptomatic improvement after treatments.  Vital signs remained stable.  No longer in respiratory distress.  Much more appreciable air movement on auscultation with minimal wheezing now.  He has a mild leukocytosis of 12.9 which is 
likely reactive to the acute process from his asthma exacerbation.  Electrolytes within normal limits, normal renal and hepatic function

## 2024-10-02 NOTE — PROGRESS NOTES
[facility] Dermatology Note  Encounter Date: Oct 3, 2024    Dermatology Problem List:  1.  Hidradenitis suppurativa (dx 2016), well-controlled on Humira  -Current tx: ordered cosentyx 10   Humira (started 5/4/2023-mid  2024; neg TB testing 5/4/2023), BPO 10% wash  -Previous tx: multiple surgical procedures (I&D), CSA, dapsone, doxycyline, Lidex  2. Psoriasis vs Atopic dermatitis, mild  - Current tx: fluocinolone oil, Augmented Betamethasone 0.05% ointment BID  3. Clavus, L foot 5th digit  4. Vitiligo  5. Acne vulgaris, mild  -Current tx: BPO 10% wash  6. Abscess, L jawline, resolved  -Developed after ILK (8/3/2023), s/p I&D 8/7/2023 and doxycycline  7. Suspected reactive arthritis, resolved  8. Chin/jawline eruption  ____________________________________________    Assessment & Plan:  # Hidradenitis suppurativa, flaring  - Has been of humira for several months because no longer effective when restarted after a break  - Level was appropriate = 16  - Prednisone burst   - Continue augmentin BID   - Will switch to cosentyx. If fails will switch to infliximab,  - [facility] pharmacy referral     # Psoriasis/Atopic dermatitis  - cont Fluocinolone 0.01% oil BID to scalp  - cpnt Augmented betamethasone 0.05% ointment BID PRN for flares     The longitudinal plan of care for the diagnosis(es)/condition(s) as documented were addressed during this visit. Due to the added complexity in care, I will continue to support Bernard in the subsequent management and with ongoing continuity of care.    Follow-up: 12 weeks    Staff and Scribe:     Scribe Disclosure:   I, Shawna Flower, am serving as a scribe; to document services personally performed by Apolinar Ruiz MD -based on data collection and the provider's statements to me.     Provider Disclosure:  I agree with above History, Review of Systems, Physical exam and Plan.  I have reviewed the content of the documentation and have edited it as needed. I have  personally performed the services documented here and the documentation accurately represents those services and the decisions I have made.      Electronically signed by:  Apolinar Ruiz MD, FAAD, FACP     Departments of Internal Medicine and Dermatology  [facility]    ____________________________________________    CC:   Chief Complaint   Patient presents with    Derm Problem     Hospital follow up, continues to flare. Feels medication is not working.       HPI:  Mr. Bernard Padilla is a(n) 39 year old male who presents today as a return patient for HS.  Last seen in clinic on 6/27/24     Flaring/. Adalimumab no longer helpful. He stopped it because not helping.     Patient is otherwise feeling well, without additional skin concerns.    HS Nurse Assessment        8/3/2023     1:41 PM 12/14/2023     2:56 PM 6/27/2024     4:33 PM   Nurse Assessment Data   Over the past 30 days how many old lesions flared back up? 0 1 3   Over the past 30 days how many new lesions did you get? 1 1 1   Over the past week, how many dressing changes do you do each day? 0 0 0   Over the past week, has your wound drainage been: None Mild Severe   Rate your HS overall from 0 - 10 (0 = no disease, 10 = worst) over the past week:  5-6 2-3 9   Rate your pain score from 0 - 10 (0 = no disease, 10 = worst) for the most painful/symptomatic lesion in the past week:  7 4 10 - Worst Pain   Over the past week, how much has HS influenced your quality of life? very much not at all very much     Physical  GEN: NAD  SKIN:   HS Data      12/14/2023     2:56 PM 6/27/2024     4:33 PM 10/3/2024     4:46 PM   HS Exam Data   LC Type LC1 LC3 LC1   Clinical Subtypes Regular type Regular type Regular type   Acne? Yes Yes Yes   Dissecting Cellulitis? No No No   Visual analogue score (0-100) 0     Total Han Stage I II    Total Inflammatory Nodules 0 2 2   Total Abcesses 0 0 0   Total Draining Tunnels 0 1 0   Total Abscess and  Nodule Count 0 2 2   IHS4 Score  0 6 2   Total  HASI Score 0  6   HS-PGA 0 3 2       Medications:  Current Outpatient Medications   Medication Sig Dispense Refill    acetaminophen (TYLENOL) 325 MG tablet Take 650 mg by mouth every 4 hours as needed      adalimumab (HUMIRA *CF*) 40 MG/0.4ML pen kit Inject 0.4 mLs (40 mg) subcutaneously every 7 days 1.6 mL 6    albuterol (PROAIR HFA/PROVENTIL HFA/VENTOLIN HFA) 108 (90 Base) MCG/ACT inhaler Inhale 2 puffs into the lungs every 6 hours 8.5 g 3    albuterol (PROVENTIL) (2.5 MG/3ML) 0.083% neb solution TAKE 1 VIAL BY NEBULIZATION EVERY 6 HOURS AS NEEDED FOR SHORTNESS OF BREATH, WHEEZING, OR COUGH 90 mL 0    amoxicillin-clavulanate (AUGMENTIN) 875-125 MG tablet Take 1 tablet by mouth 2 times daily 180 tablet 0    augmented betamethasone dipropionate (DIPROLENE-AF) 0.05 % external ointment APPLY TO AFFECTED AREA(S) TWO TIMES A DAY 50 g 0    benzoyl peroxide (PANOXYL) 10 % external liquid Use daily as directed 187 mL 11    budesonide (PULMICORT) 0.5 MG/2ML neb solution Take 2 mLs (0.5 mg) by nebulization 2 times daily 30 mL 1    fluocinolone acetonide (DERMA SMOOTHE/FS BODY) 0.01 % external oil Apply topically 2 times daily 236.56 mL 11    fluocinonide (LIDEX) 0.05 % external solution Apply topically 2 times daily 60 mL 3    fluticasone (FLONASE) 50 MCG/ACT nasal spray INSTILL 1-2 SPRAYS INTO BOTH NOSTRILS ONCE DAILY 48 g 1    IBUPROFEN PO Take 800 mg by mouth 3 times daily as needed (Last dose 9.19.2020)       loratadine (CLARITIN) 10 MG tablet TAKE ONE TABLET BY MOUTH ONCE DAILY 90 tablet 2    ondansetron (ZOFRAN ODT) 4 MG ODT tab Take 1-2 tablets (4-8 mg) by mouth every 8 hours as needed for nausea 4 tablet 0    salicylic acid (MEDIPLAST) 40 % miscellaneous Apply to corn daily 90 each 1    tretinoin (RETIN-A) 0.025 % external gel Apply topically At Bedtime 45 g 4    Vitamin D, Cholecalciferol, 25 MCG (1000 UT) CAPS Take 1,000 Int'l Units by mouth daily 90 capsule 3      Current Facility-Administered Medications   Medication Dose Route Frequency Provider Last Rate Last Admin    triamcinolone acetonide (KENALOG-10) injection 10 mg  10 mg Intra-Lesional Once Apolinar Ruiz MD          Past Medical History:   Patient Active Problem List   Diagnosis    Hidradenitis suppurativa    Patellofemoral arthritis of left knee    Eczema    Allergic rhinitis    Depression    Influenza    Knee pain, left    Other chronic pain    Scrotal abscess    Abscess of groin, left    Right rotator cuff tendinitis    Mild intermittent asthma, unspecified whether complicated     Past Medical History:   Diagnosis Date    Anxiety     Arthritis     Arthritis of knee     Boil     Chronic pain     Eczema     Hidradenitis suppurativa     Hydradenitis     Mild intermittent asthma without complication

## 2024-10-03 ENCOUNTER — OFFICE VISIT (OUTPATIENT)
Dept: DERMATOLOGY | Facility: CLINIC | Age: 39
End: 2024-10-03
Payer: MEDICARE

## 2024-10-03 DIAGNOSIS — L73.2 HIDRADENITIS SUPPURATIVA: Primary | ICD-10-CM

## 2024-10-03 PROCEDURE — G2211 COMPLEX E/M VISIT ADD ON: HCPCS | Performed by: DERMATOLOGY

## 2024-10-03 PROCEDURE — 99214 OFFICE O/P EST MOD 30 MIN: CPT | Performed by: DERMATOLOGY

## 2024-10-03 RX ORDER — PREDNISONE 10 MG/1
TABLET ORAL
Qty: 63 TABLET | Refills: 0 | Status: SHIPPED | OUTPATIENT
Start: 2024-10-03 | End: 2024-10-21

## 2024-10-03 NOTE — LETTER
10/3/2024       RE: Bernard Padilla  [address]  [place] [address]     Dear Colleague,    Thank you for referring your patient, Bernard Padilla, to the [facility] [place] at [facility]. Please see a copy of my visit note below.    [facility] Dermatology Note  Encounter Date: Oct 3, 2024    Dermatology Problem List:  1.  Hidradenitis suppurativa (dx 2016), well-controlled on Humira  -Current tx: Humira (started 5/4/2023; neg TB testing 5/4/2023), BPO 10% wash  -Previous tx: multiple surgical procedures (I&D), CSA, dapsone, doxycyline, Lidex  2. Psoriasis vs Atopic dermatitis, mild  - Current tx: fluocinolone oil, Augmented Betamethasone 0.05% ointment BID  3. Clavus, L foot 5th digit  4. Vitiligo  5. Acne vulgaris, mild  -Current tx: BPO 10% wash  6. Abscess, L jawline, resolved  -Developed after ILK (8/3/2023), s/p I&D 8/7/2023 and doxycycline  7. Suspected reactive arthritis, resolved  8. Chin/jawline eruption  ____________________________________________    Assessment & Plan:  # Hidradenitis suppurativa, flarinhg  - Has been of humira for several months because no longer effective when restarted after a break  - Level was appropriate = 16  - Will switch to cosentyx. If fails will switch to infliximab,  - [facility] pharmacy referral     # Psoriasis/Atopic dermatitis  - cont Fluocinolone 0.01% oil BID to scalp  - cpnt Augmented betamethasone 0.05% ointment BID PRN for flares     The longitudinal plan of care for the diagnosis(es)/condition(s) as documented were addressed during this visit. Due to the added complexity in care, I will continue to support Bernard in the subsequent management and with ongoing continuity of care.    Follow-up: 12 weeks    Staff and Scribe:     Scribe Disclosure:   I, Shawna Flower, am serving as a scribe; to document services personally performed by Apolinar Ruiz MD -based on  data collection and the provider's statements to me.     Provider Disclosure:  I agree with above History, Review of Systems, Physical exam and Plan.  I have reviewed the content of the documentation and have edited it as needed. I have personally performed the services documented here and the documentation accurately represents those services and the decisions I have made.      Electronically signed by:  Apolinar Ruiz MD, FAAD, FACP     Departments of Internal Medicine and Dermatology  [facility]    ____________________________________________    CC:   Chief Complaint   Patient presents with     Derm Problem     Hospital follow up, continues to flare. Feels medication is not working.       HPI:  Mr. Bernard Padilla is a(n) 39 year old male who presents today as a return patient for HS.  Last seen in clinic on 6/27/24     Flaring/. Adalimumab no longer helpful. He stopped it because not helping.     Patient is otherwise feeling well, without additional skin concerns.    HS Nurse Assessment        8/3/2023     1:41 PM 12/14/2023     2:56 PM 6/27/2024     4:33 PM   Nurse Assessment Data   Over the past 30 days how many old lesions flared back up? 0 1 3   Over the past 30 days how many new lesions did you get? 1 1 1   Over the past week, how many dressing changes do you do each day? 0 0 0   Over the past week, has your wound drainage been: None Mild Severe   Rate your HS overall from 0 - 10 (0 = no disease, 10 = worst) over the past week:  5-6 2-3 9   Rate your pain score from 0 - 10 (0 = no disease, 10 = worst) for the most painful/symptomatic lesion in the past week:  7 4 10 - Worst Pain   Over the past week, how much has HS influenced your quality of life? very much not at all very much     Physical  GEN: NAD  SKIN:   HS Data      12/14/2023     2:56 PM 6/27/2024     4:33 PM 10/3/2024     4:46 PM   HS Exam Data   LC Type LC1 LC3 LC1   Clinical Subtypes Regular type Regular type  Regular type   Acne? Yes Yes Yes   Dissecting Cellulitis? No No No   Visual analogue score (0-100) 0     Total Han Stage I II    Total Inflammatory Nodules 0 2 2   Total Abcesses 0 0 0   Total Draining Tunnels 0 1 0   Total Abscess and Nodule Count 0 2 2   IHS4 Score  0 6 2   Total  HASI Score 0  6   HS-PGA 0 3 2       Medications:  Current Outpatient Medications   Medication Sig Dispense Refill     acetaminophen (TYLENOL) 325 MG tablet Take 650 mg by mouth every 4 hours as needed       adalimumab (HUMIRA *CF*) 40 MG/0.4ML pen kit Inject 0.4 mLs (40 mg) subcutaneously every 7 days 1.6 mL 6     albuterol (PROAIR HFA/PROVENTIL HFA/VENTOLIN HFA) 108 (90 Base) MCG/ACT inhaler Inhale 2 puffs into the lungs every 6 hours 8.5 g 3     albuterol (PROVENTIL) (2.5 MG/3ML) 0.083% neb solution TAKE 1 VIAL BY NEBULIZATION EVERY 6 HOURS AS NEEDED FOR SHORTNESS OF BREATH, WHEEZING, OR COUGH 90 mL 0     amoxicillin-clavulanate (AUGMENTIN) 875-125 MG tablet Take 1 tablet by mouth 2 times daily 180 tablet 0     augmented betamethasone dipropionate (DIPROLENE-AF) 0.05 % external ointment APPLY TO AFFECTED AREA(S) TWO TIMES A DAY 50 g 0     benzoyl peroxide (PANOXYL) 10 % external liquid Use daily as directed 187 mL 11     budesonide (PULMICORT) 0.5 MG/2ML neb solution Take 2 mLs (0.5 mg) by nebulization 2 times daily 30 mL 1     fluocinolone acetonide (DERMA SMOOTHE/FS BODY) 0.01 % external oil Apply topically 2 times daily 236.56 mL 11     fluocinonide (LIDEX) 0.05 % external solution Apply topically 2 times daily 60 mL 3     fluticasone (FLONASE) 50 MCG/ACT nasal spray INSTILL 1-2 SPRAYS INTO BOTH NOSTRILS ONCE DAILY 48 g 1     IBUPROFEN PO Take 800 mg by mouth 3 times daily as needed (Last dose 9.19.2020)        loratadine (CLARITIN) 10 MG tablet TAKE ONE TABLET BY MOUTH ONCE DAILY 90 tablet 2     ondansetron (ZOFRAN ODT) 4 MG ODT tab Take 1-2 tablets (4-8 mg) by mouth every 8 hours as needed for nausea 4 tablet 0     salicylic  acid (MEDIPLAST) 40 % miscellaneous Apply to corn daily 90 each 1     tretinoin (RETIN-A) 0.025 % external gel Apply topically At Bedtime 45 g 4     Vitamin D, Cholecalciferol, 25 MCG (1000 UT) CAPS Take 1,000 Int'l Units by mouth daily 90 capsule 3     Current Facility-Administered Medications   Medication Dose Route Frequency Provider Last Rate Last Admin     triamcinolone acetonide (KENALOG-10) injection 10 mg  10 mg Intra-Lesional Once Apolinar Ruiz MD          Past Medical History:   Patient Active Problem List   Diagnosis     Hidradenitis suppurativa     Patellofemoral arthritis of left knee     Eczema     Allergic rhinitis     Depression     Influenza     Knee pain, left     Other chronic pain     Scrotal abscess     Abscess of groin, left     Right rotator cuff tendinitis     Mild intermittent asthma, unspecified whether complicated     Past Medical History:   Diagnosis Date     Anxiety      Arthritis      Arthritis of knee      Boil      Chronic pain      Eczema      Hidradenitis suppurativa      Hydradenitis      Mild intermittent asthma without complication          Again, thank you for allowing me to participate in the care of your patient.      Sincerely,    Apolinar Ruiz MD

## 2024-10-03 NOTE — PATIENT INSTRUCTIONS
Continue augmentin twice a day  Start cosentyx 300mg weekly x5 weeks and then every other week thereafter  Flare   - Prednisone 60mg taper

## 2024-10-03 NOTE — NURSING NOTE
Dermatology Rooming Note    Bernard Padilla's goals for this visit include:   Chief Complaint   Patient presents with    Derm Problem     Hospital follow up, continues to flare. Feels medication is not working.

## 2024-10-09 ENCOUNTER — VIRTUAL VISIT (OUTPATIENT)
Dept: PHARMACY | Facility: CLINIC | Age: 39
End: 2024-10-09
Attending: DERMATOLOGY
Payer: MEDICARE

## 2024-10-09 ENCOUNTER — TELEPHONE (OUTPATIENT)
Dept: DERMATOLOGY | Facility: CLINIC | Age: 39
End: 2024-10-09
Payer: MEDICARE

## 2024-10-09 DIAGNOSIS — L73.2 HIDRADENITIS SUPPURATIVA: Primary | ICD-10-CM

## 2024-10-09 NOTE — PATIENT INSTRUCTIONS
Recommendations from today's MTM visit:                                                      I reordered Cosentyx Unoready Pen (300 mg/2 mL) today so that you only need to administer 1 pen per dose. This has been approved by your insurance.     Set up delivery with [facility] (P#: [phone])     Start Cosentyx: Inject 300 mg (1 pen) subcutaneous once weekly for 5 weeks (Weeks 0, 1, 2, 3, and 4), followed by 300 mg every 2 weeks thereafter for maintenance.     Injection site: Administer into front of thighs or abdomen (at least 2 inches from navel). Rotate injection site. Do not administer into lesions     Storage: Refrigerate until use. Allow pen to sit at room temperature 30 minutes before your injection. Cosentyx pens are OK at room temperature for up to 4 days if needed for traveling purposes and may be returned to the refrigerator 1 time if needed.     Administration: Cosentyx is an auto-injector pen. To give your dose you will remove the cap when you are ready to administer. Then press and hold the pen firmly against your skin (at the clean injection site). You will hear the 1st click which indicates the injection has started, continue to hold firmly against your skin. You will hear a 2nd click which indicates the injection is almost complete, double check that the green indicator fills the window and has stopped moving, this means your injection is complete & the pen can be removed. Lastly, dispose of the pen in a sharps container.     Demo Video: (click on the Unoready pen) [email or web address]    Follow-up: with me (Vi Baugh, [facility]) for an MTM appointment by phone on Wednesday, January 22nd 2025 at 12:30pm     It was great speaking with you today.  I value your experience and would be very thankful for your time in providing feedback in our clinic survey. In the next few days, you may receive an email or text message from [employer]  "with a link to a survey related to your  clinical pharmacist.\"     To schedule another MTM appointment, please call the clinic directly or you may call the MTM scheduling line at [phone] or toll-free at [phone].     My Clinical Pharmacist's contact information:                                                      Please feel free to contact me with any questions or concerns you have.      Vi Baugh, PharmD  MTM Pharmacist  [facility] Dermatology   "

## 2024-10-09 NOTE — PROGRESS NOTES
Medication Therapy Management (MTM) Encounter    ASSESSMENT:                            Hidradenitis suppurativa/Folliculitis:   Flaring. Has been off Humira for the past month, stopped due to loss of efficacy. Dermatology recommended initiation of Cosentyx (secukinumab). Provided education on Cosentyx today including dosing, administration, potential side effects/precautions, and time to response.   - Pre-biologic labs: previously completed.   - Vaccines: Avoid live vaccines. Due for the following non-live vaccines: Covid-19 vaccine, influenza vaccine, Shingrix (2 dose series) - patient declined today.     PLAN:                            Reordered Cosentyx to [facility] (300 mg/2 mL). Confirmed with pharmacy liaisons this is approved.   Set up delivery with [facility] (P#: [phone])   Start Cosentyx: Inject 300 mg (1 pen) subcutaneous once weekly for 5 weeks (Weeks 0, 1, 2, 3, and 4), followed by 300 mg every 2 weeks thereafter for maintenance.     Follow-up: with me (Vi Baugh [facility]) for an MTM appointment by phone on Wednesday, January 22nd 2025 at 12:30pm     SUBJECTIVE/OBJECTIVE:                          Bernard Padilla is a 38 year old male called for a follow-up visit.      Reason for visit: Cosentyx start    Medication Adherence/Access:   Cosentyx coverage:   - PA approved thru 12/31/24  - Fills at [facility]   Humira coverage:   - PA approved thru 12/31/24  - Fills at [facility]     Hidradenitis suppurativa/Folliculitis:   - Cosentyx (secukinumab) 300 mg weekly x 5 weeks, followed every 2 weeks   - benzoyl peroxide wash daily as needed   - Augmentin 875-125 mg tablet twice daily (added for folliculitis on 7/18/24)   - Acute flare plan:    - Prednisone taper (recently restarted)     Patient reports he stopped Humira around 1 month ago due to loss of response in HS. Today reports he has 4 active lesions, constantly flaring. Affected areas include groin & buttocks.     Specialist: Dr. Jiang  MD Joseph, Dermatology. Last visit on 10/3/24. Referred for initiation of Cosentyx.     Previous treatment:   - Oral antibiotics: dapsone, doxycyline  - Cyclosporine   - multiple surgical procedures (I&D)   - Humira (adalimumab) 40 mg weekly (05/2023-09/2024): lost response after 2-3 months off treatment     Pre-Biologic Screening: completed 5/4/23  Hep B Surface Antibody Reactive, considered immune      Hep B Core Antibody  Non-reactive    Hep B Surface Antigen Non-reactive     Hep C Antibody  Non-reactive     HIV Antigen Antibody Non-reactive    Quantiferon TB Gold Negative , no risk factors or symptoms concerning for TB.      CBC (5/8/23): within normal limits   CMP (5/4/23): eGFR >90 mL/min, liver panel within normal limits     Immunization History   Covid-19 vaccine (4487-9418 version)  Due to receive - declined    Influenza (annual, 2967-7545) Due to receive, avoid live FluMist - declined    Pneumococcal  Pneumovax-23: 11/2/20   Prevnar-20: 1/11/23 Up-to-date   Tetanus/Tdap  Up-to-date   Shingrix Due to receive - declined      Allergies/ADRs: Reviewed in chart  Past Medical History: Reviewed in chart  Tobacco: He reports that he quit smoking about 13 months ago. His smoking use included cigarettes. He started smoking about 21 years ago. He has never used smokeless tobacco. Reports he did not use any pharmacotherapies - slowly cut back use & started up with boxing to help minimize cravings.   Alcohol: Reviewed in chart  ----------------    I spent 12 minutes with this patient today. All changes were made via collaborative practice agreement with Apolinar Ruiz. A copy of the visit note was provided to the patient's provider(s).    A summary of these recommendations was sent via [employer].    Vi Baugh, PharmD  Medication Therapy Management Pharmacist   [facility]     Telemedicine Visit Details  The patient's medications can be safely assessed via a telemedicine  encounter.  Type of service:  Telephone visit  Originating Location (pt. Location): Home  Distant Location (provider location):  Off-site  Start Time:  1:30pm  End Time:  1:43pm     Medication Therapy Recommendations  No medication therapy recommendations to display

## 2024-10-09 NOTE — TELEPHONE ENCOUNTER
10/9 Patient confirmed scheduled appointment:  Date: 1/2/2025  Time: 4:30 pm  Visit type: Return Hidradenitis Suppura  Provider: Joseph  Location: [facility] (PHONE)  Testing/imaging: n/a  Additional notes: n/a

## 2024-10-22 ENCOUNTER — PHARMACY VISIT (OUTPATIENT)
Dept: ADMINISTRATIVE | Facility: CLINIC | Age: 39
End: 2024-10-22
Payer: MEDICARE

## 2024-10-22 NOTE — PROGRESS NOTES
Activity Date 2024/10/22     Activity Medications    COSENTYX      Summary Notes  I spoke with patient for specialty pharmacy assessment.   Current Regimen: Cosentyx 300mg every week x 5 weeks, then 300mg every other week thereafter     Adherence: Started 1st dose on 10/15, then 10/22, next dose will be 10/29. He is staying right on track. We went over the dosing schedule again and he understands the loading and maintenance doses, and how to order next and when to order.     Tolerability: Denies ISRs. He had a little diarrhea for the first week, but he says that has now resolved.   Med/Allergy Changes: None.     *HS*: It had to be a brief call, so he didn't expand too much on his symptoms/condition. But he knows the drug will take some time to get to it's full potential.     Follow-Up: Passing off to MTM pharmacist. Appointment scheduled for 1/22/25.    Elias Garvey, PharmD  Therapy Management Pharmacist  [facility]

## 2024-11-07 DIAGNOSIS — L40.9 PSORIASIS: ICD-10-CM

## 2024-11-11 RX ORDER — BETAMETHASONE DIPROPIONATE 0.5 MG/G
OINTMENT, AUGMENTED TOPICAL 2 TIMES DAILY
Qty: 50 G | Refills: 1 | Status: SHIPPED | OUTPATIENT
Start: 2024-11-11

## 2024-11-11 NOTE — TELEPHONE ENCOUNTER
Last Clinic Visit: 10/3/2024  [facility] [place]  Next visit 1/2/25  Process #2

## 2024-11-21 ENCOUNTER — TELEPHONE (OUTPATIENT)
Dept: FAMILY MEDICINE | Facility: CLINIC | Age: 39
End: 2024-11-21

## 2024-11-21 ENCOUNTER — ALLIED HEALTH/NURSE VISIT (OUTPATIENT)
Dept: FAMILY MEDICINE | Facility: CLINIC | Age: 39
End: 2024-11-21
Payer: MEDICARE

## 2024-11-21 ENCOUNTER — VIRTUAL VISIT (OUTPATIENT)
Dept: FAMILY MEDICINE | Facility: CLINIC | Age: 39
End: 2024-11-21
Payer: MEDICARE

## 2024-11-21 DIAGNOSIS — J45.30 MILD PERSISTENT ASTHMA WITHOUT COMPLICATION: ICD-10-CM

## 2024-11-21 DIAGNOSIS — Z23 NEED FOR PROPHYLACTIC VACCINATION AGAINST HEPATITIS B VIRUS: ICD-10-CM

## 2024-11-21 DIAGNOSIS — Z23 ENCOUNTER FOR IMMUNIZATION: Primary | ICD-10-CM

## 2024-11-21 DIAGNOSIS — J45.20 MILD INTERMITTENT ASTHMA, UNSPECIFIED WHETHER COMPLICATED: ICD-10-CM

## 2024-11-21 DIAGNOSIS — J45.20 MILD INTERMITTENT ASTHMA, UNSPECIFIED WHETHER COMPLICATED: Primary | ICD-10-CM

## 2024-11-21 RX ORDER — ALBUTEROL SULFATE 0.83 MG/ML
2.5 SOLUTION RESPIRATORY (INHALATION) 3 TIMES DAILY PRN
Qty: 90 ML | Refills: 0 | Status: SHIPPED | OUTPATIENT
Start: 2024-11-21

## 2024-11-21 RX ORDER — BUDESONIDE AND FORMOTEROL FUMARATE DIHYDRATE 160; 4.5 UG/1; UG/1
2 AEROSOL RESPIRATORY (INHALATION) 2 TIMES DAILY
Qty: 10.2 G | Refills: 1 | Status: SHIPPED | OUTPATIENT
Start: 2024-11-21

## 2024-11-21 ASSESSMENT — ASTHMA QUESTIONNAIRES
QUESTION_1 LAST FOUR WEEKS HOW MUCH OF THE TIME DID YOUR ASTHMA KEEP YOU FROM GETTING AS MUCH DONE AT WORK, SCHOOL OR AT HOME: SOME OF THE TIME
QUESTION_3 LAST FOUR WEEKS HOW OFTEN DID YOUR ASTHMA SYMPTOMS (WHEEZING, COUGHING, SHORTNESS OF BREATH, CHEST TIGHTNESS OR PAIN) WAKE YOU UP AT NIGHT OR EARLIER THAN USUAL IN THE MORNING: ONCE A WEEK
EMERGENCY_ROOM_LAST_YEAR_TOTAL: ONE
ACT_TOTALSCORE: 15
QUESTION_5 LAST FOUR WEEKS HOW WOULD YOU RATE YOUR ASTHMA CONTROL: SOMEWHAT CONTROLLED
ACT_TOTALSCORE: 15
QUESTION_4 LAST FOUR WEEKS HOW OFTEN HAVE YOU USED YOUR RESCUE INHALER OR NEBULIZER MEDICATION (SUCH AS ALBUTEROL): ONE OR TWO TIMES PER DAY
QUESTION_2 LAST FOUR WEEKS HOW OFTEN HAVE YOU HAD SHORTNESS OF BREATH: ONCE OR TWICE A WEEK

## 2024-11-21 NOTE — LETTER
November 21, 2024      Bernard Padilla  [address]  [place] [address]        To Whom It May Concern:     I am the primary care physician of Bernard Padilla, I am familiar with the Patient's history and and functional limitations imposed by his mental related illness.  Due to this emotional and mental disability, Bernard Padilla has difficulty coping with stress, anxiety and depression.  In order to help alleviate these difficulties, and to enhance his ability to function independently, I have prescribed Bernard Padilla  to obtain an emotional support animal.  The presence of an animal pet  will help mitigate the symptoms of mental illness that he is currently experiencing.     If you have any questions or concerns, please don't hesitate to call.           Sincerely,        Himanshu Villagran MD

## 2024-11-21 NOTE — PROGRESS NOTES
Bernard is a 39 year old who is being evaluated via a billable video visit.    How would you like to obtain your AVS? MyChart  If the video visit is dropped, the invitation should be resent by: Text to cell phone: [phone]  Will anyone else be joining your video visit? No  {If patient encounters technical issues they should call [phone] :615508}    {PROVIDER CHARTING PREFERENCE:905711}    Subjective   Bernard is a 39 year old, presenting for the following health issues:  Forms      11/21/2024     3:23 PM   Additional Questions   Roomed by MARIAN         11/21/2024   Forms   Any forms needing to be completed Yes        Video Start Time: {video visit start/end time for provider to select:200609}    History of Present Illness       Reason for visit:  For my pet papers to filled out He is missing 1 dose(s) of medications per week.  He is not taking prescribed medications regularly due to remembering to take.     {SUPERLIST (Optional):448621}  {additonal problems for provider to add (Optional):035448}    {ROS Picklists (Optional):585243}      Objective           Vitals:  No vitals were obtained today due to virtual visit.    Physical Exam   {video visit exam brief selected:185982}    {Diagnostic Test Results (Optional):603290}      Video-Visit Details    Type of service:  Video Visit   Video End Time:{video visit start/end time for provider to select:940248}  Originating Location (pt. Location): Home  {PROVIDER LOCATION On-site should be selected for visits conducted from your clinic location or adjoining [facility] hospital, academic office, or other nearby [facility] building. Off-site should be selected for all other provider locations, including home:723229}  Distant Location (provider location):  On-site  Platform used for Video Visit: Zoom (Telehealth)  Signed Electronically by: Himanshu Villagran MD  {Email feedback regarding this note to [email or web address]   :745180}

## 2024-11-21 NOTE — PROGRESS NOTES
Prior to immunization administration, verified patients identity using patient s name and date of birth. Please see Immunization Activity for additional information.     Screening Questionnaire for Adult Immunization    Are you sick today?   No   Do you have allergies to medications, food, a vaccine component or latex?   No   Have you ever had a serious reaction after receiving a vaccination?   No   Do you have a long-term health problem with heart, lung, kidney, or metabolic disease (e.g., diabetes), asthma, a blood disorder, no spleen, complement component deficiency, a cochlear implant, or a spinal fluid leak?  Are you on long-term aspirin therapy?   No   Do you have cancer, leukemia, HIV/AIDS, or any other immune system problem?   No   Do you have a parent, brother, or sister with an immune system problem?   No   In the past 3 months, have you taken medications that affect  your immune system, such as prednisone, other steroids, or anticancer drugs; drugs for the treatment of rheumatoid arthritis, Crohn s disease, or psoriasis; or have you had radiation treatments?   No   Have you had a seizure, or a brain or other nervous system problem?   No   During the past year, have you received a transfusion of blood or blood    products, or been given immune (gamma) globulin or antiviral drug?   No   For women: Are you pregnant or is there a chance you could become       pregnant during the next month?   No   Have you received any vaccinations in the past 4 weeks?   No     Immunization questionnaire answers were all negative.    I have reviewed the following standing orders:   This patient is due and qualifies for the Hep A vaccine.    Click here for Hepatitis A Standing Order    I have reviewed the vaccines inclusion and exclusion criteria; No concerns regarding eligibility.     Patient instructed to remain in clinic for 15 minutes afterwards, and to report any adverse reactions.     Screening performed by Cait  Shantal on 11/21/2024 at 4:20 PM.

## 2024-11-25 NOTE — TELEPHONE ENCOUNTER
PA Initiation    Medication: BUDESONIDE-FORMOTEROL FUMARATE 160-4.5 MCG/ACT IN AERO  Insurance Company: [employer] Part D - Phone [phone] Fax [phone]  Pharmacy Filling the Rx: [place], [place] - [address] [place] [address]  Filling Pharmacy Phone: [phone]  Filling Pharmacy Fax:    Start Date: 11/25/2024  Retail Pharmacy Prior Authorization Team   Phone: [phone]

## 2024-11-26 RX ORDER — FLUTICASONE FUROATE AND VILANTEROL 200; 25 UG/1; UG/1
1 POWDER RESPIRATORY (INHALATION) DAILY
Qty: 60 EACH | Refills: 2 | Status: SHIPPED | OUTPATIENT
Start: 2024-11-26

## 2024-11-26 NOTE — TELEPHONE ENCOUNTER
Please let him know that insurance will not cover Symbicort, but they will cover Breo, I sent a new prescription to use 1 puff daily.  Have nurse follow-up with ACT in about a month to ensure improvement.

## 2024-11-26 NOTE — TELEPHONE ENCOUNTER
PRIOR AUTHORIZATION DENIED    Medication: BUDESONIDE-FORMOTEROL FUMARATE 160-4.5 MCG/ACT IN AERO  Insurance Company: [employer] Part D - Phone [phone] Fax [phone]  Denial Date: 11/25/2024  Denial Reason(s):     Appeal Information:     Patient Notified: The clinic should notify the patient of the denial.

## 2024-12-18 ENCOUNTER — PRE VISIT (OUTPATIENT)
Dept: PLASTIC SURGERY | Facility: CLINIC | Age: 39
End: 2024-12-18

## 2024-12-18 ENCOUNTER — OFFICE VISIT (OUTPATIENT)
Dept: PLASTIC SURGERY | Facility: CLINIC | Age: 39
End: 2024-12-18
Attending: DERMATOLOGY
Payer: MEDICARE

## 2024-12-18 VITALS
WEIGHT: 199.6 LBS | OXYGEN SATURATION: 99 % | HEIGHT: 66 IN | HEART RATE: 87 BPM | BODY MASS INDEX: 32.08 KG/M2 | SYSTOLIC BLOOD PRESSURE: 109 MMHG | DIASTOLIC BLOOD PRESSURE: 71 MMHG

## 2024-12-18 DIAGNOSIS — L73.2 HIDRADENITIS SUPPURATIVA: ICD-10-CM

## 2024-12-18 ASSESSMENT — PAIN SCALES - GENERAL: PAINLEVEL_OUTOF10: EXTREME PAIN (8)

## 2024-12-18 NOTE — LETTER
12/18/2024       RE: Bernard Padilla  [address]  [place] [address]     Dear Colleague,    Thank you for referring your patient, Bernard Padilla, to the [facility] [place] at [facility]. Please see a copy of my visit note below.      FOLLOWUP NOTE     PRESENTING COMPLAINT:  Followup visit for hidradenitis suppurativa of the groin, perianal area.     HISTORY OF PRESENTING COMPLAINT:  Mr. Padilla is 37 years old.  Knows me from previous surgeries.  He has been having recurrences in the areas.  He has been followed by Dermatology and has been on Cosentyx that has helped tremendously.  There were just a couple of areas in his groin/scrotal area that continued to give him issues.     ASSESSMENT AND PLAN:  Based on the above findings, a diagnosis of hidradenitis suppurativa was made.  I have advised that he see urology for further workup given the location of the problem to ensure no deeper issues are at hand.  I will see him back as needed.     Total time spent with chart review, visit itself and post-visit paperwork was 10 minutes.         Again, thank you for allowing me to participate in the care of your patient.      Sincerely,    CURT Leos MD

## 2024-12-18 NOTE — NURSING NOTE
"Chief Complaint   Patient presents with    RECHECK     HS follow-up.       Vitals:    12/18/24 0923   BP: 109/71   BP Location: Left arm   Patient Position: Sitting   Cuff Size: Adult Regular   Pulse: 87   SpO2: 99%   Weight: 90.5 kg (199 lb 9.6 oz)   Height: 1.676 m (5' 6\")       Body mass index is 32.22 kg/m .    Patient reports severe groin/perianal pain (8/10).    Jeffry Vega, EMT    "

## 2024-12-18 NOTE — PROGRESS NOTES
FOLLOWUP NOTE     PRESENTING COMPLAINT:  Followup visit for hidradenitis suppurativa of the groin, perianal area.     HISTORY OF PRESENTING COMPLAINT:  Mr. Padilla is 37 years old.  Knows me from previous surgeries.  He has been having recurrences in the areas.  He has been followed by Dermatology and has been on Cosentyx that has helped tremendously.  There were just a couple of areas in his groin/scrotal area that continued to give him issues.     ASSESSMENT AND PLAN:  Based on the above findings, a diagnosis of hidradenitis suppurativa was made.  I have advised that he see urology for further workup given the location of the problem to ensure no deeper issues are at hand.  I will see him back as needed.     Total time spent with chart review, visit itself and post-visit paperwork was 10 minutes.

## 2024-12-31 DIAGNOSIS — L30.9 ECZEMA, UNSPECIFIED TYPE: ICD-10-CM

## 2025-01-02 ENCOUNTER — VIRTUAL VISIT (OUTPATIENT)
Dept: DERMATOLOGY | Facility: CLINIC | Age: 40
End: 2025-01-02
Attending: DERMATOLOGY
Payer: MEDICARE

## 2025-01-02 DIAGNOSIS — L40.9 PSORIASIS: ICD-10-CM

## 2025-01-02 DIAGNOSIS — L30.9 ECZEMA, UNSPECIFIED TYPE: ICD-10-CM

## 2025-01-02 DIAGNOSIS — L73.2 HIDRADENITIS SUPPURATIVA: ICD-10-CM

## 2025-01-02 RX ORDER — FLUOCINOLONE ACETONIDE 0.11 MG/ML
OIL TOPICAL DAILY
Qty: 118.28 ML | Refills: 11 | Status: SHIPPED | OUTPATIENT
Start: 2025-01-02

## 2025-01-02 RX ORDER — FLUOCINOLONE ACETONIDE 0.11 MG/ML
OIL TOPICAL 2 TIMES DAILY
Qty: 236.56 ML | Refills: 11 | Status: SHIPPED | OUTPATIENT
Start: 2025-01-02

## 2025-01-02 RX ORDER — BETAMETHASONE DIPROPIONATE 0.5 MG/G
OINTMENT, AUGMENTED TOPICAL 2 TIMES DAILY
Qty: 50 G | Refills: 4 | Status: SHIPPED | OUTPATIENT
Start: 2025-01-02

## 2025-01-02 ASSESSMENT — PAIN SCALES - GENERAL: PAINLEVEL_OUTOF10: NO PAIN (0)

## 2025-01-02 NOTE — PROGRESS NOTES
[facility] Dermatology Note  Encounter Date: Jan 2, 2025  Telephone ([phone] . Location of teledermatologist: [facility] [place]. Start time: 8:42pm. End time: 8:54pm    Dermatology Problem List:  1.  Hidradenitis suppurativa (dx 2016), well-controlled on Humira  -Current tx: ordered cosentyx 10   Humira (started 5/4/2023-mid  2024; neg TB testing 5/4/2023), BPO 10% wash  -Previous tx: multiple surgical procedures (I&D), CSA, dapsone, doxycyline, Lidex  2. Psoriasis vs Atopic dermatitis, mild  - Current tx: fluocinolone oil, Augmented Betamethasone 0.05% ointment BID  3. Clavus, L foot 5th digit  4. Vitiligo  5. Acne vulgaris, mild  -Current tx: BPO 10% wash  6. Abscess, L jawline, resolved  -Developed after ILK (8/3/2023), s/p I&D 8/7/2023 and doxycycline  7. Suspected reactive arthritis, resolved  8. Chin/jawline eruption  ____________________________________________    Assessment & Plan:     # Hidradenitis suppurativa, flaring  - Has been of humira for several months because no longer effective when restarted after a break  - Level was appropriate = 16  - Continue cosentyx every 2 weeks  - Follow-up with urology to excise currently chronic lesions.   - Will also bring into clinic to follow-up and consiode punch exicision of prurigo on jawline.  The longitudinal plan of care for the diagnosis(es)/condition(s) as documented were addressed during this visit. Due to the added complexity in care, I will continue to support Bernard in the subsequent management and with ongoing continuity of care.     # Psoriasis/Atopic dermatitis  - Flaring on feet   - cont Fluocinolone 0.01% oil BID to scalp  - cpnt Augmented betamethasone 0.05% ointment BID PRN for flares    Follow-up: 12 weeks    Apolinar Ruiz MD, FAAD, FACP     Departments of Internal Medicine and Dermatology  [facility]  [place]    ____________________________________________    CC:  Chief Complaint   Patient presents with    Derm Problem     HS: had a flare last week  Needs refills on betamethasone ointment and dermasmooth oil       HPI:  Mr. Bernard Padilla is a(n) 39 year old male who presents today for follow-up  for HS and psoriasis/atopic dermatitis.    He has been taking the cosentyx for about 2-3 months. He does think its helping, but not as much as humira. Wants to givbe it more time. Feet still very dry and scaly. Ran out of most of topicals. Has appoiuntment with urology to cut out lesions on scrotum.    Last seen in dermatology in person by me on 10/3/24:  # Hidradenitis suppurativa, flaring  - Has been of humira for several months because no longer effective when restarted after a break  - Level was appropriate = 16  - Prednisone burst   - Continue augmentin BID   - Will switch to cosentyx. If fails will switch to infliximab,  - [facility] pharmacy referral     # Psoriasis/Atopic dermatitis  - cont Fluocinolone 0.01% oil BID to scalp  - cpnt Augmented betamethasone 0.05% ointment BID PRN for flares     Follow-up: 12 weeks    After his last appointment, he followed up with Dr. Leos on 12/28/24:  There were just a couple of areas in his groin/scrotal area that continued to give him issues.     Based on the above findings, a diagnosis of hidradenitis suppurativa was made. I have advised that he see urology for further workup given the location of the problem to ensure no deeper issues are at hand. I will see him back as needed.       Patient is otherwise feeling well, without additional skin concerns.  HS Nurse Assessment        6/27/2024     4:33 PM 10/3/2024     4:46 PM 1/2/2025     3:54 PM   Nurse Assessment Data   Over the past 30 days how many old lesions flared back up? 3 3 3   Over the past 30 days how many new lesions did you get? 1 3 1   Over the past week, how many dressing changes do you do each day? 0 0 0   Over  the past week, has your wound drainage been: Severe Very Severe None   Rate your HS overall from 0 - 10 (0 = no disease, 10 = worst) over the past week:  9 10 7   Rate your pain score from 0 - 10 (0 = no disease, 10 = worst) for the most painful/symptomatic lesion in the past week:  10 - Worst Pain 10 - Worst Pain 8   Over the past week, how much has HS influenced your quality of life? very much very much very much             Medications:  Current Outpatient Medications   Medication Sig Dispense Refill    acetaminophen (TYLENOL) 325 MG tablet Take 650 mg by mouth every 4 hours as needed      albuterol (PROAIR HFA/PROVENTIL HFA/VENTOLIN HFA) 108 (90 Base) MCG/ACT inhaler Inhale 2 puffs into the lungs every 6 hours 8.5 g 3    albuterol (PROVENTIL) (2.5 MG/3ML) 0.083% neb solution Take 1 vial (2.5 mg) by nebulization 3 times daily as needed for shortness of breath, wheezing or cough. 90 mL 0    amoxicillin-clavulanate (AUGMENTIN) 875-125 MG tablet Take 1 tablet by mouth 2 times daily (Patient not taking: Reported on 12/18/2024) 180 tablet 0    augmented betamethasone dipropionate (DIPROLENE-AF) 0.05 % external ointment Apply topically 2 times daily. to affected area(s) 50 g 1    benzoyl peroxide (PANOXYL) 10 % external liquid Use daily as directed 187 mL 11    budesonide (PULMICORT) 0.5 MG/2ML neb solution Take 2 mLs (0.5 mg) by nebulization 2 times daily (Patient not taking: Reported on 12/18/2024) 30 mL 1    budesonide-formoterol (SYMBICORT) 160-4.5 MCG/ACT Inhaler Inhale 2 puffs into the lungs 2 times daily. And every 4 hours as needed for wheezing or shortness of breath. 10.2 g 1    fluocinolone acetonide (DERMA SMOOTHE/FS BODY) 0.01 % external oil Apply topically 2 times daily 236.56 mL 11    fluocinonide (LIDEX) 0.05 % external solution Apply topically 2 times daily 60 mL 3    fluticasone (FLONASE) 50 MCG/ACT nasal spray INSTILL 1-2 SPRAYS INTO BOTH NOSTRILS ONCE DAILY 48 g 1    fluticasone-vilanterol (BREO  ELLIPTA) 200-25 MCG/ACT inhaler Inhale 1 puff into the lungs daily. 60 each 2    IBUPROFEN PO Take 800 mg by mouth 3 times daily as needed (Last dose 9.19.2020)       loratadine (CLARITIN) 10 MG tablet TAKE ONE TABLET BY MOUTH ONCE DAILY 90 tablet 2    ondansetron (ZOFRAN ODT) 4 MG ODT tab Take 1-2 tablets (4-8 mg) by mouth every 8 hours as needed for nausea (Patient not taking: Reported on 12/18/2024) 4 tablet 0    salicylic acid (MEDIPLAST) 40 % miscellaneous Apply to corn daily (Patient not taking: Reported on 12/18/2024) 90 each 1    Secukinumab 300 MG/2ML SOAJ Inject 300 mg subcutaneously once a week. for 5 weeks (Week 0, 1, 2, 3 & 4), followed by 300 mg every 14 days thereafter for maintenance (see separate order). MTM patient 10 mL 0    Secukinumab 300 MG/2ML SOAJ Inject 300 mg subcutaneously every 14 days. MTM patient 4 mL 2    Vitamin D, Cholecalciferol, 25 MCG (1000 UT) CAPS Take 1,000 Int'l Units by mouth daily (Patient not taking: Reported on 12/18/2024) 90 capsule 3     Current Facility-Administered Medications   Medication Dose Route Frequency Provider Last Rate Last Admin    triamcinolone acetonide (KENALOG-10) injection 10 mg  10 mg Intra-Lesional Once Apolinar Ruiz MD          Past Medical/Surgical History:   Patient Active Problem List   Diagnosis    Hidradenitis suppurativa    Patellofemoral arthritis of left knee    Eczema    Allergic rhinitis    Depression    Influenza    Knee pain, left    Other chronic pain    Scrotal abscess    Abscess of groin, left    Right rotator cuff tendinitis    Mild intermittent asthma, unspecified whether complicated     Past Medical History:   Diagnosis Date    Anxiety     Arthritis     Arthritis of knee     Boil     Chronic pain     Eczema     Hidradenitis suppurativa     Hydradenitis     Mild intermittent asthma without complication        CC Apolinar Ruiz MD  [address] [place] [address] on close of this encounter.

## 2025-01-02 NOTE — LETTER
1/2/2025       RE: Bernard Padilla  [address]  [place] [address]     Dear Colleague,    Thank you for referring your patient, Bernard Padilla, to the [facility] [place] at [facility]. Please see a copy of my visit note below.    [facility] Dermatology Note  Encounter Date: Jan 2, 2025  Telephone ([phone] . Location of teledermatologist: [facility] [place]. Start time: 8:42pm. End time: 8:54pm    Dermatology Problem List:  1.  Hidradenitis suppurativa (dx 2016), well-controlled on Humira  -Current tx: ordered cosentyx 10   Humira (started 5/4/2023-mid  2024; neg TB testing 5/4/2023), BPO 10% wash  -Previous tx: multiple surgical procedures (I&D), CSA, dapsone, doxycyline, Lidex  2. Psoriasis vs Atopic dermatitis, mild  - Current tx: fluocinolone oil, Augmented Betamethasone 0.05% ointment BID  3. Clavus, L foot 5th digit  4. Vitiligo  5. Acne vulgaris, mild  -Current tx: BPO 10% wash  6. Abscess, L jawline, resolved  -Developed after ILK (8/3/2023), s/p I&D 8/7/2023 and doxycycline  7. Suspected reactive arthritis, resolved  8. Chin/jawline eruption  ____________________________________________    Assessment & Plan:     # Hidradenitis suppurativa, flaring  - Has been of humira for several months because no longer effective when restarted after a break  - Level was appropriate = 16  - Continue cosentyx every 2 weeks  - Follow-up with urology to excise currently chronic lesions.   - Will also bring into clinic to follow-up and consiode punch exicision of prurigo on jawline.  The longitudinal plan of care for the diagnosis(es)/condition(s) as documented were addressed during this visit. Due to the added complexity in care, I will continue to support Bernard in the subsequent management and with ongoing continuity of care.     # Psoriasis/Atopic dermatitis  - Flaring on feet    - cont Fluocinolone 0.01% oil BID to scalp  - cpnt Augmented betamethasone 0.05% ointment BID PRN for flares    Follow-up: 12 weeks    Apolinar Ruiz MD, FAAD, FACP     Departments of Internal Medicine and Dermatology  [facility]    ____________________________________________    CC:  Chief Complaint   Patient presents with     Derm Problem     HS: had a flare last week  Needs refills on betamethasone ointment and dermasmooth oil       HPI:  Mr. Bernard Padilla is a(n) 39 year old male who presents today for follow-up  for HS and psoriasis/atopic dermatitis.    He has been taking the cosentyx for about 2-3 months. He does think its helping, but not as much as humira. Wants to givbe it more time. Feet still very dry and scaly. Ran out of most of topicals. Has appoiuntment with urology to cut out lesions on scrotum.    Last seen in dermatology in person by me on 10/3/24:  # Hidradenitis suppurativa, flaring  - Has been of humira for several months because no longer effective when restarted after a break  - Level was appropriate = 16  - Prednisone burst   - Continue augmentin BID   - Will switch to cosentyx. If fails will switch to infliximab,  - [facility] pharmacy referral     # Psoriasis/Atopic dermatitis  - cont Fluocinolone 0.01% oil BID to scalp  - cpnt Augmented betamethasone 0.05% ointment BID PRN for flares     Follow-up: 12 weeks    After his last appointment, he followed up with Dr. Leos on 12/28/24:  There were just a couple of areas in his groin/scrotal area that continued to give him issues.     Based on the above findings, a diagnosis of hidradenitis suppurativa was made. I have advised that he see urology for further workup given the location of the problem to ensure no deeper issues are at hand. I will see him back as needed.       Patient is otherwise feeling well, without additional skin concerns.  HS Nurse Assessment        6/27/2024     4:33 PM 10/3/2024     4:46 PM  1/2/2025     3:54 PM   Nurse Assessment Data   Over the past 30 days how many old lesions flared back up? 3 3 3   Over the past 30 days how many new lesions did you get? 1 3 1   Over the past week, how many dressing changes do you do each day? 0 0 0   Over the past week, has your wound drainage been: Severe Very Severe None   Rate your HS overall from 0 - 10 (0 = no disease, 10 = worst) over the past week:  9 10 7   Rate your pain score from 0 - 10 (0 = no disease, 10 = worst) for the most painful/symptomatic lesion in the past week:  10 - Worst Pain 10 - Worst Pain 8   Over the past week, how much has HS influenced your quality of life? very much very much very much             Medications:  Current Outpatient Medications   Medication Sig Dispense Refill     acetaminophen (TYLENOL) 325 MG tablet Take 650 mg by mouth every 4 hours as needed       albuterol (PROAIR HFA/PROVENTIL HFA/VENTOLIN HFA) 108 (90 Base) MCG/ACT inhaler Inhale 2 puffs into the lungs every 6 hours 8.5 g 3     albuterol (PROVENTIL) (2.5 MG/3ML) 0.083% neb solution Take 1 vial (2.5 mg) by nebulization 3 times daily as needed for shortness of breath, wheezing or cough. 90 mL 0     amoxicillin-clavulanate (AUGMENTIN) 875-125 MG tablet Take 1 tablet by mouth 2 times daily (Patient not taking: Reported on 12/18/2024) 180 tablet 0     augmented betamethasone dipropionate (DIPROLENE-AF) 0.05 % external ointment Apply topically 2 times daily. to affected area(s) 50 g 1     benzoyl peroxide (PANOXYL) 10 % external liquid Use daily as directed 187 mL 11     budesonide (PULMICORT) 0.5 MG/2ML neb solution Take 2 mLs (0.5 mg) by nebulization 2 times daily (Patient not taking: Reported on 12/18/2024) 30 mL 1     budesonide-formoterol (SYMBICORT) 160-4.5 MCG/ACT Inhaler Inhale 2 puffs into the lungs 2 times daily. And every 4 hours as needed for wheezing or shortness of breath. 10.2 g 1     fluocinolone acetonide (DERMA SMOOTHE/FS BODY) 0.01 % external oil  Apply topically 2 times daily 236.56 mL 11     fluocinonide (LIDEX) 0.05 % external solution Apply topically 2 times daily 60 mL 3     fluticasone (FLONASE) 50 MCG/ACT nasal spray INSTILL 1-2 SPRAYS INTO BOTH NOSTRILS ONCE DAILY 48 g 1     fluticasone-vilanterol (BREO ELLIPTA) 200-25 MCG/ACT inhaler Inhale 1 puff into the lungs daily. 60 each 2     IBUPROFEN PO Take 800 mg by mouth 3 times daily as needed (Last dose 9.19.2020)        loratadine (CLARITIN) 10 MG tablet TAKE ONE TABLET BY MOUTH ONCE DAILY 90 tablet 2     ondansetron (ZOFRAN ODT) 4 MG ODT tab Take 1-2 tablets (4-8 mg) by mouth every 8 hours as needed for nausea (Patient not taking: Reported on 12/18/2024) 4 tablet 0     salicylic acid (MEDIPLAST) 40 % miscellaneous Apply to corn daily (Patient not taking: Reported on 12/18/2024) 90 each 1     Secukinumab 300 MG/2ML SOAJ Inject 300 mg subcutaneously once a week. for 5 weeks (Week 0, 1, 2, 3 & 4), followed by 300 mg every 14 days thereafter for maintenance (see separate order). MTM patient 10 mL 0     Secukinumab 300 MG/2ML SOAJ Inject 300 mg subcutaneously every 14 days. MTM patient 4 mL 2     Vitamin D, Cholecalciferol, 25 MCG (1000 UT) CAPS Take 1,000 Int'l Units by mouth daily (Patient not taking: Reported on 12/18/2024) 90 capsule 3     Current Facility-Administered Medications   Medication Dose Route Frequency Provider Last Rate Last Admin     triamcinolone acetonide (KENALOG-10) injection 10 mg  10 mg Intra-Lesional Once Apolinar Ruiz MD          Past Medical/Surgical History:   Patient Active Problem List   Diagnosis     Hidradenitis suppurativa     Patellofemoral arthritis of left knee     Eczema     Allergic rhinitis     Depression     Influenza     Knee pain, left     Other chronic pain     Scrotal abscess     Abscess of groin, left     Right rotator cuff tendinitis     Mild intermittent asthma, unspecified whether complicated     Past Medical History:   Diagnosis Date     Anxiety       Arthritis      Arthritis of knee      Boil      Chronic pain      Eczema      Hidradenitis suppurativa      Hydradenitis      Mild intermittent asthma without complication        CC Apolinar Ruiz MD  [address] [place] [address] on close of this encounter.       Again, thank you for allowing me to participate in the care of your patient.      Sincerely,    Apolinar Ruiz MD

## 2025-01-02 NOTE — NURSING NOTE
Dermatology Rooming Note    Bernard Padilla's goals for this visit include:   Chief Complaint   Patient presents with    Derm Problem     HS: had a flare last week  Needs refills on betamethasone ointment and dermasmooth oil     Audrey Garcia LPN

## 2025-01-06 RX ORDER — FLUOCINOLONE ACETONIDE 0.11 MG/ML
OIL TOPICAL
Qty: 236.56 ML | Refills: 3 | OUTPATIENT
Start: 2025-01-06

## 2025-01-06 RX ORDER — FLUOCINOLONE ACETONIDE 0.11 MG/ML
OIL TOPICAL 2 TIMES DAILY
Qty: 236.56 ML | Refills: 9 | OUTPATIENT
Start: 2025-01-06

## 2025-01-20 ENCOUNTER — TELEPHONE (OUTPATIENT)
Dept: DERMATOLOGY | Facility: CLINIC | Age: 40
End: 2025-01-20
Payer: MEDICARE

## 2025-01-20 ENCOUNTER — MYC REFILL (OUTPATIENT)
Dept: PHARMACY | Facility: CLINIC | Age: 40
End: 2025-01-20
Payer: MEDICARE

## 2025-01-20 DIAGNOSIS — L73.2 HIDRADENITIS SUPPURATIVA: ICD-10-CM

## 2025-01-20 NOTE — TELEPHONE ENCOUNTER
1/20 Patient confirmed scheduled appointment:  Date: 2/27/25  Time: 4:30pm  Visit type: Return HS  Provider: Joseph  Location: [facility] Phone  Testing/imaging: N/A  Additional notes: N/A    1/20 Patient confirmed scheduled appointment:  Date: 5/22/25  Time: 2:30pm  Visit type: Return HS  Provider: Joseph  Location: [facility]   Testing/imaging: N/A  Additional notes: N/A

## 2025-01-22 ENCOUNTER — OFFICE VISIT (OUTPATIENT)
Dept: UROLOGY | Facility: CLINIC | Age: 40
End: 2025-01-22
Attending: PLASTIC SURGERY
Payer: MEDICARE

## 2025-01-22 VITALS
HEIGHT: 66 IN | DIASTOLIC BLOOD PRESSURE: 79 MMHG | SYSTOLIC BLOOD PRESSURE: 123 MMHG | WEIGHT: 190 LBS | HEART RATE: 78 BPM | BODY MASS INDEX: 30.53 KG/M2

## 2025-01-22 DIAGNOSIS — L73.2 HIDRADENITIS SUPPURATIVA: ICD-10-CM

## 2025-01-22 PROCEDURE — 99203 OFFICE O/P NEW LOW 30 MIN: CPT | Performed by: STUDENT IN AN ORGANIZED HEALTH CARE EDUCATION/TRAINING PROGRAM

## 2025-01-22 ASSESSMENT — PAIN SCALES - GENERAL: PAINLEVEL_OUTOF10: NO PAIN (0)

## 2025-01-22 NOTE — LETTER
"1/22/2025       RE: Bernard Padilla  [address]  [place] [address]     Dear Colleague,    Thank you for referring your patient, Bernard Padilla, to the [facility] [place] at [facility]. Please see a copy of my visit note below.    Reason for vist:  I am here for hidradenitis suppurativa    HPI  Bernard Padilla is a 39 year old year old male referred by Dr. Leos for evaluation of groin hidradenitis suppurativa.    He has struggled with this condition for most of his life. He has undergone what he estimates to be approximately 30 procedures to excise HS from his body. His armpits, groin, and buttocks are involved. He is on Cosentyx which has helped tremendously.    He notes he has one area of HS on his right groin that flares often. He would like this excised. This is his only \"problem area\" at the moment.     Past Urologic History:  Hidradenitis suppurativa    Urologic FH:  Noncontributory    Patient Active Problem List    Diagnosis Date Noted     Mild intermittent asthma, unspecified whether complicated 05/10/2023     Priority: Medium     Right rotator cuff tendinitis 12/09/2021     Priority: Medium     Influenza 03/01/2020     Priority: Medium     Abscess of groin, left 12/14/2019     Priority: Medium     Added automatically from request for surgery [id number]       Scrotal abscess 11/24/2019     Priority: Medium     Added automatically from request for surgery [id number]       Other chronic pain 09/10/2019     Priority: Medium     Hidradenitis suppurativa 01/23/2019     Priority: Medium     Allergic rhinitis 08/16/2018     Priority: Medium     Patellofemoral arthritis of left knee 06/10/2016     Priority: Medium     Eczema 12/05/2012     Priority: Medium     Knee pain, left 12/05/2012     Priority: Medium     Torn meniscus in 2009, s/p repair in 2010. Now with chronic pain.       Depression 07/21/2009     Priority: Medium       Past Medical " History:   Diagnosis Date     Anxiety      Arthritis      Arthritis of knee      Boil      Chronic pain      Eczema      Hidradenitis suppurativa      Hydradenitis      Mild intermittent asthma without complication        Past Surgical History:   Procedure Laterality Date     EXCISE HIDRADENITIS (LOCATION) Bilateral 9/25/2020    Procedure: EXCISION, HIDRADENITIS - right medial thigh, left groin and posterior scrotum.;  Surgeon: Marua Maldonado MD;  Location: [facility] OR     INCISION AND DRAINAGE, ABSCESS, SIMPLE N/A 5/4/2022    Procedure: INCISION AND closure  ABSCESS,  and wound excision right groin and perineum;  Surgeon: CURT Leos MD;  Location: [facility] OR     IRRIGATION AND DEBRIDEMENT RECTUM, COMBINED N/A 11/14/2019    Procedure: IRRIGATION AND DEBRIDEMENT, RECTUM;  Surgeon: Yon Kenny MD;  Location: [facility] OR     KNEE SURGERY       ORTHOPEDIC SURGERY      meniscus repair     SKIN SURGERY         Current Outpatient Medications   Medication Sig Dispense Refill     acetaminophen (TYLENOL) 325 MG tablet Take 650 mg by mouth every 4 hours as needed       albuterol (PROAIR HFA/PROVENTIL HFA/VENTOLIN HFA) 108 (90 Base) MCG/ACT inhaler Inhale 2 puffs into the lungs every 6 hours 8.5 g 3     albuterol (PROVENTIL) (2.5 MG/3ML) 0.083% neb solution Take 1 vial (2.5 mg) by nebulization 3 times daily as needed for shortness of breath, wheezing or cough. 90 mL 0     augmented betamethasone dipropionate (DIPROLENE-AF) 0.05 % external ointment Apply topically 2 times daily. to affected area(s) 50 g 4     fluocinolone acetonide (DERMA SMOOTHE/FS BODY) 0.01 % external oil Apply topically 2 times daily. 236.56 mL 11     Fluocinolone Acetonide Scalp (DERMA-SMOOTHE/FS SCALP) 0.01 % OIL oil Apply topically daily. 118.28 mL 11     fluocinonide (LIDEX) 0.05 % external solution Apply topically 2 times daily 60 mL 3     fluticasone (FLONASE) 50 MCG/ACT nasal spray INSTILL 1-2 SPRAYS INTO BOTH NOSTRILS ONCE DAILY 48  g 1     fluticasone-vilanterol (BREO ELLIPTA) 200-25 MCG/ACT inhaler Inhale 1 puff into the lungs daily. 60 each 2     IBUPROFEN PO Take 800 mg by mouth 3 times daily as needed (Last dose 9.19.2020)        loratadine (CLARITIN) 10 MG tablet TAKE ONE TABLET BY MOUTH ONCE DAILY 90 tablet 2     Secukinumab 300 MG/2ML SOAJ Inject 300 mg subcutaneously every 14 days. MTM patient 4 mL 6     amoxicillin-clavulanate (AUGMENTIN) 875-125 MG tablet Take 1 tablet by mouth 2 times daily (Patient not taking: Reported on 1/22/2025) 180 tablet 0     benzoyl peroxide (PANOXYL) 10 % external liquid Use daily as directed (Patient not taking: Reported on 1/22/2025) 187 mL 11     budesonide (PULMICORT) 0.5 MG/2ML neb solution Take 2 mLs (0.5 mg) by nebulization 2 times daily (Patient not taking: Reported on 1/22/2025) 30 mL 1     budesonide-formoterol (SYMBICORT) 160-4.5 MCG/ACT Inhaler Inhale 2 puffs into the lungs 2 times daily. And every 4 hours as needed for wheezing or shortness of breath. (Patient not taking: Reported on 1/22/2025) 10.2 g 1     ondansetron (ZOFRAN ODT) 4 MG ODT tab Take 1-2 tablets (4-8 mg) by mouth every 8 hours as needed for nausea (Patient not taking: Reported on 1/22/2025) 4 tablet 0     salicylic acid (MEDIPLAST) 40 % miscellaneous Apply to corn daily (Patient not taking: Reported on 1/22/2025) 90 each 1     Vitamin D, Cholecalciferol, 25 MCG (1000 UT) CAPS Take 1,000 Int'l Units by mouth daily (Patient not taking: Reported on 1/22/2025) 90 capsule 3       Allergies   Allergen Reactions     Vicodin [Hydrocodone-Acetaminophen] Shortness Of Breath     Chicken-Derived Products (Egg) Nausea and Vomiting and GI Disturbance     Hydrocodone Swelling     Other reaction(s): Throat Irritation  Tolerated oxycodone   Upset stomach         Social History     Socioeconomic History     Marital status: Single     Spouse name: Not on file     Number of children: 2     Years of education: Not on file     Highest education  level: Not on file   Occupational History     Comment: Stand-up    Tobacco Use     Smoking status: Every Day     Current packs/day: 0.00     Types: Cigarettes     Start date: 2003     Last attempt to quit: 2023     Years since quittin.4     Smokeless tobacco: Never     Tobacco comments:     Quit smoking Aug 2023.    Vaping Use     Vaping status: Never Used   Substance and Sexual Activity     Alcohol use: Yes     Comment: Beer occasionally/Social Use     Drug use: Yes     Types: Marijuana     Comment: Daily     Sexual activity: Yes     Partners: Female   Other Topics Concern     Parent/sibling w/ CABG, MI or angioplasty before 65F 55M? Not Asked   Social History Narrative    ** Merged History Encounter **       Social Drivers of Health     Financial Resource Strain: High Risk (10/18/2023)    Financial Resource Strain      Within the past 12 months, have you or your family members you live with been unable to get utilities (heat, electricity) when it was really needed?: Yes   Food Insecurity: High Risk (10/18/2023)    Food Insecurity      Within the past 12 months, did you worry that your food would run out before you got money to buy more?: Yes      Within the past 12 months, did the food you bought just not last and you didn t have money to get more?: Yes   Transportation Needs: High Risk (10/18/2023)    Transportation Needs      Within the past 12 months, has lack of transportation kept you from medical appointments, getting your medicines, non-medical meetings or appointments, work, or from getting things that you need?: Yes   Physical Activity: Inactive (2021)    Exercise Vital Sign      Days of Exercise per Week: 0 days      Minutes of Exercise per Session: 0 min   Stress: Stress Concern Present (2021)    Citizen of [place] of Occupational Health - Occupational Stress Questionnaire      Feeling of Stress : To some extent   Social Connections: Moderately Integrated (2021)     "Social Connection and Isolation Panel [NHANES]      Frequency of Communication with Friends and Family: More than three times a week      Frequency of Social Gatherings with Friends and Family: More than three times a week      Attends Caodaism Services: More than 4 times per year      Active Member of Clubs or Organizations: Yes      Attends Club or Organization Meetings: More than 4 times per year      Marital Status: Never    Interpersonal Safety: Unknown (8/11/2024)    Received from [facility]    Humiliation, Afraid, Rape, and Kick questionnaire      Fear of Current or Ex-Partner: Not on file      Emotionally Abused: Patient declined      Physically Abused: Patient declined      Sexually Abused: Patient declined   Housing Stability: High Risk (10/18/2023)    Housing Stability      Do you have housing? : No      Are you worried about losing your housing?: Yes       Family History   Problem Relation Age of Onset     Hypertension Mother      Diabetes Father      Cancer No family hx of      Coronary Artery Disease No family hx of      Heart Disease No family hx of      Anesthesia Reaction No family hx of      Deep Vein Thrombosis No family hx of      Chronic Obstructive Pulmonary Disease Mother      Coronary Artery Disease Father      Colon Cancer No family hx of      Prostate Cancer No family hx of        Review of Systems     /79   Pulse 78   Ht 1.683 m (5' 6.25\")   Wt 86.2 kg (190 lb)   BMI 30.44 kg/m    Constitutional:  well appearing. No acute distress  Head: Atraumatic, normocephalic  Eyes: extra ocular movements intact.  Ears, nose, mouth, throat: moist mucous membranes, normal external condition.  Respiratory: normal chest rise.  No audible wheezes.  Genitourinary: Penis is circumcised with an orthotopic meatus.  No evidence of urethral discharge.  No palpable penile plaques or penile masses.  The scrotum is without masses.  Both testicles are descended and without appreciable mass or " tenderness.  He has a 3 cm x 3 cm area of HS on the right groin, close to the base of his penis. No drainage at this time. I am able to palpate induration under the skin.   Muskuloskeletal: Normal range of motion.  No evidence of clubbing or cyanosis.  Neurologic: Alert and oriented x 3.  Normal gait.  Skin: No rashes or lesions.  Psychiatric: Normal mood and affect        Assessment:  Hidradenitis suppurative of the groin    Plan:  He has a small, isolated area of HS just lateral to the base of his penis on the right. I would likely be able to excise this and close primarily. Discussed I would probe the tracts to ensure complete excision. If I was unable to close the defect in a tension-free manner, I will plan to leave this open for him to pack. He is agreeable and has done this before.    - To OR for excision of groin hidradenitis suppurativa    I greatly appreciate the opportunity to participate in the care of your patient.  Please feel free to call with any questions or concerns.    Fabby Mercado MD  Reconstructive Urology  [facility]            Again, thank you for allowing me to participate in the care of your patient.      Sincerely,    Fabby Mercado MD

## 2025-01-22 NOTE — PROGRESS NOTES
"Reason for vist:  I am here for hidradenitis suppurativa    HPI  Bernard Padilla is a 39 year old year old male referred by Dr. Leos for evaluation of groin hidradenitis suppurativa.    He has struggled with this condition for most of his life. He has undergone what he estimates to be approximately 30 procedures to excise HS from his body. His armpits, groin, and buttocks are involved. He is on Cosentyx which has helped tremendously.    He notes he has one area of HS on his right groin that flares often. He would like this excised. This is his only \"problem area\" at the moment.     Past Urologic History:  Hidradenitis suppurativa    Urologic FH:  Noncontributory    Patient Active Problem List    Diagnosis Date Noted    Mild intermittent asthma, unspecified whether complicated 05/10/2023     Priority: Medium    Right rotator cuff tendinitis 12/09/2021     Priority: Medium    Influenza 03/01/2020     Priority: Medium    Abscess of groin, left 12/14/2019     Priority: Medium     Added automatically from request for surgery [id number]      Scrotal abscess 11/24/2019     Priority: Medium     Added automatically from request for surgery [id number]      Other chronic pain 09/10/2019     Priority: Medium    Hidradenitis suppurativa 01/23/2019     Priority: Medium    Allergic rhinitis 08/16/2018     Priority: Medium    Patellofemoral arthritis of left knee 06/10/2016     Priority: Medium    Eczema 12/05/2012     Priority: Medium    Knee pain, left 12/05/2012     Priority: Medium     Torn meniscus in 2009, s/p repair in 2010. Now with chronic pain.      Depression 07/21/2009     Priority: Medium       Past Medical History:   Diagnosis Date    Anxiety     Arthritis     Arthritis of knee     Boil     Chronic pain     Eczema     Hidradenitis suppurativa     Hydradenitis     Mild intermittent asthma without complication        Past Surgical History:   Procedure Laterality Date    EXCISE HIDRADENITIS (LOCATION) Bilateral 9/25/2020    " Procedure: EXCISION, HIDRADENITIS - right medial thigh, left groin and posterior scrotum.;  Surgeon: Maura Maldonado MD;  Location: [facility]    INCISION AND DRAINAGE, ABSCESS, SIMPLE N/A 5/4/2022    Procedure: INCISION AND closure  ABSCESS,  and wound excision right groin and perineum;  Surgeon: CURT Leos MD;  Location: [facility] OR    IRRIGATION AND DEBRIDEMENT RECTUM, COMBINED N/A 11/14/2019    Procedure: IRRIGATION AND DEBRIDEMENT, RECTUM;  Surgeon: Yon Kenny MD;  Location: [facility] OR    KNEE SURGERY      ORTHOPEDIC SURGERY      meniscus repair    SKIN SURGERY         Current Outpatient Medications   Medication Sig Dispense Refill    acetaminophen (TYLENOL) 325 MG tablet Take 650 mg by mouth every 4 hours as needed      albuterol (PROAIR HFA/PROVENTIL HFA/VENTOLIN HFA) 108 (90 Base) MCG/ACT inhaler Inhale 2 puffs into the lungs every 6 hours 8.5 g 3    albuterol (PROVENTIL) (2.5 MG/3ML) 0.083% neb solution Take 1 vial (2.5 mg) by nebulization 3 times daily as needed for shortness of breath, wheezing or cough. 90 mL 0    augmented betamethasone dipropionate (DIPROLENE-AF) 0.05 % external ointment Apply topically 2 times daily. to affected area(s) 50 g 4    fluocinolone acetonide (DERMA SMOOTHE/FS BODY) 0.01 % external oil Apply topically 2 times daily. 236.56 mL 11    Fluocinolone Acetonide Scalp (DERMA-SMOOTHE/FS SCALP) 0.01 % OIL oil Apply topically daily. 118.28 mL 11    fluocinonide (LIDEX) 0.05 % external solution Apply topically 2 times daily 60 mL 3    fluticasone (FLONASE) 50 MCG/ACT nasal spray INSTILL 1-2 SPRAYS INTO BOTH NOSTRILS ONCE DAILY 48 g 1    fluticasone-vilanterol (BREO ELLIPTA) 200-25 MCG/ACT inhaler Inhale 1 puff into the lungs daily. 60 each 2    IBUPROFEN PO Take 800 mg by mouth 3 times daily as needed (Last dose 9.19.2020)       loratadine (CLARITIN) 10 MG tablet TAKE ONE TABLET BY MOUTH ONCE DAILY 90 tablet 2    Secukinumab 300 MG/2ML SOAJ Inject 300 mg  subcutaneously every 14 days. MTM patient 4 mL 6    amoxicillin-clavulanate (AUGMENTIN) 875-125 MG tablet Take 1 tablet by mouth 2 times daily (Patient not taking: Reported on 2025) 180 tablet 0    benzoyl peroxide (PANOXYL) 10 % external liquid Use daily as directed (Patient not taking: Reported on 2025) 187 mL 11    budesonide (PULMICORT) 0.5 MG/2ML neb solution Take 2 mLs (0.5 mg) by nebulization 2 times daily (Patient not taking: Reported on 2025) 30 mL 1    budesonide-formoterol (SYMBICORT) 160-4.5 MCG/ACT Inhaler Inhale 2 puffs into the lungs 2 times daily. And every 4 hours as needed for wheezing or shortness of breath. (Patient not taking: Reported on 2025) 10.2 g 1    ondansetron (ZOFRAN ODT) 4 MG ODT tab Take 1-2 tablets (4-8 mg) by mouth every 8 hours as needed for nausea (Patient not taking: Reported on 2025) 4 tablet 0    salicylic acid (MEDIPLAST) 40 % miscellaneous Apply to corn daily (Patient not taking: Reported on 2025) 90 each 1    Vitamin D, Cholecalciferol, 25 MCG (1000 UT) CAPS Take 1,000 Int'l Units by mouth daily (Patient not taking: Reported on 2025) 90 capsule 3       Allergies   Allergen Reactions    Vicodin [Hydrocodone-Acetaminophen] Shortness Of Breath    Chicken-Derived Products (Egg) Nausea and Vomiting and GI Disturbance    Hydrocodone Swelling     Other reaction(s): Throat Irritation  Tolerated oxycodone   Upset stomach         Social History     Socioeconomic History    Marital status: Single     Spouse name: Not on file    Number of children: 2    Years of education: Not on file    Highest education level: Not on file   Occupational History     Comment: Stand-up    Tobacco Use    Smoking status: Every Day     Current packs/day: 0.00     Types: Cigarettes     Start date: 2003     Last attempt to quit: 2023     Years since quittin.4    Smokeless tobacco: Never    Tobacco comments:     Quit smoking Aug 2023.    Vaping Use     Vaping status: Never Used   Substance and Sexual Activity    Alcohol use: Yes     Comment: Beer occasionally/Social Use    Drug use: Yes     Types: Marijuana     Comment: Daily    Sexual activity: Yes     Partners: Female   Other Topics Concern    Parent/sibling w/ CABG, MI or angioplasty before 65F 55M? Not Asked   Social History Narrative    ** Merged History Encounter **       Social Drivers of Health     Financial Resource Strain: High Risk (10/18/2023)    Financial Resource Strain     Within the past 12 months, have you or your family members you live with been unable to get utilities (heat, electricity) when it was really needed?: Yes   Food Insecurity: High Risk (10/18/2023)    Food Insecurity     Within the past 12 months, did you worry that your food would run out before you got money to buy more?: Yes     Within the past 12 months, did the food you bought just not last and you didn t have money to get more?: Yes   Transportation Needs: High Risk (10/18/2023)    Transportation Needs     Within the past 12 months, has lack of transportation kept you from medical appointments, getting your medicines, non-medical meetings or appointments, work, or from getting things that you need?: Yes   Physical Activity: Inactive (8/18/2021)    Exercise Vital Sign     Days of Exercise per Week: 0 days     Minutes of Exercise per Session: 0 min   Stress: Stress Concern Present (8/18/2021)    [employer] [place] [employer] - Occupational Stress Questionnaire     Feeling of Stress : To some extent   Social Connections: Moderately Integrated (8/18/2021)    Social Connection and Isolation Panel [NHANES]     Frequency of Communication with Friends and Family: More than three times a week     Frequency of Social Gatherings with Friends and Family: More than three times a week     Attends Jainism Services: More than 4 times per year     Active Member of Clubs or Organizations: Yes     Attends Club or Organization  "Meetings: More than 4 times per year     Marital Status: Never    Interpersonal Safety: Unknown (8/11/2024)    Received from [facility]    Humiliation, Afraid, Rape, and Kick questionnaire     Fear of Current or Ex-Partner: Not on file     Emotionally Abused: Patient declined     Physically Abused: Patient declined     Sexually Abused: Patient declined   Housing Stability: High Risk (10/18/2023)    Housing Stability     Do you have housing? : No     Are you worried about losing your housing?: Yes       Family History   Problem Relation Age of Onset    Hypertension Mother     Diabetes Father     Cancer No family hx of     Coronary Artery Disease No family hx of     Heart Disease No family hx of     Anesthesia Reaction No family hx of     Deep Vein Thrombosis No family hx of     Chronic Obstructive Pulmonary Disease Mother     Coronary Artery Disease Father     Colon Cancer No family hx of     Prostate Cancer No family hx of        Review of Systems     /79   Pulse 78   Ht 1.683 m (5' 6.25\")   Wt 86.2 kg (190 lb)   BMI 30.44 kg/m    Constitutional:  well appearing. No acute distress  Head: Atraumatic, normocephalic  Eyes: extra ocular movements intact.  Ears, nose, mouth, throat: moist mucous membranes, normal external condition.  Respiratory: normal chest rise.  No audible wheezes.  Genitourinary: Penis is circumcised with an orthotopic meatus.  No evidence of urethral discharge.  No palpable penile plaques or penile masses.  The scrotum is without masses.  Both testicles are descended and without appreciable mass or tenderness.  He has a 3 cm x 3 cm area of HS on the right groin, close to the base of his penis. No drainage at this time. I am able to palpate induration under the skin.   Muskuloskeletal: Normal range of motion.  No evidence of clubbing or cyanosis.  Neurologic: Alert and oriented x 3.  Normal gait.  Skin: No rashes or lesions.  Psychiatric: Normal mood and " affect        Assessment:  Hidradenitis suppurative of the groin    Plan:  He has a small, isolated area of HS just lateral to the base of his penis on the right. I would likely be able to excise this and close primarily. Discussed I would probe the tracts to ensure complete excision. If I was unable to close the defect in a tension-free manner, I will plan to leave this open for him to pack. He is agreeable and has done this before.    - To OR for excision of groin hidradenitis suppurativa    I greatly appreciate the opportunity to participate in the care of your patient.  Please feel free to call with any questions or concerns.    Fabby Mercado MD  Reconstructive Urology  [facility]

## 2025-01-22 NOTE — NURSING NOTE
Chief Complaint   Patient presents with    Hidradenitis suppurativa      Pt states lesions on right side of groin are painful.    Corazon Valencia CMA

## 2025-01-22 NOTE — Clinical Note
Hi Dr. Leos-  Thanks so much for the referral. I saw Bernard in my clinic and I will plan to excise his groin HS in the OR.   -Fabby Mercado

## 2025-01-23 ENCOUNTER — TELEPHONE (OUTPATIENT)
Dept: DERMATOLOGY | Facility: CLINIC | Age: 40
End: 2025-01-23
Payer: MEDICARE

## 2025-01-23 RX ORDER — ACETAMINOPHEN 325 MG/1
975 TABLET ORAL ONCE
OUTPATIENT
Start: 2025-01-23 | End: 2025-01-23

## 2025-01-23 NOTE — TELEPHONE ENCOUNTER
MTM appointment no showed, we made one more attempt to reschedule.     Routing back to referring provider and MTM Pharmacist Team        Sabrina Miranda  MTM

## 2025-01-28 ENCOUNTER — TELEPHONE (OUTPATIENT)
Dept: UROLOGY | Facility: CLINIC | Age: 40
End: 2025-01-28
Payer: MEDICARE

## 2025-02-10 ENCOUNTER — OFFICE VISIT (OUTPATIENT)
Dept: FAMILY MEDICINE | Facility: CLINIC | Age: 40
End: 2025-02-10
Payer: MEDICARE

## 2025-02-10 VITALS
TEMPERATURE: 97.8 F | OXYGEN SATURATION: 100 % | WEIGHT: 193 LBS | SYSTOLIC BLOOD PRESSURE: 118 MMHG | HEART RATE: 83 BPM | DIASTOLIC BLOOD PRESSURE: 76 MMHG | HEIGHT: 68 IN | RESPIRATION RATE: 20 BRPM | BODY MASS INDEX: 29.25 KG/M2

## 2025-02-10 DIAGNOSIS — L73.2 HIDRADENITIS SUPPURATIVA: ICD-10-CM

## 2025-02-10 DIAGNOSIS — Z01.818 PRE-OP EXAMINATION: Primary | ICD-10-CM

## 2025-02-10 DIAGNOSIS — J45.20 MILD INTERMITTENT ASTHMA, UNSPECIFIED WHETHER COMPLICATED: ICD-10-CM

## 2025-02-10 PROBLEM — J11.1 INFLUENZA: Status: RESOLVED | Noted: 2020-03-01 | Resolved: 2025-02-10

## 2025-02-10 PROBLEM — E55.9 VITAMIN D DEFICIENCY: Status: ACTIVE | Noted: 2020-11-02

## 2025-02-10 PROCEDURE — 99214 OFFICE O/P EST MOD 30 MIN: CPT | Performed by: PHYSICIAN ASSISTANT

## 2025-02-10 ASSESSMENT — ASTHMA QUESTIONNAIRES
QUESTION_5 LAST FOUR WEEKS HOW WOULD YOU RATE YOUR ASTHMA CONTROL: WELL CONTROLLED
QUESTION_1 LAST FOUR WEEKS HOW MUCH OF THE TIME DID YOUR ASTHMA KEEP YOU FROM GETTING AS MUCH DONE AT WORK, SCHOOL OR AT HOME: A LITTLE OF THE TIME
ACT_TOTALSCORE: 20
EMERGENCY_ROOM_LAST_YEAR_TOTAL: TWO
ACT_TOTALSCORE: 20
QUESTION_2 LAST FOUR WEEKS HOW OFTEN HAVE YOU HAD SHORTNESS OF BREATH: ONCE OR TWICE A WEEK
QUESTION_3 LAST FOUR WEEKS HOW OFTEN DID YOUR ASTHMA SYMPTOMS (WHEEZING, COUGHING, SHORTNESS OF BREATH, CHEST TIGHTNESS OR PAIN) WAKE YOU UP AT NIGHT OR EARLIER THAN USUAL IN THE MORNING: ONCE OR TWICE
QUESTION_4 LAST FOUR WEEKS HOW OFTEN HAVE YOU USED YOUR RESCUE INHALER OR NEBULIZER MEDICATION (SUCH AS ALBUTEROL): ONCE A WEEK OR LESS

## 2025-02-10 NOTE — PROGRESS NOTES
Preoperative Evaluation  [facility] [address] [place] [address]  Phone: [phone]  Fax: [phone]  Primary Provider: Himanshu Villagran MD  Pre-op Performing Provider: Mary Ann Quiroga PA-C  Feb 10, 2025             2/10/2025   Surgical Information   What procedure is being done? lusion removed   Facility or Hospital where procedure/surgery will be performed: [facility]   Who is doing the procedure / surgery? dr escoto   Date of surgery / procedure: 02-13-25   Time of surgery / procedure: 11am   Where do you plan to recover after surgery? at home with family     Fax number for surgical facility: Note does not need to be faxed, will be available electronically in Epic.    Assessment & Plan     The proposed surgical procedure is considered LOW risk.    1. Pre-op examination (Primary)  2. Hidradenitis suppurativa  Using Cosentyx.  Last injection was approximately 3 weeks ago.  I reviewed with him that he can likely restart Cosentyx 2-3 weeks after surgery, as long as things are healing well.  However, I asked him to discuss this with his surgeon and prescribing provider postop.    3. Mild intermittent asthma, unspecified whether complicated  Chronic, stable.  Well-controlled on present medications.          - No identified additional risk factors other than previously addressed       Recommendation  Approval given to proceed with proposed procedure, without further diagnostic evaluation.          Jovanni Wagner is a 39 year old, presenting for the following:  Pre-Op Exam          2/10/2025     1:35 PM   Additional Questions   Roomed by nicole   Accompanied by self     HPI related to upcoming procedure:     History of hidradenitis suppurativa.  Taking Cosentyx, which has significantly helped his flareups.  Continues to have 1 area in his right groin that has chronic flareup and irritation, painful.  Met with urology on 1/22/2025 and discussed excision of this area.  I  reviewed that note today.    Of note, he was recently seen in the ER for right corneal abrasion and anterior uveitis.  He did take medications as prescribed.  He did follow-up with ophthalmology, and they are happy with his improvement.  Continue to follow-up with them.        2/10/2025   Pre-Op Questionnaire   Have you ever had a heart attack or stroke? No   Have you ever had surgery on your heart or blood vessels, such as a stent placement, a coronary artery bypass, or surgery on an artery in your head, neck, heart, or legs? No   Do you have chest pain with activity? No   Do you have a history of heart failure? No   Do you currently have a cold, bronchitis or symptoms of other infection? No   Do you have a cough, shortness of breath, or wheezing? No   Do you or anyone in your family have previous history of blood clots? (!) YES mother blood clot in leg   Do you or does anyone in your family have a serious bleeding problem such as prolonged bleeding following surgeries or cuts? No   Have you ever had problems with anemia or been told to take iron pills? No   Have you had any abnormal blood loss such as black, tarry or bloody stools? No   Have you ever had a blood transfusion? No   Are you willing to have a blood transfusion if it is medically needed before, during, or after your surgery? Yes   Have you or any of your relatives ever had problems with anesthesia? No   Do you have sleep apnea, excessive snoring or daytime drowsiness? No   Do you have any artifical heart valves or other implanted medical devices like a pacemaker, defibrillator, or continuous glucose monitor? No   Do you have artificial joints? No   Are you allergic to latex? No     Health Care Directive  Patient does not have a Health Care Directive: Discussed advance care planning with patient; information given to patient to review.      Patient Active Problem List    Diagnosis Date Noted    Mild intermittent asthma, unspecified whether complicated  05/10/2023     Priority: Medium    Right rotator cuff tendinitis 12/09/2021     Priority: Medium    Vitamin D deficiency 11/02/2020     Priority: Medium    Abscess of groin, left 12/14/2019     Priority: Medium     Added automatically from request for surgery [id number]      Scrotal abscess 11/24/2019     Priority: Medium     Added automatically from request for surgery [id number]      Other chronic pain 09/10/2019     Priority: Medium    Hidradenitis suppurativa 01/23/2019     Priority: Medium    Allergic rhinitis 08/16/2018     Priority: Medium    Patellofemoral arthritis of left knee 06/10/2016     Priority: Medium    Eczema 12/05/2012     Priority: Medium    Knee pain, left 12/05/2012     Priority: Medium     Torn meniscus in 2009, s/p repair in 2010. Now with chronic pain.        Past Medical History:   Diagnosis Date    Anxiety     Arthritis     Arthritis of knee     Boil     Chronic pain     Eczema     Hidradenitis suppurativa     Hydradenitis     Influenza 03/01/2020    Mild intermittent asthma without complication      Past Surgical History:   Procedure Laterality Date    EXCISE HIDRADENITIS (LOCATION) Bilateral 9/25/2020    Procedure: EXCISION, HIDRADENITIS - right medial thigh, left groin and posterior scrotum.;  Surgeon: Maura Maldonado MD;  [facility]: [facility] OR    INCISION AND DRAINAGE, ABSCESS, SIMPLE N/A 5/4/2022    Procedure: INCISION AND closure  ABSCESS,  and wound excision right groin and perineum;  Surgeon: CURT Leos MD;  Location: [facility] OR    IRRIGATION AND DEBRIDEMENT RECTUM, COMBINED N/A 11/14/2019    Procedure: IRRIGATION AND DEBRIDEMENT, RECTUM;  Surgeon: Yon Kenny MD;  Location: [facility] OR    KNEE SURGERY      ORTHOPEDIC SURGERY      meniscus repair    SKIN SURGERY       Current Outpatient Medications   Medication Sig Dispense Refill    acetaminophen (TYLENOL) 325 MG tablet Take 650 mg by mouth every 4 hours as needed      albuterol (PROAIR HFA/PROVENTIL HFA/VENTOLIN HFA)  108 (90 Base) MCG/ACT inhaler Inhale 2 puffs into the lungs every 6 hours 8.5 g 3    albuterol (PROVENTIL) (2.5 MG/3ML) 0.083% neb solution Take 1 vial (2.5 mg) by nebulization 3 times daily as needed for shortness of breath, wheezing or cough. 90 mL 0    augmented betamethasone dipropionate (DIPROLENE-AF) 0.05 % external ointment Apply topically 2 times daily. to affected area(s) 50 g 4    fluocinolone acetonide (DERMA SMOOTHE/FS BODY) 0.01 % external oil Apply topically 2 times daily. 236.56 mL 11    Fluocinolone Acetonide Scalp (DERMA-SMOOTHE/FS SCALP) 0.01 % OIL oil Apply topically daily. 118.28 mL 11    fluocinonide (LIDEX) 0.05 % external solution Apply topically 2 times daily 60 mL 3    fluticasone (FLONASE) 50 MCG/ACT nasal spray INSTILL 1-2 SPRAYS INTO BOTH NOSTRILS ONCE DAILY 48 g 1    fluticasone-vilanterol (BREO ELLIPTA) 200-25 MCG/ACT inhaler Inhale 1 puff into the lungs daily. 60 each 2    IBUPROFEN PO Take 800 mg by mouth 3 times daily as needed (Last dose 2020)       loratadine (CLARITIN) 10 MG tablet TAKE ONE TABLET BY MOUTH ONCE DAILY 90 tablet 2    Secukinumab 300 MG/2ML SOAJ Inject 300 mg subcutaneously every 14 days. MTM patient 4 mL 6       Allergies   Allergen Reactions    Vicodin [Hydrocodone-Acetaminophen] Shortness Of Breath    Chicken-Derived Products (Egg) Nausea and Vomiting and GI Disturbance    Hydrocodone Swelling     Other reaction(s): Throat Irritation  Tolerated oxycodone   Upset stomach          Social History     Tobacco Use    Smoking status: Every Day     Current packs/day: 0.00     Types: Cigarettes     Start date: 2003     Last attempt to quit: 2023     Years since quittin.4    Smokeless tobacco: Never    Tobacco comments:     Quit smoking Aug 2023.  Continue smoking, smoked daily, a pack a day.   Substance Use Topics    Alcohol use: Yes     Comment: Beer occasionally/Social Use       History   Drug Use    Types: Marijuana     Comment: Daily        "      Review of Systems  Constitutional, neuro, ENT, endocrine, pulmonary, cardiac, gastrointestinal, genitourinary, musculoskeletal, integument and psychiatric systems are negative, except as otherwise noted.    Objective    /76 (BP Location: Left arm, Patient Position: Sitting, Cuff Size: Adult Regular)   Pulse 83   Temp 97.8  F (36.6  C) (Oral)   Resp 20   Ht 1.715 m (5' 7.52\")   Wt 87.5 kg (193 lb)   SpO2 100%   BMI 29.76 kg/m     Estimated body mass index is 29.76 kg/m  as calculated from the following:    Height as of this encounter: 1.715 m (5' 7.52\").    Weight as of this encounter: 87.5 kg (193 lb).  Physical Exam  GENERAL: alert and no distress  EYES: Eyes grossly normal to inspection, PERRL and conjunctivae and sclerae normal  HENT: ear canals and TM's normal, nose and mouth without ulcers or lesions  NECK: no adenopathy, no asymmetry, masses, or scars  RESP: lungs clear to auscultation - no rales, rhonchi or wheezes  CV: regular rate and rhythm, normal S1 S2, no S3 or S4, no murmur, click or rub, no peripheral edema  ABDOMEN: soft, nontender, no hepatosplenomegaly, no masses and bowel sounds normal  MS: no gross musculoskeletal defects noted, no edema  SKIN: no suspicious lesions or rashes  NEURO: Normal strength and tone, mentation intact and speech normal  PSYCH: mentation appears normal, affect normal/bright    Recent Labs   Lab Test 07/22/24  1503   HGB 14.4         POTASSIUM 4.0   CR 0.98        Diagnostics  No labs were ordered during this visit.   No EKG required for low risk surgery (cataract, skin procedure, breast biopsy, etc).  No EKG required, no history of coronary heart disease, significant arrhythmia, peripheral arterial disease or other structural heart disease.    Revised Cardiac Risk Index (RCRI)  The patient has the following serious cardiovascular risks for perioperative complications:   - No serious cardiac risks = 0 points     RCRI Interpretation: 0 points: " Class I (very low risk - 0.4% complication rate)         Signed Electronically by: Mary Ann Quiroga PA-C  A copy of this evaluation report is provided to the requesting physician.       Prior to immunization administration, verified patients identity using patient s name and date of birth. Please see Immunization Activity for additional information.     Screening Questionnaire for Adult Immunization    Are you sick today?   No   Do you have allergies to medications, food, a vaccine component or latex?   Yes   Have you ever had a serious reaction after receiving a vaccination?   No   Do you have a long-term health problem with heart, lung, kidney, or metabolic disease (e.g., diabetes), asthma, a blood disorder, no spleen, complement component deficiency, a cochlear implant, or a spinal fluid leak?  Are you on long-term aspirin therapy?   No   Do you have cancer, leukemia, HIV/AIDS, or any other immune system problem?   No   Do you have a parent, brother, or sister with an immune system problem?   No   In the past 3 months, have you taken medications that affect  your immune system, such as prednisone, other steroids, or anticancer drugs; drugs for the treatment of rheumatoid arthritis, Crohn s disease, or psoriasis; or have you had radiation treatments?   Yes   Have you had a seizure, or a brain or other nervous system problem?   No   During the past year, have you received a transfusion of blood or blood    products, or been given immune (gamma) globulin or antiviral drug?   No   For women: Are you pregnant or is there a chance you could become       pregnant during the next month?   No   Have you received any vaccinations in the past 4 weeks?   No     Immunization questionnaire was positive for at least one answer.  Notified .      Patient instructed to remain in clinic for 15 minutes afterwards, and to report any adverse reactions.     Screening performed by Ernesto Diez MA on 2/10/2025 at 1:44 PM.

## 2025-02-10 NOTE — PROGRESS NOTES
Preoperative Evaluation  [facility] [address] [place] [address]  Phone: [phone]  Fax: [phone]  Primary Provider: Himanshu Villagran MD  Pre-op Performing Provider: Mary Ann Quiroga PA-C  Feb 10, 2025   {Provider  Link to PREOP SmartSet  REQUIRED to apply standard patient instructions and medication directions to the AVS :598958}  {ROOMER review and update patient entered surgical information if needed :003294}        2/10/2025   Surgical Information   What procedure is being done? lusion removed   Facility or Hospital where procedure/surgery will be performed: [facility]   Who is doing the procedure / surgery? dr escoto   Date of surgery / procedure: 02-13-25   Time of surgery / procedure: 11am   Where do you plan to recover after surgery? at home with family     Fax number for surgical facility: {:[phone]}    {Provider Charting Preference for Preop :217205}    Jovanni Wagner is a 39 year old, presenting for the following:  Pre-Op Exam          2/10/2025     1:35 PM   Additional Questions   Roomed by nicole   Accompanied by self     HPI related to upcoming procedure: ***        2/10/2025   Pre-Op Questionnaire   Have you ever had a heart attack or stroke? No   Have you ever had surgery on your heart or blood vessels, such as a stent placement, a coronary artery bypass, or surgery on an artery in your head, neck, heart, or legs? No   Do you have chest pain with activity? No   Do you have a history of heart failure? No   Do you currently have a cold, bronchitis or symptoms of other infection? No   Do you have a cough, shortness of breath, or wheezing? No   Do you or anyone in your family have previous history of blood clots? (!) YES ***   Do you or does anyone in your family have a serious bleeding problem such as prolonged bleeding following surgeries or cuts? No   Have you ever had problems with anemia or been told to take iron pills? No   Have you had any abnormal  blood loss such as black, tarry or bloody stools? No   Have you ever had a blood transfusion? No   Are you willing to have a blood transfusion if it is medically needed before, during, or after your surgery? Yes   Have you or any of your relatives ever had problems with anesthesia? No   Do you have sleep apnea, excessive snoring or daytime drowsiness? No   Do you have any artifical heart valves or other implanted medical devices like a pacemaker, defibrillator, or continuous glucose monitor? No   Do you have artificial joints? No   Are you allergic to latex? No     Health Care Directive  Patient does not have a Health Care Directive: Discussed advance care planning with patient; however, patient declined at this time.    Preoperative Review of   {Mnpmpreport:289338}  {Review MNPMP for all patients per ICSI MNPMP Profile:008054}    {Chronic problem details (Optional) :169076}    Patient Active Problem List    Diagnosis Date Noted    Mild intermittent asthma, unspecified whether complicated 05/10/2023     Priority: Medium    Right rotator cuff tendinitis 12/09/2021     Priority: Medium    Vitamin D deficiency 11/02/2020     Priority: Medium    Influenza 03/01/2020     Priority: Medium    Abscess of groin, left 12/14/2019     Priority: Medium     Added automatically from request for surgery [id number]      Scrotal abscess 11/24/2019     Priority: Medium     Added automatically from request for surgery [id number]      Other chronic pain 09/10/2019     Priority: Medium    Hidradenitis suppurativa 01/23/2019     Priority: Medium    Allergic rhinitis 08/16/2018     Priority: Medium    Patellofemoral arthritis of left knee 06/10/2016     Priority: Medium    Eczema 12/05/2012     Priority: Medium    Knee pain, left 12/05/2012     Priority: Medium     Torn meniscus in 2009, s/p repair in 2010. Now with chronic pain.        Past Medical History:   Diagnosis Date    Anxiety     Arthritis     Arthritis of knee     Boil     Chronic  pain     Eczema     Hidradenitis suppurativa     Hydradenitis     Mild intermittent asthma without complication      Past Surgical History:   Procedure Laterality Date    EXCISE HIDRADENITIS (LOCATION) Bilateral 9/25/2020    Procedure: EXCISION, HIDRADENITIS - right medial thigh, left groin and posterior scrotum.;  Surgeon: Maura Maldonado MD;  [facility]    INCISION AND DRAINAGE, ABSCESS, SIMPLE N/A 5/4/2022    Procedure: INCISION AND closure  ABSCESS,  and wound excision right groin and perineum;  Surgeon: CURT Leos MD;  Location: [facility] OR    IRRIGATION AND DEBRIDEMENT RECTUM, COMBINED N/A 11/14/2019    Procedure: IRRIGATION AND DEBRIDEMENT, RECTUM;  Surgeon: Yon Kenny MD;  Location: [facility] OR    KNEE SURGERY      ORTHOPEDIC SURGERY      meniscus repair    SKIN SURGERY       Current Outpatient Medications   Medication Sig Dispense Refill    acetaminophen (TYLENOL) 325 MG tablet Take 650 mg by mouth every 4 hours as needed      albuterol (PROAIR HFA/PROVENTIL HFA/VENTOLIN HFA) 108 (90 Base) MCG/ACT inhaler Inhale 2 puffs into the lungs every 6 hours 8.5 g 3    albuterol (PROVENTIL) (2.5 MG/3ML) 0.083% neb solution Take 1 vial (2.5 mg) by nebulization 3 times daily as needed for shortness of breath, wheezing or cough. 90 mL 0    augmented betamethasone dipropionate (DIPROLENE-AF) 0.05 % external ointment Apply topically 2 times daily. to affected area(s) 50 g 4    fluocinolone acetonide (DERMA SMOOTHE/FS BODY) 0.01 % external oil Apply topically 2 times daily. 236.56 mL 11    Fluocinolone Acetonide Scalp (DERMA-SMOOTHE/FS SCALP) 0.01 % OIL oil Apply topically daily. 118.28 mL 11    fluocinonide (LIDEX) 0.05 % external solution Apply topically 2 times daily 60 mL 3    fluticasone (FLONASE) 50 MCG/ACT nasal spray INSTILL 1-2 SPRAYS INTO BOTH NOSTRILS ONCE DAILY 48 g 1    fluticasone-vilanterol (BREO ELLIPTA) 200-25 MCG/ACT inhaler Inhale 1 puff into the lungs daily. 60 each 2     "IBUPROFEN PO Take 800 mg by mouth 3 times daily as needed (Last dose 2020)       loratadine (CLARITIN) 10 MG tablet TAKE ONE TABLET BY MOUTH ONCE DAILY 90 tablet 2    Secukinumab 300 MG/2ML SOAJ Inject 300 mg subcutaneously every 14 days. MTM patient 4 mL 6    benzoyl peroxide (PANOXYL) 10 % external liquid Use daily as directed (Patient not taking: Reported on 2/10/2025) 187 mL 11    budesonide (PULMICORT) 0.5 MG/2ML neb solution Take 2 mLs (0.5 mg) by nebulization 2 times daily (Patient not taking: Reported on 2024) 30 mL 1    budesonide-formoterol (SYMBICORT) 160-4.5 MCG/ACT Inhaler Inhale 2 puffs into the lungs 2 times daily. And every 4 hours as needed for wheezing or shortness of breath. (Patient not taking: Reported on 2025) 10.2 g 1    Vitamin D, Cholecalciferol, 25 MCG (1000 UT) CAPS Take 1,000 Int'l Units by mouth daily (Patient not taking: Reported on 2025) 90 capsule 3       Allergies   Allergen Reactions    Vicodin [Hydrocodone-Acetaminophen] Shortness Of Breath    Chicken-Derived Products (Egg) Nausea and Vomiting and GI Disturbance    Hydrocodone Swelling     Other reaction(s): Throat Irritation  Tolerated oxycodone   Upset stomach          Social History     Tobacco Use    Smoking status: Every Day     Current packs/day: 0.00     Types: Cigarettes     Start date: 2003     Last attempt to quit: 2023     Years since quittin.4    Smokeless tobacco: Never    Tobacco comments:     Quit smoking Aug 2023.  Continue smoking, smoked daily, a pack a day.   Substance Use Topics    Alcohol use: Yes     Comment: Beer occasionally/Social Use     {FAMILY HISTORY (Optional):042299702}  History   Drug Use    Types: Marijuana     Comment: Daily           {ROS Picklists (Optional):318849}    Objective    /76 (BP Location: Left arm, Patient Position: Sitting, Cuff Size: Adult Regular)   Pulse 83   Temp 97.8  F (36.6  C) (Oral)   Resp 20   Ht 1.715 m (5' 7.52\")   Wt 87.5 kg " "(193 lb)   SpO2 100%   BMI 29.76 kg/m     Estimated body mass index is 29.76 kg/m  as calculated from the following:    Height as of this encounter: 1.715 m (5' 7.52\").    Weight as of this encounter: 87.5 kg (193 lb).  Physical Exam  {Exam List :802970}    Recent Labs   Lab Test 24  1503   HGB 14.4         POTASSIUM 4.0   CR 0.98        Diagnostics  {LABS:707895}   {EK}    Revised Cardiac Risk Index (RCRI)  The patient has the following serious cardiovascular risks for perioperative complications:  {PREOP REVISED CARDIAC RISK INDEX (RCRI) :946218}     RCRI Interpretation: {REVISED CARDIAC RISK INTERPRETATION :576555}         Signed Electronically by: Mary Ann Quiroga PA-C  A copy of this evaluation report is provided to the requesting physician.    {Provider Resources  Preop [facility] Preop Guidelines  Revised Cardiac Risk Index :114440}   {Email feedback regarding this note to [email or web address]   :184114}  "

## 2025-02-10 NOTE — PROGRESS NOTES
Preoperative Evaluation  [facility] [address] [place] [address]  Phone: [phone]  Fax: [phone]  Primary Provider: Himanshu Villagran MD  Pre-op Performing Provider: Mary Ann Quiroga PA-C  Feb 10, 2025   {Provider  Link to PREOP SmartSet  REQUIRED to apply standard patient instructions and medication directions to the AVS :936316}  {ROOMER review and update patient entered surgical information if needed :812377}  { After Pre-op is completed, use lists to pull documentation into note Link to complete Pre-Op    :279817}  {Pull Surgical Information into note after flowsheet completed:573244}  Fax number for surgical facility: {:[phone]}    {Provider Charting Preference for Preop :752975}    Jovanni Wagner is a 39 year old, presenting for the following:  No chief complaint on file.      {(!) Visit Details have not yet been documented.  Please enter Visit Details and then use this list to pull in documentation. (Optional):279134}  HPI related to upcoming procedure: ***  {Pull Pre-Op Questionnaire into note after flowsheet completed:461545}  Health Care Directive  Patient does not have a Health Care Directive: {ADVANCE_DIRECTIVE_STATUS:013788}    Preoperative Review of   {Mnpmpreport:912278}  {Review MNPMP for all patients per ICSI MNPMP Profile:062747}    {Chronic problem details (Optional) :754776}    Patient Active Problem List    Diagnosis Date Noted    Mild intermittent asthma, unspecified whether complicated 05/10/2023     Priority: Medium    Right rotator cuff tendinitis 12/09/2021     Priority: Medium    Vitamin D deficiency 11/02/2020     Priority: Medium    Influenza 03/01/2020     Priority: Medium    Abscess of groin, left 12/14/2019     Priority: Medium     Added automatically from request for surgery [id number]      Scrotal abscess 11/24/2019     Priority: Medium     Added automatically from request for surgery [id number]      Other chronic pain 09/10/2019      Priority: Medium    Hidradenitis suppurativa 01/23/2019     Priority: Medium    Allergic rhinitis 08/16/2018     Priority: Medium    Patellofemoral arthritis of left knee 06/10/2016     Priority: Medium    Eczema 12/05/2012     Priority: Medium    Knee pain, left 12/05/2012     Priority: Medium     Torn meniscus in 2009, s/p repair in 2010. Now with chronic pain.        Past Medical History:   Diagnosis Date    Anxiety     Arthritis     Arthritis of knee     Boil     Chronic pain     Eczema     Hidradenitis suppurativa     Hydradenitis     Mild intermittent asthma without complication      Past Surgical History:   Procedure Laterality Date    EXCISE HIDRADENITIS (LOCATION) Bilateral 9/25/2020    Procedure: EXCISION, HIDRADENITIS - right medial thigh, left groin and posterior scrotum.;  Surgeon: Maura Maldonado MD;  Location: [facility]    INCISION AND DRAINAGE, ABSCESS, SIMPLE N/A 5/4/2022    Procedure: INCISION AND closure  ABSCESS,  and wound excision right groin and perineum;  Surgeon: CURT Leos MD;  Location: [facility] OR    IRRIGATION AND DEBRIDEMENT RECTUM, COMBINED N/A 11/14/2019    Procedure: IRRIGATION AND DEBRIDEMENT, RECTUM;  Surgeon: Yon Kenny MD;  Location: [facility] OR    KNEE SURGERY      ORTHOPEDIC SURGERY      meniscus repair    SKIN SURGERY       Current Outpatient Medications   Medication Sig Dispense Refill    acetaminophen (TYLENOL) 325 MG tablet Take 650 mg by mouth every 4 hours as needed      albuterol (PROAIR HFA/PROVENTIL HFA/VENTOLIN HFA) 108 (90 Base) MCG/ACT inhaler Inhale 2 puffs into the lungs every 6 hours 8.5 g 3    albuterol (PROVENTIL) (2.5 MG/3ML) 0.083% neb solution Take 1 vial (2.5 mg) by nebulization 3 times daily as needed for shortness of breath, wheezing or cough. 90 mL 0    augmented betamethasone dipropionate (DIPROLENE-AF) 0.05 % external ointment Apply topically 2 times daily. to affected area(s) 50 g 4    benzoyl peroxide (PANOXYL) 10 % external  liquid Use daily as directed (Patient not taking: Reported on 2025) 187 mL 11    budesonide (PULMICORT) 0.5 MG/2ML neb solution Take 2 mLs (0.5 mg) by nebulization 2 times daily (Patient not taking: Reported on 2025) 30 mL 1    budesonide-formoterol (SYMBICORT) 160-4.5 MCG/ACT Inhaler Inhale 2 puffs into the lungs 2 times daily. And every 4 hours as needed for wheezing or shortness of breath. (Patient not taking: Reported on 2025) 10.2 g 1    fluocinolone acetonide (DERMA SMOOTHE/FS BODY) 0.01 % external oil Apply topically 2 times daily. 236.56 mL 11    Fluocinolone Acetonide Scalp (DERMA-SMOOTHE/FS SCALP) 0.01 % OIL oil Apply topically daily. 118.28 mL 11    fluocinonide (LIDEX) 0.05 % external solution Apply topically 2 times daily 60 mL 3    fluticasone (FLONASE) 50 MCG/ACT nasal spray INSTILL 1-2 SPRAYS INTO BOTH NOSTRILS ONCE DAILY 48 g 1    fluticasone-vilanterol (BREO ELLIPTA) 200-25 MCG/ACT inhaler Inhale 1 puff into the lungs daily. 60 each 2    IBUPROFEN PO Take 800 mg by mouth 3 times daily as needed (Last dose 2020)       loratadine (CLARITIN) 10 MG tablet TAKE ONE TABLET BY MOUTH ONCE DAILY 90 tablet 2    Secukinumab 300 MG/2ML SOAJ Inject 300 mg subcutaneously every 14 days. MTM patient 4 mL 6    Vitamin D, Cholecalciferol, 25 MCG (1000 UT) CAPS Take 1,000 Int'l Units by mouth daily (Patient not taking: Reported on 2025) 90 capsule 3       Allergies   Allergen Reactions    Vicodin [Hydrocodone-Acetaminophen] Shortness Of Breath    Chicken-Derived Products (Egg) Nausea and Vomiting and GI Disturbance    Hydrocodone Swelling     Other reaction(s): Throat Irritation  Tolerated oxycodone   Upset stomach          Social History     Tobacco Use    Smoking status: Every Day     Current packs/day: 0.00     Types: Cigarettes     Start date: 2003     Last attempt to quit: 2023     Years since quittin.4    Smokeless tobacco: Never    Tobacco comments:     Quit smoking Aug  ".    Substance Use Topics    Alcohol use: Yes     Comment: Beer occasionally/Social Use     {FAMILY HISTORY (Optional):626374883}  History   Drug Use    Types: Marijuana     Comment: Daily           {ROS Picklists (Optional):939771}    Objective    There were no vitals taken for this visit.   Estimated body mass index is 30.44 kg/m  as calculated from the following:    Height as of 25: 1.683 m (5' 6.25\").    Weight as of 25: 86.2 kg (190 lb).  Physical Exam  {Exam List :652500}    Recent Labs   Lab Test 24  1503   HGB 14.4         POTASSIUM 4.0   CR 0.98        Diagnostics  {LABS:456548}   {EK}    Revised Cardiac Risk Index (RCRI)  The patient has the following serious cardiovascular risks for perioperative complications:  {PREOP REVISED CARDIAC RISK INDEX (RCRI) :766513}     RCRI Interpretation: {REVISED CARDIAC RISK INTERPRETATION :727604}         Signed Electronically by: Mary Ann Quiroga PA-C  A copy of this evaluation report is provided to the requesting physician.    {Provider Resources  Preop [facility] Preop Guidelines  Revised Cardiac Risk Index :905485}   {Email feedback regarding this note to [email or web address]   :518356}  "

## 2025-02-13 ENCOUNTER — HOSPITAL ENCOUNTER (OUTPATIENT)
Facility: CLINIC | Age: 40
Discharge: HOME | End: 2025-02-13
Attending: STUDENT IN AN ORGANIZED HEALTH CARE EDUCATION/TRAINING PROGRAM | Admitting: STUDENT IN AN ORGANIZED HEALTH CARE EDUCATION/TRAINING PROGRAM
Payer: MEDICARE

## 2025-02-13 ENCOUNTER — ANESTHESIA EVENT (OUTPATIENT)
Dept: SURGERY | Facility: CLINIC | Age: 40
End: 2025-02-13
Payer: MEDICARE

## 2025-02-13 ENCOUNTER — ANESTHESIA (OUTPATIENT)
Dept: SURGERY | Facility: CLINIC | Age: 40
End: 2025-02-13
Payer: MEDICARE

## 2025-02-13 VITALS
HEIGHT: 67 IN | WEIGHT: 190.8 LBS | SYSTOLIC BLOOD PRESSURE: 105 MMHG | OXYGEN SATURATION: 98 % | TEMPERATURE: 97.2 F | RESPIRATION RATE: 16 BRPM | BODY MASS INDEX: 29.95 KG/M2 | HEART RATE: 70 BPM | DIASTOLIC BLOOD PRESSURE: 58 MMHG

## 2025-02-13 DIAGNOSIS — L73.2 HIDRADENITIS SUPPURATIVA: Primary | ICD-10-CM

## 2025-02-13 PROCEDURE — 250N000013 HC RX MED GY IP 250 OP 250 PS 637: Performed by: STUDENT IN AN ORGANIZED HEALTH CARE EDUCATION/TRAINING PROGRAM

## 2025-02-13 PROCEDURE — 360N000074 HC SURGERY LEVEL 1, PER MIN: Performed by: STUDENT IN AN ORGANIZED HEALTH CARE EDUCATION/TRAINING PROGRAM

## 2025-02-13 PROCEDURE — 250N000025 HC SEVOFLURANE, PER MIN: Performed by: STUDENT IN AN ORGANIZED HEALTH CARE EDUCATION/TRAINING PROGRAM

## 2025-02-13 PROCEDURE — 11463 EXC SKN HDRDNT ING COMPLEX: CPT | Performed by: STUDENT IN AN ORGANIZED HEALTH CARE EDUCATION/TRAINING PROGRAM

## 2025-02-13 PROCEDURE — 370N000017 HC ANESTHESIA TECHNICAL FEE, PER MIN: Performed by: STUDENT IN AN ORGANIZED HEALTH CARE EDUCATION/TRAINING PROGRAM

## 2025-02-13 PROCEDURE — 999N000141 HC STATISTIC PRE-PROCEDURE NURSING ASSESSMENT: Performed by: STUDENT IN AN ORGANIZED HEALTH CARE EDUCATION/TRAINING PROGRAM

## 2025-02-13 PROCEDURE — 710N000009 HC RECOVERY PHASE 1, LEVEL 1, PER MIN: Performed by: STUDENT IN AN ORGANIZED HEALTH CARE EDUCATION/TRAINING PROGRAM

## 2025-02-13 PROCEDURE — 250N000009 HC RX 250: Performed by: NURSE ANESTHETIST, CERTIFIED REGISTERED

## 2025-02-13 PROCEDURE — 258N000003 HC RX IP 258 OP 636: Performed by: NURSE ANESTHETIST, CERTIFIED REGISTERED

## 2025-02-13 PROCEDURE — 272N000001 HC OR GENERAL SUPPLY STERILE: Performed by: STUDENT IN AN ORGANIZED HEALTH CARE EDUCATION/TRAINING PROGRAM

## 2025-02-13 PROCEDURE — 250N000013 HC RX MED GY IP 250 OP 250 PS 637: Performed by: ANESTHESIOLOGY

## 2025-02-13 PROCEDURE — 250N000011 HC RX IP 250 OP 636: Performed by: ANESTHESIOLOGY

## 2025-02-13 PROCEDURE — 250N000011 HC RX IP 250 OP 636: Mod: JW | Performed by: STUDENT IN AN ORGANIZED HEALTH CARE EDUCATION/TRAINING PROGRAM

## 2025-02-13 PROCEDURE — 250N000009 HC RX 250: Performed by: STUDENT IN AN ORGANIZED HEALTH CARE EDUCATION/TRAINING PROGRAM

## 2025-02-13 PROCEDURE — 250N000011 HC RX IP 250 OP 636: Performed by: STUDENT IN AN ORGANIZED HEALTH CARE EDUCATION/TRAINING PROGRAM

## 2025-02-13 PROCEDURE — 710N000012 HC RECOVERY PHASE 2, PER MINUTE: Performed by: STUDENT IN AN ORGANIZED HEALTH CARE EDUCATION/TRAINING PROGRAM

## 2025-02-13 PROCEDURE — 250N000011 HC RX IP 250 OP 636: Performed by: NURSE ANESTHETIST, CERTIFIED REGISTERED

## 2025-02-13 PROCEDURE — 88305 TISSUE EXAM BY PATHOLOGIST: CPT | Mod: TC | Performed by: STUDENT IN AN ORGANIZED HEALTH CARE EDUCATION/TRAINING PROGRAM

## 2025-02-13 RX ORDER — FENTANYL CITRATE 50 UG/ML
INJECTION, SOLUTION INTRAMUSCULAR; INTRAVENOUS PRN
Status: DISCONTINUED | OUTPATIENT
Start: 2025-02-13 | End: 2025-02-13

## 2025-02-13 RX ORDER — FENTANYL CITRATE 0.05 MG/ML
50 INJECTION, SOLUTION INTRAMUSCULAR; INTRAVENOUS EVERY 5 MIN PRN
Status: DISCONTINUED | OUTPATIENT
Start: 2025-02-13 | End: 2025-02-13 | Stop reason: HOSPADM

## 2025-02-13 RX ORDER — ONDANSETRON 2 MG/ML
4 INJECTION INTRAMUSCULAR; INTRAVENOUS EVERY 30 MIN PRN
Status: DISCONTINUED | OUTPATIENT
Start: 2025-02-13 | End: 2025-02-13 | Stop reason: HOSPADM

## 2025-02-13 RX ORDER — HYDROMORPHONE HCL IN WATER/PF 6 MG/30 ML
0.2 PATIENT CONTROLLED ANALGESIA SYRINGE INTRAVENOUS EVERY 5 MIN PRN
Status: DISCONTINUED | OUTPATIENT
Start: 2025-02-13 | End: 2025-02-13 | Stop reason: HOSPADM

## 2025-02-13 RX ORDER — SODIUM CHLORIDE, SODIUM LACTATE, POTASSIUM CHLORIDE, CALCIUM CHLORIDE 600; 310; 30; 20 MG/100ML; MG/100ML; MG/100ML; MG/100ML
INJECTION, SOLUTION INTRAVENOUS CONTINUOUS
Status: DISCONTINUED | OUTPATIENT
Start: 2025-02-13 | End: 2025-02-13 | Stop reason: HOSPADM

## 2025-02-13 RX ORDER — CEFAZOLIN SODIUM/WATER 2 G/20 ML
2 SYRINGE (ML) INTRAVENOUS
Status: COMPLETED | OUTPATIENT
Start: 2025-02-13 | End: 2025-02-13

## 2025-02-13 RX ORDER — PROPOFOL 10 MG/ML
INJECTION, EMULSION INTRAVENOUS PRN
Status: DISCONTINUED | OUTPATIENT
Start: 2025-02-13 | End: 2025-02-13

## 2025-02-13 RX ORDER — NALOXONE HYDROCHLORIDE 0.4 MG/ML
0.1 INJECTION, SOLUTION INTRAMUSCULAR; INTRAVENOUS; SUBCUTANEOUS
Status: DISCONTINUED | OUTPATIENT
Start: 2025-02-13 | End: 2025-02-13 | Stop reason: HOSPADM

## 2025-02-13 RX ORDER — HYDROMORPHONE HCL IN WATER/PF 6 MG/30 ML
0.4 PATIENT CONTROLLED ANALGESIA SYRINGE INTRAVENOUS EVERY 5 MIN PRN
Status: DISCONTINUED | OUTPATIENT
Start: 2025-02-13 | End: 2025-02-13 | Stop reason: HOSPADM

## 2025-02-13 RX ORDER — ACETAMINOPHEN 325 MG/1
975 TABLET ORAL ONCE
Status: COMPLETED | OUTPATIENT
Start: 2025-02-13 | End: 2025-02-13

## 2025-02-13 RX ORDER — BUPIVACAINE HYDROCHLORIDE 2.5 MG/ML
INJECTION, SOLUTION EPIDURAL; INFILTRATION; INTRACAUDAL
Status: DISCONTINUED
Start: 2025-02-13 | End: 2025-02-13 | Stop reason: HOSPADM

## 2025-02-13 RX ORDER — CEFAZOLIN SODIUM/WATER 2 G/20 ML
2 SYRINGE (ML) INTRAVENOUS SEE ADMIN INSTRUCTIONS
Status: DISCONTINUED | OUTPATIENT
Start: 2025-02-13 | End: 2025-02-13 | Stop reason: HOSPADM

## 2025-02-13 RX ORDER — BUPIVACAINE HYDROCHLORIDE 2.5 MG/ML
INJECTION, SOLUTION EPIDURAL; INFILTRATION; INTRACAUDAL PRN
Status: DISCONTINUED | OUTPATIENT
Start: 2025-02-13 | End: 2025-02-13 | Stop reason: HOSPADM

## 2025-02-13 RX ORDER — PROPOFOL 10 MG/ML
INJECTION, EMULSION INTRAVENOUS CONTINUOUS PRN
Status: DISCONTINUED | OUTPATIENT
Start: 2025-02-13 | End: 2025-02-13

## 2025-02-13 RX ORDER — ONDANSETRON 2 MG/ML
INJECTION INTRAMUSCULAR; INTRAVENOUS PRN
Status: DISCONTINUED | OUTPATIENT
Start: 2025-02-13 | End: 2025-02-13

## 2025-02-13 RX ORDER — ONDANSETRON 4 MG/1
4 TABLET, ORALLY DISINTEGRATING ORAL EVERY 30 MIN PRN
Status: DISCONTINUED | OUTPATIENT
Start: 2025-02-13 | End: 2025-02-13 | Stop reason: HOSPADM

## 2025-02-13 RX ORDER — MAGNESIUM HYDROXIDE 1200 MG/15ML
LIQUID ORAL PRN
Status: DISCONTINUED | OUTPATIENT
Start: 2025-02-13 | End: 2025-02-13 | Stop reason: HOSPADM

## 2025-02-13 RX ORDER — ACETAMINOPHEN 650 MG/1
650 SUPPOSITORY RECTAL ONCE
Status: COMPLETED | OUTPATIENT
Start: 2025-02-13 | End: 2025-02-13

## 2025-02-13 RX ORDER — DEXAMETHASONE SODIUM PHOSPHATE 4 MG/ML
INJECTION, SOLUTION INTRA-ARTICULAR; INTRALESIONAL; INTRAMUSCULAR; INTRAVENOUS; SOFT TISSUE PRN
Status: DISCONTINUED | OUTPATIENT
Start: 2025-02-13 | End: 2025-02-13

## 2025-02-13 RX ORDER — OXYCODONE HYDROCHLORIDE 5 MG/1
5 TABLET ORAL EVERY 6 HOURS PRN
Qty: 3 TABLET | Refills: 0 | Status: SHIPPED | OUTPATIENT
Start: 2025-02-13 | End: 2025-02-14

## 2025-02-13 RX ORDER — LIDOCAINE HYDROCHLORIDE 20 MG/ML
INJECTION, SOLUTION INFILTRATION; PERINEURAL PRN
Status: DISCONTINUED | OUTPATIENT
Start: 2025-02-13 | End: 2025-02-13

## 2025-02-13 RX ORDER — OXYCODONE HYDROCHLORIDE 5 MG/1
5 TABLET ORAL ONCE
Status: COMPLETED | OUTPATIENT
Start: 2025-02-13 | End: 2025-02-13

## 2025-02-13 RX ORDER — DEXMEDETOMIDINE HYDROCHLORIDE 4 UG/ML
INJECTION, SOLUTION INTRAVENOUS PRN
Status: DISCONTINUED | OUTPATIENT
Start: 2025-02-13 | End: 2025-02-13

## 2025-02-13 RX ORDER — DEXAMETHASONE SODIUM PHOSPHATE 4 MG/ML
4 INJECTION, SOLUTION INTRA-ARTICULAR; INTRALESIONAL; INTRAMUSCULAR; INTRAVENOUS; SOFT TISSUE
Status: DISCONTINUED | OUTPATIENT
Start: 2025-02-13 | End: 2025-02-13 | Stop reason: HOSPADM

## 2025-02-13 RX ORDER — GINSENG 100 MG
CAPSULE ORAL
Status: DISCONTINUED
Start: 2025-02-13 | End: 2025-02-13 | Stop reason: HOSPADM

## 2025-02-13 RX ORDER — SODIUM CHLORIDE, SODIUM LACTATE, POTASSIUM CHLORIDE, CALCIUM CHLORIDE 600; 310; 30; 20 MG/100ML; MG/100ML; MG/100ML; MG/100ML
INJECTION, SOLUTION INTRAVENOUS CONTINUOUS PRN
Status: DISCONTINUED | OUTPATIENT
Start: 2025-02-13 | End: 2025-02-13

## 2025-02-13 RX ORDER — FENTANYL CITRATE 0.05 MG/ML
25 INJECTION, SOLUTION INTRAMUSCULAR; INTRAVENOUS EVERY 5 MIN PRN
Status: DISCONTINUED | OUTPATIENT
Start: 2025-02-13 | End: 2025-02-13 | Stop reason: HOSPADM

## 2025-02-13 RX ADMIN — FENTANYL CITRATE 50 MCG: 50 INJECTION INTRAMUSCULAR; INTRAVENOUS at 10:26

## 2025-02-13 RX ADMIN — DEXMEDETOMIDINE HYDROCHLORIDE 20 MCG: 200 INJECTION INTRAVENOUS at 10:43

## 2025-02-13 RX ADMIN — ACETAMINOPHEN 975 MG: 325 TABLET, FILM COATED ORAL at 09:39

## 2025-02-13 RX ADMIN — DEXAMETHASONE SODIUM PHOSPHATE 4 MG: 4 INJECTION, SOLUTION INTRA-ARTICULAR; INTRALESIONAL; INTRAMUSCULAR; INTRAVENOUS; SOFT TISSUE at 10:20

## 2025-02-13 RX ADMIN — PROPOFOL 50 MCG/KG/MIN: 10 INJECTION, EMULSION INTRAVENOUS at 10:18

## 2025-02-13 RX ADMIN — FENTANYL CITRATE 50 MCG: 50 INJECTION INTRAMUSCULAR; INTRAVENOUS at 10:16

## 2025-02-13 RX ADMIN — PROPOFOL 200 MG: 10 INJECTION, EMULSION INTRAVENOUS at 10:18

## 2025-02-13 RX ADMIN — Medication 2 G: at 10:17

## 2025-02-13 RX ADMIN — LIDOCAINE HYDROCHLORIDE 100 MG: 20 INJECTION, SOLUTION INFILTRATION; PERINEURAL at 10:18

## 2025-02-13 RX ADMIN — OXYCODONE HYDROCHLORIDE 5 MG: 5 TABLET ORAL at 12:42

## 2025-02-13 RX ADMIN — SODIUM CHLORIDE, POTASSIUM CHLORIDE, SODIUM LACTATE AND CALCIUM CHLORIDE: 600; 310; 30; 20 INJECTION, SOLUTION INTRAVENOUS at 10:07

## 2025-02-13 RX ADMIN — MIDAZOLAM 2 MG: 1 INJECTION INTRAMUSCULAR; INTRAVENOUS at 10:13

## 2025-02-13 RX ADMIN — FENTANYL CITRATE 50 MCG: 50 INJECTION, SOLUTION INTRAMUSCULAR; INTRAVENOUS at 11:58

## 2025-02-13 RX ADMIN — ONDANSETRON 4 MG: 2 INJECTION INTRAMUSCULAR; INTRAVENOUS at 10:53

## 2025-02-13 ASSESSMENT — ACTIVITIES OF DAILY LIVING (ADL)
ADLS_ACUITY_SCORE: 41

## 2025-02-13 ASSESSMENT — LIFESTYLE VARIABLES: TOBACCO_USE: 1

## 2025-02-13 NOTE — ANESTHESIA PREPROCEDURE EVALUATION
Anesthesia Pre-Procedure Evaluation    Patient: Bernard Padilla   MRN: [id number] : 1985        Procedure : Procedure(s):  EXCISION, LESION, INGUINAL REGION          Past Medical History:   Diagnosis Date    Anxiety     Arthritis     Arthritis of knee     Boil     Chronic pain     Eczema     Hidradenitis suppurativa     Hydradenitis     Influenza 2020    Mild intermittent asthma without complication       Past Surgical History:   Procedure Laterality Date    EXCISE HIDRADENITIS (LOCATION) Bilateral 2020    Procedure: EXCISION, HIDRADENITIS - right medial thigh, left groin and posterior scrotum.;  Surgeon: Maura Maldonado MD;  [facility]    INCISION AND DRAINAGE, ABSCESS, SIMPLE N/A 2022    Procedure: INCISION AND closure  ABSCESS,  and wound excision right groin and perineum;  Surgeon: CURT Leos MD;  Location: [facility] OR    IRRIGATION AND DEBRIDEMENT RECTUM, COMBINED N/A 2019    Procedure: IRRIGATION AND DEBRIDEMENT, RECTUM;  Surgeon: Yon Kenny MD;  Location: [facility] OR    KNEE SURGERY      ORTHOPEDIC SURGERY      meniscus repair    SKIN SURGERY        Allergies   Allergen Reactions    Vicodin [Hydrocodone-Acetaminophen] Shortness Of Breath    Chicken-Derived Products (Egg) Nausea and Vomiting and GI Disturbance    Hydrocodone Swelling     Other reaction(s): Throat Irritation  Tolerated oxycodone   Upset stomach        Social History     Tobacco Use    Smoking status: Every Day     Current packs/day: 0.00     Types: Cigarettes     Start date: 2003     Last attempt to quit: 2023     Years since quittin.4    Smokeless tobacco: Never    Tobacco comments:     Quit smoking Aug 2023.  Continue smoking, smoked daily, a pack a day.   Substance Use Topics    Alcohol use: Yes     Comment: Beer occasionally/Social Use      Wt Readings from Last 1 Encounters:   02/10/25 87.5 kg (193 lb)        Anesthesia Evaluation            ROS/MED HX  ENT/Pulmonary:    "  (+)                tobacco use, Current use,     asthma               (-) sleep apnea   Neurologic:       Cardiovascular:       METS/Exercise Tolerance:     Hematologic:       Musculoskeletal:   (+)  arthritis,             GI/Hepatic:    (-) GERD   Renal/Genitourinary:       Endo:       Psychiatric/Substance Use:     (+) psychiatric history anxiety   Recreational drug usage: Cannabis.    Infectious Disease:       Malignancy:       Other:            Physical Exam    Airway        Mallampati: II   TM distance: > 3 FB   Neck ROM: full   Mouth opening: > 3 cm    Respiratory Devices and Support         Dental       (+) Minor Abnormalities - some fillings, tiny chips      Cardiovascular   cardiovascular exam normal          Pulmonary   pulmonary exam normal                OUTSIDE LABS:  CBC:   Lab Results   Component Value Date    WBC 8.6 07/22/2024    WBC 7.9 05/08/2023    HGB 14.4 07/22/2024    HGB 14.8 05/08/2023    HCT 42.8 07/22/2024    HCT 43.8 05/08/2023     07/22/2024     05/08/2023     BMP:   Lab Results   Component Value Date     07/22/2024     05/04/2023    POTASSIUM 4.0 07/22/2024    POTASSIUM 4.4 05/04/2023    CHLORIDE 105 07/22/2024    CHLORIDE 105 05/04/2023    CO2 25 07/22/2024    CO2 28 05/04/2023    BUN 12.8 07/22/2024    BUN 12.5 05/04/2023    CR 0.98 07/22/2024    CR 0.93 05/04/2023    GLC 85 07/22/2024    GLC 93 05/04/2023     COAGS: No results found for: \"PTT\", \"INR\", \"FIBR\"  POC:   Lab Results   Component Value Date    BGM 87 09/25/2020     HEPATIC:   Lab Results   Component Value Date    ALBUMIN 4.1 07/22/2024    PROTTOTAL 6.9 07/22/2024    ALT 28 07/22/2024    AST 26 07/22/2024    ALKPHOS 91 07/22/2024    BILITOTAL 0.5 07/22/2024     OTHER:   Lab Results   Component Value Date    LACT 0.6 (L) 12/05/2017    CHOLO 8.8 07/22/2024    PHOS 1.9 (L) 03/01/2020    MAG 2.2 03/01/2020    TSH 1.51 12/06/2021    CRP 0.1 06/18/2020       Anesthesia Plan    ASA Status:  2    NPO " "Status:  NPO Appropriate    Anesthesia Type: General.     - Airway: LMA   Induction: Intravenous, Propofol.   Maintenance: Inhalation.        Consents    Anesthesia Plan(s) and associated risks, benefits, and realistic alternatives discussed. Questions answered and patient/representative(s) expressed understanding.     - Discussed:     - Discussed with:  Patient            Postoperative Care    Pain management: Oral pain medications, IV analgesics.   PONV prophylaxis: Ondansetron (or other 5HT-3), Dexamethasone or Solumedrol, Background Propofol Infusion     Comments:               Osman Jacobs MD    I have reviewed the pertinent notes and labs in the chart from the past 30 days and (re)examined the patient.  Any updates or changes from those notes are reflected in this note.    Clinically Significant Risk Factors Present on Admission                             # Overweight: Estimated body mass index is 29.76 kg/m  as calculated from the following:    Height as of 2/10/25: 1.715 m (5' 7.52\").    Weight as of 2/10/25: 87.5 kg (193 lb).       # Asthma: noted on problem list          "

## 2025-02-13 NOTE — DISCHARGE INSTRUCTIONS
** Today you were given 975 mg of Tylenol at 9:45am. The recommended daily maximum dose is 4000 mg. **        Same Day Surgery Discharge Instructions for  Sedation and General Anesthesia     It's not unusual to feel dizzy, light-headed or faint for up to 24 hours after surgery or while taking pain medication.  If you have these symptoms: sit for a few minutes before standing and have someone assist you when you get up to walk or use the bathroom.    You should rest and relax for the next 24 hours. We recommend you make arrangements to have an adult stay with you for at least 24 hours after your discharge.  Avoid hazardous and strenuous activity.    DO NOT DRIVE any vehicle or operate mechanical equipment for 24 hours following the end of your surgery.  Even though you may feel normal, your reactions may be affected by the medication you have received.    Do not drink alcoholic beverages for 24 hours following surgery.     Slowly progress to your regular diet as you feel able. It's not unusual to feel nauseated and/or vomit after receiving anesthesia.  If you develop these symptoms, drink clear liquids (apple juice, ginger ale, broth, 7-up, etc. ) until you feel better.  If your nausea and vomiting persists for 24 hours, please notify your surgeon.      All narcotic pain medications, along with inactivity and anesthesia, can cause constipation. Drinking plenty of liquids and increasing fiber intake will help.    For any questions of a medical nature, call your surgeon.    Do not make important decisions for 24 hours.    If you had general anesthesia, you may have a sore throat for a couple of days related to the breathing tube used during surgery.  You may use Cepacol lozenges to help with this discomfort.  If it worsens or if you develop a fever, contact your surgeon.     If you feel your pain is not well managed with the pain medications prescribed by your surgeon, please contact your surgeon's office to let them  know so they can address your concerns.            ** If you have questions or concerns about your procedure,   call Dr. Mercado  [phone] **

## 2025-02-13 NOTE — ANESTHESIA POSTPROCEDURE EVALUATION
Patient: Bernard Padilla    Procedure: Procedure(s):  EXCISION, LESION, INGUINAL REGION       Anesthesia Type:  General    Note:  Disposition: Outpatient   Postop Pain Control: Uneventful            Sign Out: Well controlled pain   PONV: No   Neuro/Psych: Uneventful            Sign Out: Acceptable/Baseline neuro status   Airway/Respiratory: Uneventful            Sign Out: Acceptable/Baseline resp. status   CV/Hemodynamics: Uneventful            Sign Out: Acceptable CV status   Other NRE: NONE   DID A NON-ROUTINE EVENT OCCUR? No           Last vitals:  Vitals Value Taken Time   BP 98/46 02/13/25 1245   Temp 36.7  C (98  F) 02/13/25 1245   Pulse 68 02/13/25 1254   Resp 18 02/13/25 1254   SpO2 100 % 02/13/25 1254   Vitals shown include unfiled device data.    Electronically Signed By: Osman Jacobs MD  February 13, 2025  2:42 PM

## 2025-02-13 NOTE — ANESTHESIA PROCEDURE NOTES
Airway       Patient location during procedure: OR  Staff -        Anesthesiologist:  Osman Jacobs MD       CRNA: Amy Santos APRN CRNA       Performed By: CRNA  Consent for Airway        Urgency: elective  Indications and Patient Condition       Indications for airway management: lauren-procedural       Induction type:intravenous       Mask difficulty assessment: 0 - not attempted    Final Airway Details       Final airway type: supraglottic airway    Supraglottic Airway Details        Type: LMA       Brand: Ambu AuraGain       LMA size: 5    Post intubation assessment        Placement verified by: capnometry, equal breath sounds and chest rise        Number of attempts at approach: 1       Number of other approaches attempted: 0       Ease of procedure: easy       Dentition: Intact and Unchanged

## 2025-02-13 NOTE — ANESTHESIA CARE TRANSFER NOTE
Patient: Bernard Padilla    Procedure: Procedure(s):  EXCISION, LESION, INGUINAL REGION       Diagnosis: Hidradenitis suppurativa [L73.2]  Diagnosis Additional Information: No value filed.    Anesthesia Type:   General     Note:    Oropharynx: oropharynx clear of all foreign objects  Level of Consciousness: awake  Oxygen Supplementation: face mask  Level of Supplemental Oxygen (L/min / FiO2): 6  Independent Airway: airway patency satisfactory and stable  Dentition: dentition unchanged  Vital Signs Stable: post-procedure vital signs reviewed and stable  Report to RN Given: handoff report given  Patient transferred to: PACU    Handoff Report: Identifed the Patient, Identified the Reponsible Provider, Reviewed the pertinent medical history, Discussed the surgical course, Reviewed Intra-OP anesthesia mangement and issues during anesthesia, Set expectations for post-procedure period and Allowed opportunity for questions and acknowledgement of understanding      Vitals:  Vitals Value Taken Time   BP 93/57 02/13/25 1115   Temp     Pulse 66 02/13/25 1119   Resp 10 02/13/25 1119   SpO2 100 % 02/13/25 1119   Vitals shown include unfiled device data.    Electronically Signed By: ROSEY Ojeda CRNA  February 13, 2025  11:20 AM

## 2025-02-14 NOTE — OP NOTE
PREOPERATIVE DIAGNOSIS: Groin hidradenitis suppurativa  POSTOPERATIVE DIAGNOSIS:            Same    PROCEDURES PERFORMED:   1. Excision of groin hidradenitis suppurativa    STAFF SURGEON: Fabby Mercado MD  ANESTHESIA:  General- LMA    ESTIMATED BLOOD LOSS: 5 mL.   IV FLUIDS: see dictated anesthesia record  COMPLICATIONS: None.   SPECIMEN:  Groin Hidradenitis suppurativa    SIGNIFICANT FINDINGS:   4 cm x 2 cm collection of groin hidradenitis suppurativa, excised entirely and closed primarily.     BRIEF OPERATIVE INDICATIONS: Bernard Padilla is a 39 year old patient with a history of hidradenitis suppurativa of the groin, scrotum, and other areas of the body. He is on Cosentyx which has helped tremendously, however he continues to have a persistent, bothersome collection in his right inguinal crease. After discussion the risks, benefits, and alternatives, he was agreeable to proceed with the above stated procedure.      DESCRIPTION OF PROCEDURE: After full informed voluntary consent was obtained, the patient was transported to the operating room, placed supine/frog legged on the table. After adequate anesthesia was induced, they were prepped and draped in the usual sterile fashion. A timeout was taken to confirm correct patient, procedure and laterality     In the pre-operative area I had marked out the area on in his right groin crease. This was 4 cm x 2 cm.     I began by inserting a lacrimal duct probe down the tract of the open lesion. I opened the overlying skin to tract the network of tracts in the lesion. Once all the tracts were opened and the extent of the lesion was seen, I then excised an ellipse of tissue 4 cm x 2 cm, encompassing the entire lesion. This was passed off the field for pathologic examination. We were lateral to the spermatic cord which was identified and protected. The wound was copiously irrigated with normal saline and hemostasis was achieved.     The wound was closed in three layers- the two  deep layers with 3-0 vicryl in a running fashion, and the skin with 4-0 monocryl in a running horizontal mattress fashion. 10 mL 0.25% marcaine were used for local anesthetic. Bacitracin was applied.     This concluded the procedure. The patient was awakened and transferred to PACU in good condition.     Post-operative plan:  - Bacitracin to wound twice daily for 1 week  - Follow-up as needed    Fabby Mercado MD  Reconstructive Urology  [facility]

## 2025-02-17 LAB
PATH REPORT.COMMENTS IMP SPEC: NORMAL
PATH REPORT.FINAL DX SPEC: NORMAL
PATH REPORT.GROSS SPEC: NORMAL
PATH REPORT.MICROSCOPIC SPEC OTHER STN: NORMAL
PATH REPORT.RELEVANT HX SPEC: NORMAL
PHOTO IMAGE: NORMAL

## 2025-02-17 PROCEDURE — 88305 TISSUE EXAM BY PATHOLOGIST: CPT | Mod: 26

## 2025-02-25 ENCOUNTER — TELEPHONE (OUTPATIENT)
Dept: PHARMACY | Facility: CLINIC | Age: 40
End: 2025-02-25
Payer: MEDICARE

## 2025-02-25 ENCOUNTER — VIRTUAL VISIT (OUTPATIENT)
Dept: PHARMACY | Facility: CLINIC | Age: 40
End: 2025-02-25
Attending: DERMATOLOGY
Payer: MEDICARE

## 2025-02-25 DIAGNOSIS — L73.2 HIDRADENITIS SUPPURATIVA: Primary | ICD-10-CM

## 2025-02-25 NOTE — Clinical Note
Question-- Do you need to see him for post-op visit? Otherwise, would you want to push out your follow up for this Thurs (2/27/25) by a few weeks until he's been consistently on the Cosentyx again?   Briefly spoke with patient today to check on Cosentyx. He has been holding over the past month as directed by surgery team (excisional procedure?). Reportedly told he can resume Cosentyx. Advised him to restart. He said that prior to holding had been on Cosentyx for ~3 months with good response in HS.   Vi

## 2025-02-25 NOTE — PATIENT INSTRUCTIONS
"Recommendations from today's MTM visit:                                                      Resume Cosentyx (secukinumab) 300 mg every 14 days   Refills available at [facility] (P#: [phone])     I sent a message to Dr. Ruiz to see if your upcoming appointment with him should be pushed out by a few weeks until you have been consistently on Cosentyx again or if Dr. Ruiz would like to follow-up with you as scheduled by phone this Thursday (2/27/25)     Follow-up: with me (Vi Baugh, [facility]) for an MTM appointment by phone in around 6 months (8/25/25)     It was great speaking with you today.  I value your experience and would be very thankful for your time in providing feedback in our clinic survey. In the next few days, you may receive an email or text message from [employer] with a link to a survey related to your  clinical pharmacist.\"     To schedule another MTM appointment, please call the clinic directly or you may call the MTM scheduling line at [phone] or toll-free at [phone].     My Clinical Pharmacist's contact information:                                                      Please feel free to contact me with any questions or concerns you have.      Vi Baugh, PharmD  MTM Pharmacist  [facility]   "

## 2025-02-25 NOTE — PROGRESS NOTES
Medication Therapy Management (MTM) Encounter    ASSESSMENT:                            Hidradenitis suppurativa/Folliculitis:   Improved following Cosentyx initiation in Oct 2024 after 3 months of consistent treatment. Has been holding the Cosentyx over the past month due to recent excisional procedure and reportedly cleared to resume. Advised patient to refill Cosentyx 300 mg every 2 weeks with specialty pharmacy and restart this. Patient in favor of pushing out next follow-up with Dr. Ruiz to a few weeks from now until he's been consistently on Cosentyx again. Reached out to care team to potentially push out this visit if appropriate.     Pre-biologic labs: previously completed.   Vaccines: Avoid live vaccines. Due for multiple vaccines - patient declined all vaccines at our last visit.     PLAN:                            Resume Cosentyx (secukinumab) 300 mg every 14 days   Refills available at [facility] (P#: [phone])     Message out to Jessica (Derm RNCC) and Dr. Ruiz to see if upcoming appointment may be pushed out by a few weeks until patient has been consistently on Cosentyx again     Follow-up: with me (Vi Baugh, [facility]) for an MTM appointment by phone in around 6 months (8/25/25)     SUBJECTIVE/OBJECTIVE:                          Bernard Padilla is a 39 year old male called for a follow-up visit.      Reason for visit: Cosentyx follow-up     Medication Adherence/Access:   Cosentyx coverage:   - PA approved thru 4/4/25  - Fills at [facility]     Hidradenitis suppurativa/Folliculitis:   - Cosentyx (secukinumab) 300 mg every 2 weeks (start date: ~10/9/24)   - benzoyl peroxide wash daily as needed   - Augmentin 875-125 mg tablet twice daily (added for folliculitis on 7/18/24)   - Acute flare plan:    - Prednisone taper    Side effects: some more frequent colds when on weekly load of Cosentyx but reports this resolved when switching to maintenance dose of every 2 weeks - no other side  effect concerns reported     Patient reports he had to hold the Cosentyx due to recent excision of HS of right groin. Has been off this for almost 1 month. Reports he's been cleared to resume. Prior to holding treatment he had been on Cosentyx for ~3 months & felt like HS was improving. Needs refill of this. Affected areas include groin & buttocks.    He would like to push out his visit with Dr. Ruiz by a few weeks until he's been consistently on the Cosentyx again unless directed otherwise.      Specialist: Dr. Apolinar Ruiz MD, Dermatology. Last visit on 1/2/25. The following was recommended:   - Continue cosentyx every 2 weeks  - Follow-up with urology to excise currently chronic lesions.     Previous treatment:   - Oral antibiotics: dapsone, doxycyline  - Cyclosporine   - multiple surgical procedures (I&D)   - Humira (adalimumab) 40 mg weekly (05/2023-09/2024): lost response after 2-3 months off treatment     Pre-Biologic Screening: completed 5/4/23  Hep B Surface Antibody Reactive, considered immune      Hep B Core Antibody  Non-reactive    Hep B Surface Antigen Non-reactive     Hep C Antibody  Non-reactive     HIV Antigen Antibody Non-reactive    Quantiferon TB Gold Negative , no risk factors or symptoms concerning for TB.      Immunization History   Covid-19 vaccine (5379-0905 version)  Due to receive - declined at last visit   Influenza (annual) Due to receive, avoid live FluMist - declined at last visit    Pneumococcal  Pneumovax-23: 11/2/20   Prevnar-20: 1/11/23 Up-to-date   Tetanus/Tdap  Up-to-date   Shingrix Due to receive - declined at last visit     Allergies/ADRs: Reviewed in chart  Past Medical History: Reviewed in chart  Tobacco: He reports that he has been smoking cigarettes. He started smoking about 21 years ago. He has never used smokeless tobacco. Reports he did not use any pharmacotherapies - slowly cut back use & started up with boxing to help minimize cravings.   Alcohol: Reviewed in  chart  ----------------    I spent 5 minutes with this patient today. All changes were made via collaborative practice agreement with Apolinar Ruiz. A copy of the visit note was provided to the patient's provider(s).    A summary of these recommendations was sent via [employer].    Vi Baugh PharmD  Medication Therapy Management Pharmacist   [facility]     Telemedicine Visit Details  The patient's medications can be safely assessed via a telemedicine encounter.  Type of service:  Telephone visit  Originating Location (pt. Location): Home    Distant Location (provider location):  Off-site  Start Time:  10:47am  End Time:  10:52am     Medication Therapy Recommendations  No medication therapy recommendations to display

## 2025-02-25 NOTE — TELEPHONE ENCOUNTER
Called patient today to check on Cosentyx. No showed last MTM follow up. See MTM progress note from 2/25/25.     Vi Baugh, PharmD  MTM Pharmacist

## 2025-03-06 ENCOUNTER — TELEPHONE (OUTPATIENT)
Dept: PHARMACY | Facility: CLINIC | Age: 40
End: 2025-03-06
Payer: MEDICARE

## 2025-03-06 NOTE — TELEPHONE ENCOUNTER
Called & spoke with patient today. Explained that Dr. Ruiz would like him to repeat the Cosentyx loading dose of 300 mg every 7 days for 5 weeks followed by 300 mg every 2 weeks for maintenance. This has been approved by patient. Connected patient with [facility] (P#: [phone]). Explained to pharmacy that we are repeating the loading dose due to time off therapy for recent surgery. Pharmacy is still entering in this prescription. Pharmacy informed patient that they will call him in the next 24 to 48 hour to set up his shipment of this.    Advised patient to contact me if he has any issues will getting his fill set up.     Vi Baugh, PharmD  [facility] Pharmacist

## 2025-03-09 ENCOUNTER — HEALTH MAINTENANCE LETTER (OUTPATIENT)
Age: 40
End: 2025-03-09

## 2025-05-22 ENCOUNTER — OFFICE VISIT (OUTPATIENT)
Dept: DERMATOLOGY | Facility: CLINIC | Age: 40
End: 2025-05-22
Payer: MEDICARE

## 2025-05-22 VITALS — DIASTOLIC BLOOD PRESSURE: 76 MMHG | WEIGHT: 187.9 LBS | SYSTOLIC BLOOD PRESSURE: 114 MMHG | BODY MASS INDEX: 29.43 KG/M2

## 2025-05-22 DIAGNOSIS — L73.2 HIDRADENITIS SUPPURATIVA: Primary | ICD-10-CM

## 2025-05-22 PROCEDURE — 3078F DIAST BP <80 MM HG: CPT | Performed by: DERMATOLOGY

## 2025-05-22 PROCEDURE — 99214 OFFICE O/P EST MOD 30 MIN: CPT | Performed by: DERMATOLOGY

## 2025-05-22 PROCEDURE — G2211 COMPLEX E/M VISIT ADD ON: HCPCS | Performed by: DERMATOLOGY

## 2025-05-22 PROCEDURE — 3074F SYST BP LT 130 MM HG: CPT | Performed by: DERMATOLOGY

## 2025-05-22 NOTE — LETTER
5/22/2025       RE: Bernard Padilla  [address] [place] [address]     Dear Colleague,    Thank you for referring your patient, Bernard Padilla, to the [facility] [place] [facility]. Please see a copy of my visit note below.    [facility] Dermatology Note  Encounter Date: May 22, 2025  Office Visit     Dermatology Problem List:  1. Hidradenitis suppurativa  -Current tx: cosentyx    Humira (started 5/4/2023-mid  2024; neg TB testing 5/4/2023), BPO 10% wash  -Previous tx: multiple surgical procedures (I&D), CSA, dapsone, doxycyline, Lidex  2. Psoriasis vs Atopic dermatitis, mild  - Current tx: fluocinolone oil, Augmented Betamethasone 0.05% ointment BID  3. Clavus, L foot 5th digit  4. Vitiligo  5. Acne vulgaris, mild  -Current tx: BPO 10% wash  6. Abscess, L jawline, resolved  -Developed after ILK (8/3/2023), s/p I&D 8/7/2023 and doxycycline  7. Suspected reactive arthritis, resolved    ____________________________________________    Assessment & Plan:  # Hidradenitis suppurativa  - Continue cosentyx every 2 weeks  - Excise lesion on buttocks. Will ask if surgeon will also exised scarred nodule on rt jaw line.     The longitudinal plan of care for the diagnosis(es)/condition(s) as documented were addressed during this visit. Due to the added complexity in care, I will continue to support Bernard in the subsequent management and with ongoing continuity of care.     Follow-up: 12 weeks     Staff and Scribe:     Scribe Disclosure:   I, Lorrie Cuevas, am serving as a scribe; to document services personally performed by Apolinar Ruiz MD -based on data collection and the provider's statements to me.     Apolinar Ruiz MD, FAAD, FACP     Departments of Internal Medicine and Dermatology  [facility]    ____________________________________________    CC: Derm Problem  (Bernard is here for HS follow up. Would like to discuss cyst on jaw. Has HS surgery next week.)    HPI:  Mr. Bernard Padilla is a(n) 39 year old male who presents today as a return patient for HS  Patient has surgery on Tuesday for HS for lesion on buttocks,. Also wants to have lesion on face xcised. This cosentyx is helping. No new lesions while on cosentyx regualrly.     Patient is otherwise feeling well, without additional skin concerns.    HS Nurse Assessment        1/2/2025     3:54 PM 2/27/2025     1:44 PM 5/22/2025     3:21 PM   Nurse Assessment Data   Over the past 30 days how many old lesions flared back up? 3 1 3   Over the past 30 days how many new lesions did you get? 1 0 0   Over the past week, how many dressing changes do you do each day? 0 0 0   Over the past week, has your wound drainage been: None Mild Severe   Rate your HS overall from 0 - 10 (0 = no disease, 10 = worst) over the past week:  7 6 7-8   Rate your pain score from 0 - 10 (0 = no disease, 10 = worst) for the most painful/symptomatic lesion in the past week:  8 0 - No Pain 10 - Worst Pain   Over the past week, how much has HS influenced your quality of life? very much very much very much     Physical Exam:  Vitals: /76   Wt 85.2 kg (187 lb 14.4 oz)   BMI 29.43 kg/m    SKIN: Total skin excluding the undergarment areas was performed. The exam included the head/face, neck, both arms, chest, back, abdomen, both legs, digits and/or nails.   - HS Data      6/27/2024     4:33 PM 10/3/2024     4:46 PM 5/22/2025     3:44 PM   HS Exam Data   LC Type LC3 LC1    Clinical Subtypes Regular type Regular type    Acne? Yes Yes No   Dissecting Cellulitis? No No No   Total Han Stage II     Total Inflammatory Nodules 2 2 0   Total Abcesses 0 0 0   Total Draining Tunnels 1 0 0   Total Abscess and Nodule Count 2 2 0   IHS4 Score  6 2 0   Total  HASI Score  6 4   HS-PGA 3 2    Hypertrophic scarred nodule on rt jawline. Scarred scarred hypertrophic  papules not active in beard area.   Comments: fissure gluteal cleft  Follicular re-pigmentation in depigmented patches on extremities    - No other lesions of concern on areas examined.     Medications:  Current Outpatient Medications   Medication Sig Dispense Refill     acetaminophen (TYLENOL) 325 MG tablet Take 650 mg by mouth every 4 hours as needed       albuterol (PROAIR HFA/PROVENTIL HFA/VENTOLIN HFA) 108 (90 Base) MCG/ACT inhaler Inhale 2 puffs into the lungs every 6 hours 8.5 g 3     albuterol (PROVENTIL) (2.5 MG/3ML) 0.083% neb solution Take 1 vial (2.5 mg) by nebulization 3 times daily as needed for shortness of breath, wheezing or cough. 90 mL 0     augmented betamethasone dipropionate (DIPROLENE-AF) 0.05 % external ointment Apply topically 2 times daily. to affected area(s) 50 g 4     fluocinolone acetonide (DERMA SMOOTHE/FS BODY) 0.01 % external oil Apply topically 2 times daily. 236.56 mL 11     fluticasone (FLONASE) 50 MCG/ACT nasal spray INSTILL 1-2 SPRAYS INTO BOTH NOSTRILS ONCE DAILY 48 g 1     fluticasone-vilanterol (BREO ELLIPTA) 200-25 MCG/ACT inhaler Inhale 1 puff into the lungs daily. 60 each 2     IBUPROFEN PO Take 800 mg by mouth 3 times daily as needed (Last dose 9.19.2020)        loratadine (CLARITIN) 10 MG tablet TAKE ONE TABLET BY MOUTH ONCE DAILY 90 tablet 2     Secukinumab 300 MG/2ML SOAJ Inject 300 mg subcutaneously once a week. for 5 weeks, followed by 300 mg every 2 weeks thereafter. 10 mL 0     oxyCODONE (ROXICODONE) 5 MG tablet Take 1 tablet (5 mg) by mouth every 6 hours as needed for severe pain. (Patient not taking: Reported on 5/22/2025) 3 tablet 0     Secukinumab 300 MG/2ML SOAJ Inject 300 mg subcutaneously every 14 days. MTM patient (Patient not taking: Reported on 5/22/2025) 4 mL 6     Current Facility-Administered Medications   Medication Dose Route Frequency Provider Last Rate Last Admin     triamcinolone acetonide (KENALOG-10) injection 10 mg  10 mg Intra-Lesional Once  Joseph, Apolinar Villanueva MD          Past Medical History:   Patient Active Problem List   Diagnosis     Hidradenitis suppurativa     Patellofemoral arthritis of left knee     Eczema     Allergic rhinitis     Knee pain, left     Other chronic pain     Scrotal abscess     Abscess of groin, left     Right rotator cuff tendinitis     Mild intermittent asthma, unspecified whether complicated     Vitamin D deficiency     Past Medical History:   Diagnosis Date     Anxiety      Arthritis      Arthritis of knee      Boil      Chronic pain      Eczema      Hidradenitis suppurativa      Hydradenitis      Influenza 03/01/2020     Mild intermittent asthma without complication           Again, thank you for allowing me to participate in the care of your patient.      Sincerely,    Apolinar Ruiz MD

## 2025-05-22 NOTE — NURSING NOTE
Dermatology Rooming Note    Bernard Padilla's goals for this visit include:   Chief Complaint   Patient presents with    Derm Problem     Bernard is here for HS follow up. Would like to discuss cyst on jaw. Has HS surgery next week.

## 2025-05-22 NOTE — PROGRESS NOTES
[facility] Dermatology Note  Encounter Date: May 22, 2025  Office Visit     Dermatology Problem List:  1. Hidradenitis suppurativa  -Current tx: cosentyx    Humira (started 5/4/2023-mid  2024; neg TB testing 5/4/2023), BPO 10% wash  -Previous tx: multiple surgical procedures (I&D), CSA, dapsone, doxycyline, Lidex  2. Psoriasis vs Atopic dermatitis, mild  - Current tx: fluocinolone oil, Augmented Betamethasone 0.05% ointment BID  3. Clavus, L foot 5th digit  4. Vitiligo  5. Acne vulgaris, mild  -Current tx: BPO 10% wash  6. Abscess, L jawline, resolved  -Developed after ILK (8/3/2023), s/p I&D 8/7/2023 and doxycycline  7. Suspected reactive arthritis, resolved    ____________________________________________    Assessment & Plan:  # Hidradenitis suppurativa  - Continue cosentyx every 2 weeks  - Excise lesion on buttocks. Will ask if surgeon will also exised scarred nodule on rt jaw line.     The longitudinal plan of care for the diagnosis(es)/condition(s) as documented were addressed during this visit. Due to the added complexity in care, I will continue to support Bernard in the subsequent management and with ongoing continuity of care.     Follow-up: 12 weeks     Staff and Scribe:     Scribe Disclosure:   I, Lorrie Cuevas, am serving as a scribe; to document services personally performed by Apolinar Ruiz MD -based on data collection and the provider's statements to me.     Apolinar Ruiz MD, FAAD, FACP     Departments of Internal Medicine and Dermatology  [facility]    ____________________________________________    CC: Derm Problem (Bernard is here for HS follow up. Would like to discuss cyst on jaw. Has HS surgery next week.)    HPI:  Mr. Bernard Padilla is a(n) 39 year old male who presents today as a return patient for HS  Patient has surgery on Tuesday for HS for lesion on buttocks,. Also wants to have lesion on face xcised. This cosentyx is  helping. No new lesions while on cosentyx regualrly.     Patient is otherwise feeling well, without additional skin concerns.    HS Nurse Assessment        1/2/2025     3:54 PM 2/27/2025     1:44 PM 5/22/2025     3:21 PM   Nurse Assessment Data   Over the past 30 days how many old lesions flared back up? 3 1 3   Over the past 30 days how many new lesions did you get? 1 0 0   Over the past week, how many dressing changes do you do each day? 0 0 0   Over the past week, has your wound drainage been: None Mild Severe   Rate your HS overall from 0 - 10 (0 = no disease, 10 = worst) over the past week:  7 6 7-8   Rate your pain score from 0 - 10 (0 = no disease, 10 = worst) for the most painful/symptomatic lesion in the past week:  8 0 - No Pain 10 - Worst Pain   Over the past week, how much has HS influenced your quality of life? very much very much very much     Physical Exam:  Vitals: /76   Wt 85.2 kg (187 lb 14.4 oz)   BMI 29.43 kg/m    SKIN: Total skin excluding the undergarment areas was performed. The exam included the head/face, neck, both arms, chest, back, abdomen, both legs, digits and/or nails.   - HS Data      6/27/2024     4:33 PM 10/3/2024     4:46 PM 5/22/2025     3:44 PM   HS Exam Data   LC Type LC3 LC1    Clinical Subtypes Regular type Regular type    Acne? Yes Yes No   Dissecting Cellulitis? No No No   Total Han Stage II     Total Inflammatory Nodules 2 2 0   Total Abcesses 0 0 0   Total Draining Tunnels 1 0 0   Total Abscess and Nodule Count 2 2 0   IHS4 Score  6 2 0   Total  HASI Score  6 4   HS-PGA 3 2    Hypertrophic scarred nodule on rt jawline. Scarred scarred hypertrophic papules not active in beard area.   Comments: fissure gluteal cleft  Follicular re-pigmentation in depigmented patches on extremities    - No other lesions of concern on areas examined.     Medications:  Current Outpatient Medications   Medication Sig Dispense Refill    acetaminophen (TYLENOL) 325 MG tablet Take 650 mg  by mouth every 4 hours as needed      albuterol (PROAIR HFA/PROVENTIL HFA/VENTOLIN HFA) 108 (90 Base) MCG/ACT inhaler Inhale 2 puffs into the lungs every 6 hours 8.5 g 3    albuterol (PROVENTIL) (2.5 MG/3ML) 0.083% neb solution Take 1 vial (2.5 mg) by nebulization 3 times daily as needed for shortness of breath, wheezing or cough. 90 mL 0    augmented betamethasone dipropionate (DIPROLENE-AF) 0.05 % external ointment Apply topically 2 times daily. to affected area(s) 50 g 4    fluocinolone acetonide (DERMA SMOOTHE/FS BODY) 0.01 % external oil Apply topically 2 times daily. 236.56 mL 11    fluticasone (FLONASE) 50 MCG/ACT nasal spray INSTILL 1-2 SPRAYS INTO BOTH NOSTRILS ONCE DAILY 48 g 1    fluticasone-vilanterol (BREO ELLIPTA) 200-25 MCG/ACT inhaler Inhale 1 puff into the lungs daily. 60 each 2    IBUPROFEN PO Take 800 mg by mouth 3 times daily as needed (Last dose 9.19.2020)       loratadine (CLARITIN) 10 MG tablet TAKE ONE TABLET BY MOUTH ONCE DAILY 90 tablet 2    Secukinumab 300 MG/2ML SOAJ Inject 300 mg subcutaneously once a week. for 5 weeks, followed by 300 mg every 2 weeks thereafter. 10 mL 0    oxyCODONE (ROXICODONE) 5 MG tablet Take 1 tablet (5 mg) by mouth every 6 hours as needed for severe pain. (Patient not taking: Reported on 5/22/2025) 3 tablet 0    Secukinumab 300 MG/2ML SOAJ Inject 300 mg subcutaneously every 14 days. MTM patient (Patient not taking: Reported on 5/22/2025) 4 mL 6     Current Facility-Administered Medications   Medication Dose Route Frequency Provider Last Rate Last Admin    triamcinolone acetonide (KENALOG-10) injection 10 mg  10 mg Intra-Lesional Once Apolinar Ruiz MD          Past Medical History:   Patient Active Problem List   Diagnosis    Hidradenitis suppurativa    Patellofemoral arthritis of left knee    Eczema    Allergic rhinitis    Knee pain, left    Other chronic pain    Scrotal abscess    Abscess of groin, left    Right rotator cuff tendinitis    Mild  intermittent asthma, unspecified whether complicated    Vitamin D deficiency     Past Medical History:   Diagnosis Date    Anxiety     Arthritis     Arthritis of knee     Boil     Chronic pain     Eczema     Hidradenitis suppurativa     Hydradenitis     Influenza 03/01/2020    Mild intermittent asthma without complication

## 2025-06-12 ENCOUNTER — TELEPHONE (OUTPATIENT)
Dept: DERMATOLOGY | Facility: CLINIC | Age: 40
End: 2025-06-12
Payer: MEDICARE

## 2025-06-12 DIAGNOSIS — L73.2 HIDRADENITIS SUPPURATIVA: Primary | ICD-10-CM

## 2025-06-12 NOTE — TELEPHONE ENCOUNTER
6/12 Patient confirmed scheduled appointment:  Date: 10/2/25  Time: 4:30pm  Visit type: Return HS  Provider: Joseph  Location: [facility] [place] phone visit  Testing/imaging: na  Additional notes:     Pt stated they have a growth on their face that they said Dr. Ruiz said they'd get a call from someone to schedule its removal.     Pt asked that I forward a message to the clinic.    
Pt states he was to receive a call about having surgery on his jawline. Writer does not see referral. Routing to provider.  
Previously Declined (within the last year)

## 2025-06-16 ENCOUNTER — MYC REFILL (OUTPATIENT)
Dept: FAMILY MEDICINE | Facility: CLINIC | Age: 40
End: 2025-06-16
Payer: MEDICARE

## 2025-06-16 DIAGNOSIS — J45.20 MILD INTERMITTENT ASTHMA WITHOUT COMPLICATION: ICD-10-CM

## 2025-06-16 DIAGNOSIS — N52.01 ERECTILE DYSFUNCTION DUE TO ARTERIAL INSUFFICIENCY: ICD-10-CM

## 2025-06-16 DIAGNOSIS — J45.20 MILD INTERMITTENT ASTHMA, UNSPECIFIED WHETHER COMPLICATED: ICD-10-CM

## 2025-06-16 DIAGNOSIS — T78.40XD ALLERGY, SUBSEQUENT ENCOUNTER: ICD-10-CM

## 2025-06-16 RX ORDER — LORATADINE 10 MG/1
1 TABLET ORAL DAILY
Qty: 90 TABLET | Refills: 1 | Status: SHIPPED | OUTPATIENT
Start: 2025-06-16

## 2025-06-16 RX ORDER — ALBUTEROL SULFATE 0.83 MG/ML
2.5 SOLUTION RESPIRATORY (INHALATION) 3 TIMES DAILY PRN
Qty: 90 ML | Refills: 0 | Status: SHIPPED | OUTPATIENT
Start: 2025-06-16

## 2025-06-17 ENCOUNTER — VIRTUAL VISIT (OUTPATIENT)
Dept: PHARMACY | Facility: CLINIC | Age: 40
End: 2025-06-17
Attending: DERMATOLOGY
Payer: MEDICARE

## 2025-06-17 ENCOUNTER — TELEPHONE (OUTPATIENT)
Dept: DERMATOLOGY | Facility: CLINIC | Age: 40
End: 2025-06-17
Payer: MEDICARE

## 2025-06-17 DIAGNOSIS — L73.2 HIDRADENITIS SUPPURATIVA: Primary | ICD-10-CM

## 2025-06-17 NOTE — TELEPHONE ENCOUNTER
Talked to patient. Scheduled first available consult. Pt also has a cyst on the jaw line that he would like removed.

## 2025-06-17 NOTE — PROGRESS NOTES
Medication Therapy Management (MTM) Encounter    ASSESSMENT:                            Hidradenitis suppurativa:  Historically improved on Cosentyx. Held for recent excisional procedure. Patient to resume Cosentyx 300 mg weekly for 5 weeks, followed by 300 mg every 14 days thereafter.     Pre-biologic labs: previously completed.   Vaccines: Avoid live vaccines. Due for multiple vaccines - patient declined all vaccines at our last visit.     PLAN:                            Resume Cosentyx (secukinumab) 300 mg weekly for 5 weeks (with supply at home), followed by 300 mg every 14 days thereafter   Refills available at [facility] (P#: [phone])     Follow-up: with me (Vi Baugh [facility]) for an MTM appointment by phone in around 6 months (8/25/25)     SUBJECTIVE/OBJECTIVE:                          Bernard Padilla is a 39 year old male called for a follow-up visit.      Reason for visit: Cosentyx follow-up     Medication Adherence/Access:   Cosentyx coverage:   - PA approved thru 12/31/25  - Fills at [facility]     Hidradenitis suppurativa:   - Cosentyx (secukinumab) 300 mg every 2 weeks (start date: ~10/9/24)   - benzoyl peroxide wash daily as needed   - Acute flare plan:    - Prednisone taper    Side effects: None     Reports he's been off Cosentyx temporarily for surgery but recently ordered & will restart with supply at home. Surgical excision of lesions on buttocks.  Prior to recent hold felt that Cosentyx worked well to manage his symptoms. Primary affected areas include groin & buttocks.    Specialist: Dr. Apolinar Ruiz MD, Dermatology. Last visit on 5/22/25    Previous treatment:   - Oral antibiotics: dapsone, doxycyline  - Cyclosporine   - multiple surgical procedures (I&D)   - Humira (adalimumab) 40 mg weekly (05/2023-09/2024): lost response after 2-3 months off treatment     Pre-Biologic Screening: completed 5/4/23  Hep B Surface Antibody Reactive, considered immune     Hep B Core Antibody   Non-reactive    Hep B Surface Antigen Non-reactive     Hep C Antibody  Non-reactive     HIV Antigen Antibody Non-reactive    Quantiferon TB Gold Negative , no risk factors or symptoms concerning for TB.      Immunization History   Covid-19 vaccine ([id number] version)  Due to receive - declined at last visit   Influenza (annual) Consider vaccine in 2025-26 season, avoid live FluMist - declined at last visit    Pneumococcal  Pneumovax-23: 11/2/20   Prevnar-20: 1/11/23 Up-to-date   Tetanus/Tdap  Up-to-date   Shingrix Due to receive - declined at last visit     Allergies/ADRs: Reviewed in chart  Past Medical History: Reviewed in chart  Tobacco: He reports that he has been smoking cigarettes. He started smoking about 21 years ago. He has never used smokeless tobacco. Not interested in pharmacotherapies.   Alcohol: Reviewed in chart  ----------------    I spent 5 minutes with this patient today. All changes were made via collaborative practice agreement with Apolinar Ruiz.     A summary of these recommendations was sent via [employer].    Vi Baugh, PharmD  Medication Therapy Management Pharmacist   [facility]     Telemedicine Visit Details  The patient's medications can be safely assessed via a telemedicine encounter.  Type of service:  Telephone visit  Originating Location (pt. Location): Home    Distant Location (provider location):  Off-site  Start Time: 11:32am  End Time: 11:37am     Medication Therapy Recommendations  No medication therapy recommendations to display

## 2025-06-17 NOTE — TELEPHONE ENCOUNTER
[facility] Call Center    Phone Message    May a detailed message be left on voicemail: yes     Reason for Call: Appointment Intake    Referring Provider Name: RK TURNER  Diagnosis and/or Symptoms: L73.2 (ICD-10-CM) - Hidradenitis suppurativa    Action Taken: Message routed to:  [facility] Derm Surgery    Travel Screening: Not Applicable

## 2025-06-18 NOTE — PATIENT INSTRUCTIONS
Recommendations from today's MTM visit:                                                      Resume Cosentyx (secukinumab) 300 mg weekly for 5 weeks (with supply at home), followed by 300 mg every 14 days thereafter   Refills available at [facility] (P#: [phone])     Follow-up: with me (Vi Baugh [facility]) for an MTM appointment by phone in around 6 months (8/25/25)     My Clinical Pharmacist's contact information:                                                      Please feel free to contact me with any questions or concerns you have.      Vi Baugh, PharmD  MTM Pharmacist  [facility]

## 2025-07-03 DIAGNOSIS — J45.20 MILD INTERMITTENT ASTHMA, UNSPECIFIED WHETHER COMPLICATED: ICD-10-CM

## 2025-07-03 DIAGNOSIS — Z76.0 ENCOUNTER FOR MEDICATION REFILL: ICD-10-CM

## 2025-07-03 RX ORDER — ALBUTEROL SULFATE 90 UG/1
2 INHALANT RESPIRATORY (INHALATION) EVERY 6 HOURS
Qty: 8.5 G | Refills: 3 | Status: SHIPPED | OUTPATIENT
Start: 2025-07-03

## 2025-07-03 NOTE — TELEPHONE ENCOUNTER
Patient requesting a refill for albuterol inhaler.  Patient is going out of town and requests an inhaler as soon as possible.    Thank you,  Brooklyn Quiñonez, Pharmacist  [facility]

## 2025-07-03 NOTE — TELEPHONE ENCOUNTER
"Patient calling - \"I'm trying to see if I can get Dr. Villagran to refill my inhaler before I leave out of town today.\"  Flight leaves at 1500 today, traveling to a humid environment and is concerned for asthma exacerbations.  Would prefer to bring albuterol inhaler rather than nebulizer and vials.    Writer noted last refill order was sent in October 2023. This is a PRN medication, patient reports infrequent use.    Karen Dillon RN  [facility]  "

## 2025-07-30 ENCOUNTER — NURSE TRIAGE (OUTPATIENT)
Dept: FAMILY MEDICINE | Facility: CLINIC | Age: 40
End: 2025-07-30
Payer: MEDICARE

## 2025-07-30 NOTE — TELEPHONE ENCOUNTER
"Patient's call warm transferred to writer with patient reporting chest pain and tingling in BUE.    Per patient:  Duration: 1 month  This morning woke up and feels \"freezing cold\"  Intermittent tingling of BUE  Tobacco and cannabis user  ER visit 1.5-2 weeks ago  Diagnosed with heartburn  TUMS not helpful  Chest hurts when tries to speak loudly  Location: left side of chest  Uses albuterol inhaler PRN  Using more frequently for the past month  Helps temporarily  Afebrile   Ibuprofen and Tylenol using- helps \"momentarily\"  Last night felt like he was going to pass out but did not have syncope  Family history of heart disease and diabetes  Currently severe pain  Plans to go back to ER for re-evaluation    Writer advised Emergency evaluation immediately/call 911.    Patient verbalized understanding and in agreement with plan.    EMERSON MayenN, RN-[facility]        Reason for Disposition   Chest pain lasting longer than 5 minutes and ANY of the following:         Pain is crushing, pressure-like, or heavy         Over 44 years old          Over 30 years old and one cardiac risk factor (e.g diabetes, high blood pressure, high cholesterol, smoker, or family history of heart disease)         History of heart disease (e.g. angina, heart attack, heart failure, bypass surgery, takes nitroglycerin)    Additional Information   Negative: SEVERE difficulty breathing (e.g., struggling for each breath, speaks in single words)   Negative: Difficult to awaken or acting confused (e.g., disoriented, slurred speech)   Negative: Shock suspected (e.g., cold/pale/clammy skin, too weak to stand, low BP, rapid pulse)   Negative: Passed out (i.e., lost consciousness, collapsed and was not responding)    Protocols used: Chest Pain-A-OH    "

## 2025-08-05 ENCOUNTER — OFFICE VISIT (OUTPATIENT)
Dept: DERMATOLOGY | Facility: CLINIC | Age: 40
End: 2025-08-05
Attending: DERMATOLOGY
Payer: MEDICARE

## 2025-08-05 DIAGNOSIS — L73.2 HIDRADENITIS SUPPURATIVA: ICD-10-CM

## 2025-08-05 PROCEDURE — 1126F AMNT PAIN NOTED NONE PRSNT: CPT | Performed by: DERMATOLOGY

## 2025-08-05 PROCEDURE — 99213 OFFICE O/P EST LOW 20 MIN: CPT | Performed by: DERMATOLOGY

## 2025-08-05 ASSESSMENT — PAIN SCALES - GENERAL: PAINLEVEL_OUTOF10: NO PAIN (0)

## 2025-08-08 ENCOUNTER — MYC REFILL (OUTPATIENT)
Dept: DERMATOLOGY | Facility: CLINIC | Age: 40
End: 2025-08-08
Payer: MEDICARE

## 2025-08-08 DIAGNOSIS — L30.9 ECZEMA, UNSPECIFIED TYPE: ICD-10-CM

## 2025-08-08 DIAGNOSIS — L40.9 PSORIASIS: ICD-10-CM

## 2025-08-11 RX ORDER — FLUOCINOLONE ACETONIDE 0.11 MG/ML
OIL TOPICAL 2 TIMES DAILY
Qty: 236.56 ML | Refills: 11 | OUTPATIENT
Start: 2025-08-11

## 2025-08-11 RX ORDER — BETAMETHASONE DIPROPIONATE 0.5 MG/G
OINTMENT, AUGMENTED TOPICAL 2 TIMES DAILY
Qty: 50 G | Refills: 1 | Status: SHIPPED | OUTPATIENT
Start: 2025-08-11

## (undated) DEVICE — SUCTION MANIFOLD NEPTUNE 2 SYS 1 PORT 702-025-000

## (undated) DEVICE — NDL 25GA 1.5" 305127

## (undated) DEVICE — LINEN ORTHO PACK 5446

## (undated) DEVICE — CLOSURE SYS SKIN PREMIERPRO EXOFIN FUSION 4X22CM STRL 3472

## (undated) DEVICE — GLOVE PROTEXIS BLUE W/NEU-THERA 8.0  2D73EB80

## (undated) DEVICE — GOWN XLG DISP 9545

## (undated) DEVICE — GOWN IMPERVIOUS BREATHABLE SMART XLG 89045

## (undated) DEVICE — APPLICATORS COTTON TIP 6"X2 STERILE LF C15053-006

## (undated) DEVICE — ESU GROUND PAD ADULT W/CORD E7507

## (undated) DEVICE — PAD CHUX UNDERPAD 23X24" 7136

## (undated) DEVICE — ADH SKIN CLOSURE PREMIERPRO EXOFIN 1.0ML 3470

## (undated) DEVICE — PEN MARKING SKIN W/LABELS 31145918

## (undated) DEVICE — ESU ELEC NDL 6" COATED/INSULATED E1465-6

## (undated) DEVICE — ESU PENCIL W/SMOKE EVAC NEPTUNE STRYKER 0703-046-000

## (undated) DEVICE — SU MONOCRYL 3-0 PS-2 18" UND Y497G

## (undated) DEVICE — SUCTION MANIFOLD NEPTUNE 2 SYS 4 PORT 0702-020-000

## (undated) DEVICE — LINEN TOWEL PACK X6 WHITE 5487

## (undated) DEVICE — SUCTION TIP YANKAUER W/O VENT K86

## (undated) DEVICE — NDL 21GA 1.5"

## (undated) DEVICE — ESU ELEC BLADE 2.75" COATED/INSULATED E1455

## (undated) DEVICE — SUCTION MANIFOLD DORNOCH ULTRA CART UL-CL500

## (undated) DEVICE — BLADE CLIPPER 4406

## (undated) DEVICE — SU MONOCRYL 4-0 PS-2 18" UND Y496G

## (undated) DEVICE — ESU GROUND PAD UNIVERSAL W/O CORD

## (undated) DEVICE — PAD CHUX UNDERPAD 30X30"

## (undated) DEVICE — SOL WATER IRRIG 1000ML BOTTLE 2F7114

## (undated) DEVICE — ESU PENCIL SMOKE EVAC W/ROCKER SWITCH 0703-047-000

## (undated) DEVICE — LINEN TOWEL PACK X5 5464

## (undated) DEVICE — SYR BULB IRRIG 50ML LATEX FREE 0035280

## (undated) DEVICE — PACK MINOR SBA15MIFSE

## (undated) DEVICE — SU VICRYL 2-0 CT-1 27" UND J259H

## (undated) DEVICE — PACK RECTAL UMMC

## (undated) DEVICE — SOL NACL 0.9% IRRIG 500ML BOTTLE 2F7123

## (undated) DEVICE — SOL WATER IRRIG 500ML BOTTLE 2F7113

## (undated) DEVICE — SU MONOCRYL 2-0 SH 27" UND Y417H

## (undated) DEVICE — SYR 10ML LL W/O NDL 302995

## (undated) DEVICE — DRAPE MINOR PROCEDURE LAP 29496

## (undated) DEVICE — DRAPE IOBAN INCISE 23X17" 6650EZ

## (undated) DEVICE — PREP CHLORAPREP 26ML TINTED ORANGE  260815

## (undated) DEVICE — PACK MINOR CUSTOM ASC

## (undated) DEVICE — BLADE KNIFE SURG 15 371115

## (undated) DEVICE — SPECIMEN CONTAINER 5OZ STERILE 2600SA

## (undated) DEVICE — DRSG KERLIX 4 1/2"X4YDS ROLL 6730

## (undated) DEVICE — DRSG KERLIX 4 1/2"X4YDS ROLL 6715

## (undated) DEVICE — PREP POVIDONE IODINE SCRUB 7.5% 4OZ APL82212

## (undated) DEVICE — COVER CAMERA IN-LIGHT DISP LT-C02

## (undated) DEVICE — LINEN DRAPE 54X72" 5467

## (undated) DEVICE — LIGHT HANDLE X1 31140133

## (undated) DEVICE — TAPE CLOTH 2" CARDINAL 3TRCL02

## (undated) DEVICE — ESU ELEC BLADE HEX-LOCKING 2.5" E1450X

## (undated) DEVICE — SU VICRYL 3-0 SH 27" J316H

## (undated) DEVICE — PANTIES MESH LG/XLG 2PK 706M2

## (undated) DEVICE — DRSG ABDOMINAL 07 1/2X8" 7197D

## (undated) DEVICE — LINEN GOWN X4 5410

## (undated) DEVICE — STPL SKIN PROXIMATE 35 WIDE PMW35

## (undated) DEVICE — PREP SKIN SCRUB TRAY 4461A

## (undated) DEVICE — DRSG GAUZE 4X8" NON21842

## (undated) DEVICE — TAPE MEDIPORE 4"X2YD 2864

## (undated) DEVICE — LIGHT HANDLE X2

## (undated) DEVICE — SPONGE BALL KERLIX ROUND XL W/O STRING LATEX 4935

## (undated) DEVICE — GLOVE PROTEXIS POWDER FREE SMT 7.0  2D72PT70X

## (undated) DEVICE — GLOVE PROTEXIS MICRO 8.0  2D73PM80

## (undated) DEVICE — BLADE KNIFE SURG 11 371111

## (undated) DEVICE — Device

## (undated) DEVICE — DRAPE SHEET MED 44X70" 9355

## (undated) DEVICE — STRAP KNEE/BODY 31143004

## (undated) DEVICE — GLOVE PROTEXIS MICRO 6.5  2D73PM65

## (undated) DEVICE — PREP CHLORAPREP 26ML TINTED HI-LITE ORANGE 930815

## (undated) DEVICE — DRAPE U SPLIT 74X120" 29440

## (undated) DEVICE — DRAPE UNDER BUTTOCK 8483

## (undated) DEVICE — SOL NACL 0.9% IRRIG 1000ML BOTTLE 2F7124

## (undated) DEVICE — SYR 10ML FINGER CONTROL W/O NDL 309695

## (undated) RX ORDER — HYDROMORPHONE HYDROCHLORIDE 1 MG/ML
INJECTION, SOLUTION INTRAMUSCULAR; INTRAVENOUS; SUBCUTANEOUS
Status: DISPENSED
Start: 2020-09-25

## (undated) RX ORDER — ONDANSETRON 2 MG/ML
INJECTION INTRAMUSCULAR; INTRAVENOUS
Status: DISPENSED
Start: 2022-05-04

## (undated) RX ORDER — BUPIVACAINE HYDROCHLORIDE 2.5 MG/ML
INJECTION, SOLUTION EPIDURAL; INFILTRATION; INTRACAUDAL
Status: DISPENSED
Start: 2022-05-04

## (undated) RX ORDER — OXYCODONE HYDROCHLORIDE 5 MG/1
TABLET ORAL
Status: DISPENSED
Start: 2019-11-14

## (undated) RX ORDER — ONDANSETRON 2 MG/ML
INJECTION INTRAMUSCULAR; INTRAVENOUS
Status: DISPENSED
Start: 2020-09-25

## (undated) RX ORDER — ONDANSETRON 2 MG/ML
INJECTION INTRAMUSCULAR; INTRAVENOUS
Status: DISPENSED
Start: 2025-02-13

## (undated) RX ORDER — FENTANYL CITRATE 50 UG/ML
INJECTION, SOLUTION INTRAMUSCULAR; INTRAVENOUS
Status: DISPENSED
Start: 2019-11-14

## (undated) RX ORDER — CLINDAMYCIN PHOSPHATE 900 MG/50ML
INJECTION, SOLUTION INTRAVENOUS
Status: DISPENSED
Start: 2022-05-04

## (undated) RX ORDER — LIDOCAINE HYDROCHLORIDE 10 MG/ML
INJECTION, SOLUTION EPIDURAL; INFILTRATION; INTRACAUDAL; PERINEURAL
Status: DISPENSED
Start: 2024-06-27

## (undated) RX ORDER — OXYCODONE HYDROCHLORIDE 5 MG/1
TABLET ORAL
Status: DISPENSED
Start: 2025-02-13

## (undated) RX ORDER — CEFAZOLIN SODIUM/WATER 2 G/20 ML
SYRINGE (ML) INTRAVENOUS
Status: DISPENSED
Start: 2025-02-13

## (undated) RX ORDER — ACETAMINOPHEN 325 MG/1
TABLET ORAL
Status: DISPENSED
Start: 2022-05-04

## (undated) RX ORDER — FENTANYL CITRATE 50 UG/ML
INJECTION, SOLUTION INTRAMUSCULAR; INTRAVENOUS
Status: DISPENSED
Start: 2025-02-13

## (undated) RX ORDER — DEXAMETHASONE SODIUM PHOSPHATE 4 MG/ML
INJECTION, SOLUTION INTRA-ARTICULAR; INTRALESIONAL; INTRAMUSCULAR; INTRAVENOUS; SOFT TISSUE
Status: DISPENSED
Start: 2020-09-25

## (undated) RX ORDER — DEXAMETHASONE SODIUM PHOSPHATE 4 MG/ML
INJECTION, SOLUTION INTRA-ARTICULAR; INTRALESIONAL; INTRAMUSCULAR; INTRAVENOUS; SOFT TISSUE
Status: DISPENSED
Start: 2022-05-04

## (undated) RX ORDER — CEFAZOLIN SODIUM 1 G/3ML
INJECTION, POWDER, FOR SOLUTION INTRAMUSCULAR; INTRAVENOUS
Status: DISPENSED
Start: 2020-09-25

## (undated) RX ORDER — FENTANYL CITRATE 50 UG/ML
INJECTION, SOLUTION INTRAMUSCULAR; INTRAVENOUS
Status: DISPENSED
Start: 2020-09-25

## (undated) RX ORDER — PROPOFOL 10 MG/ML
INJECTION, EMULSION INTRAVENOUS
Status: DISPENSED
Start: 2025-02-13

## (undated) RX ORDER — FENTANYL CITRATE 0.05 MG/ML
INJECTION, SOLUTION INTRAMUSCULAR; INTRAVENOUS
Status: DISPENSED
Start: 2025-02-13

## (undated) RX ORDER — PROPOFOL 10 MG/ML
INJECTION, EMULSION INTRAVENOUS
Status: DISPENSED
Start: 2020-09-25

## (undated) RX ORDER — IPRATROPIUM BROMIDE AND ALBUTEROL SULFATE 2.5; .5 MG/3ML; MG/3ML
SOLUTION RESPIRATORY (INHALATION)
Status: DISPENSED
Start: 2020-09-25

## (undated) RX ORDER — BUPIVACAINE HYDROCHLORIDE AND EPINEPHRINE 2.5; 5 MG/ML; UG/ML
INJECTION, SOLUTION INFILTRATION; PERINEURAL
Status: DISPENSED
Start: 2020-09-25

## (undated) RX ORDER — CEFAZOLIN SODIUM 2 G/100ML
INJECTION, SOLUTION INTRAVENOUS
Status: DISPENSED
Start: 2020-09-25

## (undated) RX ORDER — FENTANYL CITRATE 50 UG/ML
INJECTION, SOLUTION INTRAMUSCULAR; INTRAVENOUS
Status: DISPENSED
Start: 2022-05-04

## (undated) RX ORDER — OXYCODONE HYDROCHLORIDE 5 MG/1
TABLET ORAL
Status: DISPENSED
Start: 2022-05-04

## (undated) RX ORDER — LIDOCAINE HYDROCHLORIDE 20 MG/ML
INJECTION, SOLUTION EPIDURAL; INFILTRATION; INTRACAUDAL; PERINEURAL
Status: DISPENSED
Start: 2022-05-04

## (undated) RX ORDER — PROPOFOL 10 MG/ML
INJECTION, EMULSION INTRAVENOUS
Status: DISPENSED
Start: 2022-05-04

## (undated) RX ORDER — LIDOCAINE HYDROCHLORIDE 20 MG/ML
INJECTION, SOLUTION EPIDURAL; INFILTRATION; INTRACAUDAL; PERINEURAL
Status: DISPENSED
Start: 2020-09-25

## (undated) RX ORDER — GINSENG 100 MG
CAPSULE ORAL
Status: DISPENSED
Start: 2018-07-21

## (undated) RX ORDER — ACETAMINOPHEN 325 MG/1
TABLET ORAL
Status: DISPENSED
Start: 2025-02-13